# Patient Record
Sex: FEMALE | Race: WHITE
[De-identification: names, ages, dates, MRNs, and addresses within clinical notes are randomized per-mention and may not be internally consistent; named-entity substitution may affect disease eponyms.]

---

## 2017-02-13 ENCOUNTER — APPOINTMENT (OUTPATIENT)
Dept: CARDIOLOGY | Facility: CLINIC | Age: 72
End: 2017-02-13

## 2017-02-13 VITALS
BODY MASS INDEX: 42.75 KG/M2 | SYSTOLIC BLOOD PRESSURE: 140 MMHG | WEIGHT: 266 LBS | DIASTOLIC BLOOD PRESSURE: 70 MMHG | HEART RATE: 84 BPM | HEIGHT: 66 IN

## 2017-04-03 ENCOUNTER — APPOINTMENT (OUTPATIENT)
Dept: CARDIOLOGY | Facility: CLINIC | Age: 72
End: 2017-04-03

## 2017-04-03 VITALS
WEIGHT: 271 LBS | BODY MASS INDEX: 43.55 KG/M2 | HEIGHT: 66 IN | SYSTOLIC BLOOD PRESSURE: 150 MMHG | DIASTOLIC BLOOD PRESSURE: 68 MMHG | HEART RATE: 63 BPM

## 2017-04-14 ENCOUNTER — APPOINTMENT (OUTPATIENT)
Dept: CARDIOLOGY | Facility: CLINIC | Age: 72
End: 2017-04-14

## 2017-04-17 ENCOUNTER — APPOINTMENT (OUTPATIENT)
Dept: CARDIOLOGY | Facility: CLINIC | Age: 72
End: 2017-04-17

## 2017-04-17 VITALS
WEIGHT: 268 LBS | BODY MASS INDEX: 43.07 KG/M2 | DIASTOLIC BLOOD PRESSURE: 60 MMHG | SYSTOLIC BLOOD PRESSURE: 130 MMHG | HEIGHT: 66 IN

## 2017-05-22 ENCOUNTER — APPOINTMENT (OUTPATIENT)
Dept: CARDIOLOGY | Facility: CLINIC | Age: 72
End: 2017-05-22

## 2017-05-22 VITALS
BODY MASS INDEX: 43.23 KG/M2 | WEIGHT: 269 LBS | SYSTOLIC BLOOD PRESSURE: 130 MMHG | HEART RATE: 71 BPM | DIASTOLIC BLOOD PRESSURE: 60 MMHG | HEIGHT: 66 IN

## 2017-05-22 DIAGNOSIS — R07.9 CHEST PAIN, UNSPECIFIED: ICD-10-CM

## 2017-05-31 ENCOUNTER — OUTPATIENT (OUTPATIENT)
Dept: OUTPATIENT SERVICES | Facility: HOSPITAL | Age: 72
LOS: 1 days | Discharge: HOME | End: 2017-05-31

## 2017-06-12 ENCOUNTER — APPOINTMENT (OUTPATIENT)
Dept: CARDIOLOGY | Facility: CLINIC | Age: 72
End: 2017-06-12

## 2017-06-12 VITALS
DIASTOLIC BLOOD PRESSURE: 62 MMHG | BODY MASS INDEX: 43.23 KG/M2 | SYSTOLIC BLOOD PRESSURE: 112 MMHG | WEIGHT: 269 LBS | HEIGHT: 66 IN | HEART RATE: 72 BPM

## 2017-06-28 DIAGNOSIS — Z88.0 ALLERGY STATUS TO PENICILLIN: ICD-10-CM

## 2017-06-28 DIAGNOSIS — Z95.2 PRESENCE OF PROSTHETIC HEART VALVE: ICD-10-CM

## 2017-06-28 DIAGNOSIS — R06.00 DYSPNEA, UNSPECIFIED: ICD-10-CM

## 2017-06-28 DIAGNOSIS — I10 ESSENTIAL (PRIMARY) HYPERTENSION: ICD-10-CM

## 2017-06-28 DIAGNOSIS — Z98.61 CORONARY ANGIOPLASTY STATUS: ICD-10-CM

## 2017-06-28 DIAGNOSIS — E11.9 TYPE 2 DIABETES MELLITUS WITHOUT COMPLICATIONS: ICD-10-CM

## 2017-08-28 ENCOUNTER — APPOINTMENT (OUTPATIENT)
Dept: CARDIOLOGY | Facility: CLINIC | Age: 72
End: 2017-08-28

## 2017-08-28 VITALS
DIASTOLIC BLOOD PRESSURE: 78 MMHG | HEART RATE: 78 BPM | SYSTOLIC BLOOD PRESSURE: 126 MMHG | WEIGHT: 293 LBS | BODY MASS INDEX: 47.09 KG/M2 | HEIGHT: 66 IN

## 2017-09-18 ENCOUNTER — APPOINTMENT (OUTPATIENT)
Dept: CARDIOLOGY | Facility: CLINIC | Age: 72
End: 2017-09-18

## 2017-09-18 ENCOUNTER — RESULT REVIEW (OUTPATIENT)
Age: 72
End: 2017-09-18

## 2017-09-18 ENCOUNTER — OUTPATIENT (OUTPATIENT)
Dept: OUTPATIENT SERVICES | Facility: HOSPITAL | Age: 72
LOS: 1 days | Discharge: HOME | End: 2017-09-18

## 2017-09-18 VITALS
HEIGHT: 66 IN | DIASTOLIC BLOOD PRESSURE: 68 MMHG | HEART RATE: 69 BPM | WEIGHT: 293 LBS | BODY MASS INDEX: 47.09 KG/M2 | SYSTOLIC BLOOD PRESSURE: 108 MMHG

## 2017-09-18 DIAGNOSIS — L27.0 GENERALIZED SKIN ERUPTION DUE TO DRUGS AND MEDICAMENTS TAKEN INTERNALLY: ICD-10-CM

## 2017-09-18 DIAGNOSIS — R06.00 DYSPNEA, UNSPECIFIED: ICD-10-CM

## 2017-09-18 DIAGNOSIS — R06.09 OTHER FORMS OF DYSPNEA: ICD-10-CM

## 2017-09-18 DIAGNOSIS — E11.9 TYPE 2 DIABETES MELLITUS WITHOUT COMPLICATIONS: ICD-10-CM

## 2017-09-18 DIAGNOSIS — E78.5 HYPERLIPIDEMIA, UNSPECIFIED: ICD-10-CM

## 2017-09-18 DIAGNOSIS — I10 ESSENTIAL (PRIMARY) HYPERTENSION: ICD-10-CM

## 2017-09-18 DIAGNOSIS — I25.10 ATHEROSCLEROTIC HEART DISEASE OF NATIVE CORONARY ARTERY WITHOUT ANGINA PECTORIS: ICD-10-CM

## 2017-09-25 ENCOUNTER — OUTPATIENT (OUTPATIENT)
Dept: OUTPATIENT SERVICES | Facility: HOSPITAL | Age: 72
LOS: 1 days | Discharge: HOME | End: 2017-09-25

## 2017-09-25 DIAGNOSIS — E11.9 TYPE 2 DIABETES MELLITUS WITHOUT COMPLICATIONS: ICD-10-CM

## 2017-09-25 DIAGNOSIS — I25.10 ATHEROSCLEROTIC HEART DISEASE OF NATIVE CORONARY ARTERY WITHOUT ANGINA PECTORIS: ICD-10-CM

## 2017-09-25 DIAGNOSIS — E78.5 HYPERLIPIDEMIA, UNSPECIFIED: ICD-10-CM

## 2017-09-25 DIAGNOSIS — L27.0 GENERALIZED SKIN ERUPTION DUE TO DRUGS AND MEDICAMENTS TAKEN INTERNALLY: ICD-10-CM

## 2017-09-28 ENCOUNTER — MEDICATION RENEWAL (OUTPATIENT)
Age: 72
End: 2017-09-28

## 2017-10-03 LAB
ALBUMIN SERPL-MCNC: 3.9 G/DL
ALBUMIN/GLOB SERPL: 1.44
ALP SERPL-CCNC: 43 IU/L
ALT SERPL-CCNC: 15 IU/L
ANION GAP SERPL CALC-SCNC: 15 MEQ/L
AST SERPL-CCNC: 26 IU/L
BASOPHILS # BLD: 0.01 TH/MM3
BASOPHILS NFR BLD: 0.2 %
BILIRUB SERPL-MCNC: 0.9 MG/DL
BNP SERPL-MCNC: 303 PG/ML
BUN SERPL-MCNC: 33 MG/DL
BUN/CREAT SERPL: 25.2 %
CALCIUM SERPL-MCNC: 9.2 MG/DL
CHLORIDE SERPL-SCNC: 89 MEQ/L
CHOLEST SERPL-MCNC: 150 MG/DL
CO2 SERPL-SCNC: 34 MEQ/L
CREAT SERPL-MCNC: 1.31 MG/DL
DIFFERENTIAL METHOD BLD: NORMAL
EOSINOPHIL # BLD: 0.05 TH/MM3
EOSINOPHIL NFR BLD: 0.9 %
ERYTHROCYTE [DISTWIDTH] IN BLOOD BY AUTOMATED COUNT: 19 %
ESTIMATED AVERGAGE GLUCOSE (NORTH): 91 MG/DL
GFR SERPL CREATININE-BSD FRML MDRD: 40
GLUCOSE SERPL-MCNC: 56 MG/DL
GRANULOCYTES # BLD: 2.88 TH/MM3
GRANULOCYTES NFR BLD: 53.5 %
HBA1C MFR BLD: 4.8 %
HCT VFR BLD AUTO: 27.2 %
HDLC SERPL-MCNC: 73 MG/DL
HDLC SERPL: 2.05
HGB BLD-MCNC: 8 G/DL
IMM GRANULOCYTES # BLD: 0.01 TH/MM3
IMM GRANULOCYTES NFR BLD: 0.2 %
LDLC SERPL DIRECT ASSAY-MCNC: 54 MG/DL
LYMPHOCYTES # BLD: 1.78 TH/MM3
LYMPHOCYTES NFR BLD: 33 %
MCH RBC QN AUTO: 25.6 PG
MCHC RBC AUTO-ENTMCNC: 29.4 G/DL
MCV RBC AUTO: 86.9 FL
MONOCYTES # BLD: 0.66 TH/MM3
MONOCYTES NFR BLD: 12.2 %
PLATELET # BLD: 187 TH/MM3
PMV BLD AUTO: 10.4 FL
POTASSIUM SERPL-SCNC: 2.7 MMOL/L
PROT SERPL-MCNC: 6.6 G/DL
RBC # BLD AUTO: 3.13 MIL/MM3
SODIUM SERPL-SCNC: 138 MEQ/L
TRIGL SERPL-MCNC: 67 MG/DL
VLDLC SERPL-MCNC: 13 MG/DL
WBC # BLD: 5.39 TH/MM3

## 2017-10-13 ENCOUNTER — MEDICATION RENEWAL (OUTPATIENT)
Age: 72
End: 2017-10-13

## 2017-10-17 ENCOUNTER — MEDICATION RENEWAL (OUTPATIENT)
Age: 72
End: 2017-10-17

## 2017-10-20 ENCOUNTER — MEDICATION RENEWAL (OUTPATIENT)
Age: 72
End: 2017-10-20

## 2017-10-23 ENCOUNTER — APPOINTMENT (OUTPATIENT)
Dept: CARDIOLOGY | Facility: CLINIC | Age: 72
End: 2017-10-23

## 2017-11-06 ENCOUNTER — APPOINTMENT (OUTPATIENT)
Dept: CARDIOLOGY | Facility: CLINIC | Age: 72
End: 2017-11-06

## 2017-11-06 VITALS
HEIGHT: 66 IN | DIASTOLIC BLOOD PRESSURE: 60 MMHG | BODY MASS INDEX: 45.8 KG/M2 | HEART RATE: 65 BPM | SYSTOLIC BLOOD PRESSURE: 100 MMHG | WEIGHT: 285 LBS

## 2018-01-22 ENCOUNTER — APPOINTMENT (OUTPATIENT)
Dept: CARDIOLOGY | Facility: CLINIC | Age: 73
End: 2018-01-22

## 2018-02-12 ENCOUNTER — OUTPATIENT (OUTPATIENT)
Dept: OUTPATIENT SERVICES | Facility: HOSPITAL | Age: 73
LOS: 1 days | Discharge: HOME | End: 2018-02-12

## 2018-02-12 ENCOUNTER — APPOINTMENT (OUTPATIENT)
Dept: CARDIOLOGY | Facility: CLINIC | Age: 73
End: 2018-02-12

## 2018-02-12 DIAGNOSIS — D53.9 NUTRITIONAL ANEMIA, UNSPECIFIED: ICD-10-CM

## 2018-02-12 DIAGNOSIS — D51.0 VITAMIN B12 DEFICIENCY ANEMIA DUE TO INTRINSIC FACTOR DEFICIENCY: ICD-10-CM

## 2018-02-12 DIAGNOSIS — N39.0 URINARY TRACT INFECTION, SITE NOT SPECIFIED: ICD-10-CM

## 2018-02-12 DIAGNOSIS — Z00.00 ENCOUNTER FOR GENERAL ADULT MEDICAL EXAMINATION WITHOUT ABNORMAL FINDINGS: ICD-10-CM

## 2018-02-12 DIAGNOSIS — E78.5 HYPERLIPIDEMIA, UNSPECIFIED: ICD-10-CM

## 2018-02-12 DIAGNOSIS — E11.9 TYPE 2 DIABETES MELLITUS WITHOUT COMPLICATIONS: ICD-10-CM

## 2018-02-12 DIAGNOSIS — D51.8 OTHER VITAMIN B12 DEFICIENCY ANEMIAS: ICD-10-CM

## 2018-02-12 DIAGNOSIS — E03.9 HYPOTHYROIDISM, UNSPECIFIED: ICD-10-CM

## 2018-02-15 ENCOUNTER — RX RENEWAL (OUTPATIENT)
Age: 73
End: 2018-02-15

## 2018-03-01 ENCOUNTER — MEDICATION RENEWAL (OUTPATIENT)
Age: 73
End: 2018-03-01

## 2018-03-27 ENCOUNTER — MEDICATION RENEWAL (OUTPATIENT)
Age: 73
End: 2018-03-27

## 2018-04-03 ENCOUNTER — RX RENEWAL (OUTPATIENT)
Age: 73
End: 2018-04-03

## 2018-05-14 ENCOUNTER — APPOINTMENT (OUTPATIENT)
Dept: CARDIOLOGY | Facility: CLINIC | Age: 73
End: 2018-05-14

## 2018-05-14 VITALS
BODY MASS INDEX: 46.28 KG/M2 | WEIGHT: 288 LBS | DIASTOLIC BLOOD PRESSURE: 70 MMHG | HEART RATE: 63 BPM | SYSTOLIC BLOOD PRESSURE: 108 MMHG | HEIGHT: 66 IN

## 2018-05-14 DIAGNOSIS — I25.10 ATHEROSCLEROTIC HEART DISEASE OF NATIVE CORONARY ARTERY W/OUT ANGINA PECTORIS: ICD-10-CM

## 2018-07-18 ENCOUNTER — MEDICATION RENEWAL (OUTPATIENT)
Age: 73
End: 2018-07-18

## 2018-08-06 ENCOUNTER — APPOINTMENT (OUTPATIENT)
Dept: CARDIOLOGY | Facility: CLINIC | Age: 73
End: 2018-08-06

## 2018-08-06 VITALS
HEART RATE: 64 BPM | SYSTOLIC BLOOD PRESSURE: 130 MMHG | WEIGHT: 287 LBS | BODY MASS INDEX: 46.12 KG/M2 | HEIGHT: 66 IN | DIASTOLIC BLOOD PRESSURE: 80 MMHG

## 2018-10-18 ENCOUNTER — MEDICATION RENEWAL (OUTPATIENT)
Age: 73
End: 2018-10-18

## 2018-11-07 ENCOUNTER — OTHER (OUTPATIENT)
Age: 73
End: 2018-11-07

## 2018-11-08 ENCOUNTER — MEDICATION RENEWAL (OUTPATIENT)
Age: 73
End: 2018-11-08

## 2018-11-12 ENCOUNTER — APPOINTMENT (OUTPATIENT)
Dept: CARDIOLOGY | Facility: CLINIC | Age: 73
End: 2018-11-12

## 2018-11-12 VITALS
HEIGHT: 66 IN | WEIGHT: 285 LBS | HEART RATE: 76 BPM | SYSTOLIC BLOOD PRESSURE: 120 MMHG | DIASTOLIC BLOOD PRESSURE: 62 MMHG | BODY MASS INDEX: 45.8 KG/M2

## 2018-11-12 NOTE — HISTORY OF PRESENT ILLNESS
[FreeTextEntry1] : 72 y/o lady, presents for follow-up. Hx of severe AS, and had a lesion in the LAD. The patient had a AVR with CABG by Dr. Gonzales in September of 2016. Last cath revealed patent graft and no AO gradient. She had an echo, which revealed Normal LV function, normally working AVR, mild MR. She feels better with increased dose of Ranexa. Still with leg edema, better with increased Lasix dose. She also had a TIA, but no further events since then. labs noted. Feels more tired - no new labs though.

## 2018-11-12 NOTE — REASON FOR VISIT
[Follow-Up - Clinic] : a clinic follow-up of [Aortic Stenosis] : aortic stenosis [Coronary Artery Disease] : coronary artery disease

## 2018-11-12 NOTE — PHYSICAL EXAM
[General Appearance - Well Developed] : well developed [General Appearance - Well Nourished] : well nourished [General Appearance - In No Acute Distress] : no acute distress [Normal Conjunctiva] : the conjunctiva exhibited no abnormalities [Normal Oral Mucosa] : normal oral mucosa [No Oral Pallor] : no oral pallor [Normal Oropharynx] : normal oropharynx [] : no respiratory distress [Respiration, Rhythm And Depth] : normal respiratory rhythm and effort [Exaggerated Use Of Accessory Muscles For Inspiration] : no accessory muscle use [Auscultation Breath Sounds / Voice Sounds] : lungs were clear to auscultation bilaterally [Heart Rate And Rhythm] : heart rate and rhythm were normal [Heart Sounds] : normal S1 and S2 [Murmurs] : no murmurs present [Bowel Sounds] : normal bowel sounds [Abdomen Soft] : soft [Abdomen Tenderness] : non-tender [Nail Clubbing] : no clubbing of the fingernails [Cyanosis, Localized] : no localized cyanosis [Petechial Hemorrhages (___cm)] : no petechial hemorrhages [Oriented To Time, Place, And Person] : oriented to person, place, and time [Affect] : the affect was normal [FreeTextEntry1] : erythema LE b/l, venostasis lesions

## 2018-11-12 NOTE — REVIEW OF SYSTEMS
[Feeling Fatigued] : feeling fatigued [Dyspnea on exertion] : dyspnea during exertion [Cough] : cough [Abdominal Pain] : abdominal pain [Heartburn] : heartburn [Joint Pain] : joint pain [see HPI] : see HPI [Skin: A Rash] : rash: [Itching] : itching [Change In Color Of Skin] : change in skin color [Skin Lesions] : skin lesion(s): [Depression] : depression [Anxiety] : anxiety [Under Stress] : under stress [Fever] : no fever [Headache] : no headache [Chills] : no chills [Blurry Vision] : no blurred vision [Seeing Double (Diplopia)] : no diplopia [Earache] : no earache [Discharge From The Ears] : no discharge from the ears [Shortness Of Breath] : no shortness of breath [Chest  Pressure] : no chest pressure [Chest Pain] : no chest pain [Lower Ext Edema] : no extremity edema [Leg Claudication] : no intermittent leg claudication [Palpitations] : no palpitations [Wheezing] : no wheezing [Nausea] : no nausea [Vomiting] : no vomiting [Dysuria] : no dysuria [Joint Swelling] : no joint swelling [Dizziness] : no dizziness [Tremor] : no tremor was seen [Confusion] : no confusion was observed [Excessive Thirst] : no polydipsia [Easy Bleeding] : no tendency for easy bleeding [Easy Bruising] : no tendency for easy bruising

## 2018-11-12 NOTE — ASSESSMENT
[FreeTextEntry1] : S/p AVR, CABG, recovering well.\par Still with edema - improved.\par CAD, s/p CABG.\par Echo and cath results noted.\par BP is controlled.\par Anemic - following with Heme and GI. \par C/w current medical therapy. C/w Losartan. \par C/w Ranexa 1000 mg q12.\par C/w Lasix BID. Metolazone as needed.\par KCL suppl.\par Repeat labs  - f/u in 1 month\par

## 2018-12-17 ENCOUNTER — APPOINTMENT (OUTPATIENT)
Dept: CARDIOLOGY | Facility: CLINIC | Age: 73
End: 2018-12-17

## 2018-12-17 VITALS
WEIGHT: 240 LBS | BODY MASS INDEX: 38.57 KG/M2 | HEIGHT: 66 IN | HEART RATE: 70 BPM | DIASTOLIC BLOOD PRESSURE: 70 MMHG | SYSTOLIC BLOOD PRESSURE: 130 MMHG

## 2018-12-17 DIAGNOSIS — R06.00 DYSPNEA, UNSPECIFIED: ICD-10-CM

## 2018-12-17 NOTE — PHYSICAL EXAM
[General Appearance - Well Developed] : well developed [General Appearance - Well Nourished] : well nourished [General Appearance - In No Acute Distress] : no acute distress [Normal Conjunctiva] : the conjunctiva exhibited no abnormalities [Normal Oral Mucosa] : normal oral mucosa [No Oral Pallor] : no oral pallor [Normal Oropharynx] : normal oropharynx [] : no respiratory distress [Respiration, Rhythm And Depth] : normal respiratory rhythm and effort [Exaggerated Use Of Accessory Muscles For Inspiration] : no accessory muscle use [Auscultation Breath Sounds / Voice Sounds] : lungs were clear to auscultation bilaterally [Heart Rate And Rhythm] : heart rate and rhythm were normal [Heart Sounds] : normal S1 and S2 [Murmurs] : no murmurs present [Bowel Sounds] : normal bowel sounds [Abdomen Soft] : soft [Abdomen Tenderness] : non-tender [Nail Clubbing] : no clubbing of the fingernails [Cyanosis, Localized] : no localized cyanosis [Petechial Hemorrhages (___cm)] : no petechial hemorrhages [FreeTextEntry1] : erythema LE b/l, venostasis lesions [Oriented To Time, Place, And Person] : oriented to person, place, and time [Affect] : the affect was normal

## 2018-12-17 NOTE — HISTORY OF PRESENT ILLNESS
[FreeTextEntry1] : 74 y/o lady, presents for follow-up. Hx of severe AS, and had a lesion in the LAD. The patient had a AVR with CABG by Dr. Gonzales in September of 2016. Last cath revealed patent graft and no AO gradient. She had an echo, which revealed Normal LV function, normally working AVR, mild MR. She feels better with increased dose of Ranexa. Still with leg edema, better with increased Lasix dose. She also had a TIA, but no further events since then. labs noted. Feels more tired -  new labs pending.\par Still with dyspnea on exertion.

## 2018-12-17 NOTE — ASSESSMENT
[FreeTextEntry1] : S/p AVR, CABG, recovering well.\par Still with edema - improved.\par CAD, s/p CABG.\par Echo and cath results noted.\par BP is controlled.\par Anemic - following with Heme and GI. \par C/w current medical therapy. C/w Losartan. \par C/w Ranexa 1000 mg q12.\par C/w Lasix BID. Metolazone as needed.\par KCL suppl.\par Repeat labs  noted - f/u in 3 months\par Consider pulmonary evaluation\par

## 2018-12-17 NOTE — REVIEW OF SYSTEMS
[Fever] : no fever [Headache] : no headache [Chills] : no chills [Feeling Fatigued] : feeling fatigued [Blurry Vision] : no blurred vision [Seeing Double (Diplopia)] : no diplopia [Earache] : no earache [Discharge From The Ears] : no discharge from the ears [Shortness Of Breath] : no shortness of breath [Dyspnea on exertion] : dyspnea during exertion [Chest  Pressure] : no chest pressure [Chest Pain] : no chest pain [Lower Ext Edema] : no extremity edema [Leg Claudication] : no intermittent leg claudication [Palpitations] : no palpitations [Cough] : cough [Wheezing] : no wheezing [Abdominal Pain] : abdominal pain [Nausea] : no nausea [Vomiting] : no vomiting [Heartburn] : heartburn [Dysuria] : no dysuria [Joint Pain] : joint pain [Joint Swelling] : no joint swelling [see HPI] : see HPI [Skin: A Rash] : rash: [Itching] : itching [Change In Color Of Skin] : change in skin color [Skin Lesions] : skin lesion(s): [Dizziness] : no dizziness [Tremor] : no tremor was seen [Confusion] : no confusion was observed [Depression] : depression [Anxiety] : anxiety [Under Stress] : under stress [Excessive Thirst] : no polydipsia [Easy Bleeding] : no tendency for easy bleeding [Easy Bruising] : no tendency for easy bruising

## 2019-01-03 ENCOUNTER — RX RENEWAL (OUTPATIENT)
Age: 74
End: 2019-01-03

## 2019-01-17 ENCOUNTER — MEDICATION RENEWAL (OUTPATIENT)
Age: 74
End: 2019-01-17

## 2019-01-30 ENCOUNTER — MEDICATION RENEWAL (OUTPATIENT)
Age: 74
End: 2019-01-30

## 2019-01-31 ENCOUNTER — MEDICATION RENEWAL (OUTPATIENT)
Age: 74
End: 2019-01-31

## 2019-03-18 ENCOUNTER — TRANSCRIPTION ENCOUNTER (OUTPATIENT)
Age: 74
End: 2019-03-18

## 2019-03-18 ENCOUNTER — APPOINTMENT (OUTPATIENT)
Dept: CARDIOLOGY | Facility: CLINIC | Age: 74
End: 2019-03-18

## 2019-03-18 VITALS
HEIGHT: 66 IN | HEART RATE: 60 BPM | DIASTOLIC BLOOD PRESSURE: 60 MMHG | BODY MASS INDEX: 47.09 KG/M2 | SYSTOLIC BLOOD PRESSURE: 132 MMHG | WEIGHT: 293 LBS

## 2019-03-18 NOTE — HISTORY OF PRESENT ILLNESS
[FreeTextEntry1] : 74 y/o lady, presents for follow-up. Hx of severe AS, and had a lesion in the LAD. The patient had a AVR with CABG by Dr. Gonzales in September of 2016. Last cath revealed patent graft and no AO gradient. She had an echo, which revealed Normal LV function, normally working AVR, mild MR. She feels better with increased dose of Ranexa. Still with leg edema, better with increased Lasix dose. She also had a TIA, but no further events since then. labs noted. Feels more tired. Still with stable dyspnea on exertion.\par She feels she has more constipation with Ranexa.

## 2019-03-18 NOTE — ASSESSMENT
[FreeTextEntry1] : S/p AVR, CABG, recovering well.\par Still with edema - improved.\par CAD, s/p CABG.\par Echo and cath results noted.\par BP is controlled.\par Anemic - following with Heme and GI. \par C/w current medical therapy. C/w Losartan. \par C/w Ranexa 1000 mg q12.\par Doubt constipation is related to Ranexa, but we agreed she will hold Ranexa for 3-4 days and check the response.\par C/w Lasix BID. Metolazone as needed.\par KCL suppl.\par Repeat labs  noted - f/u in 3 months\par Pulmonary evaluation\par Patient is depressed - c/w Zoloft, f/u with the PMD.\par \par

## 2019-03-18 NOTE — REVIEW OF SYSTEMS
[Fever] : no fever [Headache] : no headache [Chills] : no chills [Feeling Fatigued] : feeling fatigued [Blurry Vision] : no blurred vision [Seeing Double (Diplopia)] : no diplopia [Earache] : no earache [Discharge From The Ears] : no discharge from the ears [Shortness Of Breath] : no shortness of breath [Dyspnea on exertion] : dyspnea during exertion [Chest  Pressure] : no chest pressure [Chest Pain] : no chest pain [Lower Ext Edema] : no extremity edema [Palpitations] : no palpitations [Leg Claudication] : no intermittent leg claudication [Cough] : cough [Wheezing] : no wheezing [Nausea] : no nausea [Abdominal Pain] : abdominal pain [Heartburn] : heartburn [Vomiting] : no vomiting [Dysuria] : no dysuria [Joint Pain] : joint pain [Joint Swelling] : no joint swelling [see HPI] : see HPI [Skin: A Rash] : rash: [Itching] : itching [Change In Color Of Skin] : change in skin color [Skin Lesions] : skin lesion(s): [Dizziness] : no dizziness [Tremor] : no tremor was seen [Confusion] : no confusion was observed [Depression] : depression [Anxiety] : anxiety [Under Stress] : under stress [Excessive Thirst] : no polydipsia [Easy Bleeding] : no tendency for easy bleeding [Easy Bruising] : no tendency for easy bruising

## 2019-03-18 NOTE — PHYSICAL EXAM
[General Appearance - Well Developed] : well developed [General Appearance - Well Nourished] : well nourished [General Appearance - In No Acute Distress] : no acute distress [Normal Conjunctiva] : the conjunctiva exhibited no abnormalities [Normal Oral Mucosa] : normal oral mucosa [No Oral Pallor] : no oral pallor [Normal Oropharynx] : normal oropharynx [Respiration, Rhythm And Depth] : normal respiratory rhythm and effort [Exaggerated Use Of Accessory Muscles For Inspiration] : no accessory muscle use [] : no respiratory distress [Auscultation Breath Sounds / Voice Sounds] : lungs were clear to auscultation bilaterally [Heart Rate And Rhythm] : heart rate and rhythm were normal [Heart Sounds] : normal S1 and S2 [Murmurs] : no murmurs present [Bowel Sounds] : normal bowel sounds [Abdomen Soft] : soft [Abdomen Tenderness] : non-tender [Nail Clubbing] : no clubbing of the fingernails [Cyanosis, Localized] : no localized cyanosis [Petechial Hemorrhages (___cm)] : no petechial hemorrhages [Oriented To Time, Place, And Person] : oriented to person, place, and time [FreeTextEntry1] : erythema LE b/l, venostasis lesions [Affect] : the affect was normal

## 2019-07-08 ENCOUNTER — APPOINTMENT (OUTPATIENT)
Dept: CARDIOLOGY | Facility: CLINIC | Age: 74
End: 2019-07-08
Payer: MEDICARE

## 2019-07-08 VITALS
WEIGHT: 283 LBS | DIASTOLIC BLOOD PRESSURE: 66 MMHG | SYSTOLIC BLOOD PRESSURE: 134 MMHG | HEIGHT: 66 IN | BODY MASS INDEX: 45.48 KG/M2 | HEART RATE: 88 BPM

## 2019-07-08 PROCEDURE — 99214 OFFICE O/P EST MOD 30 MIN: CPT

## 2019-07-08 PROCEDURE — 93000 ELECTROCARDIOGRAM COMPLETE: CPT

## 2019-07-08 NOTE — PHYSICAL EXAM
[General Appearance - Well Developed] : well developed [General Appearance - Well Nourished] : well nourished [General Appearance - In No Acute Distress] : no acute distress [Normal Conjunctiva] : the conjunctiva exhibited no abnormalities [No Oral Pallor] : no oral pallor [Normal Oropharynx] : normal oropharynx [Normal Oral Mucosa] : normal oral mucosa [] : no respiratory distress [Respiration, Rhythm And Depth] : normal respiratory rhythm and effort [Exaggerated Use Of Accessory Muscles For Inspiration] : no accessory muscle use [Auscultation Breath Sounds / Voice Sounds] : lungs were clear to auscultation bilaterally [Heart Rate And Rhythm] : heart rate and rhythm were normal [Heart Sounds] : normal S1 and S2 [Murmurs] : no murmurs present [Bowel Sounds] : normal bowel sounds [Abdomen Soft] : soft [Abdomen Tenderness] : non-tender [Nail Clubbing] : no clubbing of the fingernails [Petechial Hemorrhages (___cm)] : no petechial hemorrhages [Cyanosis, Localized] : no localized cyanosis [FreeTextEntry1] : healed ulcers, venostasis changes b/l L>R [Oriented To Time, Place, And Person] : oriented to person, place, and time [Affect] : the affect was normal

## 2019-07-08 NOTE — REVIEW OF SYSTEMS
[Fever] : no fever [Chills] : no chills [Headache] : no headache [Feeling Fatigued] : feeling fatigued [Blurry Vision] : no blurred vision [Seeing Double (Diplopia)] : no diplopia [Earache] : no earache [Shortness Of Breath] : no shortness of breath [Discharge From The Ears] : no discharge from the ears [Chest  Pressure] : no chest pressure [Dyspnea on exertion] : dyspnea during exertion [Lower Ext Edema] : no extremity edema [Chest Pain] : no chest pain [Leg Claudication] : no intermittent leg claudication [Cough] : cough [Palpitations] : no palpitations [Wheezing] : no wheezing [Abdominal Pain] : abdominal pain [Vomiting] : no vomiting [Heartburn] : heartburn [Nausea] : no nausea [Joint Swelling] : no joint swelling [Joint Pain] : joint pain [Dysuria] : no dysuria [see HPI] : see HPI [Skin: A Rash] : rash: [Change In Color Of Skin] : change in skin color [Itching] : itching [Dizziness] : no dizziness [Skin Lesions] : skin lesion(s): [Tremor] : no tremor was seen [Depression] : depression [Anxiety] : anxiety [Confusion] : no confusion was observed [Under Stress] : under stress [Excessive Thirst] : no polydipsia [Easy Bleeding] : no tendency for easy bleeding [Easy Bruising] : no tendency for easy bruising

## 2019-07-08 NOTE — HISTORY OF PRESENT ILLNESS
[FreeTextEntry1] : 74 y/o lady, presents for follow-up. Hx of severe AS, and had a lesion in the LAD. The patient had a AVR with CABG by Dr. Gonzales in September of 2016. Last cath revealed patent graft and no AO gradient. She had an echo, which revealed Normal LV function, normally working AVR, mild MR. She feels better with increased dose of Ranexa. Still with leg edema, better with increased Lasix dose. She also had a TIA, but no further events since then. labs noted. Feels more tired. Still with stable dyspnea on exertion.\par Also had episodes of left shoulder pains. ST depressions on ECG.

## 2019-07-08 NOTE — ASSESSMENT
[FreeTextEntry1] : S/p AVR, CABG, recovering well.\par Still with edema - improved a lot.\par CAD, s/p CABG.\par Echo and cath results noted.\par BP is controlled.\par Anemic - following with Heme and GI. \par C/w current medical therapy. C/w Losartan. \par C/w Ranexa 1000 mg q12.\par Doubt constipation is related to Ranexa, but we agreed she will hold Ranexa for 3-4 days and check the response.\par C/w Lasix BID. Metolazone as needed.\par KCL suppl.\par Repeat labs  noted - f/u in 3 months\par Pulmonary evaluation\par Patient is depressed - c/w Zoloft, f/u with the PMD.\par In terms of the ischemic w/u will obtain nuclear stress test, especially in light of the ECG changes, although her pain is very atypical and most likely related to rotator cuff, but given the history of DM and CABG, I feel will need to obtain a stress test. Repeat echo to assess AVR.\par F/u in 4 weeks.\par \par

## 2019-07-09 ENCOUNTER — OTHER (OUTPATIENT)
Age: 74
End: 2019-07-09

## 2019-07-16 ENCOUNTER — OTHER (OUTPATIENT)
Age: 74
End: 2019-07-16

## 2019-07-18 ENCOUNTER — APPOINTMENT (OUTPATIENT)
Dept: CARDIOLOGY | Facility: CLINIC | Age: 74
End: 2019-07-18
Payer: MEDICARE

## 2019-07-18 DIAGNOSIS — I77.9 DISORDER OF ARTERIES AND ARTERIOLES, UNSPECIFIED: ICD-10-CM

## 2019-07-18 PROCEDURE — 93880 EXTRACRANIAL BILAT STUDY: CPT

## 2019-07-18 PROCEDURE — 93306 TTE W/DOPPLER COMPLETE: CPT

## 2019-07-20 PROBLEM — I77.9 CAROTID ARTERY DISEASE: Status: ACTIVE | Noted: 2019-07-20

## 2019-07-22 ENCOUNTER — FORM ENCOUNTER (OUTPATIENT)
Age: 74
End: 2019-07-22

## 2019-07-23 ENCOUNTER — OUTPATIENT (OUTPATIENT)
Dept: OUTPATIENT SERVICES | Facility: HOSPITAL | Age: 74
LOS: 1 days | Discharge: HOME | End: 2019-07-23
Payer: MEDICARE

## 2019-07-23 DIAGNOSIS — R94.31 ABNORMAL ELECTROCARDIOGRAM [ECG] [EKG]: ICD-10-CM

## 2019-07-23 PROCEDURE — 78452 HT MUSCLE IMAGE SPECT MULT: CPT | Mod: 26

## 2019-07-29 ENCOUNTER — MEDICATION RENEWAL (OUTPATIENT)
Age: 74
End: 2019-07-29

## 2019-08-16 ENCOUNTER — RX RENEWAL (OUTPATIENT)
Age: 74
End: 2019-08-16

## 2019-09-23 ENCOUNTER — APPOINTMENT (OUTPATIENT)
Dept: CARDIOLOGY | Facility: CLINIC | Age: 74
End: 2019-09-23
Payer: MEDICARE

## 2019-09-23 VITALS
HEART RATE: 96 BPM | DIASTOLIC BLOOD PRESSURE: 58 MMHG | SYSTOLIC BLOOD PRESSURE: 130 MMHG | BODY MASS INDEX: 46.93 KG/M2 | WEIGHT: 292 LBS | HEIGHT: 66 IN

## 2019-09-23 DIAGNOSIS — I10 ESSENTIAL (PRIMARY) HYPERTENSION: ICD-10-CM

## 2019-09-23 PROCEDURE — 99214 OFFICE O/P EST MOD 30 MIN: CPT

## 2019-09-23 PROCEDURE — 93000 ELECTROCARDIOGRAM COMPLETE: CPT

## 2019-09-23 NOTE — HISTORY OF PRESENT ILLNESS
[FreeTextEntry1] : 73 y/o lady, presents for follow-up. Hx of severe AS, and had a lesion in the LAD. The patient had a AVR with CABG by Dr. Gonzales in September of 2016. Last cath revealed patent graft and no AO gradient. She had an echo, which revealed Normal LV function, normally working AVR, mild MR. She feels better with increased dose of Ranexa. Still with leg edema, better with increased Lasix dose - got dizzy, so went back to 40 mg a day. She also had a TIA, but no further events since then.  Feels more tired. Still with stable dyspnea on exertion.\par Also had episodes of left shoulder pains - has rotator cuff tear - going through PT.

## 2019-09-23 NOTE — PHYSICAL EXAM
[General Appearance - Well Developed] : well developed [General Appearance - In No Acute Distress] : no acute distress [General Appearance - Well Nourished] : well nourished [Normal Conjunctiva] : the conjunctiva exhibited no abnormalities [Normal Oral Mucosa] : normal oral mucosa [Normal Oropharynx] : normal oropharynx [No Oral Pallor] : no oral pallor [Respiration, Rhythm And Depth] : normal respiratory rhythm and effort [] : no respiratory distress [Exaggerated Use Of Accessory Muscles For Inspiration] : no accessory muscle use [Heart Rate And Rhythm] : heart rate and rhythm were normal [Auscultation Breath Sounds / Voice Sounds] : lungs were clear to auscultation bilaterally [Murmurs] : no murmurs present [Heart Sounds] : normal S1 and S2 [Bowel Sounds] : normal bowel sounds [Abdomen Soft] : soft [Abdomen Tenderness] : non-tender [Nail Clubbing] : no clubbing of the fingernails [Cyanosis, Localized] : no localized cyanosis [Petechial Hemorrhages (___cm)] : no petechial hemorrhages [FreeTextEntry1] : erythema LE b/l, venostasis lesions [Oriented To Time, Place, And Person] : oriented to person, place, and time [Affect] : the affect was normal

## 2019-09-23 NOTE — ASSESSMENT
[FreeTextEntry1] : S/p AVR, CABG, recovering well.\par Still with edema - improved a lot.\par CAD, s/p CABG.\par Echo and cath results noted.\par BP is controlled.\par Anemic - following with Heme and GI. \par C/w current medical therapy. C/w Losartan. \par C/w Ranexa 1000 mg q12.\par C/w Lasix BID. Metolazone as needed.\par KCL suppl.\par Repeat labs  noted - f/u in 3 months\par Pulmonary evaluation\par Patient is depressed - c/w Zoloft, f/u with the PMD.\par In terms of the ischemic w/u will obtain nuclear stress test, especially in light of the ECG changes, although her pain is very atypical and most likely related to rotator cuff, but given the history of DM and CABG, I feel will need to obtain a stress test. Repeat echo to assess AVR.\par F/u in 4 weeks.\par \par

## 2019-09-23 NOTE — REVIEW OF SYSTEMS
[Fever] : no fever [Headache] : no headache [Feeling Fatigued] : feeling fatigued [Chills] : no chills [Blurry Vision] : no blurred vision [Earache] : no earache [Seeing Double (Diplopia)] : no diplopia [Discharge From The Ears] : no discharge from the ears [Shortness Of Breath] : no shortness of breath [Dyspnea on exertion] : dyspnea during exertion [Chest  Pressure] : no chest pressure [Chest Pain] : no chest pain [Lower Ext Edema] : no extremity edema [Leg Claudication] : no intermittent leg claudication [Palpitations] : no palpitations [Wheezing] : no wheezing [Cough] : cough [Nausea] : no nausea [Abdominal Pain] : abdominal pain [Vomiting] : no vomiting [Heartburn] : heartburn [Dysuria] : no dysuria [Joint Swelling] : no joint swelling [Joint Pain] : joint pain [see HPI] : see HPI [Skin: A Rash] : rash: [Itching] : itching [Change In Color Of Skin] : change in skin color [Skin Lesions] : skin lesion(s): [Dizziness] : no dizziness [Tremor] : no tremor was seen [Depression] : depression [Confusion] : no confusion was observed [Anxiety] : anxiety [Under Stress] : under stress [Excessive Thirst] : no polydipsia [Easy Bleeding] : no tendency for easy bleeding [Easy Bruising] : no tendency for easy bruising

## 2020-01-27 ENCOUNTER — APPOINTMENT (OUTPATIENT)
Dept: CARDIOLOGY | Facility: CLINIC | Age: 75
End: 2020-01-27
Payer: MEDICARE

## 2020-01-27 VITALS
HEIGHT: 66 IN | SYSTOLIC BLOOD PRESSURE: 118 MMHG | WEIGHT: 282 LBS | DIASTOLIC BLOOD PRESSURE: 50 MMHG | HEART RATE: 90 BPM | BODY MASS INDEX: 45.32 KG/M2

## 2020-01-27 PROCEDURE — 99214 OFFICE O/P EST MOD 30 MIN: CPT

## 2020-01-27 PROCEDURE — 93000 ELECTROCARDIOGRAM COMPLETE: CPT

## 2020-01-27 NOTE — ASSESSMENT
[FreeTextEntry1] : S/p AVR, CABG, recovering well.\par Still with edema - improved a lot. May be over diuresed - decrease metolazone to twice a week.\par CAD, s/p CABG.\par Echo and cath results noted.\par BP is controlled.\par Anemic - following with Heme and GI. last Hg 9.6.\par C/w current medical therapy. C/w Losartan. \par C/w Ranexa 1000 mg q12.\par Decrease ASA to 81 mg.\par C/w Lasix BID. Metolazone as above. K is 3.0 - will increase to 60 mEq for one week, then to 40 mEq after that.\par KCL suppl.\par Repeat labs  noted - f/u with PMD\par Pulmonary evaluation. LENY evaluation discussed - patient is not willing to consider PSG, or to use the CPAP if positive.\par Patient is depressed - c/w Zoloft, f/u with the PMD.\par In terms of the ischemic w/u, her nuclear stress test was negative.\par F/u in 4 months.\par \par

## 2020-01-27 NOTE — HISTORY OF PRESENT ILLNESS
[FreeTextEntry1] : 75 y/o lady, presents for follow-up. Hx of severe AS, and had a lesion in the LAD. The patient had a AVR with CABG by Dr. Gonzales in September of 2016. Last cath revealed patent graft and no AO gradient. She had an echo, which revealed Normal LV function, normally working AVR, mild MR. She feels better with increased dose of Ranexa. Still with leg edema, better with increased Lasix dose - got dizzy, so went back to 40 mg twice a day. She also had a TIA, but no further events since then.  Feels more tired. Still with stable dyspnea on exertion. + bruising. + fatigue. Potassium was low. Hg 9.6.

## 2020-05-18 ENCOUNTER — APPOINTMENT (OUTPATIENT)
Dept: CARDIOLOGY | Facility: CLINIC | Age: 75
End: 2020-05-18
Payer: MEDICARE

## 2020-05-18 PROCEDURE — 99442: CPT

## 2020-08-08 ENCOUNTER — INPATIENT (INPATIENT)
Facility: HOSPITAL | Age: 75
LOS: 4 days | Discharge: HOME | End: 2020-08-13
Attending: INTERNAL MEDICINE | Admitting: INTERNAL MEDICINE
Payer: MEDICARE

## 2020-08-08 VITALS
OXYGEN SATURATION: 98 % | RESPIRATION RATE: 22 BRPM | HEART RATE: 67 BPM | SYSTOLIC BLOOD PRESSURE: 127 MMHG | DIASTOLIC BLOOD PRESSURE: 57 MMHG | TEMPERATURE: 99 F

## 2020-08-08 DIAGNOSIS — N18.3 CHRONIC KIDNEY DISEASE, STAGE 3 (MODERATE): ICD-10-CM

## 2020-08-08 DIAGNOSIS — Z95.1 PRESENCE OF AORTOCORONARY BYPASS GRAFT: Chronic | ICD-10-CM

## 2020-08-08 DIAGNOSIS — E66.9 OBESITY, UNSPECIFIED: ICD-10-CM

## 2020-08-08 DIAGNOSIS — Z95.828 PRESENCE OF OTHER VASCULAR IMPLANTS AND GRAFTS: Chronic | ICD-10-CM

## 2020-08-08 LAB
ALBUMIN SERPL ELPH-MCNC: 3.9 G/DL — SIGNIFICANT CHANGE UP (ref 3.5–5.2)
ALP SERPL-CCNC: 52 U/L — SIGNIFICANT CHANGE UP (ref 30–115)
ALT FLD-CCNC: 27 U/L — SIGNIFICANT CHANGE UP (ref 0–41)
ANION GAP SERPL CALC-SCNC: 11 MMOL/L — SIGNIFICANT CHANGE UP (ref 7–14)
ANION GAP SERPL CALC-SCNC: 15 MMOL/L — HIGH (ref 7–14)
APTT BLD: 31.1 SEC — SIGNIFICANT CHANGE UP (ref 27–39.2)
AST SERPL-CCNC: 74 U/L — HIGH (ref 0–41)
BASE EXCESS BLDV CALC-SCNC: 10.8 MMOL/L — HIGH (ref -2–2)
BASOPHILS # BLD AUTO: 0.01 K/UL — SIGNIFICANT CHANGE UP (ref 0–0.2)
BASOPHILS # BLD AUTO: 0.01 K/UL — SIGNIFICANT CHANGE UP (ref 0–0.2)
BASOPHILS NFR BLD AUTO: 0.1 % — SIGNIFICANT CHANGE UP (ref 0–1)
BASOPHILS NFR BLD AUTO: 0.2 % — SIGNIFICANT CHANGE UP (ref 0–1)
BILIRUB SERPL-MCNC: 0.7 MG/DL — SIGNIFICANT CHANGE UP (ref 0.2–1.2)
BLD GP AB SCN SERPL QL: SIGNIFICANT CHANGE UP
BUN SERPL-MCNC: 32 MG/DL — HIGH (ref 10–20)
BUN SERPL-MCNC: 32 MG/DL — HIGH (ref 10–20)
CA-I SERPL-SCNC: 1.05 MMOL/L — LOW (ref 1.12–1.3)
CALCIUM SERPL-MCNC: 9.2 MG/DL — SIGNIFICANT CHANGE UP (ref 8.5–10.1)
CALCIUM SERPL-MCNC: 9.3 MG/DL — SIGNIFICANT CHANGE UP (ref 8.5–10.1)
CHLORIDE SERPL-SCNC: 100 MMOL/L — SIGNIFICANT CHANGE UP (ref 98–110)
CHLORIDE SERPL-SCNC: 102 MMOL/L — SIGNIFICANT CHANGE UP (ref 98–110)
CK MB CFR SERPL CALC: 2.5 NG/ML — SIGNIFICANT CHANGE UP (ref 0.6–6.3)
CO2 SERPL-SCNC: 28 MMOL/L — SIGNIFICANT CHANGE UP (ref 17–32)
CO2 SERPL-SCNC: 30 MMOL/L — SIGNIFICANT CHANGE UP (ref 17–32)
CREAT SERPL-MCNC: 1.3 MG/DL — SIGNIFICANT CHANGE UP (ref 0.7–1.5)
CREAT SERPL-MCNC: 1.4 MG/DL — SIGNIFICANT CHANGE UP (ref 0.7–1.5)
EOSINOPHIL # BLD AUTO: 0.01 K/UL — SIGNIFICANT CHANGE UP (ref 0–0.7)
EOSINOPHIL # BLD AUTO: 0.01 K/UL — SIGNIFICANT CHANGE UP (ref 0–0.7)
EOSINOPHIL NFR BLD AUTO: 0.1 % — SIGNIFICANT CHANGE UP (ref 0–8)
EOSINOPHIL NFR BLD AUTO: 0.2 % — SIGNIFICANT CHANGE UP (ref 0–8)
GAS PNL BLDV: 130 MMOL/L — LOW (ref 136–145)
GAS PNL BLDV: SIGNIFICANT CHANGE UP
GLUCOSE BLDC GLUCOMTR-MCNC: 151 MG/DL — HIGH (ref 70–99)
GLUCOSE BLDC GLUCOMTR-MCNC: 167 MG/DL — HIGH (ref 70–99)
GLUCOSE SERPL-MCNC: 167 MG/DL — HIGH (ref 70–99)
GLUCOSE SERPL-MCNC: 238 MG/DL — HIGH (ref 70–99)
HCO3 BLDV-SCNC: 36 MMOL/L — HIGH (ref 22–29)
HCT VFR BLD CALC: 26.8 % — LOW (ref 37–47)
HCT VFR BLD CALC: 27.2 % — LOW (ref 37–47)
HCT VFR BLDA CALC: 28.3 % — LOW (ref 34–44)
HGB BLD CALC-MCNC: 9.2 G/DL — LOW (ref 14–18)
HGB BLD-MCNC: 7.6 G/DL — LOW (ref 12–16)
HGB BLD-MCNC: 7.8 G/DL — LOW (ref 12–16)
IMM GRANULOCYTES NFR BLD AUTO: 0.4 % — HIGH (ref 0.1–0.3)
IMM GRANULOCYTES NFR BLD AUTO: 0.5 % — HIGH (ref 0.1–0.3)
INR BLD: 1.3 RATIO — SIGNIFICANT CHANGE UP (ref 0.65–1.3)
LACTATE BLDV-MCNC: 1.6 MMOL/L — SIGNIFICANT CHANGE UP (ref 0.5–1.6)
LYMPHOCYTES # BLD AUTO: 1.03 K/UL — LOW (ref 1.2–3.4)
LYMPHOCYTES # BLD AUTO: 1.47 K/UL — SIGNIFICANT CHANGE UP (ref 1.2–3.4)
LYMPHOCYTES # BLD AUTO: 14.2 % — LOW (ref 20.5–51.1)
LYMPHOCYTES # BLD AUTO: 22.3 % — SIGNIFICANT CHANGE UP (ref 20.5–51.1)
MAGNESIUM SERPL-MCNC: 2.1 MG/DL — SIGNIFICANT CHANGE UP (ref 1.8–2.4)
MCHC RBC-ENTMCNC: 23.8 PG — LOW (ref 27–31)
MCHC RBC-ENTMCNC: 23.9 PG — LOW (ref 27–31)
MCHC RBC-ENTMCNC: 28.4 G/DL — LOW (ref 32–37)
MCHC RBC-ENTMCNC: 28.7 G/DL — LOW (ref 32–37)
MCV RBC AUTO: 83.2 FL — SIGNIFICANT CHANGE UP (ref 81–99)
MCV RBC AUTO: 83.8 FL — SIGNIFICANT CHANGE UP (ref 81–99)
MONOCYTES # BLD AUTO: 0.46 K/UL — SIGNIFICANT CHANGE UP (ref 0.1–0.6)
MONOCYTES # BLD AUTO: 0.79 K/UL — HIGH (ref 0.1–0.6)
MONOCYTES NFR BLD AUTO: 12 % — HIGH (ref 1.7–9.3)
MONOCYTES NFR BLD AUTO: 6.3 % — SIGNIFICANT CHANGE UP (ref 1.7–9.3)
NEUTROPHILS # BLD AUTO: 4.27 K/UL — SIGNIFICANT CHANGE UP (ref 1.4–6.5)
NEUTROPHILS # BLD AUTO: 5.71 K/UL — SIGNIFICANT CHANGE UP (ref 1.4–6.5)
NEUTROPHILS NFR BLD AUTO: 64.8 % — SIGNIFICANT CHANGE UP (ref 42.2–75.2)
NEUTROPHILS NFR BLD AUTO: 78.9 % — HIGH (ref 42.2–75.2)
NRBC # BLD: 0 /100 WBCS — SIGNIFICANT CHANGE UP (ref 0–0)
NRBC # BLD: 0 /100 WBCS — SIGNIFICANT CHANGE UP (ref 0–0)
NT-PROBNP SERPL-SCNC: 4880 PG/ML — HIGH (ref 0–300)
PCO2 BLDV: 49 MMHG — SIGNIFICANT CHANGE UP (ref 41–51)
PH BLDV: 7.47 — HIGH (ref 7.26–7.43)
PLATELET # BLD AUTO: 149 K/UL — SIGNIFICANT CHANGE UP (ref 130–400)
PLATELET # BLD AUTO: 209 K/UL — SIGNIFICANT CHANGE UP (ref 130–400)
PO2 BLDV: 33 MMHG — SIGNIFICANT CHANGE UP (ref 20–40)
POTASSIUM BLDV-SCNC: 3.6 MMOL/L — SIGNIFICANT CHANGE UP (ref 3.3–5.6)
POTASSIUM SERPL-MCNC: 3.9 MMOL/L — SIGNIFICANT CHANGE UP (ref 3.5–5)
POTASSIUM SERPL-MCNC: 5.1 MMOL/L — HIGH (ref 3.5–5)
POTASSIUM SERPL-SCNC: 3.9 MMOL/L — SIGNIFICANT CHANGE UP (ref 3.5–5)
POTASSIUM SERPL-SCNC: 5.1 MMOL/L — HIGH (ref 3.5–5)
PROT SERPL-MCNC: 6.9 G/DL — SIGNIFICANT CHANGE UP (ref 6–8)
PROTHROM AB SERPL-ACNC: 14.9 SEC — HIGH (ref 9.95–12.87)
RBC # BLD: 3.2 M/UL — LOW (ref 4.2–5.4)
RBC # BLD: 3.27 M/UL — LOW (ref 4.2–5.4)
RBC # FLD: 20.7 % — HIGH (ref 11.5–14.5)
RBC # FLD: 20.8 % — HIGH (ref 11.5–14.5)
SAO2 % BLDV: 54 % — SIGNIFICANT CHANGE UP
SARS-COV-2 RNA SPEC QL NAA+PROBE: SIGNIFICANT CHANGE UP
SODIUM SERPL-SCNC: 143 MMOL/L — SIGNIFICANT CHANGE UP (ref 135–146)
SODIUM SERPL-SCNC: 143 MMOL/L — SIGNIFICANT CHANGE UP (ref 135–146)
TROPONIN T SERPL-MCNC: <0.01 NG/ML — SIGNIFICANT CHANGE UP
WBC # BLD: 6.58 K/UL — SIGNIFICANT CHANGE UP (ref 4.8–10.8)
WBC # BLD: 7.25 K/UL — SIGNIFICANT CHANGE UP (ref 4.8–10.8)
WBC # FLD AUTO: 6.58 K/UL — SIGNIFICANT CHANGE UP (ref 4.8–10.8)
WBC # FLD AUTO: 7.25 K/UL — SIGNIFICANT CHANGE UP (ref 4.8–10.8)

## 2020-08-08 PROCEDURE — 71045 X-RAY EXAM CHEST 1 VIEW: CPT | Mod: 26

## 2020-08-08 PROCEDURE — 99222 1ST HOSP IP/OBS MODERATE 55: CPT

## 2020-08-08 PROCEDURE — 99223 1ST HOSP IP/OBS HIGH 75: CPT

## 2020-08-08 PROCEDURE — 93010 ELECTROCARDIOGRAM REPORT: CPT

## 2020-08-08 PROCEDURE — 99285 EMERGENCY DEPT VISIT HI MDM: CPT

## 2020-08-08 RX ORDER — POTASSIUM CHLORIDE 20 MEQ
20 PACKET (EA) ORAL DAILY
Refills: 0 | Status: DISCONTINUED | OUTPATIENT
Start: 2020-08-09 | End: 2020-08-10

## 2020-08-08 RX ORDER — CHLORHEXIDINE GLUCONATE 213 G/1000ML
1 SOLUTION TOPICAL
Refills: 0 | Status: DISCONTINUED | OUTPATIENT
Start: 2020-08-08 | End: 2020-08-13

## 2020-08-08 RX ORDER — ATORVASTATIN CALCIUM 80 MG/1
40 TABLET, FILM COATED ORAL AT BEDTIME
Refills: 0 | Status: DISCONTINUED | OUTPATIENT
Start: 2020-08-08 | End: 2020-08-13

## 2020-08-08 RX ORDER — SENNA PLUS 8.6 MG/1
2 TABLET ORAL AT BEDTIME
Refills: 0 | Status: DISCONTINUED | OUTPATIENT
Start: 2020-08-08 | End: 2020-08-08

## 2020-08-08 RX ORDER — POTASSIUM CHLORIDE 20 MEQ
20 PACKET (EA) ORAL DAILY
Refills: 0 | Status: DISCONTINUED | OUTPATIENT
Start: 2020-08-08 | End: 2020-08-08

## 2020-08-08 RX ORDER — METOPROLOL TARTRATE 50 MG
50 TABLET ORAL DAILY
Refills: 0 | Status: DISCONTINUED | OUTPATIENT
Start: 2020-08-08 | End: 2020-08-09

## 2020-08-08 RX ORDER — MULTIVIT-MIN/FERROUS GLUCONATE 9 MG/15 ML
1 LIQUID (ML) ORAL DAILY
Refills: 0 | Status: DISCONTINUED | OUTPATIENT
Start: 2020-08-08 | End: 2020-08-13

## 2020-08-08 RX ORDER — RANOLAZINE 500 MG/1
1000 TABLET, FILM COATED, EXTENDED RELEASE ORAL DAILY
Refills: 0 | Status: DISCONTINUED | OUTPATIENT
Start: 2020-08-08 | End: 2020-08-13

## 2020-08-08 RX ORDER — HEPARIN SODIUM 5000 [USP'U]/ML
5000 INJECTION INTRAVENOUS; SUBCUTANEOUS EVERY 8 HOURS
Refills: 0 | Status: DISCONTINUED | OUTPATIENT
Start: 2020-08-08 | End: 2020-08-10

## 2020-08-08 RX ORDER — MONTELUKAST 4 MG/1
10 TABLET, CHEWABLE ORAL DAILY
Refills: 0 | Status: DISCONTINUED | OUTPATIENT
Start: 2020-08-08 | End: 2020-08-13

## 2020-08-08 RX ORDER — ASPIRIN/CALCIUM CARB/MAGNESIUM 324 MG
81 TABLET ORAL DAILY
Refills: 0 | Status: DISCONTINUED | OUTPATIENT
Start: 2020-08-08 | End: 2020-08-13

## 2020-08-08 RX ORDER — FUROSEMIDE 40 MG
40 TABLET ORAL
Refills: 0 | Status: COMPLETED | OUTPATIENT
Start: 2020-08-08 | End: 2020-08-09

## 2020-08-08 RX ORDER — PANTOPRAZOLE SODIUM 20 MG/1
40 TABLET, DELAYED RELEASE ORAL
Refills: 0 | Status: DISCONTINUED | OUTPATIENT
Start: 2020-08-08 | End: 2020-08-13

## 2020-08-08 RX ADMIN — Medication 40 MILLIGRAM(S): at 16:53

## 2020-08-08 RX ADMIN — Medication 81 MILLIGRAM(S): at 16:53

## 2020-08-08 RX ADMIN — Medication 50 MILLIGRAM(S): at 16:53

## 2020-08-08 RX ADMIN — ATORVASTATIN CALCIUM 40 MILLIGRAM(S): 80 TABLET, FILM COATED ORAL at 21:49

## 2020-08-08 RX ADMIN — RANOLAZINE 1000 MILLIGRAM(S): 500 TABLET, FILM COATED, EXTENDED RELEASE ORAL at 16:54

## 2020-08-08 RX ADMIN — HEPARIN SODIUM 5000 UNIT(S): 5000 INJECTION INTRAVENOUS; SUBCUTANEOUS at 21:50

## 2020-08-08 NOTE — H&P ADULT - HISTORY OF PRESENT ILLNESS
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 As per the patient she has been getting worked up with Dr. Phipps over the past few months. Her hemoglobin has been around an 8-9.     The daughter at bedside admits that the patient has beemn a bit more depressed, sluggish, and just not herself. She is not eating well in terms of amount of PO intake and type of food lately. 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 As per the patient she has been getting worked up with Dr. Phipps over the past few months. Her hemoglobin has been around an 8-9 and she was scheduled to get worked up with Dr. Ivory this week. Over the past few days though she has been feeling a bit worse. States that she has been having some nausea, dizziness, and weakness. States that she has been also having shortness of breath that has been worse on ambulation. She also states that she has been having some diarrhea. Reports that it could be from her taking the senna which she used for constipation. The patient does not endorse any urinary symptoms or any bleeding. This morning she reports that she was having some increased shortness of breath along with a bit of chest/left arm discomfort. She states that it was pressure like and went to the left side. The daughter who lives next door was contacted and 911 was called. EMS did EKG noted for A-fib RVR, given Nitro and loaded with Aspirin.    In the ED, initial vitals T98.9, HR 67 (documented) with monitor saying 98, /57, sat 98% on room air. Chest X-ray noted for sternotomy wires, possible enlarged heart but it is a single view AP, and some noted congestion and slight blunting of inferior angle on right. EKG noted for A-fib RVR, BNP approximately 5,000     The daughter at bedside admits that the patient has been a bit more depressed, sluggish, and just not herself. She is not eating well in terms of amount of PO intake and type of food lately.

## 2020-08-08 NOTE — CHART NOTE - NSCHARTNOTEFT_GEN_A_CORE
Contacted by pharmacy over concern with patient's K5.1 hemolyzed with home levels in the 4 and patient taking 20meq of K daily as her home medication. Explained to pharmacist repeat level is ordered but wanted to keep order in place to ensure patient is receiving her home meds.     Discussion further had with pharmacy manager. We agreed that a one time can be ordered for the patient today and can place daily home dose order starting tomorrow. Contacted by pharmacy over concern with patient's K5.1 hemolyzed with home levels in the 3-4 range (most recent is a [facility] from 1/2020 and patient taking 20meq of K daily as her home medication. Explained to pharmacist repeat level is ordered but wanted to keep order in place to ensure patient is receiving her home meds.     Discussion further had with pharmacy manager. We agreed that a one time can be ordered for the patient today and can place daily home dose order starting tomorrow.

## 2020-08-08 NOTE — H&P ADULT - NSHPLABSRESULTS_GEN_ALL_CORE
7.6    7.25  )-----------( 209      ( 08 Aug 2020 09:10 )             26.8     08-08    143  |  100  |  32<H>  ----------------------------<  238<H>  5.1<H>   |  28  |  1.4    Ca    9.3      08 Aug 2020 09:10  Mg     2.1     08-08    TPro  6.9  /  Alb  3.9  /  TBili  0.7  /  DBili  x   /  AST  74<H>  /  ALT  27  /  AlkPhos  52  08-08        PT/INR - ( 08 Aug 2020 11:05 )   PT: 14.90 sec;   INR: 1.30 ratio       PTT - ( 08 Aug 2020 11:05 )  PTT:31.1 sec    CARDIAC MARKERS ( 08 Aug 2020 09:00 )  x     / <0.01 ng/mL / x     / x     / x        EKG: A-fib RVR; rate 120s    CXR: sternotomy wires, possible enlarged heart but it is a single view AP, and some noted congestion and slight blunting of inferior angle on right.

## 2020-08-08 NOTE — ED PROVIDER NOTE - OBJECTIVE STATEMENT
75F with pmh of AS s/p TAVR, HTN, HLD, DM, CHF, CABG, anemia requiring auto-tranfusions presents due to worsening SCHAFFER over the past 2-3 days. PT called daughter this AM for SOB who walked her 5 steps and reports that she became "white as ghost". PT admits to chest pressure similar to when she had prior CABG. Denies fever, chills, n/v, dysuria, travel, exogenous hormone use.  Cards: James, last nuc stress 1 year ago. 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 PT called daughter this AM for SOB who walked her 5 steps and reports that she became "white as ghost". PT admits to chest pressure similar to when she had prior CABG. Denies fever, chills, n/v, dysuria, travel, exogenous hormone use.  Cards: James, last nuc stress 1 year ago.

## 2020-08-08 NOTE — H&P ADULT - ATTENDING COMMENTS
I saw and evaluated the patient on 08/08/2020 (note edited thereafter for further clarification). I have reviewed and agree with the findings and plan of care as documented above in the resident’s note (unless indicated differently below). Any necessary changes were made in the body of the text.    SOB: multi-factorial    Plan:  ECHO  Tele monitoring  Anemia workup  GI eval  Cardio eval  Meds as dosed  Supportive care I saw and evaluated the patient on 08/08/2020 (note edited thereafter for further clarification). I have reviewed and agree with the findings and plan of care as documented above in the resident’s note (unless indicated differently below). Any necessary changes were made in the body of the text.    74 yo F pt w/ a relevant hx of HFpEF (LVEF of 55-65% in 07/2019), CAD (s/p CABG in 2016) and anemia. P/w progressively worsening SOB - worsening over the past week. At baseline the patient can ambulate around her house - says "I am barely able to walk 5 steps now" before needing to rest. Associated w/ 2+ pillow orthopnea, palpitations, chest tightness (midsternal going to the LUE, alleviated by NTG and diuresis), worsening LE swelling, nausea and generalized weakness/fatigue. Intensity of symptoms was severe prior to presentation and this is what prompted the patient to come in. Of note, pt was being worked up for her anemia and was being planned for an EGD --- denies hematochezia/melena. Reports FHx of anemia (mother's side).    ROS:  Constitutional: no fevers; no chills; +generalized weakness  Eyes: no conjunctivitis; no itching  ENT: no dysphagia; no odynophagia  CVS: +orthopnea; +chest pain; +b/l LE swelling  Resp: +SOB; no coughing  GI: +nausea; no vomiting; no diarrhea; no abd pain  : no dysuria; no hematuria  MSK: +intermittent back and LE discomfort; +charcot left foot (per pt)  Skin: +b/l LE skin changes  Neuro: no focal weakness; no headache  All other systems reviewed and are negative    PMHx, home medications, SurHx, FHx and Social history as above in the corresponding sections of the note - reviewed and edited where appropriate    Exam:  Vitals: BP = 117/56; P = 107; T = 98.9; RR = 16; SpO2 > 95 on 2 L/min via NC  General: appears stated age; pleasant; dyspneic especially after exertion  Eyes: anicteric sclera; moist conjunctiva; PERRL; EOMI  HENT: NC/AT; clear oropharynx; MMM  Neck: supple w/ FROM; trachea midline; no thyromegaly; no carotid bruits  Lungs: cta b/l with no tachypnea, accessory muscle usage, wheezing, rhonci or rales  CVS: RRR; S1 and S2 w/o MRGs  Abd: BS+; soft; non-tender to palpation x 4; no masses or HSM  Ext: no peripheral edema; pulses 2+ b/l  Skin: normal temp, turgor and texture; no rashes, ulcers or nodules  Neuro: CN II-XII intact; str 5/5 throughout; sensation grossly intact – light touch/temp  Psych: appropriate affect; alert and oriented to person, place, time and situation            SOB: multi-factorial    Plan:  ECHO  Tele monitoring  Anemia workup  GI eval  Cardio eval  Meds as dosed  Supportive care I saw and evaluated the patient on 08/08/2020 (note edited thereafter for further clarification). I have reviewed and agree with the findings and plan of care as documented above in the resident’s note (unless indicated differently below). Any necessary changes were made in the body of the text.    74 yo F pt w/ a relevant hx of HFpEF (LVEF of 55-65% in 07/2019), CAD (s/p CABG in 2016) and anemia. P/w progressively worsening SOB - worsening over the past week. At baseline the patient can ambulate around her house - says "I am barely able to walk 5 steps now" before needing to rest. Associated w/ 2+ pillow orthopnea, palpitations, chest tightness (midsternal going to the LUE, alleviated by NTG and diuresis), worsening LE swelling, nausea and generalized weakness/fatigue. Intensity of symptoms was severe prior to presentation and this is what prompted the patient to come in. Of note, pt was being worked up for her anemia and was being planned for an EGD --- denies hematochezia/melena. Reports FHx of anemia (mother's side).    ROS:  Constitutional: no fevers; no chills; +generalized weakness  Eyes: no conjunctivitis; no itching  ENT: no dysphagia; no odynophagia  CVS: +orthopnea; +chest pain; +b/l LE swelling  Resp: +SOB; no coughing  GI: +nausea; no vomiting; no diarrhea; no abd pain  : no dysuria; no hematuria  MSK: +intermittent back and LE discomfort; +charcot left foot (per pt)  Skin: +b/l LE skin changes  Neuro: no focal weakness; no headache  All other systems reviewed and are negative    PMHx, home medications, SurHx, FHx and Social history as above in the corresponding sections of the note - reviewed and edited where appropriate    Exam:  Vitals: BP = 117/56; P = 107; T = 98.9; RR = 16; SpO2 > 95 on 2 L/min via NC  General: appears stated age; pleasant; dyspneic especially after exertion  Eyes: anicteric sclera; moist conjunctiva; PERRL; EOMI  HENT: NC/AT; clear oropharynx; MMM  Neck: supple w/ FROM; trachea midline  Lungs: mild tachypnea worsened by exertion; no accessory muscle usage; no wheezing or rhonci; crackles anne-hilar right  CVS: regular rhythm; tachycardic; S1 and S2 w/o apparent MRGs; eJVP 6  Abd: BS+; soft; non-tender to palpation x 4; no masses or HSM  Ext: +LE edema (2+); pulses palpable distally  Skin: +LE venous stasis changes w/ lipodermatosclerosis / lichenification  Neuro: CN II-XII intact; str grossly intact  Psych: appropriate affect; alert and oriented to person, place and situation    Labs significant for H&H 7.8/27.2, BUN/Cr 32/1.3, gluc 167, AST 74, trop wnL x 3, proBNP 4,880, vBG 7.47/49, iCa 1.05  Covid-19 PCR not detected  CXR (imaging reviewed): no acute cardiopulmonary abnormality noted  EKG: A-flutter w/ RVR; 3:1 conduction; non-specific ST-T changes infero-lat leads  Telemetry: A-flutter w/ RVR    Assessment:  (1) Acute exac of HFpEF - unclear precipitating factor (possibly non-compliance)  (2) A-flutter w/ RVR (hx of paroxysmal A-fib): not on anti-coagulation (anemia)  --- [id number] - age 2, sex 1, CHF 1, HTN 1, vasculopathy 1, DM 1  (3) Acute on chronic normocytic anemia (possibly symptomatic - SOB)  (4) Chest pain: exertional [stable angina] - r/o ACS  (5) CAD (hx of CABG) - chronic  (6) DM w/ hyperglycemia  (7) Asthma: not in acute exacerbation  (8) CKD 3a - stable renal function  (9) Physical deconditioning - likely also contributing to SOB  (10) Venous stasis dermatitis - chronic issue  (11) Hx of bioprosthetic AV replacement - stable cardiac exam - pending ECHO  (12) HTN / HLD - stable on tx    Plan:  (1) Telemetry monitoring  (2) Daily EKG; ECHO  (3) Strict intake and output monitoring and daily weights  (4) Diuresis - goal net negative 1.5 L daily  (5) Metoprolol for rate control   --- Can titrate as tolerated although flutter more difficult to rate control than Fib  --- Holding off anti-coagulation for now per Cardio recs (pending anemia w/u)  (6) Anemia w/u: retics, ferritin, Fe profile w/ TIBC [send stool guaiac if pt agrees]  (7) Limit unnecessary blood draws (phlebotomy will also contribute to anemia)  (8) Was pending out-pt EGD; can consider this in-pt if evidence of GI bleeding  (9) NTG PRN; beta blocker and ranolazine as dosed   --- Can consider long acting nitrates as tolerated  (10) Repeat enzymes w/ AM labs given episode of pain overnight - requested  (11) Rest of medications as dosed  (12) Supportive care; may benefit from rehab / PT when clinical condition improves  (13) D/c planning (not d/c ready - has clinically decompensated CHF)  --- Tentative planning 72+ hrs    Code status: full code

## 2020-08-08 NOTE — ED PROVIDER NOTE - PHYSICAL EXAMINATION
CONSTITUTIONAL: Well-developed; well-nourished; in no acute distress.   SKIN: warm, dry  HEAD: Normocephalic.  EYES: PERRL, EOMI.  ENT: Airway clear.  NECK: Supple.  LYMPH: No acute cervical adenopathy.  CARD: No murmurs, rubs or gallops. Regular rate and rhythm.   RESP: No wheezing, rales or rhonchi.  ABD: soft ntnd  EXT: No clubbing, cyanosis. LE swelling 1+ pitting edema.  NEURO: Alert, oriented.  PSYCH: Cooperative, appropriate.

## 2020-08-08 NOTE — ED PROVIDER NOTE - CHIEF COMPLAINT
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)                                         General:          Thyroid:             Growth:   X BMP   TSH   IGF-1      Glucose   Free T4   IGFBP-3    BUN   Total T3   IgA    Cr   Total T4   Tissue Transglutaminase IgA    Ca (plasma)   T3 Uptake   Endomysial Ab, IgA    Ionized Ca (whole blood)   TPO Ab (thyroperoxidase)   ESR    Mg   Tg Ab (thyroglobulin Ab)       Phos   TSI (thyroid stimulating Ab)       Osmolality, serum   TBII (TSH-Receptor antibody)                Adrenal:    CBC with differential      ACTH    ALT            Gondal:   Cortisol    AST   LH   PRA (plasma renin activity)    Other:   FSH   DHEA    Other:   Estradiol   DHEA Sulfate    Other:   Testosterone   Androstenedione       Free Testosterone   17-hydroxyprogesterone           Urine:   Prolactin   Other:    Spot        24 hour          Ca             Bone:               Diabetes:    Cr   PTH   HbA1c    Osmolality   25-OH vitamin D   Insulin    Microalbumin   1,25OH vitamin D   C-Peptide    Free cortisol   Alkaline Phosphatase   Fasting Lipids (Chol, HDL,     Other:         LDL, Trig)          Other:     Please Fax Results to [phone]  For Critical Results, call [phone]        Tiffanie Babcock MD  Pediatric Endocrinologist  11/07/2019       
November 9, 2019      Crystal Mendes MD  [address] [facility]  [facility] [address]           [facility]  [address] [address] [address]  Phone: [phone]  Fax: [phone]          Patient: Virgie Blancas   MR Number: [id number]   YOB: 2018   Date of Visit: 11/7/2019       Dear Dr. Crystal Mendes:    Thank you for referring Virgie Blancas to me for evaluation. Attached you will find relevant portions of my assessment and plan of care.    If you have questions, please do not hesitate to call me. I look forward to following Virgie Blancas along with you.    Sincerely,    Tiffanie Babcock MD    Enclosure  CC:  No Recipients    If you would like to receive this communication electronically, please contact [email or web address] or [phone] to request more information on [employer] Link access.    For providers and/or their staff who would like to refer a patient to [facility], please contact us through our one-stop-shop provider referral line, [facility], at [phone].    If you feel you have received this communication in error or would no longer like to receive these types of communications, please e-mail [email or web address]         
The patient is a 75y Female complaining of shortness of breath.

## 2020-08-08 NOTE — H&P ADULT - ASSESSMENT
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     #Shortness of Breath, weakness, dizziness, nausea, diarrhea:   - Differential: CHF exacerbation given elevated BNP and LE edema is likely along with symptomatic anemia given Hg 7.5 on admission with baseline reported 8-9 vs Viral illness as symptoms are non-specific which has been seen in COVID-19 presentations  - BNP 4800, LE Edema noted, Patient has been holding her Metolazone at home   - Sat 98% on room air  - Will change lasix to IV BID for 24 hours to help with removing some fluid given the extensive LE swelling  - Cardiology evaluation; repeat TTE and diurese for now. Further recs pending Dr. Ramirez  - Diarrhea likely secondary to Senna; will hold the Senna   - Daily Weights  - Strict Is and Os  - Monitor Electrolytes; maintain K over 4 and Mg over 2  - COVID swab; would advise patient being kept in isolation pending covid results     #Paroxysmal Atrial Fibrillation   - Currently in NSR   - Continue on Metoprolol 50mg daily  - CHADsVASC score at lease 6; but will hold off on anticoagulation at this time as per Cardio    #Symptomatic normocytic acute on chronic Anemia   - Hemoglobin 7.6 with noted baseline 8-9  - No signs of active bleeding; MILDRED refused by the patient despite education  - Iron panel, B12, Folate ordered    #LE edema with chronic skin changes  - Likely underlying vascular disease with chronic changes noted on physical examination   - Will order LE duplex to assess for any clots; low likelihood.     #CAD s/p CABG  - Continue on Metoprolol, ASA, Statin    #HLD  - Continue on Statin     #Type 2 DM  - PO meds on hold  - a1c ordered  - Carb consistent diet  - Monitor finger sticks; if over 180 start on basal/bolus insulin regimen. 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     #Shortness of Breath, weakness, dizziness, nausea, diarrhea:   - Differential: CHF exacerbation given elevated BNP and LE edema is likely along with symptomatic anemia given Hg 7.5 on admission with baseline reported 8-9 vs Viral illness as symptoms are non-specific which has been seen in COVID-19 presentations  - BNP 4800, LE Edema noted, Patient has been holding her Metolazone at home   - Sat 98% on room air  - Will change lasix to IV BID for 24 hours to help with removing some fluid given the extensive LE swelling  - Cardiology evaluation; repeat TTE and diurese for now. Further recs pending Dr. Ramirez  - Diarrhea likely secondary to Senna; will hold the Senna   - Daily Weights  - Strict Is and Os  - Monitor Electrolytes; maintain K over 4 and Mg over 2  - COVID swab; would advise patient being kept in isolation pending covid results     #Paroxysmal Atrial Fibrillation   - Currently in NSR   - Continue on Metoprolol 50mg daily  - CHADsVASC score at lease 6; but will hold off on anticoagulation at this time as per Cardio    #Symptomatic normocytic acute on chronic Anemia   - Hemoglobin 7.6 with noted baseline 8-9  - No signs of active bleeding; MILDRED refused by the patient despite education  - Iron panel, B12, Folate ordered    #LE edema with chronic skin changes  - Likely underlying vascular disease with chronic changes noted on physical examination   - Will order LE duplex to assess for any clots; low likelihood.     #CKD stage 3a  - Creatinine baseline 1.1-1.2  - Creatinine on admission 1.4  - Monitor repeat     #CAD s/p CABG  - Continue on Metoprolol, ASA, Statin    #HLD  - Continue on Statin     #Type 2 DM  - PO meds on hold  - a1c ordered  - Carb consistent diet  - Monitor finger sticks; if over 180 start on basal/bolus insulin regimen. 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     #Shortness of Breath, weakness, dizziness, nausea, diarrhea:   - Differential: CHF exacerbation given elevated BNP and LE edema is likely along with symptomatic anemia given Hg 7.5 on admission with baseline reported 8-9 vs Viral illness as symptoms are non-specific which has been seen in COVID-19 presentations  - BNP 4800, LE Edema noted, Patient has been holding her Metolazone at home   - Sat 98% on room air  - Will change lasix to IV BID for 24 hours to help with removing some fluid given the extensive LE swelling  - Cardiology evaluation; repeat TTE and diurese for now. Further recs pending Dr. Ramirez  - Diarrhea likely secondary to Senna; will hold the Senna   - Daily Weights  - Strict Is and Os  - Monitor Electrolytes; maintain K over 4 and Mg over 2  - COVID swab; would advise patient being kept in isolation pending covid results; symptoms can be COVID related but noted patient has been isolating at home to avoid erika disease.     #Paroxysmal Atrial Fibrillation   - Currently in NSR   - Continue on Metoprolol 50mg daily  - CHADsVASC score at lease 6; but will hold off on anticoagulation at this time as per Cardio    #Symptomatic normocytic acute on chronic Anemia   - Hemoglobin 7.6 with noted baseline 8-9  - No signs of active bleeding; MILDRED refused by the patient despite education  - Iron panel, B12, Folate ordered    #LE edema with chronic skin changes  - Likely underlying vascular disease with chronic changes noted on physical examination   - Will order LE duplex to assess for any clots; low likelihood.     #CKD stage 3a  - Creatinine baseline 1.1-1.2  - Creatinine on admission 1.4  - Monitor repeat     #CAD s/p CABG  - Continue on Metoprolol, ASA, Statin    #HLD  - Continue on Statin     #Type 2 DM  - PO meds on hold  - a1c ordered  - Carb consistent diet  - Monitor finger sticks; if over 180 start on basal/bolus insulin regimen. 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     #Shortness of Breath, weakness, dizziness, nausea, diarrhea:   - Differential: CHF exacerbation given elevated BNP and LE edema is likely along with symptomatic anemia given Hg 7.5 on admission with baseline reported 8-9 vs Viral illness as symptoms are non-specific which has been seen in COVID-19 presentations  - BNP 4800, LE Edema noted, Patient has been holding her Metolazone at home   - Sat 98% on room air  - Will change lasix to IV BID for 24 hours to help with removing some fluid given the extensive LE swelling  - Cardiology evaluation; repeat TTE and diurese for now. Further recs pending Dr. Ramirez  - Diarrhea likely secondary to Senna; will hold the Senna   - Daily Weights  - Strict Is and Os  - Monitor Electrolytes; maintain K over 4 and Mg over 2  - COVID swab; would advise patient being kept in isolation pending covid results; symptoms can be COVID related but noted patient has been isolating at home to avoid erika disease.     #Paroxysmal Atrial Fibrillation   - Currently in NSR   - Continue on Metoprolol 50mg daily  - CHADsVASC score at lease 6; but will hold off on anticoagulation at this time as per Cardio    #Symptomatic normocytic acute on chronic Anemia   - Hemoglobin 7.6 with noted baseline 8-9  - No signs of active bleeding; MILDRED refused by the patient despite education  - Iron panel, B12, Folate ordered    #LE edema with chronic skin changes  - Likely underlying vascular disease with chronic changes noted on physical examination   - Will order LE duplex to assess for any clots; low likelihood.     #CHFpEF  - TTE from outpatient noted for normal EF with Grade ii diastolic dysfunction  - Continue on Metoprolol and Lasix  - Losartan being held due to BP being on lower side.     #CKD stage 3a  - Creatinine baseline 1.1-1.2  - Creatinine on admission 1.4  - Monitor repeat     #CAD s/p CABG  - Continue on Metoprolol, ASA, Statin    #HLD  - Continue on Statin     #Type 2 DM  - PO meds on hold  - a1c ordered  - Carb consistent diet  - Monitor finger sticks; if over 180 start on basal/bolus insulin regimen. Will need patient's weight added to the system for medication dosing    #Asthma (stable)  - No signs of wheezing and stable on room air  - Continue on Montelukast    #AS s/p Bovine AVR  - Continue on Metoprolol   - Repeat TTE ordered    Activity: As tolerated  Diet: DASH/Carb  DVT ppx: Lovenox  GI ppx: Protonix  Code Status: Full Code  DISPO: From home; admit to telemetry

## 2020-08-08 NOTE — ED PROVIDER NOTE - CLINICAL SUMMARY MEDICAL DECISION MAKING FREE TEXT BOX
Patient presented with worsening dyspnea on exertion, hx CABG in the past. Otherwise afebrile, HD stable, normal O2 saturation at rest but with dyspnea, (+) significant desaturation. Obtained EKG which showed (+) afib of which patient does not have a hx, but no evidence of STEMI. troponin negative, but (+) pro-bnp elevated and patient anemic (no evidence of bleeding on exam). Therefore likely 2/2 fluid overload from CHF + symptomatic anemia. Consulted cardiology who evaluated patient in ED and will follow during admission. For new afib, patient started on AC in ED per cardio recs. Will admit for further monitoring and management. Patient and family at bedside agreeable with plan. HD stable at time of admission.

## 2020-08-08 NOTE — PHARMACOTHERAPY INTERVENTION NOTE - COMMENTS
k=5.1 but hemolyzed.  Prescriber [id number] was contacted with recommendation to adjust Kcl 20meq po q24h till new labs are available.

## 2020-08-08 NOTE — H&P ADULT - NSICDXPASTMEDICALHX_GEN_ALL_CORE_FT
PAST MEDICAL HISTORY:  Aortic stenosis     Asthma with COPD     CAD (coronary artery disease), native coronary artery     Diabetes     Hypertension

## 2020-08-08 NOTE — ED ADULT NURSE NOTE - OBJECTIVE STATEMENT
Pt c/c SOB worsening x "several days". Pt BIBA and treated in field for chest pain: 2X nitroglycerin spray and 2X 81mg aspirin.

## 2020-08-08 NOTE — CHART NOTE - NSCHARTNOTEFT_GEN_A_CORE
Called by nursing staff that patient is complaining of substernal chest pain radiating to left arm relieved with nitroglcyerin. Recent troponin negative x2. The patient denied chest pain. Anika signs normal. EKG showed T wave inversions I and II ( lead I was present before, lead II ? as a lot of artifact on previous ekg) - however these leads are not contiguous. Will repeat troponin and ekg. If any changes, will call cardio. Called by nursing staff that patient is complaining of substernal chest pain radiating to left arm relieved with nitroglcyerin. Recent troponin negative x2. Anika signs normal. EKG showed T wave inversions I and II ( lead I was present before, lead II ? as a lot of artifact on previous ekg) - however these leads are not contiguous. CKMB and Troponin drawm during this episode negative.  If any changes, will call cardio.

## 2020-08-08 NOTE — ED PROVIDER NOTE - NS ED ROS FT
Constitutional: (-) fever  Eyes/ENT: (-) runny nose  Cardiovascular: (+) chest pain, (-) syncope  Respiratory: (-) cough, (+) shortness of breath  Gastrointestinal: (-) vomiting, (-) diarrhea, (-) abdominal pain  : (-) dysuria   Musculoskeletal: (-) back pain, (-) joint pain  Integumentary: (-) rash  Neurological: (-)loc  Allergic/Immunologic: (-) pruritus  Endocrine: (-) history of thyroid disease

## 2020-08-08 NOTE — ED PROVIDER NOTE - PROGRESS NOTE DETAILS
SC: PT with symptomatic anemia and AFib RVR on EKG, rate controlled without medication. Cardiology consulted re: anticoagulation in setting of low hgb and history of transfusion reactions. Patient to be admitted to an inpatient floor. Case discussed with and care endorsed to medical admitting resident. Admitting physician notified. SC: PT with symptomatic anemia and AFib RVR on EKG, rate controlled without medication. Cardiology consulted re: anticoagulation in setting of low hgb and history of transfusion reactions. PT refusing MILDRED. Patient to be admitted to an inpatient floor. Case discussed with and care endorsed to medical admitting resident. Admitting physician notified.

## 2020-08-08 NOTE — ED ADULT NURSE REASSESSMENT NOTE - NS ED NURSE REASSESS COMMENT FT1
Pt transferred to ED 3 and endorsed to BABITA Snider. Pt A&OX3, ambulatory with assistance, VS stable and IV intact.

## 2020-08-08 NOTE — CONSULT NOTE ADULT - ASSESSMENT
75 y.o female patient with PMH of AS s/p Bovine valve replacement in 2016, CAD s/p CABG in 2016, HTN, HLD, Type 2 DM, HFpEF (EF 55-60% in 2016), post-op anemia requiring auto-transfusions due to severe rejection reaction, who presented to the ED for a 2-day history of worsening shortness of breath      # Shortness of breath  - Multifactorial:    > CHF exacerbation: elevated pro-BNP, fluid overload    > Anemia  - IV diuresis for now: monitor renal function, electrolytes, daily weight and volume status, strict I&Os; keep I<Os and adjust diuretics accordingly  - Check 2D echo  - Continue metoprolol; Losartan was held by her PMD due to low BP; monitor BP  - Serial cardiac enzymes and EKGs  - Check COVID    # Atrial fibrillation  - New to the patient  - Now in sinus rhythm  - Continue metoprolol  - CHADSVASC 6; will need anticoagulation but would do work-up for anemia first    # Anemia  - As per patient, last Hb was 8.4 at her PMD  - No signs or symptoms of severe active bleeding  - Work-up 75 y.o female patient with PMH of AS s/p Bovine valve replacement in 2016, CAD s/p CABG in 2016, HTN, HLD, Type 2 DM, HFpEF (EF 55-60% in 2016), post-op anemia requiring auto-transfusions due to severe rejection reaction, who presented to the ED for a 2-day history of worsening shortness of breath      # Shortness of breath  - Multifactorial:    > CHF exacerbation: elevated pro-BNP, fluid overload    > Anemia  - IV diuresis for now: monitor renal function, electrolytes, daily weight and volume status, strict I&Os; keep I<Os and adjust diuretics accordingly  - Check 2D echo  - Continue metoprolol; Losartan was held by her PMD due to low BP; monitor BP  - Serial cardiac enzymes and EKGs  - Check COVID    # Atrial fibrillation  - New to the patient  - Now in sinus rhythm  - Continue metoprolol  - CHADSVASC 6; will need anticoagulation but would do work-up for anemia first - GI follow-up for possible EGD    # Anemia  - As per patient, last Hb was 8.4 at her PMD  - No signs or symptoms of severe active bleeding  - Work-up per primary team.

## 2020-08-08 NOTE — H&P ADULT - NSHPPHYSICALEXAM_GEN_ALL_CORE
T(C): 37.2 (08-08-20 @ 06:54), Max: 37.2 (08-08-20 @ 06:54)  HR: 100 (08-08-20 @ 12:24) (67 - 100)  BP: 119/58 (08-08-20 @ 12:24) (119/58 - 127/57)  RR: 18 (08-08-20 @ 12:24) (18 - 22)  SpO2: 100% (08-08-20 @ 12:24) (98% - 100%)    PHYSICAL EXAM:  GENERAL: NAD, well-developed, obese appearing female  HEAD:  Atraumatic, Normocephalic  EYES: EOMI, PERRLA, conjunctiva and sclera clear  ENT:No nasal obstruction or discharge. No tonsillar exudate, swelling or erythema.  NECK: Supple, No JVD  CHEST/LUNG: Breath sounds diminished bilaterally; especially at the bases. Slightly coarse sounding, no wheeze, rhonchi, rales   HEART: Regular rate and rhythm; Grade 2/6 systolic murmur upper sternal border. No rubs, or gallops  ABDOMEN: Soft, mild tenderness to palpation on RLQ, Nondistended; Bowel sounds present  EXTREMITIES:  2+ Peripheral Pulses, No clubbing, cyanosis, 2+ pitting edema on lower extremities   PSYCH: AAOx3  NEUROLOGY: non-focal  SKIN: Bilateral LE with darkening with some noted hard firm bubbling blisters.

## 2020-08-08 NOTE — CONSULT NOTE ADULT - SUBJECTIVE AND OBJECTIVE BOX
HPI:  75 y.o female patient with PMH of AS s/p Bovine valve replacement in 2016, CAD s/p CABG in 2016, HTN, HLD, Type 2 DM, HFpEF (EF 55-60% in 2016), post-op anemia requiring auto-transfusions due to severe rejection reaction, who presented to the ED for a 2-day history of worsening shortness of breath.  History goes back to about 2 weeks ago when the patient started complaining of shortness of breath on exertion. Symptoms got progressively worse but in the last 2 days, symptoms became very severe; patient is unable to walk for 3 steps without getting short of breath. Symptoms resolve at rest. Shortness of breath is associated with chest pressure and worsening LE edema. Today, patient became short of breath and became very pale as per the daughter.  ROS is positive for dry cough, mainly at night as well as diarrhea and nausea. She denies abdominal pain, fever, hematemesis, bright red blood per rectum or melena.  The daughter at bedside admits that the patient has been a bit more depressed, sluggish, and just not herself. She is not eating well in terms of amount of PO intake and type of food lately.  She is compliant with medications but stopped taking metolazone lately      PAST MEDICAL & SURGICAL HISTORY  CAD (coronary artery disease), native coronary artery  Asthma with COPD  Diabetes  Hypertension  Aortic stenosis  S/P CABG x 1  H/O aortic valve replacement: Bovine Replacement      FAMILY HISTORY:  FAMILY HISTORY:  No pertinent family history in first degree relatives      SOCIAL HISTORY:  []smoker: quit smoking 20 years ago  []Alcohol: occasional  []Drug: none    ALLERGIES:  Augmentin (Other)      MEDICATIONS:  MEDICATIONS  (STANDING):  aspirin enteric coated 81 milliGRAM(s) Oral daily  atorvastatin 40 milliGRAM(s) Oral at bedtime  furosemide   Injectable 40 milliGRAM(s) IV Push two times a day  metoprolol succinate ER 50 milliGRAM(s) Oral daily  montelukast 10 milliGRAM(s) Oral daily  multivitamin/minerals 1 Tablet(s) Oral daily  pantoprazole    Tablet 40 milliGRAM(s) Oral before breakfast  potassium chloride    Tablet ER 20 milliEquivalent(s) Oral daily  ranolazine 1000 milliGRAM(s) Oral daily  senna 2 Tablet(s) Oral at bedtime    MEDICATIONS  (PRN):      HOME MEDICATIONS:  Home Medications:  aspirin 81 mg oral tablet: 1 tab(s) orally once a day (08 Aug 2020 12:17)  Centrum oral tablet: 1 tab(s) orally once a day (08 Aug 2020 12:17)  furosemide 40 mg oral tablet: 1 tab(s) orally 2 times a day (08 Aug 2020 12:17)  glipiZIDE 5 mg oral tablet: 1 tab(s) orally once a day (08 Aug 2020 12:17)  losartan 25 mg oral tablet: 1 tab(s) orally once a day (08 Aug 2020 12:17)  metoprolol succinate 50 mg oral capsule, extended release: 1 cap(s) orally once a day (08 Aug 2020 12:17)  montelukast 10 mg oral tablet: 1 tab(s) orally once a day (08 Aug 2020 12:17)  pantoprazole 40 mg oral delayed release tablet: 1 tab(s) orally once a day (08 Aug 2020 12:17)  pioglitazone 45 mg oral tablet: 1 tab(s) orally once a day (08 Aug 2020 12:17)  potassium chloride 20 mEq oral granule, extended release: 1 cap(s) orally once a day (08 Aug 2020 12:17)  ranolazine 1000 mg oral tablet, extended release: 1 tab(s) orally once a day (08 Aug 2020 12:17)  rosuvastatin 10 mg oral tablet: 1 tab(s) orally once a day (08 Aug 2020 12:17)  Senna 8.6 mg oral tablet: 1 tab(s) orally once a day (at bedtime) (08 Aug 2020 12:17)      VITALS:   T(F): 98.9 (08-08 @ 06:54), Max: 98.9 (08-08 @ 06:54)  HR: 100 (08-08 @ 12:24) (67 - 100)  BP: 119/58 (08-08 @ 12:24) (119/58 - 127/57)  BP(mean): --  RR: 18 (08-08 @ 12:24) (18 - 22)  SpO2: 100% (08-08 @ 12:24) (98% - 100%)    I&O's Summary      REVIEW OF SYSTEMS:  CONSTITUTIONAL: Reduced PO intake lately  EYES: No visual changes  ENT: No vertigo or throat pain   NECK: No pain or stiffness  RESPIRATORY: Shortness of breath as described in HPI  CARDIOVASCULAR: Chest pressure as described in HPI  GASTROINTESTINAL: No abdominal or epigastric pain. + Nausea, no vomiting, or hematemesis; + diarrhea No melena or hematochezia.  GENITOURINARY: No dysuria, frequency or hematuria  NEUROLOGICAL: No numbness or weakness  SKIN: No itching, no rashes  MSK: No pain    PHYSICAL EXAM:  NEURO: patient is awake , alert and oriented  GEN: Not in acute distress  NECK: no thyroid enlargement, no JVD  LUNGS: Bibasilar crackles   CARDIOVASCULAR: S1/S2 present, RRR , mild systolic murmur heard at right 2nd ICS  ABD: Soft, non-tender, non-distended  EXT: 2+ bilateral LE edema  SKIN: Intact    LABS:                        7.6    7.25  )-----------( 209      ( 08 Aug 2020 09:10 )             26.8     08-08    143  |  100  |  32<H>  ----------------------------<  238<H>  5.1<H>   |  28  |  1.4    Ca    9.3      08 Aug 2020 09:10  Mg     2.1     08-08    TPro  6.9  /  Alb  3.9  /  TBili  0.7  /  DBili  x   /  AST  74<H>  /  ALT  27  /  AlkPhos  52  08-08    PT/INR - ( 08 Aug 2020 11:05 )   PT: 14.90 sec;   INR: 1.30 ratio         PTT - ( 08 Aug 2020 11:05 )  PTT:31.1 sec  Troponin T, Serum: <0.01 ng/mL (08-08-20 @ 09:00)    CARDIAC MARKERS ( 08 Aug 2020 09:00 )  x     / <0.01 ng/mL / x     / x     / x        Troponin trend:    Serum Pro-Brain Natriuretic Peptide: 4880 pg/mL (08-08-20 @ 09:00)          RADIOLOGY:  -CXR:  -TTE:  -CCTA:  -STRESS TEST:  < from: NM Nuclear Stress Pharmacologic Multiple (07.23.19 @ 10:00) >  Impression:  1. IV Adenosine Dual Isotope Study which was negative with respect to   symptoms and EKG changes.  2. Myocardial perfusion imaging reveals no fixed perfusion defects  3. Gated imaging reveals septal motion consistent with an   interventricular conduction defect normal thickening and ejection   fraction:    < end of copied text >    -CATHETERIZATION:    ECG: atrial fibrillation with HR of 123bpm    TELEMETRY EVENTS: HPI:  75 y.o female patient with PMH of AS s/p Bovine valve replacement in 2016, CAD s/p CABG in 2016, HTN, HLD, Type 2 DM, HFpEF (EF 55-60% in 2016), post-op anemia requiring auto-transfusions due to severe rejection reaction, who presented to the ED for a 2-day history of worsening shortness of breath.  History goes back to about 2 weeks ago when the patient started complaining of shortness of breath on exertion. Symptoms got progressively worse but in the last 2 days, symptoms became very severe; patient is unable to walk for 3 steps without getting short of breath. Symptoms resolve at rest. Shortness of breath is associated with chest pressure and worsening LE edema. Today, patient became short of breath and became very pale as per the daughter.  ROS is positive for dry cough, mainly at night as well as diarrhea and nausea. She denies abdominal pain, fever, hematemesis, bright red blood per rectum or melena.  The daughter at bedside admits that the patient has been a bit more depressed, sluggish, and just not herself. She is not eating well in terms of amount of PO intake and type of food lately.  She is compliant with medications but stopped taking metolazone lately    In the ED, patient was found to be in rapid atrial fibrillation      PAST MEDICAL & SURGICAL HISTORY  CAD (coronary artery disease), native coronary artery  Asthma with COPD  Diabetes  Hypertension  Aortic stenosis  S/P CABG x 1  H/O aortic valve replacement: Bovine Replacement      FAMILY HISTORY:  FAMILY HISTORY:  No pertinent family history in first degree relatives      SOCIAL HISTORY:  []smoker: quit smoking 20 years ago  []Alcohol: occasional  []Drug: none    ALLERGIES:  Augmentin (Other)      MEDICATIONS:  MEDICATIONS  (STANDING):  aspirin enteric coated 81 milliGRAM(s) Oral daily  atorvastatin 40 milliGRAM(s) Oral at bedtime  furosemide   Injectable 40 milliGRAM(s) IV Push two times a day  metoprolol succinate ER 50 milliGRAM(s) Oral daily  montelukast 10 milliGRAM(s) Oral daily  multivitamin/minerals 1 Tablet(s) Oral daily  pantoprazole    Tablet 40 milliGRAM(s) Oral before breakfast  potassium chloride    Tablet ER 20 milliEquivalent(s) Oral daily  ranolazine 1000 milliGRAM(s) Oral daily  senna 2 Tablet(s) Oral at bedtime    MEDICATIONS  (PRN):      HOME MEDICATIONS:  Home Medications:  aspirin 81 mg oral tablet: 1 tab(s) orally once a day (08 Aug 2020 12:17)  Centrum oral tablet: 1 tab(s) orally once a day (08 Aug 2020 12:17)  furosemide 40 mg oral tablet: 1 tab(s) orally 2 times a day (08 Aug 2020 12:17)  glipiZIDE 5 mg oral tablet: 1 tab(s) orally once a day (08 Aug 2020 12:17)  losartan 25 mg oral tablet: 1 tab(s) orally once a day (08 Aug 2020 12:17)  metoprolol succinate 50 mg oral capsule, extended release: 1 cap(s) orally once a day (08 Aug 2020 12:17)  montelukast 10 mg oral tablet: 1 tab(s) orally once a day (08 Aug 2020 12:17)  pantoprazole 40 mg oral delayed release tablet: 1 tab(s) orally once a day (08 Aug 2020 12:17)  pioglitazone 45 mg oral tablet: 1 tab(s) orally once a day (08 Aug 2020 12:17)  potassium chloride 20 mEq oral granule, extended release: 1 cap(s) orally once a day (08 Aug 2020 12:17)  ranolazine 1000 mg oral tablet, extended release: 1 tab(s) orally once a day (08 Aug 2020 12:17)  rosuvastatin 10 mg oral tablet: 1 tab(s) orally once a day (08 Aug 2020 12:17)  Senna 8.6 mg oral tablet: 1 tab(s) orally once a day (at bedtime) (08 Aug 2020 12:17)      VITALS:   T(F): 98.9 (08-08 @ 06:54), Max: 98.9 (08-08 @ 06:54)  HR: 100 (08-08 @ 12:24) (67 - 100)  BP: 119/58 (08-08 @ 12:24) (119/58 - 127/57)  BP(mean): --  RR: 18 (08-08 @ 12:24) (18 - 22)  SpO2: 100% (08-08 @ 12:24) (98% - 100%)    I&O's Summary      REVIEW OF SYSTEMS:  CONSTITUTIONAL: Reduced PO intake lately  EYES: No visual changes  ENT: No vertigo or throat pain   NECK: No pain or stiffness  RESPIRATORY: Shortness of breath as described in HPI  CARDIOVASCULAR: Chest pressure as described in HPI  GASTROINTESTINAL: No abdominal or epigastric pain. + Nausea, no vomiting, or hematemesis; + diarrhea No melena or hematochezia.  GENITOURINARY: No dysuria, frequency or hematuria  NEUROLOGICAL: No numbness or weakness  SKIN: No itching, no rashes  MSK: No pain    PHYSICAL EXAM:  NEURO: patient is awake , alert and oriented  GEN: Not in acute distress  NECK: no thyroid enlargement, no JVD  LUNGS: Bibasilar crackles   CARDIOVASCULAR: S1/S2 present, RRR , mild systolic murmur heard at right 2nd ICS  ABD: Soft, non-tender, non-distended  EXT: 2+ bilateral LE edema  SKIN: Intact    LABS:                        7.6    7.25  )-----------( 209      ( 08 Aug 2020 09:10 )             26.8     08-08    143  |  100  |  32<H>  ----------------------------<  238<H>  5.1<H>   |  28  |  1.4    Ca    9.3      08 Aug 2020 09:10  Mg     2.1     08-08    TPro  6.9  /  Alb  3.9  /  TBili  0.7  /  DBili  x   /  AST  74<H>  /  ALT  27  /  AlkPhos  52  08-08    PT/INR - ( 08 Aug 2020 11:05 )   PT: 14.90 sec;   INR: 1.30 ratio         PTT - ( 08 Aug 2020 11:05 )  PTT:31.1 sec  Troponin T, Serum: <0.01 ng/mL (08-08-20 @ 09:00)    CARDIAC MARKERS ( 08 Aug 2020 09:00 )  x     / <0.01 ng/mL / x     / x     / x        Troponin trend:    Serum Pro-Brain Natriuretic Peptide: 4880 pg/mL (08-08-20 @ 09:00)          RADIOLOGY:  -CXR:  -TTE:  -CCTA:  -STRESS TEST:  < from: NM Nuclear Stress Pharmacologic Multiple (07.23.19 @ 10:00) >  Impression:  1. IV Adenosine Dual Isotope Study which was negative with respect to   symptoms and EKG changes.  2. Myocardial perfusion imaging reveals no fixed perfusion defects  3. Gated imaging reveals septal motion consistent with an   interventricular conduction defect normal thickening and ejection   fraction:    < end of copied text >    -CATHETERIZATION:    ECG: atrial fibrillation with HR of 123bpm    TELEMETRY EVENTS:

## 2020-08-08 NOTE — ED PROVIDER NOTE - ATTENDING CONTRIBUTION TO CARE
75 year old female, pmhx as documented above, presenting with dyspnea on exertion x 2-3 days that has been worsening. Patient reports it has been getting worse to the point that she is unable to ambulate across the room without significant dyspnea. Also endorsing chest pain described as tight, substernal, intermittent, non-radiating, worse with exertion, relieved with rest, mild severity. Otherwise denies fevers, palpitations, N/V/D, abdominal pain or any other complaints.    Vital Signs: I have reviewed the initial vital signs.  Constitutional: NAD, well-nourished, appears stated age, no acute distress.  HEENT: Airway patent, moist MM, no erythema/swelling/deformity of oral structures. EOMI, PERRLA.  CV: (+) irregular rhythm, well-perfused extremities, 2+ b/l DP and radial pulses equal. (+) b/l 2+ pitting edema  Lungs: BCTA, no increased WOB.  ABD: NTND, no guarding or rebound, no pulsatile mass, no hernias.   MSK: Neck supple, nontender, nl ROM, no stepoff. Chest nontender. Back nontender in TLS spine or to b/l bony structures or flanks. Ext nontender, nl rom, no deformity.   INTEG: Skin warm, dry, no rash.  NEURO: A&Ox3, normal strength, nl sensation throughout, normal speech.   PSYCH: Calm, cooperative, normal affect and interaction.    Patient fluid overloaded on exam which is likely cause of patient's sxs. Will obtain labs, EKG, CXR, re-eval.

## 2020-08-08 NOTE — H&P ADULT - NSHPREVIEWOFSYSTEMS_GEN_ALL_CORE
General: No fevers, chills, weight changes, +Weakness and dizziness 	  Skin/Breast: Skin darkening with bubbling on bilateral legs, Scabbing on UE from dog   Ophthalmologic: No blurry vision, double vision, recent changes in vision  ENMT: No difficulty hearing, ringing in ears, nasal discharge, throat pain, difficulty swallowing  Respiratory and Thorax: No coughing, wheezing, + shortness of breath  Cardiovascular: Mild chest pressure, No palpitations   Gastrointestinal: No abdominal pain, constipation. +Diarrhea, +Nausea  Genitourinary: No dysuria, polyuria, pyuria, hematuria  Musculoskeletal: No muscle aches or joint aches  Neurological: Some numbness/tingling into left arm from prior cervical issues   Psychiatric: Regular mood  Hematology/Lymphatics: No easy bruising	  Endocrine: No hot or cold intolerance

## 2020-08-09 LAB
ANION GAP SERPL CALC-SCNC: 14 MMOL/L — SIGNIFICANT CHANGE UP (ref 7–14)
BASOPHILS # BLD AUTO: 0.01 K/UL — SIGNIFICANT CHANGE UP (ref 0–0.2)
BASOPHILS NFR BLD AUTO: 0.2 % — SIGNIFICANT CHANGE UP (ref 0–1)
BUN SERPL-MCNC: 26 MG/DL — HIGH (ref 10–20)
CALCIUM SERPL-MCNC: 9.8 MG/DL — SIGNIFICANT CHANGE UP (ref 8.5–10.1)
CHLORIDE SERPL-SCNC: 100 MMOL/L — SIGNIFICANT CHANGE UP (ref 98–110)
CO2 SERPL-SCNC: 29 MMOL/L — SIGNIFICANT CHANGE UP (ref 17–32)
CREAT SERPL-MCNC: 1.2 MG/DL — SIGNIFICANT CHANGE UP (ref 0.7–1.5)
EOSINOPHIL # BLD AUTO: 0.04 K/UL — SIGNIFICANT CHANGE UP (ref 0–0.7)
EOSINOPHIL NFR BLD AUTO: 0.7 % — SIGNIFICANT CHANGE UP (ref 0–8)
GLUCOSE BLDC GLUCOMTR-MCNC: 136 MG/DL — HIGH (ref 70–99)
GLUCOSE BLDC GLUCOMTR-MCNC: 89 MG/DL — SIGNIFICANT CHANGE UP (ref 70–99)
GLUCOSE BLDC GLUCOMTR-MCNC: 92 MG/DL — SIGNIFICANT CHANGE UP (ref 70–99)
GLUCOSE BLDC GLUCOMTR-MCNC: 99 MG/DL — SIGNIFICANT CHANGE UP (ref 70–99)
GLUCOSE SERPL-MCNC: 91 MG/DL — SIGNIFICANT CHANGE UP (ref 70–99)
HCT VFR BLD CALC: 30.8 % — LOW (ref 37–47)
HGB BLD-MCNC: 8.5 G/DL — LOW (ref 12–16)
IMM GRANULOCYTES NFR BLD AUTO: 0.3 % — SIGNIFICANT CHANGE UP (ref 0.1–0.3)
LYMPHOCYTES # BLD AUTO: 1.75 K/UL — SIGNIFICANT CHANGE UP (ref 1.2–3.4)
LYMPHOCYTES # BLD AUTO: 29.8 % — SIGNIFICANT CHANGE UP (ref 20.5–51.1)
MAGNESIUM SERPL-MCNC: 2 MG/DL — SIGNIFICANT CHANGE UP (ref 1.8–2.4)
MCHC RBC-ENTMCNC: 23.4 PG — LOW (ref 27–31)
MCHC RBC-ENTMCNC: 27.6 G/DL — LOW (ref 32–37)
MCV RBC AUTO: 84.8 FL — SIGNIFICANT CHANGE UP (ref 81–99)
MONOCYTES # BLD AUTO: 0.63 K/UL — HIGH (ref 0.1–0.6)
MONOCYTES NFR BLD AUTO: 10.7 % — HIGH (ref 1.7–9.3)
NEUTROPHILS # BLD AUTO: 3.43 K/UL — SIGNIFICANT CHANGE UP (ref 1.4–6.5)
NEUTROPHILS NFR BLD AUTO: 58.3 % — SIGNIFICANT CHANGE UP (ref 42.2–75.2)
NRBC # BLD: 0 /100 WBCS — SIGNIFICANT CHANGE UP (ref 0–0)
PLATELET # BLD AUTO: 172 K/UL — SIGNIFICANT CHANGE UP (ref 130–400)
POTASSIUM SERPL-MCNC: 3.5 MMOL/L — SIGNIFICANT CHANGE UP (ref 3.5–5)
POTASSIUM SERPL-SCNC: 3.5 MMOL/L — SIGNIFICANT CHANGE UP (ref 3.5–5)
RBC # BLD: 3.63 M/UL — LOW (ref 4.2–5.4)
RBC # BLD: 3.63 M/UL — LOW (ref 4.2–5.4)
RBC # FLD: 20.9 % — HIGH (ref 11.5–14.5)
RETICS #: 112.2 K/UL — SIGNIFICANT CHANGE UP (ref 25–125)
RETICS/RBC NFR: 3.1 % — HIGH (ref 0.5–1.5)
SODIUM SERPL-SCNC: 143 MMOL/L — SIGNIFICANT CHANGE UP (ref 135–146)
WBC # BLD: 5.88 K/UL — SIGNIFICANT CHANGE UP (ref 4.8–10.8)
WBC # FLD AUTO: 5.88 K/UL — SIGNIFICANT CHANGE UP (ref 4.8–10.8)

## 2020-08-09 PROCEDURE — 93010 ELECTROCARDIOGRAM REPORT: CPT

## 2020-08-09 PROCEDURE — 99233 SBSQ HOSP IP/OBS HIGH 50: CPT

## 2020-08-09 PROCEDURE — 99232 SBSQ HOSP IP/OBS MODERATE 35: CPT

## 2020-08-09 PROCEDURE — 93306 TTE W/DOPPLER COMPLETE: CPT | Mod: 26

## 2020-08-09 RX ORDER — GLUCAGON INJECTION, SOLUTION 0.5 MG/.1ML
1 INJECTION, SOLUTION SUBCUTANEOUS ONCE
Refills: 0 | Status: DISCONTINUED | OUTPATIENT
Start: 2020-08-09 | End: 2020-08-13

## 2020-08-09 RX ORDER — DEXTROSE 50 % IN WATER 50 %
25 SYRINGE (ML) INTRAVENOUS ONCE
Refills: 0 | Status: DISCONTINUED | OUTPATIENT
Start: 2020-08-09 | End: 2020-08-13

## 2020-08-09 RX ORDER — FUROSEMIDE 40 MG
40 TABLET ORAL
Refills: 0 | Status: DISCONTINUED | OUTPATIENT
Start: 2020-08-09 | End: 2020-08-13

## 2020-08-09 RX ORDER — METOPROLOL TARTRATE 50 MG
100 TABLET ORAL DAILY
Refills: 0 | Status: DISCONTINUED | OUTPATIENT
Start: 2020-08-09 | End: 2020-08-11

## 2020-08-09 RX ORDER — DEXTROSE 50 % IN WATER 50 %
12.5 SYRINGE (ML) INTRAVENOUS ONCE
Refills: 0 | Status: DISCONTINUED | OUTPATIENT
Start: 2020-08-09 | End: 2020-08-13

## 2020-08-09 RX ORDER — INSULIN LISPRO 100/ML
VIAL (ML) SUBCUTANEOUS
Refills: 0 | Status: DISCONTINUED | OUTPATIENT
Start: 2020-08-09 | End: 2020-08-13

## 2020-08-09 RX ORDER — ALBUTEROL 90 UG/1
2 AEROSOL, METERED ORAL EVERY 6 HOURS
Refills: 0 | Status: DISCONTINUED | OUTPATIENT
Start: 2020-08-09 | End: 2020-08-13

## 2020-08-09 RX ORDER — DEXTROSE 50 % IN WATER 50 %
15 SYRINGE (ML) INTRAVENOUS ONCE
Refills: 0 | Status: DISCONTINUED | OUTPATIENT
Start: 2020-08-09 | End: 2020-08-13

## 2020-08-09 RX ORDER — SODIUM CHLORIDE 9 MG/ML
1000 INJECTION, SOLUTION INTRAVENOUS
Refills: 0 | Status: DISCONTINUED | OUTPATIENT
Start: 2020-08-09 | End: 2020-08-13

## 2020-08-09 RX ORDER — INSULIN GLARGINE 100 [IU]/ML
9 INJECTION, SOLUTION SUBCUTANEOUS AT BEDTIME
Refills: 0 | Status: DISCONTINUED | OUTPATIENT
Start: 2020-08-09 | End: 2020-08-13

## 2020-08-09 RX ORDER — INSULIN LISPRO 100/ML
3 VIAL (ML) SUBCUTANEOUS
Refills: 0 | Status: DISCONTINUED | OUTPATIENT
Start: 2020-08-09 | End: 2020-08-13

## 2020-08-09 RX ORDER — METOPROLOL TARTRATE 50 MG
50 TABLET ORAL ONCE
Refills: 0 | Status: COMPLETED | OUTPATIENT
Start: 2020-08-09 | End: 2020-08-09

## 2020-08-09 RX ADMIN — Medication 20 MILLIEQUIVALENT(S): at 12:45

## 2020-08-09 RX ADMIN — Medication 81 MILLIGRAM(S): at 12:45

## 2020-08-09 RX ADMIN — Medication 40 MILLIGRAM(S): at 05:09

## 2020-08-09 RX ADMIN — HEPARIN SODIUM 5000 UNIT(S): 5000 INJECTION INTRAVENOUS; SUBCUTANEOUS at 15:37

## 2020-08-09 RX ADMIN — HEPARIN SODIUM 5000 UNIT(S): 5000 INJECTION INTRAVENOUS; SUBCUTANEOUS at 05:09

## 2020-08-09 RX ADMIN — HEPARIN SODIUM 5000 UNIT(S): 5000 INJECTION INTRAVENOUS; SUBCUTANEOUS at 22:39

## 2020-08-09 RX ADMIN — RANOLAZINE 1000 MILLIGRAM(S): 500 TABLET, FILM COATED, EXTENDED RELEASE ORAL at 12:45

## 2020-08-09 RX ADMIN — PANTOPRAZOLE SODIUM 40 MILLIGRAM(S): 20 TABLET, DELAYED RELEASE ORAL at 06:16

## 2020-08-09 RX ADMIN — Medication 100 MILLIGRAM(S): at 15:48

## 2020-08-09 RX ADMIN — ATORVASTATIN CALCIUM 40 MILLIGRAM(S): 80 TABLET, FILM COATED ORAL at 22:39

## 2020-08-09 RX ADMIN — Medication 50 MILLIGRAM(S): at 18:14

## 2020-08-09 RX ADMIN — MONTELUKAST 10 MILLIGRAM(S): 4 TABLET, CHEWABLE ORAL at 12:45

## 2020-08-09 RX ADMIN — Medication 50 MILLIGRAM(S): at 05:10

## 2020-08-09 RX ADMIN — Medication 1 TABLET(S): at 12:45

## 2020-08-09 NOTE — PROGRESS NOTE ADULT - ATTENDING COMMENTS
Patient seen and examined independently. Agree with resident note   # Ac Diastolic CHF-- resume home dose of lasix 40mg po q12  # A fib-- rate uncontrolled-- metoprolol was increased to 100mg daily-- not on Ac due to anemia  may need watchman  # anemia-- occult blood stool-- GI for endoscopy

## 2020-08-09 NOTE — PROGRESS NOTE ADULT - SUBJECTIVE AND OBJECTIVE BOX
EVAN QUINN 75y Female  MRN#: [id number]   CODE STATUS:__full      SUBJECTIVE  Patient is a 75y old Female who presents with a chief complaint of Shortness of Breath (09 Aug 2020 14:58)  Currently admitted to medicine with the primary diagnosis of Afib  Today is hospital day 1d, and this morning she is feeling well.   Notes no chest pain, SOB, palpitations, abd pain, GI or urinary symptoms.     OBJECTIVE  PAST MEDICAL & SURGICAL HISTORY  CAD (coronary artery disease), native coronary artery  Asthma with COPD  Diabetes  Hypertension  Aortic stenosis  S/P CABG x 1  H/O ascending aortic replacement: Bovine Replacement    ALLERGIES:  Augmentin (Other)    MEDICATIONS:  STANDING MEDICATIONS  aspirin enteric coated 81 milliGRAM(s) Oral daily  atorvastatin 40 milliGRAM(s) Oral at bedtime  chlorhexidine 4% Liquid 1 Application(s) Topical <User Schedule>  dextrose 5%. 1000 milliLiter(s) IV Continuous <Continuous>  dextrose 50% Injectable 12.5 Gram(s) IV Push once  dextrose 50% Injectable 25 Gram(s) IV Push once  dextrose 50% Injectable 25 Gram(s) IV Push once  heparin   Injectable 5000 Unit(s) SubCutaneous every 8 hours  insulin glargine Injectable (LANTUS) 9 Unit(s) SubCutaneous at bedtime  insulin lispro (HumaLOG) corrective regimen sliding scale   SubCutaneous three times a day before meals  insulin lispro Injectable (HumaLOG) 3 Unit(s) SubCutaneous three times a day before meals  metoprolol succinate  milliGRAM(s) Oral daily  metoprolol succinate ER 50 milliGRAM(s) Oral once  montelukast 10 milliGRAM(s) Oral daily  multivitamin/minerals 1 Tablet(s) Oral daily  pantoprazole    Tablet 40 milliGRAM(s) Oral before breakfast  potassium chloride    Tablet ER 20 milliEquivalent(s) Oral daily  ranolazine 1000 milliGRAM(s) Oral daily    PRN MEDICATIONS  ALBUTerol    90 MICROgram(s) HFA Inhaler 2 Puff(s) Inhalation every 6 hours PRN  dextrose 40% Gel 15 Gram(s) Oral once PRN  glucagon  Injectable 1 milliGRAM(s) IntraMuscular once PRN      VITAL SIGNS: Last 24 Hours  T(C): 36.6 (09 Aug 2020 14:17), Max: 37.2 (08 Aug 2020 20:34)  T(F): 97.9 (09 Aug 2020 14:17), Max: 98.9 (08 Aug 2020 20:34)  HR: 117 (09 Aug 2020 14:17) (107 - 117)  BP: 124/65 (09 Aug 2020 14:17) (117/56 - 124/65)  BP(mean): --  RR: 18 (09 Aug 2020 14:17) (16 - 18)  SpO2: 100% (09 Aug 2020 05:15) (100% - 100%)    LABS:                        8.5    5.88  )-----------( 172      ( 09 Aug 2020 06:28 )             30.8     08-09    143  |  100  |  26<H>  ----------------------------<  91  3.5   |  29  |  1.2    Ca    9.8      09 Aug 2020 06:28  Mg     2.0     08-09    TPro  6.9  /  Alb  3.9  /  TBili  0.7  /  DBili  x   /  AST  74<H>  /  ALT  27  /  AlkPhos  52  08-08    PT/INR - ( 08 Aug 2020 11:05 )   PT: 14.90 sec;   INR: 1.30 ratio         PTT - ( 08 Aug 2020 11:05 )  PTT:31.1 sec      Troponin T, Serum: <0.01 ng/mL (08-08-20 @ 17:32)      CARDIAC MARKERS ( 08 Aug 2020 17:32 )  x     / <0.01 ng/mL / x     / x     / 2.5 ng/mL  CARDIAC MARKERS ( 08 Aug 2020 16:14 )  x     / <0.01 ng/mL / x     / x     / x      CARDIAC MARKERS ( 08 Aug 2020 09:00 )  x     / <0.01 ng/mL / x     / x     / x          RADIOLOGY:  < from: Transthoracic Echocardiogram (08.09.20 @ 11:53) >  Summary:   1. Left ventricular ejection fraction, by visual estimation, is 60 to 65%.   2. Mild concentric left ventricular hypertrophy.   3. Mild mitral valve regurgitation.  4. Mitral annular calcification.   5. Moderate tricuspid regurgitation.   6. Bioprosthesis in the aortic position.   7. Estimated pulmonary artery systolic pressure is 41.3 mmHg assuming a right atrial pressure of 10 mmHg, which is consistent with mild pulmonary hypertension.    < end of copied text >  < from: Xray Chest 1 View-PORTABLE IMMEDIATE (08.08.20 @ 08:06) >  Comparison : Chest radiograph 9/26/2016.    Technique/Positioning: Frontal view of the chest wasobtained.    Findings:    Support devices: None.    Cardiac/mediastinum/hilum: Stable cardiomegaly. Multiple fractured and migrated sternotomy wires with fragments present over the right upper quadrant and the left cardiac silhouette. This is new since the prior exam.    Lung parenchyma/Pleura: Low lung volumes. No focal consolidation, pneumothorax or pleural effusion.    Skeleton/soft tissues: No radiographically evident acute displaced fracture within the limitations of this exam.    Impression:    No radiographic evidence of acute cardiopulmonary disease.    Multiple fractured and migrated sternotomy wires as above. Correlate with point tenderness for underlying acute abnormality or with interval surgical history.    < end of copied text >      PHYSICAL EXAM:    GENERAL: NAD, obese, AAOx3  HEENT:  Atraumatic, Normocephalic. EOMI, ,   PULMONARY: Clear to auscultation bilaterally; No wheeze or crackles. poor inspiratory effort. decreased breath sounds at bases.  CARDIOVASCULAR: Regular rate and rhythm; No murmurs, rubs, or gallops  GASTROINTESTINAL: Soft, Nontender, Nondistended; Bowel sounds present  MUSCULOSKELETAL:  2+ Peripheral Pulses, No clubbing, cyanosis. + bilateral LE edema 2+, chronic venostasis changes.   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     #SOB, weakness 2/2 Acute on chronic CHF exacerbation, HFpEF 60-65% EF.  - likely CHF exacerbation given elevated BNP and LE edema is likely along with symptomatic anemia given Hg 7.5 on admission with baseline reported 8-9   - BNP 4800, LE Edema noted, Patient has been holding her Metolazone at home   - Sat 98% on room air  - c/w IV lasix, pt still volumed overloaded on exam today  - f/u cardio recs  - Daily Weights  - Strict Is and Os  - Monitor Electrolytes; maintain K over 4 and Mg over 2  - COVID swab negative  - losartan held due to low BP, can restart if BP elevated    #Paroxysmal Atrial Fibrillation   - Increase to Metoprolol 100mg daily  - CHADsVASC score at lease 6; but will hold off on anticoagulation at this time as per Cardio  -f/u GI for anemia and recs for a/c.    #Symptomatic normocytic acute on chronic Anemia   - Hemoglobin 7.6 with noted baseline 8-9. Today 8.5   - No signs of active bleeding; MILDRED refused by the patient despite education  - Iron panel, B12, Folate ordered  - f/u GI consult for anemia prior to starting a/c for afib    #LE edema with chronic skin changes  - Likely underlying vascular disease with chronic changes noted on physical examination   - Will order LE duplex to assess for any clots; low likelihood.       #CKD stage 3a  - creatinine 1.2 today  - Creatinine baseline 1.1-1.2  - Creatinine on admission 1.4  - Monitor     #CAD s/p CABG  - Continue on Metoprolol, ASA, Statin    #HLD  - Continue on Statin     #Type 2 DM  - PO meds on hold  - a1c ordered  - Carb consistent diet  - Monitor finger sticks; if over 180 start on basal/bolus insulin regimen. Will need patient's weight added to the system for medication dosing    #Asthma (stable)  - No signs of wheezing and stable on room air  - Continue on Montelukast    #AS s/p Bovine AVR  - Continue on Metoprolol   - Repeat TTE as above    Activity: As tolerated  Diet: DASH/Carb  DVT ppx: Lovenox  GI ppx: Protonix  Code Status: Full Code  DISPO: From home; admit to telemetry    pending: f/u GI consult, increased metoprolol today, f/u duplex. EVAN QUINN 75y Female  MRN#: [id number]   CODE STATUS:__full      SUBJECTIVE  Patient is a 75y old Female who presents with a chief complaint of Shortness of Breath (09 Aug 2020 14:58)  Currently admitted to medicine with the primary diagnosis of Afib  Today is hospital day 1d, and this morning she is feeling well.   Notes no chest pain, SOB, palpitations, abd pain, GI or urinary symptoms.     OBJECTIVE  PAST MEDICAL & SURGICAL HISTORY  CAD (coronary artery disease), native coronary artery  Asthma with COPD  Diabetes  Hypertension  Aortic stenosis  S/P CABG x 1  H/O ascending aortic replacement: Bovine Replacement    ALLERGIES:  Augmentin (Other)    MEDICATIONS:  STANDING MEDICATIONS  aspirin enteric coated 81 milliGRAM(s) Oral daily  atorvastatin 40 milliGRAM(s) Oral at bedtime  chlorhexidine 4% Liquid 1 Application(s) Topical <User Schedule>  dextrose 5%. 1000 milliLiter(s) IV Continuous <Continuous>  dextrose 50% Injectable 12.5 Gram(s) IV Push once  dextrose 50% Injectable 25 Gram(s) IV Push once  dextrose 50% Injectable 25 Gram(s) IV Push once  heparin   Injectable 5000 Unit(s) SubCutaneous every 8 hours  insulin glargine Injectable (LANTUS) 9 Unit(s) SubCutaneous at bedtime  insulin lispro (HumaLOG) corrective regimen sliding scale   SubCutaneous three times a day before meals  insulin lispro Injectable (HumaLOG) 3 Unit(s) SubCutaneous three times a day before meals  metoprolol succinate  milliGRAM(s) Oral daily  metoprolol succinate ER 50 milliGRAM(s) Oral once  montelukast 10 milliGRAM(s) Oral daily  multivitamin/minerals 1 Tablet(s) Oral daily  pantoprazole    Tablet 40 milliGRAM(s) Oral before breakfast  potassium chloride    Tablet ER 20 milliEquivalent(s) Oral daily  ranolazine 1000 milliGRAM(s) Oral daily    PRN MEDICATIONS  ALBUTerol    90 MICROgram(s) HFA Inhaler 2 Puff(s) Inhalation every 6 hours PRN  dextrose 40% Gel 15 Gram(s) Oral once PRN  glucagon  Injectable 1 milliGRAM(s) IntraMuscular once PRN      VITAL SIGNS: Last 24 Hours  T(C): 36.6 (09 Aug 2020 14:17), Max: 37.2 (08 Aug 2020 20:34)  T(F): 97.9 (09 Aug 2020 14:17), Max: 98.9 (08 Aug 2020 20:34)  HR: 117 (09 Aug 2020 14:17) (107 - 117)  BP: 124/65 (09 Aug 2020 14:17) (117/56 - 124/65)  BP(mean): --  RR: 18 (09 Aug 2020 14:17) (16 - 18)  SpO2: 100% (09 Aug 2020 05:15) (100% - 100%)    LABS:                        8.5    5.88  )-----------( 172      ( 09 Aug 2020 06:28 )             30.8     08-09    143  |  100  |  26<H>  ----------------------------<  91  3.5   |  29  |  1.2    Ca    9.8      09 Aug 2020 06:28  Mg     2.0     08-09    TPro  6.9  /  Alb  3.9  /  TBili  0.7  /  DBili  x   /  AST  74<H>  /  ALT  27  /  AlkPhos  52  08-08    PT/INR - ( 08 Aug 2020 11:05 )   PT: 14.90 sec;   INR: 1.30 ratio         PTT - ( 08 Aug 2020 11:05 )  PTT:31.1 sec      Troponin T, Serum: <0.01 ng/mL (08-08-20 @ 17:32)      CARDIAC MARKERS ( 08 Aug 2020 17:32 )  x     / <0.01 ng/mL / x     / x     / 2.5 ng/mL  CARDIAC MARKERS ( 08 Aug 2020 16:14 )  x     / <0.01 ng/mL / x     / x     / x      CARDIAC MARKERS ( 08 Aug 2020 09:00 )  x     / <0.01 ng/mL / x     / x     / x          RADIOLOGY:  < from: Transthoracic Echocardiogram (08.09.20 @ 11:53) >  Summary:   1. Left ventricular ejection fraction, by visual estimation, is 60 to 65%.   2. Mild concentric left ventricular hypertrophy.   3. Mild mitral valve regurgitation.  4. Mitral annular calcification.   5. Moderate tricuspid regurgitation.   6. Bioprosthesis in the aortic position.   7. Estimated pulmonary artery systolic pressure is 41.3 mmHg assuming a right atrial pressure of 10 mmHg, which is consistent with mild pulmonary hypertension.    < end of copied text >  < from: Xray Chest 1 View-PORTABLE IMMEDIATE (08.08.20 @ 08:06) >  Comparison : Chest radiograph 9/26/2016.    Technique/Positioning: Frontal view of the chest wasobtained.    Findings:    Support devices: None.    Cardiac/mediastinum/hilum: Stable cardiomegaly. Multiple fractured and migrated sternotomy wires with fragments present over the right upper quadrant and the left cardiac silhouette. This is new since the prior exam.    Lung parenchyma/Pleura: Low lung volumes. No focal consolidation, pneumothorax or pleural effusion.    Skeleton/soft tissues: No radiographically evident acute displaced fracture within the limitations of this exam.    Impression:    No radiographic evidence of acute cardiopulmonary disease.    Multiple fractured and migrated sternotomy wires as above. Correlate with point tenderness for underlying acute abnormality or with interval surgical history.    < end of copied text >      PHYSICAL EXAM:    GENERAL: NAD, obese, AAOx3  HEENT:  Atraumatic, Normocephalic. EOMI, ,   PULMONARY: Clear to auscultation bilaterally; No wheeze or crackles. poor inspiratory effort. decreased breath sounds at bases.  CARDIOVASCULAR: Regular rate and rhythm; No murmurs, rubs, or gallops  GASTROINTESTINAL: Soft, Nontender, Nondistended; Bowel sounds present  MUSCULOSKELETAL:  2+ Peripheral Pulses, No clubbing, cyanosis. + bilateral LE edema 2+, chronic venostasis changes.   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  Prior to admission yesterday she was having increased shortness of breath along with chest/left arm discomfort. EMS did EKG noted for A-fib RVR, she was given Nitro and loaded with Aspirin.  In the ED, vitals were normal  BNP approximately 5,000. Chest X-ray remarkable for some noted congestion and slight blunting of inferior angle on right. EKG noted for A-fib RVR,    #SOB, weakness 2/2 Acute on chronic CHF exacerbation, HFpEF 60-65% EF.  - likely CHF exacerbation given elevated BNP and LE edema is likely along with symptomatic anemia given Hg 7.5 on admission with baseline reported 8-9   - BNP 4800, LE Edema noted, Patient has been holding her Metolazone at home   - Sat 98% on room air  - c/w IV lasix, pt still volumed overloaded  - f/u cardio recs  - Daily Weights  - Strict Is and Os  - Monitor Electrolytes; maintain K over 4 and Mg over 2  - COVID swab negative  - losartan held due to low BP, can restart if BP elevated    #Paroxysmal Atrial Fibrillation   - HR today 103-104  - Increase to Metoprolol 100mg daily  - CHADsVASC score at lease 6; but will hold off on anticoagulation at this time as per Cardio  - f/u GI for anemia and recs for a/c.    #Symptomatic normocytic acute on chronic Anemia   - Hemoglobin 7.6 with noted baseline 8-9. Today 8.5   - No signs of active bleeding; MILDRED refused by the patient despite education  - Iron panel, B12, Folate ordered  - f/u GI consult for anemia prior to starting a/c for afib    #LE edema with chronic skin changes  - Likely underlying vascular disease with chronic changes noted on physical examination   - Will order LE duplex to assess for any clots; low likelihood.       #CKD stage 3a  - creatinine 1.2 today  - Creatinine baseline 1.1-1.2  - Creatinine on admission 1.4  - Monitor     #CAD s/p CABG  - Continue on Metoprolol, ASA, Statin    #HLD  - Continue on Statin     #Type 2 DM  - PO meds on hold  - a1c ordered  - Carb consistent diet  - Monitor finger sticks; if over 180 start on basal/bolus insulin regimen. Will need patient's weight added to the system for medication dosing    #Asthma (stable)  - No signs of wheezing and stable on room air  - Continue on Montelukast    #AS s/p Bovine AVR  - Continue on Metoprolol   - Repeat TTE as above    Activity: As tolerated  Diet: DASH/Carb  DVT ppx: Lovenox  GI ppx: Protonix  Code Status: Full Code  DISPO: From home; admit to telemetry    pending: f/u GI consult, increased metoprolol today, f/u duplex.

## 2020-08-09 NOTE — PROGRESS NOTE ADULT - SUBJECTIVE AND OBJECTIVE BOX
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 As per the patient she has been getting worked up with Dr. Phipps over the past few months. Her hemoglobin has been around an 8-9 and she was scheduled to get worked up with Dr. Ivory this week. Over the past few days though she has been feeling a bit worse. States that she has been having some nausea, dizziness, and weakness. States that she has been also having shortness of breath that has been worse on ambulation. She also states that she has been having some diarrhea. Reports that it could be from her taking the senna which she used for constipation. The patient does not endorse any urinary symptoms or any bleeding. This morning she reports that she was having some increased shortness of breath along with a bit of chest/left arm discomfort. She states that it was pressure like and went to the left side. The daughter who lives next door was contacted and 911 was called. EMS did EKG noted for A-fib RVR, given Nitro and loaded with Aspirin.    In the ED, initial vitals T98.9, HR 67 (documented) with monitor saying 98, /57, sat 98% on room air. Chest X-ray noted for sternotomy wires, possible enlarged heart but it is a single view AP, and some noted congestion and slight blunting of inferior angle on right. EKG noted for A-fib RVR, BNP approximately 5,000     The daughter at bedside admits that the patient has been a bit more depressed, sluggish, and just not herself. She is not eating well in terms of amount of PO intake and type of food lately. (08 Aug 2020 11:57)      SUBJ:  Patient seen and examined. Clinically feels better. Dyspna improved. She diuresed with Lasix. In AF with RVR.      MEDICATIONS  (STANDING):  aspirin enteric coated 81 milliGRAM(s) Oral daily  atorvastatin 40 milliGRAM(s) Oral at bedtime  chlorhexidine 4% Liquid 1 Application(s) Topical <User Schedule>  dextrose 5%. 1000 milliLiter(s) (50 mL/Hr) IV Continuous <Continuous>  dextrose 50% Injectable 12.5 Gram(s) IV Push once  dextrose 50% Injectable 25 Gram(s) IV Push once  dextrose 50% Injectable 25 Gram(s) IV Push once  heparin   Injectable 5000 Unit(s) SubCutaneous every 8 hours  insulin glargine Injectable (LANTUS) 9 Unit(s) SubCutaneous at bedtime  insulin lispro (HumaLOG) corrective regimen sliding scale   SubCutaneous three times a day before meals  insulin lispro Injectable (HumaLOG) 3 Unit(s) SubCutaneous three times a day before meals  metoprolol succinate ER 50 milliGRAM(s) Oral daily  montelukast 10 milliGRAM(s) Oral daily  multivitamin/minerals 1 Tablet(s) Oral daily  pantoprazole    Tablet 40 milliGRAM(s) Oral before breakfast  potassium chloride    Tablet ER 20 milliEquivalent(s) Oral daily  ranolazine 1000 milliGRAM(s) Oral daily    MEDICATIONS  (PRN):  ALBUTerol    90 MICROgram(s) HFA Inhaler 2 Puff(s) Inhalation every 6 hours PRN Shortness of Breath and/or Wheezing  dextrose 40% Gel 15 Gram(s) Oral once PRN Blood Glucose LESS THAN 70 milliGRAM(s)/deciliter  glucagon  Injectable 1 milliGRAM(s) IntraMuscular once PRN Glucose LESS THAN 70 milligrams/deciliter            Vital Signs Last 24 Hrs  T(C): 36.6 (09 Aug 2020 14:17), Max: 37.2 (08 Aug 2020 20:34)  T(F): 97.9 (09 Aug 2020 14:17), Max: 98.9 (08 Aug 2020 20:34)  HR: 117 (09 Aug 2020 14:17) (107 - 117)  BP: 124/65 (09 Aug 2020 14:17) (117/56 - 140/68)  BP(mean): --  RR: 18 (09 Aug 2020 14:17) (16 - 18)  SpO2: 100% (09 Aug 2020 05:15) (100% - 100%)      PHYSICAL EXAM:    GEN: AAO x 3, NAD, obese  HEENT: NC/AT, PERRL  Neck: No JVD, no bruits  CV: irreg, S1-S2, 2/6 murmur  Lungs: CTAB  Abd: Soft, non-tender  Ext: mild edema, + venostasis      I&O's Summary    08 Aug 2020 07:01  -  09 Aug 2020 07:00  --------------------------------------------------------  IN: 240 mL / OUT: 1550 mL / NET: -1310 mL    09 Aug 2020 07:01  -  09 Aug 2020 14:58  --------------------------------------------------------  IN: 0 mL / OUT: 800 mL / NET: -800 mL    	    TTE:  < from: Transthoracic Echocardiogram (08.09.20 @ 11:53) >  Summary:   1. Left ventricular ejection fraction, by visual estimation, is 60 to 65%.   2. Mild concentric left ventricular hypertrophy.   3. Mild mitral valve regurgitation.  4. Mitral annular calcification.   5. Moderate tricuspid regurgitation.   6. Bioprosthesis in the aortic position.   7. Estimated pulmonary artery systolic pressure is 41.3 mmHg assuming a right atrial pressure of 10 mmHg, which is consistent with mild pulmonary hypertension.    < end of copied text >      LABS:                        8.5    5.88  )-----------( 172      ( 09 Aug 2020 06:28 )             30.8     08-09    143  |  100  |  26<H>  ----------------------------<  91  3.5   |  29  |  1.2    Ca    9.8      09 Aug 2020 06:28  Mg     2.0     08-09    TPro  6.9  /  Alb  3.9  /  TBili  0.7  /  DBili  x   /  AST  74<H>  /  ALT  27  /  AlkPhos  52  08-08    CARDIAC MARKERS ( 08 Aug 2020 17:32 )  x     / <0.01 ng/mL / x     / x     / 2.5 ng/mL  CARDIAC MARKERS ( 08 Aug 2020 16:14 )  x     / <0.01 ng/mL / x     / x     / x      CARDIAC MARKERS ( 08 Aug 2020 09:00 )  x     / <0.01 ng/mL / x     / x     / x          PT/INR - ( 08 Aug 2020 11:05 )   PT: 14.90 sec;   INR: 1.30 ratio         PTT - ( 08 Aug 2020 11:05 )  PTT:31.1 sec      BNP  RADIOLOGY & ADDITIONAL STUDIES:      IMPRESSION AND PLAN:

## 2020-08-09 NOTE — PROGRESS NOTE ADULT - ASSESSMENT
new onset A. fib  CHF - diastolic   AS, s/p AVR - echo noted.  CAD, s/p CABG  GIB history.  Anemic    Recommend:  C/w diuresis with IV Lasix  Increase Metoprolol to 100 mg qd for better rate control  GI evaluation for EGD  Will hold off on Eliquis until the GI evaluation.  Will discuss Watchman with EP.

## 2020-08-09 NOTE — PATIENT PROFILE ADULT - NSTOBACCONEVERSMOKERY/N_GEN_A
Telephone Encounter by Scarlett Keating RN at 05/09/17 11:53 AM     Author:  Scarlett Keating RN Service:  (none) Author Type:  Registered Nurse     Filed:  05/09/17 12:04 PM Encounter Date:  5/8/2017 Status:  Signed     :  Scarlett Keating RN (Registered Nurse)            To Dr Castillo as FYI     Recent office visit notes printed and faxed as requested.  Patient's dad notified and verbalized understanding of all information given.[JS1.1M]       Revision History        User Key Date/Time User Provider Type Action    > JS1.1 05/09/17 12:04 PM Scarlett Keating RN Registered Nurse Sign    M - Manual             Yes

## 2020-08-09 NOTE — CHART NOTE - NSCHARTNOTEFT_GEN_A_CORE
rapid a fin-- dose of metoprolol was increased to 100mg ER and extra dose given for today-- Gi consult for  anemia -- needs EGD CHADs vac high needs to be on AC.

## 2020-08-10 ENCOUNTER — APPOINTMENT (OUTPATIENT)
Dept: CARDIOLOGY | Facility: CLINIC | Age: 75
End: 2020-08-10

## 2020-08-10 LAB
A1C WITH ESTIMATED AVERAGE GLUCOSE RESULT: 4.8 % — SIGNIFICANT CHANGE UP (ref 4–5.6)
ANION GAP SERPL CALC-SCNC: 13 MMOL/L — SIGNIFICANT CHANGE UP (ref 7–14)
ANION GAP SERPL CALC-SCNC: 18 MMOL/L — HIGH (ref 7–14)
APTT BLD: 28.8 SEC — SIGNIFICANT CHANGE UP (ref 27–39.2)
BASOPHILS # BLD AUTO: 0.02 K/UL — SIGNIFICANT CHANGE UP (ref 0–0.2)
BASOPHILS NFR BLD AUTO: 0.3 % — SIGNIFICANT CHANGE UP (ref 0–1)
BUN SERPL-MCNC: 27 MG/DL — HIGH (ref 10–20)
BUN SERPL-MCNC: 30 MG/DL — HIGH (ref 10–20)
CALCIUM SERPL-MCNC: 9.4 MG/DL — SIGNIFICANT CHANGE UP (ref 8.5–10.1)
CALCIUM SERPL-MCNC: 9.7 MG/DL — SIGNIFICANT CHANGE UP (ref 8.5–10.1)
CHLORIDE SERPL-SCNC: 100 MMOL/L — SIGNIFICANT CHANGE UP (ref 98–110)
CHLORIDE SERPL-SCNC: 98 MMOL/L — SIGNIFICANT CHANGE UP (ref 98–110)
CO2 SERPL-SCNC: 23 MMOL/L — SIGNIFICANT CHANGE UP (ref 17–32)
CO2 SERPL-SCNC: 30 MMOL/L — SIGNIFICANT CHANGE UP (ref 17–32)
CREAT SERPL-MCNC: 1.3 MG/DL — SIGNIFICANT CHANGE UP (ref 0.7–1.5)
CREAT SERPL-MCNC: 1.3 MG/DL — SIGNIFICANT CHANGE UP (ref 0.7–1.5)
EOSINOPHIL # BLD AUTO: 0.04 K/UL — SIGNIFICANT CHANGE UP (ref 0–0.7)
EOSINOPHIL NFR BLD AUTO: 0.6 % — SIGNIFICANT CHANGE UP (ref 0–8)
ESTIMATED AVERAGE GLUCOSE: 91 MG/DL — SIGNIFICANT CHANGE UP (ref 68–114)
FOLATE SERPL-MCNC: >20 NG/ML — SIGNIFICANT CHANGE UP
GLUCOSE BLDC GLUCOMTR-MCNC: 109 MG/DL — HIGH (ref 70–99)
GLUCOSE BLDC GLUCOMTR-MCNC: 112 MG/DL — HIGH (ref 70–99)
GLUCOSE BLDC GLUCOMTR-MCNC: 118 MG/DL — HIGH (ref 70–99)
GLUCOSE BLDC GLUCOMTR-MCNC: 126 MG/DL — HIGH (ref 70–99)
GLUCOSE SERPL-MCNC: 117 MG/DL — HIGH (ref 70–99)
GLUCOSE SERPL-MCNC: 127 MG/DL — HIGH (ref 70–99)
HCT VFR BLD CALC: 31.7 % — LOW (ref 37–47)
HCV AB S/CO SERPL IA: 0.04 COI — SIGNIFICANT CHANGE UP
HCV AB SERPL-IMP: SIGNIFICANT CHANGE UP
HGB BLD-MCNC: 8.7 G/DL — LOW (ref 12–16)
IMM GRANULOCYTES NFR BLD AUTO: 0.2 % — SIGNIFICANT CHANGE UP (ref 0.1–0.3)
INR BLD: 1.23 RATIO — SIGNIFICANT CHANGE UP (ref 0.65–1.3)
IRON SATN MFR SERPL: 21 UG/DL — LOW (ref 35–150)
IRON SATN MFR SERPL: 5 % — LOW (ref 15–50)
LYMPHOCYTES # BLD AUTO: 1.92 K/UL — SIGNIFICANT CHANGE UP (ref 1.2–3.4)
LYMPHOCYTES # BLD AUTO: 30.1 % — SIGNIFICANT CHANGE UP (ref 20.5–51.1)
MAGNESIUM SERPL-MCNC: 2 MG/DL — SIGNIFICANT CHANGE UP (ref 1.8–2.4)
MCHC RBC-ENTMCNC: 23.3 PG — LOW (ref 27–31)
MCHC RBC-ENTMCNC: 27.4 G/DL — LOW (ref 32–37)
MCV RBC AUTO: 85 FL — SIGNIFICANT CHANGE UP (ref 81–99)
MONOCYTES # BLD AUTO: 0.6 K/UL — SIGNIFICANT CHANGE UP (ref 0.1–0.6)
MONOCYTES NFR BLD AUTO: 9.4 % — HIGH (ref 1.7–9.3)
NEUTROPHILS # BLD AUTO: 3.79 K/UL — SIGNIFICANT CHANGE UP (ref 1.4–6.5)
NEUTROPHILS NFR BLD AUTO: 59.4 % — SIGNIFICANT CHANGE UP (ref 42.2–75.2)
NRBC # BLD: 0 /100 WBCS — SIGNIFICANT CHANGE UP (ref 0–0)
PLATELET # BLD AUTO: 181 K/UL — SIGNIFICANT CHANGE UP (ref 130–400)
POTASSIUM SERPL-MCNC: 3.7 MMOL/L — SIGNIFICANT CHANGE UP (ref 3.5–5)
POTASSIUM SERPL-MCNC: 3.8 MMOL/L — SIGNIFICANT CHANGE UP (ref 3.5–5)
POTASSIUM SERPL-SCNC: 3.7 MMOL/L — SIGNIFICANT CHANGE UP (ref 3.5–5)
POTASSIUM SERPL-SCNC: 3.8 MMOL/L — SIGNIFICANT CHANGE UP (ref 3.5–5)
PROTHROM AB SERPL-ACNC: 14.2 SEC — HIGH (ref 9.95–12.87)
RBC # BLD: 3.73 M/UL — LOW (ref 4.2–5.4)
RBC # FLD: 20.8 % — HIGH (ref 11.5–14.5)
SODIUM SERPL-SCNC: 139 MMOL/L — SIGNIFICANT CHANGE UP (ref 135–146)
SODIUM SERPL-SCNC: 143 MMOL/L — SIGNIFICANT CHANGE UP (ref 135–146)
TIBC SERPL-MCNC: 386 UG/DL — SIGNIFICANT CHANGE UP (ref 220–430)
TROPONIN T SERPL-MCNC: <0.01 NG/ML — SIGNIFICANT CHANGE UP
UIBC SERPL-MCNC: 365 UG/DL — SIGNIFICANT CHANGE UP (ref 110–370)
VIT B12 SERPL-MCNC: 789 PG/ML — SIGNIFICANT CHANGE UP (ref 232–1245)
WBC # BLD: 6.38 K/UL — SIGNIFICANT CHANGE UP (ref 4.8–10.8)
WBC # FLD AUTO: 6.38 K/UL — SIGNIFICANT CHANGE UP (ref 4.8–10.8)

## 2020-08-10 PROCEDURE — 99233 SBSQ HOSP IP/OBS HIGH 50: CPT

## 2020-08-10 PROCEDURE — 99223 1ST HOSP IP/OBS HIGH 75: CPT

## 2020-08-10 PROCEDURE — 93970 EXTREMITY STUDY: CPT | Mod: 26

## 2020-08-10 RX ORDER — HEPARIN SODIUM 5000 [USP'U]/ML
1500 INJECTION INTRAVENOUS; SUBCUTANEOUS
Qty: 25000 | Refills: 0 | Status: DISCONTINUED | OUTPATIENT
Start: 2020-08-10 | End: 2020-08-11

## 2020-08-10 RX ORDER — MORPHINE SULFATE 50 MG/1
1 CAPSULE, EXTENDED RELEASE ORAL ONCE
Refills: 0 | Status: COMPLETED | OUTPATIENT
Start: 2020-08-10 | End: 2020-08-10

## 2020-08-10 RX ADMIN — Medication 81 MILLIGRAM(S): at 12:21

## 2020-08-10 RX ADMIN — Medication 1 TABLET(S): at 12:22

## 2020-08-10 RX ADMIN — ATORVASTATIN CALCIUM 40 MILLIGRAM(S): 80 TABLET, FILM COATED ORAL at 22:59

## 2020-08-10 RX ADMIN — Medication 40 MILLIGRAM(S): at 06:10

## 2020-08-10 RX ADMIN — Medication 100 MILLIGRAM(S): at 06:11

## 2020-08-10 RX ADMIN — HEPARIN SODIUM 5000 UNIT(S): 5000 INJECTION INTRAVENOUS; SUBCUTANEOUS at 06:11

## 2020-08-10 RX ADMIN — Medication 40 MILLIGRAM(S): at 17:57

## 2020-08-10 RX ADMIN — HEPARIN SODIUM 15 UNIT(S)/HR: 5000 INJECTION INTRAVENOUS; SUBCUTANEOUS at 17:57

## 2020-08-10 RX ADMIN — PANTOPRAZOLE SODIUM 40 MILLIGRAM(S): 20 TABLET, DELAYED RELEASE ORAL at 06:10

## 2020-08-10 RX ADMIN — RANOLAZINE 1000 MILLIGRAM(S): 500 TABLET, FILM COATED, EXTENDED RELEASE ORAL at 12:22

## 2020-08-10 RX ADMIN — MONTELUKAST 10 MILLIGRAM(S): 4 TABLET, CHEWABLE ORAL at 12:22

## 2020-08-10 RX ADMIN — HEPARIN SODIUM 15 UNIT(S)/HR: 5000 INJECTION INTRAVENOUS; SUBCUTANEOUS at 16:17

## 2020-08-10 NOTE — PROGRESS NOTE ADULT - ASSESSMENT
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     #SOB, weakness 2/2 Acute on chronic CHF exacerbation, HFpEF 60-65% EF.  - likely CHF exacerbation given elevated BNP and LE edema is likely along with symptomatic anemia given Hg 7.5 on admission with baseline reported 8-9   - BNP 4800, LE Edema noted, Patient has been holding her Metolazone at home   - Sat 98% on room air  - c/w IV lasix, pt still volumed overloaded on exam today  - f/u cardio recs  - Daily Weights  - Strict Is and Os  - Monitor Electrolytes; maintain K over 4 and Mg over 2  - COVID swab negative  - losartan held due to low BP, can restart if BP elevated    #Paroxysmal Atrial Fibrillation   - Increase Metoprolol to 100mg daily  - CHADsVASC score at lease 6; but will hold off on anticoagulation at this time as per Cardio  -f/u GI for anemia and recs for a/c.    #Symptomatic normocytic acute on chronic Anemia   - Hemoglobin 7.6 with noted baseline 8-9. Today 8.5   - No signs of active bleeding; MILDRED refused by the patient despite education  - Iron panel, B12, Folate ordered  - f/u GI consult for anemia prior to starting a/c for afib    #LE edema with chronic skin changes  - Likely underlying vascular disease with chronic changes noted on physical examination   - Will order LE duplex to assess for any clots; low likelihood.       #CKD stage 3a  - creatinine 1.2 today  - Creatinine baseline 1.1-1.2  - Creatinine on admission 1.4  - Monitor     #CAD s/p CABG  - Continue on Metoprolol, ASA, Statin    #HLD  - Continue on Statin     #Type 2 DM  - PO meds on hold  - a1c ordered  - Carb consistent diet  - Monitor finger sticks; if over 180 start on basal/bolus insulin regimen. Will need patient's weight added to the system for medication dosing    #Asthma (stable)  - No signs of wheezing and stable on room air  - Continue on Montelukast    #AS s/p Bovine AVR  - Continue on Metoprolol   - Repeat TTE as above    Activity: As tolerated  Diet: DASH/Carb  DVT ppx: Lovenox  GI ppx: Protonix  Code Status: Full Code  DISPO: From home; admit to telemetry    pending: f/u GI consult, increased metoprolol today, f/u duplex. 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  Prior to admission yesterday she was having increased shortness of breath along with chest/left arm discomfort. EMS did EKG noted for A-fib RVR, she was given Nitro and loaded with Aspirin.  In the ED, vitals were normal  BNP approximately 5,000. Chest X-ray remarkable for some noted congestion and slight blunting of inferior angle on right. EKG noted for A-fib RVR,      #SOB, weakness 2/2 Acute on chronic CHF exacerbation, HFpEF 60-65% EF.  - likely CHF exacerbation given elevated BNP and LE edema is likely along with symptomatic anemia given Hg 7.5 on admission with baseline reported 8-9   - BNP 4800, LE Edema noted, Patient has been holding her Metolazone at home   - Sat 98% on room air  - c/w IV lasix, pt still volumed overloaded  - f/u cardio recs  - Daily Weights  - Strict Is and Os  - Monitor Electrolytes; maintain K over 4 and Mg over 2  - COVID swab negative  - losartan held due to low BP, can restart if BP elevated    #Paroxysmal Atrial Fibrillation   - HR today 103-104  - Increase to Metoprolol 100mg daily  - CHADsVASC score at lease 6; but will hold off on anticoagulation at this time as per Cardio  - f/u GI for anemia and recs for a/c.     #Symptomatic normocytic acute on chronic Anemia   - Hemoglobin 7.6 with noted baseline 8-9. Today 8.5   - No signs of active bleeding; MILDRED refused by the patient despite education  - Iron panel, B12, Folate ordered  - f/u GI consult for anemia prior to starting a/c for afib    #LE edema with chronic skin changes  - Likely underlying vascular disease with chronic changes noted on physical examination   - Will order LE duplex to assess for any clots; low likelihood.       #CKD stage 3a  - creatinine 1.2 today  - Creatinine baseline 1.1-1.2  - Creatinine on admission 1.4  - Monitor     #CAD s/p CABG  - Continue on Metoprolol, ASA, Statin    #HLD  - Continue on Statin     #Type 2 DM  - PO meds on hold  - a1c ordered  - Carb consistent diet  - Monitor finger sticks; if over 180 start on basal/bolus insulin regimen. Will need patient's weight added to the system for medication dosing    #Asthma (stable)  - No signs of wheezing and stable on room air  - Continue on Montelukast    #AS s/p Bovine AVR  - Continue on Metoprolol   - Repeat TTE as above    Activity: As tolerated  Diet: DASH/Carb  DVT ppx: Lovenox  GI ppx: Protonix  Code Status: Full Code  DISPO: From home; admit to telemetry    pending: f/u GI consult, increased metoprolol today, f/u duplex. 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  Prior to admission yesterday she was having increased shortness of breath along with chest/left arm discomfort. EMS did EKG noted for A-fib RVR, she was given Nitro and loaded with Aspirin.  In the ED, vitals were normal  BNP approximately 5,000. Chest X-ray remarkable for some noted congestion and slight blunting of inferior angle on right. EKG noted for A-fib RVR,      #SOB, weakness 2/2 Acute on chronic CHF exacerbation, HFpEF 60-65% EF.  - likely CHF exacerbation given elevated BNP and LE edema is likely along with symptomatic anemia given Hg 7.5 on admission with baseline reported 8-9   - BNP 4800, LE Edema noted, Patient has been holding her Metolazone at home   - Sat 98% on room air  - c/w IV lasix, pt still volumed overloaded  - f/u cardio recs  - Daily Weights  - Strict Is and Os  - Monitor Electrolytes; maintain K over 4 and Mg over 2  - COVID swab negative  - losartan held due to low BP, can restart if BP elevated    #Paroxysmal Atrial Fibrillation   - HR today 103-104  - Increase to Metoprolol 100mg daily  - CHADsVASC score at lease 6; but will hold off on anticoagulation at this time as per Cardio  - F/u GI recs regarding AC: No signs of active bleeding, Hb stable at baseline, f/u iron studies, folate and b12 pending. Will discuss with attending EGD and colonoscopy, needs risk     #Symptomatic normocytic acute on chronic Anemia   - Hemoglobin 7.6 with noted baseline 8-9. Today 8.5   - No signs of active bleeding; MILDRED refused by the patient despite education  - Iron panel, B12, Folate ordered  - f/u GI consult for anemia prior to starting a/c for afib    #LE edema with chronic skin changes  - Likely underlying vascular disease with chronic changes noted on physical examination   - Will order LE duplex to assess for any clots; low likelihood.       #CKD stage 3a  - creatinine 1.2 today  - Creatinine baseline 1.1-1.2  - Creatinine on admission 1.4  - Monitor     #CAD s/p CABG  - Continue on Metoprolol, ASA, Statin    #HLD  - Continue on Statin     #Type 2 DM  - PO meds on hold  - a1c ordered  - Carb consistent diet  - Monitor finger sticks; if over 180 start on basal/bolus insulin regimen. Will need patient's weight added to the system for medication dosing    #Asthma (stable)  - No signs of wheezing and stable on room air  - Continue on Montelukast    #AS s/p Bovine AVR  - Continue on Metoprolol   - Repeat TTE as above    Activity: As tolerated  Diet: DASH/Carb  DVT ppx: Lovenox  GI ppx: Protonix  Code Status: Full Code  DISPO: From home; admit to telemetry    pending: f/u GI consult, increased metoprolol today, f/u duplex.

## 2020-08-10 NOTE — PROGRESS NOTE ADULT - ATTENDING COMMENTS
patient seen and examined independently   agree with above note     A/P  #acute diastolic chf improved   on po lasix 40 BID   s/p IV lasix   s/p AVR     AFIB: rate better controlled on toprol 100 qda. anticoagulation on hold untl GI eval for anemia     Iron def anemia: GI eval pending

## 2020-08-10 NOTE — PROGRESS NOTE ADULT - SUBJECTIVE AND OBJECTIVE BOX
EVAN QUINN 75y Female  MRN#: [id number]   Hospital Day: 2d    SUBJECTIVE  Patient is a 75y old Female who presents with a chief complaint of Shortness of Breath (09 Aug 2020 17:30)  Currently admitted to medicine with the primary diagnosis of Afib.     Currently admitted to medicine with the primary diagnosis of Afib  Today is hospital day 2d, and this morning she is feeling well.   Notes no chest pain, SOB, palpitations, abd pain, GI or urinary symptoms.     INTERVAL HPI AND OVERNIGHT EVENTS:  Patient was examined and seen at bedside. This morning she is resting comfortably in bed and reports no issues or overnight events.    REVIEW OF SYMPTOMS:  CONSTITUTIONAL: No weakness, fevers or chills; No headaches  EYES: No visual changes, eye pain, or discharge  ENT: No vertigo; No ear pain or change in hearing; No sore throat or difficulty swallowing  NECK: No pain or stiffness  RESPIRATORY: No cough, wheezing, or hemoptysis; No shortness of breath  CARDIOVASCULAR: No chest pain or palpitations  GASTROINTESTINAL: No abdominal or epigastric pain; No nausea, vomiting, or hematemesis; No diarrhea or constipation; No melena or hematochezia  GENITOURINARY: No dysuria, frequency or hematuria  MUSCULOSKELETAL: No joint pain, no muscle pain, no weakness  NEUROLOGICAL: No numbness or weakness  SKIN: No itching or rashes    OBJECTIVE  PAST MEDICAL & SURGICAL HISTORY  CAD (coronary artery disease), native coronary artery  Asthma with COPD  Diabetes  Hypertension  Aortic stenosis  S/P CABG x 1  H/O ascending aortic replacement: Bovine Replacement    ALLERGIES:  Augmentin (Other)    MEDICATIONS:  STANDING MEDICATIONS  aspirin enteric coated 81 milliGRAM(s) Oral daily  atorvastatin 40 milliGRAM(s) Oral at bedtime  chlorhexidine 4% Liquid 1 Application(s) Topical <User Schedule>  dextrose 5%. 1000 milliLiter(s) IV Continuous <Continuous>  dextrose 50% Injectable 12.5 Gram(s) IV Push once  dextrose 50% Injectable 25 Gram(s) IV Push once  dextrose 50% Injectable 25 Gram(s) IV Push once  furosemide    Tablet 40 milliGRAM(s) Oral two times a day  heparin   Injectable 5000 Unit(s) SubCutaneous every 8 hours  insulin glargine Injectable (LANTUS) 9 Unit(s) SubCutaneous at bedtime  insulin lispro (HumaLOG) corrective regimen sliding scale   SubCutaneous three times a day before meals  insulin lispro Injectable (HumaLOG) 3 Unit(s) SubCutaneous three times a day before meals  metoprolol succinate  milliGRAM(s) Oral daily  montelukast 10 milliGRAM(s) Oral daily  multivitamin/minerals 1 Tablet(s) Oral daily  pantoprazole    Tablet 40 milliGRAM(s) Oral before breakfast  potassium chloride    Tablet ER 20 milliEquivalent(s) Oral daily  ranolazine 1000 milliGRAM(s) Oral daily    PRN MEDICATIONS  ALBUTerol    90 MICROgram(s) HFA Inhaler 2 Puff(s) Inhalation every 6 hours PRN  dextrose 40% Gel 15 Gram(s) Oral once PRN  glucagon  Injectable 1 milliGRAM(s) IntraMuscular once PRN      VITAL SIGNS: Last 24 Hours  T(C): 37.2 (09 Aug 2020 20:44), Max: 37.2 (09 Aug 2020 20:44)  T(F): 98.9 (09 Aug 2020 20:44), Max: 98.9 (09 Aug 2020 20:44)  HR: 104 (10 Aug 2020 01:20) (104 - 117)  BP: 123/58 (10 Aug 2020 01:20) (106/53 - 124/65)  BP(mean): --  RR: 18 (09 Aug 2020 20:44) (18 - 18)  SpO2: 98% (10 Aug 2020 01:20) (98% - 98%)    LABS:                        8.5    5.88  )-----------( 172      ( 09 Aug 2020 06:28 )             30.8     08-09    143  |  100  |  26<H>  ----------------------------<  91  3.5   |  29  |  1.2    Ca    9.8      09 Aug 2020 06:28  Mg     2.0     08-09    TPro  6.9  /  Alb  3.9  /  TBili  0.7  /  DBili  x   /  AST  74<H>  /  ALT  27  /  AlkPhos  52  08-08    PT/INR - ( 08 Aug 2020 11:05 )   PT: 14.90 sec;   INR: 1.30 ratio         PTT - ( 08 Aug 2020 11:05 )  PTT:31.1 sec      CARDIAC MARKERS ( 08 Aug 2020 17:32 )  x     / <0.01 ng/mL / x     / x     / 2.5 ng/mL  CARDIAC MARKERS ( 08 Aug 2020 16:14 )  x     / <0.01 ng/mL / x     / x     / x      CARDIAC MARKERS ( 08 Aug 2020 09:00 )  x     / <0.01 ng/mL / x     / x     / x          RADIOLOGY:  < from: Transthoracic Echocardiogram (08.09.20 @ 11:53) >  Summary:   1. Left ventricular ejection fraction, by visual estimation, is 60 to 65%.   2. Mild concentric left ventricular hypertrophy.   3. Mild mitral valve regurgitation.  4. Mitral annular calcification.   5. Moderate tricuspid regurgitation.   6. Bioprosthesis in the aortic position.   7. Estimated pulmonary artery systolic pressure is 41.3 mmHg assuming a right atrial pressure of 10 mmHg, which is consistent with mild pulmonary hypertension.    < end of copied text >  < from: Xray Chest 1 View-PORTABLE IMMEDIATE (08.08.20 @ 08:06) >  Comparison : Chest radiograph 9/26/2016.    Technique/Positioning: Frontal view of the chest wasobtained.    Findings:    Support devices: None.    Cardiac/mediastinum/hilum: Stable cardiomegaly. Multiple fractured and migrated sternotomy wires with fragments present over the right upper quadrant and the left cardiac silhouette. This is new since the prior exam.    Lung parenchyma/Pleura: Low lung volumes. No focal consolidation, pneumothorax or pleural effusion.    Skeleton/soft tissues: No radiographically evident acute displaced fracture within the limitations of this exam.    Impression:    No radiographic evidence of acute cardiopulmonary disease.    Multiple fractured and migrated sternotomy wires as above. Correlate with point tenderness for underlying acute abnormality or with interval surgical history.    < end of copied text >      PHYSICAL EXAM:  GENERAL: NAD, obese, AAOx3  HEENT:  Atraumatic, Normocephalic. EOMI, ,   PULMONARY: Clear to auscultation bilaterally; No wheeze or crackles. poor inspiratory effort. decreased breath sounds at bases.  CARDIOVASCULAR: Regular rate and rhythm; No murmurs, rubs, or gallops  GASTROINTESTINAL: Soft, Nontender, Nondistended; Bowel sounds present  MUSCULOSKELETAL:  2+ Peripheral Pulses, No clubbing, cyanosis. + bilateral LE edema 2+, chronic venostasis changes.   NEUROLOGY: non-focal  SKIN: No rashes or lesions EVAN QUINN 75y Female  MRN#: [id number]   Hospital Day: 2d    SUBJECTIVE  Patient is a 75y old Female who presents with a chief complaint of Shortness of Breath (09 Aug 2020 17:30)  Currently admitted to medicine with the primary diagnosis of Afib.     Currently admitted to medicine with the primary diagnosis of Afib  Today is hospital day 2d, and this morning she is feeling well.   Notes no chest pain, SOB, palpitations, abd pain, GI or urinary symptoms.     INTERVAL HPI AND OVERNIGHT EVENTS:  Patient was examined and seen at bedside. This morning she is resting comfortably in bed and reports no issues or overnight events.    REVIEW OF SYMPTOMS:  CONSTITUTIONAL: No weakness, fevers or chills; No headaches  EYES: No visual changes, eye pain, or discharge  ENT: No vertigo; No ear pain or change in hearing; No sore throat or difficulty swallowing  NECK: No pain or stiffness  RESPIRATORY: No cough, wheezing, or hemoptysis; No shortness of breath  CARDIOVASCULAR: No chest pain or palpitations  GASTROINTESTINAL: No abdominal or epigastric pain; No nausea, vomiting, or hematemesis; No diarrhea or constipation; No melena or hematochezia  GENITOURINARY: No dysuria, frequency or hematuria  MUSCULOSKELETAL: No joint pain, no muscle pain, no weakness  NEUROLOGICAL: No numbness or weakness  SKIN: No itching or rashes    OBJECTIVE  PAST MEDICAL & SURGICAL HISTORY  CAD (coronary artery disease), native coronary artery  Asthma with COPD  Diabetes  Hypertension  Aortic stenosis  S/P CABG x 1  H/O ascending aortic replacement: Bovine Replacement    ALLERGIES:  Augmentin (Other)    MEDICATIONS:  STANDING MEDICATIONS  aspirin enteric coated 81 milliGRAM(s) Oral daily  atorvastatin 40 milliGRAM(s) Oral at bedtime  chlorhexidine 4% Liquid 1 Application(s) Topical <User Schedule>  dextrose 5%. 1000 milliLiter(s) IV Continuous <Continuous>  dextrose 50% Injectable 12.5 Gram(s) IV Push once  dextrose 50% Injectable 25 Gram(s) IV Push once  dextrose 50% Injectable 25 Gram(s) IV Push once  furosemide    Tablet 40 milliGRAM(s) Oral two times a day  heparin   Injectable 5000 Unit(s) SubCutaneous every 8 hours  insulin glargine Injectable (LANTUS) 9 Unit(s) SubCutaneous at bedtime  insulin lispro (HumaLOG) corrective regimen sliding scale   SubCutaneous three times a day before meals  insulin lispro Injectable (HumaLOG) 3 Unit(s) SubCutaneous three times a day before meals  metoprolol succinate  milliGRAM(s) Oral daily  montelukast 10 milliGRAM(s) Oral daily  multivitamin/minerals 1 Tablet(s) Oral daily  pantoprazole    Tablet 40 milliGRAM(s) Oral before breakfast  potassium chloride    Tablet ER 20 milliEquivalent(s) Oral daily  ranolazine 1000 milliGRAM(s) Oral daily    PRN MEDICATIONS  ALBUTerol    90 MICROgram(s) HFA Inhaler 2 Puff(s) Inhalation every 6 hours PRN  dextrose 40% Gel 15 Gram(s) Oral once PRN  glucagon  Injectable 1 milliGRAM(s) IntraMuscular once PRN      VITAL SIGNS: Last 24 Hours  T(C): 37.2 (09 Aug 2020 20:44), Max: 37.2 (09 Aug 2020 20:44)  T(F): 98.9 (09 Aug 2020 20:44), Max: 98.9 (09 Aug 2020 20:44)  HR: 104 (10 Aug 2020 01:20) (104 - 117)  BP: 123/58 (10 Aug 2020 01:20) (106/53 - 124/65)  BP(mean): --  RR: 18 (09 Aug 2020 20:44) (18 - 18)  SpO2: 98% (10 Aug 2020 01:20) (98% - 98%)    LABS:                        8.5    5.88  )-----------( 172      ( 09 Aug 2020 06:28 )             30.8     08-09    143  |  100  |  26<H>  ----------------------------<  91  3.5   |  29  |  1.2    Ca    9.8      09 Aug 2020 06:28  Mg     2.0     08-09    TPro  6.9  /  Alb  3.9  /  TBili  0.7  /  DBili  x   /  AST  74<H>  /  ALT  27  /  AlkPhos  52  08-08    PT/INR - ( 08 Aug 2020 11:05 )   PT: 14.90 sec;   INR: 1.30 ratio         PTT - ( 08 Aug 2020 11:05 )  PTT:31.1 sec      CARDIAC MARKERS ( 08 Aug 2020 17:32 )  x     / <0.01 ng/mL / x     / x     / 2.5 ng/mL  CARDIAC MARKERS ( 08 Aug 2020 16:14 )  x     / <0.01 ng/mL / x     / x     / x      CARDIAC MARKERS ( 08 Aug 2020 09:00 )  x     / <0.01 ng/mL / x     / x     / x          RADIOLOGY:  < from: Transthoracic Echocardiogram (08.09.20 @ 11:53) >  Summary:   1. Left ventricular ejection fraction, by visual estimation, is 60 to 65%.   2. Mild concentric left ventricular hypertrophy.   3. Mild mitral valve regurgitation.  4. Mitral annular calcification.   5. Moderate tricuspid regurgitation.   6. Bioprosthesis in the aortic position.   7. Estimated pulmonary artery systolic pressure is 41.3 mmHg assuming a right atrial pressure of 10 mmHg, which is consistent with mild pulmonary hypertension.    < end of copied text >  < from: Xray Chest 1 View-PORTABLE IMMEDIATE (08.08.20 @ 08:06) >  Comparison : Chest radiograph 9/26/2016.    Technique/Positioning: Frontal view of the chest wasobtained.    Findings:    Support devices: None.    Cardiac/mediastinum/hilum: Stable cardiomegaly. Multiple fractured and migrated sternotomy wires with fragments present over the right upper quadrant and the left cardiac silhouette. This is new since the prior exam.    Lung parenchyma/Pleura: Low lung volumes. No focal consolidation, pneumothorax or pleural effusion.    Skeleton/soft tissues: No radiographically evident acute displaced fracture within the limitations of this exam.    Impression:    No radiographic evidence of acute cardiopulmonary disease.    Multiple fractured and migrated sternotomy wires as above. Correlate with point tenderness for underlying acute abnormality or with interval surgical history.    < end of copied text >      PHYSICAL EXAM:  GENERAL: NAD, obese, AAOx3  HEENT:  Atraumatic, Normocephalic. EOMI, ,   PULMONARY: Clear to auscultation bilaterally; No wheeze or crackles. poor inspiratory effort. decreased breath sounds at bases.  CARDIOVASCULAR: IRRegular rate and rhythm;   GASTROINTESTINAL: Soft, Nontender, Nondistended; Bowel sounds present  MUSCULOSKELETAL:  2+ Peripheral Pulses, No clubbing, cyanosis. + bilateral LE edema 2+, chronic venostasis changes.   NEUROLOGY: non-focal  SKIN: No rashes or lesions

## 2020-08-10 NOTE — CONSULT NOTE ADULT - ASSESSMENT
This is a 75 year old female with PMHx of AS s/p Bovine valve replacement in 2016, CAD s/p CABG in 2016 on ASA 81mg, HTN, HLD, Type 2 DM, CHFpEF (EF=60-65%) and anemia presented for dyspnea on exertion. currently admitted to telemetry for new onset afib.     patient's CHADSVASC 6. Cardiology requested GI evaluation before starting anticoagulation    No signs of active bleeding  Hb stable at baseline     please check iron studies (added on)  folate and b12 pending  will discuss with attending EGD and colonoscopy   needs risk stratification This is a 75 year old female with PMHx of AS s/p Bovine valve replacement in 2016, CAD s/p CABG in 2016 on ASA 81mg, HTN, HLD, Type 2 DM, CHFpEF (EF=60-65%) and anemia presented for dyspnea on exertion. currently admitted to telemetry for new onset afib.     patient's CHADSVASC 6. Cardiology requested GI evaluation before starting anticoagulation    No signs of active bleeding  Hb stable at baseline     please check iron studies (added on)  folate and b12 pending  can start anticoagulation / no signs of active bleeding This is a 75 year old female with PMHx of AS s/p Bovine valve replacement in 2016, CAD s/p CABG in 2016 on ASA 81mg, HTN, HLD, Type 2 DM, CHFpEF (EF=60-65%) and anemia presented for dyspnea on exertion. currently admitted to telemetry for new onset afib.     patient's CHADSVASC 6. Cardiology requested GI evaluation before starting anticoagulation    No signs of active bleeding  Hb stable at baseline     please check iron studies (added on)  folate and b12 pending  can start anticoagulation   plan for EGD/colonoscopy on wednesday for NATALIO workup This is a 75 year old female with PMHx of AS s/p Bovine valve replacement in 2016, CAD s/p CABG in 2016 on ASA 81mg, HTN, HLD, Type 2 DM, CHFpEF (EF=60-65%) and anemia presented for dyspnea on exertion. currently admitted to telemetry for new onset afib.     patient's CHADSVASC 6. Cardiology requested GI evaluation before starting anticoagulation    No signs of active bleeding  Hb stable at baseline     please check iron studies (added on)  folate and b12 pending  Please proceed with anticoagulation as indicated  Pt will need EGD/colonoscopy for NATALIO, potentially on wednesday, hence a short acting anticoagulants are preferred till after work up.  Would appreciate Cardiopulmonary stabilization and clearance for procedures by [facility] if possible  will follow up

## 2020-08-10 NOTE — CONSULT NOTE ADULT - SUBJECTIVE AND OBJECTIVE BOX
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 As per the patient she has been getting worked up with Dr. Phipps over the past few months. Her hemoglobin has been around an 8-9 and she was scheduled to get worked up with Dr. Ivory this week. Over the past few days though she has been feeling a bit worse. States that she has been having some nausea, dizziness, and weakness. States that she has been also having shortness of breath that has been worse on ambulation.      In the ED, initial vitals T98.9, HR 67, /57, sat 98% on room air. EKG noted for A-fib RVR and currently admitted to telemetry for rate control.     GI hx  patient denies melena, hematochezia, nausea, vomiting  she used to have constipation but currently on sennacot, she has daily BM  she complains of intermittent dysphagia to both solids and liquids and intermittent sensation of globus. no odynophagia, no weight loss.    Prior records Reviewed (Y/N): Y   History obtained from person other than patient (Y/N): N     Prior EGD: waiting on dr Poole to fax it, normal per the patient   Prior Colonoscopy:  waiting on dr Poole to fax it, 4 years ago, normal per the patient     PAST MEDICAL & SURGICAL HISTORY:  CAD (coronary artery disease), native coronary artery  Asthma with COPD  Diabetes  Hypertension  Aortic stenosis  S/P CABG x 1  H/O ascending aortic replacement: Bovine Replacement      FAMILY HISTORY:  No pertinent family history in first degree relatives      Social History:  Tobacco: stopped 25 years ago   Alcohol: no   Drugs: no     Home Medications:  aspirin 81 mg oral tablet: 1 tab(s) orally once a day (08 Aug 2020 12:17)  Centrum oral tablet: 1 tab(s) orally once a day (08 Aug 2020 12:17)  furosemide 40 mg oral tablet: 1 tab(s) orally 2 times a day (08 Aug 2020 12:17)  glipiZIDE 5 mg oral tablet: 1 tab(s) orally once a day (08 Aug 2020 12:17)  losartan 25 mg oral tablet: 1 tab(s) orally once a day (08 Aug 2020 12:17)  metoprolol succinate 50 mg oral capsule, extended release: 1 cap(s) orally once a day (08 Aug 2020 12:17)  montelukast 10 mg oral tablet: 1 tab(s) orally once a day (08 Aug 2020 12:17)  pantoprazole 40 mg oral delayed release tablet: 1 tab(s) orally once a day (08 Aug 2020 12:17)  pioglitazone 45 mg oral tablet: 1 tab(s) orally once a day (08 Aug 2020 12:17)  potassium chloride 20 mEq oral granule, extended release: 1 cap(s) orally once a day (08 Aug 2020 12:17)  ranolazine 1000 mg oral tablet, extended release: 1 tab(s) orally once a day (08 Aug 2020 12:17)  rosuvastatin 10 mg oral tablet: 1 tab(s) orally once a day (08 Aug 2020 12:17)  Senna 8.6 mg oral tablet: 1 tab(s) orally once a day (at bedtime) (08 Aug 2020 12:17)    MEDICATIONS  (STANDING):  aspirin enteric coated 81 milliGRAM(s) Oral daily  atorvastatin 40 milliGRAM(s) Oral at bedtime  chlorhexidine 4% Liquid 1 Application(s) Topical <User Schedule>  dextrose 5%. 1000 milliLiter(s) (50 mL/Hr) IV Continuous <Continuous>  dextrose 50% Injectable 12.5 Gram(s) IV Push once  dextrose 50% Injectable 25 Gram(s) IV Push once  dextrose 50% Injectable 25 Gram(s) IV Push once  furosemide    Tablet 40 milliGRAM(s) Oral two times a day  heparin   Injectable 5000 Unit(s) SubCutaneous every 8 hours  insulin glargine Injectable (LANTUS) 9 Unit(s) SubCutaneous at bedtime  insulin lispro (HumaLOG) corrective regimen sliding scale   SubCutaneous three times a day before meals  insulin lispro Injectable (HumaLOG) 3 Unit(s) SubCutaneous three times a day before meals  metoprolol succinate  milliGRAM(s) Oral daily  montelukast 10 milliGRAM(s) Oral daily  multivitamin/minerals 1 Tablet(s) Oral daily  pantoprazole    Tablet 40 milliGRAM(s) Oral before breakfast  potassium chloride    Tablet ER 20 milliEquivalent(s) Oral daily  ranolazine 1000 milliGRAM(s) Oral daily    MEDICATIONS  (PRN):  ALBUTerol    90 MICROgram(s) HFA Inhaler 2 Puff(s) Inhalation every 6 hours PRN Shortness of Breath and/or Wheezing  dextrose 40% Gel 15 Gram(s) Oral once PRN Blood Glucose LESS THAN 70 milliGRAM(s)/deciliter  glucagon  Injectable 1 milliGRAM(s) IntraMuscular once PRN Glucose LESS THAN 70 milligrams/deciliter      Allergies  Augmentin (Other)      Review of Systems:   Constitutional:  No Fever, No Chills  ENT/Mouth:  No Hearing Changes,  +Difficulty Swallowing  Eyes:  No Eye Pain, No Vision Changes  Cardiovascular:  +Chest Pain, +Palpitations  Respiratory:  No Cough, +Dyspnea  Gastrointestinal:  As described in HPI  Musculoskeletal:  No Joint Swelling, No Back Pain  Skin:  No Skin Lesions, No Jaundice  Neuro:  No Syncope, No Dizziness  Heme/Lymph:  No Bruising, No Bleeding.    Physical Examination:  T(C): 36.3 (08-10-20 @ 05:54), Max: 37.2 (08-09-20 @ 20:44)  HR: 103 (08-10-20 @ 05:54) (103 - 117)  BP: 127/66 (08-10-20 @ 05:54) (106/53 - 127/66)  RR: 18 (08-10-20 @ 05:54) (18 - 18)  SpO2: 98% (08-10-20 @ 01:20) (98% - 98%)  Height (cm): 165.1 (08-09-20 @ 14:58)  Weight (kg): 126 (08-09-20 @ 14:58)    08-08-20 @ 07:01  -  08-09-20 @ 07:00  --------------------------------------------------------  IN: 240 mL / OUT: 1550 mL / NET: -1310 mL    08-09-20 @ 07:01  -  08-10-20 @ 07:00  --------------------------------------------------------  IN: 240 mL / OUT: 1340 mL / NET: -1100 mL    Constitutional: No acute distress.  Eyes:. Conjunctivae are clear, Sclera is non-icteric.  Ears Nose and Throat: The external ears are normal appearing,  Oral mucosa is pink and moist.  Respiratory:  No signs of respiratory distress. Lung sounds are clear bilaterally.  Cardiovascular:  irregular rhythm, tachycardic   GI: Abdomen is soft, symmetric, and non-tender without distention. Bowel sounds are present and normoactive in all four quadrants.    Lower ext chronic changes   Neuro: No Tremor, No involuntary movements  Skin: No rashes, No Jaundice.          Data: (reviewed by attending)                        8.7    6.38  )-----------( 181      ( 10 Aug 2020 06:27 )             31.7     Hgb Trend:  8.7  08-10-20 @ 06:27  8.5  08-09-20 @ 06:28  7.8  08-08-20 @ 16:14  7.6  08-08-20 @ 09:10        08-10    143  |  100  |  27<H>  ----------------------------<  117<H>  3.7   |  30  |  1.3    Ca    9.7      10 Aug 2020 06:27  Mg     2.0     08-10      Liver panel trend:  TBili 0.7   /   AST 74   /   ALT 27   /   AlkP 52   /   Tptn 6.9   /   Alb 3.9    /   DBili --      08-08      PT/INR - ( 08 Aug 2020 11:05 )   PT: 14.90 sec;   INR: 1.30 ratio         PTT - ( 08 Aug 2020 11:05 )  PTT:31.1 sec

## 2020-08-11 LAB
ALBUMIN SERPL ELPH-MCNC: 3.4 G/DL — LOW (ref 3.5–5.2)
ALBUMIN SERPL ELPH-MCNC: 3.9 G/DL — SIGNIFICANT CHANGE UP (ref 3.5–5.2)
ALP SERPL-CCNC: 41 U/L — SIGNIFICANT CHANGE UP (ref 30–115)
ALP SERPL-CCNC: 43 U/L — SIGNIFICANT CHANGE UP (ref 30–115)
ALT FLD-CCNC: 14 U/L — SIGNIFICANT CHANGE UP (ref 0–41)
ALT FLD-CCNC: 14 U/L — SIGNIFICANT CHANGE UP (ref 0–41)
ANION GAP SERPL CALC-SCNC: 13 MMOL/L — SIGNIFICANT CHANGE UP (ref 7–14)
ANION GAP SERPL CALC-SCNC: 15 MMOL/L — HIGH (ref 7–14)
APTT BLD: 137 SEC — CRITICAL HIGH (ref 27–39.2)
APTT BLD: 32.5 SEC — SIGNIFICANT CHANGE UP (ref 27–39.2)
APTT BLD: 92.5 SEC — CRITICAL HIGH (ref 27–39.2)
APTT BLD: >200 SEC — CRITICAL HIGH (ref 27–39.2)
AST SERPL-CCNC: 24 U/L — SIGNIFICANT CHANGE UP (ref 0–41)
AST SERPL-CCNC: 25 U/L — SIGNIFICANT CHANGE UP (ref 0–41)
BASOPHILS # BLD AUTO: 0.01 K/UL — SIGNIFICANT CHANGE UP (ref 0–0.2)
BASOPHILS NFR BLD AUTO: 0.2 % — SIGNIFICANT CHANGE UP (ref 0–1)
BILIRUB DIRECT SERPL-MCNC: 0.3 MG/DL — HIGH (ref 0–0.2)
BILIRUB INDIRECT FLD-MCNC: 0.4 MG/DL — SIGNIFICANT CHANGE UP (ref 0.2–1.2)
BILIRUB SERPL-MCNC: 0.7 MG/DL — SIGNIFICANT CHANGE UP (ref 0.2–1.2)
BILIRUB SERPL-MCNC: 0.8 MG/DL — SIGNIFICANT CHANGE UP (ref 0.2–1.2)
BUN SERPL-MCNC: 27 MG/DL — HIGH (ref 10–20)
BUN SERPL-MCNC: 32 MG/DL — HIGH (ref 10–20)
CALCIUM SERPL-MCNC: 9 MG/DL — SIGNIFICANT CHANGE UP (ref 8.5–10.1)
CALCIUM SERPL-MCNC: 9 MG/DL — SIGNIFICANT CHANGE UP (ref 8.5–10.1)
CHLORIDE SERPL-SCNC: 98 MMOL/L — SIGNIFICANT CHANGE UP (ref 98–110)
CHLORIDE SERPL-SCNC: 99 MMOL/L — SIGNIFICANT CHANGE UP (ref 98–110)
CO2 SERPL-SCNC: 27 MMOL/L — SIGNIFICANT CHANGE UP (ref 17–32)
CO2 SERPL-SCNC: 29 MMOL/L — SIGNIFICANT CHANGE UP (ref 17–32)
CREAT SERPL-MCNC: 1.2 MG/DL — SIGNIFICANT CHANGE UP (ref 0.7–1.5)
CREAT SERPL-MCNC: 1.3 MG/DL — SIGNIFICANT CHANGE UP (ref 0.7–1.5)
EOSINOPHIL # BLD AUTO: 0.02 K/UL — SIGNIFICANT CHANGE UP (ref 0–0.7)
EOSINOPHIL NFR BLD AUTO: 0.4 % — SIGNIFICANT CHANGE UP (ref 0–8)
FERRITIN SERPL-MCNC: 25 NG/ML — SIGNIFICANT CHANGE UP (ref 15–150)
GLUCOSE BLDC GLUCOMTR-MCNC: 108 MG/DL — HIGH (ref 70–99)
GLUCOSE BLDC GLUCOMTR-MCNC: 112 MG/DL — HIGH (ref 70–99)
GLUCOSE BLDC GLUCOMTR-MCNC: 121 MG/DL — HIGH (ref 70–99)
GLUCOSE BLDC GLUCOMTR-MCNC: 94 MG/DL — SIGNIFICANT CHANGE UP (ref 70–99)
GLUCOSE SERPL-MCNC: 101 MG/DL — HIGH (ref 70–99)
GLUCOSE SERPL-MCNC: 94 MG/DL — SIGNIFICANT CHANGE UP (ref 70–99)
HCT VFR BLD CALC: 27.4 % — LOW (ref 37–47)
HCT VFR BLD CALC: 28.5 % — LOW (ref 37–47)
HGB BLD-MCNC: 7.8 G/DL — LOW (ref 12–16)
HGB BLD-MCNC: 8.2 G/DL — LOW (ref 12–16)
IMM GRANULOCYTES NFR BLD AUTO: 0.4 % — HIGH (ref 0.1–0.3)
INR BLD: 1.23 RATIO — SIGNIFICANT CHANGE UP (ref 0.65–1.3)
INR BLD: 1.3 RATIO — SIGNIFICANT CHANGE UP (ref 0.65–1.3)
INR BLD: 1.37 RATIO — HIGH (ref 0.65–1.3)
LYMPHOCYTES # BLD AUTO: 1.43 K/UL — SIGNIFICANT CHANGE UP (ref 1.2–3.4)
LYMPHOCYTES # BLD AUTO: 27.5 % — SIGNIFICANT CHANGE UP (ref 20.5–51.1)
MAGNESIUM SERPL-MCNC: 1.7 MG/DL — LOW (ref 1.8–2.4)
MAGNESIUM SERPL-MCNC: 2 MG/DL — SIGNIFICANT CHANGE UP (ref 1.8–2.4)
MCHC RBC-ENTMCNC: 23.7 PG — LOW (ref 27–31)
MCHC RBC-ENTMCNC: 23.9 PG — LOW (ref 27–31)
MCHC RBC-ENTMCNC: 28.5 G/DL — LOW (ref 32–37)
MCHC RBC-ENTMCNC: 28.8 G/DL — LOW (ref 32–37)
MCV RBC AUTO: 83.1 FL — SIGNIFICANT CHANGE UP (ref 81–99)
MCV RBC AUTO: 83.3 FL — SIGNIFICANT CHANGE UP (ref 81–99)
MONOCYTES # BLD AUTO: 0.56 K/UL — SIGNIFICANT CHANGE UP (ref 0.1–0.6)
MONOCYTES NFR BLD AUTO: 10.8 % — HIGH (ref 1.7–9.3)
NEUTROPHILS # BLD AUTO: 3.16 K/UL — SIGNIFICANT CHANGE UP (ref 1.4–6.5)
NEUTROPHILS NFR BLD AUTO: 60.7 % — SIGNIFICANT CHANGE UP (ref 42.2–75.2)
NRBC # BLD: 0 /100 WBCS — SIGNIFICANT CHANGE UP (ref 0–0)
NRBC # BLD: 0 /100 WBCS — SIGNIFICANT CHANGE UP (ref 0–0)
PHOSPHATE SERPL-MCNC: 4.5 MG/DL — SIGNIFICANT CHANGE UP (ref 2.1–4.9)
PLATELET # BLD AUTO: 164 K/UL — SIGNIFICANT CHANGE UP (ref 130–400)
PLATELET # BLD AUTO: 173 K/UL — SIGNIFICANT CHANGE UP (ref 130–400)
POTASSIUM SERPL-MCNC: 3.4 MMOL/L — LOW (ref 3.5–5)
POTASSIUM SERPL-MCNC: 3.4 MMOL/L — LOW (ref 3.5–5)
POTASSIUM SERPL-SCNC: 3.4 MMOL/L — LOW (ref 3.5–5)
POTASSIUM SERPL-SCNC: 3.4 MMOL/L — LOW (ref 3.5–5)
PROT SERPL-MCNC: 6.4 G/DL — SIGNIFICANT CHANGE UP (ref 6–8)
PROT SERPL-MCNC: 6.6 G/DL — SIGNIFICANT CHANGE UP (ref 6–8)
PROTHROM AB SERPL-ACNC: 14.2 SEC — HIGH (ref 9.95–12.87)
PROTHROM AB SERPL-ACNC: 15 SEC — HIGH (ref 9.95–12.87)
PROTHROM AB SERPL-ACNC: 15.8 SEC — HIGH (ref 9.95–12.87)
RBC # BLD: 3.29 M/UL — LOW (ref 4.2–5.4)
RBC # BLD: 3.43 M/UL — LOW (ref 4.2–5.4)
RBC # FLD: 20.4 % — HIGH (ref 11.5–14.5)
RBC # FLD: 20.5 % — HIGH (ref 11.5–14.5)
SODIUM SERPL-SCNC: 140 MMOL/L — SIGNIFICANT CHANGE UP (ref 135–146)
SODIUM SERPL-SCNC: 141 MMOL/L — SIGNIFICANT CHANGE UP (ref 135–146)
WBC # BLD: 5.2 K/UL — SIGNIFICANT CHANGE UP (ref 4.8–10.8)
WBC # BLD: 5.21 K/UL — SIGNIFICANT CHANGE UP (ref 4.8–10.8)
WBC # FLD AUTO: 5.2 K/UL — SIGNIFICANT CHANGE UP (ref 4.8–10.8)
WBC # FLD AUTO: 5.21 K/UL — SIGNIFICANT CHANGE UP (ref 4.8–10.8)

## 2020-08-11 PROCEDURE — 99232 SBSQ HOSP IP/OBS MODERATE 35: CPT

## 2020-08-11 PROCEDURE — 99233 SBSQ HOSP IP/OBS HIGH 50: CPT

## 2020-08-11 RX ORDER — MAGNESIUM SULFATE 500 MG/ML
2 VIAL (ML) INJECTION ONCE
Refills: 0 | Status: COMPLETED | OUTPATIENT
Start: 2020-08-11 | End: 2020-08-11

## 2020-08-11 RX ORDER — HEPARIN SODIUM 5000 [USP'U]/ML
1600 INJECTION INTRAVENOUS; SUBCUTANEOUS
Qty: 25000 | Refills: 0 | Status: DISCONTINUED | OUTPATIENT
Start: 2020-08-11 | End: 2020-08-11

## 2020-08-11 RX ORDER — POTASSIUM CHLORIDE 20 MEQ
40 PACKET (EA) ORAL ONCE
Refills: 0 | Status: COMPLETED | OUTPATIENT
Start: 2020-08-11 | End: 2020-08-11

## 2020-08-11 RX ORDER — SOD SULF/SODIUM/NAHCO3/KCL/PEG
4000 SOLUTION, RECONSTITUTED, ORAL ORAL ONCE
Refills: 0 | Status: COMPLETED | OUTPATIENT
Start: 2020-08-11 | End: 2020-08-11

## 2020-08-11 RX ORDER — IRON SUCROSE 20 MG/ML
200 INJECTION, SOLUTION INTRAVENOUS EVERY 24 HOURS
Refills: 0 | Status: DISCONTINUED | OUTPATIENT
Start: 2020-08-11 | End: 2020-08-13

## 2020-08-11 RX ORDER — METOPROLOL TARTRATE 50 MG
50 TABLET ORAL ONCE
Refills: 0 | Status: COMPLETED | OUTPATIENT
Start: 2020-08-11 | End: 2020-08-11

## 2020-08-11 RX ORDER — METOPROLOL TARTRATE 50 MG
150 TABLET ORAL DAILY
Refills: 0 | Status: DISCONTINUED | OUTPATIENT
Start: 2020-08-12 | End: 2020-08-12

## 2020-08-11 RX ADMIN — Medication 100 MILLIGRAM(S): at 06:29

## 2020-08-11 RX ADMIN — HEPARIN SODIUM 14 UNIT(S)/HR: 5000 INJECTION INTRAVENOUS; SUBCUTANEOUS at 10:19

## 2020-08-11 RX ADMIN — Medication 1 TABLET(S): at 12:31

## 2020-08-11 RX ADMIN — Medication 4000 MILLILITER(S): at 18:59

## 2020-08-11 RX ADMIN — Medication 20 MILLIGRAM(S): at 21:55

## 2020-08-11 RX ADMIN — Medication 50 MILLIGRAM(S): at 12:32

## 2020-08-11 RX ADMIN — Medication 40 MILLIGRAM(S): at 06:29

## 2020-08-11 RX ADMIN — RANOLAZINE 1000 MILLIGRAM(S): 500 TABLET, FILM COATED, EXTENDED RELEASE ORAL at 12:32

## 2020-08-11 RX ADMIN — Medication 40 MILLIEQUIVALENT(S): at 14:52

## 2020-08-11 RX ADMIN — MONTELUKAST 10 MILLIGRAM(S): 4 TABLET, CHEWABLE ORAL at 12:32

## 2020-08-11 RX ADMIN — Medication 50 GRAM(S): at 12:33

## 2020-08-11 RX ADMIN — IRON SUCROSE 110 MILLIGRAM(S): 20 INJECTION, SOLUTION INTRAVENOUS at 14:52

## 2020-08-11 RX ADMIN — PANTOPRAZOLE SODIUM 40 MILLIGRAM(S): 20 TABLET, DELAYED RELEASE ORAL at 06:30

## 2020-08-11 RX ADMIN — Medication 81 MILLIGRAM(S): at 12:33

## 2020-08-11 RX ADMIN — ATORVASTATIN CALCIUM 40 MILLIGRAM(S): 80 TABLET, FILM COATED ORAL at 21:55

## 2020-08-11 RX ADMIN — Medication 40 MILLIGRAM(S): at 17:56

## 2020-08-11 NOTE — PROGRESS NOTE ADULT - ASSESSMENT
This is a 75 year old female with PMHx of AS s/p Bovine valve replacement in 2016, CAD s/p CABG in 2016 on ASA 81mg, HTN, HLD, Type 2 DM, CHFpEF (EF=60-65%) and anemia presented for dyspnea on exertion. currently admitted to telemetry for new onset afib with  CHADSVASC 6.     We are following the patient for NATALIO  -No signs of active bleeding  -Hb stable 7-8  -ferritin=25, consider IV iron  -normal folate and b12 pending  -patient started on IV heparin drip no signs of active bleeding   -plan for EGD/colonoscopy for NATALIO, tomorrow   -Cardiology note appreciated: intermediate risk  -clear liquid diet today and NPO after midnight   -Golytely 4L and dulcolax 20mg at bedtime  -water enema at bedtime and in am    will follow This is a 75 year old female with PMHx of AS s/p Bovine valve replacement in 2016, CAD s/p CABG in 2016 on ASA 81mg, HTN, HLD, Type 2 DM, CHFpEF (EF=60-65%) and anemia presented for dyspnea on exertion. currently admitted to telemetry for new onset afib with  CHADSVASC 6.     We are following the patient for NATALIO  -No signs of active bleeding  -Hb stable 7-8  -ferritin=25, consider IV iron  -normal folate and b12 pending  -patient started on IV heparin drip no signs of active bleeding, hold heparin 6 hours before procedure  -plan for EGD/colonoscopy for NATALIO, tomorrow   -Cardiology note appreciated: intermediate risk  -clear liquid diet today and NPO after midnight   -Golytely 4L and dulcolax 20mg at bedtime  -water enema at bedtime and in am    will follow

## 2020-08-11 NOTE — PROGRESS NOTE ADULT - SUBJECTIVE AND OBJECTIVE BOX
Patient is a 75y old  Female who presents with a chief complaint of Shortness of Breath (10 Aug 2020 09:18)      SUBJ:  Patient seen and examined. Dyspnea much improved. No chest pain or plapitations. HR ~ 100. In atypical A. flutter with variable block (2:1, 3:1). No active bleeding. Tolerating Heparin.      MEDICATIONS  (STANDING):  aspirin enteric coated 81 milliGRAM(s) Oral daily  atorvastatin 40 milliGRAM(s) Oral at bedtime  chlorhexidine 4% Liquid 1 Application(s) Topical <User Schedule>  dextrose 5%. 1000 milliLiter(s) (50 mL/Hr) IV Continuous <Continuous>  dextrose 50% Injectable 12.5 Gram(s) IV Push once  dextrose 50% Injectable 25 Gram(s) IV Push once  dextrose 50% Injectable 25 Gram(s) IV Push once  furosemide    Tablet 40 milliGRAM(s) Oral two times a day  insulin glargine Injectable (LANTUS) 9 Unit(s) SubCutaneous at bedtime  insulin lispro (HumaLOG) corrective regimen sliding scale   SubCutaneous three times a day before meals  insulin lispro Injectable (HumaLOG) 3 Unit(s) SubCutaneous three times a day before meals  metoprolol succinate  milliGRAM(s) Oral daily  montelukast 10 milliGRAM(s) Oral daily  multivitamin/minerals 1 Tablet(s) Oral daily  pantoprazole    Tablet 40 milliGRAM(s) Oral before breakfast  ranolazine 1000 milliGRAM(s) Oral daily    MEDICATIONS  (PRN):  ALBUTerol    90 MICROgram(s) HFA Inhaler 2 Puff(s) Inhalation every 6 hours PRN Shortness of Breath and/or Wheezing  dextrose 40% Gel 15 Gram(s) Oral once PRN Blood Glucose LESS THAN 70 milliGRAM(s)/deciliter  glucagon  Injectable 1 milliGRAM(s) IntraMuscular once PRN Glucose LESS THAN 70 milligrams/deciliter            Vital Signs Last 24 Hrs  T(C): 36.1 (11 Aug 2020 06:24), Max: 36.4 (10 Aug 2020 20:30)  T(F): 97 (11 Aug 2020 06:24), Max: 97.5 (10 Aug 2020 20:30)  HR: 104 (11 Aug 2020 06:24) (104 - 111)  BP: 113/56 (11 Aug 2020 06:24) (101/64 - 113/59)  BP(mean): --  RR: 18 (11 Aug 2020 06:24) (18 - 18)  SpO2: --      PHYSICAL EXAM:    GEN: AAO x 3, NAD  HEENT: NC/AT, PERRL  Neck: No JVD, no bruits  CV: Reg, S1-S2, 2/6 murmur  Lungs: CTAB  Abd: Soft, non-tender  Ext: mild edema      I&O's Summary    10 Aug 2020 07:01  -  11 Aug 2020 07:00  --------------------------------------------------------  IN: 1088 mL / OUT: 1375 mL / NET: -287 mL    	    TELEMETRY:  Flutter 2:1    ECG:  < from: 12 Lead ECG (08.09.20 @ 00:25) >  Atrial flutter with 2 to 1 block  ST & T wave abnormality, consider inferolateral ischemia  Abnormal ECG    < end of copied text >    TTE:  < from: Transthoracic Echocardiogram (08.09.20 @ 11:53) >    Summary:   1. Left ventricular ejection fraction, by visual estimation, is 60 to 65%.   2. Mild concentric left ventricular hypertrophy.   3. Mild mitral valve regurgitation.  4. Mitral annular calcification.   5. Moderate tricuspid regurgitation.   6. Bioprosthesis in the aortic position.   7. Estimated pulmonary artery systolic pressure is 41.3 mmHg assuming a right atrial pressure of 10 mmHg, which is consistent with mild pulmonary hypertension.    < end of copied text >    CATH:    Patient FRY to LAD      Nuclear:    < from: NM Nuclear Stress Pharmacologic Multiple (07.23.19 @ 10:00) >  Gated SPECT imaging demonstrates septal motion consistent with an   interventricular conduction defect normal thickening and ejection   fraction.  The left ventricular ejection fraction was calculated to be   greater than 55  %.  Impression:  1. IV Adenosine Dual Isotope Study which was negative with respect to   symptoms and EKG changes.  2. Myocardial perfusion imaging reveals no fixed perfusion defects  3. Gated imaging reveals septal motion consistent with an   interventricular conduction defect normal thickening and ejection   fraction:    < end of copied text >        LABS:                        7.8    5.21  )-----------( 164      ( 11 Aug 2020 07:12 )             27.4     08-10    139  |  98  |  30<H>  ----------------------------<  127<H>  3.8   |  23  |  1.3    Ca    9.4      10 Aug 2020 18:16  Mg     2.0     08-10      CARDIAC MARKERS ( 10 Aug 2020 06:27 )  x     / <0.01 ng/mL / x     / x     / x          PT/INR - ( 11 Aug 2020 07:12 )   PT: 15.00 sec;   INR: 1.30 ratio         PTT - ( 11 Aug 2020 07:12 )  PTT:>200.0 sec      BNP  RADIOLOGY & ADDITIONAL STUDIES:      IMPRESSION AND PLAN:

## 2020-08-11 NOTE — PROGRESS NOTE ADULT - ASSESSMENT
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 She was admitted for A-fib with RVR,      #SOB, weakness 2/2 Acute on chronic CHF exacerbation, HFpEF 60-65% EF.  - likely CHF exacerbation given elevated BNP and LE edema is likely along with symptomatic anemia given Hg 7.5 on admission with baseline reported 8-9   - BNP 4800, LE Edema noted, Patient has been holding her Metolazone at home   - Sat 98% on room air  - c/w IV lasix, pt still volumed overloaded  - f/u cardio recs  - Daily Weights  - Strict Is and Os  - Monitor Electrolytes; maintain K over 4 and Mg over 2  - COVID swab negative  - losartan held due to low BP, can restart if BP elevated    #Paroxysmal Atrial Fibrillation   - HR today 103-104  - Increase to Metoprolol 100mg daily  - CHADsVASC score at lease 6; but will hold off on anticoagulation at this time as per Cardio  - Patient on heparin drip   - GI will do EGD and colonoscopy on Wednesday     #Symptomatic normocytic acute on chronic Anemia   - Hemoglobin 7.6 with noted baseline 8-9. Today 8.5   - No signs of active bleeding; MILDRED refused by the patient despite education  - Iron panel, B12, Folate ordered  - f/u GI consult for anemia prior to starting a/c for afib    #LE edema with chronic skin changes  - Likely underlying vascular disease with chronic changes noted on physical examination   - Will order LE duplex to assess for any clots; low likelihood.       #CKD stage 3a  - creatinine 1.2 today  - Creatinine baseline 1.1-1.2  - Creatinine on admission 1.4  - Monitor     #CAD s/p CABG  - Continue on Metoprolol, ASA, Statin    #HLD  - Continue on Statin     #Type 2 DM  - PO meds on hold  - a1c ordered  - Carb consistent diet  - Monitor finger sticks; if over 180 start on basal/bolus insulin regimen. Will need patient's weight added to the system for medication dosing    #Asthma (stable)  - No signs of wheezing and stable on room air  - Continue on Montelukast    #AS s/p Bovine AVR  - Continue on Metoprolol   - Repeat TTE as above    Activity: As tolerated  Diet: DASH/Carb  DVT ppx: Lovenox  GI ppx: Protonix  Code Status: Full Code  DISPO: From home; admit to telemetry

## 2020-08-11 NOTE — CONSULT NOTE ADULT - SUBJECTIVE AND OBJECTIVE BOX
Patient is a 75y old  Female who presents with a chief complaint of Shortness of Breath (11 Aug 2020 09:28)    HPI:  Pt is a 74 yo male with  PMH of AS s/p Bovine valve replacement in 2016, CAD s/p CABG in 2016, HTN, HLD, Type 2 DM, HFpEF (EF 55-60% in 2016), post-op anemia requiring auto-transfusions due to severe rejection reaction, who presented to the ED for a 2-day history of worsening shortness of breath.  History goes back to about 2 weeks ago when the patient started complaining of shortness of breath on exertion. Symptoms got progressively worse but in the last 2 days, symptoms became very severe; patient is unable to walk for 3 steps without getting short of breath. Symptoms resolve at rest. Shortness of breath is associated with chest pressure and worsening LE edema.   Dtr called 911 due to pts symptoms.  EKG done by EMS showed Afib with RVR.    Pt was admitted to telemetry.  She was found to be volume overloaded and diureses with IV lasix.  She also has acute on chronic anemia and is being followed by GI  for workup.  She was started on Heparin gtt due to high [id number] score and has not had any bleeding with Heparin.  She reports she feels palpitations at times.  On tele she is in Afib/Flutter    PAST MEDICAL & SURGICAL HISTORY:  CAD (coronary artery disease), native coronary artery  Asthma with COPD  Diabetes  Hypertension  Aortic stenosis  S/P CABG x 1  H/O ascending aortic replacement: Bovine Replacement      PREVIOUS DIAGNOSTIC TESTING:      ECHO  FINDINGS:  Transthoracic Echocardiogram (08.09.20 @ 11:53) >  Summary:   1. Left ventricular ejection fraction, by visual estimation, is 60 to 65%.   2. Mild concentric left ventricular hypertrophy.   3. Mild mitral valve regurgitation.  4. Mitral annular calcification.   5. Moderate tricuspid regurgitation.   6. Bioprosthesis in the aortic position.   7. Estimated pulmonary artery systolic pressure is 41.3 mmHg assuming a right atrial pressure of 10 mmHg, which is consistent with mild pulmonary hypertension.    MEDICATIONS  (STANDING):  aspirin enteric coated 81 milliGRAM(s) Oral daily  atorvastatin 40 milliGRAM(s) Oral at bedtime  chlorhexidine 4% Liquid 1 Application(s) Topical <User Schedule>  dextrose 5%. 1000 milliLiter(s) (50 mL/Hr) IV Continuous <Continuous>  dextrose 50% Injectable 12.5 Gram(s) IV Push once  dextrose 50% Injectable 25 Gram(s) IV Push once  dextrose 50% Injectable 25 Gram(s) IV Push once  furosemide    Tablet 40 milliGRAM(s) Oral two times a day  heparin  Infusion 1600 Unit(s)/Hr (14 mL/Hr) IV Continuous <Continuous>  insulin glargine Injectable (LANTUS) 9 Unit(s) SubCutaneous at bedtime  insulin lispro (HumaLOG) corrective regimen sliding scale   SubCutaneous three times a day before meals  insulin lispro Injectable (HumaLOG) 3 Unit(s) SubCutaneous three times a day before meals  metoprolol succinate ER 50 milliGRAM(s) Oral once  montelukast 10 milliGRAM(s) Oral daily  multivitamin/minerals 1 Tablet(s) Oral daily  pantoprazole    Tablet 40 milliGRAM(s) Oral before breakfast  ranolazine 1000 milliGRAM(s) Oral daily    MEDICATIONS  (PRN):  ALBUTerol    90 MICROgram(s) HFA Inhaler 2 Puff(s) Inhalation every 6 hours PRN Shortness of Breath and/or Wheezing  dextrose 40% Gel 15 Gram(s) Oral once PRN Blood Glucose LESS THAN 70 milliGRAM(s)/deciliter  glucagon  Injectable 1 milliGRAM(s) IntraMuscular once PRN Glucose LESS THAN 70 milligrams/deciliter      FAMILY HISTORY:  No pertinent family history in first degree relatives      SOCIAL HISTORY:    CIGARETTES: denies    ALCOHOL: denies    Past Surgical History:    Allergies:    Augmentin (Other)      REVIEW OF SYSTEMS:  Pt with fatigue, loss of appetite and cough.  Occasionaly palpitations.  Remainder 10 point ROS is negative      Vital Signs Last 24 Hrs  T(C): 36.1 (11 Aug 2020 06:24), Max: 36.4 (10 Aug 2020 20:30)  T(F): 97 (11 Aug 2020 06:24), Max: 97.5 (10 Aug 2020 20:30)  HR: 104 (11 Aug 2020 06:24) (104 - 111)  BP: 113/56 (11 Aug 2020 06:24) (101/64 - 113/59)  BP(mean): --  RR: 18 (11 Aug 2020 06:24) (18 - 18)  SpO2: --    PHYSICAL EXAM:    GENERAL: In no apparent distress, well nourished, and hydrated.  HEART: irreg irreg at 84 BPM  PULMONARY: Clear to auscultation and perfusion.  No rales, wheezing, or rhonchi bilaterally.  ABDOMEN: Soft, Nontender, Nondistended; Bowel sounds present  EXTREMITIES:  2+ Peripheral Pulses, No clubbing, cyanosis, or edema  NEUROLOGICAL: Grossly nonfocal      INTERPRETATION OF TELEMETRY:  Afib/Aflutter    ECG:  < from: 12 Lead ECG (08.09.20 @ 00:25) >  Ventricular Rate 109 BPM    Atrial Rate 109 BPM    P-R Interval 240 ms    QRS Duration 86 ms    Q-T Interval 348 ms    QTC Calculation(Bezet) 468 ms    P Axis 26 degrees    R Axis -9 degrees    T Axis 193 degrees    Diagnosis Line Atrial flutter with 2 to 1 block  ST & T wave abnormality, consider inferolateral ischemia  Abnormal ECG    I&O's Detail    10 Aug 2020 07:01  -  11 Aug 2020 07:00  --------------------------------------------------------  IN:    heparin Infusion: 208 mL    Oral Fluid: 880 mL  Total IN: 1088 mL    OUT:    Voided: 1375 mL  Total OUT: 1375 mL    Total NET: -287 mL      11 Aug 2020 07:01  -  11 Aug 2020 11:04  --------------------------------------------------------  IN:  Total IN: 0 mL    OUT:    Voided: 400 mL  Total OUT: 400 mL    Total NET: -400 mL      LABS:                        7.8    5.21  )-----------( 164      ( 11 Aug 2020 07:12 )             27.4     08-11    141  |  99  |  32<H>  ----------------------------<  101<H>  3.4<L>   |  27  |  1.3    Ca    9.0      11 Aug 2020 07:12  Phos  4.5     08-11  Mg     1.7     08-11    TPro  6.4  /  Alb  3.4<L>  /  TBili  0.7  /  DBili  0.3<H>  /  AST  25  /  ALT  14  /  AlkPhos  41  08-11    CARDIAC MARKERS ( 10 Aug 2020 06:27 )  x     / <0.01 ng/mL / x     / x     / x          PT/INR - ( 11 Aug 2020 07:12 )   PT: 15.00 sec;   INR: 1.30 ratio         PTT - ( 11 Aug 2020 07:12 )  PTT:>200.0 sec    RADIOLOGY & ADDITIONAL STUDIES:  Xray Chest 1 View-PORTABLE IMMEDIATE (08.08.20 @ 08:06) >  Impression:    No radiographic evidence of acute cardiopulmonary disease.    Multiple fractured and migrated sternotomy wires as above. Correlate with point tenderness for underlying acute abnormality or with interval surgical history.

## 2020-08-11 NOTE — PROGRESS NOTE ADULT - SUBJECTIVE AND OBJECTIVE BOX
Gastroenterology progress note:     Patient is a 75y old  Female who presents with a chief complaint of Shortness of Breath (11 Aug 2020 08:50)       Admitted on: 08-08-20    We are following the patient for NATALIO     Interval History: no melena, no vomiting, no abdominal pain     Patient's medical problems are stable    Prior records reviewed (Y/N):Y  History obtained from someone other than patient (Y/N):N      PAST MEDICAL & SURGICAL HISTORY:  CAD (coronary artery disease), native coronary artery  Asthma with COPD  Diabetes  Hypertension  Aortic stenosis  S/P CABG x 1  H/O ascending aortic replacement: Bovine Replacement      MEDICATIONS  (STANDING):  aspirin enteric coated 81 milliGRAM(s) Oral daily  atorvastatin 40 milliGRAM(s) Oral at bedtime  chlorhexidine 4% Liquid 1 Application(s) Topical <User Schedule>  dextrose 5%. 1000 milliLiter(s) (50 mL/Hr) IV Continuous <Continuous>  dextrose 50% Injectable 12.5 Gram(s) IV Push once  dextrose 50% Injectable 25 Gram(s) IV Push once  dextrose 50% Injectable 25 Gram(s) IV Push once  furosemide    Tablet 40 milliGRAM(s) Oral two times a day  heparin  Infusion 1600 Unit(s)/Hr (14 mL/Hr) IV Continuous <Continuous>  insulin glargine Injectable (LANTUS) 9 Unit(s) SubCutaneous at bedtime  insulin lispro (HumaLOG) corrective regimen sliding scale   SubCutaneous three times a day before meals  insulin lispro Injectable (HumaLOG) 3 Unit(s) SubCutaneous three times a day before meals  metoprolol succinate  milliGRAM(s) Oral daily  montelukast 10 milliGRAM(s) Oral daily  multivitamin/minerals 1 Tablet(s) Oral daily  pantoprazole    Tablet 40 milliGRAM(s) Oral before breakfast  ranolazine 1000 milliGRAM(s) Oral daily    MEDICATIONS  (PRN):  ALBUTerol    90 MICROgram(s) HFA Inhaler 2 Puff(s) Inhalation every 6 hours PRN Shortness of Breath and/or Wheezing  dextrose 40% Gel 15 Gram(s) Oral once PRN Blood Glucose LESS THAN 70 milliGRAM(s)/deciliter  glucagon  Injectable 1 milliGRAM(s) IntraMuscular once PRN Glucose LESS THAN 70 milligrams/deciliter      Allergies  Augmentin (Other)      Review of Systems:   General: no fever  HEENT: no hemoptysis  Cardiovascular:  No Chest Pain, +Palpitations  Respiratory:  No Cough, No Dyspnea  Gastrointestinal:  As described in HPI  Hematology: no bruising or hematoma   Neurology: no new motor deficit  Skin: no new rash    Physical Examination:  T(C): 36.1 (08-11-20 @ 06:24), Max: 36.4 (08-10-20 @ 20:30)  HR: 104 (08-11-20 @ 06:24) (104 - 111)  BP: 113/56 (08-11-20 @ 06:24) (101/64 - 113/59)  RR: 18 (08-11-20 @ 06:24) (18 - 18)  SpO2: --  Weight (kg): 126 (08-11-20 @ 08:39)    08-10-20 @ 07:01  -  08-11-20 @ 07:00  --------------------------------------------------------  IN: 1088 mL / OUT: 1375 mL / NET: -287 mL    08-11-20 @ 07:01  -  08-11-20 @ 09:28  --------------------------------------------------------  IN: 0 mL / OUT: 400 mL / NET: -400 mL        Constitutional: No acute distress.  Head: normocephalic  Neck: no palpable thyroid  Eyes: EOMI  Respiratory:  No signs of respiratory distress. Lung sounds are clear bilaterally.  Cardiovascular: irregular rhythm   Abdominal: Abdomen is soft, symmetric, and non-tender without distention.    Skin: No rashes, No Jaundice.        Data: (reviewed by attending)                        7.8    5.21  )-----------( 164      ( 11 Aug 2020 07:12 )             27.4     Hgb trend:  7.8  08-11-20 @ 07:12  8.7  08-10-20 @ 06:27  8.5  08-09-20 @ 06:28  7.8  08-08-20 @ 16:14        08-10    139  |  98  |  30<H>  ----------------------------<  127<H>  3.8   |  23  |  1.3    Ca    9.4      10 Aug 2020 18:16  Mg     2.0     08-10      Liver panel trend:  TBili 0.7   /   AST 74   /   ALT 27   /   AlkP 52   /   Tptn 6.9   /   Alb 3.9    /   DBili --      08-08      PT/INR - ( 11 Aug 2020 07:12 )   PT: 15.00 sec;   INR: 1.30 ratio         PTT - ( 11 Aug 2020 07:12 )  PTT:>200.0 sec

## 2020-08-11 NOTE — PROGRESS NOTE ADULT - ATTENDING COMMENTS
patient seen and examined independently   agree with above note with the following additions     A/P  #acute diastolic chf improved   on po lasix 40 BID   s/p IV lasix   s/p AVR     AFIB: increase toprol to 150 qday  . d/c hep gtt PTT elevated will hold off and after EGD and colonoscopy tomorrow if no active bleeding then will start eliquis. EP eval for afib/flutter as per cardiology     Iron def anemia: EGD and colonoscopy tomorrow. will start IV IRON 200 mg qday not to exceed 1000 mg

## 2020-08-11 NOTE — PROGRESS NOTE ADULT - SUBJECTIVE AND OBJECTIVE BOX
EVAN QUINN 75y Female  MRN#: [id number]   Hospital Day: 3d    SUBJECTIVE  Patient is a 75y old Female who presents with a chief complaint of Shortness of Breath (10 Aug 2020 09:18)  Currently admitted to medicine with the primary diagnosis of Afib    INTERVAL HPI AND OVERNIGHT EVENTS:  Patient was examined and seen at bedside. This morning she is resting comfortably in bed and reports no issues or overnight events.    REVIEW OF SYMPTOMS:  CONSTITUTIONAL: No weakness, fevers or chills; No headaches  EYES: No visual changes, eye pain, or discharge  ENT: No vertigo; No ear pain or change in hearing; No sore throat or difficulty swallowing  NECK: No pain or stiffness  RESPIRATORY: No cough, wheezing, or hemoptysis; No shortness of breath  CARDIOVASCULAR: No chest pain or palpitations  GASTROINTESTINAL: No abdominal or epigastric pain; No nausea, vomiting, or hematemesis; No diarrhea or constipation; No melena or hematochezia  GENITOURINARY: No dysuria, frequency or hematuria  MUSCULOSKELETAL: No joint pain, no muscle pain, no weakness  NEUROLOGICAL: No numbness or weakness  SKIN: No itching or rashes    OBJECTIVE  PAST MEDICAL & SURGICAL HISTORY  CAD (coronary artery disease), native coronary artery  Asthma with COPD  Diabetes  Hypertension  Aortic stenosis  S/P CABG x 1  H/O ascending aortic replacement: Bovine Replacement    ALLERGIES:  Augmentin (Other)    MEDICATIONS:  STANDING MEDICATIONS  aspirin enteric coated 81 milliGRAM(s) Oral daily  atorvastatin 40 milliGRAM(s) Oral at bedtime  chlorhexidine 4% Liquid 1 Application(s) Topical <User Schedule>  dextrose 5%. 1000 milliLiter(s) IV Continuous <Continuous>  dextrose 50% Injectable 12.5 Gram(s) IV Push once  dextrose 50% Injectable 25 Gram(s) IV Push once  dextrose 50% Injectable 25 Gram(s) IV Push once  furosemide    Tablet 40 milliGRAM(s) Oral two times a day  insulin glargine Injectable (LANTUS) 9 Unit(s) SubCutaneous at bedtime  insulin lispro (HumaLOG) corrective regimen sliding scale   SubCutaneous three times a day before meals  insulin lispro Injectable (HumaLOG) 3 Unit(s) SubCutaneous three times a day before meals  metoprolol succinate  milliGRAM(s) Oral daily  montelukast 10 milliGRAM(s) Oral daily  multivitamin/minerals 1 Tablet(s) Oral daily  pantoprazole    Tablet 40 milliGRAM(s) Oral before breakfast  ranolazine 1000 milliGRAM(s) Oral daily    PRN MEDICATIONS  ALBUTerol    90 MICROgram(s) HFA Inhaler 2 Puff(s) Inhalation every 6 hours PRN  dextrose 40% Gel 15 Gram(s) Oral once PRN  glucagon  Injectable 1 milliGRAM(s) IntraMuscular once PRN      VITAL SIGNS: Last 24 Hours  T(C): 36.1 (11 Aug 2020 06:24), Max: 36.4 (10 Aug 2020 20:30)  T(F): 97 (11 Aug 2020 06:24), Max: 97.5 (10 Aug 2020 20:30)  HR: 104 (11 Aug 2020 06:24) (104 - 111)  BP: 113/56 (11 Aug 2020 06:24) (101/64 - 113/59)  BP(mean): --  RR: 18 (11 Aug 2020 06:24) (18 - 18)  SpO2: --    LABS:                        7.8    5.21  )-----------( 164      ( 11 Aug 2020 07:12 )             27.4     08-10    139  |  98  |  30<H>  ----------------------------<  127<H>  3.8   |  23  |  1.3    Ca    9.4      10 Aug 2020 18:16  Mg     2.0     08-10      PT/INR - ( 11 Aug 2020 07:12 )   PT: 15.00 sec;   INR: 1.30 ratio         PTT - ( 11 Aug 2020 07:12 )  PTT:>200.0 sec          CARDIAC MARKERS ( 10 Aug 2020 06:27 )  x     / <0.01 ng/mL / x     / x     / x          RADIOLOGY:      PHYSICAL EXAM:  CONSTITUTIONAL: No acute distress, well-developed, well-groomed, AAOx3  HEAD: Atraumatic, normocephalic  EYES: EOM intact, PERRLA, conjunctiva and sclera clear  ENT: Supple, no masses, no thyromegaly, no bruits, no JVD; moist mucous membranes  PULMONARY: Clear to auscultation bilaterally; no wheezes, rales, or rhonchi  CARDIOVASCULAR: Regular rate and rhythm; no murmurs, rubs, or gallops  GASTROINTESTINAL: Soft, non-tender, non-distended; bowel sounds present  MUSCULOSKELETAL: 2+ peripheral pulses; no clubbing, no cyanosis, no edema  NEUROLOGY: non-focal  SKIN: No rashes or lesions; warm and dry EVAN QUINN 75y Female  MRN#: [id number]   Hospital Day: 3d    SUBJECTIVE  Patient is a 75y old Female who presents with a chief complaint of Shortness of Breath (10 Aug 2020 09:18)  Currently admitted to medicine with the primary diagnosis of Afib    INTERVAL HPI AND OVERNIGHT EVENTS:  Patient was examined and seen at bedside. This morning she is resting comfortably in bed and reports no issues or overnight events.    REVIEW OF SYMPTOMS:  CONSTITUTIONAL: No weakness, fevers or chills; No headaches  EYES: No visual changes, eye pain, or discharge  ENT: No vertigo; No ear pain or change in hearing; No sore throat or difficulty swallowing  NECK: No pain or stiffness  RESPIRATORY: No cough, wheezing, or hemoptysis; No shortness of breath  CARDIOVASCULAR: No chest pain or palpitations  GASTROINTESTINAL: No abdominal or epigastric pain; No nausea, vomiting, or hematemesis; No diarrhea or constipation; No melena or hematochezia  GENITOURINARY: No dysuria, frequency or hematuria  MUSCULOSKELETAL: No joint pain, no muscle pain, no weakness  NEUROLOGICAL: No numbness or weakness  SKIN: No itching or rashes    OBJECTIVE  PAST MEDICAL & SURGICAL HISTORY  CAD (coronary artery disease), native coronary artery  Asthma with COPD  Diabetes  Hypertension  Aortic stenosis  S/P CABG x 1  H/O ascending aortic replacement: Bovine Replacement    ALLERGIES:  Augmentin (Other)    MEDICATIONS:  STANDING MEDICATIONS  aspirin enteric coated 81 milliGRAM(s) Oral daily  atorvastatin 40 milliGRAM(s) Oral at bedtime  chlorhexidine 4% Liquid 1 Application(s) Topical <User Schedule>  dextrose 5%. 1000 milliLiter(s) IV Continuous <Continuous>  dextrose 50% Injectable 12.5 Gram(s) IV Push once  dextrose 50% Injectable 25 Gram(s) IV Push once  dextrose 50% Injectable 25 Gram(s) IV Push once  furosemide    Tablet 40 milliGRAM(s) Oral two times a day  insulin glargine Injectable (LANTUS) 9 Unit(s) SubCutaneous at bedtime  insulin lispro (HumaLOG) corrective regimen sliding scale   SubCutaneous three times a day before meals  insulin lispro Injectable (HumaLOG) 3 Unit(s) SubCutaneous three times a day before meals  metoprolol succinate  milliGRAM(s) Oral daily  montelukast 10 milliGRAM(s) Oral daily  multivitamin/minerals 1 Tablet(s) Oral daily  pantoprazole    Tablet 40 milliGRAM(s) Oral before breakfast  ranolazine 1000 milliGRAM(s) Oral daily    PRN MEDICATIONS  ALBUTerol    90 MICROgram(s) HFA Inhaler 2 Puff(s) Inhalation every 6 hours PRN  dextrose 40% Gel 15 Gram(s) Oral once PRN  glucagon  Injectable 1 milliGRAM(s) IntraMuscular once PRN      VITAL SIGNS: Last 24 Hours  T(C): 36.1 (11 Aug 2020 06:24), Max: 36.4 (10 Aug 2020 20:30)  T(F): 97 (11 Aug 2020 06:24), Max: 97.5 (10 Aug 2020 20:30)  HR: 104 (11 Aug 2020 06:24) (104 - 111)  BP: 113/56 (11 Aug 2020 06:24) (101/64 - 113/59)  BP(mean): --  RR: 18 (11 Aug 2020 06:24) (18 - 18)  SpO2: --    LABS:                        7.8    5.21  )-----------( 164      ( 11 Aug 2020 07:12 )             27.4     08-10    139  |  98  |  30<H>  ----------------------------<  127<H>  3.8   |  23  |  1.3    Ca    9.4      10 Aug 2020 18:16  Mg     2.0     08-10      PT/INR - ( 11 Aug 2020 07:12 )   PT: 15.00 sec;   INR: 1.30 ratio         PTT - ( 11 Aug 2020 07:12 )  PTT:>200.0 sec          CARDIAC MARKERS ( 10 Aug 2020 06:27 )  x     / <0.01 ng/mL / x     / x     / x          RADIOLOGY:      PHYSICAL EXAM:  CONSTITUTIONAL: No acute distress, well-developed, well-groomed, AAOx3  HEAD: Atraumatic, normocephalic  EYES: EOM intact, PERRLA, conjunctiva and sclera clear  ENT: Supple, no masses, no thyromegaly, no bruits, no JVD; moist mucous membranes  PULMONARY: Clear to auscultation bilaterally; no wheezes, rales, or rhonchi  CARDIOVASCULAR: IRRegular rate and rhythm;   GASTROINTESTINAL: Soft, non-tender, non-distended; bowel sounds present  MUSCULOSKELETAL: + edema in LE   NEUROLOGY: non-focal  SKIN: No rashes

## 2020-08-11 NOTE — PROGRESS NOTE ADULT - ASSESSMENT
New onset AF/Flutter,   Diastolic dysfunction - CHF improved with diuresis.  AS s/p AVR. CAD, s/p CABG (LIMA to LAD)  Fe-def anemia    Plan:    C/w diuresis - use Lasix PO  C/w metoprolol for rate control  EP evaluation - Dr. Jaquez  GI evaluation appreciated. Cleared form cardiac perspective for EGD. Intermediate cardiac risk based on history, but clinically optimized for the procedure.  C/w COPD management. Minimize albuterol to prevent excessive tachycardia.  C/w statin.  Usual prophylaxis measures.  If no GI contraindications, will initiate anticoagulation with Eliquis after the EGD.   Will discuss long term strategy (i.e. ablation vs. medical therapy and anticoagulation vs. Watchman) with Dr. Jaquez, after the risks of anticoagulation are more clear.

## 2020-08-11 NOTE — CONSULT NOTE ADULT - ASSESSMENT
76 yo male with  PMH of AS s/p Bovine valve replacement in 2016, CAD s/p CABG in 2016, HTN, HLD, Type 2 DM, HFpEF (EF 55-60% in 2016), post-op anemia requiring auto-transfusions due to severe rejection reaction, who presented to the ED for a 2-day history of worsening shortness of breath and fatigue.  She was found to be volume overloaded, anemic and in new onset afib/flutter    PAFib/flutter  Anemia  CHF due to diastolic dysfunction  HTN  DM    Plan  - [id number] is 6.  Continue heparin gtt  - pt is to undergo EGD/Colonoscopy tomorrow to assess for GI source of anemia.  Long term anticoagulation will be determined based on results of GI workup  - Pt may be a candidate for ablation vs watchman device.   - Cont Toprol XL  - maintain K>4 and Mg >2  - cont to monitor on tele

## 2020-08-12 ENCOUNTER — TRANSCRIPTION ENCOUNTER (OUTPATIENT)
Age: 75
End: 2020-08-12

## 2020-08-12 ENCOUNTER — RESULT REVIEW (OUTPATIENT)
Age: 75
End: 2020-08-12

## 2020-08-12 LAB
BLD GP AB SCN SERPL QL: SIGNIFICANT CHANGE UP
GLUCOSE BLDC GLUCOMTR-MCNC: 103 MG/DL — HIGH (ref 70–99)
GLUCOSE BLDC GLUCOMTR-MCNC: 105 MG/DL — HIGH (ref 70–99)
GLUCOSE BLDC GLUCOMTR-MCNC: 114 MG/DL — HIGH (ref 70–99)
GLUCOSE BLDC GLUCOMTR-MCNC: 74 MG/DL — SIGNIFICANT CHANGE UP (ref 70–99)

## 2020-08-12 PROCEDURE — 99232 SBSQ HOSP IP/OBS MODERATE 35: CPT

## 2020-08-12 PROCEDURE — 88305 TISSUE EXAM BY PATHOLOGIST: CPT | Mod: 26

## 2020-08-12 PROCEDURE — 43239 EGD BIOPSY SINGLE/MULTIPLE: CPT | Mod: XS

## 2020-08-12 PROCEDURE — 45380 COLONOSCOPY AND BIOPSY: CPT

## 2020-08-12 PROCEDURE — 88312 SPECIAL STAINS GROUP 1: CPT | Mod: 26

## 2020-08-12 PROCEDURE — 99233 SBSQ HOSP IP/OBS HIGH 50: CPT

## 2020-08-12 RX ORDER — POTASSIUM CHLORIDE 20 MEQ
20 PACKET (EA) ORAL ONCE
Refills: 0 | Status: COMPLETED | OUTPATIENT
Start: 2020-08-12 | End: 2020-08-12

## 2020-08-12 RX ORDER — ENOXAPARIN SODIUM 100 MG/ML
120 INJECTION SUBCUTANEOUS EVERY 12 HOURS
Refills: 0 | Status: DISCONTINUED | OUTPATIENT
Start: 2020-08-12 | End: 2020-08-13

## 2020-08-12 RX ORDER — MULTIVIT WITH MIN/MFOLATE/K2 340-15/3 G
1 POWDER (GRAM) ORAL ONCE
Refills: 0 | Status: COMPLETED | OUTPATIENT
Start: 2020-08-12 | End: 2020-08-12

## 2020-08-12 RX ORDER — METOPROLOL TARTRATE 50 MG
150 TABLET ORAL DAILY
Refills: 0 | Status: DISCONTINUED | OUTPATIENT
Start: 2020-08-12 | End: 2020-08-13

## 2020-08-12 RX ADMIN — RANOLAZINE 1000 MILLIGRAM(S): 500 TABLET, FILM COATED, EXTENDED RELEASE ORAL at 16:40

## 2020-08-12 RX ADMIN — MONTELUKAST 10 MILLIGRAM(S): 4 TABLET, CHEWABLE ORAL at 16:40

## 2020-08-12 RX ADMIN — ATORVASTATIN CALCIUM 40 MILLIGRAM(S): 80 TABLET, FILM COATED ORAL at 22:26

## 2020-08-12 RX ADMIN — IRON SUCROSE 110 MILLIGRAM(S): 20 INJECTION, SOLUTION INTRAVENOUS at 16:38

## 2020-08-12 RX ADMIN — Medication 81 MILLIGRAM(S): at 16:39

## 2020-08-12 RX ADMIN — PANTOPRAZOLE SODIUM 40 MILLIGRAM(S): 20 TABLET, DELAYED RELEASE ORAL at 08:30

## 2020-08-12 RX ADMIN — Medication 40 MILLIGRAM(S): at 17:20

## 2020-08-12 RX ADMIN — Medication 1 BOTTLE: at 08:30

## 2020-08-12 RX ADMIN — Medication 1 TABLET(S): at 16:40

## 2020-08-12 RX ADMIN — Medication 150 MILLIGRAM(S): at 18:24

## 2020-08-12 RX ADMIN — Medication 50 MILLIEQUIVALENT(S): at 06:23

## 2020-08-12 NOTE — PROGRESS NOTE ADULT - SUBJECTIVE AND OBJECTIVE BOX
no chest pain   no sob   pending EGD and c-scope today     Vital Signs Last 24 Hrs  T(C): 36.7 (12 Aug 2020 07:26), Max: 36.7 (12 Aug 2020 05:44)  T(F): 98 (12 Aug 2020 07:26), Max: 98 (12 Aug 2020 05:44)  HR: 105 (12 Aug 2020 07:26) (103 - 105)  BP: 94/48 (12 Aug 2020 07:26) (94/48 - 143/64)  BP(mean): --  RR: 17 (12 Aug 2020 07:26) (17 - 18)  SpO2: --    PHYSICAL EXAM:  GENERAL: NAD, well-developed  HEAD:  Atraumatic, Normocephalic  EYES: EOMI, PERRLA, conjunctiva and sclera clear  NECK: Supple, No JVD  Pulm: Clear to auscultation bilaterally; No wheeze  CV: IRRegular rate and rhythm;   GI: Soft, Nontender, Nondistended; Bowel sounds present  EXTREMITIES:  +edema  PSYCH: AAOx3  NEUROLOGY: non-focal  SKIN: No rashes                           8.2    5.20  )-----------( 173      ( 11 Aug 2020 21:48 )             28.5     08-11    140  |  98  |  27<H>  ----------------------------<  94  3.4<L>   |  29  |  1.2    Ca    9.0      11 Aug 2020 21:48  Phos  4.5     08-11  Mg     2.0     08-11    TPro  6.6  /  Alb  3.9  /  TBili  0.8  /  DBili  x   /  AST  24  /  ALT  14  /  AlkPhos  43  08-11    LIVER FUNCTIONS - ( 11 Aug 2020 21:48 )  Alb: 3.9 g/dL / Pro: 6.6 g/dL / ALK PHOS: 43 U/L / ALT: 14 U/L / AST: 24 U/L / GGT: x           PT/INR - ( 11 Aug 2020 17:14 )   PT: 14.20 sec;   INR: 1.23 ratio         PTT - ( 11 Aug 2020 17:14 )  PTT:32.5 sec      MEDICATIONS  (STANDING):  aspirin enteric coated 81 milliGRAM(s) Oral daily  atorvastatin 40 milliGRAM(s) Oral at bedtime  bisacodyl 20 milliGRAM(s) Oral at bedtime  chlorhexidine 4% Liquid 1 Application(s) Topical <User Schedule>  dextrose 5%. 1000 milliLiter(s) (50 mL/Hr) IV Continuous <Continuous>  dextrose 50% Injectable 12.5 Gram(s) IV Push once  dextrose 50% Injectable 25 Gram(s) IV Push once  dextrose 50% Injectable 25 Gram(s) IV Push once  furosemide    Tablet 40 milliGRAM(s) Oral two times a day  insulin glargine Injectable (LANTUS) 9 Unit(s) SubCutaneous at bedtime  insulin lispro (HumaLOG) corrective regimen sliding scale   SubCutaneous three times a day before meals  insulin lispro Injectable (HumaLOG) 3 Unit(s) SubCutaneous three times a day before meals  iron sucrose IVPB 200 milliGRAM(s) IV Intermittent every 24 hours  metoprolol succinate  milliGRAM(s) Oral daily  montelukast 10 milliGRAM(s) Oral daily  multivitamin/minerals 1 Tablet(s) Oral daily  pantoprazole    Tablet 40 milliGRAM(s) Oral before breakfast  ranolazine 1000 milliGRAM(s) Oral daily    MEDICATIONS  (PRN):  ALBUTerol    90 MICROgram(s) HFA Inhaler 2 Puff(s) Inhalation every 6 hours PRN Shortness of Breath and/or Wheezing  dextrose 40% Gel 15 Gram(s) Oral once PRN Blood Glucose LESS THAN 70 milliGRAM(s)/deciliter  glucagon  Injectable 1 milliGRAM(s) IntraMuscular once PRN Glucose LESS THAN 70 milligrams/deciliter

## 2020-08-12 NOTE — PROGRESS NOTE ADULT - SUBJECTIVE AND OBJECTIVE BOX
Patient is a 75y old  Female who presents with a chief complaint of Shortness of Breath (11 Aug 2020 11:04)          SUBJ:  Patient seen and examined. Events noted. HR ~ 100. For EGD/colonoscopy today.      MEDICATIONS  (STANDING):  aspirin enteric coated 81 milliGRAM(s) Oral daily  atorvastatin 40 milliGRAM(s) Oral at bedtime  bisacodyl 20 milliGRAM(s) Oral at bedtime  chlorhexidine 4% Liquid 1 Application(s) Topical <User Schedule>  dextrose 5%. 1000 milliLiter(s) (50 mL/Hr) IV Continuous <Continuous>  dextrose 50% Injectable 12.5 Gram(s) IV Push once  dextrose 50% Injectable 25 Gram(s) IV Push once  dextrose 50% Injectable 25 Gram(s) IV Push once  furosemide    Tablet 40 milliGRAM(s) Oral two times a day  insulin glargine Injectable (LANTUS) 9 Unit(s) SubCutaneous at bedtime  insulin lispro (HumaLOG) corrective regimen sliding scale   SubCutaneous three times a day before meals  insulin lispro Injectable (HumaLOG) 3 Unit(s) SubCutaneous three times a day before meals  iron sucrose IVPB 200 milliGRAM(s) IV Intermittent every 24 hours  magnesium citrate Oral Solution 1 Bottle Oral once  metoprolol succinate  milliGRAM(s) Oral daily  montelukast 10 milliGRAM(s) Oral daily  multivitamin/minerals 1 Tablet(s) Oral daily  pantoprazole    Tablet 40 milliGRAM(s) Oral before breakfast  ranolazine 1000 milliGRAM(s) Oral daily    MEDICATIONS  (PRN):  ALBUTerol    90 MICROgram(s) HFA Inhaler 2 Puff(s) Inhalation every 6 hours PRN Shortness of Breath and/or Wheezing  dextrose 40% Gel 15 Gram(s) Oral once PRN Blood Glucose LESS THAN 70 milliGRAM(s)/deciliter  glucagon  Injectable 1 milliGRAM(s) IntraMuscular once PRN Glucose LESS THAN 70 milligrams/deciliter            Vital Signs Last 24 Hrs  T(C): 36.7 (12 Aug 2020 07:26), Max: 36.7 (12 Aug 2020 05:44)  T(F): 98 (12 Aug 2020 07:26), Max: 98 (12 Aug 2020 05:44)  HR: 105 (12 Aug 2020 07:26) (103 - 105)  BP: 94/48 (12 Aug 2020 07:26) (94/48 - 143/64)  BP(mean): --  RR: 17 (12 Aug 2020 07:26) (17 - 18)  SpO2: --      PHYSICAL EXAM:    GEN: AAO x 3, NAD  HEENT: NC/AT, PERRL  Neck: No JVD  CV: Reg, S1-S2, 2/6 murmur  Lungs: CTAB  Abd: Soft, non-tender  Ext: + edema      I&O's Summary    11 Aug 2020 07:01  -  12 Aug 2020 07:00  --------------------------------------------------------  IN: 120 mL / OUT: 1200 mL / NET: -1080 mL    	    TELEMETRY:  Flutter 2:1          LABS:                        8.2    5.20  )-----------( 173      ( 11 Aug 2020 21:48 )             28.5     08-11    140  |  98  |  27<H>  ----------------------------<  94  3.4<L>   |  29  |  1.2    Ca    9.0      11 Aug 2020 21:48  Phos  4.5     08-11  Mg     2.0     08-11    TPro  6.6  /  Alb  3.9  /  TBili  0.8  /  DBili  x   /  AST  24  /  ALT  14  /  AlkPhos  43  08-11        PT/INR - ( 11 Aug 2020 17:14 )   PT: 14.20 sec;   INR: 1.23 ratio         PTT - ( 11 Aug 2020 17:14 )  PTT:32.5 sec      BNP  RADIOLOGY & ADDITIONAL STUDIES:      IMPRESSION AND PLAN:

## 2020-08-12 NOTE — PROVIDER CONTACT NOTE (OTHER) - SITUATION
Pt had 2 IV's placed and stop working. Pt will only allow you to use left arm, Unable to retrieve access at this time. Pt pending Iron infusion.

## 2020-08-12 NOTE — PROGRESS NOTE ADULT - ASSESSMENT
#acute diastolic chf improved   on po lasix 40 BID   s/p IV lasix   s/p AVR     AFIB: toprol to 150 qday , EP follow up after EGD and colonoscopy     Iron def anemia: EGD and colonoscopy today.  IV IRON 200 mg qday not to exceed 1000 mg today is day2     #Progress Note Handoff  Pending (specify):  EGD and c-scope then EP follow up   Family discussion: daughter   Disposition: Home

## 2020-08-12 NOTE — PROGRESS NOTE ADULT - ASSESSMENT
C/w diuretics.  Supplement K  Plan for EGD/colonoscopy today  Cleared from cardiac perspective.  EP evaluation appreciated, recommendations reviewed.  High CHADS-VASC score. If no GI contraindications, will initiate anticoagulation after the procedure.  DM control  COPD regimen  C/w PPI  DVT prophylaxis

## 2020-08-13 ENCOUNTER — TRANSCRIPTION ENCOUNTER (OUTPATIENT)
Age: 75
End: 2020-08-13

## 2020-08-13 VITALS
RESPIRATION RATE: 18 BRPM | HEART RATE: 99 BPM | DIASTOLIC BLOOD PRESSURE: 60 MMHG | SYSTOLIC BLOOD PRESSURE: 123 MMHG | TEMPERATURE: 97 F

## 2020-08-13 LAB
ANION GAP SERPL CALC-SCNC: 14 MMOL/L — SIGNIFICANT CHANGE UP (ref 7–14)
APTT BLD: 32.6 SEC — SIGNIFICANT CHANGE UP (ref 27–39.2)
BASOPHILS # BLD AUTO: 0.02 K/UL — SIGNIFICANT CHANGE UP (ref 0–0.2)
BASOPHILS NFR BLD AUTO: 0.3 % — SIGNIFICANT CHANGE UP (ref 0–1)
BUN SERPL-MCNC: 22 MG/DL — HIGH (ref 10–20)
CALCIUM SERPL-MCNC: 8.9 MG/DL — SIGNIFICANT CHANGE UP (ref 8.5–10.1)
CHLORIDE SERPL-SCNC: 101 MMOL/L — SIGNIFICANT CHANGE UP (ref 98–110)
CO2 SERPL-SCNC: 23 MMOL/L — SIGNIFICANT CHANGE UP (ref 17–32)
CREAT SERPL-MCNC: 1.3 MG/DL — SIGNIFICANT CHANGE UP (ref 0.7–1.5)
EOSINOPHIL # BLD AUTO: 0.03 K/UL — SIGNIFICANT CHANGE UP (ref 0–0.7)
EOSINOPHIL NFR BLD AUTO: 0.4 % — SIGNIFICANT CHANGE UP (ref 0–8)
GLUCOSE BLDC GLUCOMTR-MCNC: 122 MG/DL — HIGH (ref 70–99)
GLUCOSE BLDC GLUCOMTR-MCNC: 136 MG/DL — HIGH (ref 70–99)
GLUCOSE SERPL-MCNC: 139 MG/DL — HIGH (ref 70–99)
HCT VFR BLD CALC: 29.5 % — LOW (ref 37–47)
HGB BLD-MCNC: 8.3 G/DL — LOW (ref 12–16)
IMM GRANULOCYTES NFR BLD AUTO: 1.3 % — HIGH (ref 0.1–0.3)
INR BLD: 1.32 RATIO — HIGH (ref 0.65–1.3)
LYMPHOCYTES # BLD AUTO: 1.45 K/UL — SIGNIFICANT CHANGE UP (ref 1.2–3.4)
LYMPHOCYTES # BLD AUTO: 19.3 % — LOW (ref 20.5–51.1)
MCHC RBC-ENTMCNC: 23.7 PG — LOW (ref 27–31)
MCHC RBC-ENTMCNC: 28.1 G/DL — LOW (ref 32–37)
MCV RBC AUTO: 84.3 FL — SIGNIFICANT CHANGE UP (ref 81–99)
MONOCYTES # BLD AUTO: 0.9 K/UL — HIGH (ref 0.1–0.6)
MONOCYTES NFR BLD AUTO: 12 % — HIGH (ref 1.7–9.3)
NEUTROPHILS # BLD AUTO: 5.02 K/UL — SIGNIFICANT CHANGE UP (ref 1.4–6.5)
NEUTROPHILS NFR BLD AUTO: 66.7 % — SIGNIFICANT CHANGE UP (ref 42.2–75.2)
NRBC # BLD: 1 /100 WBCS — HIGH (ref 0–0)
PLATELET # BLD AUTO: 155 K/UL — SIGNIFICANT CHANGE UP (ref 130–400)
POTASSIUM SERPL-MCNC: 3.7 MMOL/L — SIGNIFICANT CHANGE UP (ref 3.5–5)
POTASSIUM SERPL-SCNC: 3.7 MMOL/L — SIGNIFICANT CHANGE UP (ref 3.5–5)
PROTHROM AB SERPL-ACNC: 15.2 SEC — HIGH (ref 9.95–12.87)
RBC # BLD: 3.5 M/UL — LOW (ref 4.2–5.4)
RBC # FLD: 20.9 % — HIGH (ref 11.5–14.5)
SODIUM SERPL-SCNC: 138 MMOL/L — SIGNIFICANT CHANGE UP (ref 135–146)
WBC # BLD: 7.52 K/UL — SIGNIFICANT CHANGE UP (ref 4.8–10.8)
WBC # FLD AUTO: 7.52 K/UL — SIGNIFICANT CHANGE UP (ref 4.8–10.8)

## 2020-08-13 PROCEDURE — 99239 HOSP IP/OBS DSCHRG MGMT >30: CPT

## 2020-08-13 RX ORDER — METOPROLOL TARTRATE 50 MG
1 TABLET ORAL
Qty: 0 | Refills: 0 | DISCHARGE

## 2020-08-13 RX ORDER — PANTOPRAZOLE SODIUM 20 MG/1
1 TABLET, DELAYED RELEASE ORAL
Qty: 30 | Refills: 0
Start: 2020-08-13 | End: 2020-09-11

## 2020-08-13 RX ORDER — POTASSIUM CHLORIDE 20 MEQ
1 PACKET (EA) ORAL
Qty: 5 | Refills: 0
Start: 2020-08-13 | End: 2020-08-17

## 2020-08-13 RX ORDER — LOSARTAN POTASSIUM 100 MG/1
1 TABLET, FILM COATED ORAL
Qty: 0 | Refills: 0 | DISCHARGE

## 2020-08-13 RX ORDER — PANTOPRAZOLE SODIUM 20 MG/1
1 TABLET, DELAYED RELEASE ORAL
Qty: 0 | Refills: 0 | DISCHARGE

## 2020-08-13 RX ORDER — METOPROLOL TARTRATE 50 MG
1 TABLET ORAL
Qty: 30 | Refills: 0
Start: 2020-08-13 | End: 2020-09-11

## 2020-08-13 RX ORDER — METOPROLOL TARTRATE 50 MG
1 TABLET ORAL
Qty: 60 | Refills: 0
Start: 2020-08-13 | End: 2020-09-11

## 2020-08-13 RX ORDER — APIXABAN 2.5 MG/1
1 TABLET, FILM COATED ORAL
Qty: 60 | Refills: 0
Start: 2020-08-13 | End: 2020-09-11

## 2020-08-13 RX ORDER — METOPROLOL TARTRATE 50 MG
150 TABLET ORAL ONCE
Refills: 0 | Status: COMPLETED | OUTPATIENT
Start: 2020-08-13 | End: 2020-08-13

## 2020-08-13 RX ORDER — FUROSEMIDE 40 MG
40 TABLET ORAL DAILY
Refills: 0 | Status: DISCONTINUED | OUTPATIENT
Start: 2020-08-14 | End: 2020-08-13

## 2020-08-13 RX ORDER — SENNA PLUS 8.6 MG/1
1 TABLET ORAL
Qty: 0 | Refills: 0 | DISCHARGE

## 2020-08-13 RX ORDER — APIXABAN 2.5 MG/1
5 TABLET, FILM COATED ORAL EVERY 12 HOURS
Refills: 0 | Status: DISCONTINUED | OUTPATIENT
Start: 2020-08-13 | End: 2020-08-13

## 2020-08-13 RX ORDER — POTASSIUM CHLORIDE 20 MEQ
1 PACKET (EA) ORAL
Qty: 0 | Refills: 0 | DISCHARGE

## 2020-08-13 RX ORDER — ASPIRIN/CALCIUM CARB/MAGNESIUM 324 MG
1 TABLET ORAL
Qty: 0 | Refills: 0 | DISCHARGE

## 2020-08-13 RX ORDER — POTASSIUM CHLORIDE 20 MEQ
1 PACKET (EA) ORAL
Qty: 0 | Refills: 0 | DISCHARGE
Start: 2020-08-13 | End: 2020-08-17

## 2020-08-13 RX ORDER — PIOGLITAZONE HYDROCHLORIDE 15 MG/1
1 TABLET ORAL
Qty: 0 | Refills: 0 | DISCHARGE

## 2020-08-13 RX ADMIN — Medication 40 MILLIGRAM(S): at 05:57

## 2020-08-13 RX ADMIN — Medication 150 MILLIGRAM(S): at 14:01

## 2020-08-13 RX ADMIN — Medication 1 TABLET(S): at 13:22

## 2020-08-13 RX ADMIN — RANOLAZINE 1000 MILLIGRAM(S): 500 TABLET, FILM COATED, EXTENDED RELEASE ORAL at 13:23

## 2020-08-13 RX ADMIN — MONTELUKAST 10 MILLIGRAM(S): 4 TABLET, CHEWABLE ORAL at 13:22

## 2020-08-13 RX ADMIN — PANTOPRAZOLE SODIUM 40 MILLIGRAM(S): 20 TABLET, DELAYED RELEASE ORAL at 07:55

## 2020-08-13 NOTE — DISCHARGE NOTE PROVIDER - NSDCMRMEDTOKEN_GEN_ALL_CORE_FT
apixaban 5 mg oral tablet: 1 tab(s) orally every 12 hours  Centrum oral tablet: 1 tab(s) orally once a day  furosemide 40 mg oral tablet: 1 tab(s) orally 2 times a day  glipiZIDE 5 mg oral tablet: 1 tab(s) orally once a day  K-Tab 20 mEq oral tablet, extended release: 1 tab(s) orally once a day   metoprolol succinate 100 mg oral tablet, extended release: 1 tab(s) orally once a day   montelukast 10 mg oral tablet: 1 tab(s) orally once a day  pantoprazole 40 mg oral delayed release tablet: 1 tab(s) orally once a day   ranolazine 1000 mg oral tablet, extended release: 1 tab(s) orally once a day  rosuvastatin 10 mg oral tablet: 1 tab(s) orally once a day

## 2020-08-13 NOTE — DISCHARGE NOTE PROVIDER - NSDCCPCAREPLAN_GEN_ALL_CORE_FT
PRINCIPAL DISCHARGE DIAGNOSIS  Diagnosis: Afib  Assessment and Plan of Treatment:       SECONDARY DISCHARGE DIAGNOSES  Diagnosis: Dyspnea on exertion  Assessment and Plan of Treatment:     Diagnosis: Anemia  Assessment and Plan of Treatment: PRINCIPAL DISCHARGE DIAGNOSIS  Diagnosis: Afib  Assessment and Plan of Treatment: You came to hospital after experiencing shortness of breath. On admission you were noted to have irregular heart rate called atrial fibrillation. Your heart was slightly elevated throughout your stay, around 101-107 beats per minute. You were given medication to control your heart rate. Please follow up with you primary care doctor and cardiologists after discharge from the hospital and take your medications as prescribed.      SECONDARY DISCHARGE DIAGNOSES  Diagnosis: Heart failure  Assessment and Plan of Treatment: Your lab work was signficant for heart failure which is a condition where your heart has difficulty pumping blood and moving blood forward. You underwent TTE which is an imaging modality that allows the provider to take a closer look at your heart and visualize any possible abnormalities. you lab work was significant for elevated BNP which is increased when the is excess strain on the heart due to fluid overload. you were given diuretics to help you eliminate the excess fluid. Please follow up with you primary care doctor and cardiologists after discharge from the hospital and take your medications as prescribed.    Diagnosis: Anemia  Assessment and Plan of Treatment: On admission you were noted to have low hemoglobin of 7.5 normally you have a hemoglobin of 8-9 as per prior medical records. You seen by a gastroenterologist here at the hospital and underwent endoscopy and colonoscopy. Please follow up with your primary care doctor and gastroenetrologist after discharge from the hospital.

## 2020-08-13 NOTE — DISCHARGE NOTE PROVIDER - NSDCFUADDINST_GEN_ALL_CORE_FT
Please follow up with your primary care doctor, your gastroenterologist, and cardiologists. please take your medications as prescribed.

## 2020-08-13 NOTE — DISCHARGE NOTE PROVIDER - HOSPITAL COURSE
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  Prior to admission yesterday she was having increased shortness of breath along with chest/left arm discomfort. EMS did EKG noted for A-fib RVR, she was given Nitro and loaded with Aspirin. In the ED, vitals were normal  BNP approximately 5,000. Chest X-ray remarkable for some noted congestion and slight blunting of inferior angle on right. EKG noted for A-fib RVR. She was noted to have LE swelling. Patient received lasix throughout her stay. Cardio was consulted, she undewent TTE, which showed grade II diastolic dysfunction an EF of 60-65. For Afib with RVR she received metoprolol for rate control, her HR through her stay remained between 101-107.  She was also noted to have a hemoglobin of 7.5 (baseline 8-9), GI was consulted for anemia and pt underwent EGD and colonoscopy. Her AC were held as per cardio recs for risk of bleeding. 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 patient was stable prior to discharge, sating well on RA.

## 2020-08-13 NOTE — PROGRESS NOTE ADULT - REASON FOR ADMISSION
Shortness of Breath

## 2020-08-13 NOTE — PROGRESS NOTE ADULT - SUBJECTIVE AND OBJECTIVE BOX
EVAN QUINN 75y Female  MRN#: [id number]   Hospital Day: 5d    SUBJECTIVE  Patient is a 75y old Female who presents with a chief complaint of Shortness of Breath (12 Aug 2020 11:18)  Currently admitted to medicine with the primary diagnosis of Afib    INTERVAL HPI AND OVERNIGHT EVENTS:  Patient was examined and seen at bedside. This morning she is resting comfortably in bed and reports no issues or overnight events.    REVIEW OF SYMPTOMS:  CONSTITUTIONAL: No weakness, fevers or chills; No headaches  EYES: No visual changes, eye pain, or discharge  ENT: No vertigo; No ear pain or change in hearing; No sore throat or difficulty swallowing  NECK: No pain or stiffness  RESPIRATORY: No cough, wheezing, or hemoptysis; No shortness of breath  CARDIOVASCULAR: No chest pain or palpitations  GASTROINTESTINAL: No abdominal or epigastric pain; No nausea, vomiting, or hematemesis; No diarrhea or constipation; No melena or hematochezia  GENITOURINARY: No dysuria, frequency or hematuria  MUSCULOSKELETAL: No joint pain, no muscle pain, no weakness  NEUROLOGICAL: No numbness or weakness  SKIN: No itching or rashes    OBJECTIVE  PAST MEDICAL & SURGICAL HISTORY  CAD (coronary artery disease), native coronary artery  Asthma with COPD  Diabetes  Hypertension  Aortic stenosis  S/P CABG x 1  H/O ascending aortic replacement: Bovine Replacement    ALLERGIES:  Augmentin (Other)    MEDICATIONS:  STANDING MEDICATIONS  aspirin enteric coated 81 milliGRAM(s) Oral daily  atorvastatin 40 milliGRAM(s) Oral at bedtime  bisacodyl 20 milliGRAM(s) Oral at bedtime  chlorhexidine 4% Liquid 1 Application(s) Topical <User Schedule>  dextrose 5%. 1000 milliLiter(s) IV Continuous <Continuous>  dextrose 50% Injectable 12.5 Gram(s) IV Push once  dextrose 50% Injectable 25 Gram(s) IV Push once  dextrose 50% Injectable 25 Gram(s) IV Push once  enoxaparin Injectable 120 milliGRAM(s) SubCutaneous every 12 hours  furosemide    Tablet 40 milliGRAM(s) Oral two times a day  insulin glargine Injectable (LANTUS) 9 Unit(s) SubCutaneous at bedtime  insulin lispro (HumaLOG) corrective regimen sliding scale   SubCutaneous three times a day before meals  insulin lispro Injectable (HumaLOG) 3 Unit(s) SubCutaneous three times a day before meals  iron sucrose IVPB 200 milliGRAM(s) IV Intermittent every 24 hours  metoprolol succinate  milliGRAM(s) Oral daily  montelukast 10 milliGRAM(s) Oral daily  multivitamin/minerals 1 Tablet(s) Oral daily  pantoprazole    Tablet 40 milliGRAM(s) Oral before breakfast  ranolazine 1000 milliGRAM(s) Oral daily    PRN MEDICATIONS  ALBUTerol    90 MICROgram(s) HFA Inhaler 2 Puff(s) Inhalation every 6 hours PRN  dextrose 40% Gel 15 Gram(s) Oral once PRN  glucagon  Injectable 1 milliGRAM(s) IntraMuscular once PRN      VITAL SIGNS: Last 24 Hours  T(C): 36.4 (13 Aug 2020 05:55), Max: 36.7 (12 Aug 2020 07:26)  T(F): 97.5 (13 Aug 2020 05:55), Max: 98 (12 Aug 2020 07:26)  HR: 101 (13 Aug 2020 05:55) (101 - 107)  BP: 123/57 (13 Aug 2020 05:55) (94/48 - 140/88)  BP(mean): --  RR: 18 (13 Aug 2020 05:55) (16 - 18)  SpO2: 98% (12 Aug 2020 16:00) (95% - 98%)    LABS:                        8.2    5.20  )-----------( 173      ( 11 Aug 2020 21:48 )             28.5     08-11    140  |  98  |  27<H>  ----------------------------<  94  3.4<L>   |  29  |  1.2    Ca    9.0      11 Aug 2020 21:48  Phos  4.5     08-11  Mg     2.0     08-11    TPro  6.6  /  Alb  3.9  /  TBili  0.8  /  DBili  x   /  AST  24  /  ALT  14  /  AlkPhos  43  08-11    PT/INR - ( 13 Aug 2020 05:56 )   PT: 15.20 sec;   INR: 1.32 ratio         PTT - ( 13 Aug 2020 05:56 )  PTT:32.6 sec              RADIOLOGY:      PHYSICAL EXAM:  CONSTITUTIONAL: No acute distress, well-developed, well-groomed, AAOx3  HEAD: Atraumatic, normocephalic  EYES: EOM intact, PERRLA, conjunctiva and sclera clear  ENT: Supple, no masses, no thyromegaly, no bruits, no JVD; moist mucous membranes  PULMONARY: Clear to auscultation bilaterally; no wheezes, rales, or rhonchi  CARDIOVASCULAR: Regular rate and rhythm; no murmurs, rubs, or gallops  GASTROINTESTINAL: Soft, non-tender, non-distended; bowel sounds present  MUSCULOSKELETAL: 2+ peripheral pulses; no clubbing, no cyanosis, no edema  NEUROLOGY: non-focal  SKIN: No rashes or lesions; warm and dry

## 2020-08-13 NOTE — PROGRESS NOTE ADULT - PROVIDER SPECIALTY LIST ADULT
Cardiology
Cardiology
Gastroenterology
Hospitalist
Internal Medicine
Cardiology

## 2020-08-13 NOTE — DISCHARGE NOTE PROVIDER - PROVIDER TOKENS
PROVIDER:[TOKEN:[[id number]]],PROVIDER:[TOKEN:[[id number]]],PROVIDER:[TOKEN:[[id number]]],PROVIDER:[TOKEN:[[id number]]]

## 2020-08-13 NOTE — DISCHARGE NOTE NURSING/CASE MANAGEMENT/SOCIAL WORK - NSDCPEEMAIL_GEN_ALL_CORE
[facility] for Tobacco Control email [email or web address]

## 2020-08-13 NOTE — DISCHARGE NOTE PROVIDER - CARE PROVIDER_API CALL
Mumtaz Jaquez; DEBBIE)  Cardiac Electrophysiology  [address] [place] [address]  Phone: [phone]  Fax: [phone]  Follow Up Time:     Howard Ramirez)  Cardiovascular Disease; Internal Medicine; Interventional Cardiology  [address] [place] [address]  Phone: [phone]  Fax: [phone]  Follow Up Time:     Swati Yañez)  Internal Medicine  [address] [place] [address]  Phone: [phone]  Fax: [phone]  Follow Up Time:     Ayad Phipps  Infectious Disease  [address] [place] [address]  Phone: [phone]  Fax: [phone]  Follow Up Time:

## 2020-08-13 NOTE — DISCHARGE NOTE NURSING/CASE MANAGEMENT/SOCIAL WORK - NSDCPEWEB_GEN_ALL_CORE
[facility] for Tobacco Control website --- [email or web address] website --- [email or web address]

## 2020-08-13 NOTE — DISCHARGE NOTE PROVIDER - CARE PROVIDERS DIRECT ADDRESSES
[email or web address],DirectAddress_Unknown

## 2020-08-13 NOTE — PROGRESS NOTE ADULT - ATTENDING COMMENTS
#acute diastolic chf improved   on po lasix 40 BID   s/p IV lasix   s/p AVR     AFIB/flutter: toprol to 150 qday , EP follow up. per GI can start anticoagulation will start eliquis 5 BID. Discussed benefits of starting anticoagulation to prevent strokes from Afib/Aflutter and risks involved in starting anticoagulantion including risk of bleeding, hemorrhagic stroke, GI bleed and even death. Pt agreed to start anticoagulation given benifits outweighs risk.      Iron def anemia: EGD and colonoscopy on 8/12/20 did not show any active bleeding see report for details. need OPT follow up with GI and PPI.  IV IRON 200 mg qday not to exceed 1000 mg today is day 3    #Progress Note Handoff  Pending (specify):   EP follow up   Family discussion: daughter   Disposition: Home #acute diastolic chf improved   will decrease lasix to 40 mg po qday from BID   s/p IV lasix   s/p AVR     AFIB/flutter: EP follow up. per GI can start anticoagulation will start eliquis 5 BID. Discussed benefits of starting anticoagulation to prevent strokes from Afib/Aflutter and risks involved in starting anticoagulantion including risk of bleeding, hemorrhagic stroke, GI bleed and even death. Pt agreed to start anticoagulation given benifits outweighs risk.  spoke to Dr. Jaquez from EP and recommended to anticoagulate for at least 2 weeks and to do cardioversion as OPT   spoke to Dr. Ramirez from cardiology and agrees with plan   will increase toprol to 100 BID cardiology also agrees with this         Iron def anemia: EGD and colonoscopy on 8/12/20 did not show any active bleeding see report for details. need OPT follow up with GI and PPI.  IV IRON 200 mg qday not to exceed 1000 mg today is day 3      history of CAD: given that we started eliquis and patient has NATALIO cardiology recommended to hold aspirin for now     discharge within 24 hours either today or tomorrow will discuss with daughter   spent 35 min coordinating discharge plan

## 2020-08-13 NOTE — DISCHARGE NOTE NURSING/CASE MANAGEMENT/SOCIAL WORK - PATIENT PORTAL LINK FT
You can access the FollowMyHealth Patient Portal offered by [facility] by registering at the following website: [email or web address]. By joining [employer]’s FollowMyHealth portal, you will also be able to view your health information using other applications (apps) compatible with our system.

## 2020-08-13 NOTE — PROGRESS NOTE ADULT - ASSESSMENT
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 She was admitted for A-fib with RVR,      #SOB, weakness 2/2 Acute on chronic CHF exacerbation, HFpEF 60-65% EF.  - likely CHF exacerbation given elevated BNP and LE edema is likely along with symptomatic anemia given Hg 7.5 on admission with baseline reported 8-9   - BNP 4800, LE Edema noted, Patient has been holding her Metolazone at home   - Sat 98% on room air  - c/w IV lasix, pt still volumed overloaded  - f/u cardio recs  - Daily Weights  - Strict Is and Os  - Monitor Electrolytes; maintain K over 4 and Mg over 2  - COVID swab negative  - losartan held due to low BP, can restart if BP elevated    #Paroxysmal Atrial Fibrillation   - HR today 101-107  - Increase to Metoprolol 100mg daily  - CHADsVASC score at lease 6; but will hold off on anticoagulation at this time as per Cardio   - EGD and colonoscopy yesterday     #Symptomatic normocytic acute on chronic Anemia   - Hemoglobin 7.6 with noted baseline 8-9. Today 8.5   - No signs of active bleeding; MILDRED refused by the patient despite education  - Iron panel, B12, Folate ordered  - f/u GI consult for anemia prior to starting a/c for afib    #LE edema with chronic skin changes  - Likely underlying vascular disease with chronic changes noted on physical examination   - Will order LE duplex to assess for any clots; low likelihood.       #CKD stage 3a  - creatinine 1.2 today  - Creatinine baseline 1.1-1.2  - Creatinine on admission 1.4  - Monitor     #CAD s/p CABG  - Continue on Metoprolol, ASA, Statin    #HLD  - Continue on Statin     #Type 2 DM  - PO meds on hold  - a1c ordered  - Carb consistent diet  - Monitor finger sticks; if over 180 start on basal/bolus insulin regimen. 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 She was admitted for A-fib with RVR,      #SOB, weakness 2/2 Acute on chronic CHF exacerbation, HFpEF 60-65% EF.  - likely CHF exacerbation given elevated BNP and LE edema is likely along with symptomatic anemia given Hg 7.5 on admission with baseline reported 8-9   - BNP 4800, LE Edema noted, Patient has been holding her Metolazone at home   - Sat 98% on room air  - c/w IV lasix, pt still volumed overloaded  - f/u cardio recs  - Daily Weights  - Strict Is and Os  - Monitor Electrolytes; maintain K over 4 and Mg over 2  - COVID swab negative  - losartan held due to low BP, can restart if BP elevated    #Paroxysmal Atrial Fibrillation   - HR today 101-107  - Increase to Metoprolol 100mg daily  - CHADsVASC score at lease 6; but will hold off on anticoagulation at this time as per Cardio   - EGD and colonoscopy yesterday     #Symptomatic normocytic acute on chronic Anemia   - Hemoglobin 7.6 with noted baseline 8-9. Today 8.5   - No signs of active bleeding; MILDRED refused by the patient despite education  - Iron panel, B12, Folate ordered  - f/u GI consult for anemia prior to starting a/c for afib    #LE edema with chronic skin changes  - Likely underlying vascular disease with chronic changes noted on physical examination   - Will order LE duplex to assess for any clots; low likelihood.       #CKD stage 3a  - creatinine 1.2 today  - Creatinine baseline 1.1-1.2  - Creatinine on admission 1.4  - Monitor     #CAD s/p CABG  - Continue on Metoprolol, Statin    #HLD  - Continue on Statin     #Type 2 DM  - PO meds on hold  - a1c ordered  - Carb consistent diet  - Monitor finger sticks; if over 180 start on basal/bolus insulin regimen. Will need patient's weight added to the system for medication dosing    #Asthma (stable)  - No signs of wheezing and stable on room air  - Continue on Montelukast    #AS s/p Bovine AVR  - Continue on Metoprolol   - Repeat TTE as above    Activity: As tolerated  Diet: DASH/Carb  DVT ppx: Lovenox  GI ppx: Protonix  Code Status: Full Code  DISPO: From home; admit to telemetry

## 2020-08-14 PROBLEM — E11.9 TYPE 2 DIABETES MELLITUS WITHOUT COMPLICATIONS: Chronic | Status: ACTIVE | Noted: 2020-08-08

## 2020-08-14 PROBLEM — I25.10 ATHEROSCLEROTIC HEART DISEASE OF NATIVE CORONARY ARTERY WITHOUT ANGINA PECTORIS: Chronic | Status: ACTIVE | Noted: 2020-08-08

## 2020-08-14 PROBLEM — I35.0 NONRHEUMATIC AORTIC (VALVE) STENOSIS: Chronic | Status: ACTIVE | Noted: 2020-08-08

## 2020-08-14 LAB
SURGICAL PATHOLOGY STUDY: SIGNIFICANT CHANGE UP
SURGICAL PATHOLOGY STUDY: SIGNIFICANT CHANGE UP

## 2020-08-20 DIAGNOSIS — K63.89 OTHER SPECIFIED DISEASES OF INTESTINE: ICD-10-CM

## 2020-08-20 DIAGNOSIS — E11.22 TYPE 2 DIABETES MELLITUS WITH DIABETIC CHRONIC KIDNEY DISEASE: ICD-10-CM

## 2020-08-20 DIAGNOSIS — K57.30 DIVERTICULOSIS OF LARGE INTESTINE WITHOUT PERFORATION OR ABSCESS WITHOUT BLEEDING: ICD-10-CM

## 2020-08-20 DIAGNOSIS — I13.0 HYPERTENSIVE HEART AND CHRONIC KIDNEY DISEASE WITH HEART FAILURE AND STAGE 1 THROUGH STAGE 4 CHRONIC KIDNEY DISEASE, OR UNSPECIFIED CHRONIC KIDNEY DISEASE: ICD-10-CM

## 2020-08-20 DIAGNOSIS — I48.0 PAROXYSMAL ATRIAL FIBRILLATION: ICD-10-CM

## 2020-08-20 DIAGNOSIS — K64.4 RESIDUAL HEMORRHOIDAL SKIN TAGS: ICD-10-CM

## 2020-08-20 DIAGNOSIS — K31.7 POLYP OF STOMACH AND DUODENUM: ICD-10-CM

## 2020-08-20 DIAGNOSIS — K29.60 OTHER GASTRITIS WITHOUT BLEEDING: ICD-10-CM

## 2020-08-20 DIAGNOSIS — Z95.1 PRESENCE OF AORTOCORONARY BYPASS GRAFT: ICD-10-CM

## 2020-08-20 DIAGNOSIS — I87.2 VENOUS INSUFFICIENCY (CHRONIC) (PERIPHERAL): ICD-10-CM

## 2020-08-20 DIAGNOSIS — Z88.0 ALLERGY STATUS TO PENICILLIN: ICD-10-CM

## 2020-08-20 DIAGNOSIS — R06.02 SHORTNESS OF BREATH: ICD-10-CM

## 2020-08-20 DIAGNOSIS — J44.9 CHRONIC OBSTRUCTIVE PULMONARY DISEASE, UNSPECIFIED: ICD-10-CM

## 2020-08-20 DIAGNOSIS — N18.3 CHRONIC KIDNEY DISEASE, STAGE 3 (MODERATE): ICD-10-CM

## 2020-08-20 DIAGNOSIS — E11.65 TYPE 2 DIABETES MELLITUS WITH HYPERGLYCEMIA: ICD-10-CM

## 2020-08-20 DIAGNOSIS — I50.33 ACUTE ON CHRONIC DIASTOLIC (CONGESTIVE) HEART FAILURE: ICD-10-CM

## 2020-08-20 DIAGNOSIS — K64.8 OTHER HEMORRHOIDS: ICD-10-CM

## 2020-08-20 DIAGNOSIS — L28.0 LICHEN SIMPLEX CHRONICUS: ICD-10-CM

## 2020-08-20 DIAGNOSIS — Z95.3 PRESENCE OF XENOGENIC HEART VALVE: ICD-10-CM

## 2020-08-20 DIAGNOSIS — D50.9 IRON DEFICIENCY ANEMIA, UNSPECIFIED: ICD-10-CM

## 2020-08-20 DIAGNOSIS — R19.7 DIARRHEA, UNSPECIFIED: ICD-10-CM

## 2020-08-20 DIAGNOSIS — Z79.82 LONG TERM (CURRENT) USE OF ASPIRIN: ICD-10-CM

## 2020-08-20 DIAGNOSIS — Z20.828 CONTACT WITH AND (SUSPECTED) EXPOSURE TO OTHER VIRAL COMMUNICABLE DISEASES: ICD-10-CM

## 2020-08-20 DIAGNOSIS — I25.118 ATHEROSCLEROTIC HEART DISEASE OF NATIVE CORONARY ARTERY WITH OTHER FORMS OF ANGINA PECTORIS: ICD-10-CM

## 2020-08-20 DIAGNOSIS — Z79.84 LONG TERM (CURRENT) USE OF ORAL HYPOGLYCEMIC DRUGS: ICD-10-CM

## 2020-08-20 DIAGNOSIS — I48.92 UNSPECIFIED ATRIAL FLUTTER: ICD-10-CM

## 2020-08-25 ENCOUNTER — APPOINTMENT (OUTPATIENT)
Dept: CARDIOLOGY | Facility: CLINIC | Age: 75
End: 2020-08-25
Payer: MEDICARE

## 2020-08-25 VITALS
DIASTOLIC BLOOD PRESSURE: 70 MMHG | SYSTOLIC BLOOD PRESSURE: 146 MMHG | BODY MASS INDEX: 47.09 KG/M2 | WEIGHT: 293 LBS | TEMPERATURE: 98 F | HEART RATE: 105 BPM | HEIGHT: 66 IN

## 2020-08-25 PROCEDURE — 99214 OFFICE O/P EST MOD 30 MIN: CPT

## 2020-08-25 PROCEDURE — 93000 ELECTROCARDIOGRAM COMPLETE: CPT

## 2020-08-25 NOTE — HISTORY OF PRESENT ILLNESS
[FreeTextEntry1] : 76 y/o lady, presents for follow-up. She was recently in the hospital with new onset AF/flutter, staretd on Eliquis and metoprolol. She had worsening CHF with progressive SCHAFFER and increasing LE, despiote increase in diuretics. Labs were done last week. S/p AVR with CABG by Dr. Gonzales in September of 2016. Last cath revealed patent graft and no AO gradient. She had an echo, which revealed Normal LV function, normally working AVR, mild MR. She also has a history of TIA, but no further events since then.  Feels more tired. Still with stable dyspnea on exertion. + bruising. + fatigue. Potassium was low. Hg 8.3.

## 2020-08-25 NOTE — ASSESSMENT
[FreeTextEntry1] : new onset A. fib.\par Symptomatic - schedule for NERY/CV within one week.\par C/w Eliquis\par \par Worsening CHF - c/w Lasix, re-start metolazone. Monitor renal function.\par S/p AVR, CABG,- stable\par \par \par Echo and cath results noted.\par BP is controlled.\par Anemic - following with Heme and GI. last Hg 8.3. No active bleeding.\par C/w current medical therapy. C/w Losartan. \par C/w Ranexa 1000 mg q12.\par \par KCL suppl.\par Repeat labs  noted - f/u with PMD\par Pulmonary evaluation. \par Patient is depressed - c/w Zoloft, f/u with the PMD.\par In terms of the ischemic w/u, her nuclear stress test was negative.\par Options of ablations and Watchman were also discussed. She will f/u with Dr. Jaquez after NERY.\par F/u  after the procedure.\par \par

## 2020-08-25 NOTE — REASON FOR VISIT
[Follow-Up - From Hospitalization] : follow-up of a recent hospitalization for [Aortic Stenosis] : aortic stenosis [Coronary Artery Disease] : coronary artery disease

## 2020-08-25 NOTE — PHYSICAL EXAM
[General Appearance - Well Developed] : well developed [General Appearance - Well Nourished] : well nourished [General Appearance - In No Acute Distress] : no acute distress [Normal Conjunctiva] : the conjunctiva exhibited no abnormalities [No Oral Pallor] : no oral pallor [Normal Oral Mucosa] : normal oral mucosa [] : no respiratory distress [Normal Oropharynx] : normal oropharynx [Respiration, Rhythm And Depth] : normal respiratory rhythm and effort [Auscultation Breath Sounds / Voice Sounds] : lungs were clear to auscultation bilaterally [Exaggerated Use Of Accessory Muscles For Inspiration] : no accessory muscle use [Heart Rate And Rhythm] : heart rate and rhythm were normal [Heart Sounds] : normal S1 and S2 [Murmurs] : no murmurs present [Bowel Sounds] : normal bowel sounds [Abdomen Tenderness] : non-tender [Abdomen Soft] : soft [Nail Clubbing] : no clubbing of the fingernails [Cyanosis, Localized] : no localized cyanosis [Petechial Hemorrhages (___cm)] : no petechial hemorrhages [FreeTextEntry1] : erythema LE b/l, venostasis lesions [Oriented To Time, Place, And Person] : oriented to person, place, and time [Affect] : the affect was normal

## 2020-08-25 NOTE — REVIEW OF SYSTEMS
[Fever] : no fever [Chills] : no chills [Headache] : no headache [Feeling Fatigued] : feeling fatigued [Blurry Vision] : no blurred vision [Seeing Double (Diplopia)] : no diplopia [Earache] : no earache [Discharge From The Ears] : no discharge from the ears [Shortness Of Breath] : no shortness of breath [Dyspnea on exertion] : dyspnea during exertion [Chest  Pressure] : no chest pressure [Chest Pain] : no chest pain [Lower Ext Edema] : no extremity edema [Leg Claudication] : no intermittent leg claudication [Palpitations] : no palpitations [Cough] : cough [Nausea] : no nausea [Abdominal Pain] : abdominal pain [Wheezing] : no wheezing [Dysuria] : no dysuria [Heartburn] : heartburn [Vomiting] : no vomiting [Joint Swelling] : no joint swelling [Joint Pain] : joint pain [see HPI] : see HPI [Skin: A Rash] : rash: [Change In Color Of Skin] : change in skin color [Itching] : itching [Dizziness] : no dizziness [Skin Lesions] : skin lesion(s): [Confusion] : no confusion was observed [Tremor] : no tremor was seen [Depression] : depression [Anxiety] : anxiety [Under Stress] : under stress [Excessive Thirst] : no polydipsia [Easy Bleeding] : no tendency for easy bleeding [Easy Bruising] : no tendency for easy bruising

## 2020-09-01 ENCOUNTER — OUTPATIENT (OUTPATIENT)
Dept: OUTPATIENT SERVICES | Facility: HOSPITAL | Age: 75
LOS: 1 days | Discharge: HOME | End: 2020-09-01

## 2020-09-01 ENCOUNTER — LABORATORY RESULT (OUTPATIENT)
Age: 75
End: 2020-09-01

## 2020-09-01 DIAGNOSIS — Z95.828 PRESENCE OF OTHER VASCULAR IMPLANTS AND GRAFTS: Chronic | ICD-10-CM

## 2020-09-01 DIAGNOSIS — Z11.59 ENCOUNTER FOR SCREENING FOR OTHER VIRAL DISEASES: ICD-10-CM

## 2020-09-01 DIAGNOSIS — Z95.1 PRESENCE OF AORTOCORONARY BYPASS GRAFT: Chronic | ICD-10-CM

## 2020-09-02 ENCOUNTER — FORM ENCOUNTER (OUTPATIENT)
Age: 75
End: 2020-09-02

## 2020-09-03 ENCOUNTER — OUTPATIENT (OUTPATIENT)
Dept: OUTPATIENT SERVICES | Facility: HOSPITAL | Age: 75
LOS: 1 days | Discharge: HOME | End: 2020-09-03
Payer: MEDICARE

## 2020-09-03 VITALS
HEIGHT: 64.96 IN | OXYGEN SATURATION: 98 % | DIASTOLIC BLOOD PRESSURE: 55 MMHG | SYSTOLIC BLOOD PRESSURE: 114 MMHG | HEART RATE: 96 BPM | RESPIRATION RATE: 20 BRPM | WEIGHT: 270.73 LBS

## 2020-09-03 DIAGNOSIS — Z95.828 PRESENCE OF OTHER VASCULAR IMPLANTS AND GRAFTS: Chronic | ICD-10-CM

## 2020-09-03 DIAGNOSIS — Z95.1 PRESENCE OF AORTOCORONARY BYPASS GRAFT: Chronic | ICD-10-CM

## 2020-09-03 LAB — GLUCOSE BLDC GLUCOMTR-MCNC: 113 MG/DL — HIGH (ref 70–99)

## 2020-09-03 PROCEDURE — 93010 ELECTROCARDIOGRAM REPORT: CPT | Mod: 76

## 2020-09-03 PROCEDURE — 92960 CARDIOVERSION ELECTRIC EXT: CPT

## 2020-09-03 PROCEDURE — 93312 ECHO TRANSESOPHAGEAL: CPT | Mod: 26,XU

## 2020-09-03 PROCEDURE — 93010 ELECTROCARDIOGRAM REPORT: CPT | Mod: 77

## 2020-09-03 NOTE — H&P CARDIOLOGY - HISTORY OF PRESENT ILLNESS
76 y/o F with PMH of AS s/p bovine AVR, HFpEF, CAD s/p CABG 2016, mild LVH, pulm HTN, COPD, HTN, DLD, DM, anemia last EGD/Colonoscopy found melanosis coli, recently found to have new onset afib/aflutter during past hospital admission presented today for NERY and cardioversion

## 2020-09-03 NOTE — PRE-ANESTHESIA EVALUATION ADULT - NSANTHADDINFOFT_GEN_ALL_CORE
risks, benefits, alternatives, general anesthesia as a backup discussed with the patient and she is agreeable to proceed as planned

## 2020-09-03 NOTE — CHART NOTE - NSCHARTNOTEFT_GEN_A_CORE
POST OPERATIVE PROCEDURAL DOCUMENTATION  PRE-OP DIAGNOSIS: Atrial flutter    POST-OP DIAGNOSIS: Atrial flutter with successful cardioversion to normal sinus rhythm    PROCEDURE: Transesophageal echocardiogram    Primary Physician: Dr. Ramirez  Assistant: Kiko    ANESTHESIA TYPE  [  ] General Anesthesia  [ x ] Conscious Sedation  [  ] Local/Regional    CONDITION  [  ] Critical  [  ] Serious  [x] Fair  [  ] Good    SPECIMENS REMOVED (IF APPLICABLE): N/A    IMPLANTS (IF APPLICABLE): None    ESTIMATED BLOOD LOSS: None    COMPLICATIONS: None      FINDINGS:    After risks and benefits of procedures were explained, informed consent was obtained and placed in chart. Refer to Anesthesia note for sedation details.  The NERY probe was passed into the esophagus without difficulty.  Transesophageal and transgastric images were obtained.  The NERY probe was removed without difficulty and examined.  There was no evidence for bleeding.  The patient tolerated the procedure well without any immediate NERY-related complications.      Preliminary Findings:  LA and RA: Enlarged.  GENA: Left atrial appendage was clear of clot and spontaneous echo contrast. Good velocities across GENA.  LV: LVEF was estimated at 55-65%  RV: Normal size and systolic function.  MV: Mild MR, No evidence for MS.   AV: No evidence for AI, no evidence for AS. Bioprosthetic valve functioning normally.  TV: Moderate to severe TR.   IAS: no PFO. NO R-> L shunt on color doppler.  There was mild, non-mobile atheroma seen in the thoracic aorta.     Patient successfully converted to sinus rhythm with synchronized  200 J of direct current cardioversion via AP pads.    DIAGNOSIS/IMPRESSION:    PLAN OF CARE:  [x] Continue with anticoagulation eliquis 5 mg po BID and toprol xl 100 mg POST OPERATIVE PROCEDURAL DOCUMENTATION  PRE-OP DIAGNOSIS: Atrial flutter    POST-OP DIAGNOSIS: Atrial flutter with successful cardioversion to normal sinus rhythm    PROCEDURE: Transesophageal echocardiogram    Primary Physician: Dr. Ramirez  Assistant: Kiko    ANESTHESIA TYPE  [  ] General Anesthesia  [ x ] Conscious Sedation  [  ] Local/Regional    CONDITION  [  ] Critical  [  ] Serious  [x] Fair  [  ] Good    SPECIMENS REMOVED (IF APPLICABLE): N/A    IMPLANTS (IF APPLICABLE): None    ESTIMATED BLOOD LOSS: None    COMPLICATIONS: None      FINDINGS:    After risks and benefits of procedures were explained, informed consent was obtained and placed in chart. Refer to Anesthesia note for sedation details.  The NERY probe was passed into the esophagus without difficulty.  Transesophageal and transgastric images were obtained.  The NERY probe was removed without difficulty and examined.  There was no evidence for bleeding.  The patient tolerated the procedure well without any immediate NERY-related complications.      Preliminary Findings:  LA and RA: Enlarged.  GENA: Left atrial appendage was clear of clot and spontaneous echo contrast. Good velocities across GENA.  LV: LVEF was estimated at 55-65%  RV: Normal size and systolic function.  MV: Mild MR, No evidence for MS. Thickened and calcified leaflets.  AV: No evidence for AI, no evidence for AS. Bioprosthetic valve functioning normally.  TV: Moderate to severe TR.   IAS: no PFO. NO R-> L shunt on color doppler.  There was mild, non-mobile atheroma seen in the thoracic aorta.     Patient successfully converted to sinus rhythm with synchronized  200 J of direct current cardioversion via AP pads.    DIAGNOSIS/IMPRESSION: Atrial flutter with successful cardioversion to normal sinus rhythm    PLAN OF CARE:  [x] Continue with anticoagulation eliquis 5 mg po BID and toprol xl 100 mg POST OPERATIVE PROCEDURAL DOCUMENTATION  PRE-OP DIAGNOSIS: Atrial flutter    POST-OP DIAGNOSIS: Atrial flutter with successful cardioversion to normal sinus rhythm    PROCEDURE: Transesophageal echocardiogram    Primary Physician: Dr. Ramirez  Assistant: Kiko    ANESTHESIA TYPE  [  ] General Anesthesia  [ x ] Conscious Sedation  [  ] Local/Regional    CONDITION  [  ] Critical  [  ] Serious  [x] Fair  [  ] Good    SPECIMENS REMOVED (IF APPLICABLE): N/A    IMPLANTS (IF APPLICABLE): None    ESTIMATED BLOOD LOSS: None    COMPLICATIONS: None      FINDINGS:    After risks and benefits of procedures were explained, informed consent was obtained and placed in chart. Refer to Anesthesia note for sedation details.  The NERY probe was passed into the esophagus without difficulty.  Transesophageal and transgastric images were obtained.  The NERY probe was removed without difficulty and examined.  There was no evidence for bleeding.  The patient tolerated the procedure well without any immediate NERY-related complications.      Preliminary Findings:  LA and RA: Enlarged.  GENA: Left atrial appendage was clear of clot and spontaneous echo contrast. Good velocities across GENA.  LV: LVEF was estimated at 55-65%  RV: Normal size and systolic function.  MV: Mild MR, No evidence for MS. Thickened and calcified leaflets.  AV: No evidence for AI, no evidence for AS. Bioprosthetic valve functioning normally.  TV: Moderate to severe TR.   IAS: no PFO. NO R-> L shunt on color doppler.  There was mild, non-mobile atheroma seen in the thoracic aorta.     Patient successfully converted to sinus rhythm with synchronized  200 J of direct current cardioversion via AP pads.    DIAGNOSIS/IMPRESSION: Atrial flutter with successful cardioversion to normal sinus rhythm    PLAN OF CARE:  [x] Continue with anticoagulation eliquis 5 mg po BID and toprol xl

## 2020-09-03 NOTE — ASU PATIENT PROFILE, ADULT - PMH
Aortic stenosis    Asthma with COPD    CAD (coronary artery disease), native coronary artery    Diabetes    Hypertension

## 2020-09-03 NOTE — CHART NOTE - NSCHARTNOTEFT_GEN_A_CORE
PACU ANESTHESIA ADMISSION NOTE      Procedure: NERY and cardioversion  Post op diagnosis:  A.fib/ A.flutter    _x___  Patent Airway    _x___  Full return of protective reflexes    __x__  Full recovery from anesthesia / back to baseline status    Vitals:  T(C): 98. 5 F  HR: 68  BP: 117/70  RR: 18  SpO2: 100%    Mental Status:  __x__ Awake   __x___ Alert   _____ Drowsy   _____ Sedated    Nausea/Vomiting:  __x__ NO  ______Yes,   See Post - Op Orders          Pain Scale (0-10):  __0___    Treatment: __x__ None    ____ See Post - Op/PCA Orders    Post - Operative Fluids:   ____ Oral   __x__ See Post - Op Orders    Plan: Discharge:   __x__Home       _____Floor     _____Critical Care    _____  Other:_________________    Comments: uneventful anesthesia course no complications. Vitals stable. Pt transferred to PACU. Discharge home when criteria is met

## 2020-09-03 NOTE — PRE-ANESTHESIA EVALUATION ADULT - NSANTHOSAYNRD_GEN_A_CORE
denies/No. LENY screening performed.  STOP BANG Legend: 0-2 = LOW Risk; 3-4 = INTERMEDIATE Risk; 5-8 = HIGH Risk

## 2020-09-04 DIAGNOSIS — I48.91 UNSPECIFIED ATRIAL FIBRILLATION: ICD-10-CM

## 2020-09-10 ENCOUNTER — APPOINTMENT (OUTPATIENT)
Dept: CARDIOLOGY | Facility: CLINIC | Age: 75
End: 2020-09-10
Payer: MEDICARE

## 2020-09-10 VITALS
TEMPERATURE: 95.1 F | WEIGHT: 267 LBS | HEART RATE: 60 BPM | DIASTOLIC BLOOD PRESSURE: 69 MMHG | SYSTOLIC BLOOD PRESSURE: 119 MMHG | BODY MASS INDEX: 42.91 KG/M2 | HEIGHT: 66 IN

## 2020-09-10 DIAGNOSIS — I48.19 OTHER PERSISTENT ATRIAL FIBRILLATION: ICD-10-CM

## 2020-09-10 DIAGNOSIS — I10 ESSENTIAL (PRIMARY) HYPERTENSION: ICD-10-CM

## 2020-09-10 PROCEDURE — 93000 ELECTROCARDIOGRAM COMPLETE: CPT

## 2020-09-10 PROCEDURE — 99214 OFFICE O/P EST MOD 30 MIN: CPT

## 2020-09-10 NOTE — ASSESSMENT
[FreeTextEntry1] : SOB - could be multi factorial including TR, BB and weight\par \par - decrease metoprolol to 100 mg; pt may need decrease even more\par - will continue to monitor

## 2020-09-14 ENCOUNTER — APPOINTMENT (OUTPATIENT)
Dept: CARDIOLOGY | Facility: CLINIC | Age: 75
End: 2020-09-14
Payer: MEDICARE

## 2020-09-14 VITALS
HEIGHT: 66 IN | SYSTOLIC BLOOD PRESSURE: 124 MMHG | DIASTOLIC BLOOD PRESSURE: 72 MMHG | HEART RATE: 60 BPM | WEIGHT: 261 LBS | TEMPERATURE: 96.3 F | BODY MASS INDEX: 41.95 KG/M2

## 2020-09-14 PROCEDURE — 99214 OFFICE O/P EST MOD 30 MIN: CPT

## 2020-09-14 PROCEDURE — 93000 ELECTROCARDIOGRAM COMPLETE: CPT

## 2020-09-14 RX ORDER — METOLAZONE 5 MG/1
5 TABLET ORAL DAILY
Qty: 30 | Refills: 0 | Status: COMPLETED | COMMUNITY
Start: 2020-08-25 | End: 2020-09-14

## 2020-09-14 NOTE — HISTORY OF PRESENT ILLNESS
[FreeTextEntry1] : 74 y/o lady, presents for follow-up. S/p NERY/cardioversion for new onset AF/flutter, on Eliquis and metoprolol. Remains in NSR. Her B-blocker was decreased due to bradycardia - now on 100 mg QD. She lost 30 lbs with diuresis. She is off metolazone - on Lasix 40 mg q12. Labs noted. S/p AVR with CABG by Dr. Gonzales in September of 2016. Last cath revealed patent graft and no AO gradient. She had an echo, which revealed Normal LV function, normally working AVR, mild MR. She also has a history of TIA, but no further events since then.  Feels more tired. Still with stable dyspnea on exertion. + bruising. + fatigue. Potassium was low on supplement. Hg is stable.

## 2020-09-14 NOTE — REVIEW OF SYSTEMS
[Fever] : no fever [Headache] : no headache [Chills] : no chills [Feeling Fatigued] : feeling fatigued [Earache] : no earache [Seeing Double (Diplopia)] : no diplopia [Blurry Vision] : no blurred vision [Discharge From The Ears] : no discharge from the ears [Dyspnea on exertion] : dyspnea during exertion [Shortness Of Breath] : no shortness of breath [Chest  Pressure] : no chest pressure [Chest Pain] : no chest pain [Lower Ext Edema] : no extremity edema [Leg Claudication] : no intermittent leg claudication [Palpitations] : no palpitations [Cough] : cough [Wheezing] : no wheezing [Abdominal Pain] : abdominal pain [Nausea] : no nausea [Heartburn] : heartburn [Vomiting] : no vomiting [Dysuria] : no dysuria [Joint Pain] : joint pain [Joint Swelling] : no joint swelling [see HPI] : see HPI [Skin: A Rash] : rash: [Itching] : itching [Change In Color Of Skin] : change in skin color [Skin Lesions] : skin lesion(s): [Dizziness] : no dizziness [Confusion] : no confusion was observed [Tremor] : no tremor was seen [Depression] : depression [Under Stress] : under stress [Anxiety] : anxiety [Excessive Thirst] : no polydipsia [Easy Bleeding] : no tendency for easy bleeding [Easy Bruising] : no tendency for easy bruising

## 2020-09-14 NOTE — ASSESSMENT
[FreeTextEntry1] : new onset A. fib s/p NERY/CV - remains in NSR.\par NERY reviewed.\par C/w Eliquis, metoprolol.\par \par Diastolic CHF - c/w Lasix, PRN metolazone. Monitor renal function.\par S/p AVR, CABG,- stable\par \par \par Echo and cath results noted.\par BP is controlled.\par Anemic - following with Heme and GI. last Hg 8.3. No active bleeding.\par C/w current medical therapy. C/w Losartan. \par C/w Ranexa 1000 mg q12.\par \par KCL suppl.\par Repeat labs  noted - f/u with PMD\par Pulmonary evaluation. \par Patient is depressed - c/w Zoloft, f/u with the PMD.\par In terms of the ischemic w/u, her nuclear stress test was negative.\par Options of ablations and Watchman were also discussed. She will f/u with Dr. Jaquez , if recurrent AF.\par F/u  in 1-2 months for close f/u.\par \par

## 2020-09-14 NOTE — PHYSICAL EXAM
[General Appearance - Well Developed] : well developed [General Appearance - In No Acute Distress] : no acute distress [General Appearance - Well Nourished] : well nourished [Normal Conjunctiva] : the conjunctiva exhibited no abnormalities [Normal Oral Mucosa] : normal oral mucosa [No Oral Pallor] : no oral pallor [Normal Oropharynx] : normal oropharynx [Respiration, Rhythm And Depth] : normal respiratory rhythm and effort [] : no respiratory distress [Auscultation Breath Sounds / Voice Sounds] : lungs were clear to auscultation bilaterally [Exaggerated Use Of Accessory Muscles For Inspiration] : no accessory muscle use [Heart Rate And Rhythm] : heart rate and rhythm were normal [Heart Sounds] : normal S1 and S2 [Bowel Sounds] : normal bowel sounds [Murmurs] : no murmurs present [Abdomen Tenderness] : non-tender [Abdomen Soft] : soft [Cyanosis, Localized] : no localized cyanosis [Nail Clubbing] : no clubbing of the fingernails [Petechial Hemorrhages (___cm)] : no petechial hemorrhages [FreeTextEntry1] : erythema LE b/l, venostasis lesions [Oriented To Time, Place, And Person] : oriented to person, place, and time [Affect] : the affect was normal

## 2020-11-19 NOTE — PHYSICAL EXAM
[Normal Oral Mucosa] : normal oral mucosa [No Oral Cyanosis] : no oral cyanosis [No Oral Pallor] : no oral pallor [Normal Jugular Venous V Waves Present] : normal jugular venous V waves present [Normal Jugular Venous A Waves Present] : normal jugular venous A waves present [No Jugular Venous Galvez A Waves] : no jugular venous galvez A waves [Heart Sounds] : normal S1 and S2 [Heart Rate And Rhythm] : heart rate and rhythm were normal [Respiration, Rhythm And Depth] : normal respiratory rhythm and effort [Murmurs] : no murmurs present [Exaggerated Use Of Accessory Muscles For Inspiration] : no accessory muscle use [Abdomen Soft] : soft [Auscultation Breath Sounds / Voice Sounds] : lungs were clear to auscultation bilaterally [Abdomen Mass (___ Cm)] : no abdominal mass palpated [Abdomen Tenderness] : non-tender [Nail Clubbing] : no clubbing of the fingernails [Cyanosis, Localized] : no localized cyanosis Calm [Petechial Hemorrhages (___cm)] : no petechial hemorrhages [] : no ischemic changes

## 2020-12-08 NOTE — PATIENT PROFILE ADULT - NSPROPTRIGHTCAREGIVER_GEN_A_NUR
declines
I have personally seen and examined this patient.  I have fully participated in the care of this patient. I have reviewed all pertinent clinical information, including history, physical exam, plan and the Resident’s note and agree except as noted.

## 2020-12-10 ENCOUNTER — APPOINTMENT (OUTPATIENT)
Dept: CARDIOLOGY | Facility: CLINIC | Age: 75
End: 2020-12-10

## 2021-01-04 ENCOUNTER — APPOINTMENT (OUTPATIENT)
Dept: CARDIOLOGY | Facility: CLINIC | Age: 76
End: 2021-01-04

## 2021-01-13 DIAGNOSIS — E11.9 TYPE 2 DIABETES MELLITUS W/OUT COMPLICATIONS: ICD-10-CM

## 2021-01-13 DIAGNOSIS — E78.5 HYPERLIPIDEMIA, UNSPECIFIED: ICD-10-CM

## 2021-02-22 ENCOUNTER — APPOINTMENT (OUTPATIENT)
Dept: CARDIOLOGY | Facility: CLINIC | Age: 76
End: 2021-02-22
Payer: MEDICARE

## 2021-02-22 PROCEDURE — 99442: CPT

## 2021-03-18 ENCOUNTER — APPOINTMENT (OUTPATIENT)
Dept: CARDIOLOGY | Facility: CLINIC | Age: 76
End: 2021-03-18
Payer: MEDICARE

## 2021-03-18 VITALS — TEMPERATURE: 97.8 F | HEIGHT: 66 IN | HEART RATE: 83 BPM | BODY MASS INDEX: 41.95 KG/M2 | WEIGHT: 261 LBS

## 2021-03-18 PROCEDURE — 99214 OFFICE O/P EST MOD 30 MIN: CPT

## 2021-03-18 PROCEDURE — 93000 ELECTROCARDIOGRAM COMPLETE: CPT

## 2021-03-18 PROCEDURE — 99072 ADDL SUPL MATRL&STAF TM PHE: CPT

## 2021-03-18 NOTE — PHYSICAL EXAM
[Normal Oral Mucosa] : normal oral mucosa [No Oral Pallor] : no oral pallor [No Oral Cyanosis] : no oral cyanosis [Normal Jugular Venous A Waves Present] : normal jugular venous A waves present [Normal Jugular Venous V Waves Present] : normal jugular venous V waves present [No Jugular Venous Galvez A Waves] : no jugular venous galvez A waves [Respiration, Rhythm And Depth] : normal respiratory rhythm and effort [Exaggerated Use Of Accessory Muscles For Inspiration] : no accessory muscle use [Auscultation Breath Sounds / Voice Sounds] : lungs were clear to auscultation bilaterally [Heart Rate And Rhythm] : heart rate and rhythm were normal [Heart Sounds] : normal S1 and S2 [Murmurs] : no murmurs present [Abdomen Soft] : soft [Abdomen Tenderness] : non-tender [Abdomen Mass (___ Cm)] : no abdominal mass palpated [Nail Clubbing] : no clubbing of the fingernails [Cyanosis, Localized] : no localized cyanosis [Petechial Hemorrhages (___cm)] : no petechial hemorrhages [] : no ischemic changes

## 2021-03-18 NOTE — ASSESSMENT
[FreeTextEntry1] : AF\par Left Atrial Occlusion Device Implant\par I have discussed different treatment options with the patient including other anticoagulation medication. I have explained the risks and benefits of the procedure to the patient. I have explained to the patient the patient will require to be on warfarin for 45 days after implant and NERY will be repeated. If there is no leak patient will remain on aspirin and Plavix for next month, and then ASA only. There is approximately 1-2% chance of any major cardiovascular complication to occur. Complications include, but are not limited to infection, bleeding, damage to the vessels, hole in the heart, stroke, death and heart attack. The patient understands the risk and would like to proceed with the procedure.  Materials were provided to the patient. Patient indicated that all of his questions were answered to his satisfaction and verbalized understanding.\par Patients CHADVASC Score is     6\par Patients HASBLED score is 3\par (Hypertension, Abnormal Renal/Liver Function, Stroke, Bleeding History or Predisposition, Labile INR, >65, Antiplatelet agents, NSAID, Drugs/Alcohol)\par \par Dr. Ramirez        recommends and agrees with implant of watchman\par \par

## 2021-03-18 NOTE — HISTORY OF PRESENT ILLNESS
[FreeTextEntry1] : Patient with history of DM, CABG, CAD, AF/AFL, TIA CHADVSC 6. Patient came in for followup after hospital admission. Pt s/p DCCV for AF. Patient fell very short of breath especially when walking. After cardioversion shortness of breath improved however patient continued to have be short of breath and tired. \par \par Patient continued to be anemic and complained of shortness of breath without any source of bleeding.without a source of bleeding.\par \par \par EKG SR 60 bpm

## 2021-03-24 ENCOUNTER — RX RENEWAL (OUTPATIENT)
Age: 76
End: 2021-03-24

## 2021-03-24 ENCOUNTER — NON-APPOINTMENT (OUTPATIENT)
Age: 76
End: 2021-03-24

## 2021-04-07 ENCOUNTER — LABORATORY RESULT (OUTPATIENT)
Age: 76
End: 2021-04-07

## 2021-04-07 ENCOUNTER — APPOINTMENT (OUTPATIENT)
Dept: HEMATOLOGY ONCOLOGY | Facility: CLINIC | Age: 76
End: 2021-04-07
Payer: MEDICARE

## 2021-04-07 VITALS
DIASTOLIC BLOOD PRESSURE: 47 MMHG | SYSTOLIC BLOOD PRESSURE: 138 MMHG | TEMPERATURE: 97.8 F | HEIGHT: 63 IN | BODY MASS INDEX: 46.78 KG/M2 | HEART RATE: 83 BPM | WEIGHT: 264 LBS

## 2021-04-07 PROCEDURE — 99205 OFFICE O/P NEW HI 60 MIN: CPT

## 2021-04-07 NOTE — CONSULT LETTER
[Dear  ___] : Dear  [unfilled], [Consult Letter:] : I had the pleasure of evaluating your patient, [unfilled]. [( Thank you for referring [unfilled] for consultation for _____ )] : Thank you for referring [unfilled] for consultation for [unfilled] [Please see my note below.] : Please see my note below. [Consult Closing:] : Thank you very much for allowing me to participate in the care of this patient.  If you have any questions, please do not hesitate to contact me. [Sincerely,] : Sincerely, [DrPhil  ___] : Dr. LAWSON [DrPhil ___] : Dr. LAWSON [FreeTextEntry3] : Rosalinda Lockwood MD

## 2021-04-07 NOTE — REVIEW OF SYSTEMS
[Fatigue] : fatigue [Recent Change In Weight] : ~T recent weight change [Shortness Of Breath] : shortness of breath [SOB on Exertion] : shortness of breath during exertion [Negative] : Allergic/Immunologic [Wheezing] : no wheezing [Cough] : no cough [FreeTextEntry2] : weight loss related to diuresis

## 2021-04-07 NOTE — REASON FOR VISIT
[Initial Consultation] : an initial consultation for [Family Member] : family member [FreeTextEntry2] : Anemia, referred by Dr. Ramirez

## 2021-04-07 NOTE — PHYSICAL EXAM
[Ambulatory and capable of all self care but unable to carry out any work activities] : Status 2- Ambulatory and capable of all self care but unable to carry out any work activities. Up and about more than 50% of waking hours [Obese] : obese [Normal] : grossly intact [de-identified] : LE edema, venous stasis changes

## 2021-04-07 NOTE — HISTORY OF PRESENT ILLNESS
[de-identified] : Debi is a keyla 74 yo lady with long standing h/o normocytic anemia, her Hb has been fluctuating between 7.5 and 11 since at least 2016, she also reports h/o at the time of her hysterectomy 48 years ago when her daughter was born. \par She has extensive cardiac history, including h/o CAD s/p CABG in [address]216 by DENITA Nolan, EMA. fib on Eliquis, s/p NERY/CV, now in sinus, TIA, diastolic CHF with preserved EF, DM, pulm nodules. \par She reports she is experiencing on and off rectal bleeding (2/2 hemorrhoids) that improves with holding Eliquis. \par She had EGD and colonoscopy in 8/2021, path did not reveal malignancy.  \par She reports h/o severe transfusion reaction at the time she received CABG, I have reviewed limited records available from 9/2016, no documentation on transfusion reactions, however patient required multiple units of PRBC, platelets, cryo, FFP, Novo7, suggestive of bleeding. Patient reports she was coded twice at the time. \par She is now scheduled for Watchman procedure with Dr. Jaquez for 5/11/2021 with plan to wean her off Eliquis.  [de-identified] : 4/7/2021: Today she reports increasing SOB, she was seen by Dr. Ramirez, cardiac work up was stable. She also has h/o pulm nodules, she has dropped off the follow up with Dr. Yoni Thomas and was encouraged to go back, she promises to go back ASAP. \par CT chest from 5/3/2017 that revealed Stable lung nodules, largest measuring 1.5 cm right lower lobe. Recommend\par follow-up CT in 12 months was reviewed with patient. I reiterated importance of follow up for another scan. She declined me ordering one today. \par She is also NOT up to date with mammos and has never had a DEXA scan.

## 2021-04-07 NOTE — ASSESSMENT
[FreeTextEntry1] : Anemia, normocytic, chronic present since at least 2016, reports occasional hemorrhoidal bleeding and gum bleeding \par --On Elqiuis for A. fib, plan for Watchman on 5/11/2021 \par --Not on ASA \par --Labwork today \par --Reports h/o transfusion reaction in 2016, in fact looks like hemorrhagic shock on 9/14-15/2021 requiring multiple blood products, Novo7\par --H/o hysterectomy postpartum and anemia following, ? bleeding complication during delivery \par \par Suspect possibility of bleeding d/o \par --Will need work up and additional history focused on bleeding personal (postpartum) and family \par --vW, PT/PTT for Watchman clearance\par \par Follow up in 2-3 weeks to discuss labs, will need further labwork

## 2021-04-08 LAB
ALBUMIN SERPL ELPH-MCNC: 4.1 G/DL
ALP BLD-CCNC: 60 U/L
ALT SERPL-CCNC: 13 U/L
ANION GAP SERPL CALC-SCNC: 14 MMOL/L
AST SERPL-CCNC: 21 U/L
BILIRUB SERPL-MCNC: 0.6 MG/DL
BUN SERPL-MCNC: 35 MG/DL
CALCIUM SERPL-MCNC: 10 MG/DL
CHLORIDE SERPL-SCNC: 93 MMOL/L
CO2 SERPL-SCNC: 31 MMOL/L
CREAT SERPL-MCNC: 1.2 MG/DL
FERRITIN SERPL-MCNC: 34 NG/ML
FOLATE SERPL-MCNC: 12.3 NG/ML
GLUCOSE SERPL-MCNC: 173 MG/DL
HAPTOGLOB SERPL-MCNC: 224 MG/DL
HCT VFR BLD CALC: 31.2 %
HGB BLD-MCNC: 9.1 G/DL
IRON SATN MFR SERPL: 8 %
IRON SERPL-MCNC: 31 UG/DL
LDH SERPL-CCNC: 266
MCHC RBC-ENTMCNC: 22.9 PG
MCHC RBC-ENTMCNC: 29.2 G/DL
MCV RBC AUTO: 78.6 FL
PLATELET # BLD AUTO: 196 K/UL
PMV BLD: 8.7 FL
POTASSIUM SERPL-SCNC: 3.3 MMOL/L
PROT SERPL-MCNC: 7.3 G/DL
RBC # BLD: 3.97 M/UL
RBC # FLD: 17.7 %
RETICS # AUTO: 2.2 %
RETICS AGGREG/RBC NFR: 85.7 K/UL
SODIUM SERPL-SCNC: 138 MMOL/L
TIBC SERPL-MCNC: 375 UG/DL
UIBC SERPL-MCNC: 344 UG/DL
VIT B12 SERPL-MCNC: 666 PG/ML
WBC # FLD AUTO: 7.62 K/UL

## 2021-04-14 LAB — EPO SERPL-MCNC: 119.8 MIU/ML

## 2021-04-28 ENCOUNTER — LABORATORY RESULT (OUTPATIENT)
Age: 76
End: 2021-04-28

## 2021-04-28 ENCOUNTER — APPOINTMENT (OUTPATIENT)
Dept: HEMATOLOGY ONCOLOGY | Facility: CLINIC | Age: 76
End: 2021-04-28
Payer: MEDICARE

## 2021-04-28 ENCOUNTER — APPOINTMENT (OUTPATIENT)
Dept: INFUSION THERAPY | Facility: CLINIC | Age: 76
End: 2021-04-28
Payer: MEDICARE

## 2021-04-28 VITALS
HEIGHT: 63 IN | DIASTOLIC BLOOD PRESSURE: 44 MMHG | TEMPERATURE: 97.8 F | HEART RATE: 85 BPM | BODY MASS INDEX: 46.42 KG/M2 | SYSTOLIC BLOOD PRESSURE: 129 MMHG | RESPIRATION RATE: 16 BRPM | WEIGHT: 262 LBS

## 2021-04-28 PROCEDURE — 99214 OFFICE O/P EST MOD 30 MIN: CPT

## 2021-04-28 RX ORDER — IRON SUCROSE 20 MG/ML
200 INJECTION, SOLUTION INTRAVENOUS ONCE
Refills: 0 | Status: COMPLETED | OUTPATIENT
Start: 2021-04-28 | End: 2021-04-28

## 2021-04-28 RX ADMIN — IRON SUCROSE 200 MILLIGRAM(S): 20 INJECTION, SOLUTION INTRAVENOUS at 12:15

## 2021-04-28 RX ADMIN — IRON SUCROSE 220 MILLIGRAM(S): 20 INJECTION, SOLUTION INTRAVENOUS at 11:45

## 2021-04-29 LAB
APTT BLD: 34.9 SEC
HCT VFR BLD CALC: 31.2 %
HGB BLD-MCNC: 9.1 G/DL
INR PPP: 1.55 RATIO
MCHC RBC-ENTMCNC: 23.3 PG
MCHC RBC-ENTMCNC: 29.2 G/DL
MCV RBC AUTO: 79.8 FL
PLATELET # BLD AUTO: 155 K/UL
PMV BLD: 8.5 FL
PT BLD: 17.8 SEC
RBC # BLD: 3.91 M/UL
RBC # FLD: 18.9 %
WBC # FLD AUTO: 7.99 K/UL

## 2021-04-30 ENCOUNTER — APPOINTMENT (OUTPATIENT)
Dept: INFUSION THERAPY | Facility: CLINIC | Age: 76
End: 2021-04-30

## 2021-04-30 RX ORDER — IRON SUCROSE 20 MG/ML
200 INJECTION, SOLUTION INTRAVENOUS ONCE
Refills: 0 | Status: COMPLETED | OUTPATIENT
Start: 2021-04-30 | End: 2021-04-30

## 2021-04-30 RX ADMIN — IRON SUCROSE 220 MILLIGRAM(S): 20 INJECTION, SOLUTION INTRAVENOUS at 10:42

## 2021-05-03 ENCOUNTER — APPOINTMENT (OUTPATIENT)
Dept: INFUSION THERAPY | Facility: CLINIC | Age: 76
End: 2021-05-03

## 2021-05-03 RX ORDER — IRON SUCROSE 20 MG/ML
200 INJECTION, SOLUTION INTRAVENOUS ONCE
Refills: 0 | Status: COMPLETED | OUTPATIENT
Start: 2021-05-03 | End: 2021-05-03

## 2021-05-03 RX ADMIN — IRON SUCROSE 220 MILLIGRAM(S): 20 INJECTION, SOLUTION INTRAVENOUS at 09:32

## 2021-05-05 ENCOUNTER — APPOINTMENT (OUTPATIENT)
Dept: INFUSION THERAPY | Facility: CLINIC | Age: 76
End: 2021-05-05

## 2021-05-05 ENCOUNTER — OUTPATIENT (OUTPATIENT)
Dept: OUTPATIENT SERVICES | Facility: HOSPITAL | Age: 76
LOS: 1 days | Discharge: HOME | End: 2021-05-05
Payer: MEDICARE

## 2021-05-05 VITALS
OXYGEN SATURATION: 98 % | TEMPERATURE: 97 F | RESPIRATION RATE: 16 BRPM | HEIGHT: 63 IN | DIASTOLIC BLOOD PRESSURE: 54 MMHG | WEIGHT: 274.92 LBS | SYSTOLIC BLOOD PRESSURE: 117 MMHG | HEART RATE: 63 BPM

## 2021-05-05 DIAGNOSIS — Z01.818 ENCOUNTER FOR OTHER PREPROCEDURAL EXAMINATION: ICD-10-CM

## 2021-05-05 DIAGNOSIS — Z95.828 PRESENCE OF OTHER VASCULAR IMPLANTS AND GRAFTS: Chronic | ICD-10-CM

## 2021-05-05 DIAGNOSIS — I48.0 PAROXYSMAL ATRIAL FIBRILLATION: ICD-10-CM

## 2021-05-05 DIAGNOSIS — Z95.1 PRESENCE OF AORTOCORONARY BYPASS GRAFT: Chronic | ICD-10-CM

## 2021-05-05 DIAGNOSIS — Z96.651 PRESENCE OF RIGHT ARTIFICIAL KNEE JOINT: Chronic | ICD-10-CM

## 2021-05-05 DIAGNOSIS — Z90.710 ACQUIRED ABSENCE OF BOTH CERVIX AND UTERUS: Chronic | ICD-10-CM

## 2021-05-05 LAB
ALBUMIN SERPL ELPH-MCNC: 3.7 G/DL — SIGNIFICANT CHANGE UP (ref 3.5–5.2)
ALP SERPL-CCNC: 57 U/L — SIGNIFICANT CHANGE UP (ref 30–115)
ALT FLD-CCNC: 13 U/L — SIGNIFICANT CHANGE UP (ref 0–41)
ANION GAP SERPL CALC-SCNC: 12 MMOL/L — SIGNIFICANT CHANGE UP (ref 7–14)
ANISOCYTOSIS BLD QL: SIGNIFICANT CHANGE UP
APPEARANCE UR: CLEAR — SIGNIFICANT CHANGE UP
APTT BLD: 37 SEC — SIGNIFICANT CHANGE UP (ref 27–39.2)
AST SERPL-CCNC: 21 U/L — SIGNIFICANT CHANGE UP (ref 0–41)
BASOPHILS # BLD AUTO: 0 K/UL — SIGNIFICANT CHANGE UP (ref 0–0.2)
BASOPHILS NFR BLD AUTO: 0 % — SIGNIFICANT CHANGE UP (ref 0–1)
BILIRUB SERPL-MCNC: 0.6 MG/DL — SIGNIFICANT CHANGE UP (ref 0.2–1.2)
BILIRUB UR-MCNC: NEGATIVE — SIGNIFICANT CHANGE UP
BUN SERPL-MCNC: 21 MG/DL — HIGH (ref 10–20)
CALCIUM SERPL-MCNC: 8.9 MG/DL — SIGNIFICANT CHANGE UP (ref 8.5–10.1)
CHLORIDE SERPL-SCNC: 106 MMOL/L — SIGNIFICANT CHANGE UP (ref 98–110)
CO2 SERPL-SCNC: 25 MMOL/L — SIGNIFICANT CHANGE UP (ref 17–32)
COLOR SPEC: YELLOW — SIGNIFICANT CHANGE UP
CREAT SERPL-MCNC: 1 MG/DL — SIGNIFICANT CHANGE UP (ref 0.7–1.5)
DIFF PNL FLD: NEGATIVE — SIGNIFICANT CHANGE UP
EOSINOPHIL # BLD AUTO: 0 K/UL — SIGNIFICANT CHANGE UP (ref 0–0.7)
EOSINOPHIL NFR BLD AUTO: 0 % — SIGNIFICANT CHANGE UP (ref 0–8)
GLUCOSE SERPL-MCNC: 121 MG/DL — HIGH (ref 70–99)
GLUCOSE UR QL: NEGATIVE — SIGNIFICANT CHANGE UP
HCT VFR BLD CALC: 31.1 % — LOW (ref 37–47)
HGB BLD-MCNC: 9 G/DL — LOW (ref 12–16)
HYPOCHROMIA BLD QL: SLIGHT — SIGNIFICANT CHANGE UP
INR BLD: 1.67 RATIO — HIGH (ref 0.65–1.3)
KETONES UR-MCNC: NEGATIVE — SIGNIFICANT CHANGE UP
LEUKOCYTE ESTERASE UR-ACNC: NEGATIVE — SIGNIFICANT CHANGE UP
LYMPHOCYTES # BLD AUTO: 1.71 K/UL — SIGNIFICANT CHANGE UP (ref 1.2–3.4)
LYMPHOCYTES # BLD AUTO: 27.8 % — SIGNIFICANT CHANGE UP (ref 20.5–51.1)
MANUAL SMEAR VERIFICATION: SIGNIFICANT CHANGE UP
MCHC RBC-ENTMCNC: 24.3 PG — LOW (ref 27–31)
MCHC RBC-ENTMCNC: 28.9 G/DL — LOW (ref 32–37)
MCV RBC AUTO: 83.8 FL — SIGNIFICANT CHANGE UP (ref 81–99)
MICROCYTES BLD QL: SIGNIFICANT CHANGE UP
MONOCYTES # BLD AUTO: 0.32 K/UL — SIGNIFICANT CHANGE UP (ref 0.1–0.6)
MONOCYTES NFR BLD AUTO: 5.2 % — SIGNIFICANT CHANGE UP (ref 1.7–9.3)
MRSA PCR RESULT.: NEGATIVE — SIGNIFICANT CHANGE UP
NEUTROPHILS # BLD AUTO: 4.12 K/UL — SIGNIFICANT CHANGE UP (ref 1.4–6.5)
NEUTROPHILS NFR BLD AUTO: 67 % — SIGNIFICANT CHANGE UP (ref 42.2–75.2)
NITRITE UR-MCNC: NEGATIVE — SIGNIFICANT CHANGE UP
PH UR: 6.5 — SIGNIFICANT CHANGE UP (ref 5–8)
PLAT MORPH BLD: NORMAL — SIGNIFICANT CHANGE UP
PLATELET # BLD AUTO: 163 K/UL — SIGNIFICANT CHANGE UP (ref 130–400)
POIKILOCYTOSIS BLD QL AUTO: SLIGHT — SIGNIFICANT CHANGE UP
POLYCHROMASIA BLD QL SMEAR: SIGNIFICANT CHANGE UP
POTASSIUM SERPL-MCNC: 4.2 MMOL/L — SIGNIFICANT CHANGE UP (ref 3.5–5)
POTASSIUM SERPL-SCNC: 4.2 MMOL/L — SIGNIFICANT CHANGE UP (ref 3.5–5)
PROT SERPL-MCNC: 6.3 G/DL — SIGNIFICANT CHANGE UP (ref 6–8)
PROT UR-MCNC: SIGNIFICANT CHANGE UP
PROTHROM AB SERPL-ACNC: 19.2 SEC — HIGH (ref 9.95–12.87)
RBC # BLD: 3.71 M/UL — LOW (ref 4.2–5.4)
RBC # FLD: 22.9 % — HIGH (ref 11.5–14.5)
RBC BLD AUTO: ABNORMAL
SODIUM SERPL-SCNC: 143 MMOL/L — SIGNIFICANT CHANGE UP (ref 135–146)
SP GR SPEC: 1.02 — SIGNIFICANT CHANGE UP (ref 1.01–1.03)
UROBILINOGEN FLD QL: SIGNIFICANT CHANGE UP
WBC # BLD: 6.15 K/UL — SIGNIFICANT CHANGE UP (ref 4.8–10.8)
WBC # FLD AUTO: 6.15 K/UL — SIGNIFICANT CHANGE UP (ref 4.8–10.8)

## 2021-05-05 PROCEDURE — 93010 ELECTROCARDIOGRAM REPORT: CPT

## 2021-05-05 RX ORDER — IRON SUCROSE 20 MG/ML
200 INJECTION, SOLUTION INTRAVENOUS ONCE
Refills: 0 | Status: COMPLETED | OUTPATIENT
Start: 2021-05-05 | End: 2021-05-05

## 2021-05-05 RX ORDER — LINACLOTIDE 145 UG/1
1 CAPSULE, GELATIN COATED ORAL
Qty: 0 | Refills: 0 | DISCHARGE

## 2021-05-05 RX ORDER — LOSARTAN POTASSIUM 100 MG/1
1 TABLET, FILM COATED ORAL
Qty: 0 | Refills: 0 | DISCHARGE

## 2021-05-05 RX ADMIN — IRON SUCROSE 220 MILLIGRAM(S): 20 INJECTION, SOLUTION INTRAVENOUS at 10:12

## 2021-05-05 RX ADMIN — IRON SUCROSE 200 MILLIGRAM(S): 20 INJECTION, SOLUTION INTRAVENOUS at 10:42

## 2021-05-05 NOTE — H&P PST ADULT - REASON FOR ADMISSION
76 y/o female presents at [facility] in preparation for LEFT Atrial appendage closure watchman, Transesophageal echocardiogram by Dr Jaquez in EPS by DR Jaquez with sedation on5/11/21.

## 2021-05-05 NOTE — H&P PST ADULT - NSICDXPASTSURGICALHX_GEN_ALL_CORE_FT
PAST SURGICAL HISTORY:  H/O ascending aortic replacement Bovine Replacement    H/O total knee replacement, right complicated with fx femur bars screws in femur    S/P CABG x 1 complication of bleeding 2016    S/P hysterectomy

## 2021-05-05 NOTE — H&P PST ADULT - NSICDXPASTMEDICALHX_GEN_ALL_CORE_FT
PAST MEDICAL HISTORY:  Anemia     Aortic stenosis     Asthma with COPD     CAD (coronary artery disease), native coronary artery     Diabetes     History of bleeding disorder having work up 5/2/21    History of transfusion reaction     Hypertension

## 2021-05-05 NOTE — H&P PST ADULT - HISTORY OF PRESENT ILLNESS
74 y/o female presents at [facility] in preparation for LEFT Atrial appendage closure watchman, Transesophageal echocardiogram by Dr Jaquez in EPS by DR Jaquez with sedation on5/11/21.Pt has had bleeding complications with prior surgeries and has had transfusion reactions in the past reportedly due to incompatibilities. Pt is being evaluated by Dr Lockwood who has been in contact with Dr Jaquez. She has h/o AF on eliquis and they would like to take her off of her anticoagulation. She has anemia and weakness occasional lightheadedness.  Pt denies chest pain, palpitations, shortness of breath, dyspnea, or dysuria. Pt exercise tolerance: 2 blocks/ flights of stairs without SOB.  The patient  denies any covid signs or symptoms, denies  recent exposure and denies  testing  positive in the past.  The patient was advised to self quarantine from now and following pre op covid test until  day of procedure.    Anesthesia Alert  NO--Difficult Airway  NO--History of neck surgery or radiation  NO--Limited ROM of neck  NO--History of Malignant hyperthermia  YES--No personal or family history of Pseudocholinesterase deficiency. Daughter  NO--Prior Anesthesia Complication   NO--Latex Allergy  YES--Loose teeth lower bottom   NO--History of Rheumatoid Arthritis  NO--LENY  YES--Bleeding risk__pt having eval for bleeding dyscrasia has had blood loss with prior surgeries, Has had transfusion reactions in past___

## 2021-05-05 NOTE — H&P PST ADULT - NSANTHOSAYNRD_GEN_A_CORE
No. LENY screening performed.  STOP BANG Legend: 0-2 = LOW Risk; 3-4 = INTERMEDIATE Risk; 5-8 = HIGH Risk

## 2021-05-05 NOTE — H&P PST ADULT - NSICDXFAMILYHX_GEN_ALL_CORE_FT
FAMILY HISTORY:  Child  Still living? Unknown  Family history of pseudocholinesterase deficiency, Age at diagnosis: Age Unknown

## 2021-05-07 ENCOUNTER — APPOINTMENT (OUTPATIENT)
Dept: INFUSION THERAPY | Facility: CLINIC | Age: 76
End: 2021-05-07

## 2021-05-07 PROBLEM — D64.9 ANEMIA, UNSPECIFIED: Chronic | Status: ACTIVE | Noted: 2021-05-05

## 2021-05-07 PROBLEM — Z87.898 PERSONAL HISTORY OF OTHER SPECIFIED CONDITIONS: Chronic | Status: ACTIVE | Noted: 2021-05-05

## 2021-05-07 LAB
CULTURE RESULTS: SIGNIFICANT CHANGE UP
SPECIMEN SOURCE: SIGNIFICANT CHANGE UP

## 2021-05-07 RX ORDER — IRON SUCROSE 20 MG/ML
200 INJECTION, SOLUTION INTRAVENOUS ONCE
Refills: 0 | Status: COMPLETED | OUTPATIENT
Start: 2021-05-07 | End: 2021-05-07

## 2021-05-07 RX ADMIN — IRON SUCROSE 220 MILLIGRAM(S): 20 INJECTION, SOLUTION INTRAVENOUS at 10:20

## 2021-05-07 RX ADMIN — IRON SUCROSE 200 MILLIGRAM(S): 20 INJECTION, SOLUTION INTRAVENOUS at 10:50

## 2021-05-08 LAB
FACT XI ACT/NOR PPP: 93 %
FACT XIIIA PPP-ACNC: 112 %
VWF AG PPP IA-ACNC: 329 %
VWF:RCO ACT/NOR PPP PL AGG: 304 %

## 2021-05-10 NOTE — REASON FOR VISIT
[Family Member] : family member [Follow-Up Visit] : a follow-up visit for [FreeTextEntry2] : Anemia, referred by Dr. Ramirez

## 2021-05-10 NOTE — HISTORY OF PRESENT ILLNESS
[de-identified] : Debi is a keyla 76 yo lady with long standing h/o normocytic anemia, her Hb has been fluctuating between 7.5 and 11 since at least 2016, she also reports h/o at the time of her hysterectomy 48 years ago when her daughter was born. \par She has extensive cardiac history, including h/o CAD s/p CABG in [address]216 by DENITA Nolan, EMA. fib on Eliquis, s/p NERY/CV, now in sinus, TIA, diastolic CHF with preserved EF, DM, pulm nodules. \par She reports she is experiencing on and off rectal bleeding (2/2 hemorrhoids) that improves with holding Eliquis. \par She had EGD and colonoscopy in 8/2021, path did not reveal malignancy.  \par She reports h/o severe transfusion reaction at the time she received CABG, I have reviewed limited records available from 9/2016, no documentation on transfusion reactions, however patient required multiple units of PRBC, platelets, cryo, FFP, Novo7, suggestive of bleeding. Patient reports she was coded twice at the time. \par She is now scheduled for Watchman procedure with Dr. Jaquez for 5/11/2021 with plan to wean her off Eliquis.  [de-identified] : 4/7/2021: Today she reports increasing SOB, she was seen by Dr. Ramirez, cardiac work up was stable. She also has h/o pulm nodules, she has dropped off the follow up with Dr. Yoni Thomas and was encouraged to go back, she promises to go back ASAP. \par CT chest from 5/3/2017 that revealed Stable lung nodules, largest measuring 1.5 cm right lower lobe. Recommend\par follow-up CT in 12 months was reviewed with patient. I reiterated importance of follow up for another scan. She declined me ordering one today. \par She is also NOT up to date with mammos and has never had a DEXA scan. \par \par 4/28/21: Mrs. Pickering is here for a follow up for Anemia. She reports feeling tired , S/P fall last week secondary her dizziness. Diastolic Blood pressure is Low. Recommend to contact cardiology to adjust BP Medication.  She is feeling well now but still tired. \par We reviewed blood work with Hgb/ HCT is 9.1/ 31.2 with IRON sat is 8%. Patient is scheduled to start Venofer Course today every other day for 5 doses.\par She is schedule to have WATCHMAN Procedure on 5/11/21. Case was discussed with cardiology, the procedure is now risk of bleeding complications. \par Patient has history of post Procedure  uncontrolled bleeding post heart surgery we will proceed with Blood work up for bleeding diathesis. \par Case was also discussed with patient's daughter over the phone.

## 2021-05-10 NOTE — ASSESSMENT
[FreeTextEntry1] : Anemia, normocytic, chronic present since at least 2016, reports occasional hemorrhoidal bleeding and gum bleeding \par --On Elqiuis for A. fib, plan for Watchman on 5/11/2021 \par --Not on ASA \par --Labwork today \par --Reports h/o transfusion reaction in 2016, in fact looks like hemorrhagic shock on 9/14-15/2021 requiring multiple blood products, Novo7\par --H/o hysterectomy postpartum and anemia following, ? bleeding complication during delivery \par --Patient will start Venofer Infusion on 4/28/21 fro 5 doses.\par \par Suspect possibility of bleeding d/o \par --Will need work up and additional history focused on bleeding personal (postpartum) and family \par --Patient will proceed with Blood work for vW, PT/PTT for Watchman clearance.\par \par Follow up in 4 weeks .\par  Patient seen and examined with Dr. Lockwood who agreed for the above plan of care. \par

## 2021-05-10 NOTE — PHYSICAL EXAM
[Ambulatory and capable of all self care but unable to carry out any work activities] : Status 2- Ambulatory and capable of all self care but unable to carry out any work activities. Up and about more than 50% of waking hours [Obese] : obese [Normal] : affect appropriate [de-identified] : LE edema, venous stasis changes

## 2021-05-10 NOTE — END OF VISIT
[FreeTextEntry3] : Patient was seen seen examined with NP Leighann, agree with above plan of care.\par

## 2021-05-21 ENCOUNTER — OUTPATIENT (OUTPATIENT)
Dept: OUTPATIENT SERVICES | Facility: HOSPITAL | Age: 76
LOS: 1 days | Discharge: HOME | End: 2021-05-21

## 2021-05-21 DIAGNOSIS — Z95.828 PRESENCE OF OTHER VASCULAR IMPLANTS AND GRAFTS: Chronic | ICD-10-CM

## 2021-05-21 DIAGNOSIS — Z96.651 PRESENCE OF RIGHT ARTIFICIAL KNEE JOINT: Chronic | ICD-10-CM

## 2021-05-21 DIAGNOSIS — Z90.710 ACQUIRED ABSENCE OF BOTH CERVIX AND UTERUS: Chronic | ICD-10-CM

## 2021-05-21 DIAGNOSIS — Z11.59 ENCOUNTER FOR SCREENING FOR OTHER VIRAL DISEASES: ICD-10-CM

## 2021-05-21 DIAGNOSIS — Z95.1 PRESENCE OF AORTOCORONARY BYPASS GRAFT: Chronic | ICD-10-CM

## 2021-05-24 ENCOUNTER — INPATIENT (INPATIENT)
Facility: HOSPITAL | Age: 76
LOS: 0 days | Discharge: HOME | End: 2021-05-25
Attending: INTERNAL MEDICINE | Admitting: INTERNAL MEDICINE
Payer: MEDICARE

## 2021-05-24 VITALS — WEIGHT: 273.37 LBS

## 2021-05-24 DIAGNOSIS — I48.0 PAROXYSMAL ATRIAL FIBRILLATION: ICD-10-CM

## 2021-05-24 DIAGNOSIS — Z96.651 PRESENCE OF RIGHT ARTIFICIAL KNEE JOINT: Chronic | ICD-10-CM

## 2021-05-24 DIAGNOSIS — Z95.1 PRESENCE OF AORTOCORONARY BYPASS GRAFT: Chronic | ICD-10-CM

## 2021-05-24 DIAGNOSIS — Z90.710 ACQUIRED ABSENCE OF BOTH CERVIX AND UTERUS: Chronic | ICD-10-CM

## 2021-05-24 DIAGNOSIS — Z95.828 PRESENCE OF OTHER VASCULAR IMPLANTS AND GRAFTS: Chronic | ICD-10-CM

## 2021-05-24 LAB
BLD GP AB SCN SERPL QL: SIGNIFICANT CHANGE UP
GLUCOSE BLDC GLUCOMTR-MCNC: 120 MG/DL — HIGH (ref 70–99)
GLUCOSE BLDC GLUCOMTR-MCNC: 193 MG/DL — HIGH (ref 70–99)
GLUCOSE BLDC GLUCOMTR-MCNC: 268 MG/DL — HIGH (ref 70–99)
VWF MULTIMERS PPP IA-ACNC: NORMAL

## 2021-05-24 PROCEDURE — 33340 PERQ CLSR TCAT L ATR APNDGE: CPT | Mod: Q0

## 2021-05-24 PROCEDURE — ZZZZZ: CPT

## 2021-05-24 RX ORDER — RANOLAZINE 500 MG/1
1000 TABLET, FILM COATED, EXTENDED RELEASE ORAL DAILY
Refills: 0 | Status: DISCONTINUED | OUTPATIENT
Start: 2021-05-24 | End: 2021-05-25

## 2021-05-24 RX ORDER — APIXABAN 2.5 MG/1
5 TABLET, FILM COATED ORAL EVERY 12 HOURS
Refills: 0 | Status: DISCONTINUED | OUTPATIENT
Start: 2021-05-24 | End: 2021-05-25

## 2021-05-24 RX ORDER — CHLORHEXIDINE GLUCONATE 213 G/1000ML
1 SOLUTION TOPICAL
Refills: 0 | Status: DISCONTINUED | OUTPATIENT
Start: 2021-05-24 | End: 2021-05-25

## 2021-05-24 RX ORDER — ACETAMINOPHEN 500 MG
1000 TABLET ORAL ONCE
Refills: 0 | Status: DISCONTINUED | OUTPATIENT
Start: 2021-05-24 | End: 2021-05-25

## 2021-05-24 RX ORDER — MULTIVIT-MIN/FERROUS GLUCONATE 9 MG/15 ML
1 LIQUID (ML) ORAL DAILY
Refills: 0 | Status: DISCONTINUED | OUTPATIENT
Start: 2021-05-24 | End: 2021-05-25

## 2021-05-24 RX ORDER — ACETAMINOPHEN 500 MG
650 TABLET ORAL ONCE
Refills: 0 | Status: DISCONTINUED | OUTPATIENT
Start: 2021-05-24 | End: 2021-05-25

## 2021-05-24 RX ORDER — OXYCODONE AND ACETAMINOPHEN 5; 325 MG/1; MG/1
1 TABLET ORAL EVERY 6 HOURS
Refills: 0 | Status: DISCONTINUED | OUTPATIENT
Start: 2021-05-24 | End: 2021-05-25

## 2021-05-24 RX ORDER — METOPROLOL TARTRATE 50 MG
100 TABLET ORAL DAILY
Refills: 0 | Status: DISCONTINUED | OUTPATIENT
Start: 2021-05-24 | End: 2021-05-25

## 2021-05-24 RX ORDER — PANTOPRAZOLE SODIUM 20 MG/1
40 TABLET, DELAYED RELEASE ORAL
Refills: 0 | Status: DISCONTINUED | OUTPATIENT
Start: 2021-05-24 | End: 2021-05-25

## 2021-05-24 RX ORDER — VANCOMYCIN HCL 1 G
1000 VIAL (EA) INTRAVENOUS ONCE
Refills: 0 | Status: COMPLETED | OUTPATIENT
Start: 2021-05-24 | End: 2021-05-24

## 2021-05-24 RX ORDER — VANCOMYCIN HCL 1 G
1000 VIAL (EA) INTRAVENOUS EVERY 12 HOURS
Refills: 0 | Status: COMPLETED | OUTPATIENT
Start: 2021-05-24 | End: 2021-05-25

## 2021-05-24 RX ORDER — MONTELUKAST 4 MG/1
10 TABLET, CHEWABLE ORAL DAILY
Refills: 0 | Status: DISCONTINUED | OUTPATIENT
Start: 2021-05-24 | End: 2021-05-25

## 2021-05-24 RX ORDER — VENLAFAXINE HCL 75 MG
150 CAPSULE, EXT RELEASE 24 HR ORAL DAILY
Refills: 0 | Status: DISCONTINUED | OUTPATIENT
Start: 2021-05-24 | End: 2021-05-25

## 2021-05-24 RX ORDER — SODIUM CHLORIDE 9 MG/ML
1000 INJECTION, SOLUTION INTRAVENOUS
Refills: 0 | Status: DISCONTINUED | OUTPATIENT
Start: 2021-05-24 | End: 2021-05-25

## 2021-05-24 RX ORDER — HYDROMORPHONE HYDROCHLORIDE 2 MG/ML
1 INJECTION INTRAMUSCULAR; INTRAVENOUS; SUBCUTANEOUS
Refills: 0 | Status: DISCONTINUED | OUTPATIENT
Start: 2021-05-24 | End: 2021-05-25

## 2021-05-24 RX ORDER — ATORVASTATIN CALCIUM 80 MG/1
40 TABLET, FILM COATED ORAL AT BEDTIME
Refills: 0 | Status: DISCONTINUED | OUTPATIENT
Start: 2021-05-24 | End: 2021-05-25

## 2021-05-24 RX ORDER — FUROSEMIDE 40 MG
40 TABLET ORAL
Refills: 0 | Status: DISCONTINUED | OUTPATIENT
Start: 2021-05-24 | End: 2021-05-25

## 2021-05-24 RX ORDER — SODIUM CHLORIDE 9 MG/ML
500 INJECTION INTRAMUSCULAR; INTRAVENOUS; SUBCUTANEOUS ONCE
Refills: 0 | Status: COMPLETED | OUTPATIENT
Start: 2021-05-24 | End: 2021-05-24

## 2021-05-24 RX ADMIN — Medication 5 MILLIGRAM(S): at 15:17

## 2021-05-24 RX ADMIN — Medication 250 MILLIGRAM(S): at 21:18

## 2021-05-24 RX ADMIN — SODIUM CHLORIDE 75 MILLILITER(S): 9 INJECTION, SOLUTION INTRAVENOUS at 12:18

## 2021-05-24 RX ADMIN — APIXABAN 5 MILLIGRAM(S): 2.5 TABLET, FILM COATED ORAL at 22:23

## 2021-05-24 RX ADMIN — Medication 40 MILLIGRAM(S): at 18:58

## 2021-05-24 RX ADMIN — OXYCODONE AND ACETAMINOPHEN 1 TABLET(S): 5; 325 TABLET ORAL at 14:24

## 2021-05-24 RX ADMIN — ATORVASTATIN CALCIUM 40 MILLIGRAM(S): 80 TABLET, FILM COATED ORAL at 21:18

## 2021-05-24 RX ADMIN — SODIUM CHLORIDE 1000 MILLILITER(S): 9 INJECTION INTRAMUSCULAR; INTRAVENOUS; SUBCUTANEOUS at 11:55

## 2021-05-24 RX ADMIN — Medication 250 MILLIGRAM(S): at 07:59

## 2021-05-24 NOTE — PRE-ANESTHESIA EVALUATION ADULT - NSRADCARDRESULTSFT_GEN_ALL_CORE
TTE 8/9/2020  Summary:   1. Left ventricular ejection fraction, by visual estimation, is 60 to 65%.   2. Mild concentric left ventricular hypertrophy.   3. Mild mitral valve regurgitation.  4. Mitral annular calcification.   5. Moderate tricuspid regurgitation.   6. Bioprosthesis in the aortic position.   7. Estimated pulmonary artery systolic pressure is 41.3 mmHg assuming a right atrial pressure of 10 mmHg, which is consistent with mild pulmonary hypertension.

## 2021-05-24 NOTE — PROGRESS NOTE ADULT - SUBJECTIVE AND OBJECTIVE BOX
Electrophysiology Brief Post-Op Note    I have personally seen and examined the patient.  I agree with the history and physical which I have reviewed and noted any changes below.  05-24-21 @ 07:04    PRE-OP DIAGNOSIS: AF    POST-OP DIAGNOSIS: AF    PROCEDURE: watchman implant    Vendor Representative was present for clinical support.    Physician: Gisele  Assistant: None    ESTIMATED BLOOD LOSS:   80    mL    ANESTHESIA TYPE:  [X  ]General Anesthesia  [  ] Sedation  [ X ] Local/Regional    CONDITION  [  ] Critical  [  ] Serious  [  ]Fair  [X  ]Good      SPECIMENS REMOVED (IF APPLICABLE):  none    IMPLANTS (IF APPLICABLE)  gladis    FINDINGS: none    COMPLICATIONS: none     PLAN OF CARE  - CCU admission  - start min today at 6 pm  - bed rest

## 2021-05-24 NOTE — CHART NOTE - NSCHARTNOTEFT_GEN_A_CORE
Cardiac Electrophysiology Procedure Note  	  PROCEDURE:  Watchman Implant  Transesophageal echocardiogram    MATERIALS  Watchman FLEX  Size: 27 mm  SN: [id number]  Access System  SN:[id number]  Maximum GENA Ostium Size: 19 mm   Contrast:     OPERATORS:  Attending	 Mumtaz Jaquez MD    Indication: Atrial Fibrillation, Elevated CHADS-VASC score, Elevated HAS-BLED, and sever anemia  Respecting values and preferences, through a shared decision making process involving  myself, the patient  and referring physician Dr. Ramirez , a review of patients history of  persistent Atrial Fibrillation and YOX8VQ6-BPNm/CHADS2 score  = 4  the patient has been determined to be at increased risk for thromboembolic event.   All risks, benefits and alternatives therapies in the management of thromboembolic risk reduction have been discussed such as long term anticoagulation therapy and LAAO. The patient has been determined to be a candidate and is agreement of  short term anticoagulation, but deemed inappropriate for long-term anticoagulation.  Weighing the risk/benefits, agrees to percutaneous LAAO closure Implant.    [facility], ClinicalTrials.gov number: [id number]” for Watchman  Procedure:  The patient was brought to the Procedure Room in a nonsedated and fasting state. Informed, written consent was obtained prior to the procedure. Anesthesia maintain comfort and analgesia throughout the procedure. Blood pressure, oxygenation and level of comfort were stable throughout. The right and left groin were cleaned and prepped with the applications of Chloraprep. Patient was then covered from head to toe with sterile drapes in the usual manner.     The left right inguinal areas and arterial pulse were defined; 30 cc of lidocaine solution were infiltrated into the skin overlying the area.     Next, using needle and guidewire, the right femoral vein was accessed once using modified Seldinger technique with ultrasound guidance. The guidewires were followed up the IVC, right of the spine, to confirm venous access. One introducer sheath was placed into the femoral vein. The dilators and guidewires were removed. Then, using needle and guidewire, the left femoral vein was accessed once using modified Seldinger technique with ultrasound guidance. The guidewires were followed up the IVC, right of the spine, to confirm venous access. One introducer sheaths were placed into the femoral vein. The dilators and guidewires were removed.   One SL-1 sheaths were exchanged for the 11F introducer sheath previously inserted in the right femoral vein. Single transeptal puncture was performed with BRK1 needle and one [place] 8 F degree SL-1 sheaths via the right femoral vein under direct visualization with echocardiography and  fluoroscopy guidance. In both instances, a J wire was followed to the left subclavian vein and the sheath and dilator combo inserted to that level. The wire was exchanged for the BRK1 needle and pulled back under fluoroscopic visualization until the interatrial septum was reached. On all occasions, the sheath needle combo was placed over the septum and into the left atrium with needle advancement. Left atrial location was confirmed for each transeptal puncture with pressure monitoring, micro-bubble confirmation on ICE, after withdrawal of bright red blood from the catheter and with advancement of a guide wire into the left pulmonary vein. Left atrial pressure was measured at 12 mmHg. The SL-1 sheaths were exchanged over a J wire located in the left pulmonary vein for the [employer] Access Ststem  catheter. Intravenous heparin was given prior to performing transeptal puncture and ACTs followed during the rest of the procedure to ensure an ACT greater than 300 secs.   A 6 [place] pigtail was advanced to the GENA and utilized to select the appropriate lobe as well as perform angiography of the GENA. Watchman was deployed at the ostium of the GENA a to the appropriate ostial location covering all of the appendage lobes. Final compression measurements under NERY at 0, 45,90 and 120  degrees with no anne-Watchman leak/jet on echo. All PASS criteria were met and the device was released. A figure of 8 stitch was placed in the right groin after the 14 [place] Watchman access sheath was removed. No effusion noted on the NERY.   COMPLICATIONS:    The patient tolerated the procedure well. There were no immediate complications.     CONCLUSIONS:  Successful deployment of the Watchman Device in the left atrial appendage.          _______________________________  Mumtaz Jaquez MD  Cardiac Electrophysiology

## 2021-05-24 NOTE — CHART NOTE - NSCHARTNOTEFT_GEN_A_CORE
PRE-OP DIAGNOSIS: Watchman device implantation    POST-OP DIAGNOSIS: Successful watchman device implantation    PROCEDURE: Transesophageal echocardiogram    Primary Physician: Dr. Jaquez / Dr. Cowart  Fellow: Dr. Esqueda    ANESTHESIA TYPE  [X] General Anesthesia  [  ] Conscious Sedation  [  ] Local/Regional    CONDITION  [  ] Critical  [  ] Serious  [  ] Fair  [X] Good    SPECIMENS REMOVED (IF APPLICABLE): N/A    IMPLANTS (IF APPLICABLE): None    ESTIMATED BLOOD LOSS: None    COMPLICATIONS: None      FINDINGS:    After risks and benefits of procedures were explained, informed consent was obtained and placed in chart. Refer to Anesthesia note for sedation details.  The NERY probe was passed into the esophagus without difficulty.  Transesophageal and transgastric images were obtained.  The NERY probe was removed without difficulty and examined.  There was no evidence for bleeding.  The patient tolerated the procedure well without any immediate NERY-related complications.      Findings:  GENA: Left atrial appendage was clear of clot  LV: LVEF was estimated to be normal  No pericardial effusion pre and post Watchman device implantation     Baseline measurements  45 degrees: width 15mm, length 21mm  90 degrees: width 14mm, length 22mm  135 degrees: width 18mm, length 34mm  60 degrees: width 16mm, length 22mm    Size post implantation  45 degrees: 17mm  90 degrees: 18mm  135 degrees: 19mm, compression 30  Position ostium; partial recapture: 1     DEVICE: [employer] WATCHMAN FLX, 27mm PRE-OP DIAGNOSIS: Watchman device implantation    POST-OP DIAGNOSIS: Successful watchman device implantation    PROCEDURE: Transesophageal echocardiogram    Primary Physician: Dr. Jaquez / Dr. Cowart  Fellow: Dr. Esqueda    ANESTHESIA TYPE  [X] General Anesthesia  [  ] Conscious Sedation  [  ] Local/Regional    CONDITION  [  ] Critical  [  ] Serious  [  ] Fair  [X] Good    SPECIMENS REMOVED (IF APPLICABLE): N/A    IMPLANTS (IF APPLICABLE): [employer] WATCHMAN FLX, 27mm    ESTIMATED BLOOD LOSS: None    COMPLICATIONS: None      FINDINGS:    After risks and benefits of procedures were explained, informed consent was obtained and placed in chart. Refer to Anesthesia note for sedation details.  The NERY probe was passed into the esophagus without difficulty.  Transesophageal and transgastric images were obtained.  The NERY probe was removed without difficulty and examined.  There was no evidence for bleeding.  The patient tolerated the procedure well without any immediate NERY-related complications.      Findings:  GENA: Left atrial appendage was clear of clot  LV: LVEF was estimated to be normal  No pericardial effusion pre and post Watchman device implantation     Baseline measurements  45 degrees: width 15mm, length 21mm  90 degrees: width 14mm, length 22mm  135 degrees: width 18mm, length 34mm  60 degrees: width 16mm, length 22mm    Size post implantation  45 degrees: 17mm  90 degrees: 18mm  135 degrees: 19mm, compression 30  Position ostium; partial recapture: 1     DEVICE: [employer] WATCHMAN FLX, 27mm

## 2021-05-25 ENCOUNTER — TRANSCRIPTION ENCOUNTER (OUTPATIENT)
Age: 76
End: 2021-05-25

## 2021-05-25 VITALS — SYSTOLIC BLOOD PRESSURE: 120 MMHG | DIASTOLIC BLOOD PRESSURE: 51 MMHG | HEART RATE: 80 BPM

## 2021-05-25 LAB
ANION GAP SERPL CALC-SCNC: 9 MMOL/L — SIGNIFICANT CHANGE UP (ref 7–14)
BUN SERPL-MCNC: 24 MG/DL — HIGH (ref 10–20)
CALCIUM SERPL-MCNC: 8.6 MG/DL — SIGNIFICANT CHANGE UP (ref 8.5–10.1)
CHLORIDE SERPL-SCNC: 100 MMOL/L — SIGNIFICANT CHANGE UP (ref 98–110)
CO2 SERPL-SCNC: 26 MMOL/L — SIGNIFICANT CHANGE UP (ref 17–32)
COVID-19 SPIKE DOMAIN AB INTERP: POSITIVE
COVID-19 SPIKE DOMAIN ANTIBODY RESULT: >250 U/ML — HIGH
CREAT SERPL-MCNC: 1.2 MG/DL — SIGNIFICANT CHANGE UP (ref 0.7–1.5)
GLUCOSE BLDC GLUCOMTR-MCNC: 259 MG/DL — HIGH (ref 70–99)
GLUCOSE BLDC GLUCOMTR-MCNC: 284 MG/DL — HIGH (ref 70–99)
GLUCOSE SERPL-MCNC: 289 MG/DL — HIGH (ref 70–99)
HCT VFR BLD CALC: 27.1 % — LOW (ref 37–47)
HGB BLD-MCNC: 8.3 G/DL — LOW (ref 12–16)
MCHC RBC-ENTMCNC: 26.6 PG — LOW (ref 27–31)
MCHC RBC-ENTMCNC: 30.6 G/DL — LOW (ref 32–37)
MCV RBC AUTO: 86.9 FL — SIGNIFICANT CHANGE UP (ref 81–99)
NRBC # BLD: 0 /100 WBCS — SIGNIFICANT CHANGE UP (ref 0–0)
PLATELET # BLD AUTO: 128 K/UL — LOW (ref 130–400)
POTASSIUM SERPL-MCNC: 5 MMOL/L — SIGNIFICANT CHANGE UP (ref 3.5–5)
POTASSIUM SERPL-SCNC: 5 MMOL/L — SIGNIFICANT CHANGE UP (ref 3.5–5)
RBC # BLD: 3.12 M/UL — LOW (ref 4.2–5.4)
RBC # FLD: 23 % — HIGH (ref 11.5–14.5)
SARS-COV-2 IGG+IGM SERPL QL IA: >250 U/ML — HIGH
SARS-COV-2 IGG+IGM SERPL QL IA: POSITIVE
SODIUM SERPL-SCNC: 135 MMOL/L — SIGNIFICANT CHANGE UP (ref 135–146)
WBC # BLD: 8.74 K/UL — SIGNIFICANT CHANGE UP (ref 4.8–10.8)
WBC # FLD AUTO: 8.74 K/UL — SIGNIFICANT CHANGE UP (ref 4.8–10.8)

## 2021-05-25 PROCEDURE — 93010 ELECTROCARDIOGRAM REPORT: CPT

## 2021-05-25 PROCEDURE — 99232 SBSQ HOSP IP/OBS MODERATE 35: CPT | Mod: GC

## 2021-05-25 RX ADMIN — Medication 100 MILLIGRAM(S): at 05:07

## 2021-05-25 RX ADMIN — MONTELUKAST 10 MILLIGRAM(S): 4 TABLET, CHEWABLE ORAL at 11:59

## 2021-05-25 RX ADMIN — APIXABAN 5 MILLIGRAM(S): 2.5 TABLET, FILM COATED ORAL at 11:29

## 2021-05-25 RX ADMIN — Medication 250 MILLIGRAM(S): at 07:21

## 2021-05-25 RX ADMIN — Medication 40 MILLIGRAM(S): at 05:07

## 2021-05-25 RX ADMIN — RANOLAZINE 1000 MILLIGRAM(S): 500 TABLET, FILM COATED, EXTENDED RELEASE ORAL at 11:55

## 2021-05-25 RX ADMIN — Medication 1 TABLET(S): at 11:30

## 2021-05-25 RX ADMIN — Medication 5 MILLIGRAM(S): at 11:56

## 2021-05-25 RX ADMIN — Medication 150 MILLIGRAM(S): at 11:58

## 2021-05-25 RX ADMIN — PANTOPRAZOLE SODIUM 40 MILLIGRAM(S): 20 TABLET, DELAYED RELEASE ORAL at 06:00

## 2021-05-25 RX ADMIN — CHLORHEXIDINE GLUCONATE 1 APPLICATION(S): 213 SOLUTION TOPICAL at 05:08

## 2021-05-25 NOTE — PROGRESS NOTE ADULT - ASSESSMENT
This is a 76 y/o female with PMH bleeding disorder, CAD, asthma, COPD, DM, HTN, AF who presents for Wathcman device, now POD#1. No immediate complications noted.    Plan:  - Cont Eliquis 5 mg Q 12 uninterrupted for 45 days  - Cont Lasix PO  - Cont Toprol    Dispo: home today after ambulation    Discharge Instructions:  - Continue Eliquis  - Do NOT stop blood thinners unless discussed with doctor  - No heavy lifting > 10 lbs, squatting, or exertional activities for 5-7days  - Can take a shower starting tomorrow  - No submerging in water for 1 week  - No driving for 5 days  - FU in office on      This is a 76 y/o female with PMH bleeding disorder, CAD, asthma, COPD, DM, HTN, AF who presents for Wathcman device, now POD#1. No immediate complications noted.    Plan:  - Cont Eliquis 5 mg Q 12 uninterrupted for 45 days  - Cont Lasix PO  - Cont Toprol    Dispo: home today after ambulation    Discharge Instructions:  - Continue Eliquis  - Do NOT stop blood thinners unless discussed with doctor  - No heavy lifting > 10 lbs, squatting, or exertional activities for 5-7days  - Can take a shower starting tomorrow  - No submerging in water for 1 week  - No driving for 5 days  - FU in office on 6/25 10:45 AM, [facility]

## 2021-05-25 NOTE — DISCHARGE NOTE NURSING/CASE MANAGEMENT/SOCIAL WORK - PATIENT PORTAL LINK FT
You can access the FollowMyHealth Patient Portal offered by [facility] by registering at the following website: [email or web address]. By joining [employer]’s FollowMyHealth portal, you will also be able to view your health information using other applications (apps) compatible with our system.

## 2021-05-25 NOTE — DISCHARGE NOTE PROVIDER - NSDCFUSCHEDAPPT_GEN_ALL_CORE_FT
EVAN QUINN ; 06/01/2021 ; NPP [address] EVAN Wade ; 06/15/2021 ; NPP HemOnc [address] EVAN Wade ; 06/25/2021 ; NPP Cardio [address]

## 2021-05-25 NOTE — DISCHARGE NOTE PROVIDER - HOSPITAL COURSE
This is a 74 y/o female with PMH bleeding disorder, CAD, asthma, COPD, DM, HTN, AF who presents for Wathcman device, now POD#1. No immediate complications noted.

## 2021-05-25 NOTE — DISCHARGE NOTE PROVIDER - CARE PROVIDER_API CALL
Mumtaz Jaquez; DEBBIE)  Electrophysiology Center  [address] [place] [address]  Phone: [phone]  Fax: [phone]  Established Patient  Scheduled Appointment: 06/25/2021 10:45 AM

## 2021-05-25 NOTE — PROGRESS NOTE ADULT - ASSESSMENT
IMPRESSION:    PAF on Eliquis CHADSVASc 6  Chronic anemia not tolerating AC   POD1 WATCHMAN  Hx AS s/p AV bioprosthesis  CAD s/p CABG (LIMA to LAD)    PLAN:    CNS: avoid sedatives     HEENT: Oral care    PULMONARY:  HOB @ 45 degrees    CARDIOVASCULAR:  - c/w Eliquis   - EP follow-up  - AAT   - c/w metoprolol, Ranexa, lipitor    GI: GI prophylaxis.  Feeding     RENAL:  Follow up lytes.  Correct as needed    INFECTIOUS DISEASE: Monitor VS    HEMATOLOGICAL:  DVT prophylaxis.    ENDOCRINE:  Follow up FS.  Insulin protocol if needed.    MUSCULOSKELETAL: AAT

## 2021-05-25 NOTE — PROGRESS NOTE ADULT - SUBJECTIVE AND OBJECTIVE BOX
INTERVAL HPI/OVERNIGHT EVENTS:  Pt is POD #1 Watchman device. No acute events overnight. In NSR, no tele events noted. B/L groin sutures removed.   Patient denies fever, chills, dizziness, syncope, chest pain, palpitations, SOB, cough, abd pain, n/v/d/c, dysuria, hematuria or unusual rash.     MEDICATIONS  (STANDING):  acetaminophen   Tablet .. 650 milliGRAM(s) Oral once  acetaminophen  IVPB .. 1000 milliGRAM(s) IV Intermittent once  apixaban 5 milliGRAM(s) Oral every 12 hours  atorvastatin 40 milliGRAM(s) Oral at bedtime  chlorhexidine 4% Liquid 1 Application(s) Topical <User Schedule>  furosemide    Tablet 40 milliGRAM(s) Oral two times a day  glipiZIDE. 5 milliGRAM(s) Oral daily  lactated ringers. 1000 milliLiter(s) (75 mL/Hr) IV Continuous <Continuous>  metoprolol succinate  milliGRAM(s) Oral daily  montelukast 10 milliGRAM(s) Oral daily  multivitamin/minerals 1 Tablet(s) Oral daily  pantoprazole    Tablet 40 milliGRAM(s) Oral before breakfast  ranolazine 1000 milliGRAM(s) Oral daily  venlafaxine XR. 150 milliGRAM(s) Oral daily    MEDICATIONS  (PRN):  HYDROmorphone  Injectable 1 milliGRAM(s) IV Push every 10 minutes PRN Severe Pain (7 - 10)  oxycodone    5 mG/acetaminophen 325 mG 1 Tablet(s) Oral every 6 hours PRN Moderate Pain (4 - 6)      Allergies  Augmentin (Other)  penicillins (Hives; Rash)      REVIEW OF SYSTEMS    [x] A ten-point review of systems was otherwise negative except as noted.  [ ] Due to altered mental status/intubation, subjective information were not able to be obtained from the patient. History was obtained, to the extent possible, from review of the chart and collateral sources of information.      Vital Signs Last 24 Hrs  T(C): 36.9 (25 May 2021 10:00), Max: 36.9 (25 May 2021 10:00)  T(F): 98.4 (25 May 2021 10:00), Max: 98.4 (25 May 2021 10:00)  HR: 80 (25 May 2021 10:04) (72 - 80)  BP: 109/40 (25 May 2021 10:04) (99/51 - 163/52)  BP(mean): 61 (25 May 2021 10:04) (50 - 105)  RR: 20 (25 May 2021 10:04) (16 - 28)  SpO2: 99% (25 May 2021 08:00) (97% - 100%)    05-24-21 @ 07:01  -  05-25-21 @ 07:00  --------------------------------------------------------  IN: 1345 mL / OUT: 1500 mL / NET: -155 mL    Physical Exam  GENERAL: In no apparent distress, well nourished, and hydrated.  HEART: Regular rate and rhythm; No murmurs, rubs, or gallops.  PULMONARY: Clear to auscultation and perfusion.  No rales, wheezing, or rhonchi bilaterally.  ABDOMEN: Soft, Nontender, Nondistended; Bowel sounds present  EXTREMITIES: B/L groins without hematoma/drainage. Soft to touch. 2+ Peripheral Pulses, No clubbing, cyanosis, or edema  NEUROLOGICAL: AO x4, DÍAZ speech clear.     LABS:                        8.3    8.74  )-----------( 128      ( 25 May 2021 05:28 )             27.1     05-25    135  |  100  |  24<H>  ----------------------------<  289<H>  5.0   |  26  |  1.2    Ca    8.6      25 May 2021 05:28    05-24-21 @ 07:01  -  05-25-21 @ 07:00  --------------------------------------------------------  IN: 1345 mL / OUT: 1500 mL / NET: -155 mL    05-24-21 @ 07:01  -  05-25-21 @ 07:00  --------------------------------------------------------  IN: 1345 mL / OUT: 1500 mL / NET: -155 mL        RADIOLOGY & ADDITIONAL TESTS:  < from: NERY w/Probe Placement (09.03.20 @ 09:42) >  Summary:   1. Left ventricular ejection fraction, by visual estimation, is 55 to 60%.   2. Normal global left ventricular systolic function.   3. Left atrial enlargement.   4. Mild concentric left ventricular hypertrophy.   5. Right atrial enlargement.   6. Mild mitral valve regurgitation.   7. Moderate thickening and calcification of the anterior and posteriormitral valve leaflets.   8. Moderate-severe tricuspid regurgitation.   9. Aortic valve is normally functioning bioprosthetic valve.  10. Atrial flutter with successful cardioversion to normal sinus rhythm with synchronized 200 J DC via AP pads.  11. No left atrial appendage thrombus and normal left atrial appendage velocities.      PROCEDURE: After discussion of the risks and benefits of the NERY, an informed consent was obtained by the cardiologist. Intravenous sedation was performed by anesthesia. The NERY probe was passed by the cardiologist without difficulty. Images were obtained with the patient in a supine position. The patient tolerated the procedure well and without complications.    PHYSICIAN INTERPRETATION:  Left Ventricle: There is mild concentric left ventricular hypertrophy. Global LV systolic function was normal. Left ventricular ejection fraction, by visual estimation, is 55 to 60%.  Right Ventricle: Normal right ventricular size and function.  Left Atrium: Left atrial enlargement. No left atrial appendage thrombus is seen and normal left atrial appendage velocities.  Right Atrium: Right atrial enlargement.  Pericardium: There is no evidence of pericardial effusion.  Mitral Valve: Moderate thickening and calcification ofthe anterior and posterior mitral valve leaflets. Mild mitral valve regurgitation is seen.  Tricuspid Valve: Moderate-severe tricuspid regurgitation is visualized.  Aortic Valve: The aortic valve is a normally functioning bioprosthetic valve. No evidence of aortic stenosis. No evidence of aortic valve regurgitation is seen.  Pulmonic Valve: The pulmonic valve was not well visualized.  Aorta: The aortic root, ascending aorta, aortic arch and descending aorta are all structurally normal, with no evidence of dilitation or obstruction. Simple atheroma seen in the aortic arch.  SPECTRAL DOPPLER ANALYSIS:  Tricuspid Valve and PA/RV Systolic Pressure: TR Max Velocity: 2.50 m/s RA Pressure:  RVSP/PASP:    < end of copied text >    < from: 12 Lead ECG (05.25.21 @ 05:22) >  Ventricular Rate 74 BPM    Atrial Rate 74 BPM    P-R Interval 180 ms    QRS Duration 90 ms    Q-T Interval 438 ms    QTC Calculation(Bazett) 486 ms    P Axis 76 degrees    R Axis 4 degrees    T Axis 167 degrees    Diagnosis Line Normal sinus rhythm  ST & T wave abnormality, consider inferior ischemia  ST & T wave abnormality, consider anterolateral ischemia  Prolonged QT  Abnormal ECG    < end of copied text >

## 2021-05-25 NOTE — DISCHARGE NOTE PROVIDER - NSDCMRMEDTOKEN_GEN_ALL_CORE_FT
apixaban 5 mg oral tablet: 1 tab(s) orally every 12 hours  Centrum oral tablet: 1 tab(s) orally once a day  furosemide 40 mg oral tablet: 1 tab(s) orally 2 times a day  glipiZIDE 5 mg oral tablet: 1 tab(s) orally once a day  K-Tab 20 mEq oral tablet, extended release: 1 tab(s) orally 3 times a day  Linzess 145 mcg oral capsule: 1 cap(s) orally once a day  metOLazone 5 mg oral tablet: 1 tab(s) orally 3 times a week, As Needed  metoprolol succinate 100 mg oral tablet, extended release: 1 tab(s) orally once a day   montelukast 10 mg oral tablet: 1 tab(s) orally once a day  pantoprazole 40 mg oral delayed release tablet: 1 tab(s) orally once a day   ranolazine 1000 mg oral tablet, extended release: 1 tab(s) orally once a day  rosuvastatin 10 mg oral tablet: 1 tab(s) orally once a day  venlafaxine 150 mg oral capsule, extended release: 1 cap(s) orally once a day

## 2021-05-25 NOTE — DISCHARGE NOTE PROVIDER - PROVIDER TOKENS
PROVIDER:[TOKEN:[[id number]],SCHEDULEDAPPT:[06/25/2021],SCHEDULEDAPPTTIME:[10:45 AM],ESTABLISHEDPATIENT:[T]]

## 2021-05-25 NOTE — PROGRESS NOTE ADULT - SUBJECTIVE AND OBJECTIVE BOX
HPI: 75 y female with PMH of Paroxysmal afib on Eliquis CAD s/p CABG by dr. Ramirez procedure complicated by transfusion reaction requiring initiation of massive transfusion protocol,  iron def anemia bleeding disorder currently being worked up by hematologist Heriberto,  asthma, COPD, DM, HTN, aortic stenosis presents s/p Watchman procedure by Dr. Jaquez       INTERVAL HISTORY:    PAST MEDICAL & SURGICAL HISTORY  Aortic stenosis    Hypertension    Diabetes    Asthma with COPD    CAD (coronary artery disease), native coronary artery    Anemia    History of bleeding disorder  having work up 21    History of transfusion reaction    H/O ascending aortic replacement  Bovine Replacement    S/P CABG x 1  complication of bleeding     H/O total knee replacement, right  complicated with fx femur bars screws in femur    S/P hysterectomy        ALLERGIES:  Augmentin (Other)  penicillins (Hives; Rash)      MEDICATIONS:  MEDICATIONS  (STANDING):  acetaminophen   Tablet .. 650 milliGRAM(s) Oral once  acetaminophen  IVPB .. 1000 milliGRAM(s) IV Intermittent once  apixaban 5 milliGRAM(s) Oral every 12 hours  atorvastatin 40 milliGRAM(s) Oral at bedtime  chlorhexidine 4% Liquid 1 Application(s) Topical <User Schedule>  furosemide    Tablet 40 milliGRAM(s) Oral two times a day  glipiZIDE. 5 milliGRAM(s) Oral daily  lactated ringers. 1000 milliLiter(s) (75 mL/Hr) IV Continuous <Continuous>  metoprolol succinate  milliGRAM(s) Oral daily  montelukast 10 milliGRAM(s) Oral daily  multivitamin/minerals 1 Tablet(s) Oral daily  pantoprazole    Tablet 40 milliGRAM(s) Oral before breakfast  ranolazine 1000 milliGRAM(s) Oral daily  venlafaxine XR. 150 milliGRAM(s) Oral daily    MEDICATIONS  (PRN):  HYDROmorphone  Injectable 1 milliGRAM(s) IV Push every 10 minutes PRN Severe Pain (7 - 10)  oxycodone    5 mG/acetaminophen 325 mG 1 Tablet(s) Oral every 6 hours PRN Moderate Pain (4 - 6)      HOME MEDICATIONS:  Home Medications:  Centrum oral tablet: 1 tab(s) orally once a day (05 May 2021 12:35)  furosemide 40 mg oral tablet: 1 tab(s) orally 2 times a day (05 May 2021 12:35)  glipiZIDE 5 mg oral tablet: 1 tab(s) orally once a day (05 May 2021 12:35)  K-Tab 20 mEq oral tablet, extended release: 1 tab(s) orally 3 times a day (05 May 2021 12:35)  Linzess 145 mcg oral capsule: 1 cap(s) orally once a day (05 May 2021 12:35)  metOLazone 5 mg oral tablet: 1 tab(s) orally 3 times a week, As Needed (05 May 2021 12:35)  montelukast 10 mg oral tablet: 1 tab(s) orally once a day (05 May 2021 12:35)  ranolazine 1000 mg oral tablet, extended release: 1 tab(s) orally once a day (05 May 2021 12:35)  rosuvastatin 10 mg oral tablet: 1 tab(s) orally once a day (05 May 2021 12:35)  venlafaxine 150 mg oral capsule, extended release: 1 cap(s) orally once a day (05 May 2021 12:35)        OBJECTIVE:  ICU Vital Signs Last 24 Hrs  T(C): 36.2 (25 May 2021 04:00), Max: 36.2 (25 May 2021 04:00)  T(F): 97.1 (25 May 2021 04:00), Max: 97.1 (25 May 2021 04:00)  HR: 78 (25 May 2021 06:00) (72 - 78)  BP: 126/69 (25 May 2021 06:00) (99/51 - 131/54)  BP(mean): 105 (25 May 2021 06:00) (59 - 105)  ABP: --  ABP(mean): --  RR: 22 (25 May 2021 06:00) (17 - 28)  SpO2: 100% (25 May 2021 06:00) (97% - 100%)      Adult Advanced Hemodynamics Last 24 Hrs  CVP(mm Hg): --  CVP(cm H2O): --  CO: --  CI: --  PA: --  PA(mean): --  PCWP: --  SVR: --  SVRI: --  PVR: --  PVRI: --  I&O's Summary    24 May 2021 07:01  -  25 May 2021 07:00  --------------------------------------------------------  IN: 1345 mL / OUT: 1500 mL / NET: -155 mL      Daily Height in cm: 167.64 (25 May 2021 06:00)    Daily Weight in k.5 (25 May 2021 06:00)    PHYSICAL EXAM:  NEURO: patient is awake , alert and oriented  GEN: Not in acute distress  NECK: no thyroid enlargement, no JVD  LUNGS: Clear to auscultation bilaterally   CARDIOVASCULAR: S1/S2 present, RRR , no murmurs or rubs, no carotid bruits,  + PP bilaterally  ABD: Soft, non-tender, non-distended, +BS  EXT: No YAYA  SKIN: Intact  ACCESS Site:    LABS:                        8.3    8.74  )-----------( 128      ( 25 May 2021 05:28 )             27.1     05-25    135  |  100  |  24<H>  ----------------------------<  289<H>  5.0   |  26  |  1.2    Ca    8.6      25 May 2021 05:28                Troponin trend:            RADIOLOGY:  -CXR:  -TTE:  -STRESS TEST:  -CATHETERIZATION:    ECG:    TELEMETRY EVENTS:       HPI: 75 y female with PMH of Paroxysmal afib on Eliquis CAD s/p CABG by dr. Ramirez procedure complicated by transfusion reaction requiring initiation of massive transfusion protocol,  iron def anemia bleeding disorder currently being worked up by hematologist Hreiberto,  asthma, COPD, DM, HTN, aortic stenosis presents s/p Watchman procedure by Dr. Jaquez       INTERVAL HISTORY:    No events    PAST MEDICAL & SURGICAL HISTORY  Aortic stenosis    Hypertension    Diabetes    Asthma with COPD    CAD (coronary artery disease), native coronary artery    Anemia    History of bleeding disorder  having work up 21    History of transfusion reaction    H/O ascending aortic replacement  Bovine Replacement    S/P CABG x 1  complication of bleeding     H/O total knee replacement, right  complicated with fx femur bars screws in femur    S/P hysterectomy        ALLERGIES:  Augmentin (Other)  penicillins (Hives; Rash)      MEDICATIONS:  MEDICATIONS  (STANDING):  acetaminophen   Tablet .. 650 milliGRAM(s) Oral once  acetaminophen  IVPB .. 1000 milliGRAM(s) IV Intermittent once  apixaban 5 milliGRAM(s) Oral every 12 hours  atorvastatin 40 milliGRAM(s) Oral at bedtime  chlorhexidine 4% Liquid 1 Application(s) Topical <User Schedule>  furosemide    Tablet 40 milliGRAM(s) Oral two times a day  glipiZIDE. 5 milliGRAM(s) Oral daily  lactated ringers. 1000 milliLiter(s) (75 mL/Hr) IV Continuous <Continuous>  metoprolol succinate  milliGRAM(s) Oral daily  montelukast 10 milliGRAM(s) Oral daily  multivitamin/minerals 1 Tablet(s) Oral daily  pantoprazole    Tablet 40 milliGRAM(s) Oral before breakfast  ranolazine 1000 milliGRAM(s) Oral daily  venlafaxine XR. 150 milliGRAM(s) Oral daily    MEDICATIONS  (PRN):  HYDROmorphone  Injectable 1 milliGRAM(s) IV Push every 10 minutes PRN Severe Pain (7 - 10)  oxycodone    5 mG/acetaminophen 325 mG 1 Tablet(s) Oral every 6 hours PRN Moderate Pain (4 - 6)      HOME MEDICATIONS:  Home Medications:  Centrum oral tablet: 1 tab(s) orally once a day (05 May 2021 12:35)  furosemide 40 mg oral tablet: 1 tab(s) orally 2 times a day (05 May 2021 12:35)  glipiZIDE 5 mg oral tablet: 1 tab(s) orally once a day (05 May 2021 12:35)  K-Tab 20 mEq oral tablet, extended release: 1 tab(s) orally 3 times a day (05 May 2021 12:35)  Linzess 145 mcg oral capsule: 1 cap(s) orally once a day (05 May 2021 12:35)  metOLazone 5 mg oral tablet: 1 tab(s) orally 3 times a week, As Needed (05 May 2021 12:35)  montelukast 10 mg oral tablet: 1 tab(s) orally once a day (05 May 2021 12:35)  ranolazine 1000 mg oral tablet, extended release: 1 tab(s) orally once a day (05 May 2021 12:35)  rosuvastatin 10 mg oral tablet: 1 tab(s) orally once a day (05 May 2021 12:35)  venlafaxine 150 mg oral capsule, extended release: 1 cap(s) orally once a day (05 May 2021 12:35)        OBJECTIVE:  ICU Vital Signs Last 24 Hrs  T(C): 36.2 (25 May 2021 04:00), Max: 36.2 (25 May 2021 04:00)  T(F): 97.1 (25 May 2021 04:00), Max: 97.1 (25 May 2021 04:00)  HR: 78 (25 May 2021 06:00) (72 - 78)  BP: 126/69 (25 May 2021 06:00) (99/51 - 131/54)  BP(mean): 105 (25 May 2021 06:00) (59 - 105)  ABP: --  ABP(mean): --  RR: 22 (25 May 2021 06:00) (17 - 28)  SpO2: 100% (25 May 2021 06:00) (97% - 100%)      Adult Advanced Hemodynamics Last 24 Hrs  CVP(mm Hg): --  CVP(cm H2O): --  CO: --  CI: --  PA: --  PA(mean): --  PCWP: --  SVR: --  SVRI: --  PVR: --  PVRI: --  I&O's Summary    24 May 2021 07:01  -  25 May 2021 07:00  --------------------------------------------------------  IN: 1345 mL / OUT: 1500 mL / NET: -155 mL      Daily Height in cm: 167.64 (25 May 2021 06:00)    Daily Weight in k.5 (25 May 2021 06:00)    PHYSICAL EXAM:  NEURO: patient is awake , alert and oriented  GEN: Not in acute distress  NECK: no thyroid enlargement, no JVD  LUNGS: Clear to auscultation bilaterally   CARDIOVASCULAR: S1/S2 present, RRR , systolic murmur at the apex 3/6, no rubs, no carotid bruits,  + PP bilaterally  ABD: Soft, non-tender, non-distended, +BS  EXT: No YAYA  SKIN: Intact  ACCESS Site: mild swelling R groin, no hematoma     LABS:                        8.3    8.74  )-----------( 128      ( 25 May 2021 05:28 )             27.1         135  |  100  |  24<H>  ----------------------------<  289<H>  5.0   |  26  |  1.2    Ca    8.6      25 May 2021 05:28        RADIOLOGY:  -TTE:  < from: Transthoracic Echocardiogram (20 @ 11:53) >  Summary:   1. Left ventricular ejection fraction, by visual estimation, is 60 to 65%.   2. Mild concentric left ventricular hypertrophy.   3. Mild mitral valve regurgitation.  4. Mitral annular calcification.   5. Moderate tricuspid regurgitation.   6. Bioprosthesis in the aortic position.   7. Estimated pulmonary artery systolic pressure is 41.3 mmHg assuming a right atrial pressure of 10 mmHg, which is consistent with mild pulmonary hypertension.    < end of copied text >    -CATHETERIZATION:     ECG:  SR old lateral TWI    TELEMETRY EVENTS:    No events   HPI:75 y female with PMH of Paroxysmal afib on Eliquis CAD s/p CABG by dr. Ramirez procedure complicated by transfusion reaction requiring initiation of massive transfusion protocol,  iron def anemia bleeding disorder currently being worked up by hematologist Heriberto,  asthma, COPD, DM, HTN, aortic stenosis s/p ascending aorta replacement presents s/p Watchman procedure by Dr. Jaquez on  due to increased HAS-Bled score in the setting of chronic anemia and for alleviation of chronic bleeding rectal hemorrhoids.  patient had no complications from procedure.  Patient currently being worked up fro bleeding disorder by dr. escalona with no formal diagnosis.  due to extensive history patient needs CCU monitoring.      INTERVAL HISTORY:    No events    PAST MEDICAL & SURGICAL HISTORY  Aortic stenosis    Hypertension    Diabetes    Asthma with COPD    CAD (coronary artery disease), native coronary artery    Anemia    History of bleeding disorder  having work up 21    History of transfusion reaction    H/O ascending aortic replacement  Bovine Replacement    S/P CABG x 1  complication of bleeding     H/O total knee replacement, right  complicated with fx femur bars screws in femur    S/P hysterectomy        ALLERGIES:  Augmentin (Other)  penicillins (Hives; Rash)      MEDICATIONS:  MEDICATIONS  (STANDING):  acetaminophen   Tablet .. 650 milliGRAM(s) Oral once  acetaminophen  IVPB .. 1000 milliGRAM(s) IV Intermittent once  apixaban 5 milliGRAM(s) Oral every 12 hours  atorvastatin 40 milliGRAM(s) Oral at bedtime  chlorhexidine 4% Liquid 1 Application(s) Topical <User Schedule>  furosemide    Tablet 40 milliGRAM(s) Oral two times a day  glipiZIDE. 5 milliGRAM(s) Oral daily  lactated ringers. 1000 milliLiter(s) (75 mL/Hr) IV Continuous <Continuous>  metoprolol succinate  milliGRAM(s) Oral daily  montelukast 10 milliGRAM(s) Oral daily  multivitamin/minerals 1 Tablet(s) Oral daily  pantoprazole    Tablet 40 milliGRAM(s) Oral before breakfast  ranolazine 1000 milliGRAM(s) Oral daily  venlafaxine XR. 150 milliGRAM(s) Oral daily    MEDICATIONS  (PRN):  HYDROmorphone  Injectable 1 milliGRAM(s) IV Push every 10 minutes PRN Severe Pain (7 - 10)  oxycodone    5 mG/acetaminophen 325 mG 1 Tablet(s) Oral every 6 hours PRN Moderate Pain (4 - 6)      HOME MEDICATIONS:  Home Medications:  Centrum oral tablet: 1 tab(s) orally once a day (05 May 2021 12:35)  furosemide 40 mg oral tablet: 1 tab(s) orally 2 times a day (05 May 2021 12:35)  glipiZIDE 5 mg oral tablet: 1 tab(s) orally once a day (05 May 2021 12:35)  K-Tab 20 mEq oral tablet, extended release: 1 tab(s) orally 3 times a day (05 May 2021 12:35)  Linzess 145 mcg oral capsule: 1 cap(s) orally once a day (05 May 2021 12:35)  metOLazone 5 mg oral tablet: 1 tab(s) orally 3 times a week, As Needed (05 May 2021 12:35)  montelukast 10 mg oral tablet: 1 tab(s) orally once a day (05 May 2021 12:35)  ranolazine 1000 mg oral tablet, extended release: 1 tab(s) orally once a day (05 May 2021 12:35)  rosuvastatin 10 mg oral tablet: 1 tab(s) orally once a day (05 May 2021 12:35)  venlafaxine 150 mg oral capsule, extended release: 1 cap(s) orally once a day (05 May 2021 12:35)        OBJECTIVE:  ICU Vital Signs Last 24 Hrs  T(C): 36.2 (25 May 2021 04:00), Max: 36.2 (25 May 2021 04:00)  T(F): 97.1 (25 May 2021 04:00), Max: 97.1 (25 May 2021 04:00)  HR: 78 (25 May 2021 06:00) (72 - 78)  BP: 126/69 (25 May 2021 06:00) (99/51 - 131/54)  BP(mean): 105 (25 May 2021 06:00) (59 - 105)  ABP: --  ABP(mean): --  RR: 22 (25 May 2021 06:00) (17 - 28)  SpO2: 100% (25 May 2021 06:00) (97% - 100%)      Adult Advanced Hemodynamics Last 24 Hrs  CVP(mm Hg): --  CVP(cm H2O): --  CO: --  CI: --  PA: --  PA(mean): --  PCWP: --  SVR: --  SVRI: --  PVR: --  PVRI: --  I&O's Summary    24 May 2021 07:01  -  25 May 2021 07:00  --------------------------------------------------------  IN: 1345 mL / OUT: 1500 mL / NET: -155 mL      Daily Height in cm: 167.64 (25 May 2021 06:00)    Daily Weight in k.5 (25 May 2021 06:00)    PHYSICAL EXAM:  NEURO: patient is awake , alert and oriented  GEN: Not in acute distress  NECK: no thyroid enlargement, no JVD  LUNGS: Clear to auscultation bilaterally   CARDIOVASCULAR: S1/S2 present, RRR , systolic murmur at the apex 3/6, no rubs, no carotid bruits,  + PP bilaterally  ABD: Soft, non-tender, non-distended, +BS  EXT: No YAYA  SKIN: Intact  ACCESS Site: mild swelling R groin, no hematoma     LABS:                        8.3    8.74  )-----------( 128      ( 25 May 2021 05:28 )             27.1         135  |  100  |  24<H>  ----------------------------<  289<H>  5.0   |  26  |  1.2    Ca    8.6      25 May 2021 05:28        RADIOLOGY:  -TTE:  < from: Transthoracic Echocardiogram (20 @ 11:53) >  Summary:   1. Left ventricular ejection fraction, by visual estimation, is 60 to 65%.   2. Mild concentric left ventricular hypertrophy.   3. Mild mitral valve regurgitation.  4. Mitral annular calcification.   5. Moderate tricuspid regurgitation.   6. Bioprosthesis in the aortic position.   7. Estimated pulmonary artery systolic pressure is 41.3 mmHg assuming a right atrial pressure of 10 mmHg, which is consistent with mild pulmonary hypertension.    < end of copied text >    -CATHETERIZATION:     ECG:  SR old lateral TWI    TELEMETRY EVENTS:    No events

## 2021-06-01 ENCOUNTER — APPOINTMENT (OUTPATIENT)
Dept: CARDIOTHORACIC SURGERY | Facility: CLINIC | Age: 76
End: 2021-06-01
Payer: MEDICARE

## 2021-06-01 ENCOUNTER — NON-APPOINTMENT (OUTPATIENT)
Age: 76
End: 2021-06-01

## 2021-06-01 PROCEDURE — 99072 ADDL SUPL MATRL&STAF TM PHE: CPT

## 2021-06-01 RX ORDER — APIXABAN 5 MG/1
5 TABLET, FILM COATED ORAL
Qty: 180 | Refills: 3 | Status: DISCONTINUED | COMMUNITY
End: 2021-06-01

## 2021-06-02 ENCOUNTER — NON-APPOINTMENT (OUTPATIENT)
Age: 76
End: 2021-06-02

## 2021-06-02 DIAGNOSIS — I10 ESSENTIAL (PRIMARY) HYPERTENSION: ICD-10-CM

## 2021-06-02 DIAGNOSIS — E11.9 TYPE 2 DIABETES MELLITUS WITHOUT COMPLICATIONS: ICD-10-CM

## 2021-06-02 DIAGNOSIS — Z79.01 LONG TERM (CURRENT) USE OF ANTICOAGULANTS: ICD-10-CM

## 2021-06-02 DIAGNOSIS — J44.9 CHRONIC OBSTRUCTIVE PULMONARY DISEASE, UNSPECIFIED: ICD-10-CM

## 2021-06-02 DIAGNOSIS — Z00.6 ENCOUNTER FOR EXAMINATION FOR NORMAL COMPARISON AND CONTROL IN CLINICAL RESEARCH PROGRAM: ICD-10-CM

## 2021-06-02 DIAGNOSIS — I48.91 UNSPECIFIED ATRIAL FIBRILLATION: ICD-10-CM

## 2021-06-02 DIAGNOSIS — Z96.651 PRESENCE OF RIGHT ARTIFICIAL KNEE JOINT: ICD-10-CM

## 2021-06-02 DIAGNOSIS — Z90.710 ACQUIRED ABSENCE OF BOTH CERVIX AND UTERUS: ICD-10-CM

## 2021-06-02 DIAGNOSIS — I35.0 NONRHEUMATIC AORTIC (VALVE) STENOSIS: ICD-10-CM

## 2021-06-02 DIAGNOSIS — Z88.0 ALLERGY STATUS TO PENICILLIN: ICD-10-CM

## 2021-06-02 DIAGNOSIS — D64.9 ANEMIA, UNSPECIFIED: ICD-10-CM

## 2021-06-02 DIAGNOSIS — I25.10 ATHEROSCLEROTIC HEART DISEASE OF NATIVE CORONARY ARTERY WITHOUT ANGINA PECTORIS: ICD-10-CM

## 2021-06-02 DIAGNOSIS — Z95.3 PRESENCE OF XENOGENIC HEART VALVE: ICD-10-CM

## 2021-06-02 DIAGNOSIS — Z95.1 PRESENCE OF AORTOCORONARY BYPASS GRAFT: ICD-10-CM

## 2021-06-02 NOTE — PHYSICAL EXAM
[General Appearance - Alert] : alert [General Appearance - In No Acute Distress] : in no acute distress [General Appearance - Well Nourished] : well nourished [Sclera] : the sclera and conjunctiva were normal [PERRL With Normal Accommodation] : pupils were equal in size, round, and reactive to light [Extraocular Movements] : extraocular movements were intact [Outer Ear] : the ears and nose were normal in appearance [Neck Appearance] : the appearance of the neck was normal [] : no respiratory distress [Respiration, Rhythm And Depth] : normal respiratory rhythm and effort [Exaggerated Use Of Accessory Muscles For Inspiration] : no accessory muscle use [Apical Impulse] : the apical impulse was normal [Heart Rate And Rhythm] : heart rate was normal and rhythm regular [Heart Sounds] : normal S1 and S2 [Bowel Sounds] : normal bowel sounds [Abdomen Soft] : soft [Abdomen Tenderness] : non-tender [Cervical Lymph Nodes Enlarged Posterior Bilaterally] : posterior cervical [Cranial Nerves] : cranial nerves 2-12 were intact [Deep Tendon Reflexes (DTR)] : deep tendon reflexes were 2+ and symmetric [Sensation] : the sensory exam was normal to light touch and pinprick [Oriented To Time, Place, And Person] : oriented to person, place, and time [Impaired Insight] : insight and judgment were intact [Affect] : the affect was normal [FreeTextEntry1] : Unsteady, Ambulatory with Cane

## 2021-06-02 NOTE — HISTORY OF PRESENT ILLNESS
[FreeTextEntry1] : Ms. Debi Pickering is a 74 y/o female, former smoker, with PMH bleeding disorder, CAD S/P CABG AVR 9/2016 (Janet), asthma, COPD, DM, HTN, AF s/p Watchman device 5/2021, here today to discuss her hiatal hernia.  Symptoms include daily nausea, dry heaving, excessive flatulence.  Also complains of food getting stuck in throat and early satiety.  She is currently tolerating all consistency diet.  She has not had any recent imaging and EGD August 2020 did not show a hiatal hernia.  Off note with cough has visible ?sternal non-union, for which she believed to be her hernia.  Here today for discussion of her potential hiatal hernia.\par \par Arrives today with her daughter\par Pt is a poor historian\par ECOG 2\par \par Her healthcare teams is as follows:\par PMD: [facility] \par Cardio: [facility] \par Hem//Onc: [facility] \par GI: [facility]

## 2021-06-02 NOTE — DATA REVIEWED
[FreeTextEntry1] : 8/12/2020: \par EGD Findings: \par   Esophagus Mucosa Normal mucosa was noted in the whole esophagus.   Stomach Mucosa Diffuse erythema of the mucosa was noted in the stomach. These  findings are compatible with non-erosive gastritis. Multiple cold forceps  biopsies were performed for histology.   Localized erosions of the mucosa with no bleeding was noted in the pre-pyloric  region. These findings are compatible with erosive gastritis.   Protruding lesions Several sessile non-bleeding polyps of benign appearance  ranging in size from 0.5 cm to 1 cm were found in the fundus. Findings were  suggestive of fundic gland-type polyp(s). Multiple cold forceps biopsies were  performed for histology.   Duodenum Mucosa Normal mucosa was noted in the whole examined duodenum. Multiple  cold forceps biopsies were performed for histology.     EGD Impressions:    Normal mucosa in the whole esophagus.    Erythema in the stomach compatible with non-erosive gastritis. (Biopsy).    Polyps (0.5 cm to 1 cm) in the fundus. (Biopsy).    Erosions in the pre-pyloric region compatible with erosive gastritis.    Normal mucosa in the whole examined duodenum. (Biopsy).

## 2021-06-02 NOTE — ASSESSMENT
[FreeTextEntry1] : Ms. Debi Pickering is a 76 y/o female, former smoker, with PMH bleeding disorder, CAD S/P CABG AVR 9/2016 (Janet), asthma, COPD, DM, HTN, AF s/p Watchman device 5/2021, here today for discussion of her potential hiatal hernia.\par \par Plan:\par CT Chest Abdomen and Pelvis with PO contrast only\par Esophagram now\par F/U after Imaging for discussion of results\par F/U with Dr. Jaquez\par F/U with PMD for routine medical care\par \par I, Sweta Farah [facility], am acting as scribe for Dr. Khan\par I, Ravin Pedraza saw, examined and reviewed the diagnostic images on patient:  DEBI PICKERING on 06/01/2021 and agreed with my Nurse Practitioner's clinical note, physical exam findings and treatment plan.\par

## 2021-06-15 ENCOUNTER — APPOINTMENT (OUTPATIENT)
Dept: HEMATOLOGY ONCOLOGY | Facility: CLINIC | Age: 76
End: 2021-06-15
Payer: MEDICARE

## 2021-06-15 ENCOUNTER — OUTPATIENT (OUTPATIENT)
Dept: OUTPATIENT SERVICES | Facility: HOSPITAL | Age: 76
LOS: 1 days | Discharge: HOME | End: 2021-06-15

## 2021-06-15 ENCOUNTER — LABORATORY RESULT (OUTPATIENT)
Age: 76
End: 2021-06-15

## 2021-06-15 DIAGNOSIS — I10 ESSENTIAL (PRIMARY) HYPERTENSION: ICD-10-CM

## 2021-06-15 DIAGNOSIS — Z90.710 ACQUIRED ABSENCE OF BOTH CERVIX AND UTERUS: Chronic | ICD-10-CM

## 2021-06-15 DIAGNOSIS — Z86.39 PERSONAL HISTORY OF OTHER ENDOCRINE, NUTRITIONAL AND METABOLIC DISEASE: ICD-10-CM

## 2021-06-15 DIAGNOSIS — Z95.828 PRESENCE OF OTHER VASCULAR IMPLANTS AND GRAFTS: Chronic | ICD-10-CM

## 2021-06-15 DIAGNOSIS — I25.10 ATHEROSCLEROTIC HEART DISEASE OF NATIVE CORONARY ARTERY WITHOUT ANGINA PECTORIS: ICD-10-CM

## 2021-06-15 DIAGNOSIS — D64.9 ANEMIA, UNSPECIFIED: ICD-10-CM

## 2021-06-15 DIAGNOSIS — Z96.651 PRESENCE OF RIGHT ARTIFICIAL KNEE JOINT: Chronic | ICD-10-CM

## 2021-06-15 DIAGNOSIS — Z95.1 PRESENCE OF AORTOCORONARY BYPASS GRAFT: Chronic | ICD-10-CM

## 2021-06-15 DIAGNOSIS — R06.00 DYSPNEA, UNSPECIFIED: ICD-10-CM

## 2021-06-15 PROCEDURE — 99213 OFFICE O/P EST LOW 20 MIN: CPT

## 2021-06-16 LAB
FERRITIN SERPL-MCNC: 121 NG/ML
HCT VFR BLD CALC: 38.1 %
HGB BLD-MCNC: 11.5 G/DL
IRON SATN MFR SERPL: 17 %
IRON SERPL-MCNC: 51 UG/DL
MCHC RBC-ENTMCNC: 29.4 PG
MCHC RBC-ENTMCNC: 30.2 G/DL
MCV RBC AUTO: 97.4 FL
PLATELET # BLD AUTO: 168 K/UL
PMV BLD: 8.9 FL
RBC # BLD: 3.91 M/UL
RBC # FLD: 20.4 %
TIBC SERPL-MCNC: 299 UG/DL
UIBC SERPL-MCNC: 248 UG/DL
WBC # FLD AUTO: 8.75 K/UL

## 2021-06-21 ENCOUNTER — RESULT REVIEW (OUTPATIENT)
Age: 76
End: 2021-06-21

## 2021-06-21 ENCOUNTER — OUTPATIENT (OUTPATIENT)
Dept: OUTPATIENT SERVICES | Facility: HOSPITAL | Age: 76
LOS: 1 days | Discharge: HOME | End: 2021-06-21
Payer: MEDICARE

## 2021-06-21 DIAGNOSIS — Z90.710 ACQUIRED ABSENCE OF BOTH CERVIX AND UTERUS: Chronic | ICD-10-CM

## 2021-06-21 DIAGNOSIS — K21.9 GASTRO-ESOPHAGEAL REFLUX DISEASE WITHOUT ESOPHAGITIS: ICD-10-CM

## 2021-06-21 DIAGNOSIS — Z95.828 PRESENCE OF OTHER VASCULAR IMPLANTS AND GRAFTS: Chronic | ICD-10-CM

## 2021-06-21 DIAGNOSIS — Z95.1 PRESENCE OF AORTOCORONARY BYPASS GRAFT: Chronic | ICD-10-CM

## 2021-06-21 DIAGNOSIS — Z96.651 PRESENCE OF RIGHT ARTIFICIAL KNEE JOINT: Chronic | ICD-10-CM

## 2021-06-21 PROCEDURE — 74220 X-RAY XM ESOPHAGUS 1CNTRST: CPT | Mod: 26

## 2021-06-21 NOTE — HISTORY OF PRESENT ILLNESS
[de-identified] : Debi is a keyla 74 yo lady with long standing h/o normocytic anemia, her Hb has been fluctuating between 7.5 and 11 since at least 2016, she also reports h/o at the time of her hysterectomy 48 years ago when her daughter was born. \par She has extensive cardiac history, including h/o CAD s/p CABG in [address]216 by DENITA Nolan, EMA. fib on Eliquis, s/p NERY/CV, now in sinus, TIA, diastolic CHF with preserved EF, DM, pulm nodules. \par She reports she is experiencing on and off rectal bleeding (2/2 hemorrhoids) that improves with holding Eliquis. \par She had EGD and colonoscopy in 8/2021, path did not reveal malignancy.  \par She reports h/o severe transfusion reaction at the time she received CABG, I have reviewed limited records available from 9/2016, no documentation on transfusion reactions, however patient required multiple units of PRBC, platelets, cryo, FFP, Novo7, suggestive of bleeding. Patient reports she was coded twice at the time. \par She is now scheduled for Watchman procedure with Dr. Jaquez for 5/11/2021 with plan to wean her off Eliquis.  [de-identified] : 4/7/2021: Today she reports increasing SOB, she was seen by Dr. Ramirez, cardiac work up was stable. She also has h/o pulm nodules, she has dropped off the follow up with Dr. Yoni Thomas and was encouraged to go back, she promises to go back ASAP. \par CT chest from 5/3/2017 that revealed Stable lung nodules, largest measuring 1.5 cm right lower lobe. Recommend\par follow-up CT in 12 months was reviewed with patient. I reiterated importance of follow up for another scan. She declined me ordering one today. \par She is also NOT up to date with mammos and has never had a DEXA scan. \par \par 4/28/21: Mrs. Pickering is here for a follow up for Anemia. She reports feeling tired , S/P fall last week secondary her dizziness. Diastolic Blood pressure is Low. Recommend to contact cardiology to adjust BP Medication.  She is feeling well now but still tired. \par We reviewed blood work with Hgb/ HCT is 9.1/ 31.2 with IRON sat is 8%. Patient is scheduled to start Venofer Course today every other day for 5 doses.\par She is schedule to have WATCHMAN Procedure on 5/11/21. Case was discussed with cardiology, the procedure is now risk of bleeding complications. \par Patient has history of post Procedure  uncontrolled bleeding post heart surgery we will proceed with Blood work up for bleeding diathesis. \par Case was also discussed with patient's daughter over the phone. \par \par 6/15/21: Mrs. Pickering is here for a follow up for Anemia. She reports feeling up set from on going issues with her care plan, she is scheduled to repeat CT Scan of C/ A/P and also CT neck. We reviewed CBC results.\par She is s/p WATCHMAN procedure, tolerated it well. \par She is considering Hiatal Hernia repair if needed next month. \par S/P Venofer infusions, she tolerated well. \par

## 2021-06-21 NOTE — REASON FOR VISIT
[Follow-Up Visit] : a follow-up visit for [Family Member] : family member [FreeTextEntry2] : Anemia, referred by Dr. Ramirez

## 2021-06-21 NOTE — PHYSICAL EXAM
[Ambulatory and capable of all self care but unable to carry out any work activities] : Status 2- Ambulatory and capable of all self care but unable to carry out any work activities. Up and about more than 50% of waking hours [Obese] : obese [Normal] : affect appropriate [de-identified] : LE edema, venous stasis changes

## 2021-06-21 NOTE — ASSESSMENT
[FreeTextEntry1] : Anemia, normocytic, chronic present since at least 2016, reports occasional hemorrhoidal bleeding and gum bleeding \par --On Elqiuis for A. fib, plan for Watchman on 5/11/2021 \par --Not on ASA \par --Labwork today \par --Reports h/o transfusion reaction in 2016, in fact looks like hemorrhagic shock on 9/14-15/2021 requiring multiple blood products, Novo7\par --H/o hysterectomy postpartum and anemia following, ? bleeding complication during delivery \par --She completed  Venofer Infusion on 5/28/21 fro 5 doses.\par --Patient will remain on observation with monitor CBC , and Ferritin level.\par \par Suspect possibility of bleeding d/o \par --Will need work up and additional history focused on bleeding personal (postpartum) and family \par --S/P WATCHMAN  Procedure\par \par Follow up in 4 months.\par  Patient seen and examined with Dr. Lockwood who agreed for the above plan of care. \par

## 2021-06-22 DIAGNOSIS — Z86.39 PERSONAL HISTORY OF OTHER ENDOCRINE, NUTRITIONAL AND METABOLIC DISEASE: ICD-10-CM

## 2021-06-22 DIAGNOSIS — R06.00 DYSPNEA, UNSPECIFIED: ICD-10-CM

## 2021-06-23 ENCOUNTER — RESULT REVIEW (OUTPATIENT)
Age: 76
End: 2021-06-23

## 2021-06-23 ENCOUNTER — OUTPATIENT (OUTPATIENT)
Dept: OUTPATIENT SERVICES | Facility: HOSPITAL | Age: 76
LOS: 1 days | Discharge: HOME | End: 2021-06-23
Payer: MEDICARE

## 2021-06-23 DIAGNOSIS — Z95.1 PRESENCE OF AORTOCORONARY BYPASS GRAFT: Chronic | ICD-10-CM

## 2021-06-23 DIAGNOSIS — Z96.651 PRESENCE OF RIGHT ARTIFICIAL KNEE JOINT: Chronic | ICD-10-CM

## 2021-06-23 DIAGNOSIS — Z95.828 PRESENCE OF OTHER VASCULAR IMPLANTS AND GRAFTS: Chronic | ICD-10-CM

## 2021-06-23 DIAGNOSIS — K21.9 GASTRO-ESOPHAGEAL REFLUX DISEASE WITHOUT ESOPHAGITIS: ICD-10-CM

## 2021-06-23 DIAGNOSIS — Z90.710 ACQUIRED ABSENCE OF BOTH CERVIX AND UTERUS: Chronic | ICD-10-CM

## 2021-06-23 PROCEDURE — 71250 CT THORAX DX C-: CPT | Mod: 26,MH

## 2021-06-23 PROCEDURE — 74176 CT ABD & PELVIS W/O CONTRAST: CPT | Mod: 26,MH

## 2021-06-25 ENCOUNTER — APPOINTMENT (OUTPATIENT)
Dept: CARDIOLOGY | Facility: CLINIC | Age: 76
End: 2021-06-25
Payer: MEDICARE

## 2021-06-25 VITALS
WEIGHT: 260 LBS | OXYGEN SATURATION: 98 % | TEMPERATURE: 97.8 F | HEART RATE: 88 BPM | DIASTOLIC BLOOD PRESSURE: 81 MMHG | HEIGHT: 63 IN | SYSTOLIC BLOOD PRESSURE: 137 MMHG | BODY MASS INDEX: 46.07 KG/M2 | RESPIRATION RATE: 18 BRPM

## 2021-06-25 DIAGNOSIS — D64.9 ANEMIA, UNSPECIFIED: ICD-10-CM

## 2021-06-25 PROCEDURE — 99072 ADDL SUPL MATRL&STAF TM PHE: CPT

## 2021-06-25 PROCEDURE — 93000 ELECTROCARDIOGRAM COMPLETE: CPT

## 2021-06-25 PROCEDURE — 99214 OFFICE O/P EST MOD 30 MIN: CPT

## 2021-06-25 RX ORDER — LOSARTAN POTASSIUM 25 MG/1
25 TABLET, FILM COATED ORAL
Refills: 0 | Status: DISCONTINUED | COMMUNITY
End: 2021-06-25

## 2021-06-25 RX ORDER — PIOGLITAZONE HYDROCHLORIDE 15 MG/1
15 TABLET ORAL
Refills: 0 | Status: DISCONTINUED | COMMUNITY
End: 2021-06-25

## 2021-06-25 RX ORDER — VENLAFAXINE 75 MG/1
75 TABLET ORAL DAILY
Refills: 0 | Status: DISCONTINUED | COMMUNITY
End: 2021-06-25

## 2021-06-25 NOTE — HISTORY OF PRESENT ILLNESS
[FreeTextEntry1] : Patient with history of DM, CABG, CAD, AF/AFL, TIA CHADVSC 6. Patient came in for followup after hospital admission. Pt s/p DCCV for AF. Patient fell very short of breath especially when walking. After cardioversion shortness of breath improved however patient continued to have be short of breath and tired. \par \par Patient s/p wachman implant. Pt feels better - less SOB\par \par EKG SR 60 bpm

## 2021-06-25 NOTE — ASSESSMENT
[FreeTextEntry1] : S/P watchman\par - cont AC\par - NERY\par - if no leak will change to ASA and plavix\par

## 2021-06-29 ENCOUNTER — APPOINTMENT (OUTPATIENT)
Dept: CARDIOTHORACIC SURGERY | Facility: CLINIC | Age: 76
End: 2021-06-29
Payer: MEDICARE

## 2021-06-29 VITALS
SYSTOLIC BLOOD PRESSURE: 129 MMHG | HEIGHT: 63 IN | WEIGHT: 260 LBS | TEMPERATURE: 98.8 F | HEART RATE: 69 BPM | OXYGEN SATURATION: 96 % | RESPIRATION RATE: 18 BRPM | BODY MASS INDEX: 46.07 KG/M2 | DIASTOLIC BLOOD PRESSURE: 79 MMHG

## 2021-06-29 PROCEDURE — 99212 OFFICE O/P EST SF 10 MIN: CPT

## 2021-06-29 PROCEDURE — 99072 ADDL SUPL MATRL&STAF TM PHE: CPT

## 2021-06-29 NOTE — REVIEW OF SYSTEMS
[Negative] : Heme/Lymph [Fever] : no fever [Chills] : no chills [Feeling Poorly] : not feeling poorly [Feeling Tired] : not feeling tired [Eye Pain] : no eye pain [Red Eyes] : eyes not red [Earache] : no earache [Loss Of Hearing] : no hearing loss

## 2021-06-29 NOTE — REASON FOR VISIT
[Follow-Up: _____] : a [unfilled] follow-up visit [FreeTextEntry1] : Hiatal Hernia, Had CT Chest, Abdomen and Pelvis

## 2021-06-29 NOTE — ASSESSMENT
[FreeTextEntry1] : Ms. Debi Pickering is a 76 y/o female, former smoker, with PMH bleeding disorder, CAD S/P CABG AVR 9/2016 (Janet), asthma, COPD, DM, HTN, AF s/p Watchman device 5/2021, here today after esophagram and CT Chest/Abdomen for discussion of results.\par \par Plan:\par CT reviewed with patient and daughter, Small HH, sternal non-union as well\par Dr. Gonzales consulted via phone call at time of visit, no surgical intervention required at this time, pt was actually a re-op at the time of the initial surgery \par Advised to follow anti-reflux diet and continue anti-reflux medications and thoroughly chew food, small frequent meals throughout the day\par F/U with CT Surgery as needed\par F/U with GI Dr. Yañez for management of GI Symptoms \par F/U with Dr. Jaquez\par F/U with PMD for routine medical care\par \par I, Sweta Farah [facility], am acting as scribe for Dr. Khan\par I, Ravin Pedraza saw, examined and reviewed the diagnostic images on patient:  DEBI PICKERING on 06/29/2021 and agreed with my Nurse Practitioner's clinical note, physical exam findings and treatment plan.\par Patient referred to me with diagnosis of symptomatic hiatal hernia, upon questioning patient refers mild reflux symptoms, occasional dysphagia to solids and more frequent globus sensation.  Esophagram: small hiatal hernia.  Chest CT: sternal non-union.  After discussing with cardiac surgery and patient no surgical intervention was considered indicated for sternal non-union, regarding hiatal hernia given size and mild symptoms I recommended against surgical intervention: dietary recommendations given and referral to GI for esophageal dilation consideration.

## 2021-06-29 NOTE — DATA REVIEWED
[FreeTextEntry1] : EXAM:  CT CHEST\par PROCEDURE DATE:  06/23/2021\par INTERPRETATION:  Reason for Exam:  Gastroesophageal disease.\par \par Technique: CT of the chest was performed from the thoracic inlet to the level of the adrenal glands without contrast injection. Coronal and sagittal images have been submitted.\par \par Comparison: Esophagram 6/21/2021. CT chest 5/3/2017.\par \par Findings:\par \par Tubes/Lines: None.\par \par Lungs, Pleura, and Airways:Patent central airway. No pleural effusion, pneumothorax or focal consolidation. Bilateral subsegmental atelectasis.\par \par Pulmonary nodules:\par Stable 15 mm right lower lobe solid pulmonary nodule (4/123).\par Stable 6 mm right upper lobe solid pulmonary nodule and 4/97).\par \par Mediastinum/Lymph Nodes:No mediastinal, hilar or axial lymphadenopathy. Small hiatal hernia.\par \par Heart/Great Vessels: Normal heart size. No pericardial effusion. Status post CABG, aortic valve repair and atrial appendage device placement (watchman device). Dilated main pulmonary artery measuring 3.1 cm (4/97), which may be seen with pulmonary arterial hypertension. Extensive mitral annular calcifications.\par \par Bones and soft tissues: Multilevel degenerative changes of the spine. Status post median sternotomy.\par \par IMPRESSION:\par Small hiatal hernia.\par \par Dilated main pulmonary artery measuring 3.1 cm, which may be seen with pulmonary arterial hypertension.\par \par \par EXAM:  CT ABDOMEN AND PELVIS OC\par PROCEDURE DATE:  06/23/2021\par INTERPRETATION:  CLINICAL HISTORY: Gastroesophageal reflux\par \par TECHNIQUE: Contiguous axial CT images were obtained from the lower chest to the pubic symphysis following administration of Optiray intravenous contrast. Oral contrast was administered. 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 Additional subcentimeter left lower pole renal angiomyolipoma. No hydronephrosis.\par \par ABDOMINOPELVIC NODES: Unremarkable\par \par PELVIC ORGANS: Obscured by extensive streak artifact from retained contrast from prior sonogram.\par \par PERITONEUM/MESENTERY/BOWEL: Retained oral contrast from prior esophagram resulting in extensive streak artifact. No evidence of bowel obstruction ascites or free air. Small hiatal hernia.\par \par BONES/SOFT TISSUES: Degenerative changes of the spine. Bilateral L5 pars defects.\par \par OTHER: Atherosclerotic calcifications\par \par IMPRESSION:\par 1.  No CT evidence of acute abdominopelvic pathology.\par 2.  2.9 cm right renal angiomyolipoma. Additional subcentimeter left renal angiomyolipoma\par 3.  See separately dictated CT chest report for thoracic findings.\par \par   EXAM:  XR ESOPH SNGL CON STUDY\par PROCEDURE DATE:  06/21/2021\par INTERPRETATION:  CLINICAL HISTORY / REASON FOR EXAM: Dysphagia.\par \par PROCEDURE/TECHNIQUE: Double contrast barium esophagram. EZ gas is given orally. Following the oral ingestion of barium, rapid sequence filming of the esophagus was performed under fluoroscopic control.\par \par FINDINGS:\par \par The swallowing mechanism is intact. There is no pharyngeal esophageal dysfunction. No laryngeal penetration or tracheal aspiration is identified. On a single swallow there is primary peristaltic wave.\par \par Barium traverses the esophagus with no obstruction. There is no evidence of mass or ulceration.\par \par There is no evidence of hiatal hernia.\par \par Mild tertiary contractions in the distal esophagus.\par \par There is no evidence of gastroesophageal reflux.\par \par There is occasionally approximately 5 second hold up of contrast within the distal esophagus, however contrast then quickly empties into the stomach. Contrast is seen filling the stomach and proximal small bowel.\par \par A 13 mm barium tablet traverses the esophagus with no obstruction. There is approximately 180 second hold up of tablet at the gastroesophageal junction. Tablet eventually empties into the stomach.\par \par Mid sternotomy wires.\par \par IMPRESSION:\par Mild disordered motility of the distal esophagus.\par There is occasionally approximately 5 second hold up of contrast within the distal esophagus, however contrast then quickly empties into the stomach.\par No esophageal stricture or mass.

## 2021-06-29 NOTE — PHYSICAL EXAM
[Sclera] : the sclera and conjunctiva were normal [PERRL With Normal Accommodation] : pupils were equal in size, round, and reactive to light [Extraocular Movements] : extraocular movements were intact [Neck Appearance] : the appearance of the neck was normal [] : no respiratory distress [Respiration, Rhythm And Depth] : normal respiratory rhythm and effort [Exaggerated Use Of Accessory Muscles For Inspiration] : no accessory muscle use [Apical Impulse] : the apical impulse was normal [Heart Rate And Rhythm] : heart rate was normal and rhythm regular [Heart Sounds] : normal S1 and S2 [Bowel Sounds] : normal bowel sounds [Abdomen Soft] : soft [Abdomen Tenderness] : non-tender [Cervical Lymph Nodes Enlarged Posterior Bilaterally] : posterior cervical [Cranial Nerves] : cranial nerves 2-12 were intact [Deep Tendon Reflexes (DTR)] : deep tendon reflexes were 2+ and symmetric [Sensation] : the sensory exam was normal to light touch and pinprick [Impaired Insight] : insight and judgment were intact [Oriented To Time, Place, And Person] : oriented to person, place, and time [Affect] : the affect was normal [General Appearance - Alert] : alert [General Appearance - In No Acute Distress] : in no acute distress [General Appearance - Well Nourished] : well nourished [Outer Ear] : the ears and nose were normal in appearance [FreeTextEntry1] : Unsteady, Ambulatory with Cane

## 2021-06-29 NOTE — HISTORY OF PRESENT ILLNESS
[FreeTextEntry1] : Ms. Debi Pickering is a 74 y/o female, former smoker, with PMH bleeding disorder, CAD S/P CABG AVR 9/2016 (Janet), asthma, COPD, DM, HTN, AF s/p Watchman device 5/2021, here today to discuss her hiatal hernia. Symptoms include daily nausea, dry heaving, excessive flatulence. Also complains of food getting stuck in throat and early satiety. She is currently tolerating all consistency diet. She has not had any recent imaging and EGD August 2020 did not show a hiatal hernia. Off note with cough has visible ?sternal non-union, for which she believed to be her hernia. Here today for follow up discussion of  her potential hiatal hernia and recent testing. \par \par Her symptoms are unchanged, her biggest complaints are early satiety and feels the food not going doiwn in the mid, also with complaints of intermittent left sided chest pain \par Arrives today with her daughter\par Pt is a poor historian\par ECOG 2\par \par Her healthcare teams is as follows:\par PMD: [facility] \par Cardio: [facility] \par Hem//Onc: [facility] \par GI: [facility]

## 2021-07-15 ENCOUNTER — OUTPATIENT (OUTPATIENT)
Dept: OUTPATIENT SERVICES | Facility: HOSPITAL | Age: 76
LOS: 1 days | Discharge: HOME | End: 2021-07-15
Payer: MEDICARE

## 2021-07-15 VITALS
WEIGHT: 258.6 LBS | OXYGEN SATURATION: 98 % | DIASTOLIC BLOOD PRESSURE: 60 MMHG | HEART RATE: 74 BPM | TEMPERATURE: 97 F | HEIGHT: 64 IN | SYSTOLIC BLOOD PRESSURE: 139 MMHG | RESPIRATION RATE: 18 BRPM

## 2021-07-15 DIAGNOSIS — I48.0 PAROXYSMAL ATRIAL FIBRILLATION: ICD-10-CM

## 2021-07-15 DIAGNOSIS — Z01.818 ENCOUNTER FOR OTHER PREPROCEDURAL EXAMINATION: ICD-10-CM

## 2021-07-15 DIAGNOSIS — Z95.818 PRESENCE OF OTHER CARDIAC IMPLANTS AND GRAFTS: Chronic | ICD-10-CM

## 2021-07-15 DIAGNOSIS — Z95.1 PRESENCE OF AORTOCORONARY BYPASS GRAFT: Chronic | ICD-10-CM

## 2021-07-15 DIAGNOSIS — Z96.651 PRESENCE OF RIGHT ARTIFICIAL KNEE JOINT: Chronic | ICD-10-CM

## 2021-07-15 DIAGNOSIS — Z95.828 PRESENCE OF OTHER VASCULAR IMPLANTS AND GRAFTS: Chronic | ICD-10-CM

## 2021-07-15 DIAGNOSIS — Z90.710 ACQUIRED ABSENCE OF BOTH CERVIX AND UTERUS: Chronic | ICD-10-CM

## 2021-07-15 LAB
A1C WITH ESTIMATED AVERAGE GLUCOSE RESULT: 6.3 % — HIGH (ref 4–5.6)
ALBUMIN SERPL ELPH-MCNC: 4.2 G/DL — SIGNIFICANT CHANGE UP (ref 3.5–5.2)
ALP SERPL-CCNC: 56 U/L — SIGNIFICANT CHANGE UP (ref 30–115)
ALT FLD-CCNC: 15 U/L — SIGNIFICANT CHANGE UP (ref 0–41)
ANION GAP SERPL CALC-SCNC: 13 MMOL/L — SIGNIFICANT CHANGE UP (ref 7–14)
APTT BLD: 35.8 SEC — SIGNIFICANT CHANGE UP (ref 27–39.2)
AST SERPL-CCNC: 24 U/L — SIGNIFICANT CHANGE UP (ref 0–41)
BASOPHILS # BLD AUTO: 0.01 K/UL — SIGNIFICANT CHANGE UP (ref 0–0.2)
BASOPHILS NFR BLD AUTO: 0.2 % — SIGNIFICANT CHANGE UP (ref 0–1)
BILIRUB SERPL-MCNC: 1 MG/DL — SIGNIFICANT CHANGE UP (ref 0.2–1.2)
BUN SERPL-MCNC: 36 MG/DL — HIGH (ref 10–20)
CALCIUM SERPL-MCNC: 9.8 MG/DL — SIGNIFICANT CHANGE UP (ref 8.5–10.1)
CHLORIDE SERPL-SCNC: 95 MMOL/L — LOW (ref 98–110)
CO2 SERPL-SCNC: 34 MMOL/L — HIGH (ref 17–32)
CREAT SERPL-MCNC: 1.2 MG/DL — SIGNIFICANT CHANGE UP (ref 0.7–1.5)
EOSINOPHIL # BLD AUTO: 0.03 K/UL — SIGNIFICANT CHANGE UP (ref 0–0.7)
EOSINOPHIL NFR BLD AUTO: 0.5 % — SIGNIFICANT CHANGE UP (ref 0–8)
ESTIMATED AVERAGE GLUCOSE: 134 MG/DL — HIGH (ref 68–114)
GLUCOSE SERPL-MCNC: 251 MG/DL — HIGH (ref 70–99)
HCT VFR BLD CALC: 35 % — LOW (ref 37–47)
HGB BLD-MCNC: 11.2 G/DL — LOW (ref 12–16)
IMM GRANULOCYTES NFR BLD AUTO: 0.4 % — HIGH (ref 0.1–0.3)
INR BLD: 2.03 RATIO — HIGH (ref 0.65–1.3)
LYMPHOCYTES # BLD AUTO: 1.47 K/UL — SIGNIFICANT CHANGE UP (ref 1.2–3.4)
LYMPHOCYTES # BLD AUTO: 26.5 % — SIGNIFICANT CHANGE UP (ref 20.5–51.1)
MCHC RBC-ENTMCNC: 29.6 PG — SIGNIFICANT CHANGE UP (ref 27–31)
MCHC RBC-ENTMCNC: 32 G/DL — SIGNIFICANT CHANGE UP (ref 32–37)
MCV RBC AUTO: 92.6 FL — SIGNIFICANT CHANGE UP (ref 81–99)
MONOCYTES # BLD AUTO: 0.44 K/UL — SIGNIFICANT CHANGE UP (ref 0.1–0.6)
MONOCYTES NFR BLD AUTO: 7.9 % — SIGNIFICANT CHANGE UP (ref 1.7–9.3)
NEUTROPHILS # BLD AUTO: 3.57 K/UL — SIGNIFICANT CHANGE UP (ref 1.4–6.5)
NEUTROPHILS NFR BLD AUTO: 64.5 % — SIGNIFICANT CHANGE UP (ref 42.2–75.2)
NRBC # BLD: 0 /100 WBCS — SIGNIFICANT CHANGE UP (ref 0–0)
PLATELET # BLD AUTO: 150 K/UL — SIGNIFICANT CHANGE UP (ref 130–400)
POTASSIUM SERPL-MCNC: 2.6 MMOL/L — CRITICAL LOW (ref 3.5–5)
POTASSIUM SERPL-SCNC: 2.6 MMOL/L — CRITICAL LOW (ref 3.5–5)
PROT SERPL-MCNC: 6.7 G/DL — SIGNIFICANT CHANGE UP (ref 6–8)
PROTHROM AB SERPL-ACNC: 23.4 SEC — HIGH (ref 9.95–12.87)
RBC # BLD: 3.78 M/UL — LOW (ref 4.2–5.4)
RBC # FLD: 15.4 % — HIGH (ref 11.5–14.5)
SODIUM SERPL-SCNC: 142 MMOL/L — SIGNIFICANT CHANGE UP (ref 135–146)
WBC # BLD: 5.54 K/UL — SIGNIFICANT CHANGE UP (ref 4.8–10.8)
WBC # FLD AUTO: 5.54 K/UL — SIGNIFICANT CHANGE UP (ref 4.8–10.8)

## 2021-07-15 PROCEDURE — 93010 ELECTROCARDIOGRAM REPORT: CPT

## 2021-07-15 RX ORDER — VENLAFAXINE HCL 75 MG
1 CAPSULE, EXT RELEASE 24 HR ORAL
Qty: 0 | Refills: 0 | DISCHARGE

## 2021-07-15 RX ORDER — LINACLOTIDE 145 UG/1
1 CAPSULE, GELATIN COATED ORAL
Qty: 0 | Refills: 0 | DISCHARGE

## 2021-07-15 NOTE — H&P PST ADULT - NSICDXPASTMEDICALHX_GEN_ALL_CORE_FT
PAST MEDICAL HISTORY:  Anemia     Aortic stenosis     Asthma with COPD many years since last attack    CAD (coronary artery disease), native coronary artery     Diabetes     Hiatal hernia     History of bleeding disorder having work up 5/2/21- HAD WORKUP BUT NO DIAGNOSIS "NOTHING WAS FOUND"    History of transfusion reaction

## 2021-07-15 NOTE — H&P PST ADULT - REASON FOR ADMISSION
76 Y/O F SCHEDULED FOR PAST FOR NERY WITH DR MERINO ON 7/29/21. PT IS S/P SANGITA'S PROCEDURE 5/2021

## 2021-07-15 NOTE — H&P PST ADULT - NSICDXPASTSURGICALHX_GEN_ALL_CORE_FT
PAST SURGICAL HISTORY:  H/O ascending aortic replacement Bovine Replacement    H/O total knee replacement, right complicated with fx femur bars screws in femur- b/l knee replacement    Presence of Watchman left atrial appendage closure device     S/P CABG x 1 complication of bleeding 2016    S/P hysterectomy

## 2021-07-15 NOTE — H&P PST ADULT - HISTORY OF PRESENT ILLNESS
CURRENTLY  DENIES ANY CP, SOB,PALPITATIONS,COUGH OR DYSURIA  EXERCISE TOLERANCE 1/2 FOS WITHOUT shortness of breath. HAS A CANE FOR STABILITY.    AS PER PATIENT  this is his/her complete medical history including medications - PRESCRIPTIONS  OVER THE COUNTER MEDS    pt denies any covid s/s, or tested positive in the past.  Received covid vaccine- MODERNA  pt advised self quarantine till day of procedure    Anesthesia Alert  NO--Difficult Airway  NO--History of neck surgery or radiation  NO--Limited ROM of neck  NO--History of Malignant hyperthermia  YES--personal or family history of Pseudocholinesterase deficiency + DAUGHTER  NO--Prior Anesthesia Complication  NO--Latex Allergy  NO--Loose teeth  NO--History of Rheumatoid Arthritis  NO--LENY  YES--Bleeding risk- has had many complications after surgery requiring blood transfusions but has also had severe transfusion reactions- Had workup with Dr Louis but states "they didn't find any cause"  NO--Other_____

## 2021-07-20 DIAGNOSIS — I25.10 ATHEROSCLEROTIC HEART DISEASE OF NATIVE CORONARY ARTERY W/OUT ANGINA PECTORIS: ICD-10-CM

## 2021-07-21 LAB
ANION GAP SERPL CALC-SCNC: 15 MMOL/L
BUN SERPL-MCNC: 29 MG/DL
CALCIUM SERPL-MCNC: 9 MG/DL
CHLORIDE SERPL-SCNC: 101 MMOL/L
CO2 SERPL-SCNC: 24 MMOL/L
CREAT SERPL-MCNC: 1.12 MG/DL
GLUCOSE SERPL-MCNC: 153 MG/DL
POTASSIUM SERPL-SCNC: 4.2 MMOL/L
SODIUM SERPL-SCNC: 141 MMOL/L

## 2021-07-26 PROBLEM — Z86.2 PERSONAL HISTORY OF DISEASES OF THE BLOOD AND BLOOD-FORMING ORGANS AND CERTAIN DISORDERS INVOLVING THE IMMUNE MECHANISM: Chronic | Status: ACTIVE | Noted: 2021-05-05

## 2021-07-26 PROBLEM — J44.9 CHRONIC OBSTRUCTIVE PULMONARY DISEASE, UNSPECIFIED: Chronic | Status: ACTIVE | Noted: 2020-08-08

## 2021-07-26 PROBLEM — K44.9 DIAPHRAGMATIC HERNIA WITHOUT OBSTRUCTION OR GANGRENE: Chronic | Status: ACTIVE | Noted: 2021-07-15

## 2021-07-28 ENCOUNTER — FORM ENCOUNTER (OUTPATIENT)
Age: 76
End: 2021-07-28

## 2021-07-29 ENCOUNTER — OUTPATIENT (OUTPATIENT)
Dept: OUTPATIENT SERVICES | Facility: HOSPITAL | Age: 76
LOS: 1 days | Discharge: HOME | End: 2021-07-29
Payer: MEDICARE

## 2021-07-29 VITALS — HEIGHT: 66 IN | WEIGHT: 259.93 LBS

## 2021-07-29 DIAGNOSIS — Z95.828 PRESENCE OF OTHER VASCULAR IMPLANTS AND GRAFTS: Chronic | ICD-10-CM

## 2021-07-29 DIAGNOSIS — Z95.818 PRESENCE OF OTHER CARDIAC IMPLANTS AND GRAFTS: Chronic | ICD-10-CM

## 2021-07-29 DIAGNOSIS — I48.0 PAROXYSMAL ATRIAL FIBRILLATION: ICD-10-CM

## 2021-07-29 DIAGNOSIS — Z95.1 PRESENCE OF AORTOCORONARY BYPASS GRAFT: Chronic | ICD-10-CM

## 2021-07-29 DIAGNOSIS — Z96.651 PRESENCE OF RIGHT ARTIFICIAL KNEE JOINT: Chronic | ICD-10-CM

## 2021-07-29 DIAGNOSIS — Z90.710 ACQUIRED ABSENCE OF BOTH CERVIX AND UTERUS: Chronic | ICD-10-CM

## 2021-07-29 LAB — GLUCOSE BLDC GLUCOMTR-MCNC: 152 MG/DL — HIGH (ref 70–99)

## 2021-07-29 PROCEDURE — 93312 ECHO TRANSESOPHAGEAL: CPT | Mod: 26,XU

## 2021-07-29 PROCEDURE — 93325 DOPPLER ECHO COLOR FLOW MAPG: CPT | Mod: 26

## 2021-07-29 PROCEDURE — 93320 DOPPLER ECHO COMPLETE: CPT | Mod: 26

## 2021-07-29 NOTE — CHART NOTE - NSCHARTNOTEFT_GEN_A_CORE
POST OPERATIVE PROCEDURAL DOCUMENTATION  PRE-OP DIAGNOSIS:  Post watchman     POST-OP DIAGNOSIS:  No anne-watchman leaks, device appears in position  Severe TR,  dilated PA, mild MR, PFO with bidirectional flow    PROCEDURE: Transesophageal echocardiogram    Primary Physician:  Dr. Ramirez  Assistant: Dr. Cody Posey    ANESTHESIA TYPE  [  ] General Anesthesia  [ x ] Conscious Sedation  [  ] Local/Regional    CONDITION  [  ] Critical  [  ] Serious  [  ] Fair  [ x ] Good    SPECIMENS REMOVED (IF APPLICABLE): N/A    IMPLANTS (IF APPLICABLE): None    ESTIMATED BLOOD LOSS: None    COMPLICATIONS: None      FINDINGS:    After risks and benefits of procedures were explained, informed consent was obtained and placed in chart. Refer to Anesthesia note for sedation details.  The NERY probe was passed into the esophagus without difficulty.  Transesophageal and transgastric images were obtained.  The NERY probe was removed without difficulty and examined.  There was no evidence for bleeding.  The patient tolerated the procedure well without any immediate NERY-related complications.      Preliminary Findings:  LA:   severely dilated  GENA: watchman device in place.   No anne-device leaks observed.    LV: LVEF nL  MV: mild MR, severe MAC with calcific MV.   AV:  bioprosthetic AV in position, opening and functioning normally.   RA: severely dilated  TV: severe TR. with mod-sev pulmonary HTN and dilated PA.    IAS: PFO with bidirectional flow on doppler.   Aorta:  simple atheroma of aortic arch / desc aorta        DIAGNOSIS/IMPRESSION:  No anne-watchman leaks, device appears in position  Severe TR,  dilated PA, mild MR, PFO with bidirectional flow      PLAN OF CARE:  dc home later today  f/u with cardiologists Dr. Ramirez and Dr. Jaquez

## 2021-07-29 NOTE — ASU PATIENT PROFILE, ADULT - PSH
H/O ascending aortic replacement  Bovine Replacement  H/O total knee replacement, right  complicated with fx femur bars screws in femur- b/l knee replacement  Presence of Watchman left atrial appendage closure device    S/P CABG x 1  complication of bleeding 2016  S/P hysterectomy

## 2021-07-29 NOTE — ASU PATIENT PROFILE, ADULT - PMH
Anemia    Aortic stenosis    Asthma with COPD  many years since last attack  CAD (coronary artery disease), native coronary artery    Diabetes    Hiatal hernia    History of bleeding disorder  having work up 5/2/21- HAD WORKUP BUT NO DIAGNOSIS "NOTHING WAS FOUND"  History of transfusion reaction

## 2021-07-29 NOTE — CHART NOTE - NSCHARTNOTEFT_GEN_A_CORE
PACU ANESTHESIA ADMISSION NOTE      Procedure:   Post op diagnosis:      ____  Intubated  TV:______       Rate: ______      FiO2: ______    __x__  Patent Airway    _x___  Full return of protective reflexes    __x__  Full recovery from anesthesia / back to baseline     Vitals:   T:    36.5      R:    16              BP:  100 / 42                Sat:    99%               P:  64      Mental Status:  _x___ Awake   _____ Alert   _____ Drowsy   _____ Sedated    Nausea/Vomiting:  __x__ NO  ______Yes,   See Post - Op Orders          Pain Scale (0-10):  ___0__    Treatment: ____ None    ____ See Post - Op/PCA Orders    Post - Operative Fluids:   ___x_ Oral   ____ See Post - Op Orders    Plan: Discharge:   ___x_Home       _____Floor     _____Critical Care    _____  Other:_________________    Comments:    May be discharged from PACU when criteria met.

## 2021-08-09 ENCOUNTER — APPOINTMENT (OUTPATIENT)
Dept: CARDIOLOGY | Facility: CLINIC | Age: 76
End: 2021-08-09
Payer: MEDICARE

## 2021-08-09 VITALS
DIASTOLIC BLOOD PRESSURE: 70 MMHG | TEMPERATURE: 97.5 F | BODY MASS INDEX: 48.2 KG/M2 | HEART RATE: 68 BPM | HEIGHT: 63 IN | SYSTOLIC BLOOD PRESSURE: 130 MMHG | WEIGHT: 272 LBS

## 2021-08-09 DIAGNOSIS — I25.10 ATHEROSCLEROTIC HEART DISEASE OF NATIVE CORONARY ARTERY W/OUT ANGINA PECTORIS: ICD-10-CM

## 2021-08-09 PROCEDURE — 93000 ELECTROCARDIOGRAM COMPLETE: CPT

## 2021-08-09 PROCEDURE — 99214 OFFICE O/P EST MOD 30 MIN: CPT

## 2021-08-09 NOTE — ASSESSMENT
[FreeTextEntry1] : A. fib s/p NERY/CV - remains in NSR.\par S/p Watchman\par NERY reviewed.\par C/w metoprolol. D/c Eliquis. Start on ASA/Plavix\par \par Diastolic CHF - c/w Lasix, PRN metolazone. Monitor renal function. Supplement K.\par S/p AVR, CABG,- stable\par \par \par Echo and cath results noted.\par BP is controlled.\par Anemic - following with Heme and GI. last Hg 8.3. No active bleeding.\par C/w current medical therapy. C/w Losartan. \par C/w Ranexa 1000 mg q12.\par \par KCL suppl.\par Repeat labs  noted - f/u with PMD\par Pulmonary evaluation. \par Patient is depressed - c/w Zoloft, f/u with the PMD.\par In terms of the ischemic w/u, her nuclear stress test was negative.\par Options of ablations and Watchman were also discussed. She will f/u with Dr. Jaquez\par F/u  in 2-3 months for close f/u.\par \par

## 2021-08-09 NOTE — PHYSICAL EXAM
[General Appearance - Well Developed] : well developed [General Appearance - Well Nourished] : well nourished [General Appearance - In No Acute Distress] : no acute distress [Normal Conjunctiva] : the conjunctiva exhibited no abnormalities [Normal Oral Mucosa] : normal oral mucosa [No Oral Pallor] : no oral pallor [Normal Oropharynx] : normal oropharynx [] : no respiratory distress [Respiration, Rhythm And Depth] : normal respiratory rhythm and effort [Exaggerated Use Of Accessory Muscles For Inspiration] : no accessory muscle use [Auscultation Breath Sounds / Voice Sounds] : lungs were clear to auscultation bilaterally [Heart Rate And Rhythm] : heart rate and rhythm were normal [Heart Sounds] : normal S1 and S2 [Murmurs] : no murmurs present [Edema] : no peripheral edema present [Bowel Sounds] : normal bowel sounds [Abdomen Soft] : soft [Abdomen Tenderness] : non-tender [Nail Clubbing] : no clubbing of the fingernails [Cyanosis, Localized] : no localized cyanosis [Petechial Hemorrhages (___cm)] : no petechial hemorrhages [FreeTextEntry1] : erythema LE b/l, venostasis lesions [Oriented To Time, Place, And Person] : oriented to person, place, and time [Affect] : the affect was normal

## 2021-08-09 NOTE — REVIEW OF SYSTEMS
[Feeling Fatigued] : feeling fatigued [Joint Pain] : joint pain [Easy Bruising] : a tendency for easy bruising [Negative] : Psychiatric [FreeTextEntry5] : see HPI [de-identified] : bruising

## 2021-08-09 NOTE — HISTORY OF PRESENT ILLNESS
[FreeTextEntry1] : 77 y/o lady, presents for follow-up. S/p NERY/cardioversion for new onset AF/flutter, on Eliquis and metoprolol. Underwent Watchman implant with Dr. Jaquez. Repeat NERY revealed no leak. Remains in NSR. Her B-blocker was decreased due to bradycardia - now on 100 mg QD. She lost 30 lbs with diuresis. She is off metolazone - on Lasix 40 mg q12. Labs noted. S/p AVR with CABG by Dr. Gonzales in September of 2016. Last cath revealed patent graft and no AO gradient. She had an echo, which revealed Normal LV function, normally working AVR, mild MR. She also has a history of TIA, but no further events since then.  Feels more tired. Still with stable dyspnea on exertion. + bruising. + fatigue. Potassium was better on supplement. Hg is stable.

## 2021-08-09 NOTE — REASON FOR VISIT
[Arrhythmia/ECG Abnorrmalities] : arrhythmia/ECG abnormalities [Structural Heart and Valve Disease] : structural heart and valve disease [Follow-Up - Clinic] : a clinic follow-up of [Aortic Stenosis] : aortic stenosis [Coronary Artery Disease] : coronary artery disease

## 2021-08-13 NOTE — CHART NOTE - NSCHARTNOTESELECT_GEN_ALL_CORE
The medication is typically used for restless leg syndrome (night time symptoms most common).     If he is willing to wait, I would have him consult with the Neurologist in the near future, as a referral to [facility] has already been placed.    Shoaib Toro MD  8/13/2021  1:24 PM       Post NERY/Cardioversion Note Post NERY/Cardioversion Note/Event Note

## 2021-08-25 NOTE — H&P CARDIOLOGY - NEUROLOGICAL
Addended by: AMAN FLOOD on: 8/25/2021 03:46 PM     Modules accepted: Orders     negative not examined

## 2021-09-14 ENCOUNTER — APPOINTMENT (OUTPATIENT)
Dept: HEMATOLOGY ONCOLOGY | Facility: CLINIC | Age: 76
End: 2021-09-14

## 2021-10-08 ENCOUNTER — TRANSCRIPTION ENCOUNTER (OUTPATIENT)
Age: 76
End: 2021-10-08

## 2021-10-29 ENCOUNTER — APPOINTMENT (OUTPATIENT)
Dept: CARDIOLOGY | Facility: CLINIC | Age: 76
End: 2021-10-29
Payer: MEDICARE

## 2021-10-29 VITALS
SYSTOLIC BLOOD PRESSURE: 136 MMHG | HEIGHT: 63 IN | BODY MASS INDEX: 46.25 KG/M2 | TEMPERATURE: 97.6 F | DIASTOLIC BLOOD PRESSURE: 77 MMHG | WEIGHT: 261 LBS | HEART RATE: 92 BPM | RESPIRATION RATE: 16 BRPM

## 2021-10-29 PROCEDURE — 93000 ELECTROCARDIOGRAM COMPLETE: CPT

## 2021-10-29 PROCEDURE — 99214 OFFICE O/P EST MOD 30 MIN: CPT

## 2021-10-29 RX ORDER — APIXABAN 5 MG/1
5 TABLET, FILM COATED ORAL
Qty: 3 | Refills: 3 | Status: DISCONTINUED | COMMUNITY
Start: 2021-06-25 | End: 2021-10-29

## 2021-12-13 ENCOUNTER — RX RENEWAL (OUTPATIENT)
Age: 76
End: 2021-12-13

## 2022-01-14 NOTE — DISCHARGE NOTE PROVIDER - NSDCFUADDINST_GEN_ALL_CORE_FT
- Continue Eliquis  - Do NOT stop blood thinners unless discussed with doctor  - No heavy lifting > 10 lbs, squatting, or exertional activities for 5-7days  - Can take a shower starting tomorrow  - No submerging in water for 1 week  - No driving for 5 days  - FU in office on 6/25 10:45 AM, [facility] intox, found in car in driveway by girlfriend who called 911 after patient drove home intox states drnak bottle of [place]

## 2022-02-19 NOTE — ED ADULT NURSE NOTE - NSIMPLEMENTINTERV_GEN_ALL_ED
No
Implemented All Fall Risk Interventions:  [place] to call system. Call bell, personal items and telephone within reach. Instruct patient to call for assistance. Room bathroom lighting operational. Non-slip footwear when patient is off stretcher. Physically safe environment: no spills, clutter or unnecessary equipment. Stretcher in lowest position, wheels locked, appropriate side rails in place. Provide visual cue, wrist band, yellow gown, etc. Monitor gait and stability. Monitor for mental status changes and reorient to person, place, and time. Review medications for side effects contributing to fall risk. Reinforce activity limits and safety measures with patient and family.

## 2022-02-25 ENCOUNTER — APPOINTMENT (OUTPATIENT)
Dept: CARDIOLOGY | Facility: CLINIC | Age: 77
End: 2022-02-25
Payer: MEDICARE

## 2022-02-25 VITALS
WEIGHT: 264.31 LBS | DIASTOLIC BLOOD PRESSURE: 73 MMHG | HEART RATE: 74 BPM | HEIGHT: 63 IN | SYSTOLIC BLOOD PRESSURE: 114 MMHG | BODY MASS INDEX: 46.83 KG/M2 | TEMPERATURE: 97.2 F

## 2022-02-25 PROCEDURE — 93000 ELECTROCARDIOGRAM COMPLETE: CPT

## 2022-02-25 PROCEDURE — 99214 OFFICE O/P EST MOD 30 MIN: CPT

## 2022-02-25 RX ORDER — CLOPIDOGREL BISULFATE 75 MG/1
75 TABLET, FILM COATED ORAL DAILY
Qty: 1 | Refills: 2 | Status: DISCONTINUED | COMMUNITY
Start: 2021-08-09 | End: 2022-02-25

## 2022-02-25 NOTE — ASSESSMENT
[FreeTextEntry1] : S/P watchman\par - DC plavix \par - cont ASA\par \par \par AF\par - ASA\par - cont BB

## 2022-03-21 ENCOUNTER — RX RENEWAL (OUTPATIENT)
Age: 77
End: 2022-03-21

## 2022-04-07 NOTE — PATIENT PROFILE ADULT - DATE OF LAST VACCINATION
small bowel obstruction small bowel obstruction small bowel obstruction small bowel obstruction small bowel obstruction small bowel obstruction small bowel obstruction small bowel obstruction small bowel obstruction small bowel obstruction small bowel obstruction small bowel obstruction small bowel obstruction small bowel obstruction small bowel obstruction small bowel obstruction small bowel obstruction small bowel obstruction small bowel obstruction small bowel obstruction small bowel obstruction small bowel obstruction small bowel obstruction small bowel obstruction small bowel obstruction small bowel obstruction small bowel obstruction small bowel obstruction small bowel obstruction small bowel obstruction small bowel obstruction small bowel obstruction small bowel obstruction small bowel obstruction small bowel obstruction small bowel obstruction small bowel obstruction small bowel obstruction small bowel obstruction small bowel obstruction small bowel obstruction small bowel obstruction small bowel obstruction small bowel obstruction small bowel obstruction small bowel obstruction small bowel obstruction small bowel obstruction small bowel obstruction small bowel obstruction small bowel obstruction small bowel obstruction small bowel obstruction small bowel obstruction small bowel obstruction small bowel obstruction small bowel obstruction small bowel obstruction small bowel obstruction small bowel obstruction small bowel obstruction small bowel obstruction small bowel obstruction small bowel obstruction small bowel obstruction small bowel obstruction small bowel obstruction small bowel obstruction small bowel obstruction small bowel obstruction small bowel obstruction 28-Feb-2021

## 2022-04-11 ENCOUNTER — TRANSCRIPTION ENCOUNTER (OUTPATIENT)
Age: 77
End: 2022-04-11

## 2022-08-02 ENCOUNTER — EMERGENCY (EMERGENCY)
Facility: HOSPITAL | Age: 77
LOS: 0 days | Discharge: HOME | End: 2022-08-02
Attending: EMERGENCY MEDICINE | Admitting: EMERGENCY MEDICINE

## 2022-08-02 VITALS
HEART RATE: 67 BPM | TEMPERATURE: 97 F | DIASTOLIC BLOOD PRESSURE: 67 MMHG | RESPIRATION RATE: 20 BRPM | OXYGEN SATURATION: 98 % | HEIGHT: 66 IN | SYSTOLIC BLOOD PRESSURE: 149 MMHG | WEIGHT: 276.02 LBS
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
## 2022-08-02 DIAGNOSIS — Z95.1 PRESENCE OF AORTOCORONARY BYPASS GRAFT: ICD-10-CM

## 2022-08-02 DIAGNOSIS — Z98.42 CATARACT EXTRACTION STATUS, LEFT EYE: ICD-10-CM

## 2022-08-02 DIAGNOSIS — I25.10 ATHEROSCLEROTIC HEART DISEASE OF NATIVE CORONARY ARTERY WITHOUT ANGINA PECTORIS: ICD-10-CM

## 2022-08-02 DIAGNOSIS — Z86.2 PERSONAL HISTORY OF DISEASES OF THE BLOOD AND BLOOD-FORMING ORGANS AND CERTAIN DISORDERS INVOLVING THE IMMUNE MECHANISM: ICD-10-CM

## 2022-08-02 DIAGNOSIS — Z95.2 PRESENCE OF PROSTHETIC HEART VALVE: ICD-10-CM
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
## 2022-08-02 DIAGNOSIS — Z96.651 PRESENCE OF RIGHT ARTIFICIAL KNEE JOINT: Chronic | ICD-10-CM

## 2022-08-02 DIAGNOSIS — S01.01XA LACERATION WITHOUT FOREIGN BODY OF SCALP, INITIAL ENCOUNTER: ICD-10-CM

## 2022-08-02 DIAGNOSIS — Z95.828 PRESENCE OF OTHER VASCULAR IMPLANTS AND GRAFTS: Chronic | ICD-10-CM

## 2022-08-02 DIAGNOSIS — E11.9 TYPE 2 DIABETES MELLITUS WITHOUT COMPLICATIONS: ICD-10-CM

## 2022-08-02 DIAGNOSIS — Z88.0 ALLERGY STATUS TO PENICILLIN: ICD-10-CM

## 2022-08-02 DIAGNOSIS — Z96.653 PRESENCE OF ARTIFICIAL KNEE JOINT, BILATERAL: ICD-10-CM
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
## 2022-08-02 DIAGNOSIS — S09.90XA UNSPECIFIED INJURY OF HEAD, INITIAL ENCOUNTER: ICD-10-CM

## 2022-08-02 DIAGNOSIS — Z79.82 LONG TERM (CURRENT) USE OF ASPIRIN: ICD-10-CM

## 2022-08-02 DIAGNOSIS — J44.9 CHRONIC OBSTRUCTIVE PULMONARY DISEASE, UNSPECIFIED: ICD-10-CM

## 2022-08-02 DIAGNOSIS — Z90.710 ACQUIRED ABSENCE OF BOTH CERVIX AND UTERUS: Chronic | ICD-10-CM

## 2022-08-02 DIAGNOSIS — Z90.710 ACQUIRED ABSENCE OF BOTH CERVIX AND UTERUS: ICD-10-CM
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
## 2022-08-02 DIAGNOSIS — Z95.1 PRESENCE OF AORTOCORONARY BYPASS GRAFT: Chronic | ICD-10-CM

## 2022-08-02 DIAGNOSIS — Z95.818 PRESENCE OF OTHER CARDIAC IMPLANTS AND GRAFTS: Chronic | ICD-10-CM

## 2022-08-02 PROCEDURE — 12002 RPR S/N/AX/GEN/TRNK2.6-7.5CM: CPT

## 2022-08-02 PROCEDURE — 70450 CT HEAD/BRAIN W/O DYE: CPT | Mod: 26,MA

## 2022-08-02 PROCEDURE — 72125 CT NECK SPINE W/O DYE: CPT | Mod: 26,MA

## 2022-08-02 PROCEDURE — 99285 EMERGENCY DEPT VISIT HI MDM: CPT | Mod: 25

## 2022-08-02 NOTE — ED PROVIDER NOTE - OBJECTIVE STATEMENT
77 year old female on daily ASA not taken in 3 days as patient had left eye cateract sx today comes to emergency room for fall and head injury. patient states hat she was walking and tripped and fell backwards hitting head. no other injury. no loss of consciousness. patient family called 911 as she was bleeding from scalp/

## 2022-08-02 NOTE — ED PROVIDER NOTE - CLINICAL SUMMARY MEDICAL DECISION MAKING FREE TEXT BOX
Imaging obtained.  results d/w pt and son in law. Head injury instructions provided.  Wound care and repair

## 2022-08-02 NOTE — ED PROVIDER NOTE - PHYSICAL EXAMINATION
Physical Exam    Vital Signs: I have reviewed the initial vital signs.  Constitutional: elderly no acute distress  Eyes: Conjunctiva pink, Sclera clear, PERRLA, EOMI.  Cardiovascular: S1 and S2, regular rate, regular rhythm, well-perfused extremities, radial pulses equal and 2+  Respiratory: unlabored respiratory effort, clear to auscultation bilaterally no wheezing, rales and rhonchi  Gastrointestinal: soft, non-tender abdomen, no pulsatile mass, normal bowl sounds  Musculoskeletal: supple neck, no lower extremity edema, no midline tenderness  Integumentary: warm, dry, 3.0cm lac to posterior scalp and mild swelling and tenderness noted.   Neurologic: awake, alert, cranial nerves II-XII grossly intact, extremities’ motor and sensory functions grossly intact  Psychiatric: appropriate mood, appropriate affect

## 2022-08-02 NOTE — ED PROVIDER NOTE - ATTENDING APP SHARED VISIT CONTRIBUTION OF CARE
78 yo F PMHx noted with cataract surgery done today of left eye, presents for evaluation of head injury.  Pt fell backwards and hit head, no LOC. Pt was able to ambulate afterwards. Tetanus UTD. Pt has been holding her aspirin last 3 days in anticipation of cataract surgery. On exam pt in NAD AAO x 3, GCS 15, + 3 cm laceration to posterior scalp, no raccoon no hicks, left eye patched, rt eye reactive, no midline vertebral tenderness, ext atraumatic,

## 2022-08-02 NOTE — ED PROVIDER NOTE - PATIENT PORTAL LINK FT
You can access the FollowMyHealth Patient Portal offered by [facility] by registering at the following website: [email or web address]. By joining [facility]’s FollowMyHealth portal, you will also be able to view your health information using other applications (apps) compatible with our system.

## 2022-08-02 NOTE — ED ADULT NURSE NOTE - CHIEF COMPLAINT QUOTE
Pt BIBA for fall after LEFT cataract surgery today; hit head into brick by front door walking out of house; denies LOC; laceration to back of head.

## 2022-08-02 NOTE — ED ADULT TRIAGE NOTE - CHIEF COMPLAINT QUOTE
Pt BIBA for fall after cataract surgery today; hit head into brick by front door walking into house; denies LOC; laceration to back of head. Pt BIBA for fall after LEFT cataract surgery today; hit head into brick by front door walking out of house; denies LOC; laceration to back of head.

## 2022-08-02 NOTE — ED PROVIDER NOTE - NSFOLLOWUPINSTRUCTIONS_ED_ALL_ED_FT
Follow up with your primary care doctor in 1-2 days     Staple removal in 1 week    Head Injury    WHAT YOU NEED TO KNOW:    A head injury is most often caused by a blow to the head. This may occur from a fall, bicycle injury, sports injury, being struck in the head, or a motor vehicle accident.     DISCHARGE INSTRUCTIONS:    Call 911 or have someone else call for any of the following:     You cannot be woken.      You have a seizure.      You stop responding to others or you faint.      You have blurry or double vision.      Your speech becomes slurred or confused.      You have arm or leg weakness, loss of feeling, or new problems with coordination.      Your pupils are larger than usual or one pupil is a different size than the other.       You have blood or clear fluid coming out of your ears or nose.    Return to the emergency department if:     You have repeated or forceful vomiting.      You feel confused.      Your headache gets worse or becomes severe.      You or someone caring for you notices that you are harder to wake than usual.    Contact your healthcare provider if:     Your symptoms last longer than 6 weeks after the injury.      You have questions or concerns about your condition or care.    Medicines:     Acetaminophen decreases pain. Acetaminophen is available without a doctor's order. Ask how much to take and how often to take it. Follow directions. Acetaminophen can cause liver damage if not taken correctly.      Take your medicine as directed. Contact your healthcare provider if you think your medicine is not helping or if you have side effects. Tell him or her if you are allergic to any medicine. Keep a list of the medicines, vitamins, and herbs you take. Include the amounts, and when and why you take them. Bring the list or the pill bottles to follow-up visits. Carry your medicine list with you in case of an emergency.    Self-care:     Rest or do quiet activities for 24 to 48 hours. Limit your time watching TV, using the computer, or doing tasks that require a lot of thinking. Slowly return to your normal activities as directed. Do not play sports or do activities that may cause you to get hit in the head. Ask your healthcare provider when you can return to sports.       Apply ice on your head for 15 to 20 minutes every hour or as directed. Use an ice pack, or put crushed ice in a plastic bag. Cover it with a towel before you apply it to your skin. Ice helps prevent tissue damage and decreases swelling and pain.       Have someone stay with you for 24 hours or as directed. This person can monitor you for complications and call 911. When you are awake the person should ask you a few questions to see if you are thinking clearly. An example would be to ask your name or your address.     Prevent another head injury:     Wear a helmet that fits properly. Do this when you play sports, or ride a bike, scooter, or skateboard. Helmets help decrease your risk of a serious head injury. Talk to your healthcare provider about other ways you can protect yourself if you play sports.      Wear your seat belt every time you are in a car. This helps to decrease your risk for a head injury if you are in a car accident.     Follow up with your healthcare provider as directed: Write down your questions so you remember to ask them during your visits.        © Copyright [employer] 2019 All illustrations and images included in CareNotes are the copyrighted property of [employer] or [employer].        Laceration    WHAT YOU NEED TO KNOW:    A laceration is an injury to the skin and the soft tissue underneath it. Lacerations happen when you are cut or hit by something. They can happen anywhere on the body.     DISCHARGE INSTRUCTIONS:    Return to the emergency department if:     You have heavy bleeding or bleeding that does not stop after 10 minutes of holding firm, direct pressure over the wound.       Your wound opens up.     Contact your healthcare provider if:     You have a fever or chills.       Your laceration is red, warm, or swollen.      You have red streaks on your skin coming from your wound.      You have white or yellow drainage from the wound that smells bad.      You have pain that gets worse, even after treatment.       You have questions or concerns about your condition or care.     Medicines:     Prescription pain medicine may be given. Ask how to take this medicine safely.       Antibiotics help treat or prevent a bacterial infection.       Take your medicine as directed. Contact your healthcare provider if you think your medicine is not helping or if you have side effects. Tell him or her if you are allergic to any medicine. Keep a list of the medicines, vitamins, and herbs you take. Include the amounts, and when and why you take them. Bring the list or the pill bottles to follow-up visits. Carry your medicine list with you in case of an emergency.    Care for your wound as directed:     Do not get your wound wet until your healthcare provider says it is okay. Do not soak your wound in water. Do not go swimming until your healthcare provider says it is okay. Carefully wash the wound with soap and water. Gently pat the area dry or allow it to air dry.       Change your bandages when they get wet, dirty, or after washing. Apply new, clean bandages as directed. Do not apply elastic bandages or tape too tight. Do not put powders or lotions over your incision.       Apply antibiotic ointment as directed. Your healthcare provider may give you antibiotic ointment to put over your wound if you have stitches. If you have strips of tape over your incision, let them dry up and fall off on their own. If they do not fall off within 14 days, gently remove them. If you have glue over your wound, do not remove or pick at it. If your glue comes off, do not replace it with glue that you have at home.       Check your wound every day for signs of infection such as swelling, redness, or pus.     Self-care:     Apply ice on your wound for 15 to 20 minutes every hour or as directed. Use an ice pack, or put crushed ice in a plastic bag. Cover it with a towel. Ice helps prevent tissue damage and decreases swelling and pain.      Use a splint as directed. A splint will decrease movement and stress on your wound. It may help it heal faster. A splint may be used for lacerations over joints or areas of your body that bend. Ask your healthcare provider how to apply and remove a splint.       Decrease scarring of your wound by applying ointments as directed. Do not apply ointments until your healthcare provider says it is okay. You may need to wait until your wound is healed. Ask which ointment to buy and how often to use it. After your wound is healed, use sunscreen over the area when you are out in the sun. You should do this for at least 6 months to 1 year after your injury.     Follow up with your healthcare provider as directed: You may need to follow up in 24 to 48 hours to have your wound checked for infection. You will need to return in 3 to 14 days if you have stitches or staples so they can be removed. Care for your wound as directed to prevent infection and help it heal. Write down your questions so you remember to ask them during your visits.       © Copyright [employer] 2019 All illustrations and images included in CareNotes are the copyrighted property of [email or web address]. or [employer].       Staple Care    WHAT YOU NEED TO KNOW:    Staples are often used to close a wound. Your staples may be placed for 3 to 14 days, depending on the location of your wound.    DISCHARGE INSTRUCTIONS:    Care for your wound:     Clean:   You may be able to shower in 24 hours. Do not soak your wound under water.      Gently wash your wound with soap and warm water daily. Lightly pat it dry. Do not cover your wound unless your healthcare provider tells you to.       You may also need to clean your wound with a mixture of hydrogen peroxide and water. Ask how to do this.      Do not apply ointment or cream to the wound unless your healthcare provider tells you to.      Elevate:   Rest any arm or leg that has a wound on pillows above the level of your heart. Do this as often as possible for 2 days. This will help decrease swelling and pain, and help you heal faster.          Minimize scarring:   Avoid sunshine on your wound to reduce scarring.         Follow up with your healthcare provider as directed: You may need to return for a wound checkup 3 days after your staples are placed. Ask when you should return to get your staples removed.    Staple removal:     A medical staple remover will be used to take out your staples. Your healthcare provider will slide the tool under each staple, squeeze the handle, and gently pull the staple out.       Medical tape will be placed on your wound once your staples are removed. This will help keep your wound closed. The medical tape will fall off on its own after several days.     Contact your healthcare provider if:     You have redness, pain, swelling, and pus draining from your wound.      Your pain medicine does not relieve your pain.      You have a fever of 101°F (38.5°C) or higher.      You have an odor coming from your wound.      You have questions or concerns about your condition or care.    Return to the emergency department if:     Your wound reopens.      You have red streaks in your skin that spread out from your wound.      You have severe pain or vomiting.

## 2022-08-02 NOTE — ED ADULT NURSE NOTE - OBJECTIVE STATEMENT
"I had cataract surgery today. I was walking afterwards and fell backwards, hitting my head on the door. I think my depth perception was off"  pt denies LOC  pt has laceration to back of the head

## 2022-08-02 NOTE — ED PROVIDER NOTE - NS ED ROS FT
Constitutional: (-) fever  Eyes/ENT: (-) blurry vision, (-) epistaxis  Cardiovascular: (-) chest pain, (-) syncope  Respiratory: (-) cough, (-) shortness of breath  Gastrointestinal: (-) vomiting, (-) diarrhea  Musculoskeletal: (-) neck pain, (-) back pain, (-) joint pain  Integumentary: + lac  Neurological: (-) headache, (-) altered mental status  Psychiatric: (-) hallucinations  Allergic/Immunologic: (-) pruritus

## 2022-08-03 NOTE — ED PROCEDURE NOTE - CPROC ED ANATOMIC LOCATION1
Kenalog Preparation: Kenalog Include Z78.9 (Other Specified Conditions Influencing Health Status) As An Associated Diagnosis?: No Total Volume Injected (Ccs- Only Use Numbers And Decimals): 3.0 Consent: The risks of atrophy were reviewed with the patient. Concentration Of Solution Injected (Mg/Ml): 2.5 Lot # (Optional): [id number] Validate Note Data When Using Inventory: Yes Ndc# For Kenalog Only: 4160-2093-39 Administered By (Optional): Patel Griffin PA-C Medical Necessity Clause: This procedure was medically necessary because the lesions that were treated were: scalp Detail Level: Detailed Expiration Date (Optional): MAR 2023 X Size Of Lesion In Cm (Optional): 0

## 2022-08-31 ENCOUNTER — RX RENEWAL (OUTPATIENT)
Age: 77
End: 2022-08-31

## 2022-09-06 ENCOUNTER — APPOINTMENT (OUTPATIENT)
Dept: CARDIOLOGY | Facility: CLINIC | Age: 77
End: 2022-09-06

## 2022-09-06 VITALS
TEMPERATURE: 96 F | BODY MASS INDEX: 45 KG/M2 | DIASTOLIC BLOOD PRESSURE: 77 MMHG | HEIGHT: 66 IN | SYSTOLIC BLOOD PRESSURE: 121 MMHG | HEART RATE: 91 BPM | WEIGHT: 280 LBS

## 2022-09-06 PROCEDURE — 93000 ELECTROCARDIOGRAM COMPLETE: CPT

## 2022-09-06 PROCEDURE — 99214 OFFICE O/P EST MOD 30 MIN: CPT | Mod: 25

## 2022-09-06 RX ORDER — METOPROLOL SUCCINATE 100 MG/1
100 TABLET, EXTENDED RELEASE ORAL
Qty: 120 | Refills: 3 | Status: COMPLETED | COMMUNITY
Start: 2020-08-25 | End: 2022-09-06

## 2022-09-06 RX ORDER — GLIPIZIDE 5 MG/1
5 TABLET ORAL DAILY
Refills: 0 | Status: COMPLETED | COMMUNITY
End: 2022-09-06

## 2022-09-06 NOTE — REVIEW OF SYSTEMS
[Feeling Fatigued] : feeling fatigued [Joint Pain] : joint pain [Easy Bruising] : a tendency for easy bruising [Negative] : Psychiatric [FreeTextEntry5] : see HPI [de-identified] : bruising

## 2022-09-06 NOTE — ASSESSMENT
[FreeTextEntry1] : A. fib s/p NERY/CV - remains in NSR.\par S/p Watchman\par NERY reviewed.\par C/w metoprolol. D/c Eliquis. Start on ASA/Plavix\par \par Diastolic CHF - c/w Lasix, PRN metolazone. Monitor renal function. Supplement K.\par S/p AVR, CABG,- stable\par \par Syncope - likely fawn related - metoprolol decreased. Check MCOT.  F/u with EP, Dr. Torres to consider monitoring.Also decreased glipizide - possible hypoglycemia.\par \par \par Echo and cath results noted.\par BP is controlled.\par Anemic - following with Heme and GI. last Hg 8.3. No active bleeding.\par C/w current medical therapy. C/w Losartan. \par C/w Ranexa 1000 mg q12.\par \par KCL suppl.\par Repeat labs  noted - f/u with PMD\par Pulmonary evaluation. \par Patient is depressed - c/w Zoloft, f/u with the PMD.\par In terms of the ischemic w/u, her nuclear stress test was negative.\par S/p Watchman-  f/u with Dr. Jaquez\par F/u  in 2-3 months for close f/u.\par \par

## 2022-09-06 NOTE — HISTORY OF PRESENT ILLNESS
[FreeTextEntry1] : 78 y/o lady, presents for follow-up. S/p NERY/cardioversion for new onset AF/flutter, on Eliquis and metoprolol. Underwent Watchman implant with Dr. Jaquez. Repeat NERY revealed no leak. Remains in NSR. Her B-blocker was decreased due to bradycardia - now on 50 mg QD. She lost weight with diuresis. She is off metolazone - on Lasix 40 mg q12. Labs noted. S/p AVR with CABG by Dr. Gonzales in September of 2016. Last cath revealed patent graft and no AO gradient. She had an echo, which revealed Normal LV function, normally working AVR, mild MR. She also has a history of TIA, but no further events since then.  Feels more tired. Still with stable dyspnea on exertion. + bruising. + fatigue. Potassium was better on supplement. Hg is stable. She had cataract surgery, later that day she had a syncope, hit her head, CT head was negative, but needed staples for superficial occipital wound,  - her heart rate was low and her metoprolol was decreased to 50 - no further events. her glipizide was decreased as well.

## 2022-10-18 ENCOUNTER — APPOINTMENT (OUTPATIENT)
Dept: CARDIOLOGY | Facility: CLINIC | Age: 77
End: 2022-10-18

## 2022-12-06 ENCOUNTER — APPOINTMENT (OUTPATIENT)
Dept: CARDIOLOGY | Facility: CLINIC | Age: 77
End: 2022-12-06

## 2022-12-06 VITALS
HEIGHT: 66 IN | DIASTOLIC BLOOD PRESSURE: 80 MMHG | RESPIRATION RATE: 16 BRPM | TEMPERATURE: 98 F | HEART RATE: 103 BPM | BODY MASS INDEX: 43.55 KG/M2 | SYSTOLIC BLOOD PRESSURE: 144 MMHG | WEIGHT: 271 LBS

## 2022-12-06 DIAGNOSIS — I10 ESSENTIAL (PRIMARY) HYPERTENSION: ICD-10-CM

## 2022-12-06 DIAGNOSIS — I35.0 NONRHEUMATIC AORTIC (VALVE) STENOSIS: ICD-10-CM

## 2022-12-06 DIAGNOSIS — I25.10 ATHEROSCLEROTIC HEART DISEASE OF NATIVE CORONARY ARTERY W/OUT ANGINA PECTORIS: ICD-10-CM

## 2022-12-06 DIAGNOSIS — I48.91 UNSPECIFIED ATRIAL FIBRILLATION: ICD-10-CM

## 2022-12-06 DIAGNOSIS — I50.32 CHRONIC DIASTOLIC (CONGESTIVE) HEART FAILURE: ICD-10-CM

## 2022-12-06 DIAGNOSIS — R94.31 ABNORMAL ELECTROCARDIOGRAM [ECG] [EKG]: ICD-10-CM

## 2022-12-06 PROCEDURE — 93000 ELECTROCARDIOGRAM COMPLETE: CPT

## 2022-12-06 PROCEDURE — 99214 OFFICE O/P EST MOD 30 MIN: CPT | Mod: 25

## 2022-12-06 RX ORDER — MAGNESIUM OXIDE/MAG AA CHELATE 300 MG
CAPSULE ORAL DAILY
Refills: 0 | Status: ACTIVE | COMMUNITY

## 2022-12-06 RX ORDER — RANOLAZINE 1000 MG/1
1000 TABLET, FILM COATED, EXTENDED RELEASE ORAL DAILY
Refills: 0 | Status: COMPLETED | COMMUNITY
End: 2022-12-06

## 2022-12-06 RX ORDER — MULTIVIT-MIN/FA/LYCOPEN/LUTEIN .4-300-25
TABLET ORAL DAILY
Refills: 0 | Status: ACTIVE | COMMUNITY

## 2022-12-06 RX ORDER — CLOPIDOGREL BISULFATE 75 MG/1
75 TABLET, FILM COATED ORAL DAILY
Refills: 0 | Status: COMPLETED | COMMUNITY
End: 2022-12-06

## 2022-12-06 RX ORDER — FUROSEMIDE 40 MG/1
40 TABLET ORAL DAILY
Refills: 0 | Status: ACTIVE | COMMUNITY

## 2022-12-06 RX ORDER — ASPIRIN 81 MG
81 TABLET, DELAYED RELEASE (ENTERIC COATED) ORAL DAILY
Refills: 0 | Status: ACTIVE | COMMUNITY

## 2022-12-06 RX ORDER — ASPIRIN ENTERIC COATED TABLETS 81 MG 81 MG/1
81 TABLET, DELAYED RELEASE ORAL DAILY
Refills: 0 | Status: COMPLETED | COMMUNITY
End: 2022-12-06

## 2022-12-06 RX ORDER — METOLAZONE 5 MG/1
5 TABLET ORAL
Refills: 0 | Status: COMPLETED | COMMUNITY
End: 2022-12-06

## 2022-12-06 RX ORDER — ROSUVASTATIN CALCIUM 10 MG/1
10 TABLET, FILM COATED ORAL DAILY
Qty: 30 | Refills: 3 | Status: COMPLETED | COMMUNITY
End: 2022-12-06

## 2022-12-06 RX ORDER — MAGNESIUM OXIDE 500 MG
500 TABLET ORAL DAILY
Refills: 0 | Status: COMPLETED | COMMUNITY
End: 2022-12-06

## 2022-12-06 RX ORDER — MONTELUKAST 10 MG/1
10 TABLET, FILM COATED ORAL DAILY
Refills: 0 | Status: ACTIVE | COMMUNITY

## 2022-12-06 RX ORDER — DESVENLAFAXINE 25 MG/1
25 TABLET, EXTENDED RELEASE ORAL DAILY
Refills: 0 | Status: ACTIVE | COMMUNITY

## 2022-12-06 RX ORDER — PNV NO.95/FERROUS FUM/FOLIC AC 28MG-0.8MG
TABLET ORAL DAILY
Refills: 0 | Status: ACTIVE | COMMUNITY

## 2022-12-06 RX ORDER — FUROSEMIDE 40 MG/1
40 TABLET ORAL
Qty: 180 | Refills: 3 | Status: COMPLETED | COMMUNITY
Start: 2018-10-18 | End: 2022-12-06

## 2022-12-06 RX ORDER — POTASSIUM CHLORIDE 20 MEQ
20 TABLET, EXT RELEASE, PARTICLES/CRYSTALS ORAL TWICE DAILY
Refills: 0 | Status: ACTIVE | COMMUNITY

## 2022-12-06 RX ORDER — GLIPIZIDE 5 MG/1
5 TABLET ORAL DAILY
Refills: 0 | Status: ACTIVE | COMMUNITY

## 2022-12-06 RX ORDER — RANOLAZINE 1000 MG/1
1000 TABLET, EXTENDED RELEASE ORAL DAILY
Refills: 0 | Status: ACTIVE | COMMUNITY

## 2022-12-06 RX ORDER — METOLAZONE 5 MG/1
5 TABLET ORAL WEEKLY
Refills: 0 | Status: ACTIVE | COMMUNITY

## 2022-12-06 RX ORDER — PNV NO.95/FERROUS FUM/FOLIC AC 28MG-0.8MG
TABLET ORAL DAILY
Refills: 0 | Status: COMPLETED | COMMUNITY
End: 2022-12-06

## 2022-12-06 RX ORDER — PANTOPRAZOLE 40 MG/1
40 TABLET, DELAYED RELEASE ORAL DAILY
Refills: 0 | Status: ACTIVE | COMMUNITY

## 2022-12-06 NOTE — ASSESSMENT
[FreeTextEntry1] : A. fib s/p NERY/CV - remains in NSR.\par S/p Watchman\par NERY reviewed.\par C/w metoprolol. D/c Eliquis. C/w ASA, she has been off Plavix as per EP.\par \par Diastolic CHF - c/w Lasix, PRN metolazone. Monitor renal function. Supplement K.\par S/p AVR, CABG,- stable\par Sternal disunion, hernia - f/u with CT surgery\par \par Syncope - likely fawn related - metoprolol decreased. Check MCOT.  F/u with EP, Dr. Torres to consider monitoring.Also decreased glipizide - possible hypoglycemia.\par \par \par Echo and cath results noted.\par BP is controlled.\par Anemic - following with Heme and GI. last Hg 8.3. No active bleeding.\par C/w current medical therapy. C/w Losartan. \par C/w Ranexa 1000 mg q12.\par \par KCL suppl.\par Repeat labs  noted - f/u with PMD\par Pulmonary evaluation. \par Patient is depressed - c/w Zoloft, f/u with the PMD.\par In terms of the ischemic w/u, her nuclear stress test was negative.\par S/p Watchman-  f/u with Dr. Jaquez\par F/u  in 3 months for close f/u.\par \par

## 2022-12-06 NOTE — REVIEW OF SYSTEMS
[Feeling Fatigued] : feeling fatigued [Joint Pain] : joint pain [Easy Bruising] : a tendency for easy bruising [Negative] : Psychiatric [FreeTextEntry5] : see HPI [de-identified] : bruising

## 2022-12-06 NOTE — PHYSICAL EXAM
[General Appearance - Well Developed] : well developed [General Appearance - Well Nourished] : well nourished [General Appearance - In No Acute Distress] : no acute distress [Normal Conjunctiva] : the conjunctiva exhibited no abnormalities [Normal Oral Mucosa] : normal oral mucosa [No Oral Pallor] : no oral pallor [Normal Oropharynx] : normal oropharynx [] : no respiratory distress [Respiration, Rhythm And Depth] : normal respiratory rhythm and effort [Exaggerated Use Of Accessory Muscles For Inspiration] : no accessory muscle use [Auscultation Breath Sounds / Voice Sounds] : lungs were clear to auscultation bilaterally [Heart Rate And Rhythm] : heart rate and rhythm were normal [Heart Sounds] : normal S1 and S2 [Murmurs] : no murmurs present [Edema] : no peripheral edema present [Bowel Sounds] : normal bowel sounds [Abdomen Soft] : soft [Abdomen Tenderness] : non-tender [Nail Clubbing] : no clubbing of the fingernails [Cyanosis, Localized] : no localized cyanosis [Petechial Hemorrhages (___cm)] : no petechial hemorrhages [Oriented To Time, Place, And Person] : oriented to person, place, and time [Affect] : the affect was normal [FreeTextEntry1] : erythema LE b/l, venostasis lesions

## 2022-12-06 NOTE — HISTORY OF PRESENT ILLNESS
[FreeTextEntry1] : 76 y/o lady, presents for follow-up. S/p NERY/cardioversion for new onset AF/flutter, on Eliquis and metoprolol. Underwent Watchman implant with Dr. Jaquez. Repeat NERY revealed no leak. Remains in NSR. Her B-blocker was decreased due to bradycardia - now on 50 mg QD. She lost weight with diuresis. She is off metolazone - on Lasix 40 mg q12. Labs noted. S/p AVR with CABG by Dr. Gonzales in September of 2016. Last cath revealed patent graft and no AO gradient. She had an echo, which revealed Normal LV function, normally working AVR, mild MR. She also has a history of TIA, but no further events since then.  Feels more tired. Still with stable dyspnea on exertion. + bruising. + fatigue.  Hg is stable. She had cataract surgery, later that day she had a syncope, hit her head, CT head was negative, but needed staples for superficial occipital wound,  - her heart rate was low and her metoprolol was decreased to 50 - no further events. HR is  today. Will increase metoprolol back to 100mg. Sternal dysunion, + hernia - f/u with Dr. Gonzales and Dr. Sami Pedraza.

## 2022-12-22 NOTE — ED PROVIDER NOTE - SECONDARY DIAGNOSIS.
30 day refill only  Must follow up with me or another provider for refills Anemia Dyspnea on exertion

## 2023-01-01 ENCOUNTER — APPOINTMENT (OUTPATIENT)
Dept: PAIN MANAGEMENT | Facility: CLINIC | Age: 78
End: 2023-01-01

## 2023-01-01 ENCOUNTER — APPOINTMENT (OUTPATIENT)
Dept: PAIN MANAGEMENT | Facility: CLINIC | Age: 78
End: 2023-01-01
Payer: MEDICARE

## 2023-01-01 ENCOUNTER — RX RENEWAL (OUTPATIENT)
Age: 78
End: 2023-01-01

## 2023-01-01 VITALS
HEIGHT: 66 IN | HEART RATE: 77 BPM | SYSTOLIC BLOOD PRESSURE: 110 MMHG | WEIGHT: 271 LBS | BODY MASS INDEX: 43.55 KG/M2 | DIASTOLIC BLOOD PRESSURE: 51 MMHG

## 2023-01-01 DIAGNOSIS — M75.22 BICIPITAL TENDINITIS, LEFT SHOULDER: ICD-10-CM

## 2023-01-01 DIAGNOSIS — M46.1 SACROILIITIS, NOT ELSEWHERE CLASSIFIED: ICD-10-CM

## 2023-01-01 DIAGNOSIS — M16.11 UNILATERAL PRIMARY OSTEOARTHRITIS, RIGHT HIP: ICD-10-CM

## 2023-01-01 DIAGNOSIS — M51.26 OTHER INTERVERTEBRAL DISC DISPLACEMENT, LUMBAR REGION: ICD-10-CM

## 2023-01-01 DIAGNOSIS — M48.061 SPINAL STENOSIS, LUMBAR REGION WITHOUT NEUROGENIC CLAUDICATION: ICD-10-CM

## 2023-01-01 DIAGNOSIS — M54.16 RADICULOPATHY, LUMBAR REGION: ICD-10-CM

## 2023-01-01 PROCEDURE — 27096 INJECT SACROILIAC JOINT: CPT | Mod: RT

## 2023-01-01 PROCEDURE — 99213 OFFICE O/P EST LOW 20 MIN: CPT

## 2023-01-01 PROCEDURE — 93770 DETERMINATION VENOUS PRESS: CPT

## 2023-01-01 PROCEDURE — 99442: CPT

## 2023-01-01 PROCEDURE — 94761 N-INVAS EAR/PLS OXIMETRY MLT: CPT | Mod: 59

## 2023-01-01 PROCEDURE — 99214 OFFICE O/P EST MOD 30 MIN: CPT

## 2023-01-01 PROCEDURE — 93770 DETERMINATION VENOUS PRESS: CPT | Mod: 59

## 2023-01-01 PROCEDURE — 27096 INJECT SACROILIAC JOINT: CPT | Mod: LT

## 2023-01-01 PROCEDURE — 62323 NJX INTERLAMINAR LMBR/SAC: CPT

## 2023-01-01 RX ORDER — DICLOFENAC SODIUM 1% 10 MG/G
1 GEL TOPICAL DAILY
Qty: 1 | Refills: 3 | Status: ACTIVE | COMMUNITY
Start: 2023-01-01 | End: 1900-01-01

## 2023-01-01 RX ORDER — PREGABALIN 75 MG/1
75 CAPSULE ORAL
Qty: 60 | Refills: 0 | Status: ACTIVE | COMMUNITY
Start: 2023-06-12 | End: 1900-01-01

## 2023-01-01 RX ORDER — ROSUVASTATIN CALCIUM 10 MG/1
10 TABLET, FILM COATED ORAL DAILY
Qty: 90 | Refills: 3 | Status: ACTIVE | COMMUNITY
Start: 1900-01-01 | End: 1900-01-01

## 2023-01-01 RX ORDER — METOPROLOL SUCCINATE 100 MG/1
100 TABLET, EXTENDED RELEASE ORAL DAILY
Qty: 90 | Refills: 3 | Status: ACTIVE | COMMUNITY
Start: 1900-01-01 | End: 1900-01-01

## 2023-01-01 RX ORDER — ACETAMINOPHEN AND CODEINE PHOSPHATE 300; 30 MG/1; MG/1
300-30 TABLET ORAL
Qty: 28 | Refills: 0 | Status: ACTIVE | COMMUNITY
Start: 2023-01-01 | End: 1900-01-01

## 2023-01-01 RX ORDER — RANOLAZINE 1000 MG/1
1000 TABLET, EXTENDED RELEASE ORAL
Qty: 180 | Refills: 3 | Status: ACTIVE | COMMUNITY
Start: 2021-12-13 | End: 1900-01-01

## 2023-01-01 RX ORDER — MELOXICAM 15 MG/1
15 TABLET ORAL DAILY
Qty: 14 | Refills: 0 | Status: ACTIVE | COMMUNITY
Start: 2023-01-01 | End: 1900-01-01

## 2023-01-17 ENCOUNTER — APPOINTMENT (OUTPATIENT)
Dept: CARDIOTHORACIC SURGERY | Facility: CLINIC | Age: 78
End: 2023-01-17
Payer: MEDICARE

## 2023-01-17 VITALS
RESPIRATION RATE: 12 BRPM | DIASTOLIC BLOOD PRESSURE: 84 MMHG | BODY MASS INDEX: 43.55 KG/M2 | WEIGHT: 271 LBS | HEIGHT: 66 IN | TEMPERATURE: 98 F | SYSTOLIC BLOOD PRESSURE: 134 MMHG | OXYGEN SATURATION: 96 % | HEART RATE: 77 BPM

## 2023-01-17 DIAGNOSIS — Z95.1 PRESENCE OF AORTOCORONARY BYPASS GRAFT: ICD-10-CM

## 2023-01-17 PROCEDURE — 99213 OFFICE O/P EST LOW 20 MIN: CPT

## 2023-01-18 LAB
ANION GAP SERPL CALC-SCNC: 12 MMOL/L
BUN SERPL-MCNC: 24 MG/DL
CALCIUM SERPL-MCNC: 9.6 MG/DL
CHLORIDE SERPL-SCNC: 100 MMOL/L
CO2 SERPL-SCNC: 29 MMOL/L
CREAT SERPL-MCNC: 1 MG/DL
EGFR: 58 ML/MIN/1.73M2
GLUCOSE SERPL-MCNC: 123 MG/DL
POTASSIUM SERPL-SCNC: 4.3 MMOL/L
SODIUM SERPL-SCNC: 141 MMOL/L

## 2023-01-19 NOTE — PHYSICAL EXAM
[Sclera] : the sclera and conjunctiva were normal [PERRL With Normal Accommodation] : pupils were equal in size, round, and reactive to light [Extraocular Movements] : extraocular movements were intact [Neck Appearance] : the appearance of the neck was normal [] : no respiratory distress [Respiration, Rhythm And Depth] : normal respiratory rhythm and effort [Exaggerated Use Of Accessory Muscles For Inspiration] : no accessory muscle use [Apical Impulse] : the apical impulse was normal [Heart Rate And Rhythm] : heart rate was normal and rhythm regular [Heart Sounds] : normal S1 and S2 [Bowel Sounds] : normal bowel sounds [Abdomen Soft] : soft [Abdomen Tenderness] : non-tender [Cervical Lymph Nodes Enlarged Posterior Bilaterally] : posterior cervical [Skin Color & Pigmentation] : normal skin color and pigmentation [Cranial Nerves] : cranial nerves 2-12 were intact [Deep Tendon Reflexes (DTR)] : deep tendon reflexes were 2+ and symmetric [Sensation] : the sensory exam was normal to light touch and pinprick [Oriented To Time, Place, And Person] : oriented to person, place, and time [Impaired Insight] : insight and judgment were intact [Affect] : the affect was normal [General Appearance - Alert] : alert [General Appearance - In No Acute Distress] : in no acute distress [General Appearance - Well Nourished] : well nourished [Outer Ear] : the ears and nose were normal in appearance [FreeTextEntry1] : Unsteady, Ambulatory with Cane

## 2023-01-19 NOTE — ASSESSMENT
[FreeTextEntry1] : Ms. Debi Quinn is a 76 y/o female, former smoker, with PMH bleeding disorder, CAD S/P CABG AVR 9/2016 (Janet), asthma, COPD, DM, HTN, AF s/p Watchman device 5/2021, here today for discussion of her potential hiatal hernia.\par \par Plan:\par Symptoms reviewed with patient, she has a worsening sternal non-union and hernia\par Will get CT Chest with IV Contrast now\par F/U after with televisit for review\par F/U with Cardiologist \par F/U with PMD for routine medical care\par \par I, Sweta [facility], am acting as scribe for Dr.Villa Pedraza \par I, Ravin Pedraza saw, examined and reviewed the diagnostic images on patient:  DEBI QUINN on 01/17/2023 and agreed with my Nurse Practitioner's clinical note, physical exam findings and treatment plan.\par Patient presented to the office for follow-up, I had seen her 2021 for hiatal hernia with mild symptoms well controlled with PPIs, she also 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  At that time no surgical intervention was recommended.  Patient comes back to the office complaining of enlarging bulging of the lower anterior chest.  No vomiting no pain but mild discomfort.  At physical exam there is large epigastric fascial defect, reducible and nontender.  It seems to me that he has been enlarging.  I reviewed the old CT scan images with the patient and recommended a repeat CT scan.  I discussed that large hernia defects are very unlikely to present incarceration but there is always a possibility.  Patient is a very high surgical risk with multiple medical conditions and very poor functional condition condition.  CT scan was ordered.\par

## 2023-01-19 NOTE — HISTORY OF PRESENT ILLNESS
[FreeTextEntry1] : Ms. Debi Pickering is a 76 y/o female, former smoker, with PMH bleeding disorder, CAD S/P CABG AVR 9/2016 (Janet), asthma, COPD, DM, HTN, AF s/p Watchman device 5/2021, here today to discuss her potential hiatal hernia.   She was seen last 6/2021 and she  Symptoms include daily nausea, dry heaving, excessive flatulence.  Also complains of food getting stuck in throat and early satiety.  Her esophagram her last visit showed a small hiatal hernia, at the time after discussing with cardiac surgery and patient no surgical intervention was considered indicated for sternal non-union, regarding hiatal hernia given size and she was referred back by Cardiologist for worsening sternal non-union.\par \par Arrives today with her daughter\par Pt is a poor historian\par ECOG 2\par \par Her healthcare teams is as follows:\par PMD: [facility] \par Cardio: Tamarayzman \par Hem//Onc: [facility]ie \par GI: [facility]

## 2023-01-27 ENCOUNTER — NON-APPOINTMENT (OUTPATIENT)
Age: 78
End: 2023-01-27

## 2023-01-27 DIAGNOSIS — Z00.00 ENCOUNTER FOR GENERAL ADULT MEDICAL EXAMINATION W/OUT ABNORMAL FINDINGS: ICD-10-CM

## 2023-02-02 ENCOUNTER — OUTPATIENT (OUTPATIENT)
Dept: OUTPATIENT SERVICES | Facility: HOSPITAL | Age: 78
LOS: 1 days | Discharge: HOME | End: 2023-02-02
Payer: MEDICARE

## 2023-02-02 DIAGNOSIS — Z90.710 ACQUIRED ABSENCE OF BOTH CERVIX AND UTERUS: Chronic | ICD-10-CM

## 2023-02-02 DIAGNOSIS — Z95.1 PRESENCE OF AORTOCORONARY BYPASS GRAFT: Chronic | ICD-10-CM

## 2023-02-02 DIAGNOSIS — Z95.828 PRESENCE OF OTHER VASCULAR IMPLANTS AND GRAFTS: Chronic | ICD-10-CM

## 2023-02-02 DIAGNOSIS — Z95.1 PRESENCE OF AORTOCORONARY BYPASS GRAFT: ICD-10-CM

## 2023-02-02 DIAGNOSIS — Z95.818 PRESENCE OF OTHER CARDIAC IMPLANTS AND GRAFTS: Chronic | ICD-10-CM

## 2023-02-02 DIAGNOSIS — Z96.651 PRESENCE OF RIGHT ARTIFICIAL KNEE JOINT: Chronic | ICD-10-CM

## 2023-02-02 PROCEDURE — 71260 CT THORAX DX C+: CPT | Mod: 26

## 2023-02-07 ENCOUNTER — APPOINTMENT (OUTPATIENT)
Dept: CARDIOTHORACIC SURGERY | Facility: CLINIC | Age: 78
End: 2023-02-07
Payer: MEDICARE

## 2023-02-07 VITALS
OXYGEN SATURATION: 98 % | TEMPERATURE: 98 F | HEART RATE: 70 BPM | DIASTOLIC BLOOD PRESSURE: 95 MMHG | BODY MASS INDEX: 43.55 KG/M2 | SYSTOLIC BLOOD PRESSURE: 144 MMHG | RESPIRATION RATE: 12 BRPM | WEIGHT: 271 LBS | HEIGHT: 66 IN

## 2023-02-07 DIAGNOSIS — M95.4 ACQUIRED DEFORMITY OF CHEST AND RIB: ICD-10-CM

## 2023-02-07 DIAGNOSIS — K43.2 INCISIONAL HERNIA W/OUT OBSTRUCTION OR GANGRENE: ICD-10-CM

## 2023-02-07 PROCEDURE — 99212 OFFICE O/P EST SF 10 MIN: CPT

## 2023-02-09 NOTE — PHYSICAL EXAM
[Sclera] : the sclera and conjunctiva were normal [PERRL With Normal Accommodation] : pupils were equal in size, round, and reactive to light [Extraocular Movements] : extraocular movements were intact [Neck Appearance] : the appearance of the neck was normal [] : no respiratory distress [Respiration, Rhythm And Depth] : normal respiratory rhythm and effort [Exaggerated Use Of Accessory Muscles For Inspiration] : no accessory muscle use [Apical Impulse] : the apical impulse was normal [Heart Rate And Rhythm] : heart rate was normal and rhythm regular [Heart Sounds] : normal S1 and S2 [Bowel Sounds] : normal bowel sounds [Abdomen Tenderness] : non-tender [Abdomen Soft] : soft [Cervical Lymph Nodes Enlarged Posterior Bilaterally] : posterior cervical [Skin Color & Pigmentation] : normal skin color and pigmentation [Cranial Nerves] : cranial nerves 2-12 were intact [Deep Tendon Reflexes (DTR)] : deep tendon reflexes were 2+ and symmetric [Sensation] : the sensory exam was normal to light touch and pinprick [Oriented To Time, Place, And Person] : oriented to person, place, and time [Impaired Insight] : insight and judgment were intact [Affect] : the affect was normal [General Appearance - Alert] : alert [General Appearance - In No Acute Distress] : in no acute distress [General Appearance - Well Nourished] : well nourished [Outer Ear] : the ears and nose were normal in appearance [FreeTextEntry1] : Unsteady, Ambulatory with Cane

## 2023-02-09 NOTE — PACU DISCHARGE NOTE - HYDRATION STATUS:
My chart message has been sent to the patient for PATIENT R[place]ING with [facility].  
Satisfactory

## 2023-02-09 NOTE — DATA REVIEWED
[FreeTextEntry1] : ACC: [id number] EXAM: CT CHEST IC ORDERED BY: Ravin Pedraza\par \par PROCEDURE DATE: 02/02/2023\par \par \par \par INTERPRETATION: Reason for study: Preop hiatal hernia\par \par Technique: Postcontrast CT scan of the thorax was performed from lung apices to adrenal glands.\par Sagittal and coronal reformatted images were generated.\par \par Comparison: CT chest-6/23/2021\par \par \par Findings:\par \par Tubes/Lines: none\par Mediastinum/hilum: No adenopathy or mass.\par Chest Wall/ Breasts: Poststernotomy\par \par  Heart/Great Vessels:No pericardial effusions. Stable dilatation main pulmonary artery segment measuring approximately 3.2 cm (303/85). Normal size thoracic aorta.. Post CABG, aortic valve repair and placement of left atrial appendage watchman device. Extensive mitral calcifications\par \par Abdomen: Stable approximate 2.9 cm hiatal hernia. (303/173). Cholelithiasis. Hypodensity left kidney, too small to characterize. 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 There is no evidence of parenchymal mass or pleural effusions. No bronchiectasis or honeycombing. Stable reticular opacities, left upper lobe and peripheral right middle lobe, suggesting discoid atelectasis and/or scarring.\par Pulmonary nodules: No new or enlarging nodules.\par Stable 15 mm solid nodule right lower lobe (303/103).\par Stable 6 mm solid nodule right upper lobe (303/74).\par \par \par IMPRESSION:\par \par Since 6/23/2021\par \par Small hiatal hernia measuring approximately 2.9 cm.\par \par Stable dilatation main pulmonary artery segment, measuring approximately 3.2 cm (303/85).\par \par Stable pulmonary nodules.

## 2023-02-09 NOTE — HISTORY OF PRESENT ILLNESS
[FreeTextEntry1] : Ms. Debi Pickering is a 76 y/o female, former smoker, with PMH bleeding disorder, CAD S/P CABG AVR 9/2016 (Janet), asthma, COPD, DM, HTN, AF s/p Watchman device 5/2021, here today to discuss her potential hiatal hernia. She was seen last 6/2021 and she Symptoms include daily nausea, dry heaving, excessive flatulence. Also complains of food getting stuck in throat and early satiety. Her esophagram her last visit showed a small hiatal hernia, at the time after discussing with cardiac surgery and patient no surgical intervention was considered indicated for sternal non-union, regarding hiatal hernia given size and she was referred back by Cardiologist for worsening sternal non-union. Here today for review of CT. \par \par Arrives today with her daughter\par Pt is a poor historian\par ECOG 2\par \par Her healthcare teams is as follows:\par PMD: Scafuri \par Cardio: Tamarayzman \par Hem//Onc: Mandie \par GI: SALLY \par

## 2023-02-09 NOTE — ASSESSMENT
[FreeTextEntry1] : Ms. Debi Pickering is a 76 y/o female, former smoker, with PMH bleeding disorder, CAD S/P CABG AVR 9/2016 (Janet), asthma, COPD, DM, HTN, AF s/p Watchman device 5/2021, here today to discuss her potential hiatal hernia. Here today for review of CT. \par \par Plan:\par CT Imaging reviewed with patient and daughter\par Not much of a change since last CT and no bowel noted in hernia, fat\par Patient wearing supportive bra with some relief\par No indication for surgical intervention \par F/U with CT Surgery PRN\par F/U with Dr. Ramirez for cardiac management\par F/U with PMD Dr. 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 At that time no surgical intervention was recommended. Patient comes back to the office complaining of enlarging bulging of the lower anterior chest. No vomiting no pain but mild discomfort. At physical exam there is large epigastric fascial defect, reducible and nontender. It seems to me that he has been enlarging. I reviewed the old CT scan images with the patient and recommended a repeat CT scan. I discussed that large hernia defects are very unlikely to present incarceration but there is always a possibility. Patient is a very high surgical risk with multiple medical conditions and very poor functional condition condition. CT scan was ordered.\par Today I reviewed the CT scan with the patient, epigastric bulge represents hernia with fat within the sac, no evidence of bowel or any viscus within the hernia. Patient is wearing support bra with improved symptoms. She is to continue care by PCP. No surgical intervention recommended given high surgical risk and minimal symptoms.

## 2023-02-22 ENCOUNTER — RX RENEWAL (OUTPATIENT)
Age: 78
End: 2023-02-22

## 2023-04-27 ENCOUNTER — INPATIENT (INPATIENT)
Facility: HOSPITAL | Age: 78
LOS: 12 days | Discharge: HOME HEALTH | DRG: 393 | End: 2023-05-10
Attending: HOSPITALIST | Admitting: INTERNAL MEDICINE
Payer: MEDICARE

## 2023-04-27 VITALS
RESPIRATION RATE: 18 BRPM | HEART RATE: 86 BPM | OXYGEN SATURATION: 99 % | SYSTOLIC BLOOD PRESSURE: 121 MMHG | HEIGHT: 66 IN | TEMPERATURE: 99 F | DIASTOLIC BLOOD PRESSURE: 54 MMHG | WEIGHT: 270.07 LBS

## 2023-04-27 DIAGNOSIS — Z96.651 PRESENCE OF RIGHT ARTIFICIAL KNEE JOINT: Chronic | ICD-10-CM

## 2023-04-27 DIAGNOSIS — M54.59 OTHER LOW BACK PAIN: ICD-10-CM

## 2023-04-27 DIAGNOSIS — Z95.1 PRESENCE OF AORTOCORONARY BYPASS GRAFT: Chronic | ICD-10-CM

## 2023-04-27 DIAGNOSIS — Z90.710 ACQUIRED ABSENCE OF BOTH CERVIX AND UTERUS: Chronic | ICD-10-CM

## 2023-04-27 DIAGNOSIS — Z95.818 PRESENCE OF OTHER CARDIAC IMPLANTS AND GRAFTS: Chronic | ICD-10-CM

## 2023-04-27 DIAGNOSIS — Z95.828 PRESENCE OF OTHER VASCULAR IMPLANTS AND GRAFTS: Chronic | ICD-10-CM

## 2023-04-27 LAB
ALBUMIN SERPL ELPH-MCNC: 3.9 G/DL — SIGNIFICANT CHANGE UP (ref 3.5–5.2)
ALP SERPL-CCNC: 70 U/L — SIGNIFICANT CHANGE UP (ref 30–115)
ALT FLD-CCNC: 21 U/L — SIGNIFICANT CHANGE UP (ref 0–41)
ANION GAP SERPL CALC-SCNC: 13 MMOL/L — SIGNIFICANT CHANGE UP (ref 7–14)
APPEARANCE UR: CLEAR — SIGNIFICANT CHANGE UP
APTT BLD: 28.6 SEC — SIGNIFICANT CHANGE UP (ref 27–39.2)
AST SERPL-CCNC: 29 U/L — SIGNIFICANT CHANGE UP (ref 0–41)
BACTERIA # UR AUTO: NEGATIVE — SIGNIFICANT CHANGE UP
BASOPHILS # BLD AUTO: 0.01 K/UL — SIGNIFICANT CHANGE UP (ref 0–0.2)
BASOPHILS NFR BLD AUTO: 0.1 % — SIGNIFICANT CHANGE UP (ref 0–1)
BILIRUB SERPL-MCNC: 0.8 MG/DL — SIGNIFICANT CHANGE UP (ref 0.2–1.2)
BILIRUB UR-MCNC: NEGATIVE — SIGNIFICANT CHANGE UP
BLD GP AB SCN SERPL QL: SIGNIFICANT CHANGE UP
BUN SERPL-MCNC: 33 MG/DL — HIGH (ref 10–20)
CALCIUM SERPL-MCNC: 9.4 MG/DL — SIGNIFICANT CHANGE UP (ref 8.4–10.5)
CHLORIDE SERPL-SCNC: 104 MMOL/L — SIGNIFICANT CHANGE UP (ref 98–110)
CO2 SERPL-SCNC: 22 MMOL/L — SIGNIFICANT CHANGE UP (ref 17–32)
COLOR SPEC: SIGNIFICANT CHANGE UP
CREAT SERPL-MCNC: 1.2 MG/DL — SIGNIFICANT CHANGE UP (ref 0.7–1.5)
DIFF PNL FLD: ABNORMAL
EGFR: 47 ML/MIN/1.73M2 — LOW
EOSINOPHIL # BLD AUTO: 0.01 K/UL — SIGNIFICANT CHANGE UP (ref 0–0.7)
EOSINOPHIL NFR BLD AUTO: 0.1 % — SIGNIFICANT CHANGE UP (ref 0–8)
EPI CELLS # UR: 1 /HPF — SIGNIFICANT CHANGE UP (ref 0–5)
GLUCOSE SERPL-MCNC: 179 MG/DL — HIGH (ref 70–99)
GLUCOSE UR QL: NEGATIVE — SIGNIFICANT CHANGE UP
HCT VFR BLD CALC: 35.7 % — LOW (ref 37–47)
HGB BLD-MCNC: 11.1 G/DL — LOW (ref 12–16)
HYALINE CASTS # UR AUTO: 1 /LPF — SIGNIFICANT CHANGE UP (ref 0–7)
IMM GRANULOCYTES NFR BLD AUTO: 0.5 % — HIGH (ref 0.1–0.3)
INR BLD: 1.13 RATIO — SIGNIFICANT CHANGE UP (ref 0.65–1.3)
KETONES UR-MCNC: SIGNIFICANT CHANGE UP
LEUKOCYTE ESTERASE UR-ACNC: NEGATIVE — SIGNIFICANT CHANGE UP
LYMPHOCYTES # BLD AUTO: 1.46 K/UL — SIGNIFICANT CHANGE UP (ref 1.2–3.4)
LYMPHOCYTES # BLD AUTO: 18.3 % — LOW (ref 20.5–51.1)
MCHC RBC-ENTMCNC: 29.2 PG — SIGNIFICANT CHANGE UP (ref 27–31)
MCHC RBC-ENTMCNC: 31.1 G/DL — LOW (ref 32–37)
MCV RBC AUTO: 93.9 FL — SIGNIFICANT CHANGE UP (ref 81–99)
MONOCYTES # BLD AUTO: 0.52 K/UL — SIGNIFICANT CHANGE UP (ref 0.1–0.6)
MONOCYTES NFR BLD AUTO: 6.5 % — SIGNIFICANT CHANGE UP (ref 1.7–9.3)
NEUTROPHILS # BLD AUTO: 5.96 K/UL — SIGNIFICANT CHANGE UP (ref 1.4–6.5)
NEUTROPHILS NFR BLD AUTO: 74.5 % — SIGNIFICANT CHANGE UP (ref 42.2–75.2)
NITRITE UR-MCNC: NEGATIVE — SIGNIFICANT CHANGE UP
NRBC # BLD: 0 /100 WBCS — SIGNIFICANT CHANGE UP (ref 0–0)
PH UR: 6 — SIGNIFICANT CHANGE UP (ref 5–8)
PLATELET # BLD AUTO: 138 K/UL — SIGNIFICANT CHANGE UP (ref 130–400)
PMV BLD: 10 FL — SIGNIFICANT CHANGE UP (ref 7.4–10.4)
POTASSIUM SERPL-MCNC: 5.3 MMOL/L — HIGH (ref 3.5–5)
POTASSIUM SERPL-SCNC: 5.3 MMOL/L — HIGH (ref 3.5–5)
PROT SERPL-MCNC: 6.5 G/DL — SIGNIFICANT CHANGE UP (ref 6–8)
PROT UR-MCNC: NEGATIVE — SIGNIFICANT CHANGE UP
PROTHROM AB SERPL-ACNC: 12.9 SEC — HIGH (ref 9.95–12.87)
RBC # BLD: 3.8 M/UL — LOW (ref 4.2–5.4)
RBC # FLD: 14.1 % — SIGNIFICANT CHANGE UP (ref 11.5–14.5)
RBC CASTS # UR COMP ASSIST: 2 /HPF — SIGNIFICANT CHANGE UP (ref 0–4)
SODIUM SERPL-SCNC: 139 MMOL/L — SIGNIFICANT CHANGE UP (ref 135–146)
SP GR SPEC: 1.01 — SIGNIFICANT CHANGE UP (ref 1.01–1.03)
UROBILINOGEN FLD QL: SIGNIFICANT CHANGE UP
WBC # BLD: 8 K/UL — SIGNIFICANT CHANGE UP (ref 4.8–10.8)
WBC # FLD AUTO: 8 K/UL — SIGNIFICANT CHANGE UP (ref 4.8–10.8)
WBC UR QL: 0 /HPF — SIGNIFICANT CHANGE UP (ref 0–5)

## 2023-04-27 PROCEDURE — 86850 RBC ANTIBODY SCREEN: CPT

## 2023-04-27 PROCEDURE — 82962 GLUCOSE BLOOD TEST: CPT

## 2023-04-27 PROCEDURE — 73620 X-RAY EXAM OF FOOT: CPT | Mod: RT

## 2023-04-27 PROCEDURE — 94640 AIRWAY INHALATION TREATMENT: CPT

## 2023-04-27 PROCEDURE — 84132 ASSAY OF SERUM POTASSIUM: CPT

## 2023-04-27 PROCEDURE — 93970 EXTREMITY STUDY: CPT

## 2023-04-27 PROCEDURE — U0003: CPT

## 2023-04-27 PROCEDURE — 88305 TISSUE EXAM BY PATHOLOGIST: CPT

## 2023-04-27 PROCEDURE — 86901 BLOOD TYPING SEROLOGIC RH(D): CPT

## 2023-04-27 PROCEDURE — 93306 TTE W/DOPPLER COMPLETE: CPT

## 2023-04-27 PROCEDURE — U0005: CPT

## 2023-04-27 PROCEDURE — 83036 HEMOGLOBIN GLYCOSYLATED A1C: CPT

## 2023-04-27 PROCEDURE — 80053 COMPREHEN METABOLIC PANEL: CPT

## 2023-04-27 PROCEDURE — 99223 1ST HOSP IP/OBS HIGH 75: CPT

## 2023-04-27 PROCEDURE — 93005 ELECTROCARDIOGRAM TRACING: CPT

## 2023-04-27 PROCEDURE — 85027 COMPLETE CBC AUTOMATED: CPT

## 2023-04-27 PROCEDURE — 83735 ASSAY OF MAGNESIUM: CPT

## 2023-04-27 PROCEDURE — C9113: CPT

## 2023-04-27 PROCEDURE — 99285 EMERGENCY DEPT VISIT HI MDM: CPT

## 2023-04-27 PROCEDURE — 97530 THERAPEUTIC ACTIVITIES: CPT | Mod: GP

## 2023-04-27 PROCEDURE — 97535 SELF CARE MNGMENT TRAINING: CPT | Mod: GO

## 2023-04-27 PROCEDURE — 86900 BLOOD TYPING SEROLOGIC ABO: CPT

## 2023-04-27 PROCEDURE — 97166 OT EVAL MOD COMPLEX 45 MIN: CPT | Mod: GO

## 2023-04-27 PROCEDURE — 97116 GAIT TRAINING THERAPY: CPT | Mod: GP

## 2023-04-27 PROCEDURE — 97110 THERAPEUTIC EXERCISES: CPT | Mod: GP

## 2023-04-27 PROCEDURE — 97162 PT EVAL MOD COMPLEX 30 MIN: CPT | Mod: GP

## 2023-04-27 PROCEDURE — 85025 COMPLETE CBC W/AUTO DIFF WBC: CPT

## 2023-04-27 PROCEDURE — 74177 CT ABD & PELVIS W/CONTRAST: CPT | Mod: 26,MA

## 2023-04-27 PROCEDURE — 88312 SPECIAL STAINS GROUP 1: CPT

## 2023-04-27 PROCEDURE — 36415 COLL VENOUS BLD VENIPUNCTURE: CPT

## 2023-04-27 RX ORDER — DEXTROSE 50 % IN WATER 50 %
50 SYRINGE (ML) INTRAVENOUS ONCE
Refills: 0 | Status: COMPLETED | OUTPATIENT
Start: 2023-04-27 | End: 2023-04-27

## 2023-04-27 RX ORDER — MORPHINE SULFATE 50 MG/1
4 CAPSULE, EXTENDED RELEASE ORAL ONCE
Refills: 0 | Status: DISCONTINUED | OUTPATIENT
Start: 2023-04-27 | End: 2023-04-27

## 2023-04-27 RX ORDER — TRAMADOL HYDROCHLORIDE 50 MG/1
25 TABLET ORAL ONCE
Refills: 0 | Status: DISCONTINUED | OUTPATIENT
Start: 2023-04-27 | End: 2023-04-27

## 2023-04-27 RX ORDER — KETOROLAC TROMETHAMINE 30 MG/ML
15 SYRINGE (ML) INJECTION ONCE
Refills: 0 | Status: DISCONTINUED | OUTPATIENT
Start: 2023-04-27 | End: 2023-04-27

## 2023-04-27 RX ORDER — METHOCARBAMOL 500 MG/1
750 TABLET, FILM COATED ORAL ONCE
Refills: 0 | Status: COMPLETED | OUTPATIENT
Start: 2023-04-27 | End: 2023-04-27

## 2023-04-27 RX ORDER — PANTOPRAZOLE SODIUM 20 MG/1
80 TABLET, DELAYED RELEASE ORAL ONCE
Refills: 0 | Status: COMPLETED | OUTPATIENT
Start: 2023-04-27 | End: 2023-04-27

## 2023-04-27 RX ORDER — INSULIN HUMAN 100 [IU]/ML
10 INJECTION, SOLUTION SUBCUTANEOUS ONCE
Refills: 0 | Status: COMPLETED | OUTPATIENT
Start: 2023-04-27 | End: 2023-04-27

## 2023-04-27 RX ORDER — OXYCODONE HYDROCHLORIDE 5 MG/1
5 TABLET ORAL EVERY 6 HOURS
Refills: 0 | Status: DISCONTINUED | OUTPATIENT
Start: 2023-04-27 | End: 2023-04-28

## 2023-04-27 RX ORDER — PANTOPRAZOLE SODIUM 20 MG/1
8 TABLET, DELAYED RELEASE ORAL
Qty: 80 | Refills: 0 | Status: DISCONTINUED | OUTPATIENT
Start: 2023-04-27 | End: 2023-04-28

## 2023-04-27 RX ADMIN — PANTOPRAZOLE SODIUM 80 MILLIGRAM(S): 20 TABLET, DELAYED RELEASE ORAL at 15:17

## 2023-04-27 RX ADMIN — METHOCARBAMOL 750 MILLIGRAM(S): 500 TABLET, FILM COATED ORAL at 18:50

## 2023-04-27 RX ADMIN — MORPHINE SULFATE 4 MILLIGRAM(S): 50 CAPSULE, EXTENDED RELEASE ORAL at 15:18

## 2023-04-27 RX ADMIN — PANTOPRAZOLE SODIUM 10 MG/HR: 20 TABLET, DELAYED RELEASE ORAL at 17:35

## 2023-04-27 RX ADMIN — MORPHINE SULFATE 4 MILLIGRAM(S): 50 CAPSULE, EXTENDED RELEASE ORAL at 16:37

## 2023-04-27 RX ADMIN — TRAMADOL HYDROCHLORIDE 25 MILLIGRAM(S): 50 TABLET ORAL at 20:49

## 2023-04-27 NOTE — ED PROVIDER NOTE - CARE PLAN
1 Principal Discharge DX:	Back pain  Secondary Diagnosis:	Abdominal pain  Secondary Diagnosis:	Rectal bleeding

## 2023-04-27 NOTE — H&P ADULT - HISTORY OF PRESENT ILLNESS
76yo F hx of HFpEF, CAD s/p CABG, AS s/p SAVR (2016), atrial fibrillation s/p watchman (2021), bleeding disorder, asthma, COPD, DM2, HTN, CKD 2-3 presenting to the hospital for lower back pain, and GI bleed. Last week, patient had COVID-19. This week, she was having lower back pain with radiation to LLE with no improvement from OTC meds and having difficulty ambulation. Patient was also having BRBPR, noticed by daughter. Denies fevers, chills, dizziness, lightheadedness, shortness of breah, nausea/vomiting, dysuria.     Brought in via EMS. At EMS, was given fentanyl 200 mcg x1. At the ED, T: 98.7F, BP: 121/54, HR: 86bpm, RR: 18bpm, SpO2: 99% on RA. MILDRED (+) for blood and internal hemorrhoids on physical exam per ED. Labs significant for mild anemia (Hb- 11.1), hyperkalemia (K-5.3), elevated BUN (33), UA (+) for blood, only 2 RBC. CT A+P was not significant for acute pathology.     Patient admitted to the hospital for intractable pain, GIB. Given ketorolac x1, metocabamol x1, morphine 8mg IV, tramadol 25mg x1, and started on PPI infusion after PPI IVP. GI consulted. Possible EGD/colonoscopy tomorrow.      76yo F hx of diverticulitis, HFpEF, CAD s/p CABG, AS s/p SAVR (2016), atrial fibrillation s/p watchman (2021), bleeding disorder, asthma, COPD, DM2, HTN, CKD 2-3 presenting to the hospital for lower back pain, and GI bleed. On Monday 4/24/23, she was having lower back pain with radiation to LLE with no improvement from OTC meds and bloody BMs with associated abdominal pain. Today, back pain so severe that she called her daughter for help with assisting with cleaning her skin after having bloody BMs because she was having difficulty with ambulation. Patient was also having BRBPR, noticed by daughter. Patient does note she has internal hemorrhoids but felt this intensity of bleeding felt different. Has had diverticulitis in the past. Denies fevers, chills, dizziness, lightheadedness, shortness of breath, nausea/vomiting, dysuria.     Brought in via EMS. At EMS, was given fentanyl 200 mcg x1. At the ED, T: 98.7F, BP: 121/54, HR: 86bpm, RR: 18bpm, SpO2: 99% on RA. MILDRED (+) for blood and internal hemorrhoids on physical exam per ED. Labs significant for mild anemia (Hb- 11.1), hyperkalemia (K-5.3), elevated BUN (33), UA (+) for blood, only 2 RBC. CT A+P was not significant for acute pathology.     Patient admitted to the hospital for intractable pain, GIB. Given ketorolac x1, metjocabamol x1, morphine 8mg IV, tramadol 25mg x1, and started on PPI infusion after PPI IVP. Minimal improvement in her back pain with the pain regimen. GI consulted. Possible EGD/colonoscopy tomorrow.

## 2023-04-27 NOTE — ED PROVIDER NOTE - ATTENDING CONTRIBUTION TO CARE
77-year-old female past medical history of diabetes, hypertension, hyperlipidemia, CHF, asthma presents with lower back pain.  Patient states for the last few days she has been having right lower back pain radiating to her right leg.  Denies any fall or trauma.  No heavy lifting.  No numbness tingling or weakness.  No changes in urination or bowel habits.  Separately patient reports a few days of bright red blood per rectum.  No pain.  States that she had similar episodes in the past that resolved on their own.  Had a normal colonoscopy this year, does not member who did the procedure.  No chest pain shortness of breath or palpitations.  No nausea vomiting or diarrhea.  Recent illness.    CONSTITUTIONAL: Well-developed; well-nourished; in no acute distress.   SKIN: warm, dry  HEAD: Normocephalic; atraumatic.  EYES: PERRL, EOMI, no conjunctival erythema  ENT: No nasal discharge; airway clear.  NECK: Supple; non tender.  CARD: S1, S2 normal;  Regular rate and rhythm.   RESP: No wheezes, rales or rhonchi.  ABD: soft + tenderness LLQ, non distended, no rebound or guarding. + red blood on rectal exam.   EXT: Normal ROM.  5/5 strength in all 4 extremities. + tenderness to midline and right lumbar side.    LYMPH: No acute cervical adenopathy.  NEURO: Alert, oriented, grossly unremarkable. neurovascularly intact  PSYCH: Cooperative, appropriate.

## 2023-04-27 NOTE — PATIENT PROFILE ADULT - NSPROPTRIGHTCAREGIVER_GEN_A_NUR
Last Visit: 9/30/19 with KEILY Velez  Next Appointment: none  Previous Refill Encounter(s): 2/17/20 #30 with 1 refill    Requested Prescriptions     Pending Prescriptions Disp Refills    clopidogreL (Plavix) 75 mg tab 90 Tab 0     Sig: Take 1 Tab by mouth daily. no

## 2023-04-27 NOTE — H&P ADULT - ATTENDING COMMENTS
78 YO F w/ a PMH of HFpEF, CAD s/p CABG, AS s/p TAVR, chronic Afib s/p watchman, bleeding disorder, asthma, COPD (not on home O2), DM2, HTN, CKD3, and recent Hx of COVID19 who was BIBEMS for eval of bloody stools for the past x 1 day. Associated with - ABD pain. - hematemesis. Denies any hematuria, dysuria, rashes, bruising, N/V/D, or fevers/chills. ROS is positive for lumbar back pain radiating into LLE (denies any bowel/bladder incontinence or saddle paresthesia), otherwise negative, except as stated above.     In the ED, MILDRED was bloody. CT-AP w/ IV contrast showed no acute process. Hgb was 11.1. GI consulted and will consider scope in the AM.     FMHx:   -No family Hx of early cardiac death, CAD, asthma, or genetic disorders identified    Physical exam shows pt in NAD. VSS, afebrile, not hypoxic on RA. No Pallor present. A&Ox3. Neuro exam intact. CTA B/L with no W/C/R. RRR, no M/G/R. ABD with no TTP, normoactive BSs. LEs without swelling. No rashes or ecchymosis noted. Labs and radiology as above.     Normocytic anemia + bloody stools, due to lower GIB, suspect hemorrhoidal vs diverticular. HD stable. Trend CBC. Anemia studies. PPI. Active T&S. Transfuse PRN for Hgb < 7. Two large bore IVs. NPO. GI consult.    Difficulty ambulating due to acute back pain, non-traumatic; doubt cord compression. Non-benzo muscle relaxers. Lidocaine patch. PRN pain meds. PT consult. Fall precautions.     Hyperkalemia. Treat now. Repeat BMP in the AM.     Diabetes mellitus with hyperglycemia. A1c. FSs. Insulin standing/correctional dosing    Normocytic anemia, at baseline. Replace as necessary.     HX of HFpEF, CAD s/p CABG, AS s/p TAVR, chronic Afib s/p watchman, bleeding disorder, asthma, COPD (not on home O2), HTN, CKD3, and recent Hx of COVID19. Restart home meds, except as stated above. DVT PPX. Inform PCP of pt's admission to hospital. My note supersedes the residents note.     Date seen by Attendin23

## 2023-04-27 NOTE — ED ADULT TRIAGE NOTE - CHIEF COMPLAINT QUOTE
Pt BIBA from home for bloody stools x 1 day. Pt also c/o lower back pain radiating down right leg since monday. Given 200 mcg of fentanyl via EMS

## 2023-04-27 NOTE — ED PROVIDER NOTE - OBJECTIVE STATEMENT
76 y/o F with PMH A fib s/p Watchman, CAD, HTN, HLD, COPD, ?unknown bleeding disorder presenting for right lower back pain and BRBPR. Pt brought in by daughter who states pt has been having progressive pain at right lower back and buttock for past week, radiates down right leg and not responding to OTC pain meds. Pt states she has been having more and more difficulty walking and for past few days has not been walking. Today pt's daughter was helping her use the bathroom because of pain and daughter noted that there was a lot of blood in stool and underwear. Pt reports this has happened before in the past and that she has been holding her bowel movements because of pain when she moves around. Denies chest pain, SOB, lightheadedness, dizziness.

## 2023-04-27 NOTE — ED PROVIDER NOTE - CLINICAL SUMMARY MEDICAL DECISION MAKING FREE TEXT BOX
Patient presents with lower back pain and rectal bleeding. labs, imaging done. Hgb stable, GI consulted. Recommending keeping patient NPO. Patient admitted for further management.

## 2023-04-27 NOTE — H&P ADULT - NSHPLABSRESULTS_GEN_ALL_CORE
LABS:  cret                        11.1   8.00  )-----------( 138      ( 27 Apr 2023 14:55 )             35.7     04-27    139  |  104  |  33<H>  ----------------------------<  179<H>  5.3<H>   |  22  |  1.2    Ca    9.4      27 Apr 2023 14:55    TPro  6.5  /  Alb  3.9  /  TBili  0.8  /  DBili  x   /  AST  29  /  ALT  21  /  AlkPhos  70  04-27    PT/INR - ( 27 Apr 2023 17:54 )   PT: 12.90 sec;   INR: 1.13 ratio       < from: CT Abdomen and Pelvis w/ IV Cont (04.27.23 @ 17:09) >    IMPRESSION:  No CT evidence of an acute abdominopelvic pathology.    < end of copied text >      PTT - ( 27 Apr 2023 17:54 )  PTT:28.6 sec

## 2023-04-27 NOTE — H&P ADULT - ASSESSMENT
78yo F hx of diverticulitis, HFpEF, CAD s/p CABG, AS s/p SAVR (2016), atrial fibrillation s/p watchman (2021), bleeding disorder, asthma, COPD, DM2, HTN presenting to the hospital for lower back pain, and GI bleed.    #Acute Hematochezia: DDx includes diverticulitis, AVM, colitis, colon polyp, internal hemorrhoids. Low suspicion for colon CA  #Internal hemmorhoids  #Normocytic anemia  - Hb 11  - s/p PPI IV bolus, cont PPI drip  - clear liquids + NPO midnight  - GI following  - keep active T+S  - avoid NSAIDs received toradol @ ED for back pain)  - cont sucralfate    #Hyperkalemia: likely 2/2 home K PO supplementation  - K- 5.3  - insulin + dextrose  - repeat CMP in AM    #Difficulty with ambulation 2/2 pain  - PRN pain meds  - PT consult   muscle relaxers  - lidocaine patch for back    #CAD s/p CABG  #HFpEF  #HTN  - cont metoprolol  - hold diuretics, aspirin  - cont statin, ranolazine    #Paroxysmal atrial fibrillation s/p Watchmann  - no longer on therapeutic AC due to concern of bleeding    #COPD- not on home O2  #Hx of Asthma  - not in COPD exacerbation currently  - cont montelukast    #DM2  - check A1c  - check FS BG  - start insulin if FG>180    #DVT ppx: none. SCDs  #GI ppx: protonix drip  #Diet: Clear liquid. NPO midnight  #Activity: AAT  #Dispo: acute  #Code: full

## 2023-04-27 NOTE — H&P ADULT - NSHPPHYSICALEXAM_GEN_ALL_CORE
PHYSICAL EXAM:  GENERAL: NAD, lying in bed comfortably  HEAD:  Atraumatic, Normocephalic  EYES: EOMI, PERRLA, conjunctiva and sclera clear  ENT: Moist mucous membranes  NECK: Supple, No JVD  CHEST/LUNG: Clear to auscultation bilaterally; No rales, rhonchi, wheezing, or rubs. Unlabored respirations  HEART: Regular rate and rhythm; No murmurs, rubs, or gallops  ABDOMEN: Bowel sounds present; Soft, RLQ tenderness to palpation, Nondistended. No hepatomegaly  EXTREMITIES:  2+ Peripheral Pulses, brisk capillary refill. No clubbing, cyanosis, or edema  NERVOUS SYSTEM:  Alert & Oriented X3, speech clear. No deficits   MSK: FROM all 4 extremities, full and equal strength  SKIN: multiple small petechiae on upper and lower extremities b/l

## 2023-04-27 NOTE — ED PROVIDER NOTE - PROGRESS NOTE DETAILS
Livia: GI consulted. Advised to keep pt NPO, will see pt in morning. Advised CTA if pt becomes hemodynamically unstable.

## 2023-04-27 NOTE — PATIENT PROFILE ADULT - FUNCTIONAL ASSESSMENT - BASIC MOBILITY 6.
2-calculated by average/Not able to assess (calculate score using [facility] averaging method)

## 2023-04-27 NOTE — ED PROVIDER NOTE - PHYSICAL EXAMINATION
CONSTITUTIONAL: Well-developed; well-nourished; in no acute distress.   SKIN: Warm, dry  HEAD: Normocephalic; atraumatic  EYES: PERRL, EOMI, normal sclera and conjunctiva. No conjunctival pallor.   ENT: No nasal discharge; airway clear. MMM  NECK: Supple; non tender.  CARD:  Regular rate and rhythm. Normal S1, S2  RESP: No increased WOB. CTA b/l without wheezes, crackles, rhonchi  ABD: Normoactive BS. ++ttp RLQ, LLQ. +guarding. Soft, nondistended. Rectal exam with profuse bright red blood externally and in vault; internal hemorrhoid present at 12 o clock position.  BACK: Right buttock tender to palpation.   EXT: Normal ROM.   NEURO: Alert, oriented, grossly unremarkable. 5/5 lower extremity strength. LE sensation intact and equal.  PSYCH: Cooperative, appropriate.

## 2023-04-28 ENCOUNTER — TRANSCRIPTION ENCOUNTER (OUTPATIENT)
Age: 78
End: 2023-04-28

## 2023-04-28 DIAGNOSIS — M54.41 LUMBAGO WITH SCIATICA, RIGHT SIDE: ICD-10-CM

## 2023-04-28 LAB
A1C WITH ESTIMATED AVERAGE GLUCOSE RESULT: 6.5 % — HIGH (ref 4–5.6)
ALBUMIN SERPL ELPH-MCNC: 3.7 G/DL — SIGNIFICANT CHANGE UP (ref 3.5–5.2)
ALP SERPL-CCNC: 62 U/L — SIGNIFICANT CHANGE UP (ref 30–115)
ALT FLD-CCNC: 19 U/L — SIGNIFICANT CHANGE UP (ref 0–41)
ANION GAP SERPL CALC-SCNC: 9 MMOL/L — SIGNIFICANT CHANGE UP (ref 7–14)
AST SERPL-CCNC: 27 U/L — SIGNIFICANT CHANGE UP (ref 0–41)
BASOPHILS # BLD AUTO: 0.02 K/UL — SIGNIFICANT CHANGE UP (ref 0–0.2)
BASOPHILS # BLD AUTO: 0.02 K/UL — SIGNIFICANT CHANGE UP (ref 0–0.2)
BASOPHILS NFR BLD AUTO: 0.3 % — SIGNIFICANT CHANGE UP (ref 0–1)
BASOPHILS NFR BLD AUTO: 0.3 % — SIGNIFICANT CHANGE UP (ref 0–1)
BILIRUB SERPL-MCNC: 1 MG/DL — SIGNIFICANT CHANGE UP (ref 0.2–1.2)
BUN SERPL-MCNC: 24 MG/DL — HIGH (ref 10–20)
CALCIUM SERPL-MCNC: 9.2 MG/DL — SIGNIFICANT CHANGE UP (ref 8.4–10.5)
CHLORIDE SERPL-SCNC: 105 MMOL/L — SIGNIFICANT CHANGE UP (ref 98–110)
CO2 SERPL-SCNC: 30 MMOL/L — SIGNIFICANT CHANGE UP (ref 17–32)
CREAT SERPL-MCNC: 1.1 MG/DL — SIGNIFICANT CHANGE UP (ref 0.7–1.5)
CULTURE RESULTS: SIGNIFICANT CHANGE UP
EGFR: 52 ML/MIN/1.73M2 — LOW
EOSINOPHIL # BLD AUTO: 0.13 K/UL — SIGNIFICANT CHANGE UP (ref 0–0.7)
EOSINOPHIL # BLD AUTO: 0.13 K/UL — SIGNIFICANT CHANGE UP (ref 0–0.7)
EOSINOPHIL NFR BLD AUTO: 1.8 % — SIGNIFICANT CHANGE UP (ref 0–8)
EOSINOPHIL NFR BLD AUTO: 1.9 % — SIGNIFICANT CHANGE UP (ref 0–8)
ESTIMATED AVERAGE GLUCOSE: 140 MG/DL — HIGH (ref 68–114)
GLUCOSE SERPL-MCNC: 153 MG/DL — HIGH (ref 70–99)
HCT VFR BLD CALC: 33.2 % — LOW (ref 37–47)
HCT VFR BLD CALC: 34.8 % — LOW (ref 37–47)
HGB BLD-MCNC: 10.5 G/DL — LOW (ref 12–16)
HGB BLD-MCNC: 10.8 G/DL — LOW (ref 12–16)
IMM GRANULOCYTES NFR BLD AUTO: 0.3 % — SIGNIFICANT CHANGE UP (ref 0.1–0.3)
IMM GRANULOCYTES NFR BLD AUTO: 0.4 % — HIGH (ref 0.1–0.3)
LYMPHOCYTES # BLD AUTO: 1.71 K/UL — SIGNIFICANT CHANGE UP (ref 1.2–3.4)
LYMPHOCYTES # BLD AUTO: 1.72 K/UL — SIGNIFICANT CHANGE UP (ref 1.2–3.4)
LYMPHOCYTES # BLD AUTO: 23.1 % — SIGNIFICANT CHANGE UP (ref 20.5–51.1)
LYMPHOCYTES # BLD AUTO: 25.4 % — SIGNIFICANT CHANGE UP (ref 20.5–51.1)
MAGNESIUM SERPL-MCNC: 2.2 MG/DL — SIGNIFICANT CHANGE UP (ref 1.8–2.4)
MCHC RBC-ENTMCNC: 29.3 PG — SIGNIFICANT CHANGE UP (ref 27–31)
MCHC RBC-ENTMCNC: 29.9 PG — SIGNIFICANT CHANGE UP (ref 27–31)
MCHC RBC-ENTMCNC: 31 G/DL — LOW (ref 32–37)
MCHC RBC-ENTMCNC: 31.6 G/DL — LOW (ref 32–37)
MCV RBC AUTO: 94.6 FL — SIGNIFICANT CHANGE UP (ref 81–99)
MCV RBC AUTO: 94.6 FL — SIGNIFICANT CHANGE UP (ref 81–99)
MONOCYTES # BLD AUTO: 0.66 K/UL — HIGH (ref 0.1–0.6)
MONOCYTES # BLD AUTO: 0.81 K/UL — HIGH (ref 0.1–0.6)
MONOCYTES NFR BLD AUTO: 10.9 % — HIGH (ref 1.7–9.3)
MONOCYTES NFR BLD AUTO: 9.7 % — HIGH (ref 1.7–9.3)
NEUTROPHILS # BLD AUTO: 4.22 K/UL — SIGNIFICANT CHANGE UP (ref 1.4–6.5)
NEUTROPHILS # BLD AUTO: 4.71 K/UL — SIGNIFICANT CHANGE UP (ref 1.4–6.5)
NEUTROPHILS NFR BLD AUTO: 62.4 % — SIGNIFICANT CHANGE UP (ref 42.2–75.2)
NEUTROPHILS NFR BLD AUTO: 63.5 % — SIGNIFICANT CHANGE UP (ref 42.2–75.2)
NRBC # BLD: 0 /100 WBCS — SIGNIFICANT CHANGE UP (ref 0–0)
NRBC # BLD: 0 /100 WBCS — SIGNIFICANT CHANGE UP (ref 0–0)
PLATELET # BLD AUTO: 128 K/UL — LOW (ref 130–400)
PLATELET # BLD AUTO: 129 K/UL — LOW (ref 130–400)
PMV BLD: 10 FL — SIGNIFICANT CHANGE UP (ref 7.4–10.4)
PMV BLD: 9.9 FL — SIGNIFICANT CHANGE UP (ref 7.4–10.4)
POTASSIUM SERPL-MCNC: 4.7 MMOL/L — SIGNIFICANT CHANGE UP (ref 3.5–5)
POTASSIUM SERPL-SCNC: 4.7 MMOL/L — SIGNIFICANT CHANGE UP (ref 3.5–5)
PROT SERPL-MCNC: 6 G/DL — SIGNIFICANT CHANGE UP (ref 6–8)
RBC # BLD: 3.51 M/UL — LOW (ref 4.2–5.4)
RBC # BLD: 3.68 M/UL — LOW (ref 4.2–5.4)
RBC # FLD: 14.2 % — SIGNIFICANT CHANGE UP (ref 11.5–14.5)
RBC # FLD: 14.5 % — SIGNIFICANT CHANGE UP (ref 11.5–14.5)
SODIUM SERPL-SCNC: 144 MMOL/L — SIGNIFICANT CHANGE UP (ref 135–146)
SPECIMEN SOURCE: SIGNIFICANT CHANGE UP
WBC # BLD: 6.77 K/UL — SIGNIFICANT CHANGE UP (ref 4.8–10.8)
WBC # BLD: 7.41 K/UL — SIGNIFICANT CHANGE UP (ref 4.8–10.8)
WBC # FLD AUTO: 6.77 K/UL — SIGNIFICANT CHANGE UP (ref 4.8–10.8)
WBC # FLD AUTO: 7.41 K/UL — SIGNIFICANT CHANGE UP (ref 4.8–10.8)

## 2023-04-28 PROCEDURE — 99233 SBSQ HOSP IP/OBS HIGH 50: CPT

## 2023-04-28 PROCEDURE — 99222 1ST HOSP IP/OBS MODERATE 55: CPT

## 2023-04-28 PROCEDURE — 93306 TTE W/DOPPLER COMPLETE: CPT | Mod: 26

## 2023-04-28 PROCEDURE — 93010 ELECTROCARDIOGRAM REPORT: CPT

## 2023-04-28 PROCEDURE — 99223 1ST HOSP IP/OBS HIGH 75: CPT

## 2023-04-28 RX ORDER — METOPROLOL TARTRATE 50 MG
100 TABLET ORAL DAILY
Refills: 0 | Status: DISCONTINUED | OUTPATIENT
Start: 2023-04-28 | End: 2023-05-10

## 2023-04-28 RX ORDER — MORPHINE SULFATE 50 MG/1
4 CAPSULE, EXTENDED RELEASE ORAL ONCE
Refills: 0 | Status: DISCONTINUED | OUTPATIENT
Start: 2023-04-28 | End: 2023-04-28

## 2023-04-28 RX ORDER — LIDOCAINE 4 G/100G
1 CREAM TOPICAL EVERY 24 HOURS
Refills: 0 | Status: DISCONTINUED | OUTPATIENT
Start: 2023-04-28 | End: 2023-05-10

## 2023-04-28 RX ORDER — GABAPENTIN 400 MG/1
200 CAPSULE ORAL THREE TIMES A DAY
Refills: 0 | Status: DISCONTINUED | OUTPATIENT
Start: 2023-04-28 | End: 2023-04-29

## 2023-04-28 RX ORDER — PANTOPRAZOLE SODIUM 20 MG/1
40 TABLET, DELAYED RELEASE ORAL
Refills: 0 | Status: DISCONTINUED | OUTPATIENT
Start: 2023-04-28 | End: 2023-05-04

## 2023-04-28 RX ORDER — POLYETHYLENE GLYCOL 3350 17 G/17G
17 POWDER, FOR SOLUTION ORAL DAILY
Refills: 0 | Status: DISCONTINUED | OUTPATIENT
Start: 2023-04-28 | End: 2023-05-10

## 2023-04-28 RX ORDER — ACETAMINOPHEN 500 MG
1000 TABLET ORAL ONCE
Refills: 0 | Status: COMPLETED | OUTPATIENT
Start: 2023-04-28 | End: 2023-04-28

## 2023-04-28 RX ORDER — SENNA PLUS 8.6 MG/1
2 TABLET ORAL AT BEDTIME
Refills: 0 | Status: DISCONTINUED | OUTPATIENT
Start: 2023-04-28 | End: 2023-05-10

## 2023-04-28 RX ORDER — OXYCODONE HYDROCHLORIDE 5 MG/1
10 TABLET ORAL EVERY 6 HOURS
Refills: 0 | Status: DISCONTINUED | OUTPATIENT
Start: 2023-04-28 | End: 2023-04-29

## 2023-04-28 RX ORDER — ACETAMINOPHEN 500 MG
650 TABLET ORAL EVERY 6 HOURS
Refills: 0 | Status: DISCONTINUED | OUTPATIENT
Start: 2023-04-28 | End: 2023-05-10

## 2023-04-28 RX ORDER — METHOCARBAMOL 500 MG/1
750 TABLET, FILM COATED ORAL EVERY 6 HOURS
Refills: 0 | Status: DISCONTINUED | OUTPATIENT
Start: 2023-04-28 | End: 2023-05-05

## 2023-04-28 RX ORDER — SUCRALFATE 1 G
1 TABLET ORAL
Refills: 0 | Status: DISCONTINUED | OUTPATIENT
Start: 2023-04-28 | End: 2023-05-06

## 2023-04-28 RX ORDER — RANOLAZINE 500 MG/1
1000 TABLET, FILM COATED, EXTENDED RELEASE ORAL
Refills: 0 | Status: DISCONTINUED | OUTPATIENT
Start: 2023-04-28 | End: 2023-05-10

## 2023-04-28 RX ORDER — MONTELUKAST 4 MG/1
10 TABLET, CHEWABLE ORAL DAILY
Refills: 0 | Status: DISCONTINUED | OUTPATIENT
Start: 2023-04-28 | End: 2023-05-10

## 2023-04-28 RX ORDER — ACETAMINOPHEN 500 MG
650 TABLET ORAL EVERY 6 HOURS
Refills: 0 | Status: DISCONTINUED | OUTPATIENT
Start: 2023-04-28 | End: 2023-04-28

## 2023-04-28 RX ORDER — LANOLIN ALCOHOL/MO/W.PET/CERES
3 CREAM (GRAM) TOPICAL ONCE
Refills: 0 | Status: COMPLETED | OUTPATIENT
Start: 2023-04-28 | End: 2023-04-28

## 2023-04-28 RX ORDER — ATORVASTATIN CALCIUM 80 MG/1
40 TABLET, FILM COATED ORAL AT BEDTIME
Refills: 0 | Status: DISCONTINUED | OUTPATIENT
Start: 2023-04-28 | End: 2023-05-10

## 2023-04-28 RX ADMIN — Medication 1 GRAM(S): at 05:41

## 2023-04-28 RX ADMIN — SENNA PLUS 2 TABLET(S): 8.6 TABLET ORAL at 22:32

## 2023-04-28 RX ADMIN — GABAPENTIN 200 MILLIGRAM(S): 400 CAPSULE ORAL at 16:17

## 2023-04-28 RX ADMIN — LIDOCAINE 1 PATCH: 4 CREAM TOPICAL at 00:45

## 2023-04-28 RX ADMIN — METHOCARBAMOL 750 MILLIGRAM(S): 500 TABLET, FILM COATED ORAL at 05:42

## 2023-04-28 RX ADMIN — INSULIN HUMAN 10 UNIT(S): 100 INJECTION, SOLUTION SUBCUTANEOUS at 00:15

## 2023-04-28 RX ADMIN — Medication 650 MILLIGRAM(S): at 17:51

## 2023-04-28 RX ADMIN — OXYCODONE HYDROCHLORIDE 10 MILLIGRAM(S): 5 TABLET ORAL at 12:42

## 2023-04-28 RX ADMIN — Medication 50 MILLILITER(S): at 00:15

## 2023-04-28 RX ADMIN — Medication 1000 MILLIGRAM(S): at 11:45

## 2023-04-28 RX ADMIN — GABAPENTIN 200 MILLIGRAM(S): 400 CAPSULE ORAL at 11:16

## 2023-04-28 RX ADMIN — Medication 650 MILLIGRAM(S): at 11:45

## 2023-04-28 RX ADMIN — Medication 3 MILLIGRAM(S): at 00:45

## 2023-04-28 RX ADMIN — Medication 1 GRAM(S): at 11:16

## 2023-04-28 RX ADMIN — RANOLAZINE 1000 MILLIGRAM(S): 500 TABLET, FILM COATED, EXTENDED RELEASE ORAL at 05:42

## 2023-04-28 RX ADMIN — Medication 400 MILLIGRAM(S): at 10:48

## 2023-04-28 RX ADMIN — LIDOCAINE 1 PATCH: 4 CREAM TOPICAL at 04:07

## 2023-04-28 RX ADMIN — RANOLAZINE 1000 MILLIGRAM(S): 500 TABLET, FILM COATED, EXTENDED RELEASE ORAL at 17:51

## 2023-04-28 RX ADMIN — PANTOPRAZOLE SODIUM 40 MILLIGRAM(S): 20 TABLET, DELAYED RELEASE ORAL at 17:52

## 2023-04-28 RX ADMIN — MORPHINE SULFATE 4 MILLIGRAM(S): 50 CAPSULE, EXTENDED RELEASE ORAL at 04:17

## 2023-04-28 RX ADMIN — METHOCARBAMOL 750 MILLIGRAM(S): 500 TABLET, FILM COATED ORAL at 11:16

## 2023-04-28 RX ADMIN — Medication 650 MILLIGRAM(S): at 11:16

## 2023-04-28 RX ADMIN — MONTELUKAST 10 MILLIGRAM(S): 4 TABLET, CHEWABLE ORAL at 11:17

## 2023-04-28 RX ADMIN — METHOCARBAMOL 750 MILLIGRAM(S): 500 TABLET, FILM COATED ORAL at 17:51

## 2023-04-28 RX ADMIN — OXYCODONE HYDROCHLORIDE 10 MILLIGRAM(S): 5 TABLET ORAL at 13:42

## 2023-04-28 RX ADMIN — OXYCODONE HYDROCHLORIDE 5 MILLIGRAM(S): 5 TABLET ORAL at 00:01

## 2023-04-28 RX ADMIN — PANTOPRAZOLE SODIUM 10 MG/HR: 20 TABLET, DELAYED RELEASE ORAL at 00:56

## 2023-04-28 RX ADMIN — Medication 1 GRAM(S): at 17:51

## 2023-04-28 RX ADMIN — GABAPENTIN 200 MILLIGRAM(S): 400 CAPSULE ORAL at 22:32

## 2023-04-28 RX ADMIN — Medication 100 MILLIGRAM(S): at 05:41

## 2023-04-28 RX ADMIN — ATORVASTATIN CALCIUM 40 MILLIGRAM(S): 80 TABLET, FILM COATED ORAL at 22:32

## 2023-04-28 NOTE — CONSULT NOTE ADULT - ASSESSMENT
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 On Monday 4/24/23, she was having lower back pain with radiation to LLE with no improvement from OTC meds. Pt took a combination of tylenol, liquid advil and aleeve for the past week. She also noticed that she was constipated for 3 days and when attempting to have a bm, pt had large red blood per rectum with clots in her stool. This prompted the daughter to bring her to the hospital. She has not had any bloody bms since shes been in the hospital. She also endorses epigastric pain without bloody vomitus, weight loss, N/V, fever or chills. GI was consulted for management    #Hematochezia likely Diverticular vs Hemorrhoidal Bleed  #Iron Deficiency anemia  #Recent Hx of NSAID abuse w/ epigastric pain  - HD stable  - Hb at baseline 11  - MILDRED - Old blood red + Hemorrhoids  - EGD (2020); small hiatal hernia. Non erosive gastritis.. No H. Pylori  - CF (2020) - Good prep. Mild diverticulosis in left colon. Melanosis COLI. I/E Hemorrhoids.     Plan  - Clear liquid diet  - Monitor CBC BID  - Target Hb >8  - Protonix 40mg BID  - Pt will benefit from EGD/Colonoscopy. However, will need cardiac risk stratification prior to procedure. Will plan for EGD/Colonoscopy early next week  - Avoid NSAIDs  - If large hematochezia with red blood per rectum with HD instability please obtain stat CTAngio

## 2023-04-28 NOTE — CONSULT NOTE ADULT - PROBLEM SELECTOR RECOMMENDATION 9
No history of chronic opioid therapy. Low risk of opioid abuse per ORT.  1) Continue oxycodone IR 10mg Q6h prn; if no improvement, may rotate to hydromorphone PO 4mg Q4h prn  2) Start hydromorphone IV 0.5mg Q8h prn  3) Continue acetaminophen 650mg Q6h standing  4) Increase gabapentin to 300mg TID  5) Continue methocarbamol 750mg Q6h standing  6) Avoid NSAIDs in the setting of bleeding  7) Continue miralax, senna

## 2023-04-28 NOTE — CONSULT NOTE ADULT - ASSESSMENT
Patient is a 76 y/o woman with history of CHFpEF, CAD s/p CABG, AS s/p AVR, Afib w/ watchman, undiagnosed bleeding disorder, COPD, asthma, DM, HTN, and CKD who was admitted on 4/27/2023 with low back pain and recent GI bleed.

## 2023-04-28 NOTE — CONSULT NOTE ADULT - ASSESSMENT
* SUMMARY:    * Patient-based characteristics (Functional capacity)  Patient is able to achieve more than 4 MET (walk 4 blocks, climb 2 flights of stairs, etc...)          Y [] / N [x]    High-risk patient features:  - Recent (<30 days) or active MI          Y [] / N [X]  - Unstable or severe angina          Y [] / N [X]  - Decompensated heart failure, or worsening or new-onset heart failure          Y [] / N [X]  - Severe valvular disease          Y [] / N [X]  - Significant arrhythmia (Tachy- or Bradyarrhythmia)          Y [] / N [X]    * Surgery/Procedure-based characteristics (Type of surgery)  - Low-risk procedure (outpatient procedure, elective, endoscopy, etc...)          Y [X] / N []  - Elevated or Moderate-risk procedure (Inpatient)          Y [] / N []  - High-risk procedure (urgent/emergent procedure, Intrathoracic, vascular, etc...)          Y [] / N []    * Revised Cardiac Risk Index (RCRI)  1- History of ischemic heart disease          Y [X] / N []  2- History of congestive heart failure          Y [X] / N []  3- History of stroke/TIA          Y [] / N [X]  4- History of insulin-dependent diabetes          Y [X] / N []  5- Chronic kidney disease (Cr >2mg/dL)          Y [] / N [X]  6- Undergoing suprainguinal vascular, intraperitoneal, or intrathoracic surgery          Y [] / N [X]    Class IV risk (Three factors or more) --> >15% risk (30-day risk of death, MI, or cardiac arrest)    * IMPRESSION & RECOMMENDATIONS:  Pt has new EKG changes T wave inversion anterolateral leads  Obtain Echo  Will need Nuclear stress test  Will discuss with cardiologist * SUMMARY:    * Patient-based characteristics (Functional capacity)  Patient is able to achieve more than 4 MET (walk 4 blocks, climb 2 flights of stairs, etc...)          Y [] / N [x]    High-risk patient features:  - Recent (<30 days) or active MI          Y [] / N [X]  - Unstable or severe angina          Y [] / N [X]  - Decompensated heart failure, or worsening or new-onset heart failure          Y [] / N [X]  - Severe valvular disease          Y [] / N [X]  - Significant arrhythmia (Tachy- or Bradyarrhythmia)          Y [] / N [X]    * Surgery/Procedure-based characteristics (Type of surgery)  - Low-risk procedure (outpatient procedure, elective, endoscopy, etc...)          Y [X] / N []  - Elevated or Moderate-risk procedure (Inpatient)          Y [] / N []  - High-risk procedure (urgent/emergent procedure, Intrathoracic, vascular, etc...)          Y [] / N []    * Revised Cardiac Risk Index (RCRI)  1- History of ischemic heart disease          Y [X] / N []  2- History of congestive heart failure          Y [X] / N []  3- History of stroke/TIA          Y [] / N [X]  4- History of insulin-dependent diabetes          Y [X] / N []  5- Chronic kidney disease (Cr >2mg/dL)          Y [] / N [X]  6- Undergoing suprainguinal vascular, intraperitoneal, or intrathoracic surgery          Y [] / N [X]    Class IV risk (Three factors or more) --> >15% risk (30-day risk of death, MI, or cardiac arrest)    * IMPRESSION & RECOMMENDATIONS:  Obtain Echo  Will discuss with cardiologist

## 2023-04-28 NOTE — PHYSICAL THERAPY INITIAL EVALUATION ADULT - SPECIFY REASON(S)
1125 am PT attempted to see pt for eval however pt. c/o tremendous lower back pain, RN aware and will provide pain meds. PT to f/u later if appropriate.

## 2023-04-28 NOTE — DISCHARGE NOTE NURSING/CASE MANAGEMENT/SOCIAL WORK - PATIENT PORTAL LINK FT
You can access the FollowMyHealth Patient Portal offered by [facility] by registering at the following website: [email or web address]. By joining [employer]’s FollowMyHealth portal, you will also be able to view your health information using other applications (apps) compatible with our system.

## 2023-04-28 NOTE — DISCHARGE NOTE NURSING/CASE MANAGEMENT/SOCIAL WORK - NSDCPEFALRISK_GEN_ALL_CORE
For information on Fall & Injury Prevention, visit: [email or web address] OR  [email or web address] OR  https://www.cdc.gov/steadi/patient.html

## 2023-04-28 NOTE — PROGRESS NOTE ADULT - ASSESSMENT
77F w/ PMHx of unknown bleeding disorder (s/p workup 5/2021 by Dr. Louis; w/o diagnosis), severe transfusion reactions, diverticulitis, HFpEF, CAD (s/p CABG; Dr. Ramirez), AS (s/p surgical AVR, 2016), atrial fibrillation (s/p watchman, 2021), asthma, COPD, HTN, and DM p/w lower back pain + GI bleed.    # Hematochezia  # Normocytic anemia  # HO Internal hemorrhoids  Patient endorses bloody BMs + BRBPR. Patient has HO internal hemorrhoids but says intensity of bleed felt different.   - GI eval:       - Likely diverticular vs hemorrhoidal bleed       - Clear liquid diet       - CBC BID, transfuse if Hgb < 8       - Protonix 40mg BID       - Plan for EGD/colonoscopy early next week after cardiac risk stratification       - If large hematochezia w/ BRBPR + HD instability, get STAT CT angio  - C/w sucralfate 1g PO QID  - Bowel reg  - CBC BID, transfuse if Hgb < 8  - Keep active T&S  - F/u anemia w/u    # Severe lower back pain  # ?Sciatica  R straight leg raise (+).  - C/w lidocaine patch Q24H  - C/w methocarbamol 750mg PO Q6H  - C/w acetaminophen 650mg PO Q6H standing  - C/w gabapentin 200mg PO TID  - C/w oxycodone 10mg PO Q6H PRN  - C/s pain management, appreciate recs  - PT once pain is better controlled    # CAD s/p CABG  # HFpEF  # Severe pHTN  # HTN  # pAfib s/p watchman  Not on AC 2/2 concern for bleed.  - Holding diuretics/aspirin for GI bleed  - C/w atorvastatin 40mg PO QHS  - C/w metoprolol succinate ER 100mg PO QD  - C/w ranolazine 1000mg PO BID  - Fluid restriction to 1200mL  - I/Os, daily weight    # COPD (not on home O2)  # ?LENY  # Obesity hypoventilation syndrome  # HO asthma  - C/w montelukast 10mg PO QD    # DM2  A1c 6.5.  - Monitor FS  - Start insulin if >180    # To follow up  - Cardio eval for clearance  - Plan for EGD/colonoscopy early next week  - CBC BID, transfuse if Hgb < 8  - Keep active T&S  - F/u anemia w/u    # Misc  - DVT Prophylaxis: Compression Device Sequential  - GI Prophylaxis: pantoprazole Infusion 8 mG/Hr IV Continuous <Continuous>  - Diet: Diet, Clear Liquid  - Activity: Activity - Ambulate as Tolerated  - Code Status: Full    77F w/ PMHx of unknown bleeding disorder (s/p workup 5/2021 by Dr. Louis; w/o diagnosis), severe transfusion reactions, diverticulitis, HFpEF, CAD (s/p CABG; Dr. Ramirez), AS (s/p surgical AVR, 2016), atrial fibrillation (s/p watchman, 2021), asthma, COPD, HTN, and DM p/w lower back pain + GI bleed.    # Hematochezia  # Normocytic anemia  # HO Internal hemorrhoids  Patient endorses bloody BMs + BRBPR. Patient has HO internal hemorrhoids but says intensity of bleed felt different.   - GI eval:       - Likely diverticular vs hemorrhoidal bleed       - Clear liquid diet       - CBC BID, transfuse if Hgb < 8       - Protonix 40mg BID       - Plan for EGD/colonoscopy early next week after cardiac risk stratification       - If large hematochezia w/ BRBPR + HD instability, get STAT CT angio  - C/w sucralfate 1g PO QID  - Bowel reg  - CBC BID, transfuse if Hgb < 8  - Keep active T&S  - F/u anemia w/u    # Severe lower back pain  # ?Sciatica  R straight leg raise (+).  - C/w lidocaine patch Q24H  - C/w methocarbamol 750mg PO Q6H  - C/w acetaminophen 650mg PO Q6H standing  - C/w gabapentin 200mg PO TID  - C/w oxycodone 10mg PO Q6H PRN  - C/s pain management, appreciate recs  - PT once pain is better controlled    # CAD s/p CABG  # HFpEF  # Severe pHTN  # HTN  # pAfib s/p watchman  Not on AC 2/2 concern for bleed.  - Holding diuretics/aspirin for GI bleed  - C/w atorvastatin 40mg PO QHS  - C/w metoprolol succinate ER 100mg PO QD  - C/w ranolazine 1000mg PO BID  - Fluid restriction to 1200mL  - I/Os, daily weight    # COPD (not on home O2)  # ?LENY  # Obesity hypoventilation syndrome  # HO asthma  - C/w montelukast 10mg PO QD    # DM2  A1c 6.5.  - Monitor FS  - Start insulin if >180    # To follow up  - Cardio eval for clearance  - Plan for EGD/colonoscopy early next week  - CBC BID, transfuse if Hgb < 8  - Keep active T&S  - F/u anemia w/u    # Misc  - DVT Prophylaxis: Compression Device Sequential  - GI Prophylaxis: pantoprazole 40mg IV BID  - Diet: Diet, Clear Liquid  - Activity: Activity - Ambulate as Tolerated  - Code Status: Full

## 2023-04-28 NOTE — CONSULT NOTE ADULT - ATTENDING COMMENTS
Patient with rectal bleeding.  Pt will benefit from EGD/Colonoscopy. However, will need cardiac risk stratification prior to procedure. Will plan for Colonoscopy early next week

## 2023-04-28 NOTE — PROGRESS NOTE ADULT - ASSESSMENT
76yo F hx of diverticulitis, HFpEF, CAD s/p CABG, AS s/p SAVR (2016), atrial fibrillation s/p watchman (2021), bleeding disorder, asthma, COPD, DM2, HTN presenting to the hospital for lower back pain, and GI bleed.      A/P  # Sciatica/ severe lower back pain  - give IV Tylenol 1 gm x one now and start Tylenol po standing  - start Neurontin 200 mg TID, c/w muscle relaxants  - may consult pain management for better pain control   - pt denies urinary or stool incontinence at this time, no clinica indications for imaging   - will call PT eval when pain is better controlled      #Hematochezia/ Internal hemorrhoids /Normocytic anemia  - likely due to hemorrhoids  - send anemia work up   - monitor H/H, keep Hb above7.5, if bleeding persists check CBC Q 12 hours    - pt was consulted by GI, will give IV PPIs   - pt kept NPO, no test planned for today, may resume diet   - consult cardiology for risk stratification, GI might schedule EGD/ colonoscopy early next week   - prevent constipation   - cont sucralfate    #Hyperkalemia  - resolved     #CAD s/p CABG/ chronic diastolic CHF/ severe PHTN/ HTN  - fluid restriction 1200 ml in 24 hours   - intake and output monitoring, daily weight   - monitor for fluid overload    - cont metoprolol  - diuretics, aspirin held on admission   - cont statin, ranolazine    #Paroxysmal atrial fibrillation s/p Watchmann  - no longer on therapeutic AC due to concern of bleeding  - c/w BB     #COPD- not on home O2/ Hx of Asthma/ suspected LENY/ obesity hypoventilation syndrome   - stable for now   - cont montelukast    #DM2  - carb consistent diet   - monitor finger stick   - check A1c  - start insulin if FG>180    #DVT ppx: none. SCDs  #GI ppx: protonix drip    #Progress Note Handoff  Pending (specify): change PPI to Q 12 hours IV, resume diet, consult cardiology for risk stratification, GI is planning EGD/colonoscopy early next week, send anemia work up, monitor H/H, keep Hb above 7.5 put pt on CHF protocol, monitor for fluid overload, start Tylenol around the clock, Neurontin, consult pain management for sciatica, supportive care    Family discussion: I spoke with pt, she agreed with a plan of care   Disposition: Home___/SNF___/Other________/Unknown at this time____x ____

## 2023-04-29 LAB
BASOPHILS # BLD AUTO: 0.02 K/UL — SIGNIFICANT CHANGE UP (ref 0–0.2)
BASOPHILS NFR BLD AUTO: 0.3 % — SIGNIFICANT CHANGE UP (ref 0–1)
EOSINOPHIL # BLD AUTO: 0.15 K/UL — SIGNIFICANT CHANGE UP (ref 0–0.7)
EOSINOPHIL NFR BLD AUTO: 2 % — SIGNIFICANT CHANGE UP (ref 0–8)
GLUCOSE BLDC GLUCOMTR-MCNC: 104 MG/DL — HIGH (ref 70–99)
GLUCOSE BLDC GLUCOMTR-MCNC: 136 MG/DL — HIGH (ref 70–99)
GLUCOSE BLDC GLUCOMTR-MCNC: 174 MG/DL — HIGH (ref 70–99)
GLUCOSE BLDC GLUCOMTR-MCNC: 87 MG/DL — SIGNIFICANT CHANGE UP (ref 70–99)
HCT VFR BLD CALC: 34.4 % — LOW (ref 37–47)
HGB BLD-MCNC: 10.7 G/DL — LOW (ref 12–16)
IMM GRANULOCYTES NFR BLD AUTO: 0.1 % — SIGNIFICANT CHANGE UP (ref 0.1–0.3)
LYMPHOCYTES # BLD AUTO: 1.97 K/UL — SIGNIFICANT CHANGE UP (ref 1.2–3.4)
LYMPHOCYTES # BLD AUTO: 26.2 % — SIGNIFICANT CHANGE UP (ref 20.5–51.1)
MCHC RBC-ENTMCNC: 29.4 PG — SIGNIFICANT CHANGE UP (ref 27–31)
MCHC RBC-ENTMCNC: 31.1 G/DL — LOW (ref 32–37)
MCV RBC AUTO: 94.5 FL — SIGNIFICANT CHANGE UP (ref 81–99)
MONOCYTES # BLD AUTO: 0.82 K/UL — HIGH (ref 0.1–0.6)
MONOCYTES NFR BLD AUTO: 10.9 % — HIGH (ref 1.7–9.3)
NEUTROPHILS # BLD AUTO: 4.54 K/UL — SIGNIFICANT CHANGE UP (ref 1.4–6.5)
NEUTROPHILS NFR BLD AUTO: 60.5 % — SIGNIFICANT CHANGE UP (ref 42.2–75.2)
NRBC # BLD: 0 /100 WBCS — SIGNIFICANT CHANGE UP (ref 0–0)
PLATELET # BLD AUTO: 123 K/UL — LOW (ref 130–400)
PMV BLD: 9.3 FL — SIGNIFICANT CHANGE UP (ref 7.4–10.4)
RBC # BLD: 3.64 M/UL — LOW (ref 4.2–5.4)
RBC # FLD: 14.1 % — SIGNIFICANT CHANGE UP (ref 11.5–14.5)
WBC # BLD: 7.51 K/UL — SIGNIFICANT CHANGE UP (ref 4.8–10.8)
WBC # FLD AUTO: 7.51 K/UL — SIGNIFICANT CHANGE UP (ref 4.8–10.8)

## 2023-04-29 PROCEDURE — 99233 SBSQ HOSP IP/OBS HIGH 50: CPT

## 2023-04-29 PROCEDURE — 93970 EXTREMITY STUDY: CPT | Mod: 26

## 2023-04-29 RX ORDER — GABAPENTIN 400 MG/1
300 CAPSULE ORAL THREE TIMES A DAY
Refills: 0 | Status: DISCONTINUED | OUTPATIENT
Start: 2023-04-29 | End: 2023-05-05

## 2023-04-29 RX ORDER — OXYCODONE HYDROCHLORIDE 5 MG/1
10 TABLET ORAL EVERY 6 HOURS
Refills: 0 | Status: DISCONTINUED | OUTPATIENT
Start: 2023-04-29 | End: 2023-05-05

## 2023-04-29 RX ORDER — HYDROMORPHONE HYDROCHLORIDE 2 MG/ML
0.5 INJECTION INTRAMUSCULAR; INTRAVENOUS; SUBCUTANEOUS EVERY 8 HOURS
Refills: 0 | Status: DISCONTINUED | OUTPATIENT
Start: 2023-04-29 | End: 2023-05-05

## 2023-04-29 RX ADMIN — Medication 1 GRAM(S): at 05:20

## 2023-04-29 RX ADMIN — GABAPENTIN 300 MILLIGRAM(S): 400 CAPSULE ORAL at 21:19

## 2023-04-29 RX ADMIN — SENNA PLUS 2 TABLET(S): 8.6 TABLET ORAL at 21:20

## 2023-04-29 RX ADMIN — Medication 650 MILLIGRAM(S): at 17:52

## 2023-04-29 RX ADMIN — PANTOPRAZOLE SODIUM 40 MILLIGRAM(S): 20 TABLET, DELAYED RELEASE ORAL at 17:52

## 2023-04-29 RX ADMIN — POLYETHYLENE GLYCOL 3350 17 GRAM(S): 17 POWDER, FOR SOLUTION ORAL at 11:38

## 2023-04-29 RX ADMIN — GABAPENTIN 300 MILLIGRAM(S): 400 CAPSULE ORAL at 14:43

## 2023-04-29 RX ADMIN — PANTOPRAZOLE SODIUM 40 MILLIGRAM(S): 20 TABLET, DELAYED RELEASE ORAL at 05:19

## 2023-04-29 RX ADMIN — Medication 650 MILLIGRAM(S): at 00:50

## 2023-04-29 RX ADMIN — GABAPENTIN 200 MILLIGRAM(S): 400 CAPSULE ORAL at 05:19

## 2023-04-29 RX ADMIN — HYDROMORPHONE HYDROCHLORIDE 0.5 MILLIGRAM(S): 2 INJECTION INTRAMUSCULAR; INTRAVENOUS; SUBCUTANEOUS at 17:56

## 2023-04-29 RX ADMIN — METHOCARBAMOL 750 MILLIGRAM(S): 500 TABLET, FILM COATED ORAL at 05:18

## 2023-04-29 RX ADMIN — RANOLAZINE 1000 MILLIGRAM(S): 500 TABLET, FILM COATED, EXTENDED RELEASE ORAL at 17:52

## 2023-04-29 RX ADMIN — METHOCARBAMOL 750 MILLIGRAM(S): 500 TABLET, FILM COATED ORAL at 17:52

## 2023-04-29 RX ADMIN — Medication 1 GRAM(S): at 17:52

## 2023-04-29 RX ADMIN — MONTELUKAST 10 MILLIGRAM(S): 4 TABLET, CHEWABLE ORAL at 11:38

## 2023-04-29 RX ADMIN — Medication 1 GRAM(S): at 23:03

## 2023-04-29 RX ADMIN — METHOCARBAMOL 750 MILLIGRAM(S): 500 TABLET, FILM COATED ORAL at 00:50

## 2023-04-29 RX ADMIN — OXYCODONE HYDROCHLORIDE 10 MILLIGRAM(S): 5 TABLET ORAL at 11:42

## 2023-04-29 RX ADMIN — Medication 1 GRAM(S): at 00:50

## 2023-04-29 RX ADMIN — Medication 650 MILLIGRAM(S): at 11:38

## 2023-04-29 RX ADMIN — Medication 650 MILLIGRAM(S): at 05:19

## 2023-04-29 RX ADMIN — ATORVASTATIN CALCIUM 40 MILLIGRAM(S): 80 TABLET, FILM COATED ORAL at 21:20

## 2023-04-29 RX ADMIN — Medication 1 GRAM(S): at 11:38

## 2023-04-29 RX ADMIN — METHOCARBAMOL 750 MILLIGRAM(S): 500 TABLET, FILM COATED ORAL at 11:38

## 2023-04-29 RX ADMIN — Medication 100 MILLIGRAM(S): at 05:19

## 2023-04-29 RX ADMIN — RANOLAZINE 1000 MILLIGRAM(S): 500 TABLET, FILM COATED, EXTENDED RELEASE ORAL at 05:19

## 2023-04-29 NOTE — PHYSICAL THERAPY INITIAL EVALUATION ADULT - PERTINENT HX OF CURRENT PROBLEM, REHAB EVAL
78yo F hx of diverticulitis, HFpEF, CAD s/p CABG, AS s/p SAVR (2016), atrial fibrillation s/p watchman (2021), bleeding disorder, asthma, COPD, DM2, HTN, CKD 2-3 presenting to the hospital for lower back pain, and GI bleed. On Monday 4/24/23, she was having lower back pain with radiation to RLE with no improvement from OTC meds and bloody BMs with associated abdominal pain.

## 2023-04-29 NOTE — PHYSICAL THERAPY INITIAL EVALUATION ADULT - GENERAL OBSERVATIONS, REHAB EVAL
PT IE [id number]. Chart reviewed. pt encountered semi-mackenzie in bed. In NAD. + IV lock, + primafit. pt c/o 6-7/10 pain of LB and radiating to R LE. Hwever willing to participate in PT session.

## 2023-04-29 NOTE — PROGRESS NOTE ADULT - ASSESSMENT
76yo F hx of diverticulitis, HFpEF, CAD s/p CABG, AS s/p SAVR (2016), atrial fibrillation s/p watchman (2021), bleeding disorder, asthma, COPD, DM2, HTN presenting to the hospital for lower back pain, and GI bleed.      A/P  # Sciatica/ severe lower back pain  -clinically improving   - consulted by pain management recommendations noted:   1) Continue oxycodone IR 10mg Q6h prn; if no improvement, may rotate to hydromorphone PO 4mg Q4h prn  2) Start hydromorphone IV 0.5mg Q8h prn  3) Continue acetaminophen 650mg Q6h standing  4) Increase gabapentin to 300mg TID  5) Continue methocarbamol 750mg Q6h standing  6) Avoid NSAIDs in the setting of bleeding  7) Continue miralax, senna.  - pt denies urinary or stool incontinence at this time, no clinica indications for imaging   - will call PT eval when pain is better controlled      #Hematochezia/ Internal hemorrhoids /Normocytic anemia  - likely due to hemorrhoids, no BMs since admission reported   - send anemia work up   - monitor H/H, keep Hb above7.5, if bleeding persists check CBC Q 12 hours    - pt was consulted by GI, will give IV PPIs   - pt kept NPO, no test planned for today, may resume diet   - consult cardiology for risk stratification, GI might schedule EGD/ colonoscopy early next week   - prevent constipation   - cont sucralfate        #CAD s/p CABG/ chronic diastolic CHF/ severe PHTN/ HTN  - fluid restriction 1200 ml in 24 hours   - intake and output monitoring, daily weight   - monitor for fluid overload    - cont metoprolol  - diuretics, aspirin held on admission   - cont statin, ranolazine    #Paroxysmal atrial fibrillation s/p Watchmann  - no longer on therapeutic AC due to concern of bleeding  - c/w BB     #COPD- not on home O2/ Hx of Asthma/ suspected LENY/ obesity hypoventilation syndrome   - stable for now   - cont montelukast    #DM2  - carb consistent diet   - monitor finger stick   - check A1c  - start insulin if FG>180    #DVT ppx: none. SCDs  #GI ppx: protonix drip    #Progress Note Handoff  Pending (specify): may resume regular diet for today, GI follow up to confirm EGD/ Colonoscopy on Monday, if confirmed will keep pt on clear liquid diet and start bowel prep on Sunday, f/u  send anemia work up, monitor H/H, keep Hb above 7.5 put  CHF protocol, monitor for fluid overload, OOB to chair, PT as tolerated    Family discussion: I spoke with pt, she agreed with a plan of care   Disposition: Home___/SNF___/Other________/Unknown at this time____x ____

## 2023-04-29 NOTE — PROGRESS NOTE ADULT - ASSESSMENT
77F w/ PMHx of unknown bleeding disorder (s/p workup 5/2021 by Dr. Louis; w/o diagnosis), severe transfusion reactions, diverticulitis, HFpEF, CAD (s/p CABG; Dr. Ramirez), AS (s/p surgical AVR, 2016), atrial fibrillation (s/p watchman, 2021), asthma, COPD, HTN, and DM p/w lower back pain + GI bleed.    # Hematochezia  # Normocytic anemia  # HO Internal hemorrhoids  Patient endorses bloody BMs + BRBPR. Patient has HO internal hemorrhoids but says intensity of bleed felt different.   - GI eval:       - Likely diverticular vs hemorrhoidal bleed       - Clear liquid diet       - CBC BID, transfuse if Hgb < 8       - Protonix 40mg BID       - Plan for EGD/colonoscopy early next week after cardiac risk stratification [moderate risk]       - If large hematochezia w/ BRBPR + HD instability, get STAT CT angio  - C/w sucralfate 1g PO QID  - Bowel reg  - CBC BID, transfuse if Hgb < 8  - Keep active T&S  - F/u anemia w/u    # Severe lower back pain  # ?Sciatica  R straight leg raise (+).  - C/w lidocaine patch Q24H  - C/w methocarbamol 750mg PO Q6H  - C/w acetaminophen 650mg PO Q6H standing  - C/w gabapentin 200mg PO TID  - C/w oxycodone 10mg PO Q6H PRN  - C/s pain management, appreciate recs  - PT once pain is better controlled    # CAD s/p CABG  # HFpEF  # Severe pHTN  # HTN  # pAfib s/p watchman  Not on AC 2/2 concern for bleed. TTE w/ EF 72%, severely enlarged LA, G2DD, mod reduced RV systolic function, severe mitral annular calcification, mod TR, PASP 66.7 mmHg.  - Holding diuretics/aspirin for GI bleed  - C/w atorvastatin 40mg PO QHS  - C/w metoprolol succinate ER 100mg PO QD  - C/w ranolazine 1000mg PO BID  - Fluid restriction to 1200mL  - I/Os, daily weight    # COPD (not on home O2)  # ?LENY  # Obesity hypoventilation syndrome  # HO asthma  - C/w montelukast 10mg PO QD    # DM2  A1c 6.5.  - Monitor FS  - Start insulin if >180    # To follow up  - Plan for EGD/colonoscopy early next week  - CBC BID, transfuse if Hgb < 8  - Keep active T&S  - F/u anemia w/u    # Misc  - DVT Prophylaxis: Compression Device Sequential  - GI Prophylaxis: pantoprazole 40mg IV BID  - Diet: Diet, Clear Liquid  - Activity: Activity - Ambulate as Tolerated  - Code Status: Full

## 2023-04-30 LAB
ALBUMIN SERPL ELPH-MCNC: 3.6 G/DL — SIGNIFICANT CHANGE UP (ref 3.5–5.2)
ALP SERPL-CCNC: 57 U/L — SIGNIFICANT CHANGE UP (ref 30–115)
ALT FLD-CCNC: 15 U/L — SIGNIFICANT CHANGE UP (ref 0–41)
ANION GAP SERPL CALC-SCNC: 13 MMOL/L — SIGNIFICANT CHANGE UP (ref 7–14)
AST SERPL-CCNC: 26 U/L — SIGNIFICANT CHANGE UP (ref 0–41)
BASOPHILS # BLD AUTO: 0.01 K/UL — SIGNIFICANT CHANGE UP (ref 0–0.2)
BASOPHILS # BLD AUTO: 0.02 K/UL — SIGNIFICANT CHANGE UP (ref 0–0.2)
BASOPHILS NFR BLD AUTO: 0.1 % — SIGNIFICANT CHANGE UP (ref 0–1)
BASOPHILS NFR BLD AUTO: 0.3 % — SIGNIFICANT CHANGE UP (ref 0–1)
BILIRUB SERPL-MCNC: 0.9 MG/DL — SIGNIFICANT CHANGE UP (ref 0.2–1.2)
BLD GP AB SCN SERPL QL: SIGNIFICANT CHANGE UP
BUN SERPL-MCNC: 25 MG/DL — HIGH (ref 10–20)
CALCIUM SERPL-MCNC: 9 MG/DL — SIGNIFICANT CHANGE UP (ref 8.4–10.5)
CHLORIDE SERPL-SCNC: 104 MMOL/L — SIGNIFICANT CHANGE UP (ref 98–110)
CO2 SERPL-SCNC: 24 MMOL/L — SIGNIFICANT CHANGE UP (ref 17–32)
CREAT SERPL-MCNC: 1.1 MG/DL — SIGNIFICANT CHANGE UP (ref 0.7–1.5)
EGFR: 52 ML/MIN/1.73M2 — LOW
EOSINOPHIL # BLD AUTO: 0.08 K/UL — SIGNIFICANT CHANGE UP (ref 0–0.7)
EOSINOPHIL # BLD AUTO: 0.09 K/UL — SIGNIFICANT CHANGE UP (ref 0–0.7)
EOSINOPHIL NFR BLD AUTO: 1.2 % — SIGNIFICANT CHANGE UP (ref 0–8)
EOSINOPHIL NFR BLD AUTO: 1.2 % — SIGNIFICANT CHANGE UP (ref 0–8)
GLUCOSE BLDC GLUCOMTR-MCNC: 115 MG/DL — HIGH (ref 70–99)
GLUCOSE BLDC GLUCOMTR-MCNC: 122 MG/DL — HIGH (ref 70–99)
GLUCOSE BLDC GLUCOMTR-MCNC: 91 MG/DL — SIGNIFICANT CHANGE UP (ref 70–99)
GLUCOSE SERPL-MCNC: 77 MG/DL — SIGNIFICANT CHANGE UP (ref 70–99)
HCT VFR BLD CALC: 33.7 % — LOW (ref 37–47)
HCT VFR BLD CALC: 35.1 % — LOW (ref 37–47)
HGB BLD-MCNC: 10.6 G/DL — LOW (ref 12–16)
HGB BLD-MCNC: 11.2 G/DL — LOW (ref 12–16)
IMM GRANULOCYTES NFR BLD AUTO: 0.1 % — SIGNIFICANT CHANGE UP (ref 0.1–0.3)
IMM GRANULOCYTES NFR BLD AUTO: 0.4 % — HIGH (ref 0.1–0.3)
LYMPHOCYTES # BLD AUTO: 1.17 K/UL — LOW (ref 1.2–3.4)
LYMPHOCYTES # BLD AUTO: 1.4 K/UL — SIGNIFICANT CHANGE UP (ref 1.2–3.4)
LYMPHOCYTES # BLD AUTO: 15.4 % — LOW (ref 20.5–51.1)
LYMPHOCYTES # BLD AUTO: 20.5 % — SIGNIFICANT CHANGE UP (ref 20.5–51.1)
MCHC RBC-ENTMCNC: 29.2 PG — SIGNIFICANT CHANGE UP (ref 27–31)
MCHC RBC-ENTMCNC: 29.9 PG — SIGNIFICANT CHANGE UP (ref 27–31)
MCHC RBC-ENTMCNC: 31.5 G/DL — LOW (ref 32–37)
MCHC RBC-ENTMCNC: 31.9 G/DL — LOW (ref 32–37)
MCV RBC AUTO: 92.8 FL — SIGNIFICANT CHANGE UP (ref 81–99)
MCV RBC AUTO: 93.9 FL — SIGNIFICANT CHANGE UP (ref 81–99)
MONOCYTES # BLD AUTO: 0.75 K/UL — HIGH (ref 0.1–0.6)
MONOCYTES # BLD AUTO: 0.8 K/UL — HIGH (ref 0.1–0.6)
MONOCYTES NFR BLD AUTO: 10.5 % — HIGH (ref 1.7–9.3)
MONOCYTES NFR BLD AUTO: 11 % — HIGH (ref 1.7–9.3)
NEUTROPHILS # BLD AUTO: 4.57 K/UL — SIGNIFICANT CHANGE UP (ref 1.4–6.5)
NEUTROPHILS # BLD AUTO: 5.49 K/UL — SIGNIFICANT CHANGE UP (ref 1.4–6.5)
NEUTROPHILS NFR BLD AUTO: 67.1 % — SIGNIFICANT CHANGE UP (ref 42.2–75.2)
NEUTROPHILS NFR BLD AUTO: 72.2 % — SIGNIFICANT CHANGE UP (ref 42.2–75.2)
NRBC # BLD: 0 /100 WBCS — SIGNIFICANT CHANGE UP (ref 0–0)
NRBC # BLD: 0 /100 WBCS — SIGNIFICANT CHANGE UP (ref 0–0)
PLATELET # BLD AUTO: 128 K/UL — LOW (ref 130–400)
PLATELET # BLD AUTO: 139 K/UL — SIGNIFICANT CHANGE UP (ref 130–400)
PMV BLD: 9.8 FL — SIGNIFICANT CHANGE UP (ref 7.4–10.4)
PMV BLD: 9.9 FL — SIGNIFICANT CHANGE UP (ref 7.4–10.4)
POTASSIUM SERPL-MCNC: 4.2 MMOL/L — SIGNIFICANT CHANGE UP (ref 3.5–5)
POTASSIUM SERPL-SCNC: 4.2 MMOL/L — SIGNIFICANT CHANGE UP (ref 3.5–5)
PROT SERPL-MCNC: 6 G/DL — SIGNIFICANT CHANGE UP (ref 6–8)
RBC # BLD: 3.63 M/UL — LOW (ref 4.2–5.4)
RBC # BLD: 3.74 M/UL — LOW (ref 4.2–5.4)
RBC # FLD: 14.1 % — SIGNIFICANT CHANGE UP (ref 11.5–14.5)
RBC # FLD: 14.2 % — SIGNIFICANT CHANGE UP (ref 11.5–14.5)
SODIUM SERPL-SCNC: 141 MMOL/L — SIGNIFICANT CHANGE UP (ref 135–146)
WBC # BLD: 6.82 K/UL — SIGNIFICANT CHANGE UP (ref 4.8–10.8)
WBC # BLD: 7.6 K/UL — SIGNIFICANT CHANGE UP (ref 4.8–10.8)
WBC # FLD AUTO: 6.82 K/UL — SIGNIFICANT CHANGE UP (ref 4.8–10.8)
WBC # FLD AUTO: 7.6 K/UL — SIGNIFICANT CHANGE UP (ref 4.8–10.8)

## 2023-04-30 PROCEDURE — 99233 SBSQ HOSP IP/OBS HIGH 50: CPT

## 2023-04-30 RX ORDER — ONDANSETRON 8 MG/1
4 TABLET, FILM COATED ORAL ONCE
Refills: 0 | Status: COMPLETED | OUTPATIENT
Start: 2023-04-30 | End: 2023-04-30

## 2023-04-30 RX ORDER — SOD SULF/SODIUM/NAHCO3/KCL/PEG
4000 SOLUTION, RECONSTITUTED, ORAL ORAL ONCE
Refills: 0 | Status: COMPLETED | OUTPATIENT
Start: 2023-04-30 | End: 2023-04-30

## 2023-04-30 RX ADMIN — Medication 650 MILLIGRAM(S): at 17:36

## 2023-04-30 RX ADMIN — SENNA PLUS 2 TABLET(S): 8.6 TABLET ORAL at 22:18

## 2023-04-30 RX ADMIN — METHOCARBAMOL 750 MILLIGRAM(S): 500 TABLET, FILM COATED ORAL at 17:35

## 2023-04-30 RX ADMIN — OXYCODONE HYDROCHLORIDE 10 MILLIGRAM(S): 5 TABLET ORAL at 23:59

## 2023-04-30 RX ADMIN — METHOCARBAMOL 750 MILLIGRAM(S): 500 TABLET, FILM COATED ORAL at 05:43

## 2023-04-30 RX ADMIN — METHOCARBAMOL 750 MILLIGRAM(S): 500 TABLET, FILM COATED ORAL at 23:58

## 2023-04-30 RX ADMIN — Medication 650 MILLIGRAM(S): at 23:57

## 2023-04-30 RX ADMIN — PANTOPRAZOLE SODIUM 40 MILLIGRAM(S): 20 TABLET, DELAYED RELEASE ORAL at 17:35

## 2023-04-30 RX ADMIN — Medication 100 MILLIGRAM(S): at 05:43

## 2023-04-30 RX ADMIN — Medication 1 GRAM(S): at 23:57

## 2023-04-30 RX ADMIN — Medication 1 GRAM(S): at 11:54

## 2023-04-30 RX ADMIN — Medication 650 MILLIGRAM(S): at 00:40

## 2023-04-30 RX ADMIN — Medication 650 MILLIGRAM(S): at 05:43

## 2023-04-30 RX ADMIN — PANTOPRAZOLE SODIUM 40 MILLIGRAM(S): 20 TABLET, DELAYED RELEASE ORAL at 05:42

## 2023-04-30 RX ADMIN — GABAPENTIN 300 MILLIGRAM(S): 400 CAPSULE ORAL at 05:43

## 2023-04-30 RX ADMIN — ATORVASTATIN CALCIUM 40 MILLIGRAM(S): 80 TABLET, FILM COATED ORAL at 22:18

## 2023-04-30 RX ADMIN — METHOCARBAMOL 750 MILLIGRAM(S): 500 TABLET, FILM COATED ORAL at 11:54

## 2023-04-30 RX ADMIN — GABAPENTIN 300 MILLIGRAM(S): 400 CAPSULE ORAL at 13:35

## 2023-04-30 RX ADMIN — Medication 4000 MILLILITER(S): at 11:53

## 2023-04-30 RX ADMIN — OXYCODONE HYDROCHLORIDE 10 MILLIGRAM(S): 5 TABLET ORAL at 17:36

## 2023-04-30 RX ADMIN — METHOCARBAMOL 750 MILLIGRAM(S): 500 TABLET, FILM COATED ORAL at 01:40

## 2023-04-30 RX ADMIN — GABAPENTIN 300 MILLIGRAM(S): 400 CAPSULE ORAL at 22:18

## 2023-04-30 RX ADMIN — Medication 650 MILLIGRAM(S): at 11:53

## 2023-04-30 RX ADMIN — ONDANSETRON 4 MILLIGRAM(S): 8 TABLET, FILM COATED ORAL at 16:03

## 2023-04-30 RX ADMIN — RANOLAZINE 1000 MILLIGRAM(S): 500 TABLET, FILM COATED, EXTENDED RELEASE ORAL at 17:34

## 2023-04-30 RX ADMIN — RANOLAZINE 1000 MILLIGRAM(S): 500 TABLET, FILM COATED, EXTENDED RELEASE ORAL at 05:43

## 2023-04-30 RX ADMIN — MONTELUKAST 10 MILLIGRAM(S): 4 TABLET, CHEWABLE ORAL at 11:55

## 2023-04-30 RX ADMIN — Medication 1 GRAM(S): at 05:43

## 2023-04-30 RX ADMIN — Medication 1 GRAM(S): at 17:34

## 2023-04-30 NOTE — DIETITIAN INITIAL EVALUATION ADULT - COLLABORATION WITH OTHER PROVIDERS
I tried to contact the patient's physician name   I tried to contact the patient's physician name Dr. Kimble but was unsuccessful.

## 2023-04-30 NOTE — DIETITIAN INITIAL EVALUATION ADULT - PERTINENT MEDS FT
MEDICATIONS  (STANDING):  acetaminophen     Tablet .. 650 milliGRAM(s) Oral every 6 hours  atorvastatin 40 milliGRAM(s) Oral at bedtime  gabapentin 300 milliGRAM(s) Oral three times a day  lidocaine   4% Patch 1 Patch Transdermal every 24 hours  methocarbamol 750 milliGRAM(s) Oral every 6 hours  metoprolol succinate  milliGRAM(s) Oral daily  montelukast 10 milliGRAM(s) Oral daily  pantoprazole  Injectable 40 milliGRAM(s) IV Push two times a day  polyethylene glycol 3350 17 Gram(s) Oral daily  ranolazine 1000 milliGRAM(s) Oral two times a day  senna 2 Tablet(s) Oral at bedtime  sucralfate 1 Gram(s) Oral four times a day    MEDICATIONS  (PRN):  HYDROmorphone  Injectable 0.5 milliGRAM(s) IV Push every 8 hours PRN Severe Pain (7 - 10)  oxyCODONE    IR 10 milliGRAM(s) Oral every 6 hours PRN Moderate Pain (4 - 6)

## 2023-04-30 NOTE — DIETITIAN INITIAL EVALUATION ADULT - NAME AND PHONE
Intervention: 1.Meals and Snacks 2.Medical Food Supplement  Monitor/Evaluate: Diet order, energy intake, nutrition focused physical findings, renal and anemia profile

## 2023-04-30 NOTE — DIETITIAN INITIAL EVALUATION ADULT - NSFNSGIIOFT_GEN_A_CORE
Dx: 78y/o female with h/o diverticulitis, HFpEF, CAD s/p CABG, AS s/p SAVR (2016), atrial fibrillation s/p watchman (2021), bleeding disorder, asthma, COPD, Type II DM, HTN presenting to the hospital for lower back pain and GI bleed. Hospital course is complicated by hematochezia, internal hemorrhoids, normocytic anemia, sciatica and lower back pain (improving). An EGD and colonoscopy are pending.

## 2023-04-30 NOTE — DIETITIAN INITIAL EVALUATION ADULT - OTHER CALCULATIONS
Estimated Calorie Needs: [id number] x AF-8=4314uulc/day -Due to morbid obesity  Estimated Protein Needs: 77-89grams/day (1.3-1.5grams/kg of IBW-59kg) -Due to morbid obesity and age  Estimated Fluid Needs: 1837mL/day (1mL/kcal)

## 2023-04-30 NOTE — DIETITIAN INITIAL EVALUATION ADULT - PERTINENT LABORATORY DATA
04-30    141  |  104  |  25<H>  ----------------------------<  77  4.2   |  24  |  1.1    Ca    9.0      30 Apr 2023 07:42    TPro  6.0  /  Alb  3.6  /  TBili  0.9  /  DBili  x   /  AST  26  /  ALT  15  /  AlkPhos  57  04-30  POCT Blood Glucose.: 122 mg/dL (04-30-23 @ 17:43)  A1C with Estimated Average Glucose Result: 6.5 % (04-28-23 @ 07:26)

## 2023-04-30 NOTE — DIETITIAN INITIAL EVALUATION ADULT - ORAL INTAKE PTA/DIET HISTORY
The patient reports following a regular diet at home; consumed two meals at >75%; consumed glucerna shake daily.

## 2023-04-30 NOTE — PROGRESS NOTE ADULT - ASSESSMENT
76yo F hx of diverticulitis, HFpEF, CAD s/p CABG, AS s/p SAVR (2016), atrial fibrillation s/p watchman (2021), bleeding disorder, asthma, COPD, DM2, HTN presenting to the hospital for lower back pain, and GI bleed.      A/P  #Hematochezia/ Internal hemorrhoids /Normocytic anemia  - likely due to hemorrhoids, no BMs since admission reported   - send anemia work up   - monitor H/H, keep Hb above7.5, if bleeding persists check CBC Q 12 hours    - pt was consulted by GI, scheduled for EGD/colonoscopy   - clear liquid diet today, will start bowel prep in the afternoon   - NPO after midnight     # Sciatica/ severe lower back pain  -clinically improving   - consulted by pain management recommendations noted:   1) Continue oxycodone IR 10mg Q6h prn; if no improvement, may rotate to hydromorphone PO 4mg Q4h prn  2) Start hydromorphone IV 0.5mg Q8h prn  3) Continue acetaminophen 650mg Q6h standing  4) Increase gabapentin to 300mg TID  5) Continue methocarbamol 750mg Q6h standing  6) Avoid NSAIDs in the setting of bleeding  7) Continue miralax, senna.  - pt denies urinary or stool incontinence at this time, no clinica indications for imaging   - will call PT eval when pain is better controlled      #CAD s/p CABG/ chronic diastolic CHF/ severe PHTN/ HTN  - fluid restriction 1200 ml in 24 hours   - intake and output monitoring, daily weight   - monitor for fluid overload    - cont metoprolol  - diuretics, aspirin held on admission   - cont statin, ranolazine    #Paroxysmal atrial fibrillation s/p Watchmann  - no longer on therapeutic AC due to concern of bleeding  - c/w BB     #COPD- not on home O2/ Hx of Asthma/ suspected LENY/ obesity hypoventilation syndrome   - stable for now   - cont montelukast    #DM2  - carb consistent diet   - monitor finger stick   - check A1c  - start insulin if FG>180    #DVT ppx: none. SCDs  #GI ppx: protonix drip    #Progress Note Handoff  Pending (specify): clear liquid diet for today, bowel prep this afternoon, NPO after midnight for EGD/ colonoscopy  on Monday, c/w pain meds for lower back pain, use bedside commode   Family discussion: I spoke with pt, she agreed with a plan of care   Disposition: Home___/SNF___/Other________/Unknown at this time____x ____

## 2023-05-01 ENCOUNTER — TRANSCRIPTION ENCOUNTER (OUTPATIENT)
Age: 78
End: 2023-05-01

## 2023-05-01 ENCOUNTER — RESULT REVIEW (OUTPATIENT)
Age: 78
End: 2023-05-01

## 2023-05-01 LAB
ALBUMIN SERPL ELPH-MCNC: 3.5 G/DL — SIGNIFICANT CHANGE UP (ref 3.5–5.2)
ALBUMIN SERPL ELPH-MCNC: 3.7 G/DL — SIGNIFICANT CHANGE UP (ref 3.5–5.2)
ALP SERPL-CCNC: 58 U/L — SIGNIFICANT CHANGE UP (ref 30–115)
ALP SERPL-CCNC: 60 U/L — SIGNIFICANT CHANGE UP (ref 30–115)
ALT FLD-CCNC: 15 U/L — SIGNIFICANT CHANGE UP (ref 0–41)
ALT FLD-CCNC: 20 U/L — SIGNIFICANT CHANGE UP (ref 0–41)
ANION GAP SERPL CALC-SCNC: 12 MMOL/L — SIGNIFICANT CHANGE UP (ref 7–14)
ANION GAP SERPL CALC-SCNC: 12 MMOL/L — SIGNIFICANT CHANGE UP (ref 7–14)
AST SERPL-CCNC: 26 U/L — SIGNIFICANT CHANGE UP (ref 0–41)
AST SERPL-CCNC: 47 U/L — HIGH (ref 0–41)
BASOPHILS # BLD AUTO: 0.01 K/UL — SIGNIFICANT CHANGE UP (ref 0–0.2)
BASOPHILS # BLD AUTO: 0.02 K/UL — SIGNIFICANT CHANGE UP (ref 0–0.2)
BASOPHILS NFR BLD AUTO: 0.2 % — SIGNIFICANT CHANGE UP (ref 0–1)
BASOPHILS NFR BLD AUTO: 0.3 % — SIGNIFICANT CHANGE UP (ref 0–1)
BILIRUB SERPL-MCNC: 0.8 MG/DL — SIGNIFICANT CHANGE UP (ref 0.2–1.2)
BILIRUB SERPL-MCNC: 0.9 MG/DL — SIGNIFICANT CHANGE UP (ref 0.2–1.2)
BUN SERPL-MCNC: 16 MG/DL — SIGNIFICANT CHANGE UP (ref 10–20)
BUN SERPL-MCNC: 18 MG/DL — SIGNIFICANT CHANGE UP (ref 10–20)
CALCIUM SERPL-MCNC: 8.4 MG/DL — SIGNIFICANT CHANGE UP (ref 8.4–10.5)
CALCIUM SERPL-MCNC: 8.9 MG/DL — SIGNIFICANT CHANGE UP (ref 8.4–10.5)
CHLORIDE SERPL-SCNC: 100 MMOL/L — SIGNIFICANT CHANGE UP (ref 98–110)
CHLORIDE SERPL-SCNC: 100 MMOL/L — SIGNIFICANT CHANGE UP (ref 98–110)
CO2 SERPL-SCNC: 26 MMOL/L — SIGNIFICANT CHANGE UP (ref 17–32)
CO2 SERPL-SCNC: 27 MMOL/L — SIGNIFICANT CHANGE UP (ref 17–32)
CREAT SERPL-MCNC: 0.9 MG/DL — SIGNIFICANT CHANGE UP (ref 0.7–1.5)
CREAT SERPL-MCNC: 1.1 MG/DL — SIGNIFICANT CHANGE UP (ref 0.7–1.5)
EGFR: 52 ML/MIN/1.73M2 — LOW
EGFR: 66 ML/MIN/1.73M2 — SIGNIFICANT CHANGE UP
EOSINOPHIL # BLD AUTO: 0.07 K/UL — SIGNIFICANT CHANGE UP (ref 0–0.7)
EOSINOPHIL # BLD AUTO: 0.07 K/UL — SIGNIFICANT CHANGE UP (ref 0–0.7)
EOSINOPHIL NFR BLD AUTO: 1 % — SIGNIFICANT CHANGE UP (ref 0–8)
EOSINOPHIL NFR BLD AUTO: 1.1 % — SIGNIFICANT CHANGE UP (ref 0–8)
GLUCOSE BLDC GLUCOMTR-MCNC: 110 MG/DL — HIGH (ref 70–99)
GLUCOSE BLDC GLUCOMTR-MCNC: 114 MG/DL — HIGH (ref 70–99)
GLUCOSE BLDC GLUCOMTR-MCNC: 127 MG/DL — HIGH (ref 70–99)
GLUCOSE BLDC GLUCOMTR-MCNC: 91 MG/DL — SIGNIFICANT CHANGE UP (ref 70–99)
GLUCOSE SERPL-MCNC: 103 MG/DL — HIGH (ref 70–99)
GLUCOSE SERPL-MCNC: 110 MG/DL — HIGH (ref 70–99)
HCT VFR BLD CALC: 33.6 % — LOW (ref 37–47)
HCT VFR BLD CALC: 35.9 % — LOW (ref 37–47)
HGB BLD-MCNC: 10.7 G/DL — LOW (ref 12–16)
HGB BLD-MCNC: 11.4 G/DL — LOW (ref 12–16)
IMM GRANULOCYTES NFR BLD AUTO: 0.3 % — SIGNIFICANT CHANGE UP (ref 0.1–0.3)
IMM GRANULOCYTES NFR BLD AUTO: 0.3 % — SIGNIFICANT CHANGE UP (ref 0.1–0.3)
LYMPHOCYTES # BLD AUTO: 1.12 K/UL — LOW (ref 1.2–3.4)
LYMPHOCYTES # BLD AUTO: 1.46 K/UL — SIGNIFICANT CHANGE UP (ref 1.2–3.4)
LYMPHOCYTES # BLD AUTO: 17.8 % — LOW (ref 20.5–51.1)
LYMPHOCYTES # BLD AUTO: 21.5 % — SIGNIFICANT CHANGE UP (ref 20.5–51.1)
MCHC RBC-ENTMCNC: 29.6 PG — SIGNIFICANT CHANGE UP (ref 27–31)
MCHC RBC-ENTMCNC: 29.6 PG — SIGNIFICANT CHANGE UP (ref 27–31)
MCHC RBC-ENTMCNC: 31.8 G/DL — LOW (ref 32–37)
MCHC RBC-ENTMCNC: 31.8 G/DL — LOW (ref 32–37)
MCV RBC AUTO: 93.1 FL — SIGNIFICANT CHANGE UP (ref 81–99)
MCV RBC AUTO: 93.2 FL — SIGNIFICANT CHANGE UP (ref 81–99)
MONOCYTES # BLD AUTO: 0.62 K/UL — HIGH (ref 0.1–0.6)
MONOCYTES # BLD AUTO: 0.67 K/UL — HIGH (ref 0.1–0.6)
MONOCYTES NFR BLD AUTO: 9.9 % — HIGH (ref 1.7–9.3)
MONOCYTES NFR BLD AUTO: 9.9 % — HIGH (ref 1.7–9.3)
NEUTROPHILS # BLD AUTO: 4.44 K/UL — SIGNIFICANT CHANGE UP (ref 1.4–6.5)
NEUTROPHILS # BLD AUTO: 4.54 K/UL — SIGNIFICANT CHANGE UP (ref 1.4–6.5)
NEUTROPHILS NFR BLD AUTO: 67 % — SIGNIFICANT CHANGE UP (ref 42.2–75.2)
NEUTROPHILS NFR BLD AUTO: 70.7 % — SIGNIFICANT CHANGE UP (ref 42.2–75.2)
NRBC # BLD: 0 /100 WBCS — SIGNIFICANT CHANGE UP (ref 0–0)
NRBC # BLD: 0 /100 WBCS — SIGNIFICANT CHANGE UP (ref 0–0)
PLATELET # BLD AUTO: 126 K/UL — LOW (ref 130–400)
PLATELET # BLD AUTO: 144 K/UL — SIGNIFICANT CHANGE UP (ref 130–400)
PMV BLD: 9.4 FL — SIGNIFICANT CHANGE UP (ref 7.4–10.4)
PMV BLD: 9.6 FL — SIGNIFICANT CHANGE UP (ref 7.4–10.4)
POTASSIUM SERPL-MCNC: 3.8 MMOL/L — SIGNIFICANT CHANGE UP (ref 3.5–5)
POTASSIUM SERPL-MCNC: 4 MMOL/L — SIGNIFICANT CHANGE UP (ref 3.5–5)
POTASSIUM SERPL-MCNC: 5.5 MMOL/L — HIGH (ref 3.5–5)
POTASSIUM SERPL-SCNC: 3.8 MMOL/L — SIGNIFICANT CHANGE UP (ref 3.5–5)
POTASSIUM SERPL-SCNC: 4 MMOL/L — SIGNIFICANT CHANGE UP (ref 3.5–5)
POTASSIUM SERPL-SCNC: 5.5 MMOL/L — HIGH (ref 3.5–5)
PROT SERPL-MCNC: 5.9 G/DL — LOW (ref 6–8)
PROT SERPL-MCNC: 6.5 G/DL — SIGNIFICANT CHANGE UP (ref 6–8)
RBC # BLD: 3.61 M/UL — LOW (ref 4.2–5.4)
RBC # BLD: 3.85 M/UL — LOW (ref 4.2–5.4)
RBC # FLD: 14.1 % — SIGNIFICANT CHANGE UP (ref 11.5–14.5)
RBC # FLD: 14.2 % — SIGNIFICANT CHANGE UP (ref 11.5–14.5)
SODIUM SERPL-SCNC: 138 MMOL/L — SIGNIFICANT CHANGE UP (ref 135–146)
SODIUM SERPL-SCNC: 139 MMOL/L — SIGNIFICANT CHANGE UP (ref 135–146)
WBC # BLD: 6.28 K/UL — SIGNIFICANT CHANGE UP (ref 4.8–10.8)
WBC # BLD: 6.78 K/UL — SIGNIFICANT CHANGE UP (ref 4.8–10.8)
WBC # FLD AUTO: 6.28 K/UL — SIGNIFICANT CHANGE UP (ref 4.8–10.8)
WBC # FLD AUTO: 6.78 K/UL — SIGNIFICANT CHANGE UP (ref 4.8–10.8)

## 2023-05-01 PROCEDURE — 99233 SBSQ HOSP IP/OBS HIGH 50: CPT

## 2023-05-01 PROCEDURE — 43239 EGD BIOPSY SINGLE/MULTIPLE: CPT | Mod: XS

## 2023-05-01 PROCEDURE — 45378 DIAGNOSTIC COLONOSCOPY: CPT

## 2023-05-01 PROCEDURE — 93010 ELECTROCARDIOGRAM REPORT: CPT

## 2023-05-01 PROCEDURE — 88305 TISSUE EXAM BY PATHOLOGIST: CPT | Mod: 26

## 2023-05-01 PROCEDURE — 88312 SPECIAL STAINS GROUP 1: CPT | Mod: 26

## 2023-05-01 RX ORDER — SODIUM ZIRCONIUM CYCLOSILICATE 10 G/10G
10 POWDER, FOR SUSPENSION ORAL ONCE
Refills: 0 | Status: COMPLETED | OUTPATIENT
Start: 2023-05-01 | End: 2023-05-01

## 2023-05-01 RX ADMIN — SODIUM ZIRCONIUM CYCLOSILICATE 10 GRAM(S): 10 POWDER, FOR SUSPENSION ORAL at 11:25

## 2023-05-01 RX ADMIN — Medication 650 MILLIGRAM(S): at 23:16

## 2023-05-01 RX ADMIN — Medication 650 MILLIGRAM(S): at 11:29

## 2023-05-01 RX ADMIN — METHOCARBAMOL 750 MILLIGRAM(S): 500 TABLET, FILM COATED ORAL at 23:16

## 2023-05-01 RX ADMIN — Medication 650 MILLIGRAM(S): at 05:37

## 2023-05-01 RX ADMIN — Medication 1 GRAM(S): at 17:19

## 2023-05-01 RX ADMIN — PANTOPRAZOLE SODIUM 40 MILLIGRAM(S): 20 TABLET, DELAYED RELEASE ORAL at 05:37

## 2023-05-01 RX ADMIN — ATORVASTATIN CALCIUM 40 MILLIGRAM(S): 80 TABLET, FILM COATED ORAL at 21:30

## 2023-05-01 RX ADMIN — Medication 650 MILLIGRAM(S): at 17:19

## 2023-05-01 RX ADMIN — Medication 1 GRAM(S): at 23:16

## 2023-05-01 RX ADMIN — Medication 650 MILLIGRAM(S): at 18:15

## 2023-05-01 RX ADMIN — MONTELUKAST 10 MILLIGRAM(S): 4 TABLET, CHEWABLE ORAL at 11:29

## 2023-05-01 RX ADMIN — METHOCARBAMOL 750 MILLIGRAM(S): 500 TABLET, FILM COATED ORAL at 05:37

## 2023-05-01 RX ADMIN — Medication 100 MILLIGRAM(S): at 05:40

## 2023-05-01 RX ADMIN — GABAPENTIN 300 MILLIGRAM(S): 400 CAPSULE ORAL at 05:37

## 2023-05-01 RX ADMIN — METHOCARBAMOL 750 MILLIGRAM(S): 500 TABLET, FILM COATED ORAL at 11:29

## 2023-05-01 RX ADMIN — PANTOPRAZOLE SODIUM 40 MILLIGRAM(S): 20 TABLET, DELAYED RELEASE ORAL at 17:21

## 2023-05-01 RX ADMIN — GABAPENTIN 300 MILLIGRAM(S): 400 CAPSULE ORAL at 21:30

## 2023-05-01 RX ADMIN — RANOLAZINE 1000 MILLIGRAM(S): 500 TABLET, FILM COATED, EXTENDED RELEASE ORAL at 05:36

## 2023-05-01 RX ADMIN — Medication 1 GRAM(S): at 11:29

## 2023-05-01 RX ADMIN — Medication 1 GRAM(S): at 05:37

## 2023-05-01 RX ADMIN — METHOCARBAMOL 750 MILLIGRAM(S): 500 TABLET, FILM COATED ORAL at 17:19

## 2023-05-01 RX ADMIN — RANOLAZINE 1000 MILLIGRAM(S): 500 TABLET, FILM COATED, EXTENDED RELEASE ORAL at 17:19

## 2023-05-01 RX ADMIN — Medication 650 MILLIGRAM(S): at 12:00

## 2023-05-01 NOTE — PROGRESS NOTE ADULT - ASSESSMENT
76yo F hx of diverticulitis, HFpEF, CAD s/p CABG, AS s/p SAVR (2016), atrial fibrillation s/p watchman (2021), bleeding disorder, asthma, COPD, DM2, HTN presenting to the hospital for lower back pain, and GI bleed.    A/P  #Hematochezia/ Internal hemorrhoids /Normocytic anemia  - likely due to hemorrhoids, no BMs since admission reported   - send anemia work up   - monitor H/H, keep Hb above7.5, if bleeding persists check CBC Q 12 hours    - pt was consulted by GI --> EGD/colonoscopy on 5/1    # Sciatica/ severe lower back pain  -clinically improving   - consulted by pain management recommendations noted:   1) Continue oxycodone IR 10mg Q6h prn; if no improvement, may rotate to hydromorphone PO 4mg Q4h prn  2) Start hydromorphone IV 0.5mg Q8h prn  3) Continue acetaminophen 650mg Q6h standing  4) Increase gabapentin to 300mg TID  5) Continue methocarbamol 750mg Q6h standing  6) Avoid NSAIDs in the setting of bleeding  7) Continue miralax, senna.  - pt denies urinary or stool incontinence at this time, no clinica indications for imaging   - will call PT eval when pain is better controlled      #CAD s/p CABG/ chronic diastolic CHF/ severe PHTN/ HTN  - fluid restriction 1200 ml in 24 hours   - intake and output monitoring, daily weight   - monitor for fluid overload    - cont metoprolol  - diuretics, aspirin held on admission   - cont statin, ranolazine    #Paroxysmal atrial fibrillation s/p Watchmann  - no longer on therapeutic AC due to concern of bleeding  - c/w BB     #COPD- not on home O2/ Hx of Asthma/ suspected LENY/ obesity hypoventilation syndrome   - stable for now   - cont montelukast    #DM2  - carb consistent diet   - monitor finger stick   - check A1c  - start insulin if FG>180    #DVT ppx: none. SCDs  #GI ppx: protonix drip

## 2023-05-01 NOTE — PROGRESS NOTE ADULT - ASSESSMENT
76yo F hx of diverticulitis, HFpEF, CAD s/p CABG, AS s/p SAVR (2016), atrial fibrillation s/p watchman (2021), bleeding disorder, asthma, COPD, DM2, HTN presenting to the hospital for lower back pain, and GI bleed.      A/P  #Hematochezia/ Internal hemorrhoids /Normocytic anemia  - likely due to hemorrhoids, no BMs since admission reported   - monitor H/H, keep Hb above7.5, if bleeding persists check CBC Q 12 hours    - pt was consulted by GI,  EGD/colonoscopy today       # Sciatica/ severe lower back pain  -clinically improving   - consulted by pain management recommendations noted:   1) Continue oxycodone IR 10mg Q6h prn; if no improvement, may rotate to hydromorphone PO 4mg Q4h prn  2) Start hydromorphone IV 0.5mg Q8h prn  3) Continue acetaminophen 650mg Q6h standing  4) Increase gabapentin to 300mg TID  5) Continue methocarbamol 750mg Q6h standing  6) Avoid NSAIDs in the setting of bleeding  7) Continue miralax, senna.  - pt denies urinary or stool incontinence at this time, no clinica indications for imaging   - will call PT eval when pain is better controlled      #CAD s/p CABG/ chronic diastolic CHF/ severe PHTN/ HTN  - fluid restriction 1200 ml in 24 hours   - intake and output monitoring, daily weight   - monitor for fluid overload    - cont metoprolol  - diuretics, aspirin held on admission   - cont statin, ranolazine    #Paroxysmal atrial fibrillation s/p Watchmann  - no longer on therapeutic AC due to concern of bleeding  - c/w BB     #COPD- not on home O2/ Hx of Asthma/ suspected LENY/ obesity hypoventilation syndrome   - stable for now   - cont montelukast    #DM2  - carb consistent diet   - monitor finger stick   - check A1c  - start insulin if FG>180    #DVT ppx: none. SCDs  #GI ppx: protonix drip    #Progress Note Handoff  Pending (specify):  EGD/ colonoscopy today, c/w pain meds for lower back pain, PT/rehab   Family discussion: I spoke with pt, she agreed with a plan of care   Disposition: Home___/SNF___/Other________/Unknown at this time____x ____

## 2023-05-02 ENCOUNTER — APPOINTMENT (OUTPATIENT)
Dept: CARDIOLOGY | Facility: CLINIC | Age: 78
End: 2023-05-02

## 2023-05-02 LAB
ALBUMIN SERPL ELPH-MCNC: 3.4 G/DL — LOW (ref 3.5–5.2)
ALP SERPL-CCNC: 58 U/L — SIGNIFICANT CHANGE UP (ref 30–115)
ALT FLD-CCNC: 15 U/L — SIGNIFICANT CHANGE UP (ref 0–41)
ANION GAP SERPL CALC-SCNC: 14 MMOL/L — SIGNIFICANT CHANGE UP (ref 7–14)
AST SERPL-CCNC: 25 U/L — SIGNIFICANT CHANGE UP (ref 0–41)
BILIRUB SERPL-MCNC: 0.7 MG/DL — SIGNIFICANT CHANGE UP (ref 0.2–1.2)
BLD GP AB SCN SERPL QL: SIGNIFICANT CHANGE UP
BUN SERPL-MCNC: 12 MG/DL — SIGNIFICANT CHANGE UP (ref 10–20)
CALCIUM SERPL-MCNC: 8.9 MG/DL — SIGNIFICANT CHANGE UP (ref 8.4–10.5)
CHLORIDE SERPL-SCNC: 103 MMOL/L — SIGNIFICANT CHANGE UP (ref 98–110)
CO2 SERPL-SCNC: 23 MMOL/L — SIGNIFICANT CHANGE UP (ref 17–32)
CREAT SERPL-MCNC: 0.9 MG/DL — SIGNIFICANT CHANGE UP (ref 0.7–1.5)
EGFR: 66 ML/MIN/1.73M2 — SIGNIFICANT CHANGE UP
GLUCOSE BLDC GLUCOMTR-MCNC: 123 MG/DL — HIGH (ref 70–99)
GLUCOSE BLDC GLUCOMTR-MCNC: 151 MG/DL — HIGH (ref 70–99)
GLUCOSE BLDC GLUCOMTR-MCNC: 156 MG/DL — HIGH (ref 70–99)
GLUCOSE BLDC GLUCOMTR-MCNC: 180 MG/DL — HIGH (ref 70–99)
GLUCOSE SERPL-MCNC: 105 MG/DL — HIGH (ref 70–99)
HCT VFR BLD CALC: 34 % — LOW (ref 37–47)
HGB BLD-MCNC: 10.9 G/DL — LOW (ref 12–16)
MAGNESIUM SERPL-MCNC: 1.9 MG/DL — SIGNIFICANT CHANGE UP (ref 1.8–2.4)
MCHC RBC-ENTMCNC: 29.6 PG — SIGNIFICANT CHANGE UP (ref 27–31)
MCHC RBC-ENTMCNC: 32.1 G/DL — SIGNIFICANT CHANGE UP (ref 32–37)
MCV RBC AUTO: 92.4 FL — SIGNIFICANT CHANGE UP (ref 81–99)
NRBC # BLD: 0 /100 WBCS — SIGNIFICANT CHANGE UP (ref 0–0)
PLATELET # BLD AUTO: 132 K/UL — SIGNIFICANT CHANGE UP (ref 130–400)
PMV BLD: 9.6 FL — SIGNIFICANT CHANGE UP (ref 7.4–10.4)
POTASSIUM SERPL-MCNC: 4.1 MMOL/L — SIGNIFICANT CHANGE UP (ref 3.5–5)
POTASSIUM SERPL-SCNC: 4.1 MMOL/L — SIGNIFICANT CHANGE UP (ref 3.5–5)
PROT SERPL-MCNC: 5.7 G/DL — LOW (ref 6–8)
RBC # BLD: 3.68 M/UL — LOW (ref 4.2–5.4)
RBC # FLD: 14.4 % — SIGNIFICANT CHANGE UP (ref 11.5–14.5)
SODIUM SERPL-SCNC: 140 MMOL/L — SIGNIFICANT CHANGE UP (ref 135–146)
WBC # BLD: 6.45 K/UL — SIGNIFICANT CHANGE UP (ref 4.8–10.8)
WBC # FLD AUTO: 6.45 K/UL — SIGNIFICANT CHANGE UP (ref 4.8–10.8)

## 2023-05-02 PROCEDURE — 99233 SBSQ HOSP IP/OBS HIGH 50: CPT

## 2023-05-02 PROCEDURE — 99232 SBSQ HOSP IP/OBS MODERATE 35: CPT

## 2023-05-02 RX ADMIN — Medication 650 MILLIGRAM(S): at 11:36

## 2023-05-02 RX ADMIN — POLYETHYLENE GLYCOL 3350 17 GRAM(S): 17 POWDER, FOR SOLUTION ORAL at 11:36

## 2023-05-02 RX ADMIN — Medication 650 MILLIGRAM(S): at 12:05

## 2023-05-02 RX ADMIN — Medication 650 MILLIGRAM(S): at 00:16

## 2023-05-02 RX ADMIN — METHOCARBAMOL 750 MILLIGRAM(S): 500 TABLET, FILM COATED ORAL at 23:00

## 2023-05-02 RX ADMIN — Medication 650 MILLIGRAM(S): at 05:42

## 2023-05-02 RX ADMIN — SENNA PLUS 2 TABLET(S): 8.6 TABLET ORAL at 21:44

## 2023-05-02 RX ADMIN — PANTOPRAZOLE SODIUM 40 MILLIGRAM(S): 20 TABLET, DELAYED RELEASE ORAL at 05:42

## 2023-05-02 RX ADMIN — RANOLAZINE 1000 MILLIGRAM(S): 500 TABLET, FILM COATED, EXTENDED RELEASE ORAL at 05:43

## 2023-05-02 RX ADMIN — GABAPENTIN 300 MILLIGRAM(S): 400 CAPSULE ORAL at 21:44

## 2023-05-02 RX ADMIN — MONTELUKAST 10 MILLIGRAM(S): 4 TABLET, CHEWABLE ORAL at 11:36

## 2023-05-02 RX ADMIN — METHOCARBAMOL 750 MILLIGRAM(S): 500 TABLET, FILM COATED ORAL at 17:23

## 2023-05-02 RX ADMIN — HYDROMORPHONE HYDROCHLORIDE 0.5 MILLIGRAM(S): 2 INJECTION INTRAMUSCULAR; INTRAVENOUS; SUBCUTANEOUS at 00:33

## 2023-05-02 RX ADMIN — Medication 1 GRAM(S): at 17:22

## 2023-05-02 RX ADMIN — Medication 1 GRAM(S): at 11:36

## 2023-05-02 RX ADMIN — Medication 100 MILLIGRAM(S): at 05:43

## 2023-05-02 RX ADMIN — PANTOPRAZOLE SODIUM 40 MILLIGRAM(S): 20 TABLET, DELAYED RELEASE ORAL at 17:23

## 2023-05-02 RX ADMIN — ATORVASTATIN CALCIUM 40 MILLIGRAM(S): 80 TABLET, FILM COATED ORAL at 21:44

## 2023-05-02 RX ADMIN — GABAPENTIN 300 MILLIGRAM(S): 400 CAPSULE ORAL at 05:42

## 2023-05-02 RX ADMIN — METHOCARBAMOL 750 MILLIGRAM(S): 500 TABLET, FILM COATED ORAL at 05:43

## 2023-05-02 RX ADMIN — Medication 1 GRAM(S): at 23:00

## 2023-05-02 RX ADMIN — Medication 1 GRAM(S): at 05:43

## 2023-05-02 RX ADMIN — OXYCODONE HYDROCHLORIDE 10 MILLIGRAM(S): 5 TABLET ORAL at 16:52

## 2023-05-02 RX ADMIN — OXYCODONE HYDROCHLORIDE 10 MILLIGRAM(S): 5 TABLET ORAL at 18:00

## 2023-05-02 RX ADMIN — Medication 650 MILLIGRAM(S): at 18:00

## 2023-05-02 RX ADMIN — Medication 650 MILLIGRAM(S): at 06:42

## 2023-05-02 RX ADMIN — Medication 650 MILLIGRAM(S): at 17:22

## 2023-05-02 RX ADMIN — HYDROMORPHONE HYDROCHLORIDE 0.5 MILLIGRAM(S): 2 INJECTION INTRAMUSCULAR; INTRAVENOUS; SUBCUTANEOUS at 01:33

## 2023-05-02 RX ADMIN — RANOLAZINE 1000 MILLIGRAM(S): 500 TABLET, FILM COATED, EXTENDED RELEASE ORAL at 17:23

## 2023-05-02 RX ADMIN — OXYCODONE HYDROCHLORIDE 10 MILLIGRAM(S): 5 TABLET ORAL at 22:52

## 2023-05-02 RX ADMIN — Medication 650 MILLIGRAM(S): at 23:00

## 2023-05-02 RX ADMIN — GABAPENTIN 300 MILLIGRAM(S): 400 CAPSULE ORAL at 14:20

## 2023-05-02 RX ADMIN — METHOCARBAMOL 750 MILLIGRAM(S): 500 TABLET, FILM COATED ORAL at 11:36

## 2023-05-02 NOTE — PROGRESS NOTE ADULT - ASSESSMENT
78yo F hx of diverticulitis, HFpEF, CAD s/p CABG, AS s/p SAVR (2016), atrial fibrillation s/p watchman (2021), bleeding disorder, asthma, COPD, DM2, HTN presenting to the hospital for lower back pain, and GI bleed.      A/P  #Hematochezia/ Internal hemorrhoids /Normocytic anemia  - monitor H/H, keep Hb above7.5, if bleeding persists check CBC Q 12 hours    - pt was consulted by GI,  EGD/colonoscopy showed gastritis, gastric polyps/ diverticulosis  - no active bleeding noted  - c/w PPIs, prevent constipation   - pt needs repeat colonoscopy as an OP      # Sciatica/ severe lower back pain  -clinically improving   - pain management follow up for recommendations for  discharge   - pt denies urinary or stool incontinence at this time, no clinical indications for imaging   - PT as tolerated, physiatry eval, pt wants to go to [facility]      #CAD s/p CABG/ chronic diastolic CHF/ severe PHTN/ HTN  - fluid restriction 1200 ml in 24 hours   - intake and output monitoring, daily weight   - monitor for fluid overload    - cont metoprolol  - diuretics, aspirin held on admission   - cont statin, ranolazine    #Paroxysmal atrial fibrillation s/p Watchmann  - no longer on therapeutic AC due to concern of bleeding  - c/w BB     #COPD- not on home O2/ Hx of Asthma/ suspected LENY/ obesity hypoventilation syndrome   - stable for now   - cont montelukast    #DM2  - carb consistent diet   - monitor finger stick   - check A1c  - start insulin if FG>180    #DVT ppx: none. SCDs  #GI ppx: protonix drip    #Progress Note Handoff  Pending (specify):  pain management follow up before discharge, physiatry eval for [facility], anticipate discharge in 24- 48 hours, supportive care   Family discussion: I spoke with pt, she agreed with a plan of care   Disposition: Home___/SNF___/Other________/Unknown at this time____x ____

## 2023-05-02 NOTE — CONSULT NOTE ADULT - SUBJECTIVE AND OBJECTIVE BOX
HPI:  78yo F hx of diverticulitis, HFpEF, CAD s/p CABG, AS s/p SAVR (2016), atrial fibrillation s/p watchman (2021), bleeding disorder, asthma, COPD, DM2, HTN, CKD 2-3 presenting to the hospital for lower back pain, and GI bleed. On Monday 4/24/23, she was having lower back pain with radiation to LLE with no improvement from OTC meds and bloody BMs with associated abdominal pain. Today, back pain so severe that she called her daughter for help with assisting with cleaning her skin after having bloody BMs because she was having difficulty with ambulation. Patient was also having BRBPR, noticed by daughter. Patient does note she has internal hemorrhoids but felt this intensity of bleeding felt different. Has had diverticulitis in the past. Denies fevers, chills, dizziness, lightheadedness, shortness of breath, nausea/vomiting, dysuria.     Brought in via EMS. At EMS, was given fentanyl 200 mcg x1. At the ED, T: 98.7F, BP: 121/54, HR: 86bpm, RR: 18bpm, SpO2: 99% on RA. MILDRED (+) for blood and internal hemorrhoids on physical exam per ED. Labs significant for mild anemia (Hb- 11.1), hyperkalemia (K-5.3), elevated BUN (33), UA (+) for blood, only 2 RBC. CT A+P was not significant for acute pathology.     Patient admitted to the hospital for intractable pain, GIB. Given ketorolac x1, metjocabamol x1, morphine 8mg IV, tramadol 25mg x1, and started on PPI infusion after PPI IVP. Minimal improvement in her back pain with the pain regimen. GI consulted. Possible EGD/colonoscopy tomorrow.     < from: CT Abdomen and Pelvis w/ IV Cont (04.27.23 @ 17:09) >    IMPRESSION:  No CT evidence of an acute abdominopelvic pathology.    < end of copied text >    < from: CT Chest w/ IV Cont (02.02.23 @ 17:03) >    IMPRESSION:    Since  6/23/2021    Small hiatal hernia measuring approximately 2.9 cm.    Stable dilatation main pulmonary artery segment, measuring approximately   3.2 cm (303/85).    Stable pulmonary nodules.    < end of copied text >    #Hematochezia/ Internal hemorrhoids /Normocytic anemia  - monitor H/H, keep Hb above7.5, if bleeding persists check CBC Q 12 hours    - pt was consulted by GI,  EGD/colonoscopy showed gastritis, gastric polyps/ diverticulosis  - no active bleeding noted  - c/w PPIs, prevent constipation   - pt needs repeat colonoscopy as an OP      # Sciatica/ severe lower back pain  - pain management follow up for recommendations for  discharge   - pt denies urinary or stool incontinence at this time, no clinical indications for imaging     Medical charts / labs / imaging studies / PT notes reviewed       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      CAD (coronary artery disease), native coronary artery      Anemia      History of bleeding disorder  having work up 5/2/21- HAD WORKUP BUT NO DIAGNOSIS "NOTHING WAS FOUND"      History of transfusion reaction      Hiatal hernia      H/O ascending aortic replacement  Bovine Replacement      S/P CABG x 1  complication of bleeding 2016      H/O total knee replacement, right  complicated with fx femur bars screws in femur- b/l knee replacement      S/P hysterectomy      Presence of Watchman left atrial appendage closure device          Hospital Course:    TODAY'S SUBJECTIVE & REVIEW OF SYMPTOMS:     Constitutional WNL   Cardio WNL   Resp WNL   GI WNL  Heme WNL  Endo WNL  Skin WNL  MSK back pain   Neuro WNL  Cognitive WNL  Psych WNL      MEDICATIONS  (STANDING):  acetaminophen     Tablet .. 650 milliGRAM(s) Oral every 6 hours  atorvastatin 40 milliGRAM(s) Oral at bedtime  gabapentin 300 milliGRAM(s) Oral three times a day  lidocaine   4% Patch 1 Patch Transdermal every 24 hours  methocarbamol 750 milliGRAM(s) Oral every 6 hours  metoprolol succinate  milliGRAM(s) Oral daily  montelukast 10 milliGRAM(s) Oral daily  pantoprazole  Injectable 40 milliGRAM(s) IV Push two times a day  polyethylene glycol 3350 17 Gram(s) Oral daily  ranolazine 1000 milliGRAM(s) Oral two times a day  senna 2 Tablet(s) Oral at bedtime  sucralfate 1 Gram(s) Oral four times a day    MEDICATIONS  (PRN):  HYDROmorphone  Injectable 0.5 milliGRAM(s) IV Push every 8 hours PRN Severe Pain (7 - 10)  oxyCODONE    IR 10 milliGRAM(s) Oral every 6 hours PRN Moderate Pain (4 - 6)      FAMILY HISTORY:  Family history of pseudocholinesterase deficiency (Child)        Allergies    penicillins (Hives; Rash)  Augmentin (Other)    Intolerances        SOCIAL HISTORY:    [  ] Etoh  [  ] Smoking  [  ] Substance abuse     Home Environment:  [  x ] Home Alone  [   ] Lives with Family  [   ] Home Health Aid    Dwelling:  [   ] Apartment  [ x  ] Private House  [   ] Adult Home  [   ] Skilled Nursing Facility      [   ] Short Term  [   ] Long Term  [ x  ] Stairs       Elevator [   ]    FUNCTIONAL STATUS PTA: (Check all that apply)  Ambulation: [  x  ]Independent    [   ] Dependent     [   ] Non-Ambulatory  Assistive Device: [x   ] SA Cane  [   ]  Q Cane  [   ] Walker  [   ]  Wheelchair  ADL : [ x  ] Independent  [    ]  Dependent       Vital Signs Last 24 Hrs  T(C): 35.9 (02 May 2023 12:25), Max: 37 (02 May 2023 05:34)  T(F): 96.6 (02 May 2023 12:25), Max: 98.6 (02 May 2023 05:34)  HR: 57 (02 May 2023 12:25) (57 - 87)  BP: 116/55 (02 May 2023 12:25) (113/63 - 137/81)  BP(mean): --  RR: 18 (02 May 2023 12:25) (18 - 18)  SpO2: 98% (01 May 2023 20:25) (98% - 98%)          PHYSICAL EXAM: Awake & Alert  GENERAL: NAD  HEAD:  Normocephalic  CHEST/LUNG: Clear   HEART: S1S2+  ABDOMEN: Soft, Nontender  EXTREMITIES:  no calf tenderness    NERVOUS SYSTEM:  Cranial Nerves 2-12 intact [   ] Abnormal  [   ]  ROM: WFL all extremities [   ]  Abnormal [x   ]limited RLE due to pain   Motor Strength: WFL all extremities  [   ]  Abnormal [ x  ]  Sensation: intact to light touch [  x ] Abnormal [   ]    FUNCTIONAL STATUS:  Bed Mobility: Independent [   ]  Supervision [   ]  Needs Assistance [x   ]  N/A [   ]  Transfers: Independent [   ]  Supervision [   ]  Needs Assistance [x  ]  N/A [   ]   Ambulation: Independent [   ]  Supervision [   ]  Needs Assistance [x   ]  N/A [   ]  ADL: Independent [   ] Requires Assistance [   ] N/A [   ]      LABS:                        10.9   6.45  )-----------( 132      ( 02 May 2023 07:15 )             34.0     05-02    140  |  103  |  12  ----------------------------<  105<H>  4.1   |  23  |  0.9    Ca    8.9      02 May 2023 07:15  Mg     1.9     05-02    TPro  5.7<L>  /  Alb  3.4<L>  /  TBili  0.7  /  DBili  x   /  AST  25  /  ALT  15  /  AlkPhos  58  05-02          RADIOLOGY & ADDITIONAL STUDIES:  
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 On Monday 4/24/23, she was having lower back pain with radiation to LLE with no improvement from OTC meds. Pt took a combination of tylenol, liquid advil and aleeve for the past week. She also noticed that she was constipated for 3 days and when attempting to have a bm, pt had large red blood per rectum with clots in her stool. This prompted the daughter to bring her to the hospital. She has not had any bloody bms since shes been in the hospital. She also endorses epigastric pain without bloody vomitus, weight loss, N/V, fever or chills. GI was consulted for management            Prior EGD:    < from: EGD-Colonoscopy (08.12.20 @ 13:00) >   Normal mucosa in the whole esophagus.    Erythema in the stomach compatible with non-erosive gastritis. (Biopsy).    Polyps (0.5 cm to 1 cm) in the fundus. (Biopsy).    Erosions in the pre-pyloric region compatible with erosive gastritis.    Normal mucosa in the whole examined duodenum. (Biopsy).     < end of copied text >    Prior Colonoscopy: < from: EGD-Colonoscopy (08.12.20 @ 13:00) >   Mild diverticulosis of the the left side of the colon.    Melanosis coli was noted, mainly in the right colon. (Biopsy).    Internal and external hemorrhoids.     < end of copied text >        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      CAD (coronary artery disease), native coronary artery      Anemia      History of bleeding disorder  having work up 5/2/21- HAD WORKUP BUT NO DIAGNOSIS "NOTHING WAS FOUND"      History of transfusion reaction      Hiatal hernia      H/O ascending aortic replacement  Bovine Replacement      S/P CABG x 1  complication of bleeding 2016      H/O total knee replacement, right  complicated with fx femur bars screws in femur- b/l knee replacement      S/P hysterectomy      Presence of Watchman left atrial appendage closure device            FAMILY HISTORY:  Family history of pseudocholinesterase deficiency (Child)        Social History:  Tobacco: denies   Alcohol: denies   Drugs: denies    Home Medications:  aspirin 81 mg oral delayed release tablet: 1 tab(s) orally once a day (27 Apr 2023 23:52)  Centrum oral tablet: 1 tab(s) orally once a day (27 Apr 2023 23:52)  desvenlafaxine (as succinate) 25 mg oral tablet, extended release: 1 tab(s) orally once a day (27 Apr 2023 23:52)  furosemide 40 mg oral tablet: 1 tab(s) orally 2 times a day (27 Apr 2023 23:52)  glipiZIDE 5 mg oral tablet: 1 tab(s) orally once a day (27 Apr 2023 23:52)  K-Tab 20 mEq oral tablet, extended release: 1 tab(s) orally 3 times a day (27 Apr 2023 23:52)  magnesium oxide 500 mg oral tablet: 1 tab(s) orally once a day (27 Apr 2023 23:52)  metOLazone 5 mg oral tablet: 1 tab(s) orally 3 times a week, As Needed (27 Apr 2023 23:52)  montelukast 10 mg oral tablet: 1 tab(s) orally once a day (27 Apr 2023 23:52)  ranolazine 1000 mg oral tablet, extended release: 1 tab(s) orally once a day (27 Apr 2023 23:52)  rosuvastatin 10 mg oral tablet: 1 tab(s) orally once a day (27 Apr 2023 23:52)  sucralfate 1 g oral tablet: 1 orally 4 times a day (27 Apr 2023 23:58)  Vitamin B-12: 5000  orally 2 times a day (27 Apr 2023 23:52)        MEDICATIONS  (STANDING):  atorvastatin 40 milliGRAM(s) Oral at bedtime  lidocaine   4% Patch 1 Patch Transdermal every 24 hours  methocarbamol 750 milliGRAM(s) Oral every 6 hours  metoprolol succinate  milliGRAM(s) Oral daily  montelukast 10 milliGRAM(s) Oral daily  pantoprazole Infusion 8 mG/Hr (10 mL/Hr) IV Continuous <Continuous>  ranolazine 1000 milliGRAM(s) Oral two times a day  sucralfate 1 Gram(s) Oral four times a day    MEDICATIONS  (PRN):  acetaminophen     Tablet .. 650 milliGRAM(s) Oral every 6 hours PRN Mild Pain (1 - 3), Moderate Pain (4 - 6)  oxyCODONE    IR 5 milliGRAM(s) Oral every 6 hours PRN Severe Pain (7 - 10)      Allergies  penicillins (Hives; Rash)  Augmentin (Other)      Review of Systems:   Constitutional:  No Fever, No Chills  ENT/Mouth:  No Hearing Changes,  No Difficulty Swallowing  Eyes:  No Eye Pain, No Vision Changes  Cardiovascular:  No Chest Pain, No Palpitations  Respiratory:  No Cough, No Dyspnea  Gastrointestinal:  As described in HPI          Physical Examination:  T(C): 36.1 (04-28-23 @ 05:30), Max: 37.2 (04-27-23 @ 16:18)  HR: 90 (04-28-23 @ 05:30) (83 - 90)  BP: 120/65 (04-28-23 @ 05:30) (120/65 - 140/66)  RR: 20 (04-28-23 @ 05:30) (17 - 20)  SpO2: 98% (04-27-23 @ 22:06) (98% - 99%)  Height (cm): 167.6 (04-27-23 @ 22:06)  Weight (kg): 133 (04-27-23 @ 22:06)        GENERAL: AAOx3, no acute distress.  HEAD:  Atraumatic, Normocephalic  EYES: conjunctiva and sclera clear  CHEST/LUNG: Clear to auscultation bilaterally; No wheeze, rhonchi, or rales. Definitive defect noted in sternal area with central hernia  HEART: Regular rate and rhythm; normal S1, S2, No murmurs.  ABDOMEN: Soft, nontender, nondistended; Bowel sounds present  SKIN: Intact, no jaundice        Data:                        10.5   6.77  )-----------( 128      ( 28 Apr 2023 07:26 )             33.2     Hgb Trend:  10.5  04-28-23 @ 07:26  11.1  04-27-23 @ 14:55      04-27    139  |  104  |  33<H>  ----------------------------<  179<H>  5.3<H>   |  22  |  1.2    Ca    9.4      27 Apr 2023 14:55    TPro  6.5  /  Alb  3.9  /  TBili  0.8  /  DBili  x   /  AST  29  /  ALT  21  /  AlkPhos  70  04-27    Liver panel trend:  TBili 0.8   /   AST 29   /   ALT 21   /   AlkP 70   /   Tptn 6.5   /   Alb 3.9    /   DBili --      04-27      PT/INR - ( 27 Apr 2023 17:54 )   PT: 12.90 sec;   INR: 1.13 ratio         PTT - ( 27 Apr 2023 17:54 )  PTT:28.6 sec        Radiology:  CT Abdomen and Pelvis w/ IV Cont:   ACC: [id number] EXAM:  CT ABDOMEN AND PELVIS IC   ORDERED BY: FLORIAN BUENO     PROCEDURE DATE:  04/27/2023          INTERPRETATION:  CLINICAL STATEMENT: Back pain    TECHNIQUE: Contiguous axial CT images were obtained from the lower chest   tothe pubic symphysis following administration of 100cc Optiray 350   intravenous contrast.  Oral contrast was not administered.  Reformatted   images in the coronal and sagittal planes were acquired.    COMPARISON CT: June 23, 2021    OTHER STUDIES USED FOR CORRELATION: None.      FINDINGS:    LOWER CHEST: Unchanged 1.5 cm right lower lobe nodule. Post median   sternotomy and aortic valve replacement left atrial appendage exclusion   device with associated thrombosis. Coronary calcifications noted.    HEPATOBILIARY: Cholelithiasis. Calcified hepatic granuloma.    SPLEEN: Unremarkable.    PANCREAS: Unremarkable.    ADRENAL GLANDS: Unremarkable.    KIDNEYS: Bilateral renal angiomyolipomas, right measuring 2.8 cm and the   left measuring subcentimeter. Symmetric renal enhancement bilaterally. No   hydronephrosis    ABDOMINOPELVIC NODES: No lymphadenopathy.    PELVIC ORGANS: Post hysterectomy.    PERITONEUM/MESENTERY/BOWEL: No bowel obstruction, ascites or   pneumoperitoneum. Scattered colonicdiverticulosis without evidence for   acute diverticulitis. Appendix not visualized; no focal pericecal   inflammation. Small hiatal hernia.    BONES/SOFT TISSUES: Fat-containing subxiphoid/epigastric hernias. Post   median sternotomy. Multilevel degenerative changes of the spine noted.   Bilateral L5 pars defects without associated spondylolisthesis.    OTHER: Scattered atherosclerotic vascular calcifications.      IMPRESSION:  No CT evidence of an acute abdominopelvic pathology.    --- End of Report ---            CARMINA GOMEZ MD; Attending Radiologist  This document has been electronically signed. Apr 27 2023  5:21PM (04-27-23 @ 17:09)      
HPI:  78yo F hx of diverticulitis, HFpEF, CAD s/p CABG, AS s/p SAVR (2016), atrial fibrillation s/p watchman (2021), bleeding disorder, asthma, COPD, DM2, HTN, CKD 2-3 presenting to the hospital for lower back pain, and GI bleed. On Monday 4/24/23, she was having lower back pain with radiation to LLE with no improvement from OTC meds and bloody BMs with associated abdominal pain. Today, back pain so severe that she called her daughter for help with assisting with cleaning her skin after having bloody BMs because she was having difficulty with ambulation. Patient was also having BRBPR, noticed by daughter. Patient does note she has internal hemorrhoids but felt this intensity of bleeding felt different. Has had diverticulitis in the past. Denies fevers, chills, dizziness, lightheadedness, shortness of breath, nausea/vomiting, dysuria.     Brought in via EMS. At EMS, was given fentanyl 200 mcg x1. At the ED, T: 98.7F, BP: 121/54, HR: 86bpm, RR: 18bpm, SpO2: 99% on RA. MILDRED (+) for blood and internal hemorrhoids on physical exam per ED. Labs significant for mild anemia (Hb- 11.1), hyperkalemia (K-5.3), elevated BUN (33), UA (+) for blood, only 2 RBC. CT A+P was not significant for acute pathology.     Patient admitted to the hospital for intractable pain, GIB. Given ketorolac x1, metjocabamol x1, morphine 8mg IV, tramadol 25mg x1, and started on PPI infusion after PPI IVP. Minimal improvement in her back pain with the pain regimen. GI consulted. Possible EGD/colonoscopy tomorrow.     Pt seen and examined at bedside. Pt has been having R sided back pain, has been taking NSAID. Noticed BRBPR. Hgb stable on presentation. Cardiology consulted for pre-op risk stratification for EGD/Colonscopy. Pt reports sob with ambulation. Able to walk less than 2 blocks, limited back back pain and SOB. denies chest pain, palpitations or dizziness    PAST MEDICAL & SURGICAL HISTORY  Aortic stenosis    Diabetes    Asthma with COPD  many years since last attack    CAD (coronary artery disease), native coronary artery    Anemia    History of bleeding disorder  having work up 5/2/21- HAD WORKUP BUT NO DIAGNOSIS "NOTHING WAS FOUND"    History of transfusion reaction    Hiatal hernia    H/O ascending aortic replacement  Bovine Replacement    S/P CABG x 1  complication of bleeding 2016    H/O total knee replacement, right  complicated with fx femur bars screws in femur- b/l knee replacement    S/P hysterectomy    Presence of Watchman left atrial appendage closure device        FAMILY HISTORY:  FAMILY HISTORY:  Family history of pseudocholinesterase deficiency (Child)        SOCIAL HISTORY:  []smoker  []Alcohol  []Drug    ALLERGIES:  penicillins (Hives; Rash)  Augmentin (Other)      MEDICATIONS:  MEDICATIONS  (STANDING):  acetaminophen     Tablet .. 650 milliGRAM(s) Oral every 6 hours  atorvastatin 40 milliGRAM(s) Oral at bedtime  gabapentin 200 milliGRAM(s) Oral three times a day  lidocaine   4% Patch 1 Patch Transdermal every 24 hours  methocarbamol 750 milliGRAM(s) Oral every 6 hours  metoprolol succinate  milliGRAM(s) Oral daily  montelukast 10 milliGRAM(s) Oral daily  pantoprazole  Injectable 40 milliGRAM(s) IV Push two times a day  polyethylene glycol 3350 17 Gram(s) Oral daily  ranolazine 1000 milliGRAM(s) Oral two times a day  senna 2 Tablet(s) Oral at bedtime  sucralfate 1 Gram(s) Oral four times a day    MEDICATIONS  (PRN):  oxyCODONE    IR 10 milliGRAM(s) Oral every 6 hours PRN Severe Pain (7 - 10)      HOME MEDICATIONS:  Home Medications:  aspirin 81 mg oral delayed release tablet: 1 tab(s) orally once a day (27 Apr 2023 23:52)  Centrum oral tablet: 1 tab(s) orally once a day (27 Apr 2023 23:52)  desvenlafaxine (as succinate) 25 mg oral tablet, extended release: 1 tab(s) orally once a day (27 Apr 2023 23:52)  furosemide 40 mg oral tablet: 1 tab(s) orally 2 times a day (27 Apr 2023 23:52)  glipiZIDE 5 mg oral tablet: 1 tab(s) orally once a day (27 Apr 2023 23:52)  K-Tab 20 mEq oral tablet, extended release: 1 tab(s) orally 3 times a day (27 Apr 2023 23:52)  magnesium oxide 500 mg oral tablet: 1 tab(s) orally once a day (27 Apr 2023 23:52)  metOLazone 5 mg oral tablet: 1 tab(s) orally 3 times a week, As Needed (27 Apr 2023 23:52)  montelukast 10 mg oral tablet: 1 tab(s) orally once a day (27 Apr 2023 23:52)  ranolazine 1000 mg oral tablet, extended release: 1 tab(s) orally once a day (27 Apr 2023 23:52)  rosuvastatin 10 mg oral tablet: 1 tab(s) orally once a day (27 Apr 2023 23:52)  sucralfate 1 g oral tablet: 1 orally 4 times a day (27 Apr 2023 23:58)  Vitamin B-12: 5000  orally 2 times a day (27 Apr 2023 23:52)      VITALS:   T(F): 96.8 (04-28 @ 14:00), Max: 98.9 (04-27 @ 16:18)  HR: 77 (04-28 @ 14:00) (77 - 90)  BP: 138/66 (04-28 @ 14:00) (120/65 - 140/66)  BP(mean): --  RR: 18 (04-28 @ 14:00) (17 - 20)  SpO2: 98% (04-27 @ 22:06) (98% - 99%)    I&O's Summary      REVIEW OF SYSTEMS:  CONSTITUTIONAL: No weakness, fevers or chills  EYES: No visual changes  ENT: No vertigo or throat pain   NECK: No pain or stiffness  RESPIRATORY: No cough, wheezing, hemoptysis; No shortness of breath  CARDIOVASCULAR: No chest pain or palpitations  GASTROINTESTINAL: No abdominal or epigastric pain. No nausea, vomiting, or hematemesis; No diarrhea or constipation. No melena or hematochezia.  GENITOURINARY: No dysuria, frequency or hematuria  NEUROLOGICAL: No numbness or weakness  SKIN: No itching, no rashes  MSK: back pain    PHYSICAL EXAM:  NEURO: patient is awake , alert and oriented  GEN: Not in acute distress  NECK: no thyroid enlargement, no JVD  LUNGS: Clear to auscultation bilaterally   CARDIOVASCULAR: S1/S2 present, RRR , no murmurs or rubs, no carotid bruits,  + PP bilaterally  ABD: Soft, non-tender, non-distended, +BS  EXT: No YAYA  SKIN: Intact    LABS:                        10.5   6.77  )-----------( 128      ( 28 Apr 2023 07:26 )             33.2     04-28    144  |  105  |  24<H>  ----------------------------<  153<H>  4.7   |  30  |  1.1    Ca    9.2      28 Apr 2023 07:26  Mg     2.2     04-28    TPro  6.0  /  Alb  3.7  /  TBili  1.0  /  DBili  x   /  AST  27  /  ALT  19  /  AlkPhos  62  04-28    PT/INR - ( 27 Apr 2023 17:54 )   PT: 12.90 sec;   INR: 1.13 ratio         PTT - ( 27 Apr 2023 17:54 )  PTT:28.6 sec          Troponin trend:            RADIOLOGY:  -CXR:  -TTE:  < from: Transesophageal Echocardiogram (07.29.21 @ 09:38) >   1. Left ventricular ejection fraction, by visual estimation, is 55 to 60%.   2. Severely enlarged left atrium.   3. Severely enlarged right atrium.   4. Watchman device in place, no anne-device leaks identified in multiple views.   5. Severe tricuspid regurgitation.   6. Aortic valve bioprosthetic valve in place, functioning normally without significant perivalvular leaks seen.   7. Estimated pulmonary artery systolic pressure is 51.6 mmHg assuming a right atrial pressure of 8 mmHg, which is consistent with moderate pulmonary hypertension.   8. Moderately dilated pulmonary artery.   9. Pulmonary hypertension is present.  10. Color Doppler demonstrates the presence of a shunt at the Atrial level.    < end of copied text >    -CCTA:  -STRESS TEST:  < from: NM Nuclear Stress Pharmacologic Multiple (07.23.19 @ 10:00) >  1. IV Adenosine Dual Isotope Study which was negative with respect to   symptoms and EKG changes.  2. Myocardial perfusion imaging reveals no fixed perfusion defects  3. Gated imaging reveals septal motion consistent with an   interventricular conduction defect normal thickening and ejection   fraction:    < end of copied text >    -CATHETERIZATION:    ECG:  NSR with T wave inversion in anterolateral leads  TELEMETRY EVENTS:  
Pain Medicine Consult Note    History of Present Illness  Patient is a 76 y/o woman with history of CHFpEF, CAD s/p CABG, AS s/p AVR, Afib w/ watchman, undiagnosed bleeding disorder, COPD, asthma, DM, HTN, and CKD who was admitted on 4/27/2023 with low back pain and recent GI bleed. The patient states that she had sudden onset of pain in the right low back with radiation to the right gluteal region radiating to the right anterolateral leg and dorsal foot. She notes some numbness and tingling over the dorsal foot with no focal weakness. No bowel or bladder incontinence or saddle anesthesia. She has never had back pain in the past. The patient states that she was taking acetaminophen, ibuprofen, and naproxen OTC for her pain. After a few days of this, she noticed bright red blood in the stool and some abdominal pain. No previous history of chronic pain or chronic opioid therapy.     Current Inpatient Medication Regimen:  acetaminophen     Tablet .. 650 milliGRAM(s) Oral every 6 hours  atorvastatin 40 milliGRAM(s) Oral at bedtime  gabapentin 200 milliGRAM(s) Oral three times a day  lidocaine   4% Patch 1 Patch Transdermal every 24 hours  methocarbamol 750 milliGRAM(s) Oral every 6 hours  metoprolol succinate  milliGRAM(s) Oral daily  montelukast 10 milliGRAM(s) Oral daily  oxyCODONE    IR 10 milliGRAM(s) Oral every 6 hours PRN  pantoprazole  Injectable 40 milliGRAM(s) IV Push two times a day  polyethylene glycol 3350 17 Gram(s) Oral daily  ranolazine 1000 milliGRAM(s) Oral two times a day  senna 2 Tablet(s) Oral at bedtime  sucralfate 1 Gram(s) Oral four times a day      Home Analgesic Regimen:  none    Allergies:  penicillins (Hives; Rash)  Augmentin (Other)      Past Medical History:  Aortic stenosis s/p AVR  Hypertension  Diabetes  Asthma with COPD  CAD (coronary artery disease) s/p CABG  Anemia  History of bleeding disorder  Hiatal hernia  Afib  CHFpEF  CKD    Past Surgical History:  AVR  Bilateral TKA  CABG  Hysterectomy  Watchman    Family History:  RA (parents)  DM (mother)    Social History:  Tobacco - quit smoking ~1992  EtOH - occasional  Drugs - denies      Review of Systems:  General: no fevers or chills  Eyes: no diplopia or blurred vision  ENT: no rhinorrhea  CV: no chest pain  Resp: no cough or dyspnea  GI: +bright red blood in the stool, abdominal pain  : no urinary incontinence or dysuria  Neuro: +right foot numbness    Physical Exam:  T(C): 36 (04-28-23 @ 14:00), Max: 37.2 (04-27-23 @ 16:18)  HR: 77 (04-28-23 @ 14:00) (77 - 90)  BP: 138/66 (04-28-23 @ 14:00) (120/65 - 140/66)  RR: 18 (04-28-23 @ 14:00) (17 - 20)  SpO2: 98% (04-27-23 @ 22:06) (98% - 99%)  Gen: NAD  Eyes: no glasses or scleral icterus  Head: Normocephalic / Atraumatic  CV: no JVD  Lungs: nonlabored breathing  Abdomen: nondistended, soft  : no burden catheter in place  Back: tenderness to palpation  Neuro: AOx3, Cranial nerves intact, +5/5 strength in bilateral lower extremities  Extremities: +bilateral trace edema to the mid leg  Psych: normal affect      Labs:  CBC  6.77 K/uL [4.80 - 10.80] > 10.5 g/dL<L> [12.0 - 16.0] / 33.2 %<L> [37.0 - 47.0] < 128 K/uL<L> [130 - 400]      BMP  144 mmol/L [135 - 146] | 105 mmol/L [98 - 110] | 24 mg/dL<H> [10 - 20]  4.7 mmol/L [3.5 - 5.0] | 30 mmol/L [17 - 32] | 1.1 mg/dL [0.7 - 1.5]    153 mg/dL<H> [70 - 99]        Imaging Studies:  CT Abdomen/Pelvis (4/27/2023)  FINDINGS:    LOWER CHEST: Unchanged 1.5 cm right lower lobe nodule. Post median   sternotomy and aortic valve replacement left atrial appendage exclusion   device with associated thrombosis. Coronary calcifications noted.    HEPATOBILIARY: Cholelithiasis. Calcified hepatic granuloma.    SPLEEN: Unremarkable.    PANCREAS: Unremarkable.    ADRENAL GLANDS: Unremarkable.    KIDNEYS: Bilateral renal angiomyolipomas, right measuring 2.8 cm and the   left measuring subcentimeter. Symmetric renal enhancement bilaterally. No   hydronephrosis    ABDOMINOPELVIC NODES: No lymphadenopathy.    PELVIC ORGANS: Post hysterectomy.    PERITONEUM/MESENTERY/BOWEL: No bowel obstruction, ascites or   pneumoperitoneum. Scattered colonic diverticulosis without evidence for   acute diverticulitis. Appendix not visualized; no focal pericecal   inflammation. Small hiatal hernia.    BONES/SOFT TISSUES: Fat-containing subxiphoid/epigastric hernias. Post   median sternotomy. Multilevel degenerative changes of the spine noted.   Bilateral L5 pars defects without associated spondylolisthesis.    OTHER: Scattered atherosclerotic vascular calcifications.      IMPRESSION:  No CT evidence of an acute abdominopelvic pathology.      Opioid Risk Assessment Tool                                                                         Female       Male  Family History  Alcohol                                                              1                3  Illegal drugs                                                       2                3  Rx drugs                                                            4                4    Personal History   Alcohol                                                              3                3  Illegal drugs                                                       4                4  Rx drugs                                                            5                5    Age between 16—45 years                                1                1  History of preadolescent sexual abuse               3                0    Psychological disease  ADD, OCD, bipolar, schizophrenia                      2                2  Depression                                                       1                1    Total Score                                                      0              __    0 - 3 = low risk for future opioid abuse  4 - 7 = moderate risk for future opioid abuse  8+ = high risk for future opioid abuse

## 2023-05-02 NOTE — PROGRESS NOTE ADULT - ASSESSMENT
77F w/ PMHx of unknown bleeding disorder (s/p workup 5/2021 by Dr. Louis; w/o diagnosis), severe transfusion reactions, diverticulitis, HFpEF, CAD (s/p CABG; Dr. Ramirez), AS (s/p surgical AVR, 2016), atrial fibrillation (s/p watchman, 2021), asthma, COPD, HTN, and DM p/w lower back pain + GI bleed.    # Hematochezia  # Normocytic anemia  # HO Internal hemorrhoids  Patient endorses bloody BMs + BRBPR. Patient has HO internal hemorrhoids but says intensity of bleed felt different.   - GI eval:       - EGD w/ multiple gastric polyps; non-erosive gastritis; biopsies sent       - Colonoscopy (poor prep) w/ moderate diverticulosis, medium internal hemorrhoids       - Likely diverticular vs hemorrhoidal bleed       - Clear liquid diet       - Protonix 40mg BID       - Repeat colonoscopy outpatient       - Repeat EGD in 2 months       - If large hematochezia w/ BRBPR + HD instability, get STAT CT angio  - C/w sucralfate 1g PO QID  - Bowel reg    # Severe lower back pain  # ?Sciatica  R straight leg raise (+).  - Pain management eval:       - C/w lidocaine patch Q24H       - C/w methocarbamol 750mg PO Q6H       - C/w acetaminophen 650mg PO Q6H standing       - C/w gabapentin 300mg PO TID       - C/w oxycodone 10mg PO Q6H PRN       - C/w Dilaudid 0.5mg IV Q8H PRN  - PT/physiatry    # CAD s/p CABG  # HFpEF  # Severe pHTN  # HTN  # pAfib s/p watchman  Not on AC 2/2 concern for bleed. TTE w/ EF 72%, severely enlarged LA, G2DD, mod reduced RV systolic function, severe mitral annular calcification, mod TR, PASP 66.7 mmHg.  - Holding diuretics/aspirin for GI bleed  - C/w atorvastatin 40mg PO QHS  - C/w metoprolol succinate ER 100mg PO QD  - C/w ranolazine 1000mg PO BID  - Fluid restriction to 1200mL  - I/Os, daily weight    # COPD (not on home O2)  # ?LENY  # Obesity hypoventilation syndrome  # HO asthma  - C/w montelukast 10mg PO QD    # DM2  A1c 6.5.  - Monitor FS  - Start insulin if >180    # To follow up  - PT/physiatry  - Dispo    # Misc  - DVT Prophylaxis: Compression Device Sequential  - GI Prophylaxis: pantoprazole 40mg IV BID  - Diet: Diet, Clear Liquid  - Activity: Activity - Ambulate as Tolerated  - Code Status: Full

## 2023-05-02 NOTE — CONSULT NOTE ADULT - ASSESSMENT
IMPRESSION: Rehab of right sciatica / anemia /  diverticulitis, HFpEF, CAD s/p CABG, AS, atrial fibrillation s/p watchman (2021), bleeding disorder, asthma, COPD, DM2, HTN, CKD    PRECAUTIONS: [   ] Cardiac  [   ] Respiratory  [   ] Seizures [   ] Contact Isolation  [   ] Droplet Isolation  [   ] Other    Weight Bearing Status:     RECOMMENDATION: aggressive pain management                                   f/u pain management   Out of Bed to Chair     DVT/Decubiti Prophylaxis    REHAB PLAN:     [  x  ] Bedside P/T 3-5 times a week   [    ]   Bedside O/T  2-3 times a week             [    ] Speech Therapy               [    ]  No Rehab Therapy Indicated   Conditioning/ROM                                    ADL  Bed Mobility                                               Conditioning/ROM  Transfers                                                     Bed Mobility  Sitting /Standing Balance                         Transfers                                        Gait Training                                               Sitting/Standing Balance  Stair Training [   ]Applicable                    Home equipment Eval                                                                        Splinting  [   ] Only      GOALS:   ADL   [    ]   Independent                    Transfers  [  x  ] Independent                          Ambulation  [  x  ] Independent     [x     ] With device                            [    ]  CG                                                         [    ]  CG                                                                  [    ] CG                            [    ] Min A                                                   [    ] Min A                                                              [    ] Min  A          DISCHARGE PLAN:   [    ]  Good candidate for Intensive Rehabilitation/Hospital based                                             Will tolerate 3hrs Intensive Rehab Daily                                       [   x  ]  Short Term Rehab in Skilled Nursing Facility                            vs           [  x   ]  Home with Outpatient or  services                                         [     ]  Possible Candidate for Intensive Hospital based Rehab

## 2023-05-02 NOTE — PROGRESS NOTE ADULT - ASSESSMENT
Patient is a 78 y/o woman with history of CHFpEF, CAD s/p CABG, AS s/p AVR, Afib w/ watchman, undiagnosed bleeding disorder, COPD, asthma, DM, HTN, and CKD who was admitted on 4/27/2023 with low back pain and recent GI bleed.

## 2023-05-03 LAB
ALBUMIN SERPL ELPH-MCNC: 3.4 G/DL — LOW (ref 3.5–5.2)
ALP SERPL-CCNC: 58 U/L — SIGNIFICANT CHANGE UP (ref 30–115)
ALT FLD-CCNC: 15 U/L — SIGNIFICANT CHANGE UP (ref 0–41)
ANION GAP SERPL CALC-SCNC: 11 MMOL/L — SIGNIFICANT CHANGE UP (ref 7–14)
AST SERPL-CCNC: 27 U/L — SIGNIFICANT CHANGE UP (ref 0–41)
BILIRUB SERPL-MCNC: 0.7 MG/DL — SIGNIFICANT CHANGE UP (ref 0.2–1.2)
BUN SERPL-MCNC: 15 MG/DL — SIGNIFICANT CHANGE UP (ref 10–20)
CALCIUM SERPL-MCNC: 9 MG/DL — SIGNIFICANT CHANGE UP (ref 8.4–10.5)
CHLORIDE SERPL-SCNC: 102 MMOL/L — SIGNIFICANT CHANGE UP (ref 98–110)
CO2 SERPL-SCNC: 27 MMOL/L — SIGNIFICANT CHANGE UP (ref 17–32)
CREAT SERPL-MCNC: 1 MG/DL — SIGNIFICANT CHANGE UP (ref 0.7–1.5)
EGFR: 58 ML/MIN/1.73M2 — LOW
GLUCOSE BLDC GLUCOMTR-MCNC: 124 MG/DL — HIGH (ref 70–99)
GLUCOSE BLDC GLUCOMTR-MCNC: 178 MG/DL — HIGH (ref 70–99)
GLUCOSE BLDC GLUCOMTR-MCNC: 227 MG/DL — HIGH (ref 70–99)
GLUCOSE SERPL-MCNC: 143 MG/DL — HIGH (ref 70–99)
HCT VFR BLD CALC: 33.9 % — LOW (ref 37–47)
HGB BLD-MCNC: 10.8 G/DL — LOW (ref 12–16)
MAGNESIUM SERPL-MCNC: 1.8 MG/DL — SIGNIFICANT CHANGE UP (ref 1.8–2.4)
MCHC RBC-ENTMCNC: 29.3 PG — SIGNIFICANT CHANGE UP (ref 27–31)
MCHC RBC-ENTMCNC: 31.9 G/DL — LOW (ref 32–37)
MCV RBC AUTO: 92.1 FL — SIGNIFICANT CHANGE UP (ref 81–99)
NRBC # BLD: 0 /100 WBCS — SIGNIFICANT CHANGE UP (ref 0–0)
PLATELET # BLD AUTO: 140 K/UL — SIGNIFICANT CHANGE UP (ref 130–400)
PMV BLD: 9.7 FL — SIGNIFICANT CHANGE UP (ref 7.4–10.4)
POTASSIUM SERPL-MCNC: 3.6 MMOL/L — SIGNIFICANT CHANGE UP (ref 3.5–5)
POTASSIUM SERPL-SCNC: 3.6 MMOL/L — SIGNIFICANT CHANGE UP (ref 3.5–5)
PROT SERPL-MCNC: 5.8 G/DL — LOW (ref 6–8)
RBC # BLD: 3.68 M/UL — LOW (ref 4.2–5.4)
RBC # FLD: 14.5 % — SIGNIFICANT CHANGE UP (ref 11.5–14.5)
SARS-COV-2 RNA SPEC QL NAA+PROBE: SIGNIFICANT CHANGE UP
SODIUM SERPL-SCNC: 140 MMOL/L — SIGNIFICANT CHANGE UP (ref 135–146)
SURGICAL PATHOLOGY STUDY: SIGNIFICANT CHANGE UP
WBC # BLD: 7.49 K/UL — SIGNIFICANT CHANGE UP (ref 4.8–10.8)
WBC # FLD AUTO: 7.49 K/UL — SIGNIFICANT CHANGE UP (ref 4.8–10.8)

## 2023-05-03 PROCEDURE — 99233 SBSQ HOSP IP/OBS HIGH 50: CPT

## 2023-05-03 RX ORDER — ASPIRIN/CALCIUM CARB/MAGNESIUM 324 MG
81 TABLET ORAL DAILY
Refills: 0 | Status: DISCONTINUED | OUTPATIENT
Start: 2023-05-03 | End: 2023-05-10

## 2023-05-03 RX ADMIN — Medication 1 GRAM(S): at 17:42

## 2023-05-03 RX ADMIN — GABAPENTIN 300 MILLIGRAM(S): 400 CAPSULE ORAL at 13:31

## 2023-05-03 RX ADMIN — METHOCARBAMOL 750 MILLIGRAM(S): 500 TABLET, FILM COATED ORAL at 17:42

## 2023-05-03 RX ADMIN — METHOCARBAMOL 750 MILLIGRAM(S): 500 TABLET, FILM COATED ORAL at 11:06

## 2023-05-03 RX ADMIN — Medication 81 MILLIGRAM(S): at 15:44

## 2023-05-03 RX ADMIN — Medication 650 MILLIGRAM(S): at 06:35

## 2023-05-03 RX ADMIN — SENNA PLUS 2 TABLET(S): 8.6 TABLET ORAL at 21:07

## 2023-05-03 RX ADMIN — RANOLAZINE 1000 MILLIGRAM(S): 500 TABLET, FILM COATED, EXTENDED RELEASE ORAL at 17:42

## 2023-05-03 RX ADMIN — RANOLAZINE 1000 MILLIGRAM(S): 500 TABLET, FILM COATED, EXTENDED RELEASE ORAL at 06:36

## 2023-05-03 RX ADMIN — OXYCODONE HYDROCHLORIDE 10 MILLIGRAM(S): 5 TABLET ORAL at 17:46

## 2023-05-03 RX ADMIN — Medication 650 MILLIGRAM(S): at 13:31

## 2023-05-03 RX ADMIN — Medication 100 MILLIGRAM(S): at 06:36

## 2023-05-03 RX ADMIN — Medication 650 MILLIGRAM(S): at 11:06

## 2023-05-03 RX ADMIN — Medication 650 MILLIGRAM(S): at 17:42

## 2023-05-03 RX ADMIN — METHOCARBAMOL 750 MILLIGRAM(S): 500 TABLET, FILM COATED ORAL at 06:36

## 2023-05-03 RX ADMIN — Medication 1 GRAM(S): at 06:36

## 2023-05-03 RX ADMIN — ATORVASTATIN CALCIUM 40 MILLIGRAM(S): 80 TABLET, FILM COATED ORAL at 21:07

## 2023-05-03 RX ADMIN — OXYCODONE HYDROCHLORIDE 10 MILLIGRAM(S): 5 TABLET ORAL at 15:41

## 2023-05-03 RX ADMIN — Medication 1 GRAM(S): at 11:06

## 2023-05-03 RX ADMIN — GABAPENTIN 300 MILLIGRAM(S): 400 CAPSULE ORAL at 06:37

## 2023-05-03 RX ADMIN — PANTOPRAZOLE SODIUM 40 MILLIGRAM(S): 20 TABLET, DELAYED RELEASE ORAL at 17:42

## 2023-05-03 RX ADMIN — Medication 650 MILLIGRAM(S): at 23:00

## 2023-05-03 RX ADMIN — GABAPENTIN 300 MILLIGRAM(S): 400 CAPSULE ORAL at 21:07

## 2023-05-03 RX ADMIN — OXYCODONE HYDROCHLORIDE 10 MILLIGRAM(S): 5 TABLET ORAL at 08:44

## 2023-05-03 RX ADMIN — PANTOPRAZOLE SODIUM 40 MILLIGRAM(S): 20 TABLET, DELAYED RELEASE ORAL at 06:35

## 2023-05-03 RX ADMIN — Medication 1 GRAM(S): at 23:00

## 2023-05-03 RX ADMIN — OXYCODONE HYDROCHLORIDE 10 MILLIGRAM(S): 5 TABLET ORAL at 11:10

## 2023-05-03 RX ADMIN — METHOCARBAMOL 750 MILLIGRAM(S): 500 TABLET, FILM COATED ORAL at 23:00

## 2023-05-03 RX ADMIN — MONTELUKAST 10 MILLIGRAM(S): 4 TABLET, CHEWABLE ORAL at 11:06

## 2023-05-03 NOTE — PROGRESS NOTE ADULT - ASSESSMENT
Imp: Rehab of right sciatica / anemia, s/p EGD / colonoscopy /  diverticulitis, HFpEF, CAD s/p CABG, AS, atrial fibrillation s/p watchman (2021), bleeding disorder, asthma, COPD, DM2, HTN, CKD           Prior to hospitalization she was ambulating independent with a cane and lives alone at home. Currently she needs assist with all ADLs and ambulate short distance        with walker with assist. Pt can tolerate 3hr/day PT / OT and medically necessary. She is motivated. She needs acute inpatient rehab to restore functions for safe return home.        She also needs physiatrist to monitor her medical status and functional progress during her rehab stay. She warrants acute inpatient rehab  Plan: continue bedside therapy as tolerated          Good 4a acute inpatient rehab candidate

## 2023-05-03 NOTE — PROGRESS NOTE ADULT - ASSESSMENT
77F w/ PMHx of unknown bleeding disorder (s/p workup 5/2021 by Dr. Louis; w/o diagnosis), severe transfusion reactions, diverticulitis, HFpEF, CAD (s/p CABG; Dr. Ramirez), AS (s/p surgical AVR, 2016), atrial fibrillation (s/p watchman, 2021), asthma, COPD, HTN, and DM p/w lower back pain + GI bleed.    # Hematochezia  # Normocytic anemia  # HO Internal hemorrhoids  Patient endorses bloody BMs + BRBPR. Patient has HO internal hemorrhoids but says intensity of bleed felt different.   - GI eval:       - EGD w/ multiple gastric polyps; non-erosive gastritis; biopsies sent       - Colonoscopy (poor prep) w/ moderate diverticulosis, medium internal hemorrhoids       - Likely diverticular vs hemorrhoidal bleed       - Clear liquid diet       - Protonix 40mg BID       - Repeat colonoscopy outpatient       - Repeat EGD in 2 months       - If large hematochezia w/ BRBPR + HD instability, get STAT CT angio  - C/w sucralfate 1g PO QID  - Bowel reg    # Severe lower back pain  # ?Sciatica  R straight leg raise (+).  - Pain management eval:  1) Continue oxycodone IR 10mg Q6h prn; may discharge with a 3-5 day supply  2) Continue hydromorphone IV 0.5mg Q8h prn  3) Continue acetaminophen 650mg Q6h standing  4) Continue gabapentin to 300mg TID  5) Continue methocarbamol 750mg Q6h standing  6) Avoid NSAIDs in the setting of bleeding  7) Continue miralax, senna.  - PT/physiatry    # CAD s/p CABG  # HFpEF  # Severe pHTN  # HTN  # pAfib s/p watchman  Not on AC 2/2 concern for bleed. TTE w/ EF 72%, severely enlarged LA, G2DD, mod reduced RV systolic function, severe mitral annular calcification, mod TR, PASP 66.7 mmHg.  - Holding diuretics/aspirin for GI bleed  - C/w atorvastatin 40mg PO QHS  - C/w metoprolol succinate ER 100mg PO QD  - C/w ranolazine 1000mg PO BID  - Fluid restriction to 1200mL  - I/Os, daily weight    # COPD (not on home O2)  # ?LENY  # Obesity hypoventilation syndrome  # HO asthma  - C/w montelukast 10mg PO QD    # DM2  A1c 6.5.  - Monitor FS  - Start insulin if >180      # Misc  - DVT Prophylaxis: Compression Device Sequential  - GI Prophylaxis: pantoprazole 40mg IV BID  - Diet: Diet, Clear Liquid  - Activity: Activity - Ambulate as Tolerated  - Code Status: Full

## 2023-05-03 NOTE — PROGRESS NOTE ADULT - ASSESSMENT
76yo F hx of diverticulitis, HFpEF, CAD s/p CABG, AS s/p SAVR (2016), atrial fibrillation s/p watchman (2021), bleeding disorder, asthma, COPD, DM2, HTN presenting to the hospital for lower back pain, and GI bleed.      #Hematochezia/Internal hemorrhoids/Normocytic anemia  - monitor H/H, keep Hb above 7.5   - pt was consulted by GI,  EGD/colonoscopy showed gastritis, gastric polyps/ diverticulosis  - no active bleeding noted  - c/w PPIs, prevent constipation   - pt needs repeat colonoscopy as an OP      #Sciatica/Severe lower back pain  - clinically improving   - pain management follow up for recommendations for  discharge   - pt denies urinary or stool incontinence at this time, no clinical indications for imaging   - PT as tolerated, physiatry eval, pt wants to go to 4A      #CAD s/p CABG/Chronic diastolic CHF/Severe PHTN/ HTN  - fluid restriction 1200 ml in 24 hours   - intake and output monitoring, daily weight   - monitor for fluid overload    - cont metoprolol  - diuretics, aspirin held on admission   - cont statin, ranolazine    #Paroxysmal atrial fibrillation s/p Watchmann  - no longer on therapeutic AC due to concern of bleeding  - c/w BB     #COPD- not on home O2/ Hx of Asthma/ suspected LENY/ obesity hypoventilation syndrome   - stable for now   - cont montelukast    #DM2  - carb consistent diet   - monitor finger stick   - check A1c  - start insulin if FG>180    DVT ppx: none. SCDs  GI ppx: protonix     #Progress Note Handoff  Pending (specify):  pain management, physiatry eval for 4A   Family discussion: I spoke with pt, she agreed with a plan of care   Disposition: Home___/SNF___/Other________/Unknown at this time____x __

## 2023-05-04 LAB
ALBUMIN SERPL ELPH-MCNC: 3.4 G/DL — LOW (ref 3.5–5.2)
ALP SERPL-CCNC: 58 U/L — SIGNIFICANT CHANGE UP (ref 30–115)
ALT FLD-CCNC: 19 U/L — SIGNIFICANT CHANGE UP (ref 0–41)
ANION GAP SERPL CALC-SCNC: 13 MMOL/L — SIGNIFICANT CHANGE UP (ref 7–14)
AST SERPL-CCNC: 32 U/L — SIGNIFICANT CHANGE UP (ref 0–41)
BASOPHILS # BLD AUTO: 0.03 K/UL — SIGNIFICANT CHANGE UP (ref 0–0.2)
BASOPHILS NFR BLD AUTO: 0.5 % — SIGNIFICANT CHANGE UP (ref 0–1)
BILIRUB SERPL-MCNC: 0.6 MG/DL — SIGNIFICANT CHANGE UP (ref 0.2–1.2)
BLD GP AB SCN SERPL QL: SIGNIFICANT CHANGE UP
BUN SERPL-MCNC: 18 MG/DL — SIGNIFICANT CHANGE UP (ref 10–20)
CALCIUM SERPL-MCNC: 9.2 MG/DL — SIGNIFICANT CHANGE UP (ref 8.4–10.4)
CHLORIDE SERPL-SCNC: 104 MMOL/L — SIGNIFICANT CHANGE UP (ref 98–110)
CO2 SERPL-SCNC: 25 MMOL/L — SIGNIFICANT CHANGE UP (ref 17–32)
CREAT SERPL-MCNC: 1 MG/DL — SIGNIFICANT CHANGE UP (ref 0.7–1.5)
EGFR: 58 ML/MIN/1.73M2 — LOW
EOSINOPHIL # BLD AUTO: 0.15 K/UL — SIGNIFICANT CHANGE UP (ref 0–0.7)
EOSINOPHIL NFR BLD AUTO: 2.3 % — SIGNIFICANT CHANGE UP (ref 0–8)
GLUCOSE BLDC GLUCOMTR-MCNC: 156 MG/DL — HIGH (ref 70–99)
GLUCOSE BLDC GLUCOMTR-MCNC: 173 MG/DL — HIGH (ref 70–99)
GLUCOSE BLDC GLUCOMTR-MCNC: 177 MG/DL — HIGH (ref 70–99)
GLUCOSE BLDC GLUCOMTR-MCNC: 216 MG/DL — HIGH (ref 70–99)
GLUCOSE SERPL-MCNC: 171 MG/DL — HIGH (ref 70–99)
HCT VFR BLD CALC: 34 % — LOW (ref 37–47)
HGB BLD-MCNC: 10.9 G/DL — LOW (ref 12–16)
IMM GRANULOCYTES NFR BLD AUTO: 0.3 % — SIGNIFICANT CHANGE UP (ref 0.1–0.3)
LYMPHOCYTES # BLD AUTO: 1.6 K/UL — SIGNIFICANT CHANGE UP (ref 1.2–3.4)
LYMPHOCYTES # BLD AUTO: 24.4 % — SIGNIFICANT CHANGE UP (ref 20.5–51.1)
MAGNESIUM SERPL-MCNC: 1.8 MG/DL — SIGNIFICANT CHANGE UP (ref 1.8–2.4)
MCHC RBC-ENTMCNC: 30.1 PG — SIGNIFICANT CHANGE UP (ref 27–31)
MCHC RBC-ENTMCNC: 32.1 G/DL — SIGNIFICANT CHANGE UP (ref 32–37)
MCV RBC AUTO: 93.9 FL — SIGNIFICANT CHANGE UP (ref 81–99)
MONOCYTES # BLD AUTO: 0.71 K/UL — HIGH (ref 0.1–0.6)
MONOCYTES NFR BLD AUTO: 10.8 % — HIGH (ref 1.7–9.3)
NEUTROPHILS # BLD AUTO: 4.06 K/UL — SIGNIFICANT CHANGE UP (ref 1.4–6.5)
NEUTROPHILS NFR BLD AUTO: 61.7 % — SIGNIFICANT CHANGE UP (ref 42.2–75.2)
NRBC # BLD: 0 /100 WBCS — SIGNIFICANT CHANGE UP (ref 0–0)
PLATELET # BLD AUTO: 136 K/UL — SIGNIFICANT CHANGE UP (ref 130–400)
PMV BLD: 9.2 FL — SIGNIFICANT CHANGE UP (ref 7.4–10.4)
POTASSIUM SERPL-MCNC: 3.9 MMOL/L — SIGNIFICANT CHANGE UP (ref 3.5–5)
POTASSIUM SERPL-SCNC: 3.9 MMOL/L — SIGNIFICANT CHANGE UP (ref 3.5–5)
PROT SERPL-MCNC: 5.9 G/DL — LOW (ref 6–8)
RBC # BLD: 3.62 M/UL — LOW (ref 4.2–5.4)
RBC # FLD: 14.6 % — HIGH (ref 11.5–14.5)
SODIUM SERPL-SCNC: 142 MMOL/L — SIGNIFICANT CHANGE UP (ref 135–146)
WBC # BLD: 6.57 K/UL — SIGNIFICANT CHANGE UP (ref 4.8–10.8)
WBC # FLD AUTO: 6.57 K/UL — SIGNIFICANT CHANGE UP (ref 4.8–10.8)

## 2023-05-04 PROCEDURE — 99232 SBSQ HOSP IP/OBS MODERATE 35: CPT

## 2023-05-04 RX ORDER — SODIUM CHLORIDE 9 MG/ML
1000 INJECTION, SOLUTION INTRAVENOUS
Refills: 0 | Status: DISCONTINUED | OUTPATIENT
Start: 2023-05-04 | End: 2023-05-10

## 2023-05-04 RX ORDER — DEXTROSE 50 % IN WATER 50 %
25 SYRINGE (ML) INTRAVENOUS ONCE
Refills: 0 | Status: DISCONTINUED | OUTPATIENT
Start: 2023-05-04 | End: 2023-05-10

## 2023-05-04 RX ORDER — INSULIN LISPRO 100/ML
VIAL (ML) SUBCUTANEOUS
Refills: 0 | Status: DISCONTINUED | OUTPATIENT
Start: 2023-05-04 | End: 2023-05-07

## 2023-05-04 RX ORDER — DEXTROSE 50 % IN WATER 50 %
12.5 SYRINGE (ML) INTRAVENOUS ONCE
Refills: 0 | Status: DISCONTINUED | OUTPATIENT
Start: 2023-05-04 | End: 2023-05-10

## 2023-05-04 RX ORDER — GLUCAGON INJECTION, SOLUTION 0.5 MG/.1ML
1 INJECTION, SOLUTION SUBCUTANEOUS ONCE
Refills: 0 | Status: DISCONTINUED | OUTPATIENT
Start: 2023-05-04 | End: 2023-05-10

## 2023-05-04 RX ORDER — PANTOPRAZOLE SODIUM 20 MG/1
40 TABLET, DELAYED RELEASE ORAL
Refills: 0 | Status: DISCONTINUED | OUTPATIENT
Start: 2023-05-04 | End: 2023-05-09

## 2023-05-04 RX ORDER — DEXTROSE 50 % IN WATER 50 %
15 SYRINGE (ML) INTRAVENOUS ONCE
Refills: 0 | Status: DISCONTINUED | OUTPATIENT
Start: 2023-05-04 | End: 2023-05-10

## 2023-05-04 RX ADMIN — Medication 650 MILLIGRAM(S): at 11:16

## 2023-05-04 RX ADMIN — Medication 650 MILLIGRAM(S): at 17:41

## 2023-05-04 RX ADMIN — MONTELUKAST 10 MILLIGRAM(S): 4 TABLET, CHEWABLE ORAL at 11:16

## 2023-05-04 RX ADMIN — Medication 1: at 17:42

## 2023-05-04 RX ADMIN — Medication 100 MILLIGRAM(S): at 06:17

## 2023-05-04 RX ADMIN — Medication 1 GRAM(S): at 23:12

## 2023-05-04 RX ADMIN — SENNA PLUS 2 TABLET(S): 8.6 TABLET ORAL at 21:16

## 2023-05-04 RX ADMIN — RANOLAZINE 1000 MILLIGRAM(S): 500 TABLET, FILM COATED, EXTENDED RELEASE ORAL at 17:41

## 2023-05-04 RX ADMIN — GABAPENTIN 300 MILLIGRAM(S): 400 CAPSULE ORAL at 06:16

## 2023-05-04 RX ADMIN — METHOCARBAMOL 750 MILLIGRAM(S): 500 TABLET, FILM COATED ORAL at 23:12

## 2023-05-04 RX ADMIN — PANTOPRAZOLE SODIUM 40 MILLIGRAM(S): 20 TABLET, DELAYED RELEASE ORAL at 17:42

## 2023-05-04 RX ADMIN — POLYETHYLENE GLYCOL 3350 17 GRAM(S): 17 POWDER, FOR SOLUTION ORAL at 11:16

## 2023-05-04 RX ADMIN — Medication 650 MILLIGRAM(S): at 18:40

## 2023-05-04 RX ADMIN — GABAPENTIN 300 MILLIGRAM(S): 400 CAPSULE ORAL at 13:39

## 2023-05-04 RX ADMIN — Medication 81 MILLIGRAM(S): at 11:16

## 2023-05-04 RX ADMIN — HYDROMORPHONE HYDROCHLORIDE 0.5 MILLIGRAM(S): 2 INJECTION INTRAMUSCULAR; INTRAVENOUS; SUBCUTANEOUS at 11:15

## 2023-05-04 RX ADMIN — HYDROMORPHONE HYDROCHLORIDE 0.5 MILLIGRAM(S): 2 INJECTION INTRAMUSCULAR; INTRAVENOUS; SUBCUTANEOUS at 21:15

## 2023-05-04 RX ADMIN — METHOCARBAMOL 750 MILLIGRAM(S): 500 TABLET, FILM COATED ORAL at 06:15

## 2023-05-04 RX ADMIN — GABAPENTIN 300 MILLIGRAM(S): 400 CAPSULE ORAL at 21:16

## 2023-05-04 RX ADMIN — Medication 1 GRAM(S): at 17:41

## 2023-05-04 RX ADMIN — PANTOPRAZOLE SODIUM 40 MILLIGRAM(S): 20 TABLET, DELAYED RELEASE ORAL at 06:17

## 2023-05-04 RX ADMIN — HYDROMORPHONE HYDROCHLORIDE 0.5 MILLIGRAM(S): 2 INJECTION INTRAMUSCULAR; INTRAVENOUS; SUBCUTANEOUS at 11:45

## 2023-05-04 RX ADMIN — Medication 650 MILLIGRAM(S): at 06:16

## 2023-05-04 RX ADMIN — Medication 650 MILLIGRAM(S): at 12:15

## 2023-05-04 RX ADMIN — Medication 1 GRAM(S): at 11:16

## 2023-05-04 RX ADMIN — Medication 1 GRAM(S): at 06:17

## 2023-05-04 RX ADMIN — METHOCARBAMOL 750 MILLIGRAM(S): 500 TABLET, FILM COATED ORAL at 17:41

## 2023-05-04 RX ADMIN — Medication 650 MILLIGRAM(S): at 23:12

## 2023-05-04 RX ADMIN — METHOCARBAMOL 750 MILLIGRAM(S): 500 TABLET, FILM COATED ORAL at 11:16

## 2023-05-04 RX ADMIN — ATORVASTATIN CALCIUM 40 MILLIGRAM(S): 80 TABLET, FILM COATED ORAL at 21:16

## 2023-05-04 RX ADMIN — RANOLAZINE 1000 MILLIGRAM(S): 500 TABLET, FILM COATED, EXTENDED RELEASE ORAL at 06:17

## 2023-05-04 NOTE — OCCUPATIONAL THERAPY INITIAL EVALUATION ADULT - LEVEL OF CONSCIOUSNESS, OT EVAL
ASSESSMENT/ PLAN:   1. Type 2 diabetes mellitus with microalbuminuria, with long-term current use of insulin (CMS/[facility])    2. Type 2 diabetes mellitus with morbid obesity (CMS/[facility])      Patient will work on improving diet.  Continue physical activity as tolerated.  Will reassess hemoglobin A1c in 3.  Patient from diabetic and cardiac standpoint cleared for cataract surgery.  Reviewed preoperative insulin dosing.    Orders Placed This Encounter   • POCT Glycohemoglobin Analyzer     See Patient Instruction section  No follow-ups on file.    ___________________________________________________  SUBJECTIVE:  Jose Cruz is a 77 year old male who is seen for evaluation of his insulin-dependent type 2 diabetes.  Obesity with BMI 35 today.  Hemoglobin A1 c 8.4.  Patient had issue with pain swelling induration erythema at injection site of his Levemir.  This occurred in mid October.  He got a new vial of Levemir which he started end of October and in the past 2 week noted significant improvement in erythema induration and discomfort.  Injecting in abdomen and thighs.  Patient on 80 units Levemir and sliding scale NovoLog.  Occasional low spells typically associated with increase physical activity will preceded by decreased oral intake.  Does have low sugar awareness.    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    • History of grid laser and multiple intra-vitreal injections of the right eye for  diabetic macular edema and panretinal photocoagulation the left eye for proliferative diabetic retinopathy   • Combined forms of age-related cataract of left eye   • Myopia of both eyes with astigmatism   • Pseudophakia of right eye   • Epiretinal membrane, left eye   • Mild nonproliferative diabetic retinopathy of right eye with macular edema associated with type 2 diabetes mellitus (CMS/HCC)   • Mild nonproliferative diabetic retinopathy of left eye without macular edema associated with type 2 diabetes mellitus (CMS/HCC)   • Posterior vitreous detachment of both eyes       REVIEW OF SYSTEMS:  In addition to that noted above, review of systems is remarkable for:  Hypertension/coronary artery disease.  No shortness of breath or chest pain.  Patient reports he has upcoming left cataract surgery.  Patient remains physically active outside without dyspnea or chest pain.    metoPROLOL tartrate (LOPRESSOR) 25 MG tablet  insulin aspart (NovoLOG FlexPen) 100 UNIT/ML pen-injector  atorvastatin (LIPITOR) 10 MG tablet  levothyroxine 150 MCG tablet  ramipril (ALTACE) 5 MG capsule  insulin detemir (Levemir) 100 UNIT/ML injectable solution  Cholecalciferol (Vitamin D3) 125 mcg (5,000 units) disintegrating tablet  Blood Glucose Calibration (Accu-Chek Juli) Solution  blood glucose (Accu-Chek Juli Plus) test strip  SOFTCLIX LANCETS Misc  Insulin Pen Needle (Pen Needles 5/16\") 31G X 8 MM Misc  Insulin Syringe-Needle U-100 (BD Insulin Syringe Ultrafine) 31G X 5/16\" 1 ML Misc  Blood Glucose Monitoring Suppl (Accu-Chek Juli Plus) w/Device Kit  aspirin (ECOTRIN) 81 MG EC tablet  ferrous sulfate 325 (65 FE) MG tablet  Fish Oil Oil    No current facility-administered medications on file prior to visit.    Allergies as of 12/22/2021 - Reviewed 12/22/2021   Allergen Reaction Noted   • Loteprednol-tobramycin Other (See Comments) 10/10/2014       OBJECTIVE:  Visit Vitals  /50 (BP Location: LUE - Left upper extremity, Patient  Position: Sitting, Cuff Size: Regular)   Pulse 60   Temp 97.7 °F (36.5 °C) (Temporal)   Resp 12   Ht 5' 10\" (1.778 m)   Wt 112.2 kg (247 lb 5 oz)   BMI 35.49 kg/m²     General:   In no acute distress. Awake, appropriate.  Neck is supple.  No adenopathy bruit or thyromegaly.  Lungs clear to auscultation, unlabored.  Heart regular rate rhythm.  No ectopy.  Abdomen soft nontender.  Abdomen skin with induration and erythema.  This appears similar to what I have seen over the past couple years.  Patient states significantly better and back to near normal.  Extremities without pitting.               alert

## 2023-05-04 NOTE — PROGRESS NOTE ADULT - ASSESSMENT
76yo F hx of diverticulitis, HFpEF, CAD s/p CABG, AS s/p SAVR (2016), atrial fibrillation s/p watchman (2021), bleeding disorder, asthma, COPD, DM2, HTN presenting to the hospital for lower back pain, and GI bleed.      #Hematochezia/Internal hemorrhoids/Normocytic anemia  - monitor H/H, keep Hb above 7.5   - pt was consulted by GI,  EGD/colonoscopy showed gastritis, gastric polyps/ diverticulosis  - no active bleeding noted  - c/w PPIs, prevent constipation   - pt needs repeat colonoscopy as an OP      #Sciatica/Severe lower back pain  - clinically improving   - pain management follow up for recommendations for  discharge   - pt denies urinary or stool incontinence at this time, no clinical indications for imaging   - PT as tolerated, physiatry eval, pt wants to go to 4A      #CAD s/p CABG/Chronic diastolic CHF/Severe PHTN/ HTN  - fluid restriction 1200 ml in 24 hours   - intake and output monitoring, daily weight   - monitor for fluid overload    - cont metoprolol  - diuretics, aspirin held on admission   - cont statin, ranolazine    #Paroxysmal atrial fibrillation s/p Watchmann  - no longer on therapeutic AC due to concern of bleeding  - c/w BB     #COPD- not on home O2/ Hx of Asthma/ suspected LENY/ obesity hypoventilation syndrome   - stable for now   - cont montelukast    #DM2  - carb consistent diet   - monitor finger stick   - start insulin if FG>180    DVT ppx: none. SCDs  GI ppx: protonix     #Progress Note Handoff  Pending (specify): 4A authorization   Family discussion: I spoke with pt, she agreed with a plan of care   Disposition: Acute rehab

## 2023-05-04 NOTE — OCCUPATIONAL THERAPY INITIAL EVALUATION ADULT - PERTINENT HX OF CURRENT PROBLEM, REHAB EVAL
78yo F hx of diverticulitis, HFpEF, CAD s/p CABG, AS s/p SAVR (2016), atrial fibrillation s/p watchman (2021), bleeding disorder, asthma, COPD, DM2, HTN, CKD 2-3 presenting to the hospital for lower back pain, and GI bleed. On Monday 4/24/23, she was having lower back pain with radiation to LLE with no improvement from OTC meds and bloody BMs with associated abdominal pain. Today, back pain so severe that she called her daughter for help with assisting with cleaning her skin after having bloody BMs because she was having difficulty with ambulation. Patient was also having BRBPR, noticed by daughter. Patient does note she has internal hemorrhoids but felt this intensity of bleeding felt different. Has had diverticulitis in the past. Denies fevers, chills, dizziness, lightheadedness, shortness of breath, nausea/vomiting, dysuria. Patient admitted to the hospital for intractable pain, GIB. Given ketorolac x1, metjocabamol x1, morphine 8mg IV, tramadol 25mg x1, and started on PPI infusion after PPI IVP. Minimal improvement in her back pain with the pain regimen. GI consulted. Possible EGD/colonoscopy tomorrow.

## 2023-05-04 NOTE — PROGRESS NOTE ADULT - ASSESSMENT
77F w/ PMHx of unknown bleeding disorder (s/p workup 5/2021 by Dr. Louis; w/o diagnosis), severe transfusion reactions, diverticulitis, HFpEF, CAD (s/p CABG; Dr. Ramirez), AS (s/p surgical AVR, 2016), atrial fibrillation (s/p watchman, 2021), asthma, COPD, HTN, and DM p/w lower back pain + GI bleed.    # Hematochezia  # Normocytic anemia  # HO Internal hemorrhoids  Patient endorses bloody BMs + BRBPR. Patient has HO internal hemorrhoids but says intensity of bleed felt different.   - GI eval:       - EGD w/ multiple gastric polyps; non-erosive gastritis; biopsies sent       - Colonoscopy (poor prep) w/ moderate diverticulosis, medium internal hemorrhoids       - Likely diverticular vs hemorrhoidal bleed       - Clear liquid diet       - Protonix 40mg BID       - Repeat colonoscopy outpatient       - Repeat EGD in 2 months       - If large hematochezia w/ BRBPR + HD instability, get STAT CT angio  - C/w sucralfate 1g PO QID  - Bowel reg    # Severe lower back pain  # ?Sciatica  R straight leg raise (+).  - Pain management eval:  1) Continue oxycodone IR 10mg Q6h prn; may discharge with a 3-5 day supply  2) Continue hydromorphone IV 0.5mg Q8h prn  3) Continue acetaminophen 650mg Q6h standing  4) Continue gabapentin to 300mg TID  5) Continue methocarbamol 750mg Q6h standing  6) Avoid NSAIDs in the setting of bleeding  7) Continue miralax, senna.  - PT/physiatry    # CAD s/p CABG  # HFpEF  # Severe pHTN  # HTN  # pAfib s/p watchman  Not on AC 2/2 concern for bleed. TTE w/ EF 72%, severely enlarged LA, G2DD, mod reduced RV systolic function, severe mitral annular calcification, mod TR, PASP 66.7 mmHg.  - Holding diuretics/aspirin for GI bleed  - C/w atorvastatin 40mg PO QHS  - C/w metoprolol succinate ER 100mg PO QD  - C/w ranolazine 1000mg PO BID  - Fluid restriction to 1200mL  - I/Os, daily weight    # COPD (not on home O2)  # ?LENY  # Obesity hypoventilation syndrome  # HO asthma  - C/w montelukast 10mg PO QD    # DM2  A1c 6.5.  - ISS    # To follow up  - Dispo: cleared for 4A by physiatry    # Misc  - DVT Prophylaxis: Compression Device Sequential  - GI Prophylaxis: pantoprazole 40mg IV BID  - Diet: Diet, Clear Liquid  - Activity: Activity - Ambulate as Tolerated  - Code Status: Full

## 2023-05-05 LAB
ALBUMIN SERPL ELPH-MCNC: 3.5 G/DL — SIGNIFICANT CHANGE UP (ref 3.5–5.2)
ALP SERPL-CCNC: 55 U/L — SIGNIFICANT CHANGE UP (ref 30–115)
ALT FLD-CCNC: 19 U/L — SIGNIFICANT CHANGE UP (ref 0–41)
ANION GAP SERPL CALC-SCNC: 11 MMOL/L — SIGNIFICANT CHANGE UP (ref 7–14)
AST SERPL-CCNC: 30 U/L — SIGNIFICANT CHANGE UP (ref 0–41)
BILIRUB SERPL-MCNC: 0.7 MG/DL — SIGNIFICANT CHANGE UP (ref 0.2–1.2)
BUN SERPL-MCNC: 19 MG/DL — SIGNIFICANT CHANGE UP (ref 10–20)
CALCIUM SERPL-MCNC: 9.2 MG/DL — SIGNIFICANT CHANGE UP (ref 8.4–10.5)
CHLORIDE SERPL-SCNC: 103 MMOL/L — SIGNIFICANT CHANGE UP (ref 98–110)
CO2 SERPL-SCNC: 28 MMOL/L — SIGNIFICANT CHANGE UP (ref 17–32)
CREAT SERPL-MCNC: 1 MG/DL — SIGNIFICANT CHANGE UP (ref 0.7–1.5)
EGFR: 58 ML/MIN/1.73M2 — LOW
GLUCOSE BLDC GLUCOMTR-MCNC: 171 MG/DL — HIGH (ref 70–99)
GLUCOSE BLDC GLUCOMTR-MCNC: 185 MG/DL — HIGH (ref 70–99)
GLUCOSE BLDC GLUCOMTR-MCNC: 194 MG/DL — HIGH (ref 70–99)
GLUCOSE BLDC GLUCOMTR-MCNC: 197 MG/DL — HIGH (ref 70–99)
GLUCOSE SERPL-MCNC: 166 MG/DL — HIGH (ref 70–99)
MAGNESIUM SERPL-MCNC: 1.7 MG/DL — LOW (ref 1.8–2.4)
POTASSIUM SERPL-MCNC: 4 MMOL/L — SIGNIFICANT CHANGE UP (ref 3.5–5)
POTASSIUM SERPL-SCNC: 4 MMOL/L — SIGNIFICANT CHANGE UP (ref 3.5–5)
PROT SERPL-MCNC: 5.8 G/DL — LOW (ref 6–8)
SODIUM SERPL-SCNC: 142 MMOL/L — SIGNIFICANT CHANGE UP (ref 135–146)

## 2023-05-05 PROCEDURE — 99232 SBSQ HOSP IP/OBS MODERATE 35: CPT

## 2023-05-05 PROCEDURE — 99231 SBSQ HOSP IP/OBS SF/LOW 25: CPT

## 2023-05-05 PROCEDURE — 99233 SBSQ HOSP IP/OBS HIGH 50: CPT

## 2023-05-05 RX ORDER — METHOCARBAMOL 500 MG/1
1000 TABLET, FILM COATED ORAL EVERY 6 HOURS
Refills: 0 | Status: DISCONTINUED | OUTPATIENT
Start: 2023-05-05 | End: 2023-05-10

## 2023-05-05 RX ORDER — OXYCODONE HYDROCHLORIDE 5 MG/1
10 TABLET ORAL ONCE
Refills: 0 | Status: DISCONTINUED | OUTPATIENT
Start: 2023-05-05 | End: 2023-05-05

## 2023-05-05 RX ORDER — GABAPENTIN 400 MG/1
600 CAPSULE ORAL THREE TIMES A DAY
Refills: 0 | Status: DISCONTINUED | OUTPATIENT
Start: 2023-05-05 | End: 2023-05-07

## 2023-05-05 RX ORDER — HYDROMORPHONE HYDROCHLORIDE 2 MG/ML
4 INJECTION INTRAMUSCULAR; INTRAVENOUS; SUBCUTANEOUS EVERY 6 HOURS
Refills: 0 | Status: DISCONTINUED | OUTPATIENT
Start: 2023-05-05 | End: 2023-05-09

## 2023-05-05 RX ORDER — LACTULOSE 10 G/15ML
30 SOLUTION ORAL
Refills: 0 | Status: COMPLETED | OUTPATIENT
Start: 2023-05-05 | End: 2023-05-06

## 2023-05-05 RX ORDER — HYDROMORPHONE HYDROCHLORIDE 2 MG/ML
1 INJECTION INTRAMUSCULAR; INTRAVENOUS; SUBCUTANEOUS ONCE
Refills: 0 | Status: DISCONTINUED | OUTPATIENT
Start: 2023-05-05 | End: 2023-05-05

## 2023-05-05 RX ORDER — MAGNESIUM OXIDE 400 MG ORAL TABLET 241.3 MG
400 TABLET ORAL
Refills: 0 | Status: DISCONTINUED | OUTPATIENT
Start: 2023-05-05 | End: 2023-05-10

## 2023-05-05 RX ORDER — NALOXEGOL OXALATE 12.5 MG/1
25 TABLET, FILM COATED ORAL DAILY
Refills: 0 | Status: DISCONTINUED | OUTPATIENT
Start: 2023-05-05 | End: 2023-05-10

## 2023-05-05 RX ORDER — HEPARIN SODIUM 5000 [USP'U]/ML
5000 INJECTION INTRAVENOUS; SUBCUTANEOUS EVERY 12 HOURS
Refills: 0 | Status: DISCONTINUED | OUTPATIENT
Start: 2023-05-05 | End: 2023-05-08

## 2023-05-05 RX ADMIN — Medication 650 MILLIGRAM(S): at 23:06

## 2023-05-05 RX ADMIN — OXYCODONE HYDROCHLORIDE 10 MILLIGRAM(S): 5 TABLET ORAL at 01:16

## 2023-05-05 RX ADMIN — Medication 650 MILLIGRAM(S): at 17:44

## 2023-05-05 RX ADMIN — OXYCODONE HYDROCHLORIDE 10 MILLIGRAM(S): 5 TABLET ORAL at 09:25

## 2023-05-05 RX ADMIN — Medication 650 MILLIGRAM(S): at 18:02

## 2023-05-05 RX ADMIN — Medication 650 MILLIGRAM(S): at 05:55

## 2023-05-05 RX ADMIN — METHOCARBAMOL 1000 MILLIGRAM(S): 500 TABLET, FILM COATED ORAL at 13:04

## 2023-05-05 RX ADMIN — METHOCARBAMOL 1000 MILLIGRAM(S): 500 TABLET, FILM COATED ORAL at 17:45

## 2023-05-05 RX ADMIN — OXYCODONE HYDROCHLORIDE 10 MILLIGRAM(S): 5 TABLET ORAL at 08:13

## 2023-05-05 RX ADMIN — GABAPENTIN 600 MILLIGRAM(S): 400 CAPSULE ORAL at 13:04

## 2023-05-05 RX ADMIN — HYDROMORPHONE HYDROCHLORIDE 4 MILLIGRAM(S): 2 INJECTION INTRAMUSCULAR; INTRAVENOUS; SUBCUTANEOUS at 17:49

## 2023-05-05 RX ADMIN — Medication 1: at 17:47

## 2023-05-05 RX ADMIN — Medication 1 GRAM(S): at 05:56

## 2023-05-05 RX ADMIN — RANOLAZINE 1000 MILLIGRAM(S): 500 TABLET, FILM COATED, EXTENDED RELEASE ORAL at 17:45

## 2023-05-05 RX ADMIN — Medication 1: at 08:16

## 2023-05-05 RX ADMIN — OXYCODONE HYDROCHLORIDE 10 MILLIGRAM(S): 5 TABLET ORAL at 09:43

## 2023-05-05 RX ADMIN — Medication 1 GRAM(S): at 23:06

## 2023-05-05 RX ADMIN — GABAPENTIN 300 MILLIGRAM(S): 400 CAPSULE ORAL at 05:55

## 2023-05-05 RX ADMIN — Medication 650 MILLIGRAM(S): at 12:39

## 2023-05-05 RX ADMIN — GABAPENTIN 600 MILLIGRAM(S): 400 CAPSULE ORAL at 21:37

## 2023-05-05 RX ADMIN — METHOCARBAMOL 1000 MILLIGRAM(S): 500 TABLET, FILM COATED ORAL at 23:06

## 2023-05-05 RX ADMIN — Medication 1 GRAM(S): at 17:45

## 2023-05-05 RX ADMIN — LACTULOSE 30 GRAM(S): 10 SOLUTION ORAL at 23:07

## 2023-05-05 RX ADMIN — SENNA PLUS 2 TABLET(S): 8.6 TABLET ORAL at 21:38

## 2023-05-05 RX ADMIN — LACTULOSE 30 GRAM(S): 10 SOLUTION ORAL at 17:46

## 2023-05-05 RX ADMIN — LACTULOSE 30 GRAM(S): 10 SOLUTION ORAL at 12:39

## 2023-05-05 RX ADMIN — Medication 100 MILLIGRAM(S): at 05:55

## 2023-05-05 RX ADMIN — Medication 1 GRAM(S): at 12:40

## 2023-05-05 RX ADMIN — HYDROMORPHONE HYDROCHLORIDE 4 MILLIGRAM(S): 2 INJECTION INTRAMUSCULAR; INTRAVENOUS; SUBCUTANEOUS at 23:07

## 2023-05-05 RX ADMIN — PANTOPRAZOLE SODIUM 40 MILLIGRAM(S): 20 TABLET, DELAYED RELEASE ORAL at 05:57

## 2023-05-05 RX ADMIN — POLYETHYLENE GLYCOL 3350 17 GRAM(S): 17 POWDER, FOR SOLUTION ORAL at 12:40

## 2023-05-05 RX ADMIN — METHOCARBAMOL 750 MILLIGRAM(S): 500 TABLET, FILM COATED ORAL at 05:55

## 2023-05-05 RX ADMIN — Medication 1: at 12:19

## 2023-05-05 RX ADMIN — Medication 81 MILLIGRAM(S): at 12:40

## 2023-05-05 RX ADMIN — MONTELUKAST 10 MILLIGRAM(S): 4 TABLET, CHEWABLE ORAL at 12:40

## 2023-05-05 RX ADMIN — OXYCODONE HYDROCHLORIDE 10 MILLIGRAM(S): 5 TABLET ORAL at 10:00

## 2023-05-05 RX ADMIN — Medication 650 MILLIGRAM(S): at 13:02

## 2023-05-05 RX ADMIN — MAGNESIUM OXIDE 400 MG ORAL TABLET 400 MILLIGRAM(S): 241.3 TABLET ORAL at 17:49

## 2023-05-05 RX ADMIN — RANOLAZINE 1000 MILLIGRAM(S): 500 TABLET, FILM COATED, EXTENDED RELEASE ORAL at 05:55

## 2023-05-05 RX ADMIN — OXYCODONE HYDROCHLORIDE 10 MILLIGRAM(S): 5 TABLET ORAL at 02:29

## 2023-05-05 RX ADMIN — HYDROMORPHONE HYDROCHLORIDE 4 MILLIGRAM(S): 2 INJECTION INTRAMUSCULAR; INTRAVENOUS; SUBCUTANEOUS at 18:02

## 2023-05-05 RX ADMIN — HYDROMORPHONE HYDROCHLORIDE 1 MILLIGRAM(S): 2 INJECTION INTRAMUSCULAR; INTRAVENOUS; SUBCUTANEOUS at 02:40

## 2023-05-05 RX ADMIN — ATORVASTATIN CALCIUM 40 MILLIGRAM(S): 80 TABLET, FILM COATED ORAL at 21:37

## 2023-05-05 NOTE — PROGRESS NOTE ADULT - PROBLEM SELECTOR PROBLEM 1
Acute right-sided low back pain with right-sided sciatica
Acute right-sided low back pain with right-sided sciatica

## 2023-05-05 NOTE — PROGRESS NOTE ADULT - PROBLEM SELECTOR PLAN 1
No history of chronic opioid therapy. Low risk of opioid abuse per ORT.  1) Start hydromorphone PO 4mg Q6h standing  2) Stop oxycodone  3) Continue hydromorphone IV 0.5mg Q8h prn  4) Continue acetaminophen 650mg Q6h standing  5) Continue gabapentin to 600mg TID  6) Continue methocarbamol 1000mg Q6h standing  7) Avoid NSAIDs in the setting of bleeding  8) Continue miralax, senna, lactulose  9) Start naloxegol 25mg daily
No history of chronic opioid therapy. Low risk of opioid abuse per ORT.  1) Continue oxycodone IR 10mg Q6h prn; may discharge with a 3-5 day supply  2) Continue hydromorphone IV 0.5mg Q8h prn  3) Continue acetaminophen 650mg Q6h standing  4) Continue gabapentin to 300mg TID  5) Continue methocarbamol 750mg Q6h standing  6) Avoid NSAIDs in the setting of bleeding  7) Continue miralax, senna

## 2023-05-05 NOTE — PROGRESS NOTE ADULT - ASSESSMENT
77F w/ PMHx of unknown bleeding disorder (s/p workup 5/2021 by Dr. Louis; w/o diagnosis), severe transfusion reactions, diverticulitis, HFpEF, CAD (s/p CABG; Dr. Ramirez), AS (s/p surgical AVR, 2016), atrial fibrillation (s/p watchman, 2021), asthma, COPD, HTN, and DM p/w lower back pain + GI bleed.    # Hematochezia  # Normocytic anemia  # HO Internal hemorrhoids  Patient endorses bloody BMs + BRBPR. Patient has HO internal hemorrhoids but says intensity of bleed felt different.   - GI eval:       - EGD w/ multiple gastric polyps; non-erosive gastritis; biopsies sent       - Colonoscopy (poor prep) w/ moderate diverticulosis, medium internal hemorrhoids       - Likely diverticular vs hemorrhoidal bleed       - Clear liquid diet       - Protonix 40mg BID       - Repeat colonoscopy outpatient       - Repeat EGD in 2 months       - If large hematochezia w/ BRBPR + HD instability, get STAT CT angio  - C/w sucralfate 1g PO QID  - Bowel reg    # Severe lower back pain  # ?Sciatica  R straight leg raise (+).  - Pain management eval:  1) Start hydromorphone PO 4mg Q6h standing  2) Stop oxycodone  3) Continue hydromorphone IV 0.5mg Q8h prn  4) Continue acetaminophen 650mg Q6h standing  5) Continue gabapentin to 600mg TID  6) Continue methocarbamol 1000mg Q6h standing  7) Avoid NSAIDs in the setting of bleeding  8) Continue miralax, senna, lactulose  9) Start naloxegol 25mg daily.  - PT/physiatry    # CAD s/p CABG  # HFpEF  # Severe pHTN  # HTN  # pAfib s/p watchman  Not on AC 2/2 concern for bleed. TTE w/ EF 72%, severely enlarged LA, G2DD, mod reduced RV systolic function, severe mitral annular calcification, mod TR, PASP 66.7 mmHg.  - Holding diuretics/aspirin for GI bleed  - C/w atorvastatin 40mg PO QHS  - C/w metoprolol succinate ER 100mg PO QD  - C/w ranolazine 1000mg PO BID  - Fluid restriction to 1200mL  - I/Os, daily weight    # COPD (not on home O2)  # ?LENY  # Obesity hypoventilation syndrome  # HO asthma  - C/w montelukast 10mg PO QD    # DM2  A1c 6.5.  - ISS    # To follow up  - Dispo: cleared for 4A by physiatry    # Misc  - DVT Prophylaxis: Compression Device Sequential  - GI Prophylaxis: pantoprazole 40mg IV BID  - Diet: Diet, DASH/CC  - Activity: Activity - Ambulate as Tolerated  - Code Status: Full

## 2023-05-05 NOTE — PROGRESS NOTE ADULT - ATTENDING COMMENTS
78yo F hx of diverticulitis, HFpEF, CAD s/p CABG, AS s/p SAVR (2016), atrial fibrillation s/p watchman (2021), bleeding disorder, asthma, COPD, DM2, HTN presenting to the hospital for lower back pain, and GI bleed.      #Hematochezia/Internal hemorrhoids/Normocytic anemia  - monitor H/H, keep Hb above 7.5   - pt was consulted by GI,  EGD/colonoscopy showed gastritis, gastric polyps/ diverticulosis  - no active bleeding noted  - c/w PPIs, prevent constipation   - pt needs repeat colonoscopy as an OP      #Sciatica/Severe lower back pain  - pain management follow up appreciated   - c/w gabapentin  - c/w Dilaudid  - c/w Robaxin   - PT as tolerated, physiatry eval, pt wants to go to 4[facility]      #CAD s/p CABG/Chronic diastolic CHF/Severe PHTN/ HTN  - fluid restriction 1200 ml in 24 hours   - intake and output monitoring, daily weight   - monitor for fluid overload    - cont metoprolol  - c/w, aspirin    - cont statin, ranolazine    #Paroxysmal atrial fibrillation s/p Watchmann  - no longer on therapeutic AC due to concern of bleeding  - c/w BB     #COPD- not on home O2/ Hx of Asthma/ suspected LENY/ obesity hypoventilation syndrome   - stable for now   - cont montelukast    #DM2  - carb consistent diet   - monitor finger stick   - start insulin if FG>180    DVT ppx: none. SCDs, heparin 5k BID   GI ppx: protonix     #Progress Note Handoff  Pending (specify): 4A authorization   Family discussion: I spoke with pt, she agreed with a plan of care   Disposition: Acute rehab

## 2023-05-06 LAB
ALBUMIN SERPL ELPH-MCNC: 3.6 G/DL — SIGNIFICANT CHANGE UP (ref 3.5–5.2)
ALP SERPL-CCNC: 60 U/L — SIGNIFICANT CHANGE UP (ref 30–115)
ALT FLD-CCNC: 18 U/L — SIGNIFICANT CHANGE UP (ref 0–41)
ANION GAP SERPL CALC-SCNC: 12 MMOL/L — SIGNIFICANT CHANGE UP (ref 7–14)
AST SERPL-CCNC: 28 U/L — SIGNIFICANT CHANGE UP (ref 0–41)
BASOPHILS # BLD AUTO: 0.01 K/UL — SIGNIFICANT CHANGE UP (ref 0–0.2)
BASOPHILS NFR BLD AUTO: 0.2 % — SIGNIFICANT CHANGE UP (ref 0–1)
BILIRUB SERPL-MCNC: 0.8 MG/DL — SIGNIFICANT CHANGE UP (ref 0.2–1.2)
BLD GP AB SCN SERPL QL: SIGNIFICANT CHANGE UP
BUN SERPL-MCNC: 20 MG/DL — SIGNIFICANT CHANGE UP (ref 10–20)
CALCIUM SERPL-MCNC: 9.6 MG/DL — SIGNIFICANT CHANGE UP (ref 8.4–10.4)
CHLORIDE SERPL-SCNC: 101 MMOL/L — SIGNIFICANT CHANGE UP (ref 98–110)
CO2 SERPL-SCNC: 25 MMOL/L — SIGNIFICANT CHANGE UP (ref 17–32)
CREAT SERPL-MCNC: 0.9 MG/DL — SIGNIFICANT CHANGE UP (ref 0.7–1.5)
EGFR: 66 ML/MIN/1.73M2 — SIGNIFICANT CHANGE UP
EOSINOPHIL # BLD AUTO: 0.14 K/UL — SIGNIFICANT CHANGE UP (ref 0–0.7)
EOSINOPHIL NFR BLD AUTO: 2.5 % — SIGNIFICANT CHANGE UP (ref 0–8)
GLUCOSE BLDC GLUCOMTR-MCNC: 151 MG/DL — HIGH (ref 70–99)
GLUCOSE BLDC GLUCOMTR-MCNC: 185 MG/DL — HIGH (ref 70–99)
GLUCOSE BLDC GLUCOMTR-MCNC: 186 MG/DL — HIGH (ref 70–99)
GLUCOSE BLDC GLUCOMTR-MCNC: 260 MG/DL — HIGH (ref 70–99)
GLUCOSE SERPL-MCNC: 187 MG/DL — HIGH (ref 70–99)
HCT VFR BLD CALC: 34 % — LOW (ref 37–47)
HGB BLD-MCNC: 10.9 G/DL — LOW (ref 12–16)
IMM GRANULOCYTES NFR BLD AUTO: 0.4 % — HIGH (ref 0.1–0.3)
LYMPHOCYTES # BLD AUTO: 1.39 K/UL — SIGNIFICANT CHANGE UP (ref 1.2–3.4)
LYMPHOCYTES # BLD AUTO: 24.4 % — SIGNIFICANT CHANGE UP (ref 20.5–51.1)
MAGNESIUM SERPL-MCNC: 1.7 MG/DL — LOW (ref 1.8–2.4)
MCHC RBC-ENTMCNC: 29.9 PG — SIGNIFICANT CHANGE UP (ref 27–31)
MCHC RBC-ENTMCNC: 32.1 G/DL — SIGNIFICANT CHANGE UP (ref 32–37)
MCV RBC AUTO: 93.2 FL — SIGNIFICANT CHANGE UP (ref 81–99)
MONOCYTES # BLD AUTO: 0.51 K/UL — SIGNIFICANT CHANGE UP (ref 0.1–0.6)
MONOCYTES NFR BLD AUTO: 8.9 % — SIGNIFICANT CHANGE UP (ref 1.7–9.3)
NEUTROPHILS # BLD AUTO: 3.63 K/UL — SIGNIFICANT CHANGE UP (ref 1.4–6.5)
NEUTROPHILS NFR BLD AUTO: 63.6 % — SIGNIFICANT CHANGE UP (ref 42.2–75.2)
NRBC # BLD: 0 /100 WBCS — SIGNIFICANT CHANGE UP (ref 0–0)
PLATELET # BLD AUTO: 140 K/UL — SIGNIFICANT CHANGE UP (ref 130–400)
PMV BLD: 9.1 FL — SIGNIFICANT CHANGE UP (ref 7.4–10.4)
POTASSIUM SERPL-MCNC: 4 MMOL/L — SIGNIFICANT CHANGE UP (ref 3.5–5)
POTASSIUM SERPL-SCNC: 4 MMOL/L — SIGNIFICANT CHANGE UP (ref 3.5–5)
PROT SERPL-MCNC: 6.4 G/DL — SIGNIFICANT CHANGE UP (ref 6–8)
RBC # BLD: 3.65 M/UL — LOW (ref 4.2–5.4)
RBC # FLD: 14.6 % — HIGH (ref 11.5–14.5)
SODIUM SERPL-SCNC: 138 MMOL/L — SIGNIFICANT CHANGE UP (ref 135–146)
WBC # BLD: 5.7 K/UL — SIGNIFICANT CHANGE UP (ref 4.8–10.8)
WBC # FLD AUTO: 5.7 K/UL — SIGNIFICANT CHANGE UP (ref 4.8–10.8)

## 2023-05-06 PROCEDURE — 99232 SBSQ HOSP IP/OBS MODERATE 35: CPT

## 2023-05-06 PROCEDURE — 73620 X-RAY EXAM OF FOOT: CPT | Mod: 26,RT

## 2023-05-06 RX ORDER — ONDANSETRON 8 MG/1
4 TABLET, FILM COATED ORAL EVERY 6 HOURS
Refills: 0 | Status: DISCONTINUED | OUTPATIENT
Start: 2023-05-06 | End: 2023-05-06

## 2023-05-06 RX ORDER — ONDANSETRON 8 MG/1
4 TABLET, FILM COATED ORAL EVERY 6 HOURS
Refills: 0 | Status: DISCONTINUED | OUTPATIENT
Start: 2023-05-06 | End: 2023-05-10

## 2023-05-06 RX ORDER — HYDROMORPHONE HYDROCHLORIDE 2 MG/ML
0.5 INJECTION INTRAMUSCULAR; INTRAVENOUS; SUBCUTANEOUS EVERY 8 HOURS
Refills: 0 | Status: DISCONTINUED | OUTPATIENT
Start: 2023-05-06 | End: 2023-05-10

## 2023-05-06 RX ADMIN — ONDANSETRON 4 MILLIGRAM(S): 8 TABLET, FILM COATED ORAL at 19:01

## 2023-05-06 RX ADMIN — Medication 100 MILLIGRAM(S): at 06:08

## 2023-05-06 RX ADMIN — HYDROMORPHONE HYDROCHLORIDE 4 MILLIGRAM(S): 2 INJECTION INTRAMUSCULAR; INTRAVENOUS; SUBCUTANEOUS at 06:07

## 2023-05-06 RX ADMIN — LACTULOSE 30 GRAM(S): 10 SOLUTION ORAL at 06:10

## 2023-05-06 RX ADMIN — Medication 650 MILLIGRAM(S): at 11:10

## 2023-05-06 RX ADMIN — POLYETHYLENE GLYCOL 3350 17 GRAM(S): 17 POWDER, FOR SOLUTION ORAL at 11:10

## 2023-05-06 RX ADMIN — HYDROMORPHONE HYDROCHLORIDE 0.5 MILLIGRAM(S): 2 INJECTION INTRAMUSCULAR; INTRAVENOUS; SUBCUTANEOUS at 03:06

## 2023-05-06 RX ADMIN — HYDROMORPHONE HYDROCHLORIDE 4 MILLIGRAM(S): 2 INJECTION INTRAMUSCULAR; INTRAVENOUS; SUBCUTANEOUS at 11:09

## 2023-05-06 RX ADMIN — Medication 1: at 08:14

## 2023-05-06 RX ADMIN — RANOLAZINE 1000 MILLIGRAM(S): 500 TABLET, FILM COATED, EXTENDED RELEASE ORAL at 17:16

## 2023-05-06 RX ADMIN — MAGNESIUM OXIDE 400 MG ORAL TABLET 400 MILLIGRAM(S): 241.3 TABLET ORAL at 17:15

## 2023-05-06 RX ADMIN — HYDROMORPHONE HYDROCHLORIDE 4 MILLIGRAM(S): 2 INJECTION INTRAMUSCULAR; INTRAVENOUS; SUBCUTANEOUS at 11:45

## 2023-05-06 RX ADMIN — RANOLAZINE 1000 MILLIGRAM(S): 500 TABLET, FILM COATED, EXTENDED RELEASE ORAL at 06:07

## 2023-05-06 RX ADMIN — METHOCARBAMOL 1000 MILLIGRAM(S): 500 TABLET, FILM COATED ORAL at 17:16

## 2023-05-06 RX ADMIN — Medication 650 MILLIGRAM(S): at 06:08

## 2023-05-06 RX ADMIN — HYDROMORPHONE HYDROCHLORIDE 4 MILLIGRAM(S): 2 INJECTION INTRAMUSCULAR; INTRAVENOUS; SUBCUTANEOUS at 17:13

## 2023-05-06 RX ADMIN — Medication 650 MILLIGRAM(S): at 23:05

## 2023-05-06 RX ADMIN — Medication 1 GRAM(S): at 06:08

## 2023-05-06 RX ADMIN — ONDANSETRON 4 MILLIGRAM(S): 8 TABLET, FILM COATED ORAL at 08:58

## 2023-05-06 RX ADMIN — MAGNESIUM OXIDE 400 MG ORAL TABLET 400 MILLIGRAM(S): 241.3 TABLET ORAL at 07:42

## 2023-05-06 RX ADMIN — GABAPENTIN 600 MILLIGRAM(S): 400 CAPSULE ORAL at 23:04

## 2023-05-06 RX ADMIN — HYDROMORPHONE HYDROCHLORIDE 4 MILLIGRAM(S): 2 INJECTION INTRAMUSCULAR; INTRAVENOUS; SUBCUTANEOUS at 18:00

## 2023-05-06 RX ADMIN — HEPARIN SODIUM 5000 UNIT(S): 5000 INJECTION INTRAVENOUS; SUBCUTANEOUS at 17:17

## 2023-05-06 RX ADMIN — Medication 650 MILLIGRAM(S): at 12:30

## 2023-05-06 RX ADMIN — HYDROMORPHONE HYDROCHLORIDE 4 MILLIGRAM(S): 2 INJECTION INTRAMUSCULAR; INTRAVENOUS; SUBCUTANEOUS at 02:28

## 2023-05-06 RX ADMIN — MONTELUKAST 10 MILLIGRAM(S): 4 TABLET, CHEWABLE ORAL at 11:10

## 2023-05-06 RX ADMIN — LIDOCAINE 1 PATCH: 4 CREAM TOPICAL at 11:09

## 2023-05-06 RX ADMIN — METHOCARBAMOL 1000 MILLIGRAM(S): 500 TABLET, FILM COATED ORAL at 11:10

## 2023-05-06 RX ADMIN — Medication 1: at 17:26

## 2023-05-06 RX ADMIN — LIDOCAINE 1 PATCH: 4 CREAM TOPICAL at 00:25

## 2023-05-06 RX ADMIN — HYDROMORPHONE HYDROCHLORIDE 4 MILLIGRAM(S): 2 INJECTION INTRAMUSCULAR; INTRAVENOUS; SUBCUTANEOUS at 23:07

## 2023-05-06 RX ADMIN — LIDOCAINE 1 PATCH: 4 CREAM TOPICAL at 07:30

## 2023-05-06 RX ADMIN — METHOCARBAMOL 1000 MILLIGRAM(S): 500 TABLET, FILM COATED ORAL at 06:08

## 2023-05-06 RX ADMIN — Medication 81 MILLIGRAM(S): at 11:10

## 2023-05-06 RX ADMIN — Medication 3: at 11:56

## 2023-05-06 RX ADMIN — Medication 650 MILLIGRAM(S): at 02:28

## 2023-05-06 RX ADMIN — ATORVASTATIN CALCIUM 40 MILLIGRAM(S): 80 TABLET, FILM COATED ORAL at 23:03

## 2023-05-06 RX ADMIN — PANTOPRAZOLE SODIUM 40 MILLIGRAM(S): 20 TABLET, DELAYED RELEASE ORAL at 17:27

## 2023-05-06 RX ADMIN — Medication 1 GRAM(S): at 11:10

## 2023-05-06 RX ADMIN — MAGNESIUM OXIDE 400 MG ORAL TABLET 400 MILLIGRAM(S): 241.3 TABLET ORAL at 11:56

## 2023-05-06 RX ADMIN — LIDOCAINE 1 PATCH: 4 CREAM TOPICAL at 23:14

## 2023-05-06 RX ADMIN — SENNA PLUS 2 TABLET(S): 8.6 TABLET ORAL at 23:06

## 2023-05-06 RX ADMIN — GABAPENTIN 600 MILLIGRAM(S): 400 CAPSULE ORAL at 06:07

## 2023-05-06 RX ADMIN — Medication 650 MILLIGRAM(S): at 17:15

## 2023-05-06 RX ADMIN — HEPARIN SODIUM 5000 UNIT(S): 5000 INJECTION INTRAVENOUS; SUBCUTANEOUS at 06:09

## 2023-05-06 RX ADMIN — METHOCARBAMOL 1000 MILLIGRAM(S): 500 TABLET, FILM COATED ORAL at 23:05

## 2023-05-06 RX ADMIN — NALOXEGOL OXALATE 25 MILLIGRAM(S): 12.5 TABLET, FILM COATED ORAL at 11:09

## 2023-05-06 RX ADMIN — PANTOPRAZOLE SODIUM 40 MILLIGRAM(S): 20 TABLET, DELAYED RELEASE ORAL at 06:07

## 2023-05-06 RX ADMIN — ONDANSETRON 4 MILLIGRAM(S): 8 TABLET, FILM COATED ORAL at 07:42

## 2023-05-06 RX ADMIN — Medication 650 MILLIGRAM(S): at 18:00

## 2023-05-06 RX ADMIN — GABAPENTIN 600 MILLIGRAM(S): 400 CAPSULE ORAL at 13:24

## 2023-05-06 NOTE — CHART NOTE - NSCHARTNOTEFT_GEN_A_CORE
Registered Dietitian Follow-Up     Patient Profile Reviewed                           Yes [x]   No []     Nutrition History Previously Obtained        Yes [x]  No []       Pertinent Subjective Information: Pt reports fair appetite. Pt did not consume breakfast yet at time of visit d/t pain. Per pt, RN gave her pain medication this morning. RD discussed medical nutrition supplements to optimize kcal/protein intake; pt refused. Pt states she does not need supplements.      Pertinent Medical Information: 78 y/o female PMHx of diverticulitis, HFpEF, CAD s/p CABG, AS s/p SAVR (2016), atrial fibrillation s/p watchman (2021), bleeding disorder, asthma, COPD, DM2, HTN, CKD 2-3 presenting to the hospital for lower back pain, and GI bleed.      Diet order: Diet, DASH/TLC:   Sodium & Cholesterol Restricted  Consistent Carbohydrate {Evening Snack} (05-05-23 @ 09:00) [Active]     Anthropometrics:  - Ht: 167.6 cm  - Wt: 133 KG -- no new dosing weights   - BMI: 47.4  - IBW: 56.8 KG     Pertinent Lab Data: 5/4 @ 08:42 - RBC 3.62; H/H 10.9/34; ; eGFR 58    4/28 @ 07:26 - A1C 6.5    CAPILLARY BLOOD GLUCOSE: finger sticks  POCT Blood Glucose.: 197 mg/dL (05 May 2023 07:37)  POCT Blood Glucose.: 156 mg/dL (04 May 2023 21:19)  POCT Blood Glucose.: 173 mg/dL (04 May 2023 16:47)  POCT Blood Glucose.: 216 mg/dL (04 May 2023 11:42)     Pertinent Meds:  MEDICATIONS  (STANDING):  acetaminophen     Tablet .. 650 milliGRAM(s) Oral every 6 hours  aspirin  chewable 81 milliGRAM(s) Oral daily  atorvastatin 40 milliGRAM(s) Oral at bedtime  dextrose 5%. 1000 milliLiter(s) (50 mL/Hr) IV Continuous <Continuous>  dextrose 5%. 1000 milliLiter(s) (100 mL/Hr) IV Continuous <Continuous>  dextrose 50% Injectable 12.5 Gram(s) IV Push once  dextrose 50% Injectable 25 Gram(s) IV Push once  dextrose 50% Injectable 25 Gram(s) IV Push once  gabapentin 300 milliGRAM(s) Oral three times a day  glucagon  Injectable 1 milliGRAM(s) IntraMuscular once  insulin lispro (ADMELOG) corrective regimen sliding scale   SubCutaneous three times a day before meals  lidocaine   4% Patch 1 Patch Transdermal every 24 hours  methocarbamol 750 milliGRAM(s) Oral every 6 hours  metoprolol succinate  milliGRAM(s) Oral daily  montelukast 10 milliGRAM(s) Oral daily  pantoprazole   Suspension 40 milliGRAM(s) Oral two times a day  polyethylene glycol 3350 17 Gram(s) Oral daily  ranolazine 1000 milliGRAM(s) Oral two times a day  senna 2 Tablet(s) Oral at bedtime  sucralfate 1 Gram(s) Oral four times a day    MEDICATIONS  (PRN):  dextrose Oral Gel 15 Gram(s) Oral once PRN Blood Glucose LESS THAN 70 milliGRAM(s)/deciliter  HYDROmorphone  Injectable 0.5 milliGRAM(s) IV Push every 8 hours PRN Severe Pain (7 - 10)  oxyCODONE    IR 10 milliGRAM(s) Oral every 6 hours PRN Moderate Pain (4 - 6)     Physical Findings:  - Appearance: alert  - GI function: last BM last week per pt; no BMs documented per flowsheet  - Tubes: n/a  - Oral/Mouth cavity: tolerating diet texture/consistency well  - Skin: edema 2+ to left foot, right foot; intact, no pressure injuries noted     Nutrition Requirements  Weight Used:  KG -- with consideration for age, BMI    Estimated Energy Needs    Continue [x]  Adjust []  Estimated Calorie Needs: [id number] x AF-9=4725kjks/day     Estimated Protein Needs    Continue [x]  Adjust []  Estimated Protein Needs: 77-89grams/day (1.3-1.5grams/kg of IBW-59kg)      Estimated Fluid Needs        Continue [x]  Adjust []  Estimated Fluid Needs: 1837mL/day (1mL/kcal)     [x] Previous Nutrition Diagnosis: Predicted Suboptimal Calorie, Protein intake            [x] Ongoing          [] Resolved    [] No active nutrition diagnosis identified at this time    Nutrition Intervention: Meals, snacks, coordination of care     Goal/Expected Outcome: Pt to meet >75% of estimated needs within 5-7 days     Indicator/Monitoring: Diet order, PO intake, weights, labs, NFPF, body composition, BM     Recommendations:  1. Continue with diet order; RD to offer supplements again at follow up if appetite declines  2. Encourage PO intake and assist during meals prn  3. Adjust insulin regimen prn; BG trending up - will mon BG, POCT, A1C    Pt is a moderate nutrition risk; will f/u in 5-7 days or prn    RD to remain available: Rosalinda Huitron [phone] or TEAMS
Will plan for EGD/Colonoscopy in AM  clear liquid diet today  NPO after mid night  Golytely and dulcolax for prep  optimize blood work
declined heparin  states she has watchman device
EGD:    Esophagus: normal    Stomach: 	A single superficial non-bleeding 6 mm ulcer was found in the antrum. Multiple cold forceps biopsies were performed for histology. 	A sliding large size hiatal hernia was seen, displacing the gastroesophageal junction (GEJ) to 36cm from the incisors, with hiatal narrowing at 50cm from the incisors. Additional findings include Mauro erosions. Retroflexion view in the stomach confirmed the size and morphology of the hernia. Hill Grade 4. The hiatal hernia could be easily palpated at the site of non union sternal fracture. Pressure with the palm of both hands of the assistant was required to further advance the scope in to the duodenum. 	Diffuse erythema of the mucosa was noted in the body and antrum. These findings are compatible with non-erosive gastritis. Multiple cold forceps biopsies were performed for histology. 	Many semi-pedunculated polyps ranging in size from 8 mm to 1 cm were found in the fundus and body. There was residual oral medication with a green tinge coating in the body of the stomach that could not be completely irrigated. As such, larger polyps could have been easily missed. Scant bleeding occurred at the polypectomy site. Multiple cold forceps biopsies were performed for histology. 	There was evidence of an inflamed nodule in the gastric antrum in the pyloric channel.. Multiple cold forceps biopsies were performed for histology.    Duodenum: Normal mucosa was noted in the duodenal bulb, first part of the duodenum and second part of the duodenum. Multiple cold forceps biopsies were performed for histology.    Colonoscopy    Prep: poor    Multiple diverticula with medium openings were seen in the the left side of the colon. Diverticulosis appeared to be of moderate severity. Medium internal hemorrhoids were noted.	No fresh or old bleed was seen in the colon. Due to significant looping and inadequate prep the PCF scope could not be traversed beyond mid transverse colon. Additionally, polyps <2cm could have been easily missed..    PLAN    Resume Diet    Await pathology results    Follow up in [facility]   Protonix 40mg BID for 2 months     Avoid NSAIDs    Avoid Constipation - Miralax and Senna x2 tabs  Recommend small frequent meals   Repeat EGD in 2 months to document healing of gastric ulcer     Colonoscopy to be re-scheduled as outpatient due to poor prep

## 2023-05-06 NOTE — PROGRESS NOTE ADULT - ASSESSMENT
77F w/ PMHx of unknown bleeding disorder (s/p workup 5/2021 by Dr. Louis; w/o diagnosis), severe transfusion reactions, diverticulitis, HFpEF, CAD (s/p CABG; Dr. Ramirez), AS (s/p surgical AVR, 2016), atrial fibrillation (s/p watchman, 2021), asthma, COPD, HTN, and DM p/w lower back pain + GI bleed.    # Hematochezia  # Normocytic anemia  # HO Internal hemorrhoids  Patient endorses bloody BMs + BRBPR. Patient has HO internal hemorrhoids but says intensity of bleed felt different.   - GI eval:       - EGD w/ multiple gastric polyps; non-erosive gastritis; biopsies sent       - Colonoscopy (poor prep) w/ moderate diverticulosis, medium internal hemorrhoids       - Likely diverticular vs hemorrhoidal bleed       - Protonix 40mg BID       - Repeat colonoscopy outpatient       - Repeat EGD in 2 months       - If large hematochezia w/ BRBPR + HD instability, get STAT CT angio  - C/w sucralfate 1g PO QID  - Bowel reg    # Severe lower back pain  # ?Sciatica  R straight leg raise (+).  - Pain management eval:  1) Start hydromorphone PO 4mg Q6h standing  2) Stop oxycodone  3) Continue hydromorphone IV 0.5mg Q8h prn  4) Continue acetaminophen 650mg Q6h standing  5) Continue gabapentin to 600mg TID  6) Continue methocarbamol 1000mg Q6h standing  7) Avoid NSAIDs in the setting of bleeding  8) Continue miralax, senna, lactulose  9) Start naloxegol 25mg daily.  - PT/physiatry    # CAD s/p CABG  # HFpEF  # Severe pHTN  # HTN  # pAfib s/p watchman  Not on AC 2/2 concern for bleed. TTE w/ EF 72%, severely enlarged LA, G2DD, mod reduced RV systolic function, severe mitral annular calcification, mod TR, PASP 66.7 mmHg.  - Holding diuretics/aspirin for GI bleed  - C/w atorvastatin 40mg PO QHS  - C/w metoprolol succinate ER 100mg PO QD  - C/w ranolazine 1000mg PO BID  - Fluid restriction to 1200mL  - I/Os, daily weight    # COPD (not on home O2)  # ?LENY  # Obesity hypoventilation syndrome  # HO asthma  - C/w montelukast 10mg PO QD    # DM2  A1c 6.5.  - ISS    # To follow up  - Dispo: cleared for 4A by physiatry    # Misc  - DVT Prophylaxis: Compression Device Sequential  - GI Prophylaxis: pantoprazole 40mg IV BID  - Diet: Diet, DASH/CC  - Activity: Activity - Ambulate as Tolerated  - Code Status: Full

## 2023-05-06 NOTE — PROGRESS NOTE ADULT - ATTENDING COMMENTS
78yo F hx of diverticulitis, HFpEF, CAD s/p CABG, AS s/p SAVR (2016), atrial fibrillation s/p watchman (2021), bleeding disorder, asthma, COPD, DM2, HTN presenting to the hospital for lower back pain, and GI bleed.      #Hematochezia/Internal hemorrhoids/Normocytic anemia  - monitor H/H, keep Hb above 7.5   - pt was consulted by GI,  EGD/colonoscopy showed gastritis, gastric polyps/ diverticulosis  - no active bleeding noted  - c/w PPIs, prevent constipation   - pt needs repeat colonoscopy as an OP      #Sciatica/Severe lower back pain  - pain management follow up appreciated   - c/w gabapentin  - c/w Dilaudid  - c/w Robaxin   - PT as tolerated, physiatry eval, pt wants to go to 4[facility]     #Opiate induced Constipation   - on bowel regimen including Movantik   - dulcolax suppository     #Right Foot Pain  - dorsum tender on palpation  - check r foot xray- rule out fracture      #CAD s/p CABG/Chronic diastolic CHF/Severe PHTN/ HTN  - fluid restriction 1200 ml in 24 hours   - intake and output monitoring, daily weight   - monitor for fluid overload    - cont metoprolol  - c/w, aspirin    - cont statin, ranolazine    #Paroxysmal atrial fibrillation s/p Watchmann  - no longer on therapeutic AC due to concern of bleeding  - c/w BB     #COPD- not on home O2/ Hx of Asthma/ suspected LENY/ obesity hypoventilation syndrome   - stable for now   - cont montelukast    #DM2  - carb consistent diet   - monitor finger stick   - start insulin if FG>180    DVT ppx: none. SCDs, heparin 5k BID   GI ppx: protonix     #Progress Note Handoff  Pending (specify): 4A authorization   Family discussion: I spoke with pt, she agreed with a plan of care   Disposition: Acute rehab

## 2023-05-07 LAB
ALBUMIN SERPL ELPH-MCNC: 3.6 G/DL — SIGNIFICANT CHANGE UP (ref 3.5–5.2)
ALP SERPL-CCNC: 54 U/L — SIGNIFICANT CHANGE UP (ref 30–115)
ALT FLD-CCNC: 15 U/L — SIGNIFICANT CHANGE UP (ref 0–41)
ANION GAP SERPL CALC-SCNC: 11 MMOL/L — SIGNIFICANT CHANGE UP (ref 7–14)
AST SERPL-CCNC: 25 U/L — SIGNIFICANT CHANGE UP (ref 0–41)
BASOPHILS # BLD AUTO: 0.01 K/UL — SIGNIFICANT CHANGE UP (ref 0–0.2)
BASOPHILS NFR BLD AUTO: 0.2 % — SIGNIFICANT CHANGE UP (ref 0–1)
BILIRUB SERPL-MCNC: 0.6 MG/DL — SIGNIFICANT CHANGE UP (ref 0.2–1.2)
BUN SERPL-MCNC: 20 MG/DL — SIGNIFICANT CHANGE UP (ref 10–20)
CALCIUM SERPL-MCNC: 9.3 MG/DL — SIGNIFICANT CHANGE UP (ref 8.4–10.5)
CHLORIDE SERPL-SCNC: 103 MMOL/L — SIGNIFICANT CHANGE UP (ref 98–110)
CO2 SERPL-SCNC: 26 MMOL/L — SIGNIFICANT CHANGE UP (ref 17–32)
CREAT SERPL-MCNC: 1 MG/DL — SIGNIFICANT CHANGE UP (ref 0.7–1.5)
EGFR: 58 ML/MIN/1.73M2 — LOW
EOSINOPHIL # BLD AUTO: 0.14 K/UL — SIGNIFICANT CHANGE UP (ref 0–0.7)
EOSINOPHIL NFR BLD AUTO: 2.5 % — SIGNIFICANT CHANGE UP (ref 0–8)
GLUCOSE BLDC GLUCOMTR-MCNC: 150 MG/DL — HIGH (ref 70–99)
GLUCOSE BLDC GLUCOMTR-MCNC: 198 MG/DL — HIGH (ref 70–99)
GLUCOSE BLDC GLUCOMTR-MCNC: 228 MG/DL — HIGH (ref 70–99)
GLUCOSE BLDC GLUCOMTR-MCNC: 238 MG/DL — HIGH (ref 70–99)
GLUCOSE SERPL-MCNC: 161 MG/DL — HIGH (ref 70–99)
HCT VFR BLD CALC: 32.3 % — LOW (ref 37–47)
HGB BLD-MCNC: 10.2 G/DL — LOW (ref 12–16)
IMM GRANULOCYTES NFR BLD AUTO: 0.4 % — HIGH (ref 0.1–0.3)
LYMPHOCYTES # BLD AUTO: 1.85 K/UL — SIGNIFICANT CHANGE UP (ref 1.2–3.4)
LYMPHOCYTES # BLD AUTO: 32.5 % — SIGNIFICANT CHANGE UP (ref 20.5–51.1)
MAGNESIUM SERPL-MCNC: 1.7 MG/DL — LOW (ref 1.8–2.4)
MCHC RBC-ENTMCNC: 29.7 PG — SIGNIFICANT CHANGE UP (ref 27–31)
MCHC RBC-ENTMCNC: 31.6 G/DL — LOW (ref 32–37)
MCV RBC AUTO: 93.9 FL — SIGNIFICANT CHANGE UP (ref 81–99)
MONOCYTES # BLD AUTO: 0.66 K/UL — HIGH (ref 0.1–0.6)
MONOCYTES NFR BLD AUTO: 11.6 % — HIGH (ref 1.7–9.3)
NEUTROPHILS # BLD AUTO: 3.01 K/UL — SIGNIFICANT CHANGE UP (ref 1.4–6.5)
NEUTROPHILS NFR BLD AUTO: 52.8 % — SIGNIFICANT CHANGE UP (ref 42.2–75.2)
NRBC # BLD: 0 /100 WBCS — SIGNIFICANT CHANGE UP (ref 0–0)
PLATELET # BLD AUTO: 124 K/UL — LOW (ref 130–400)
PMV BLD: 9.4 FL — SIGNIFICANT CHANGE UP (ref 7.4–10.4)
POTASSIUM SERPL-MCNC: 4.2 MMOL/L — SIGNIFICANT CHANGE UP (ref 3.5–5)
POTASSIUM SERPL-SCNC: 4.2 MMOL/L — SIGNIFICANT CHANGE UP (ref 3.5–5)
PROT SERPL-MCNC: 5.8 G/DL — LOW (ref 6–8)
RBC # BLD: 3.44 M/UL — LOW (ref 4.2–5.4)
RBC # FLD: 14.7 % — HIGH (ref 11.5–14.5)
SODIUM SERPL-SCNC: 140 MMOL/L — SIGNIFICANT CHANGE UP (ref 135–146)
WBC # BLD: 5.69 K/UL — SIGNIFICANT CHANGE UP (ref 4.8–10.8)
WBC # FLD AUTO: 5.69 K/UL — SIGNIFICANT CHANGE UP (ref 4.8–10.8)

## 2023-05-07 PROCEDURE — 99232 SBSQ HOSP IP/OBS MODERATE 35: CPT

## 2023-05-07 RX ORDER — GABAPENTIN 400 MG/1
300 CAPSULE ORAL THREE TIMES A DAY
Refills: 0 | Status: DISCONTINUED | OUTPATIENT
Start: 2023-05-07 | End: 2023-05-10

## 2023-05-07 RX ORDER — IPRATROPIUM/ALBUTEROL SULFATE 18-103MCG
3 AEROSOL WITH ADAPTER (GRAM) INHALATION EVERY 6 HOURS
Refills: 0 | Status: DISCONTINUED | OUTPATIENT
Start: 2023-05-07 | End: 2023-05-09

## 2023-05-07 RX ORDER — INSULIN GLARGINE 100 [IU]/ML
10 INJECTION, SOLUTION SUBCUTANEOUS AT BEDTIME
Refills: 0 | Status: DISCONTINUED | OUTPATIENT
Start: 2023-05-07 | End: 2023-05-08

## 2023-05-07 RX ORDER — INSULIN LISPRO 100/ML
VIAL (ML) SUBCUTANEOUS
Refills: 0 | Status: DISCONTINUED | OUTPATIENT
Start: 2023-05-07 | End: 2023-05-10

## 2023-05-07 RX ORDER — INSULIN LISPRO 100/ML
3 VIAL (ML) SUBCUTANEOUS
Refills: 0 | Status: DISCONTINUED | OUTPATIENT
Start: 2023-05-07 | End: 2023-05-08

## 2023-05-07 RX ADMIN — METHOCARBAMOL 1000 MILLIGRAM(S): 500 TABLET, FILM COATED ORAL at 17:53

## 2023-05-07 RX ADMIN — SENNA PLUS 2 TABLET(S): 8.6 TABLET ORAL at 22:24

## 2023-05-07 RX ADMIN — METHOCARBAMOL 1000 MILLIGRAM(S): 500 TABLET, FILM COATED ORAL at 11:48

## 2023-05-07 RX ADMIN — MAGNESIUM OXIDE 400 MG ORAL TABLET 400 MILLIGRAM(S): 241.3 TABLET ORAL at 08:52

## 2023-05-07 RX ADMIN — MONTELUKAST 10 MILLIGRAM(S): 4 TABLET, CHEWABLE ORAL at 11:46

## 2023-05-07 RX ADMIN — HYDROMORPHONE HYDROCHLORIDE 4 MILLIGRAM(S): 2 INJECTION INTRAMUSCULAR; INTRAVENOUS; SUBCUTANEOUS at 17:57

## 2023-05-07 RX ADMIN — GABAPENTIN 300 MILLIGRAM(S): 400 CAPSULE ORAL at 22:25

## 2023-05-07 RX ADMIN — PANTOPRAZOLE SODIUM 40 MILLIGRAM(S): 20 TABLET, DELAYED RELEASE ORAL at 05:34

## 2023-05-07 RX ADMIN — ATORVASTATIN CALCIUM 40 MILLIGRAM(S): 80 TABLET, FILM COATED ORAL at 22:25

## 2023-05-07 RX ADMIN — Medication 650 MILLIGRAM(S): at 17:53

## 2023-05-07 RX ADMIN — Medication 100 MILLIGRAM(S): at 05:34

## 2023-05-07 RX ADMIN — HYDROMORPHONE HYDROCHLORIDE 4 MILLIGRAM(S): 2 INJECTION INTRAMUSCULAR; INTRAVENOUS; SUBCUTANEOUS at 05:35

## 2023-05-07 RX ADMIN — Medication 650 MILLIGRAM(S): at 00:55

## 2023-05-07 RX ADMIN — HYDROMORPHONE HYDROCHLORIDE 4 MILLIGRAM(S): 2 INJECTION INTRAMUSCULAR; INTRAVENOUS; SUBCUTANEOUS at 11:47

## 2023-05-07 RX ADMIN — Medication 3 MILLILITER(S): at 20:26

## 2023-05-07 RX ADMIN — NALOXEGOL OXALATE 25 MILLIGRAM(S): 12.5 TABLET, FILM COATED ORAL at 11:53

## 2023-05-07 RX ADMIN — HEPARIN SODIUM 5000 UNIT(S): 5000 INJECTION INTRAVENOUS; SUBCUTANEOUS at 17:54

## 2023-05-07 RX ADMIN — Medication 650 MILLIGRAM(S): at 05:35

## 2023-05-07 RX ADMIN — POLYETHYLENE GLYCOL 3350 17 GRAM(S): 17 POWDER, FOR SOLUTION ORAL at 11:52

## 2023-05-07 RX ADMIN — HYDROMORPHONE HYDROCHLORIDE 0.5 MILLIGRAM(S): 2 INJECTION INTRAMUSCULAR; INTRAVENOUS; SUBCUTANEOUS at 00:58

## 2023-05-07 RX ADMIN — Medication 81 MILLIGRAM(S): at 11:46

## 2023-05-07 RX ADMIN — Medication 650 MILLIGRAM(S): at 13:00

## 2023-05-07 RX ADMIN — Medication 2: at 11:46

## 2023-05-07 RX ADMIN — MAGNESIUM OXIDE 400 MG ORAL TABLET 400 MILLIGRAM(S): 241.3 TABLET ORAL at 11:47

## 2023-05-07 RX ADMIN — HYDROMORPHONE HYDROCHLORIDE 0.5 MILLIGRAM(S): 2 INJECTION INTRAMUSCULAR; INTRAVENOUS; SUBCUTANEOUS at 08:51

## 2023-05-07 RX ADMIN — HYDROMORPHONE HYDROCHLORIDE 4 MILLIGRAM(S): 2 INJECTION INTRAMUSCULAR; INTRAVENOUS; SUBCUTANEOUS at 00:55

## 2023-05-07 RX ADMIN — GABAPENTIN 600 MILLIGRAM(S): 400 CAPSULE ORAL at 05:34

## 2023-05-07 RX ADMIN — HEPARIN SODIUM 5000 UNIT(S): 5000 INJECTION INTRAVENOUS; SUBCUTANEOUS at 05:34

## 2023-05-07 RX ADMIN — HYDROMORPHONE HYDROCHLORIDE 0.5 MILLIGRAM(S): 2 INJECTION INTRAMUSCULAR; INTRAVENOUS; SUBCUTANEOUS at 09:51

## 2023-05-07 RX ADMIN — Medication 650 MILLIGRAM(S): at 11:46

## 2023-05-07 RX ADMIN — Medication 650 MILLIGRAM(S): at 06:24

## 2023-05-07 RX ADMIN — Medication 3 MILLILITER(S): at 15:26

## 2023-05-07 RX ADMIN — HYDROMORPHONE HYDROCHLORIDE 4 MILLIGRAM(S): 2 INJECTION INTRAMUSCULAR; INTRAVENOUS; SUBCUTANEOUS at 06:24

## 2023-05-07 RX ADMIN — HYDROMORPHONE HYDROCHLORIDE 4 MILLIGRAM(S): 2 INJECTION INTRAMUSCULAR; INTRAVENOUS; SUBCUTANEOUS at 18:29

## 2023-05-07 RX ADMIN — HYDROMORPHONE HYDROCHLORIDE 0.5 MILLIGRAM(S): 2 INJECTION INTRAMUSCULAR; INTRAVENOUS; SUBCUTANEOUS at 01:51

## 2023-05-07 RX ADMIN — Medication 650 MILLIGRAM(S): at 18:29

## 2023-05-07 RX ADMIN — METHOCARBAMOL 1000 MILLIGRAM(S): 500 TABLET, FILM COATED ORAL at 05:33

## 2023-05-07 RX ADMIN — PANTOPRAZOLE SODIUM 40 MILLIGRAM(S): 20 TABLET, DELAYED RELEASE ORAL at 17:53

## 2023-05-07 RX ADMIN — Medication 2: at 17:52

## 2023-05-07 RX ADMIN — Medication 40 MILLIGRAM(S): at 13:05

## 2023-05-07 RX ADMIN — Medication 3 UNIT(S): at 17:52

## 2023-05-07 RX ADMIN — RANOLAZINE 1000 MILLIGRAM(S): 500 TABLET, FILM COATED, EXTENDED RELEASE ORAL at 17:53

## 2023-05-07 RX ADMIN — INSULIN GLARGINE 10 UNIT(S): 100 INJECTION, SOLUTION SUBCUTANEOUS at 22:23

## 2023-05-07 RX ADMIN — RANOLAZINE 1000 MILLIGRAM(S): 500 TABLET, FILM COATED, EXTENDED RELEASE ORAL at 05:34

## 2023-05-07 RX ADMIN — MAGNESIUM OXIDE 400 MG ORAL TABLET 400 MILLIGRAM(S): 241.3 TABLET ORAL at 17:53

## 2023-05-07 NOTE — PROGRESS NOTE ADULT - ASSESSMENT
78yo F hx of diverticulitis, HFpEF, CAD s/p CABG, AS s/p SAVR (2016), atrial fibrillation s/p watchman (2021), bleeding disorder, asthma, COPD, DM2, HTN presenting to the hospital for lower back pain, and GI bleed.      #Hematochezia/Internal hemorrhoids/Normocytic anemia  - monitor H/H, keep Hb above 7.5   - pt was consulted by GI, EGD/colonoscopy showed gastritis, gastric polyps/ diverticulosis  - no active bleeding noted  - c/w PPIs, prevent constipation   - pt needs repeat colonoscopy as an OP      #Sciatica/Severe lower back pain  - pain management follow up appreciated   - c/w gabapentin  - c/w Dilaudid  - c/w Robaxin   - start prednisone   - PT as tolerated, physiatry eval, pt wants to go to 4[facility]     #Opiate induced Constipation   - on bowel regimen including Movantik   - dulcolax suppository     #Right Foot Pain  - dorsum tender on palpation  - f/u r foot x-ray results - rule out fracture   - prednisone for 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      #CAD s/p CABG/Chronic diastolic CHF/Severe PHTN/ HTN  - cont metoprolol  - c/w, aspirin    - cont statin, ranolazine    #Paroxysmal atrial fibrillation s/p Watchmann  - no longer on therapeutic AC due to concern of bleeding  - c/w BB     #COPD- not on home O2/ Hx of Asthma/ suspected LENY/ obesity hypoventilation syndrome   - stable for now   - cont montelukast    #DM2  - carb consistent diet   - monitor finger stick   - start insulin if FG>180    DVT ppx: none. SCDs, heparin 5k BID   GI ppx: protonix     #Progress Note Handoff  Pending (specify): 4A authorization   Family discussion: I spoke with pt, she agreed with a plan of care   Disposition: Acute rehab .

## 2023-05-08 LAB
ALBUMIN SERPL ELPH-MCNC: 3.7 G/DL — SIGNIFICANT CHANGE UP (ref 3.5–5.2)
ALP SERPL-CCNC: 55 U/L — SIGNIFICANT CHANGE UP (ref 30–115)
ALT FLD-CCNC: 17 U/L — SIGNIFICANT CHANGE UP (ref 0–41)
ANION GAP SERPL CALC-SCNC: 8 MMOL/L — SIGNIFICANT CHANGE UP (ref 7–14)
AST SERPL-CCNC: 24 U/L — SIGNIFICANT CHANGE UP (ref 0–41)
BASOPHILS # BLD AUTO: 0.01 K/UL — SIGNIFICANT CHANGE UP (ref 0–0.2)
BASOPHILS NFR BLD AUTO: 0.2 % — SIGNIFICANT CHANGE UP (ref 0–1)
BILIRUB SERPL-MCNC: 0.6 MG/DL — SIGNIFICANT CHANGE UP (ref 0.2–1.2)
BLD GP AB SCN SERPL QL: SIGNIFICANT CHANGE UP
BUN SERPL-MCNC: 25 MG/DL — HIGH (ref 10–20)
CALCIUM SERPL-MCNC: 9.2 MG/DL — SIGNIFICANT CHANGE UP (ref 8.4–10.5)
CHLORIDE SERPL-SCNC: 99 MMOL/L — SIGNIFICANT CHANGE UP (ref 98–110)
CO2 SERPL-SCNC: 28 MMOL/L — SIGNIFICANT CHANGE UP (ref 17–32)
CREAT SERPL-MCNC: 1.1 MG/DL — SIGNIFICANT CHANGE UP (ref 0.7–1.5)
EGFR: 52 ML/MIN/1.73M2 — LOW
EOSINOPHIL # BLD AUTO: 0 K/UL — SIGNIFICANT CHANGE UP (ref 0–0.7)
EOSINOPHIL NFR BLD AUTO: 0 % — SIGNIFICANT CHANGE UP (ref 0–8)
GLUCOSE BLDC GLUCOMTR-MCNC: 224 MG/DL — HIGH (ref 70–99)
GLUCOSE BLDC GLUCOMTR-MCNC: 246 MG/DL — HIGH (ref 70–99)
GLUCOSE BLDC GLUCOMTR-MCNC: 281 MG/DL — HIGH (ref 70–99)
GLUCOSE BLDC GLUCOMTR-MCNC: 305 MG/DL — HIGH (ref 70–99)
GLUCOSE SERPL-MCNC: 258 MG/DL — HIGH (ref 70–99)
HCT VFR BLD CALC: 31.1 % — LOW (ref 37–47)
HGB BLD-MCNC: 9.9 G/DL — LOW (ref 12–16)
IMM GRANULOCYTES NFR BLD AUTO: 0.8 % — HIGH (ref 0.1–0.3)
LYMPHOCYTES # BLD AUTO: 0.89 K/UL — LOW (ref 1.2–3.4)
LYMPHOCYTES # BLD AUTO: 13.9 % — LOW (ref 20.5–51.1)
MAGNESIUM SERPL-MCNC: 2 MG/DL — SIGNIFICANT CHANGE UP (ref 1.8–2.4)
MCHC RBC-ENTMCNC: 29.7 PG — SIGNIFICANT CHANGE UP (ref 27–31)
MCHC RBC-ENTMCNC: 31.8 G/DL — LOW (ref 32–37)
MCV RBC AUTO: 93.4 FL — SIGNIFICANT CHANGE UP (ref 81–99)
MONOCYTES # BLD AUTO: 0.27 K/UL — SIGNIFICANT CHANGE UP (ref 0.1–0.6)
MONOCYTES NFR BLD AUTO: 4.2 % — SIGNIFICANT CHANGE UP (ref 1.7–9.3)
NEUTROPHILS # BLD AUTO: 5.17 K/UL — SIGNIFICANT CHANGE UP (ref 1.4–6.5)
NEUTROPHILS NFR BLD AUTO: 80.9 % — HIGH (ref 42.2–75.2)
NRBC # BLD: 0 /100 WBCS — SIGNIFICANT CHANGE UP (ref 0–0)
PLATELET # BLD AUTO: 131 K/UL — SIGNIFICANT CHANGE UP (ref 130–400)
PMV BLD: 9.5 FL — SIGNIFICANT CHANGE UP (ref 7.4–10.4)
POTASSIUM SERPL-MCNC: 4.6 MMOL/L — SIGNIFICANT CHANGE UP (ref 3.5–5)
POTASSIUM SERPL-SCNC: 4.6 MMOL/L — SIGNIFICANT CHANGE UP (ref 3.5–5)
PROT SERPL-MCNC: 6.2 G/DL — SIGNIFICANT CHANGE UP (ref 6–8)
RBC # BLD: 3.33 M/UL — LOW (ref 4.2–5.4)
RBC # FLD: 14.6 % — HIGH (ref 11.5–14.5)
SODIUM SERPL-SCNC: 135 MMOL/L — SIGNIFICANT CHANGE UP (ref 135–146)
WBC # BLD: 6.39 K/UL — SIGNIFICANT CHANGE UP (ref 4.8–10.8)
WBC # FLD AUTO: 6.39 K/UL — SIGNIFICANT CHANGE UP (ref 4.8–10.8)

## 2023-05-08 PROCEDURE — 99232 SBSQ HOSP IP/OBS MODERATE 35: CPT

## 2023-05-08 RX ORDER — LACTULOSE 10 G/15ML
30 SOLUTION ORAL
Refills: 0 | Status: DISCONTINUED | OUTPATIENT
Start: 2023-05-08 | End: 2023-05-10

## 2023-05-08 RX ORDER — INSULIN GLARGINE 100 [IU]/ML
20 INJECTION, SOLUTION SUBCUTANEOUS AT BEDTIME
Refills: 0 | Status: DISCONTINUED | OUTPATIENT
Start: 2023-05-08 | End: 2023-05-10

## 2023-05-08 RX ORDER — INSULIN LISPRO 100/ML
6 VIAL (ML) SUBCUTANEOUS
Refills: 0 | Status: DISCONTINUED | OUTPATIENT
Start: 2023-05-08 | End: 2023-05-10

## 2023-05-08 RX ORDER — FUROSEMIDE 40 MG
40 TABLET ORAL
Refills: 0 | Status: DISCONTINUED | OUTPATIENT
Start: 2023-05-08 | End: 2023-05-09

## 2023-05-08 RX ADMIN — ATORVASTATIN CALCIUM 40 MILLIGRAM(S): 80 TABLET, FILM COATED ORAL at 22:18

## 2023-05-08 RX ADMIN — Medication 650 MILLIGRAM(S): at 00:13

## 2023-05-08 RX ADMIN — Medication 40 MILLIGRAM(S): at 09:18

## 2023-05-08 RX ADMIN — Medication 6 UNIT(S): at 11:53

## 2023-05-08 RX ADMIN — METHOCARBAMOL 1000 MILLIGRAM(S): 500 TABLET, FILM COATED ORAL at 11:52

## 2023-05-08 RX ADMIN — Medication 650 MILLIGRAM(S): at 12:21

## 2023-05-08 RX ADMIN — HYDROMORPHONE HYDROCHLORIDE 4 MILLIGRAM(S): 2 INJECTION INTRAMUSCULAR; INTRAVENOUS; SUBCUTANEOUS at 00:46

## 2023-05-08 RX ADMIN — METHOCARBAMOL 1000 MILLIGRAM(S): 500 TABLET, FILM COATED ORAL at 00:13

## 2023-05-08 RX ADMIN — Medication 650 MILLIGRAM(S): at 06:00

## 2023-05-08 RX ADMIN — Medication 3 MILLILITER(S): at 08:44

## 2023-05-08 RX ADMIN — POLYETHYLENE GLYCOL 3350 17 GRAM(S): 17 POWDER, FOR SOLUTION ORAL at 11:52

## 2023-05-08 RX ADMIN — METHOCARBAMOL 1000 MILLIGRAM(S): 500 TABLET, FILM COATED ORAL at 05:28

## 2023-05-08 RX ADMIN — Medication 6 UNIT(S): at 17:22

## 2023-05-08 RX ADMIN — MAGNESIUM OXIDE 400 MG ORAL TABLET 400 MILLIGRAM(S): 241.3 TABLET ORAL at 11:51

## 2023-05-08 RX ADMIN — Medication 81 MILLIGRAM(S): at 11:52

## 2023-05-08 RX ADMIN — GABAPENTIN 300 MILLIGRAM(S): 400 CAPSULE ORAL at 14:29

## 2023-05-08 RX ADMIN — Medication 650 MILLIGRAM(S): at 23:26

## 2023-05-08 RX ADMIN — HYDROMORPHONE HYDROCHLORIDE 4 MILLIGRAM(S): 2 INJECTION INTRAMUSCULAR; INTRAVENOUS; SUBCUTANEOUS at 00:16

## 2023-05-08 RX ADMIN — Medication 40 MILLIGRAM(S): at 05:28

## 2023-05-08 RX ADMIN — Medication 650 MILLIGRAM(S): at 00:43

## 2023-05-08 RX ADMIN — Medication 6: at 08:36

## 2023-05-08 RX ADMIN — RANOLAZINE 1000 MILLIGRAM(S): 500 TABLET, FILM COATED, EXTENDED RELEASE ORAL at 05:28

## 2023-05-08 RX ADMIN — SENNA PLUS 2 TABLET(S): 8.6 TABLET ORAL at 22:15

## 2023-05-08 RX ADMIN — Medication 650 MILLIGRAM(S): at 05:29

## 2023-05-08 RX ADMIN — GABAPENTIN 300 MILLIGRAM(S): 400 CAPSULE ORAL at 22:15

## 2023-05-08 RX ADMIN — Medication 8: at 11:53

## 2023-05-08 RX ADMIN — MONTELUKAST 10 MILLIGRAM(S): 4 TABLET, CHEWABLE ORAL at 11:52

## 2023-05-08 RX ADMIN — Medication 3 MILLILITER(S): at 14:06

## 2023-05-08 RX ADMIN — Medication 3 MILLILITER(S): at 21:34

## 2023-05-08 RX ADMIN — METHOCARBAMOL 1000 MILLIGRAM(S): 500 TABLET, FILM COATED ORAL at 23:26

## 2023-05-08 RX ADMIN — METHOCARBAMOL 1000 MILLIGRAM(S): 500 TABLET, FILM COATED ORAL at 17:24

## 2023-05-08 RX ADMIN — RANOLAZINE 1000 MILLIGRAM(S): 500 TABLET, FILM COATED, EXTENDED RELEASE ORAL at 17:23

## 2023-05-08 RX ADMIN — Medication 650 MILLIGRAM(S): at 17:23

## 2023-05-08 RX ADMIN — Medication 40 MILLIGRAM(S): at 22:18

## 2023-05-08 RX ADMIN — INSULIN GLARGINE 20 UNIT(S): 100 INJECTION, SOLUTION SUBCUTANEOUS at 22:15

## 2023-05-08 RX ADMIN — PANTOPRAZOLE SODIUM 40 MILLIGRAM(S): 20 TABLET, DELAYED RELEASE ORAL at 17:24

## 2023-05-08 RX ADMIN — MAGNESIUM OXIDE 400 MG ORAL TABLET 400 MILLIGRAM(S): 241.3 TABLET ORAL at 17:23

## 2023-05-08 RX ADMIN — MAGNESIUM OXIDE 400 MG ORAL TABLET 400 MILLIGRAM(S): 241.3 TABLET ORAL at 08:38

## 2023-05-08 RX ADMIN — NALOXEGOL OXALATE 25 MILLIGRAM(S): 12.5 TABLET, FILM COATED ORAL at 11:54

## 2023-05-08 RX ADMIN — Medication 650 MILLIGRAM(S): at 11:51

## 2023-05-08 RX ADMIN — Medication 100 MILLIGRAM(S): at 05:29

## 2023-05-08 RX ADMIN — GABAPENTIN 300 MILLIGRAM(S): 400 CAPSULE ORAL at 05:32

## 2023-05-08 RX ADMIN — Medication 3 UNIT(S): at 08:36

## 2023-05-08 RX ADMIN — Medication 4: at 17:22

## 2023-05-08 RX ADMIN — HEPARIN SODIUM 5000 UNIT(S): 5000 INJECTION INTRAVENOUS; SUBCUTANEOUS at 05:30

## 2023-05-08 RX ADMIN — PANTOPRAZOLE SODIUM 40 MILLIGRAM(S): 20 TABLET, DELAYED RELEASE ORAL at 05:30

## 2023-05-08 NOTE — PROGRESS NOTE ADULT - ASSESSMENT
77F w/ PMHx of unknown bleeding disorder (s/p workup 5/2021 by Dr. Louis; w/o diagnosis), severe transfusion reactions, diverticulitis, HFpEF, CAD (s/p CABG; Dr. Ramirez), AS (s/p surgical AVR, 2016), atrial fibrillation (s/p watchman, 2021), asthma, COPD, HTN, and DM p/w lower back pain + GI bleed.    # Hematochezia  # Normocytic anemia  # HO Internal hemorrhoids  Patient endorses bloody BMs + BRBPR. Patient has HO internal hemorrhoids but says intensity of bleed felt different.   - GI eval:       - EGD w/ multiple gastric polyps; non-erosive gastritis; biopsies sent       - Colonoscopy (poor prep) w/ moderate diverticulosis, medium internal hemorrhoids       - Likely diverticular vs hemorrhoidal bleed       - Protonix 40mg BID       - Repeat colonoscopy outpatient       - Repeat EGD in 2 months       - If large hematochezia w/ BRBPR + HD instability, get STAT CT angio  - C/w sucralfate 1g PO QID  - Bowel reg    # Severe lower back pain  # ?Sciatica  R straight leg raise (+).  - Pain management eval:  1) Start hydromorphone PO 4mg Q6h standing  2) Stop oxycodone  3) Continue hydromorphone IV 0.5mg Q8h prn  4) Continue acetaminophen 650mg Q6h standing  5) Continue gabapentin to 600mg TID  6) Continue methocarbamol 1000mg Q6h standing  7) Avoid NSAIDs in the setting of bleeding  8) Continue miralax, senna, lactulose  9) Start naloxegol 25mg daily.  - PT/physiatry    # CAD s/p CABG  # HFpEF  # Severe pHTN  # HTN  # pAfib s/p watchman  Not on AC 2/2 concern for bleed. TTE w/ EF 72%, severely enlarged LA, G2DD, mod reduced RV systolic function, severe mitral annular calcification, mod TR, PASP 66.7 mmHg.  - C/w ASA 81mg PO QD  - C/w atorvastatin 40mg PO QHS  - C/w metoprolol succinate ER 100mg PO QD  - C/w ranolazine 1000mg PO BID  - C/w Lasix 40mg IV BID  - Fluid restriction to 1200mL  - I/Os, daily weight    # COPD (not on home O2)  # ?LENY  # Obesity hypoventilation syndrome  # HO asthma  - C/w montelukast 10mg PO QD    # DM2  A1c 6.5.  - ISS    # To follow up  - Dispo: cleared for 4A by physiatry    # Misc  - DVT Prophylaxis: Compression Device Sequential  - GI Prophylaxis: pantoprazole 40mg IV BID  - Diet: Diet, DASH/CC  - Activity: Activity - Ambulate as Tolerated  - Code Status: Full

## 2023-05-08 NOTE — PROGRESS NOTE ADULT - ASSESSMENT
76yo F hx of diverticulitis, chronic HFpEF, CAD s/p CABG, AS s/p SAVR (2016), atrial fibrillation s/p watchman (2021), bleeding disorder, asthma, COPD, DM2, HTN presenting to the hospital for lower back pain, and GI bleed.      #Hematochezia/Internal hemorrhoids/Normocytic anemia  - monitor H/H, keep Hb above 7.5   - s/p EGD/colonoscopy showed gastritis, gastric polyps/ diverticulosis  - no active bleeding noted  - c/w PPIs, prevent constipation   - pt needs repeat colonoscopy as an OP      #Sciatica/Severe lower back pain  - pain management follow up appreciated   - c/w gabapentin  - c/w Dilaudid  - c/w Robaxin   - c/w prednisone   - PT as tolerated, physiatry eval, 4A candidate     #Opiate induced Constipation   - BM over weekend   - on bowel regimen including Movantik     #Right Foot Pain  - dorsum tender on palpation  - f/u r foot x-ray results - negative   - prednisone for 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 SCDs   GI ppx: protonix     #Progress Note Handoff  Pending (specify): 4A authorization   Family discussion: I spoke with pt, she agreed with a plan of care   Disposition: Acute rehab

## 2023-05-09 LAB
ALBUMIN SERPL ELPH-MCNC: 3.8 G/DL — SIGNIFICANT CHANGE UP (ref 3.5–5.2)
ALP SERPL-CCNC: 56 U/L — SIGNIFICANT CHANGE UP (ref 30–115)
ALT FLD-CCNC: 18 U/L — SIGNIFICANT CHANGE UP (ref 0–41)
ANION GAP SERPL CALC-SCNC: 16 MMOL/L — HIGH (ref 7–14)
AST SERPL-CCNC: 23 U/L — SIGNIFICANT CHANGE UP (ref 0–41)
BASOPHILS # BLD AUTO: 0.01 K/UL — SIGNIFICANT CHANGE UP (ref 0–0.2)
BASOPHILS NFR BLD AUTO: 0.1 % — SIGNIFICANT CHANGE UP (ref 0–1)
BILIRUB SERPL-MCNC: 0.6 MG/DL — SIGNIFICANT CHANGE UP (ref 0.2–1.2)
BUN SERPL-MCNC: 31 MG/DL — HIGH (ref 10–20)
CALCIUM SERPL-MCNC: 9.5 MG/DL — SIGNIFICANT CHANGE UP (ref 8.4–10.5)
CHLORIDE SERPL-SCNC: 98 MMOL/L — SIGNIFICANT CHANGE UP (ref 98–110)
CO2 SERPL-SCNC: 28 MMOL/L — SIGNIFICANT CHANGE UP (ref 17–32)
CREAT SERPL-MCNC: 1.2 MG/DL — SIGNIFICANT CHANGE UP (ref 0.7–1.5)
EGFR: 47 ML/MIN/1.73M2 — LOW
EOSINOPHIL # BLD AUTO: 0.01 K/UL — SIGNIFICANT CHANGE UP (ref 0–0.7)
EOSINOPHIL NFR BLD AUTO: 0.1 % — SIGNIFICANT CHANGE UP (ref 0–8)
GLUCOSE BLDC GLUCOMTR-MCNC: 191 MG/DL — HIGH (ref 70–99)
GLUCOSE BLDC GLUCOMTR-MCNC: 197 MG/DL — HIGH (ref 70–99)
GLUCOSE BLDC GLUCOMTR-MCNC: 213 MG/DL — HIGH (ref 70–99)
GLUCOSE BLDC GLUCOMTR-MCNC: 215 MG/DL — HIGH (ref 70–99)
GLUCOSE BLDC GLUCOMTR-MCNC: 216 MG/DL — HIGH (ref 70–99)
GLUCOSE SERPL-MCNC: 197 MG/DL — HIGH (ref 70–99)
HCT VFR BLD CALC: 31.7 % — LOW (ref 37–47)
HGB BLD-MCNC: 10.1 G/DL — LOW (ref 12–16)
IMM GRANULOCYTES NFR BLD AUTO: 0.4 % — HIGH (ref 0.1–0.3)
LYMPHOCYTES # BLD AUTO: 1.49 K/UL — SIGNIFICANT CHANGE UP (ref 1.2–3.4)
LYMPHOCYTES # BLD AUTO: 16.6 % — LOW (ref 20.5–51.1)
MAGNESIUM SERPL-MCNC: 2.1 MG/DL — SIGNIFICANT CHANGE UP (ref 1.8–2.4)
MCHC RBC-ENTMCNC: 29.8 PG — SIGNIFICANT CHANGE UP (ref 27–31)
MCHC RBC-ENTMCNC: 31.9 G/DL — LOW (ref 32–37)
MCV RBC AUTO: 93.5 FL — SIGNIFICANT CHANGE UP (ref 81–99)
MONOCYTES # BLD AUTO: 0.95 K/UL — HIGH (ref 0.1–0.6)
MONOCYTES NFR BLD AUTO: 10.6 % — HIGH (ref 1.7–9.3)
NEUTROPHILS # BLD AUTO: 6.45 K/UL — SIGNIFICANT CHANGE UP (ref 1.4–6.5)
NEUTROPHILS NFR BLD AUTO: 72.2 % — SIGNIFICANT CHANGE UP (ref 42.2–75.2)
NRBC # BLD: 0 /100 WBCS — SIGNIFICANT CHANGE UP (ref 0–0)
PLATELET # BLD AUTO: 135 K/UL — SIGNIFICANT CHANGE UP (ref 130–400)
PMV BLD: 9.8 FL — SIGNIFICANT CHANGE UP (ref 7.4–10.4)
POTASSIUM SERPL-MCNC: 4.1 MMOL/L — SIGNIFICANT CHANGE UP (ref 3.5–5)
POTASSIUM SERPL-SCNC: 4.1 MMOL/L — SIGNIFICANT CHANGE UP (ref 3.5–5)
PROT SERPL-MCNC: 6.1 G/DL — SIGNIFICANT CHANGE UP (ref 6–8)
RBC # BLD: 3.39 M/UL — LOW (ref 4.2–5.4)
RBC # FLD: 15 % — HIGH (ref 11.5–14.5)
SODIUM SERPL-SCNC: 142 MMOL/L — SIGNIFICANT CHANGE UP (ref 135–146)
WBC # BLD: 8.95 K/UL — SIGNIFICANT CHANGE UP (ref 4.8–10.8)
WBC # FLD AUTO: 8.95 K/UL — SIGNIFICANT CHANGE UP (ref 4.8–10.8)

## 2023-05-09 PROCEDURE — 99232 SBSQ HOSP IP/OBS MODERATE 35: CPT

## 2023-05-09 RX ORDER — PANTOPRAZOLE SODIUM 20 MG/1
40 TABLET, DELAYED RELEASE ORAL
Refills: 0 | Status: DISCONTINUED | OUTPATIENT
Start: 2023-05-09 | End: 2023-05-10

## 2023-05-09 RX ORDER — HYDROMORPHONE HYDROCHLORIDE 2 MG/ML
4 INJECTION INTRAMUSCULAR; INTRAVENOUS; SUBCUTANEOUS EVERY 6 HOURS
Refills: 0 | Status: DISCONTINUED | OUTPATIENT
Start: 2023-05-09 | End: 2023-05-09

## 2023-05-09 RX ORDER — HEPARIN SODIUM 5000 [USP'U]/ML
5000 INJECTION INTRAVENOUS; SUBCUTANEOUS EVERY 12 HOURS
Refills: 0 | Status: DISCONTINUED | OUTPATIENT
Start: 2023-05-09 | End: 2023-05-10

## 2023-05-09 RX ORDER — SODIUM CHLORIDE 9 MG/ML
250 INJECTION, SOLUTION INTRAVENOUS ONCE
Refills: 0 | Status: DISCONTINUED | OUTPATIENT
Start: 2023-05-09 | End: 2023-05-09

## 2023-05-09 RX ORDER — HYDROMORPHONE HYDROCHLORIDE 2 MG/ML
2 INJECTION INTRAMUSCULAR; INTRAVENOUS; SUBCUTANEOUS EVERY 4 HOURS
Refills: 0 | Status: DISCONTINUED | OUTPATIENT
Start: 2023-05-09 | End: 2023-05-10

## 2023-05-09 RX ADMIN — PANTOPRAZOLE SODIUM 40 MILLIGRAM(S): 20 TABLET, DELAYED RELEASE ORAL at 05:38

## 2023-05-09 RX ADMIN — Medication 81 MILLIGRAM(S): at 11:55

## 2023-05-09 RX ADMIN — MONTELUKAST 10 MILLIGRAM(S): 4 TABLET, CHEWABLE ORAL at 11:55

## 2023-05-09 RX ADMIN — Medication 2: at 11:56

## 2023-05-09 RX ADMIN — HYDROMORPHONE HYDROCHLORIDE 2 MILLIGRAM(S): 2 INJECTION INTRAMUSCULAR; INTRAVENOUS; SUBCUTANEOUS at 14:58

## 2023-05-09 RX ADMIN — Medication 40 MILLIGRAM(S): at 05:37

## 2023-05-09 RX ADMIN — Medication 4: at 08:49

## 2023-05-09 RX ADMIN — METHOCARBAMOL 1000 MILLIGRAM(S): 500 TABLET, FILM COATED ORAL at 05:38

## 2023-05-09 RX ADMIN — Medication 6 UNIT(S): at 11:57

## 2023-05-09 RX ADMIN — MAGNESIUM OXIDE 400 MG ORAL TABLET 400 MILLIGRAM(S): 241.3 TABLET ORAL at 17:31

## 2023-05-09 RX ADMIN — MAGNESIUM OXIDE 400 MG ORAL TABLET 400 MILLIGRAM(S): 241.3 TABLET ORAL at 11:55

## 2023-05-09 RX ADMIN — RANOLAZINE 1000 MILLIGRAM(S): 500 TABLET, FILM COATED, EXTENDED RELEASE ORAL at 17:31

## 2023-05-09 RX ADMIN — NALOXEGOL OXALATE 25 MILLIGRAM(S): 12.5 TABLET, FILM COATED ORAL at 11:56

## 2023-05-09 RX ADMIN — Medication 650 MILLIGRAM(S): at 17:31

## 2023-05-09 RX ADMIN — Medication 40 MILLIGRAM(S): at 05:38

## 2023-05-09 RX ADMIN — RANOLAZINE 1000 MILLIGRAM(S): 500 TABLET, FILM COATED, EXTENDED RELEASE ORAL at 05:37

## 2023-05-09 RX ADMIN — Medication 100 MILLIGRAM(S): at 05:37

## 2023-05-09 RX ADMIN — GABAPENTIN 300 MILLIGRAM(S): 400 CAPSULE ORAL at 14:59

## 2023-05-09 RX ADMIN — Medication 4: at 17:26

## 2023-05-09 RX ADMIN — METHOCARBAMOL 1000 MILLIGRAM(S): 500 TABLET, FILM COATED ORAL at 11:55

## 2023-05-09 RX ADMIN — Medication 6 UNIT(S): at 17:26

## 2023-05-09 RX ADMIN — METHOCARBAMOL 1000 MILLIGRAM(S): 500 TABLET, FILM COATED ORAL at 17:30

## 2023-05-09 RX ADMIN — GABAPENTIN 300 MILLIGRAM(S): 400 CAPSULE ORAL at 05:37

## 2023-05-09 RX ADMIN — PANTOPRAZOLE SODIUM 40 MILLIGRAM(S): 20 TABLET, DELAYED RELEASE ORAL at 18:51

## 2023-05-09 RX ADMIN — Medication 650 MILLIGRAM(S): at 12:30

## 2023-05-09 RX ADMIN — Medication 650 MILLIGRAM(S): at 11:55

## 2023-05-09 RX ADMIN — Medication 6 UNIT(S): at 08:49

## 2023-05-09 RX ADMIN — MAGNESIUM OXIDE 400 MG ORAL TABLET 400 MILLIGRAM(S): 241.3 TABLET ORAL at 08:55

## 2023-05-09 RX ADMIN — HEPARIN SODIUM 5000 UNIT(S): 5000 INJECTION INTRAVENOUS; SUBCUTANEOUS at 17:33

## 2023-05-09 RX ADMIN — Medication 650 MILLIGRAM(S): at 05:40

## 2023-05-09 RX ADMIN — Medication 650 MILLIGRAM(S): at 05:37

## 2023-05-09 RX ADMIN — LACTULOSE 30 GRAM(S): 10 SOLUTION ORAL at 05:39

## 2023-05-09 NOTE — PROGRESS NOTE ADULT - ATTENDING COMMENTS
78yo F hx of diverticulitis, chronic HFpEF, CAD s/p CABG, AS s/p SAVR (2016), atrial fibrillation s/p watchman (2021), bleeding disorder, asthma, COPD, DM2, HTN presenting to the hospital for lower back pain, and GI bleed.      #Hematochezia/Internal hemorrhoids/Normocytic anemia  - monitor H/H, keep Hb above 7.5   - s/p EGD/colonoscopy showed gastritis, gastric polyps/ diverticulosis  - no active bleeding noted  - c/w PPIs, prevent constipation   - pt needs repeat colonoscopy as an OP      #Sciatica/Severe lower back pain  - pain management follow up appreciated   - c/w gabapentin  - c/w Dilaudid, make it PRN   - c/w Robaxin   - c/w prednisone (5 day course)   - PT as tolerated, physiatry eval, 4A candidate - insurance auth denied for acute rehab     #Opiate induced Constipation   - resolved   - on bowel regimen including Movantik     #Right Foot Pain  - dorsum tender on palpation  - f/u r foot x-ray results - negative   - short course of prednisone for 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      #CAD s/p CABG  #Chronic diastolic CHF/Severe PHTN/ HTN  - cont metoprolol  - c/w, aspirin    - cont statin, ranolazine  - Lasix 40mg BID - IV for now, oral starting tomorrow on discharge     #Paroxysmal atrial fibrillation s/p Watchmann  - no longer on therapeutic AC due to concern of bleeding  - c/w BB     #COPD- not on home O2/ Hx of Asthma/ suspected LENY/ obesity hypoventilation syndrome   - stable for now   - cont montelukast    #DM2  - carb consistent diet   - monitor finger stick   - start insulin if FG>180    DVT ppx: start heparin   GI ppx: Protonix     #Progress Note Handoff  Pending (specify): insurance auth for acute rehab denied, refusing STR placement, anticipated for discharge home tomorrow   Family discussion: I spoke with pt, she agreed with a plan of care   Disposition: Home with home care 78yo F hx of diverticulitis, chronic HFpEF, CAD s/p CABG, AS s/p SAVR (2016), atrial fibrillation s/p watchman (2021), bleeding disorder, asthma, COPD, DM2, HTN presenting to the hospital for lower back pain, and GI bleed.      #Hematochezia/Internal hemorrhoids/Normocytic anemia  - monitor H/H, keep Hb above 7.5   - s/p EGD/colonoscopy showed gastritis, gastric polyps/ diverticulosis  - no active bleeding noted  - c/w PPIs, prevent constipation   - pt needs repeat colonoscopy as an OP      #Sciatica/Severe lower back pain  - pain management follow up appreciated   - c/w gabapentin  - c/w Dilaudid, make it PRN   - c/w Robaxin   - c/w prednisone (5 day course)   - PT as tolerated, physiatry eval, 4A candidate - insurance auth denied for acute rehab     #Opiate induced Constipation   - resolved   - on bowel regimen including Movantik     #Right Foot Pain  - dorsum tender on palpation  - f/u r foot x-ray results - negative   - short course of prednisone for 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      #CAD s/p CABG  #Chronic diastolic CHF/Severe PHTN/ HTN  - cont metoprolol  - c/w, aspirin    - cont statin, ranolazine  - Lasix 40mg BID - IV for now, oral starting tomorrow on discharge     #Paroxysmal atrial fibrillation s/p Watchmann  - no longer on therapeutic AC due to concern of bleeding  - c/w BB     #COPD- not on home O2/ Hx of Asthma/ suspected LENY/ obesity hypoventilation syndrome   - stable for now   - cont montelukast    #DM2  - carb consistent diet   - monitor finger stick   - start insulin if FG>180    DVT ppx: start heparin   GI ppx: Protonix     #Progress Note Handoff  Pending (specify): IV diuresis for 1-2 days, insurance auth for acute rehab denied, refusing STR placement, anticipated for discharge home tomorrow   Family discussion: I spoke with pt, she agreed with a plan of care   Disposition: Home with home care

## 2023-05-09 NOTE — PROGRESS NOTE ADULT - ASSESSMENT
77F w/ PMHx of unknown bleeding disorder (s/p workup 5/2021 by Dr. Louis; w/o diagnosis), severe transfusion reactions, diverticulitis, HFpEF, CAD (s/p CABG; Dr. Ramirez), AS (s/p surgical AVR, 2016), atrial fibrillation (s/p watchman, 2021), asthma, COPD, HTN, and DM p/w lower back pain + GI bleed.    # Hematochezia  # Normocytic anemia  # HO Internal hemorrhoids  Patient endorses bloody BMs + BRBPR. Patient has HO internal hemorrhoids but says intensity of bleed felt different.   - GI eval:       - EGD w/ multiple gastric polyps; non-erosive gastritis; biopsies sent       - Colonoscopy (poor prep) w/ moderate diverticulosis, medium internal hemorrhoids       - Likely diverticular vs hemorrhoidal bleed       - Protonix 40mg BID       - Repeat colonoscopy outpatient       - Repeat EGD in 2 months       - If large hematochezia w/ BRBPR + HD instability, get STAT CT angio  - C/w sucralfate 1g PO QID  - Bowel reg    # Severe lower back pain  # ?Sciatica  R straight leg raise (+).  - Pain management eval:  1) Start hydromorphone PO 4mg Q6h standing  2) Stop oxycodone  3) Continue hydromorphone IV 0.5mg Q8h prn  4) Continue acetaminophen 650mg Q6h standing  5) Continue gabapentin to 600mg TID  6) Continue methocarbamol 1000mg Q6h standing  7) Avoid NSAIDs in the setting of bleeding  8) Continue miralax, senna, lactulose  9) Start naloxegol 25mg daily.  - PT/physiatry    # CAD s/p CABG  # HFpEF  # Severe pHTN  # HTN  # pAfib s/p watchman  Not on AC 2/2 concern for bleed. TTE w/ EF 72%, severely enlarged LA, G2DD, mod reduced RV systolic function, severe mitral annular calcification, mod TR, PASP 66.7 mmHg.  - C/w ASA 81mg PO QD  - C/w atorvastatin 40mg PO QHS  - C/w metoprolol succinate ER 100mg PO QD  - C/w ranolazine 1000mg PO BID  - C/w Lasix 40mg IV BID  - Fluid restriction to 1200mL  - I/Os, daily weight    # COPD (not on home O2)  # ?LENY  # Obesity hypoventilation syndrome  # HO asthma  - C/w montelukast 10mg PO QD    # DM2  A1c 6.5.  - ISS    # To follow up  - Dispo: home w/ home PT    # Misc  - DVT Prophylaxis: Compression Device Sequential  - GI Prophylaxis: pantoprazole 40mg IV BID  - Diet: Diet, DASH/CC  - Activity: Activity - Ambulate as Tolerated  - Code Status: Full

## 2023-05-10 ENCOUNTER — TRANSCRIPTION ENCOUNTER (OUTPATIENT)
Age: 78
End: 2023-05-10

## 2023-05-10 VITALS
RESPIRATION RATE: 18 BRPM | HEART RATE: 74 BPM | SYSTOLIC BLOOD PRESSURE: 137 MMHG | DIASTOLIC BLOOD PRESSURE: 70 MMHG | OXYGEN SATURATION: 99 % | TEMPERATURE: 98 F

## 2023-05-10 LAB
ALBUMIN SERPL ELPH-MCNC: 4.1 G/DL — SIGNIFICANT CHANGE UP (ref 3.5–5.2)
ALP SERPL-CCNC: 62 U/L — SIGNIFICANT CHANGE UP (ref 30–115)
ALT FLD-CCNC: 26 U/L — SIGNIFICANT CHANGE UP (ref 0–41)
ANION GAP SERPL CALC-SCNC: 13 MMOL/L — SIGNIFICANT CHANGE UP (ref 7–14)
AST SERPL-CCNC: 36 U/L — SIGNIFICANT CHANGE UP (ref 0–41)
BASOPHILS # BLD AUTO: 0.02 K/UL — SIGNIFICANT CHANGE UP (ref 0–0.2)
BASOPHILS NFR BLD AUTO: 0.2 % — SIGNIFICANT CHANGE UP (ref 0–1)
BILIRUB SERPL-MCNC: 0.7 MG/DL — SIGNIFICANT CHANGE UP (ref 0.2–1.2)
BLD GP AB SCN SERPL QL: SIGNIFICANT CHANGE UP
BUN SERPL-MCNC: 34 MG/DL — HIGH (ref 10–20)
CALCIUM SERPL-MCNC: 9.7 MG/DL — SIGNIFICANT CHANGE UP (ref 8.4–10.5)
CHLORIDE SERPL-SCNC: 97 MMOL/L — LOW (ref 98–110)
CO2 SERPL-SCNC: 28 MMOL/L — SIGNIFICANT CHANGE UP (ref 17–32)
CREAT SERPL-MCNC: 1.1 MG/DL — SIGNIFICANT CHANGE UP (ref 0.7–1.5)
EGFR: 52 ML/MIN/1.73M2 — LOW
EOSINOPHIL # BLD AUTO: 0.04 K/UL — SIGNIFICANT CHANGE UP (ref 0–0.7)
EOSINOPHIL NFR BLD AUTO: 0.4 % — SIGNIFICANT CHANGE UP (ref 0–8)
GLUCOSE BLDC GLUCOMTR-MCNC: 174 MG/DL — HIGH (ref 70–99)
GLUCOSE BLDC GLUCOMTR-MCNC: 243 MG/DL — HIGH (ref 70–99)
GLUCOSE SERPL-MCNC: 145 MG/DL — HIGH (ref 70–99)
HCT VFR BLD CALC: 34.2 % — LOW (ref 37–47)
HGB BLD-MCNC: 10.9 G/DL — LOW (ref 12–16)
IMM GRANULOCYTES NFR BLD AUTO: 0.5 % — HIGH (ref 0.1–0.3)
LYMPHOCYTES # BLD AUTO: 2.32 K/UL — SIGNIFICANT CHANGE UP (ref 1.2–3.4)
LYMPHOCYTES # BLD AUTO: 22.3 % — SIGNIFICANT CHANGE UP (ref 20.5–51.1)
MAGNESIUM SERPL-MCNC: 2.4 MG/DL — SIGNIFICANT CHANGE UP (ref 1.8–2.4)
MCHC RBC-ENTMCNC: 29.9 PG — SIGNIFICANT CHANGE UP (ref 27–31)
MCHC RBC-ENTMCNC: 31.9 G/DL — LOW (ref 32–37)
MCV RBC AUTO: 94 FL — SIGNIFICANT CHANGE UP (ref 81–99)
MONOCYTES # BLD AUTO: 1.04 K/UL — HIGH (ref 0.1–0.6)
MONOCYTES NFR BLD AUTO: 10 % — HIGH (ref 1.7–9.3)
NEUTROPHILS # BLD AUTO: 6.93 K/UL — HIGH (ref 1.4–6.5)
NEUTROPHILS NFR BLD AUTO: 66.6 % — SIGNIFICANT CHANGE UP (ref 42.2–75.2)
NRBC # BLD: 0 /100 WBCS — SIGNIFICANT CHANGE UP (ref 0–0)
PLATELET # BLD AUTO: 181 K/UL — SIGNIFICANT CHANGE UP (ref 130–400)
PMV BLD: 9.5 FL — SIGNIFICANT CHANGE UP (ref 7.4–10.4)
POTASSIUM SERPL-MCNC: 4.2 MMOL/L — SIGNIFICANT CHANGE UP (ref 3.5–5)
POTASSIUM SERPL-SCNC: 4.2 MMOL/L — SIGNIFICANT CHANGE UP (ref 3.5–5)
PROT SERPL-MCNC: 6.7 G/DL — SIGNIFICANT CHANGE UP (ref 6–8)
RBC # BLD: 3.64 M/UL — LOW (ref 4.2–5.4)
RBC # FLD: 15 % — HIGH (ref 11.5–14.5)
SODIUM SERPL-SCNC: 138 MMOL/L — SIGNIFICANT CHANGE UP (ref 135–146)
WBC # BLD: 10.4 K/UL — SIGNIFICANT CHANGE UP (ref 4.8–10.8)
WBC # FLD AUTO: 10.4 K/UL — SIGNIFICANT CHANGE UP (ref 4.8–10.8)

## 2023-05-10 PROCEDURE — 99239 HOSP IP/OBS DSCHRG MGMT >30: CPT

## 2023-05-10 RX ORDER — GABAPENTIN 400 MG/1
1 CAPSULE ORAL
Qty: 0 | Refills: 0 | DISCHARGE
Start: 2023-05-10

## 2023-05-10 RX ORDER — GABAPENTIN 400 MG/1
1 CAPSULE ORAL
Qty: 63 | Refills: 0
Start: 2023-05-10 | End: 2023-05-30

## 2023-05-10 RX ORDER — HYDROMORPHONE HYDROCHLORIDE 2 MG/ML
1 INJECTION INTRAMUSCULAR; INTRAVENOUS; SUBCUTANEOUS
Qty: 15 | Refills: 0
Start: 2023-05-10 | End: 2023-05-14

## 2023-05-10 RX ORDER — PANTOPRAZOLE SODIUM 20 MG/1
1 TABLET, DELAYED RELEASE ORAL
Qty: 60 | Refills: 0
Start: 2023-05-10 | End: 2023-06-08

## 2023-05-10 RX ORDER — SUCRALFATE 1 G
1 TABLET ORAL
Qty: 120 | Refills: 0
Start: 2023-05-10 | End: 2023-06-08

## 2023-05-10 RX ORDER — METHOCARBAMOL 500 MG/1
2 TABLET, FILM COATED ORAL
Qty: 240 | Refills: 0
Start: 2023-05-10 | End: 2023-06-08

## 2023-05-10 RX ORDER — SUCRALFATE 1 G
1 TABLET ORAL
Refills: 0 | DISCHARGE

## 2023-05-10 RX ORDER — METHOCARBAMOL 500 MG/1
2 TABLET, FILM COATED ORAL
Qty: 0 | Refills: 0 | DISCHARGE
Start: 2023-05-10

## 2023-05-10 RX ORDER — METOLAZONE 5 MG/1
1 TABLET ORAL
Qty: 9 | Refills: 0
Start: 2023-05-10 | End: 2023-05-30

## 2023-05-10 RX ORDER — HYDROMORPHONE HYDROCHLORIDE 2 MG/ML
1 INJECTION INTRAMUSCULAR; INTRAVENOUS; SUBCUTANEOUS
Qty: 0 | Refills: 0 | DISCHARGE
Start: 2023-05-10

## 2023-05-10 RX ORDER — PANTOPRAZOLE SODIUM 20 MG/1
1 TABLET, DELAYED RELEASE ORAL
Qty: 60 | Refills: 0 | DISCHARGE
Start: 2023-05-10 | End: 2023-06-08

## 2023-05-10 RX ADMIN — ATORVASTATIN CALCIUM 40 MILLIGRAM(S): 80 TABLET, FILM COATED ORAL at 00:06

## 2023-05-10 RX ADMIN — RANOLAZINE 1000 MILLIGRAM(S): 500 TABLET, FILM COATED, EXTENDED RELEASE ORAL at 05:51

## 2023-05-10 RX ADMIN — MAGNESIUM OXIDE 400 MG ORAL TABLET 400 MILLIGRAM(S): 241.3 TABLET ORAL at 08:45

## 2023-05-10 RX ADMIN — Medication 650 MILLIGRAM(S): at 00:06

## 2023-05-10 RX ADMIN — Medication 81 MILLIGRAM(S): at 12:31

## 2023-05-10 RX ADMIN — METHOCARBAMOL 1000 MILLIGRAM(S): 500 TABLET, FILM COATED ORAL at 12:31

## 2023-05-10 RX ADMIN — Medication 20 MILLIGRAM(S): at 05:51

## 2023-05-10 RX ADMIN — MONTELUKAST 10 MILLIGRAM(S): 4 TABLET, CHEWABLE ORAL at 12:31

## 2023-05-10 RX ADMIN — Medication 650 MILLIGRAM(S): at 00:30

## 2023-05-10 RX ADMIN — PANTOPRAZOLE SODIUM 40 MILLIGRAM(S): 20 TABLET, DELAYED RELEASE ORAL at 05:52

## 2023-05-10 RX ADMIN — METHOCARBAMOL 1000 MILLIGRAM(S): 500 TABLET, FILM COATED ORAL at 05:51

## 2023-05-10 RX ADMIN — SENNA PLUS 2 TABLET(S): 8.6 TABLET ORAL at 00:06

## 2023-05-10 RX ADMIN — INSULIN GLARGINE 20 UNIT(S): 100 INJECTION, SOLUTION SUBCUTANEOUS at 00:05

## 2023-05-10 RX ADMIN — GABAPENTIN 300 MILLIGRAM(S): 400 CAPSULE ORAL at 05:52

## 2023-05-10 RX ADMIN — Medication 650 MILLIGRAM(S): at 05:51

## 2023-05-10 RX ADMIN — HEPARIN SODIUM 5000 UNIT(S): 5000 INJECTION INTRAVENOUS; SUBCUTANEOUS at 05:51

## 2023-05-10 RX ADMIN — METHOCARBAMOL 1000 MILLIGRAM(S): 500 TABLET, FILM COATED ORAL at 00:06

## 2023-05-10 RX ADMIN — GABAPENTIN 300 MILLIGRAM(S): 400 CAPSULE ORAL at 00:06

## 2023-05-10 RX ADMIN — MAGNESIUM OXIDE 400 MG ORAL TABLET 400 MILLIGRAM(S): 241.3 TABLET ORAL at 12:31

## 2023-05-10 RX ADMIN — HYDROMORPHONE HYDROCHLORIDE 2 MILLIGRAM(S): 2 INJECTION INTRAMUSCULAR; INTRAVENOUS; SUBCUTANEOUS at 00:16

## 2023-05-10 RX ADMIN — Medication 100 MILLIGRAM(S): at 05:51

## 2023-05-10 RX ADMIN — Medication 2: at 08:47

## 2023-05-10 RX ADMIN — Medication 650 MILLIGRAM(S): at 12:31

## 2023-05-10 RX ADMIN — Medication 6 UNIT(S): at 08:49

## 2023-05-10 NOTE — DISCHARGE NOTE PROVIDER - HOSPITAL COURSE
77F w/ PMHx of unknown bleeding disorder (s/p workup 5/2021 by Dr. Louis; w/o diagnosis), severe transfusion reactions, diverticulitis, HFpEF, CAD (s/p CABG; Dr. Ramirez), AS (s/p surgical AVR, 2016), atrial fibrillation (s/p watchman, 2021), asthma, COPD, HTN, and DM p/w lower back pain + GI bleed.    # Hematochezia  # Normocytic anemia  # HO Internal hemorrhoids  Patient endorses bloody BMs + BRBPR. Patient has HO internal hemorrhoids but says intensity of bleed felt different.   - GI eval:       - EGD w/ multiple gastric polyps; non-erosive gastritis; biopsies sent       - Colonoscopy (poor prep) w/ moderate diverticulosis, medium internal hemorrhoids       - Likely diverticular vs hemorrhoidal bleed       - Protonix 40mg BID       - Repeat colonoscopy outpatient       - Repeat EGD in 2 months       - If large hematochezia w/ BRBPR + HD instability, get STAT CT angio  - C/w sucralfate 1g PO QID  - Bowel reg    # Severe lower back pain  # Sciatica  R straight leg raise (+).  - Pain management eval:  1) Start hydromorphone PO 4mg Q6h standing  2) Stop oxycodone  3) Continue hydromorphone IV 0.5mg Q8h prn  4) Continue acetaminophen 650mg Q6h standing  5) Continue gabapentin to 600mg TID  6) Continue methocarbamol 1000mg Q6h standing  7) Avoid NSAIDs in the setting of bleeding  8) Continue miralax, senna, lactulose  9) Start naloxegol 25mg daily.  - PT/physiatry    # CAD s/p CABG  # HFpEF  # Severe pHTN  # HTN  # pAfib s/p watchman  Not on AC 2/2 concern for bleed. TTE w/ EF 72%, severely enlarged LA, G2DD, mod reduced RV systolic function, severe mitral annular calcification, mod TR, PASP 66.7 mmHg.  - C/w ASA 81mg PO QD  - C/w atorvastatin 40mg PO QHS  - C/w metoprolol succinate ER 100mg PO QD  - C/w ranolazine 1000mg PO BID  - C/w Lasix 40mg IV BID  - Fluid restriction to 1200mL  - I/Os, daily weight    # COPD (not on home O2)  # ?LENY  # Obesity hypoventilation syndrome  # HO asthma  - C/w montelukast 10mg PO QD    # DM2  A1c 6.5.  - ISS

## 2023-05-10 NOTE — PROGRESS NOTE ADULT - REASON FOR ADMISSION
GI bleed
GI bleed / LBP
GI bleed

## 2023-05-10 NOTE — PROGRESS NOTE ADULT - PROVIDER SPECIALTY LIST ADULT
Hospitalist
Internal Medicine
Hospitalist
Internal Medicine
Physiatry
Internal Medicine
Hospitalist
Hospitalist
Internal Medicine
Pain Medicine
Pain Medicine

## 2023-05-10 NOTE — DISCHARGE NOTE PROVIDER - CARE PROVIDER_API CALL
Daniel Canales)  Gastroenterology; Internal Medicine  [address] [place] [address]  Phone: [phone]  Fax: [phone]  Follow Up Time: 1 week

## 2023-05-10 NOTE — DISCHARGE NOTE PROVIDER - NSDCCPCAREPLAN_GEN_ALL_CORE_FT
Breath sounds clear and equal bilaterally. PRINCIPAL DISCHARGE DIAGNOSIS  Diagnosis: Rectal bleeding  Assessment and Plan of Treatment: You received a colonoscopy which did not show any signs of active bleeding. It is possible that the bloody stools you experienced might have been due to a low platelet count that you presented with. It is very important for you to limit taking Aspirin or any NSAIDS including Advil, Motrin, or Ibuprofen. You should also limit the use of caffeine and spicy foods as they can irritate your stomach lining. It is also imperative that you begin or continue taking PPIs to reduce the amount of acid production in your stomach. Please follow up with your Primary care doctor for continued monitoring of symptoms.  SEEK IMMEDIATE MEDICAL CARE IF YOU HAVE ANY OF THE FOLLOWING SYMPTOMS: nosebleed lasting longer than 20 minutes, unusual bleeding from or bruising on other parts of your body, dizziness or lightheadedness, fainting, nosebleed occurring after a head injury, fever or blood in stool.     Breath sounds clear and equal bilaterally. Speaking in full sentences. no SOB. 02 99%

## 2023-05-10 NOTE — DISCHARGE NOTE PROVIDER - NSDCMRMEDTOKEN_GEN_ALL_CORE_FT
aspirin 81 mg oral delayed release tablet: 1 tab(s) orally once a day  Centrum oral tablet: 1 tab(s) orally once a day  desvenlafaxine (as succinate) 25 mg oral tablet, extended release: 1 tab(s) orally once a day  furosemide 40 mg oral tablet: 1 tab(s) orally 2 times a day  glipiZIDE 5 mg oral tablet: 1 tab(s) orally once a day  K-Tab 20 mEq oral tablet, extended release: 1 tab(s) orally 3 times a day  magnesium oxide 500 mg oral tablet: 1 tab(s) orally once a day  metOLazone 5 mg oral tablet: 1 tab(s) orally 3 times a week, As Needed  metoprolol succinate 100 mg oral tablet, extended release: 1 tab(s) orally once a day   montelukast 10 mg oral tablet: 1 tab(s) orally once a day  pantoprazole 40 mg oral delayed release tablet: 1 tab(s) orally once a day   ranolazine 1000 mg oral tablet, extended release: 1 tab(s) orally once a day  rosuvastatin 10 mg oral tablet: 1 tab(s) orally once a day  sucralfate 1 g oral tablet: 1 orally 4 times a day  Vitamin B-12: 5000  orally 2 times a day   aspirin 81 mg oral delayed release tablet: 1 tab(s) orally once a day  Centrum oral tablet: 1 tab(s) orally once a day  desvenlafaxine (as succinate) 25 mg oral tablet, extended release: 1 tab(s) orally once a day  furosemide 40 mg oral tablet: 1 tab(s) orally 2 times a day  gabapentin 300 mg oral capsule: 1 cap(s) orally 3 times a day  glipiZIDE 5 mg oral tablet: 1 tab(s) orally once a day  HYDROmorphone 2 mg oral tablet: 1 tab(s) orally every 4 hours As needed Severe Pain (7 - 10)  HYDROmorphone 2 mg oral tablet: 1 tab(s) orally 3 times a day MDD: 3 pills  K-Tab 20 mEq oral tablet, extended release: 1 tab(s) orally 3 times a day  magnesium oxide 500 mg oral tablet: 1 tab(s) orally once a day  methocarbamol 500 mg oral tablet: 2 tab(s) orally every 6 hours  metOLazone 5 mg oral tablet: 1 tab(s) orally 3 times a week, As Needed  metoprolol succinate 100 mg oral tablet, extended release: 1 tab(s) orally once a day   montelukast 10 mg oral tablet: 1 tab(s) orally once a day  ranolazine 1000 mg oral tablet, extended release: 1 tab(s) orally once a day  rosuvastatin 10 mg oral tablet: 1 tab(s) orally once a day  sucralfate 1 g oral tablet: 1 orally 4 times a day  Vitamin B-12: 5000  orally 2 times a day

## 2023-05-10 NOTE — PROGRESS NOTE ADULT - ASSESSMENT
78yo F hx of diverticulitis, HFpEF, CAD s/p CABG, AS s/p SAVR (2016), atrial fibrillation s/p watchman (2021), bleeding disorder, asthma, COPD, DM2, HTN presenting to the hospital for lower back pain, and GI bleed.      A/P  #Hematochezia/ Internal hemorrhoids /Normocytic anemia  - resolved now    - pt was consulted by GI,  EGD/colonoscopy showed gastritis, gastric polyps/ diverticulosis  - c/w PPIs, prevent constipation   - pt needs repeat colonoscopy as an OP      # Sciatica/ severe lower back pain  -clinically improved   - pt is stable for discharge home with OP PT      #CAD s/p CABG/ chronic diastolic CHF/ severe PHTN/ HTN  - fluid restriction 1200 ml in 24 hours   - intake and output monitoring, daily weight   - monitor for fluid overload    - cont metoprolol  - diuretics, aspirin held on admission   - cont statin, ranolazine    #Paroxysmal atrial fibrillation s/p Watchmann  - no longer on therapeutic AC due to concern of bleeding  - c/w BB     #COPD- not on home O2/ Hx of Asthma/ suspected LENY/ obesity hypoventilation syndrome   - stable for now   - cont montelukast    #DM2  - carb consistent diet   - monitor finger stick   - check A1c  - start insulin if FG>180    #DVT ppx: none. SCDs  #GI ppx: protonix drip    # dispo: pt is stable for discharge home with OP PT, PMD and pain management follow up

## 2023-05-10 NOTE — PROGRESS NOTE ADULT - SUBJECTIVE AND OBJECTIVE BOX
76yo F hx of diverticulitis, HFpEF, CAD s/p CABG, AS s/p SAVR (), atrial fibrillation s/p watchman (), bleeding disorder, asthma, COPD, DM2, HTN, CKD 2-3 presenting to the hospital for lower back pain, and GI bleed. On 23, she was having lower back pain with radiation to LLE with no improvement from OTC meds and bloody BMs with associated abdominal pain. Today, back pain so severe that she called her daughter for help with assisting with cleaning her skin after having bloody BMs because she was having difficulty with ambulation. Patient was also having BRBPR, noticed by daughter. Patient does note she has internal hemorrhoids but felt this intensity of bleeding felt different. Has had diverticulitis in the past. Denies fevers, chills, dizziness, lightheadedness, shortness of breath, nausea/vomiting, dysuria.   Brought in via EMS. At EMS, was given fentanyl 200 mcg x1. At the ED, T: 98.7F, BP: 121/54, HR: 86bpm, RR: 18bpm, SpO2: 99% on RA. MILDRED (+) for blood and internal hemorrhoids on physical exam per ED. Labs significant for mild anemia (Hb- 11.1), hyperkalemia (K-5.3), elevated BUN (33), UA (+) for blood, only 2 RBC. CT A+P was not significant for acute pathology.   Today pt feels better, asking to get OOB, will try to use bedside commode during bowel prep.       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      CAD (coronary artery disease), native coronary artery      Anemia      History of bleeding disorder  having work up 21- HAD WORKUP BUT NO DIAGNOSIS "NOTHING WAS FOUND"      History of transfusion reaction      Hiatal hernia      H/O ascending aortic replacement  Bovine Replacement      S/P CABG x 1  complication of bleeding       H/O total knee replacement, right  complicated with fx femur bars screws in femur- b/l knee replacement      S/P hysterectomy      Presence of Watchman left atrial appendage closure device        Vital Signs Last 24 Hrs  T(C): 36 (2023 13:19), Max: 36.4 (2023 20:34)  T(F): 96.8 (2023 13:19), Max: 97.6 (2023 20:34)  HR: 63 (2023 13:19) (63 - 70)  BP: 110/47 (2023 13:19) (110/47 - 140/61)  BP(mean): --  RR: 18 (2023 13:19) (18 - 18)      PHYSICAL EXAM:  GENERAL: NAD, morbidly obese   HEAD:  Atraumatic, Normocephalic  EYES: EOMI, PERRLA, conjunctiva and sclera clear  ENMT: No tonsillar erythema, exudates, or enlargement; Moist mucous membranes, Good dentition, No lesions  NECK: Supple, No JVD, Normal thyroid, wide neck   NERVOUS SYSTEM:  Alert & Oriented X3, Good concentration; decrease ROM in LE due to lower back pain, straight leg test is positive   CHEST/LUNG: distant BS, no wheezing   HEART: S1,S2, murmur appreciated on PE   ABDOMEN: Soft, Nontender, Nondistended; Bowel sounds present  EXTREMITIES:  2+ Peripheral Pulses, No clubbing, cyanosis, or edema  LYMPH: No lymphadenopathy noted  SKIN: No rashes or lesions      LABS:                                   11.2   6.82  )-----------( 128      ( 2023 07:42 )             35.1   04-30    141  |  104  |  25<H>  ----------------------------<  77  4.2   |  24  |  1.1    Ca    9.0      2023 07:42    TPro  6.0  /  Alb  3.6  /  TBili  0.9  /  DBili  x   /  AST  26  /  ALT  15  /  AlkPhos  57  -30       PTT - ( 2023 17:54 )  PTT:28.6 sec  Urinalysis Basic - ( 2023 14:53 )    Color: Light Yellow / Appearance: Clear / S.013 / pH: x  Gluc: x / Ketone: Trace  / Bili: Negative / Urobili: <2 mg/dL   Blood: x / Protein: Negative / Nitrite: Negative   Leuk Esterase: Negative / RBC: 2 /HPF / WBC 0 /HPF   Sq Epi: x / Non Sq Epi: x / Bacteria: Negative    RADIOLOGY & ADDITIONAL TESTS:  < from: CT Abdomen and Pelvis w/ IV Cont (23 @ 17:09) >  IMPRESSION:  No CT evidence of an acute abdominopelvic pathology.    < end of copied text >  < from: CT Chest w/ IV Cont (23 @ 17:03) >    IMPRESSION:    Since  2021    Small hiatal hernia measuring approximately 2.9 cm.    Stable dilatation main pulmonary artery segment, measuring approximately   3.2 cm (303/85).    Stable pulmonary nodules.    < end of copied text >  < from: Transesophageal Echocardiogram (21 @ 09:38) >  Summary:   1. Left ventricular ejection fraction, by visual estimation, is 55 to 60%.   2. Severely enlarged left atrium.   3. Severely enlarged right atrium.   4. Watchman device in place, no anne-device leaks identified in multiple views.   5. Severe tricuspid regurgitation.   6. Aortic valve bioprosthetic valve in place, functioning normally without significant perivalvular leaks seen.   7. Estimated pulmonary artery systolic pressure is 51.6 mmHg assuming a right atrial pressure of 8 mmHg, which is consistent with moderate pulmonary hypertension.   8. Moderately dilated pulmonary artery.   9. Pulmonary hypertension is present.  10. Color Doppler demonstrates the presence of a shunt at the Atrial level.  < from: [facility] Duplex Lower Ext Vein Scan, Bilat (23 @ 09:06) >  Impression:    No evidence of deep venous thrombosis or superficial thrombophlebitis in   the bilateral lower extremities.    < end of copied text >  < from: TTE Echo Complete w/o Contrast w/ Doppler (23 @ 15:28) >  Summary:   1. Technically difficult study.   2. Normal global left ventricular systolic function.   3. Severely enlarged left atrium.   4. LV Ejection Fraction by Graves's Method with a biplane EF of 72 %.   5. Spectral Doppler shows pseudonormal pattern of left ventricular   myocardial filling (Grade II diastolic dysfunction).   6. Mildly enlarged right ventricle.   7.Moderately reduced RV systolic function.   8. Mildly enlarged right atrium.   9. Degenerative mitral valve.  10. Mild mitral valve regurgitation.  11. Severe mitral annular calcification.  12. Moderate tricuspid regurgitation.  13. Estimated pulmonary artery systolic pressure is 66.7 mmHg assuming a   right atrial pressure of 8 mmHg, which is consistent with severe   pulmonary hypertension.    < end of copied text >      MEDICATIONS  (STANDING):  acetaminophen     Tablet .. 650 milliGRAM(s) Oral every 6 hours  atorvastatin 40 milliGRAM(s) Oral at bedtime  gabapentin 300 milliGRAM(s) Oral three times a day  lidocaine   4% Patch 1 Patch Transdermal every 24 hours  methocarbamol 750 milliGRAM(s) Oral every 6 hours  metoprolol succinate  milliGRAM(s) Oral daily  montelukast 10 milliGRAM(s) Oral daily  pantoprazole  Injectable 40 milliGRAM(s) IV Push two times a day  polyethylene glycol 3350 17 Gram(s) Oral daily  ranolazine 1000 milliGRAM(s) Oral two times a day  senna 2 Tablet(s) Oral at bedtime  sucralfate 1 Gram(s) Oral four times a day    MEDICATIONS  (PRN):  HYDROmorphone  Injectable 0.5 milliGRAM(s) IV Push every 8 hours PRN Severe Pain (7 - 10)  oxyCODONE    IR 10 milliGRAM(s) Oral every 6 hours PRN Moderate Pain (4 - 6)                
76yo F hx of diverticulitis, HFpEF, CAD s/p CABG, AS s/p SAVR (), atrial fibrillation s/p watchman (), bleeding disorder, asthma, COPD, DM2, HTN, CKD 2-3 presenting to the hospital for lower back pain, and GI bleed. On 23, she was having lower back pain with radiation to LLE with no improvement from OTC meds and bloody BMs with associated abdominal pain. Today, back pain so severe that she called her daughter for help with assisting with cleaning her skin after having bloody BMs because she was having difficulty with ambulation. Patient was also having BRBPR, noticed by daughter. Patient does note she has internal hemorrhoids but felt this intensity of bleeding felt different. Has had diverticulitis in the past. Denies fevers, chills, dizziness, lightheadedness, shortness of breath, nausea/vomiting, dysuria.   Brought in via EMS. At EMS, was given fentanyl 200 mcg x1. At the ED, T: 98.7F, BP: 121/54, HR: 86bpm, RR: 18bpm, SpO2: 99% on RA. MILDRED (+) for blood and internal hemorrhoids on physical exam per ED. Labs significant for mild anemia (Hb- 11.1), hyperkalemia (K-5.3), elevated BUN (33), UA (+) for blood, only 2 RBC. CT A+P was not significant for acute pathology.   Today pt is c/o severe lower back pain, asking for pain medications, admits to have red blood per rectum with BMs, take ASA 81 mg at home only.     Vital Signs Last 24 Hrs  T(C): 36.1 (2023 05:30), Max: 37.2 (2023 16:18)  T(F): 96.9 (2023 05:30), Max: 98.9 (2023 16:18)  HR: 90 (2023 05:30) (83 - 90)  BP: 120/65 (2023 05:30) (120/65 - 140/66)  BP(mean): --  RR: 20 (2023 05:30) (17 - 20)  SpO2: 98% (2023 22:06) (98% - 99%)    Parameters below as of 2023 16:18  Patient On (Oxygen Delivery Method): room air      PHYSICAL EXAM:  GENERAL: NAD, morbidly obese   HEAD:  Atraumatic, Normocephalic  EYES: EOMI, PERRLA, conjunctiva and sclera clear  ENMT: No tonsillar erythema, exudates, or enlargement; Moist mucous membranes, Good dentition, No lesions  NECK: Supple, No JVD, Normal thyroid, wide neck   NERVOUS SYSTEM:  Alert & Oriented X3, Good concentration; decrease ROM in LE due to lower back pain, straight leg test is positive   CHEST/LUNG: distant BS, no wheezing   HEART: S1,S2, murmur appreciated on PE   ABDOMEN: Soft, Nontender, Nondistended; Bowel sounds present  EXTREMITIES:  2+ Peripheral Pulses, No clubbing, cyanosis, or edema  LYMPH: No lymphadenopathy noted  SKIN: No rashes or lesions      LABS:                        10.5   6.77  )-----------( 128      ( 2023 07:26 )             33.2         144  |  105  |  24<H>  ----------------------------<  153<H>  4.7   |  30  |  1.1    Ca    9.2      2023 07:26  Mg     2.2         TPro  6.0  /  Alb  3.7  /  TBili  1.0  /  DBili  x   /  AST  27  /  ALT  19  /  AlkPhos  62  -    PT/INR - ( 2023 17:54 )   PT: 12.90 sec;   INR: 1.13 ratio         PTT - ( 2023 17:54 )  PTT:28.6 sec  Urinalysis Basic - ( 2023 14:53 )    Color: Light Yellow / Appearance: Clear / S.013 / pH: x  Gluc: x / Ketone: Trace  / Bili: Negative / Urobili: <2 mg/dL   Blood: x / Protein: Negative / Nitrite: Negative   Leuk Esterase: Negative / RBC: 2 /HPF / WBC 0 /HPF   Sq Epi: x / Non Sq Epi: x / Bacteria: Negative    RADIOLOGY & ADDITIONAL TESTS:  < from: CT Abdomen and Pelvis w/ IV Cont (23 @ 17:09) >  IMPRESSION:  No CT evidence of an acute abdominopelvic pathology.    < end of copied text >  < from: CT Chest w/ IV Cont (23 @ 17:03) >    IMPRESSION:    Since  2021    Small hiatal hernia measuring approximately 2.9 cm.    Stable dilatation main pulmonary artery segment, measuring approximately   3.2 cm (303/85).    Stable pulmonary nodules.    < end of copied text >  < from: Transesophageal Echocardiogram (21 @ 09:38) >  Summary:   1. Left ventricular ejection fraction, by visual estimation, is 55 to 60%.   2. Severely enlarged left atrium.   3. Severely enlarged right atrium.   4. Watchman device in place, no anne-device leaks identified in multiple views.   5. Severe tricuspid regurgitation.   6. Aortic valve bioprosthetic valve in place, functioning normally without significant perivalvular leaks seen.   7. Estimated pulmonary artery systolic pressure is 51.6 mmHg assuming a right atrial pressure of 8 mmHg, which is consistent with moderate pulmonary hypertension.   8. Moderately dilated pulmonary artery.   9. Pulmonary hypertension is present.  10. Color Doppler demonstrates the presence of a shunt at the Atrial level.      MEDICATIONS  (STANDING):  acetaminophen     Tablet .. 650 milliGRAM(s) Oral every 6 hours  atorvastatin 40 milliGRAM(s) Oral at bedtime  gabapentin 200 milliGRAM(s) Oral three times a day  lidocaine   4% Patch 1 Patch Transdermal every 24 hours  methocarbamol 750 milliGRAM(s) Oral every 6 hours  metoprolol succinate  milliGRAM(s) Oral daily  montelukast 10 milliGRAM(s) Oral daily  pantoprazole Infusion 8 mG/Hr (10 mL/Hr) IV Continuous <Continuous>  ranolazine 1000 milliGRAM(s) Oral two times a day  sucralfate 1 Gram(s) Oral four times a day    MEDICATIONS  (PRN):  oxyCODONE    IR 10 milliGRAM(s) Oral every 6 hours PRN Severe Pain (7 - 10)            
HPI:  76yo F hx of diverticulitis, HFpEF, CAD s/p CABG, AS s/p SAVR (), atrial fibrillation s/p watchman (), bleeding disorder, asthma, COPD, DM2, HTN, CKD 2-3 presenting to the hospital for lower back pain, and GI bleed. On 23, she was having lower back pain with radiation to LLE with no improvement from OTC meds and bloody BMs with associated abdominal pain. Today, back pain so severe that she called her daughter for help with assisting with cleaning her skin after having bloody BMs because she was having difficulty with ambulation. Patient was also having BRBPR, noticed by daughter. Patient does note she has internal hemorrhoids but felt this intensity of bleeding felt different. Has had diverticulitis in the past. Denies fevers, chills, dizziness, lightheadedness, shortness of breath, nausea/vomiting, dysuria.     Brought in via EMS. At EMS, was given fentanyl 200 mcg x1. At the ED, T: 98.7F, BP: 121/54, HR: 86bpm, RR: 18bpm, SpO2: 99% on RA. MILDRED (+) for blood and internal hemorrhoids on physical exam per ED. Labs significant for mild anemia (Hb- 11.1), hyperkalemia (K-5.3), elevated BUN (33), UA (+) for blood, only 2 RBC. CT A+P was not significant for acute pathology.     Patient admitted to the hospital for intractable pain, GIB. Given ketorolac x1, metjocabamol x1, morphine 8mg IV, tramadol 25mg x1, and started on PPI infusion after PPI IVP. Minimal improvement in her back pain with the pain regimen. GI consulted. Possible EGD/colonoscopy tomorrow.      (2023 20:49)    Currently admitted to medicine with the primary diagnosis of Back pain       Today is hospital day 1d.     INTERVAL HPI / OVERNIGHT EVENTS:  Patient was examined and seen at bedside. This morning she is resting comfortably in bed and reports no new issues or overnight events.     ROS: Otherwise unremarkable     PAST MEDICAL & SURGICAL HISTORY  Aortic stenosis    Diabetes    Asthma with COPD  many years since last attack    CAD (coronary artery disease), native coronary artery    Anemia    History of bleeding disorder  having work up 21- HAD WORKUP BUT NO DIAGNOSIS "NOTHING WAS FOUND"    History of transfusion reaction    Hiatal hernia    H/O ascending aortic replacement  Bovine Replacement    S/P CABG x 1  complication of bleeding     H/O total knee replacement, right  complicated with fx femur bars screws in femur- b/l knee replacement    S/P hysterectomy    Presence of Watchman left atrial appendage closure device      ALLERGIES  penicillins (Hives; Rash)  Augmentin (Other)    MEDICATIONS  STANDING MEDICATIONS  acetaminophen     Tablet .. 650 milliGRAM(s) Oral every 6 hours  atorvastatin 40 milliGRAM(s) Oral at bedtime  gabapentin 200 milliGRAM(s) Oral three times a day  lidocaine   4% Patch 1 Patch Transdermal every 24 hours  methocarbamol 750 milliGRAM(s) Oral every 6 hours  metoprolol succinate  milliGRAM(s) Oral daily  montelukast 10 milliGRAM(s) Oral daily  pantoprazole Infusion 8 mG/Hr IV Continuous <Continuous>  ranolazine 1000 milliGRAM(s) Oral two times a day  sucralfate 1 Gram(s) Oral four times a day    PRN MEDICATIONS  oxyCODONE    IR 10 milliGRAM(s) Oral every 6 hours PRN    VITALS:  T(F): 96.9  HR: 90  BP: 120/65  RR: 20  SpO2: 98%    PHYSICAL EXAM  GENERAL: NAD, resting comfortably in bed  HEAD:  Atraumatic, Normocephalic  EYES: EOMI, conjunctiva and sclera clear  ENT: Moist mucous membranes  CHEST/LUNG: Clear to auscultation bilaterally; No rales, rhonchi, wheezing, or rubs. Unlabored respirations  HEART: Regular rate and rhythm; No murmurs, rubs, or gallops  ABDOMEN: Soft, nontender, nondistended  EXTREMITIES:  No clubbing, cyanosis, or edema  NERVOUS SYSTEM:  A&Ox3, follows commands, no sensory or motor deficits    LABS                        10.5   6.77  )-----------( 128      ( 2023 07:26 )             33.2     -    144  |  105  |  24<H>  ----------------------------<  153<H>  4.7   |  30  |  1.1    Ca    9.2      2023 07:26  Mg     2.2         TPro  6.0  /  Alb  3.7  /  TBili  1.0  /  DBili  x   /  AST  27  /  ALT  19  /  AlkPhos  62  04-28    PT/INR - ( 2023 17:54 )   PT: 12.90 sec;   INR: 1.13 ratio         PTT - ( 2023 17:54 )  PTT:28.6 sec  Urinalysis Basic - ( 2023 14:53 )    Color: Light Yellow / Appearance: Clear / S.013 / pH: x  Gluc: x / Ketone: Trace  / Bili: Negative / Urobili: <2 mg/dL   Blood: x / Protein: Negative / Nitrite: Negative   Leuk Esterase: Negative / RBC: 2 /HPF / WBC 0 /HPF   Sq Epi: x / Non Sq Epi: x / Bacteria: Negative                RADIOLOGY    < from: CT Abdomen and Pelvis w/ IV Cont (23 @ 17:09) >    ACC: [id number] EXAM:  CT ABDOMEN AND PELVIS IC   ORDERED BY: FLOIRAN BUENO     PROCEDURE DATE:  2023          INTERPRETATION:  CLINICAL STATEMENT: Back pain    TECHNIQUE: Contiguous axial CT images were obtained from the lower chest   tothe pubic symphysis following administration of 100cc Optiray 350   intravenous contrast.  Oral contrast was not administered.  Reformatted   images in the coronal and sagittal planes were acquired.    COMPARISON CT: 2021    OTHER STUDIES USED FOR CORRELATION: None.      FINDINGS:    LOWER CHEST: Unchanged 1.5 cm right lower lobe nodule. Post median   sternotomy and aortic valve replacement left atrial appendage exclusion   device with associated thrombosis. Coronary calcifications noted.    HEPATOBILIARY: Cholelithiasis. Calcified hepatic granuloma.    SPLEEN: Unremarkable.    PANCREAS: Unremarkable.    ADRENAL GLANDS: Unremarkable.    KIDNEYS: Bilateral renal angiomyolipomas, right measuring 2.8 cm and the   left measuring subcentimeter. Symmetric renal enhancement bilaterally. No   hydronephrosis    ABDOMINOPELVIC NODES: No lymphadenopathy.    PELVIC ORGANS: Post hysterectomy.    PERITONEUM/MESENTERY/BOWEL: No bowel obstruction, ascites or   pneumoperitoneum. Scattered colonicdiverticulosis without evidence for   acute diverticulitis. Appendix not visualized; no focal pericecal   inflammation. Small hiatal hernia.    BONES/SOFT TISSUES: Fat-containing subxiphoid/epigastric hernias. Post   median sternotomy. Multilevel degenerative changes of the spine noted.   Bilateral L5 pars defects without associated spondylolisthesis.    OTHER: Scattered atherosclerotic vascular calcifications.      IMPRESSION:  No CT evidence of an acute abdominopelvic pathology.    --- End of Report ---            CARMINA GOMEZ MD; Attending Radiologist  This document has been electronically signed. 2023  5:21PM    < end of copied text >  
HPI:  76yo F hx of diverticulitis, HFpEF, CAD s/p CABG, AS s/p SAVR (2016), atrial fibrillation s/p watchman (2021), bleeding disorder, asthma, COPD, DM2, HTN, CKD 2-3 presenting to the hospital for lower back pain, and GI bleed. On Monday 4/24/23, she was having lower back pain with radiation to LLE with no improvement from OTC meds and bloody BMs with associated abdominal pain. Today, back pain so severe that she called her daughter for help with assisting with cleaning her skin after having bloody BMs because she was having difficulty with ambulation. Patient was also having BRBPR, noticed by daughter. Patient does note she has internal hemorrhoids but felt this intensity of bleeding felt different. Has had diverticulitis in the past. Denies fevers, chills, dizziness, lightheadedness, shortness of breath, nausea/vomiting, dysuria.     Brought in via EMS. At EMS, was given fentanyl 200 mcg x1. At the ED, T: 98.7F, BP: 121/54, HR: 86bpm, RR: 18bpm, SpO2: 99% on RA. MILDRED (+) for blood and internal hemorrhoids on physical exam per ED. Labs significant for mild anemia (Hb- 11.1), hyperkalemia (K-5.3), elevated BUN (33), UA (+) for blood, only 2 RBC. CT A+P was not significant for acute pathology.     Patient admitted to the hospital for intractable pain, GIB. Given ketorolac x1, metjocabamol x1, morphine 8mg IV, tramadol 25mg x1, and started on PPI infusion after PPI IVP. Minimal improvement in her back pain with the pain regimen. GI consulted. Possible EGD/colonoscopy tomorrow.      (27 Apr 2023 20:49)    Currently admitted to medicine with the primary diagnosis of Back pain       Today is hospital day 8d.     INTERVAL HPI / OVERNIGHT EVENTS:  Patient was examined and seen at bedside. This morning she is resting comfortably in bed and reports no new issues or overnight events.     ROS: Otherwise unremarkable     PAST MEDICAL & SURGICAL HISTORY  Aortic stenosis    Diabetes    Asthma with COPD  many years since last attack    CAD (coronary artery disease), native coronary artery    Anemia    History of bleeding disorder  having work up 5/2/21- HAD WORKUP BUT NO DIAGNOSIS "NOTHING WAS FOUND"    History of transfusion reaction    Hiatal hernia    H/O ascending aortic replacement  Bovine Replacement    S/P CABG x 1  complication of bleeding 2016    H/O total knee replacement, right  complicated with fx femur bars screws in femur- b/l knee replacement    S/P hysterectomy    Presence of Watchman left atrial appendage closure device      ALLERGIES  penicillins (Hives; Rash)  Augmentin (Other)    MEDICATIONS  STANDING MEDICATIONS  acetaminophen     Tablet .. 650 milliGRAM(s) Oral every 6 hours  aspirin  chewable 81 milliGRAM(s) Oral daily  atorvastatin 40 milliGRAM(s) Oral at bedtime  dextrose 5%. 1000 milliLiter(s) IV Continuous <Continuous>  dextrose 5%. 1000 milliLiter(s) IV Continuous <Continuous>  dextrose 50% Injectable 12.5 Gram(s) IV Push once  dextrose 50% Injectable 25 Gram(s) IV Push once  dextrose 50% Injectable 25 Gram(s) IV Push once  gabapentin 600 milliGRAM(s) Oral three times a day  glucagon  Injectable 1 milliGRAM(s) IntraMuscular once  insulin lispro (ADMELOG) corrective regimen sliding scale   SubCutaneous three times a day before meals  lactulose Syrup 30 Gram(s) Oral four times a day  lidocaine   4% Patch 1 Patch Transdermal every 24 hours  methocarbamol 1000 milliGRAM(s) Oral every 6 hours  metoprolol succinate  milliGRAM(s) Oral daily  montelukast 10 milliGRAM(s) Oral daily  pantoprazole   Suspension 40 milliGRAM(s) Oral two times a day  polyethylene glycol 3350 17 Gram(s) Oral daily  ranolazine 1000 milliGRAM(s) Oral two times a day  senna 2 Tablet(s) Oral at bedtime  sucralfate 1 Gram(s) Oral four times a day    PRN MEDICATIONS  dextrose Oral Gel 15 Gram(s) Oral once PRN  HYDROmorphone  Injectable 0.5 milliGRAM(s) IV Push every 8 hours PRN  oxyCODONE    IR 10 milliGRAM(s) Oral every 6 hours PRN    VITALS:  T(F): 96.7  HR: 73  BP: 111/51  RR: 18  SpO2: 97%    PHYSICAL EXAM  GENERAL: NAD, resting comfortably in bed  HEAD:  Atraumatic, Normocephalic  EYES: EOMI, conjunctiva and sclera clear  ENT: Moist mucous membranes  CHEST/LUNG: Clear to auscultation bilaterally; No rales, rhonchi, wheezing, or rubs. Unlabored respirations  HEART: Regular rate and rhythm; No murmurs, rubs, or gallops  ABDOMEN: Soft, nontender, nondistended  EXTREMITIES:  No clubbing, cyanosis, or edema  NERVOUS SYSTEM:  A&Ox3, follows commands, no sensory or motor deficits    LABS                        10.9   6.57  )-----------( 136      ( 04 May 2023 08:42 )             34.0     05-05    142  |  103  |  19  ----------------------------<  166<H>  4.0   |  28  |  1.0    Ca    9.2      05 May 2023 08:49  Mg     1.7     05-05    TPro  5.8<L>  /  Alb  3.5  /  TBili  0.7  /  DBili  x   /  AST  30  /  ALT  19  /  AlkPhos  55  05-05    
     Pt seen and examined at bedside. Feels better. Back pain improving. OOBTC.         VITAL SIGNS (Last 24 hrs):  T(C): 35.7 (05-03-23 @ 20:56), Max: 35.7 (05-03-23 @ 20:56)  HR: 69 (05-03-23 @ 20:56) (69 - 69)  BP: 132/76 (05-03-23 @ 20:56) (132/76 - 132/76)  RR: 18 (05-03-23 @ 20:56) (18 - 18)  SpO2: 100% (05-04-23 @ 07:46) (100% - 100%)        I&O's Summary      PHYSICAL EXAM:  GENERAL: NAD, well-developed  HEAD:  Atraumatic, Normocephalic  EYES: EOMI, PERRLA, conjunctiva and sclera clear  NECK: Supple, No JVD  CHEST/LUNG: Clear to auscultation bilaterally; No wheeze  HEART: Regular rate and rhythm; No murmurs, rubs, or gallops  ABDOMEN: Soft, Nontender, Nondistended; Bowel sounds present  EXTREMITIES:  2+ Peripheral Pulses, No clubbing, cyanosis, or edema  PSYCH: AAOx3  NEUROLOGY: non-focal  SKIN: No rashes or lesions    Labs Reviewed        CBC Full  -  ( 04 May 2023 08:42 )  WBC Count : 6.57 K/uL  Hemoglobin : 10.9 g/dL  Hematocrit : 34.0 %  Platelet Count - Automated : 136 K/uL  Mean Cell Volume : 93.9 fL  Mean Cell Hemoglobin : 30.1 pg  Mean Cell Hemoglobin Concentration : 32.1 g/dL  Auto Neutrophil # : 4.06 K/uL  Auto Lymphocyte # : 1.60 K/uL  Auto Monocyte # : 0.71 K/uL  Auto Eosinophil # : 0.15 K/uL  Auto Basophil # : 0.03 K/uL  Auto Neutrophil % : 61.7 %  Auto Lymphocyte % : 24.4 %  Auto Monocyte % : 10.8 %  Auto Eosinophil % : 2.3 %  Auto Basophil % : 0.5 %    BMP:    05-04 @ 08:42    Blood Urea Nitrogen - 18  Calcium - 9.2  Carbond Dioxide - 25  Chloride - 104  Creatinine - 1.0  Glucose - 171  Potassium - 3.9  Sodium - 142      Hemoglobin A1c -     Urine Culture:            MEDICATIONS  (STANDING):  acetaminophen     Tablet .. 650 milliGRAM(s) Oral every 6 hours  aspirin  chewable 81 milliGRAM(s) Oral daily  atorvastatin 40 milliGRAM(s) Oral at bedtime  dextrose 5%. 1000 milliLiter(s) (100 mL/Hr) IV Continuous <Continuous>  dextrose 5%. 1000 milliLiter(s) (50 mL/Hr) IV Continuous <Continuous>  dextrose 50% Injectable 25 Gram(s) IV Push once  dextrose 50% Injectable 12.5 Gram(s) IV Push once  dextrose 50% Injectable 25 Gram(s) IV Push once  gabapentin 300 milliGRAM(s) Oral three times a day  glucagon  Injectable 1 milliGRAM(s) IntraMuscular once  insulin lispro (ADMELOG) corrective regimen sliding scale   SubCutaneous three times a day before meals  lidocaine   4% Patch 1 Patch Transdermal every 24 hours  methocarbamol 750 milliGRAM(s) Oral every 6 hours  metoprolol succinate  milliGRAM(s) Oral daily  montelukast 10 milliGRAM(s) Oral daily  pantoprazole  Injectable 40 milliGRAM(s) IV Push two times a day  polyethylene glycol 3350 17 Gram(s) Oral daily  ranolazine 1000 milliGRAM(s) Oral two times a day  senna 2 Tablet(s) Oral at bedtime  sucralfate 1 Gram(s) Oral four times a day    MEDICATIONS  (PRN):  dextrose Oral Gel 15 Gram(s) Oral once PRN Blood Glucose LESS THAN 70 milliGRAM(s)/deciliter  HYDROmorphone  Injectable 0.5 milliGRAM(s) IV Push every 8 hours PRN Severe Pain (7 - 10)  oxyCODONE    IR 10 milliGRAM(s) Oral every 6 hours PRN Moderate Pain (4 - 6)  
HPI:  76yo F hx of diverticulitis, HFpEF, CAD s/p CABG, AS s/p SAVR (2016), atrial fibrillation s/p watchman (2021), bleeding disorder, asthma, COPD, DM2, HTN, CKD 2-3 presenting to the hospital for lower back pain, and GI bleed. On Monday 4/24/23, she was having lower back pain with radiation to LLE with no improvement from OTC meds and bloody BMs with associated abdominal pain. Today, back pain so severe that she called her daughter for help with assisting with cleaning her skin after having bloody BMs because she was having difficulty with ambulation. Patient was also having BRBPR, noticed by daughter. Patient does note she has internal hemorrhoids but felt this intensity of bleeding felt different. Has had diverticulitis in the past. Denies fevers, chills, dizziness, lightheadedness, shortness of breath, nausea/vomiting, dysuria.     Brought in via EMS. At EMS, was given fentanyl 200 mcg x1. At the ED, T: 98.7F, BP: 121/54, HR: 86bpm, RR: 18bpm, SpO2: 99% on RA. MILDRED (+) for blood and internal hemorrhoids on physical exam per ED. Labs significant for mild anemia (Hb- 11.1), hyperkalemia (K-5.3), elevated BUN (33), UA (+) for blood, only 2 RBC. CT A+P was not significant for acute pathology.     Patient admitted to the hospital for intractable pain, GIB. Given ketorolac x1, metjocabamol x1, morphine 8mg IV, tramadol 25mg x1, and started on PPI infusion after PPI IVP. Minimal improvement in her back pain with the pain regimen. GI consulted. Possible EGD/colonoscopy tomorrow.      (27 Apr 2023 20:49)    Currently admitted to medicine with the primary diagnosis of Back pain       Today is hospital day 11d.     INTERVAL HPI / OVERNIGHT EVENTS:  Patient was examined and seen at bedside. This morning she is resting comfortably in bed and reports no new issues or overnight events.     ROS: Otherwise unremarkable     PAST MEDICAL & SURGICAL HISTORY  Aortic stenosis    Diabetes    Asthma with COPD  many years since last attack    CAD (coronary artery disease), native coronary artery    Anemia    History of bleeding disorder  having work up 5/2/21- HAD WORKUP BUT NO DIAGNOSIS "NOTHING WAS FOUND"    History of transfusion reaction    Hiatal hernia    H/O ascending aortic replacement  Bovine Replacement    S/P CABG x 1  complication of bleeding 2016    H/O total knee replacement, right  complicated with fx femur bars screws in femur- b/l knee replacement    S/P hysterectomy    Presence of Watchman left atrial appendage closure device      ALLERGIES  penicillins (Hives; Rash)  Augmentin (Other)    MEDICATIONS  STANDING MEDICATIONS  acetaminophen     Tablet .. 650 milliGRAM(s) Oral every 6 hours  albuterol/ipratropium for Nebulization 3 milliLiter(s) Nebulizer every 6 hours  aspirin  chewable 81 milliGRAM(s) Oral daily  atorvastatin 40 milliGRAM(s) Oral at bedtime  dextrose 5%. 1000 milliLiter(s) IV Continuous <Continuous>  dextrose 5%. 1000 milliLiter(s) IV Continuous <Continuous>  dextrose 50% Injectable 25 Gram(s) IV Push once  dextrose 50% Injectable 12.5 Gram(s) IV Push once  dextrose 50% Injectable 25 Gram(s) IV Push once  furosemide   Injectable 40 milliGRAM(s) IV Push two times a day  gabapentin 300 milliGRAM(s) Oral three times a day  glucagon  Injectable 1 milliGRAM(s) IntraMuscular once  HYDROmorphone   Tablet 4 milliGRAM(s) Oral every 6 hours  insulin glargine Injectable (LANTUS) 20 Unit(s) SubCutaneous at bedtime  insulin lispro (ADMELOG) corrective regimen sliding scale   SubCutaneous three times a day before meals  insulin lispro Injectable (ADMELOG) 6 Unit(s) SubCutaneous three times a day before meals  lactulose Syrup 30 Gram(s) Oral four times a day  lidocaine   4% Patch 1 Patch Transdermal every 24 hours  magnesium oxide 400 milliGRAM(s) Oral three times a day with meals  methocarbamol 1000 milliGRAM(s) Oral every 6 hours  metoprolol succinate  milliGRAM(s) Oral daily  montelukast 10 milliGRAM(s) Oral daily  naloxegol 25 milliGRAM(s) Oral daily  pantoprazole   Suspension 40 milliGRAM(s) Oral two times a day  polyethylene glycol 3350 17 Gram(s) Oral daily  predniSONE   Tablet 40 milliGRAM(s) Oral daily  ranolazine 1000 milliGRAM(s) Oral two times a day  senna 2 Tablet(s) Oral at bedtime    PRN MEDICATIONS  dextrose Oral Gel 15 Gram(s) Oral once PRN  HYDROmorphone  Injectable 0.5 milliGRAM(s) IV Push every 8 hours PRN  ondansetron Injectable 4 milliGRAM(s) IV Push every 6 hours PRN    VITALS:  T(F): 97.1  HR: 79  BP: 133/60  RR: 18  SpO2: 96%    PHYSICAL EXAM  GENERAL: NAD, resting comfortably in bed  HEAD:  Atraumatic, Normocephalic  EYES: EOMI, conjunctiva and sclera clear  ENT: Moist mucous membranes  CHEST/LUNG: Clear to auscultation bilaterally; No rales, rhonchi, wheezing, or rubs. Unlabored respirations  HEART: Regular rate and rhythm; No murmurs, rubs, or gallops  ABDOMEN: Soft, nontender, nondistended  EXTREMITIES:  Mild edema in bilateral LEs  NERVOUS SYSTEM:  A&Ox3, follows commands, no sensory or motor deficits    LABS                        9.9    6.39  )-----------( 131      ( 08 May 2023 04:30 )             31.1     05-08    135  |  99  |  25<H>  ----------------------------<  258<H>  4.6   |  28  |  1.1    Ca    9.2      08 May 2023 04:30  Mg     2.0     05-08    TPro  6.2  /  Alb  3.7  /  TBili  0.6  /  DBili  x   /  AST  24  /  ALT  17  /  AlkPhos  55  05-08    RADIOLOGY    < from: Xray Foot AP + Lateral, Right (05.06.23 @ 17:37) >    ACC: [id number] EXAM:  XR FOOT 2 VIEWS RT   ORDERED BY: CHAN LIZ     PROCEDURE DATE:  05/06/2023          INTERPRETATION:  CLINICAL HISTORY: Pain.    COMPARISON: None.    TECHNIQUE: 2 views of the right foot.      FINDINGS/  IMPRESSION:    Suboptimal evaluation of the midfoot/hindfoot on the frontal view due to   patient positioning.  Diffuse osteopenia. No definite displaced fracture. No dislocation.   Periostitis throughout the foot, nonspecific. Degenerative change   throughout the foot.    --- End of Report ---            KOMAL JOHN MD; Attending Radiologist  This document has been electronically signed. May  8 2023  8:47AM    < end of copied text >  
HPI:  76yo F hx of diverticulitis, HFpEF, CAD s/p CABG, AS s/p SAVR (2016), atrial fibrillation s/p watchman (2021), bleeding disorder, asthma, COPD, DM2, HTN, CKD 2-3 presenting to the hospital for lower back pain, and GI bleed. On Monday 4/24/23, she was having lower back pain with radiation to LLE with no improvement from OTC meds and bloody BMs with associated abdominal pain. Today, back pain so severe that she called her daughter for help with assisting with cleaning her skin after having bloody BMs because she was having difficulty with ambulation. Patient was also having BRBPR, noticed by daughter. Patient does note she has internal hemorrhoids but felt this intensity of bleeding felt different. Has had diverticulitis in the past. Denies fevers, chills, dizziness, lightheadedness, shortness of breath, nausea/vomiting, dysuria.     Brought in via EMS. At EMS, was given fentanyl 200 mcg x1. At the ED, T: 98.7F, BP: 121/54, HR: 86bpm, RR: 18bpm, SpO2: 99% on RA. MILDRED (+) for blood and internal hemorrhoids on physical exam per ED. Labs significant for mild anemia (Hb- 11.1), hyperkalemia (K-5.3), elevated BUN (33), UA (+) for blood, only 2 RBC. CT A+P was not significant for acute pathology.     Patient admitted to the hospital for intractable pain, GIB. Given ketorolac x1, metjocabamol x1, morphine 8mg IV, tramadol 25mg x1, and started on PPI infusion after PPI IVP. Minimal improvement in her back pain with the pain regimen. GI consulted. Possible EGD/colonoscopy tomorrow.      (27 Apr 2023 20:49)    Currently admitted to medicine with the primary diagnosis of Back pain       Today is hospital day 12d.     INTERVAL HPI / OVERNIGHT EVENTS:  Patient was examined and seen at bedside. This morning she is resting comfortably in bed and reports no new issues or overnight events.     ROS: Otherwise unremarkable     PAST MEDICAL & SURGICAL HISTORY  Aortic stenosis    Diabetes    Asthma with COPD  many years since last attack    CAD (coronary artery disease), native coronary artery    Anemia    History of bleeding disorder  having work up 5/2/21- HAD WORKUP BUT NO DIAGNOSIS "NOTHING WAS FOUND"    History of transfusion reaction    Hiatal hernia    H/O ascending aortic replacement  Bovine Replacement    S/P CABG x 1  complication of bleeding 2016    H/O total knee replacement, right  complicated with fx femur bars screws in femur- b/l knee replacement    S/P hysterectomy    Presence of Watchman left atrial appendage closure device      ALLERGIES  penicillins (Hives; Rash)  Augmentin (Other)    MEDICATIONS  STANDING MEDICATIONS  acetaminophen     Tablet .. 650 milliGRAM(s) Oral every 6 hours  albuterol/ipratropium for Nebulization 3 milliLiter(s) Nebulizer every 6 hours  aspirin  chewable 81 milliGRAM(s) Oral daily  atorvastatin 40 milliGRAM(s) Oral at bedtime  dextrose 5%. 1000 milliLiter(s) IV Continuous <Continuous>  dextrose 5%. 1000 milliLiter(s) IV Continuous <Continuous>  dextrose 50% Injectable 25 Gram(s) IV Push once  dextrose 50% Injectable 12.5 Gram(s) IV Push once  dextrose 50% Injectable 25 Gram(s) IV Push once  furosemide   Injectable 40 milliGRAM(s) IV Push two times a day  gabapentin 300 milliGRAM(s) Oral three times a day  glucagon  Injectable 1 milliGRAM(s) IntraMuscular once  insulin glargine Injectable (LANTUS) 20 Unit(s) SubCutaneous at bedtime  insulin lispro (ADMELOG) corrective regimen sliding scale   SubCutaneous three times a day before meals  insulin lispro Injectable (ADMELOG) 6 Unit(s) SubCutaneous three times a day before meals  lactulose Syrup 30 Gram(s) Oral four times a day  lidocaine   4% Patch 1 Patch Transdermal every 24 hours  magnesium oxide 400 milliGRAM(s) Oral three times a day with meals  methocarbamol 1000 milliGRAM(s) Oral every 6 hours  metoprolol succinate  milliGRAM(s) Oral daily  montelukast 10 milliGRAM(s) Oral daily  naloxegol 25 milliGRAM(s) Oral daily  pantoprazole   Suspension 40 milliGRAM(s) Oral two times a day  polyethylene glycol 3350 17 Gram(s) Oral daily  ranolazine 1000 milliGRAM(s) Oral two times a day  senna 2 Tablet(s) Oral at bedtime    PRN MEDICATIONS  dextrose Oral Gel 15 Gram(s) Oral once PRN  HYDROmorphone   Tablet 4 milliGRAM(s) Oral every 6 hours PRN  HYDROmorphone  Injectable 0.5 milliGRAM(s) IV Push every 8 hours PRN  ondansetron Injectable 4 milliGRAM(s) IV Push every 6 hours PRN    VITALS:  T(F): 96.7  HR: 75  BP: 136/62  RR: 18  SpO2: 98%    PHYSICAL EXAM  GENERAL: NAD, resting comfortably in bed  HEAD:  Atraumatic, Normocephalic  EYES: EOMI, conjunctiva and sclera clear  ENT: Moist mucous membranes  CHEST/LUNG: Clear to auscultation bilaterally; No rales, rhonchi, wheezing, or rubs. Unlabored respirations  HEART: Regular rate and rhythm; No murmurs, rubs, or gallops  ABDOMEN: Soft, nontender, nondistended  EXTREMITIES:  No clubbing, cyanosis, or edema  NERVOUS SYSTEM:  A&Ox3, follows commands, no sensory or motor deficits    LABS                        10.1   8.95  )-----------( 135      ( 09 May 2023 08:26 )             31.7     05-09    142  |  98  |  31<H>  ----------------------------<  197<H>  4.1   |  28  |  1.2    Ca    9.5      09 May 2023 08:26  Mg     2.1     05-09    TPro  6.1  /  Alb  3.8  /  TBili  0.6  /  DBili  x   /  AST  23  /  ALT  18  /  AlkPhos  56  05-09    
Pain Medicine Follow Up Visit    Subjective  Patient endorses having ongoing pain in the low back with radiation to the right posterolateral thigh to the anterior leg and dorsal foot. She notes pins and needles sensation over this distribution with no focal weakness. The patient feels that oxycodone is not helping significantly with her pain. She is still having constipation but denies nausea/vomiting.     Current Medication Regimen  acetaminophen     Tablet .. 650 milliGRAM(s) Oral every 6 hours  aspirin  chewable 81 milliGRAM(s) Oral daily  atorvastatin 40 milliGRAM(s) Oral at bedtime  dextrose 5%. 1000 milliLiter(s) IV Continuous <Continuous>  dextrose 5%. 1000 milliLiter(s) IV Continuous <Continuous>  dextrose 50% Injectable 25 Gram(s) IV Push once  dextrose 50% Injectable 12.5 Gram(s) IV Push once  dextrose 50% Injectable 25 Gram(s) IV Push once  dextrose Oral Gel 15 Gram(s) Oral once PRN  gabapentin 600 milliGRAM(s) Oral three times a day  glucagon  Injectable 1 milliGRAM(s) IntraMuscular once  HYDROmorphone  Injectable 0.5 milliGRAM(s) IV Push every 8 hours PRN  insulin lispro (ADMELOG) corrective regimen sliding scale   SubCutaneous three times a day before meals  lactulose Syrup 30 Gram(s) Oral four times a day  lidocaine   4% Patch 1 Patch Transdermal every 24 hours  methocarbamol 1000 milliGRAM(s) Oral every 6 hours  metoprolol succinate  milliGRAM(s) Oral daily  montelukast 10 milliGRAM(s) Oral daily  oxyCODONE    IR 10 milliGRAM(s) Oral every 6 hours PRN  pantoprazole   Suspension 40 milliGRAM(s) Oral two times a day  polyethylene glycol 3350 17 Gram(s) Oral daily  ranolazine 1000 milliGRAM(s) Oral two times a day  senna 2 Tablet(s) Oral at bedtime  sucralfate 1 Gram(s) Oral four times a day        Allergies  penicillins (Hives; Rash)  Augmentin (Other)          Physical Exam  T(C): 35.9 (05-05-23 @ 13:03), Max: 36.2 (05-05-23 @ 05:47)  HR: 73 (05-05-23 @ 13:03) (70 - 74)  BP: 111/51 (05-05-23 @ 13:03) (111/51 - 153/65)  RR: 18 (05-05-23 @ 13:03) (18 - 19)  SpO2: 97% (05-05-23 @ 05:47) (97% - 97%)  Gen: NAD  Eyes: no glasses or scleral icterus  Head: Normocephalic / Atraumatic  CV: no JVD  Lungs: nonlabored breathing  Abdomen: nondistended, soft  : no burden catheter in place  Neuro: AOx3, Cranial nerves intact  Psych: normal affect      Labs  CBC  6.57 > 10.9 g/dL / 34.0 % < 136 K/uL        Imaging  CT Abdomen/Pelvis (4/27/2023)  FINDINGS:    LOWER CHEST: Unchanged 1.5 cm right lower lobe nodule. Post median   sternotomy and aortic valve replacement left atrial appendage exclusion   device with associated thrombosis. Coronary calcifications noted.    HEPATOBILIARY: Cholelithiasis. Calcified hepatic granuloma.    SPLEEN: Unremarkable.    PANCREAS: Unremarkable.    ADRENAL GLANDS: Unremarkable.    KIDNEYS: Bilateral renal angiomyolipomas, right measuring 2.8 cm and the   left measuring subcentimeter. Symmetric renal enhancement bilaterally. No   hydronephrosis    ABDOMINOPELVIC NODES: No lymphadenopathy.    PELVIC ORGANS: Post hysterectomy.    PERITONEUM/MESENTERY/BOWEL: No bowel obstruction, ascites or   pneumoperitoneum. Scattered colonic diverticulosis without evidence for   acute diverticulitis. Appendix not visualized; no focal pericecal   inflammation. Small hiatal hernia.    BONES/SOFT TISSUES: Fat-containing subxiphoid/epigastric hernias. Post   median sternotomy. Multilevel degenerative changes of the spine noted.   Bilateral L5 pars defects without associated spondylolisthesis.    OTHER: Scattered atherosclerotic vascular calcifications.      IMPRESSION:  No CT evidence of an acute abdominopelvic pathology.          
SUBJECTIVE:    Patient is a 77y old Female who presents with a chief complaint of GI bleed (03 May 2023 14:09)    Currently admitted to medicine with the primary diagnosis of:    Today is hospital day 6d.     Overnight Events:     no overnight events     PAST MEDICAL & SURGICAL HISTORY  Aortic stenosis    Diabetes    Asthma with COPD  many years since last attack    CAD (coronary artery disease), native coronary artery    Anemia    History of bleeding disorder  having work up 5/2/21- HAD WORKUP BUT NO DIAGNOSIS "NOTHING WAS FOUND"    History of transfusion reaction    Hiatal hernia    H/O ascending aortic replacement  Bovine Replacement    S/P CABG x 1  complication of bleeding 2016    H/O total knee replacement, right  complicated with fx femur bars screws in femur- b/l knee replacement    S/P hysterectomy    Presence of Watchman left atrial appendage closure device        ALLERGIES:  penicillins (Hives; Rash)  Augmentin (Other)    MEDICATIONS:  STANDING MEDICATIONS  acetaminophen     Tablet .. 650 milliGRAM(s) Oral every 6 hours  aspirin  chewable 81 milliGRAM(s) Oral daily  atorvastatin 40 milliGRAM(s) Oral at bedtime  gabapentin 300 milliGRAM(s) Oral three times a day  lidocaine   4% Patch 1 Patch Transdermal every 24 hours  methocarbamol 750 milliGRAM(s) Oral every 6 hours  metoprolol succinate  milliGRAM(s) Oral daily  montelukast 10 milliGRAM(s) Oral daily  pantoprazole  Injectable 40 milliGRAM(s) IV Push two times a day  polyethylene glycol 3350 17 Gram(s) Oral daily  ranolazine 1000 milliGRAM(s) Oral two times a day  senna 2 Tablet(s) Oral at bedtime  sucralfate 1 Gram(s) Oral four times a day    PRN MEDICATIONS  HYDROmorphone  Injectable 0.5 milliGRAM(s) IV Push every 8 hours PRN  oxyCODONE    IR 10 milliGRAM(s) Oral every 6 hours PRN    VITALS:   ICU Vital Signs Last 24 Hrs  T(C): 37.2 (03 May 2023 05:37), Max: 37.2 (03 May 2023 05:37)  T(F): 98.9 (03 May 2023 05:37), Max: 98.9 (03 May 2023 05:37)  HR: 75 (03 May 2023 05:37) (73 - 75)  BP: 130/58 (03 May 2023 05:37) (130/58 - 132/60)  BP(mean): --  ABP: --  ABP(mean): --  RR: 18 (03 May 2023 05:37) (18 - 18)  SpO2: --        LABS:                        10.8   7.49  )-----------( 140      ( 03 May 2023 07:49 )             33.9     05-03    140  |  102  |  15  ----------------------------<  143<H>  3.6   |  27  |  1.0    Ca    9.0      03 May 2023 07:49  Mg     1.8     05-03    TPro  5.8<L>  /  Alb  3.4<L>  /  TBili  0.7  /  DBili  x   /  AST  27  /  ALT  15  /  AlkPhos  58  05-03                    RADIOLOGY:  < from: [facility] Duplex Lower Ext Vein Scan, Bilat (04.29.23 @ 09:06) >  No evidence of deep venous thrombosis or superficial thrombophlebitis in   the bilateral lower extremities.    < end of copied text >    PHYSICAL EXAM:  GEN: laying in bed  LUNGS: clear  HEART: s1 s2  ABD: nontender  EXT: no lower extremity edema  NEURO: ao3
76yo F hx of diverticulitis, HFpEF, CAD s/p CABG, AS s/p SAVR (), atrial fibrillation s/p watchman (), bleeding disorder, asthma, COPD, DM2, HTN, CKD 2-3 presenting to the hospital for lower back pain, and GI bleed. On 23, she was having lower back pain with radiation to LLE with no improvement from OTC meds and bloody BMs with associated abdominal pain. Today, back pain so severe that she called her daughter for help with assisting with cleaning her skin after having bloody BMs because she was having difficulty with ambulation. Patient was also having BRBPR, noticed by daughter. Patient does note she has internal hemorrhoids but felt this intensity of bleeding felt different. Has had diverticulitis in the past. Denies fevers, chills, dizziness, lightheadedness, shortness of breath, nausea/vomiting, dysuria.   Brought in via EMS. At EMS, was given fentanyl 200 mcg x1. At the ED, T: 98.7F, BP: 121/54, HR: 86bpm, RR: 18bpm, SpO2: 99% on RA. MILDRED (+) for blood and internal hemorrhoids on physical exam per ED. Labs significant for mild anemia (Hb- 11.1), hyperkalemia (K-5.3), elevated BUN (33), UA (+) for blood, only 2 RBC. CT A+P was not significant for acute pathology.   Today pt feels anxious about EGD/colonoscopy, not in pain at rest, bowel prep was challenging.       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      CAD (coronary artery disease), native coronary artery      Anemia      History of bleeding disorder  having work up 21- HAD WORKUP BUT NO DIAGNOSIS "NOTHING WAS FOUND"      History of transfusion reaction      Hiatal hernia      H/O ascending aortic replacement  Bovine Replacement      S/P CABG x 1  complication of bleeding       H/O total knee replacement, right  complicated with fx femur bars screws in femur- b/l knee replacement      S/P hysterectomy      Presence of Watchman left atrial appendage closure device        Vital Signs Last 24 Hrs  T(C): 36.6 (01 May 2023 13:23), Max: 36.6 (01 May 2023 13:23)  T(F): 97.8 (01 May 2023 13:23), Max: 97.8 (01 May 2023 13:23)  HR: 63 (01 May 2023 13:23) (63 - 71)  BP: 135/58 (01 May 2023 13:23) (104/55 - 135/58)  BP(mean): --  RR: 20 (01 May 2023 13:23) (18 - 20)  SpO2: 97% (01 May 2023 13:06) (97% - 97%)    Parameters below as of 01 May 2023 04:15  Patient On (Oxygen Delivery Method): room air          PHYSICAL EXAM:  GENERAL: NAD, morbidly obese   HEAD:  Atraumatic, Normocephalic  EYES: EOMI, PERRLA, conjunctiva and sclera clear  ENMT: No tonsillar erythema, exudates, or enlargement; Moist mucous membranes, Good dentition, No lesions  NECK: Supple, No JVD, Normal thyroid, wide neck   NERVOUS SYSTEM:  Alert & Oriented X3, Good concentration; decrease ROM in LE due to lower back pain, straight leg test is positive   CHEST/LUNG: distant BS, no wheezing   HEART: S1,S2, murmur appreciated on PE   ABDOMEN: Soft, Nontender, Nondistended; Bowel sounds present  EXTREMITIES:  2+ Peripheral Pulses, No clubbing, cyanosis, or edema  LYMPH: No lymphadenopathy noted  SKIN: No rashes or lesions      LABS:                          11.4   6.78  )-----------( 144      ( 01 May 2023 06:51 )             35.9   05-    x   |  x   |  x   ----------------------------<  x   4.0   |  x   |  x     Ca    8.4      01 May 2023 10:07    TPro  5.9<L>  /  Alb  3.5  /  TBili  0.8  /  DBili  x   /  AST  26  /  ALT  15  /  AlkPhos  58  05-       PTT - ( 2023 17:54 )  PTT:28.6 sec  Urinalysis Basic - ( 2023 14:53 )    Color: Light Yellow / Appearance: Clear / S.013 / pH: x  Gluc: x / Ketone: Trace  / Bili: Negative / Urobili: <2 mg/dL   Blood: x / Protein: Negative / Nitrite: Negative   Leuk Esterase: Negative / RBC: 2 /HPF / WBC 0 /HPF   Sq Epi: x / Non Sq Epi: x / Bacteria: Negative    RADIOLOGY & ADDITIONAL TESTS:  < from: CT Abdomen and Pelvis w/ IV Cont (23 @ 17:09) >  IMPRESSION:  No CT evidence of an acute abdominopelvic pathology.    < end of copied text >  < from: CT Chest w/ IV Cont (23 @ 17:03) >    IMPRESSION:    Since  2021    Small hiatal hernia measuring approximately 2.9 cm.    Stable dilatation main pulmonary artery segment, measuring approximately   3.2 cm (303/85).    Stable pulmonary nodules.    < end of copied text >  < from: Transesophageal Echocardiogram (21 @ 09:38) >  Summary:   1. Left ventricular ejection fraction, by visual estimation, is 55 to 60%.   2. Severely enlarged left atrium.   3. Severely enlarged right atrium.   4. Watchman device in place, no anne-device leaks identified in multiple views.   5. Severe tricuspid regurgitation.   6. Aortic valve bioprosthetic valve in place, functioning normally without significant perivalvular leaks seen.   7. Estimated pulmonary artery systolic pressure is 51.6 mmHg assuming a right atrial pressure of 8 mmHg, which is consistent with moderate pulmonary hypertension.   8. Moderately dilated pulmonary artery.   9. Pulmonary hypertension is present.  10. Color Doppler demonstrates the presence of a shunt at the Atrial level.  < from: [facility] Duplex Lower Ext Vein Scan, Bilat (23 @ 09:06) >  Impression:    No evidence of deep venous thrombosis or superficial thrombophlebitis in   the bilateral lower extremities.    < end of copied text >  < from: TTE Echo Complete w/o Contrast w/ Doppler (23 @ 15:28) >  Summary:   1. Technically difficult study.   2. Normal global left ventricular systolic function.   3. Severely enlarged left atrium.   4. LV Ejection Fraction by Graves's Method with a biplane EF of 72 %.   5. Spectral Doppler shows pseudonormal pattern of left ventricular   myocardial filling (Grade II diastolic dysfunction).   6. Mildly enlarged right ventricle.   7.Moderately reduced RV systolic function.   8. Mildly enlarged right atrium.   9. Degenerative mitral valve.  10. Mild mitral valve regurgitation.  11. Severe mitral annular calcification.  12. Moderate tricuspid regurgitation.  13. Estimated pulmonary artery systolic pressure is 66.7 mmHg assuming a   right atrial pressure of 8 mmHg, which is consistent with severe   pulmonary hypertension.            MEDICATIONS  (STANDING):  acetaminophen     Tablet .. 650 milliGRAM(s) Oral every 6 hours  atorvastatin 40 milliGRAM(s) Oral at bedtime  gabapentin 300 milliGRAM(s) Oral three times a day  lidocaine   4% Patch 1 Patch Transdermal every 24 hours  methocarbamol 750 milliGRAM(s) Oral every 6 hours  metoprolol succinate  milliGRAM(s) Oral daily  montelukast 10 milliGRAM(s) Oral daily  pantoprazole  Injectable 40 milliGRAM(s) IV Push two times a day  polyethylene glycol 3350 17 Gram(s) Oral daily  ranolazine 1000 milliGRAM(s) Oral two times a day  senna 2 Tablet(s) Oral at bedtime  sucralfate 1 Gram(s) Oral four times a day    MEDICATIONS  (PRN):  HYDROmorphone  Injectable 0.5 milliGRAM(s) IV Push every 8 hours PRN Severe Pain (7 - 10)  oxyCODONE    IR 10 milliGRAM(s) Oral every 6 hours PRN Moderate Pain (4 - 6)                
78yo F hx of diverticulitis, HFpEF, CAD s/p CABG, AS s/p SAVR (), atrial fibrillation s/p watchman (), bleeding disorder, asthma, COPD, DM2, HTN, CKD 2-3 presenting to the hospital for lower back pain, and GI bleed. On 23, she was having lower back pain with radiation to LLE with no improvement from OTC meds and bloody BMs with associated abdominal pain. Today, back pain so severe that she called her daughter for help with assisting with cleaning her skin after having bloody BMs because she was having difficulty with ambulation. Patient was also having BRBPR, noticed by daughter. Patient does note she has internal hemorrhoids but felt this intensity of bleeding felt different. Has had diverticulitis in the past. Denies fevers, chills, dizziness, lightheadedness, shortness of breath, nausea/vomiting, dysuria.   Brought in via EMS. At EMS, was given fentanyl 200 mcg x1. At the ED, T: 98.7F, BP: 121/54, HR: 86bpm, RR: 18bpm, SpO2: 99% on RA. MILDRED (+) for blood and internal hemorrhoids on physical exam per ED. Labs significant for mild anemia (Hb- 11.1), hyperkalemia (K-5.3), elevated BUN (33), UA (+) for blood, only 2 RBC. CT A+P was not significant for acute pathology.   Today pt feels better, has less pain in her lower back, able to move out of bed, denies bloody BMs ( did not eat much since the time she was admitted).     Vital Signs Last 24 Hrs  T(C): 35.6 (2023 05:05), Max: 36.2 (2023 19:52)  T(F): 96 (2023 05:05), Max: 97.1 (2023 19:52)  HR: 69 (2023 05:05) (69 - 79)  BP: 136/50 (2023 05:05) (120/59 - 138/66)  BP(mean): --  RR: 18 (2023 05:05) (18 - 18)  SpO2: 96% (2023 05:05) (95% - 96%)    Parameters below as of 2023 05:05  Patient On (Oxygen Delivery Method): room air      PHYSICAL EXAM:  GENERAL: NAD, morbidly obese   HEAD:  Atraumatic, Normocephalic  EYES: EOMI, PERRLA, conjunctiva and sclera clear  ENMT: No tonsillar erythema, exudates, or enlargement; Moist mucous membranes, Good dentition, No lesions  NECK: Supple, No JVD, Normal thyroid, wide neck   NERVOUS SYSTEM:  Alert & Oriented X3, Good concentration; decrease ROM in LE due to lower back pain, straight leg test is positive   CHEST/LUNG: distant BS, no wheezing   HEART: S1,S2, murmur appreciated on PE   ABDOMEN: Soft, Nontender, Nondistended; Bowel sounds present  EXTREMITIES:  2+ Peripheral Pulses, No clubbing, cyanosis, or edema  LYMPH: No lymphadenopathy noted  SKIN: No rashes or lesions      LABS:                                   10.8   7.41  )-----------( 129      ( 2023 17:27 )             34.8       144  |  105  |  24<H>  ----------------------------<  153<H>  4.7   |  30  |  1.1    Ca    9.2      2023 07:26  Mg     2.2         TPro  6.0  /  Alb  3.7  /  TBili  1.0  /  DBili  x   /  AST  27  /  ALT  19  /  AlkPhos  62         PTT - ( 2023 17:54 )  PTT:28.6 sec  Urinalysis Basic - ( 2023 14:53 )    Color: Light Yellow / Appearance: Clear / S.013 / pH: x  Gluc: x / Ketone: Trace  / Bili: Negative / Urobili: <2 mg/dL   Blood: x / Protein: Negative / Nitrite: Negative   Leuk Esterase: Negative / RBC: 2 /HPF / WBC 0 /HPF   Sq Epi: x / Non Sq Epi: x / Bacteria: Negative    RADIOLOGY & ADDITIONAL TESTS:  < from: CT Abdomen and Pelvis w/ IV Cont (23 @ 17:09) >  IMPRESSION:  No CT evidence of an acute abdominopelvic pathology.    < end of copied text >  < from: CT Chest w/ IV Cont (23 @ 17:03) >    IMPRESSION:    Since  2021    Small hiatal hernia measuring approximately 2.9 cm.    Stable dilatation main pulmonary artery segment, measuring approximately   3.2 cm (303/85).    Stable pulmonary nodules.    < end of copied text >  < from: Transesophageal Echocardiogram (21 @ 09:38) >  Summary:   1. Left ventricular ejection fraction, by visual estimation, is 55 to 60%.   2. Severely enlarged left atrium.   3. Severely enlarged right atrium.   4. Watchman device in place, no anne-device leaks identified in multiple views.   5. Severe tricuspid regurgitation.   6. Aortic valve bioprosthetic valve in place, functioning normally without significant perivalvular leaks seen.   7. Estimated pulmonary artery systolic pressure is 51.6 mmHg assuming a right atrial pressure of 8 mmHg, which is consistent with moderate pulmonary hypertension.   8. Moderately dilated pulmonary artery.   9. Pulmonary hypertension is present.  10. Color Doppler demonstrates the presence of a shunt at the Atrial level.  < from: [facility] Duplex Lower Ext Vein Scan, Bilat (23 @ 09:06) >  Impression:    No evidence of deep venous thrombosis or superficial thrombophlebitis in   the bilateral lower extremities.    < end of copied text >  < from: TTE Echo Complete w/o Contrast w/ Doppler (23 @ 15:28) >  Summary:   1. Technically difficult study.   2. Normal global left ventricular systolic function.   3. Severely enlarged left atrium.   4. LV Ejection Fraction by Graves's Method with a biplane EF of 72 %.   5. Spectral Doppler shows pseudonormal pattern of left ventricular   myocardial filling (Grade II diastolic dysfunction).   6. Mildly enlarged right ventricle.   7.Moderately reduced RV systolic function.   8. Mildly enlarged right atrium.   9. Degenerative mitral valve.  10. Mild mitral valve regurgitation.  11. Severe mitral annular calcification.  12. Moderate tricuspid regurgitation.  13. Estimated pulmonary artery systolic pressure is 66.7 mmHg assuming a   right atrial pressure of 8 mmHg, which is consistent with severe   pulmonary hypertension.    < end of copied text >      MEDICATIONS  (STANDING):  acetaminophen     Tablet .. 650 milliGRAM(s) Oral every 6 hours  atorvastatin 40 milliGRAM(s) Oral at bedtime  gabapentin 300 milliGRAM(s) Oral three times a day  lidocaine   4% Patch 1 Patch Transdermal every 24 hours  methocarbamol 750 milliGRAM(s) Oral every 6 hours  metoprolol succinate  milliGRAM(s) Oral daily  montelukast 10 milliGRAM(s) Oral daily  pantoprazole  Injectable 40 milliGRAM(s) IV Push two times a day  polyethylene glycol 3350 17 Gram(s) Oral daily  ranolazine 1000 milliGRAM(s) Oral two times a day  senna 2 Tablet(s) Oral at bedtime  sucralfate 1 Gram(s) Oral four times a day    MEDICATIONS  (PRN):  HYDROmorphone  Injectable 0.5 milliGRAM(s) IV Push every 8 hours PRN Severe Pain (7 - 10)  oxyCODONE    IR 10 milliGRAM(s) Oral every 6 hours PRN Moderate Pain (4 - 6)              
HPI:  76yo F hx of diverticulitis, HFpEF, CAD s/p CABG, AS s/p SAVR (2016), atrial fibrillation s/p watchman (2021), bleeding disorder, asthma, COPD, DM2, HTN, CKD 2-3 presenting to the hospital for lower back pain, and GI bleed. On Monday 4/24/23, she was having lower back pain with radiation to LLE with no improvement from OTC meds and bloody BMs with associated abdominal pain. Today, back pain so severe that she called her daughter for help with assisting with cleaning her skin after having bloody BMs because she was having difficulty with ambulation. Patient was also having BRBPR, noticed by daughter. Patient does note she has internal hemorrhoids but felt this intensity of bleeding felt different. Has had diverticulitis in the past. Denies fevers, chills, dizziness, lightheadedness, shortness of breath, nausea/vomiting, dysuria.     Brought in via EMS. At EMS, was given fentanyl 200 mcg x1. At the ED, T: 98.7F, BP: 121/54, HR: 86bpm, RR: 18bpm, SpO2: 99% on RA. MILDRED (+) for blood and internal hemorrhoids on physical exam per ED. Labs significant for mild anemia (Hb- 11.1), hyperkalemia (K-5.3), elevated BUN (33), UA (+) for blood, only 2 RBC. CT A+P was not significant for acute pathology.     Patient admitted to the hospital for intractable pain, GIB. Given ketorolac x1, metjocabamol x1, morphine 8mg IV, tramadol 25mg x1, and started on PPI infusion after PPI IVP. Minimal improvement in her back pain with the pain regimen. GI consulted. Possible EGD/colonoscopy tomorrow.      (27 Apr 2023 20:49)    Currently admitted to medicine with the primary diagnosis of Back pain       Today is hospital day 5d.     INTERVAL HPI / OVERNIGHT EVENTS:  Patient was examined and seen at bedside. This morning she is resting comfortably in bed and reports no new issues or overnight events.     ROS: Otherwise unremarkable     PAST MEDICAL & SURGICAL HISTORY  Aortic stenosis    Diabetes    Asthma with COPD  many years since last attack    CAD (coronary artery disease), native coronary artery    Anemia    History of bleeding disorder  having work up 5/2/21- HAD WORKUP BUT NO DIAGNOSIS "NOTHING WAS FOUND"    History of transfusion reaction    Hiatal hernia    H/O ascending aortic replacement  Bovine Replacement    S/P CABG x 1  complication of bleeding 2016    H/O total knee replacement, right  complicated with fx femur bars screws in femur- b/l knee replacement    S/P hysterectomy    Presence of Watchman left atrial appendage closure device      ALLERGIES  penicillins (Hives; Rash)  Augmentin (Other)    MEDICATIONS  STANDING MEDICATIONS  acetaminophen     Tablet .. 650 milliGRAM(s) Oral every 6 hours  atorvastatin 40 milliGRAM(s) Oral at bedtime  gabapentin 300 milliGRAM(s) Oral three times a day  lidocaine   4% Patch 1 Patch Transdermal every 24 hours  methocarbamol 750 milliGRAM(s) Oral every 6 hours  metoprolol succinate  milliGRAM(s) Oral daily  montelukast 10 milliGRAM(s) Oral daily  pantoprazole  Injectable 40 milliGRAM(s) IV Push two times a day  polyethylene glycol 3350 17 Gram(s) Oral daily  ranolazine 1000 milliGRAM(s) Oral two times a day  senna 2 Tablet(s) Oral at bedtime  sucralfate 1 Gram(s) Oral four times a day    PRN MEDICATIONS  HYDROmorphone  Injectable 0.5 milliGRAM(s) IV Push every 8 hours PRN  oxyCODONE    IR 10 milliGRAM(s) Oral every 6 hours PRN    VITALS:  T(F): 98.6  HR: 87  BP: 137/81  RR: 18  SpO2: 98%    PHYSICAL EXAM  GENERAL: NAD, resting comfortably in bed  HEAD:  Atraumatic, Normocephalic  EYES: EOMI, conjunctiva and sclera clear  ENT: Moist mucous membranes  CHEST/LUNG: Clear to auscultation bilaterally; No rales, rhonchi, wheezing, or rubs. Unlabored respirations  HEART: Regular rate and rhythm; No murmurs, rubs, or gallops  ABDOMEN: Soft, nontender, nondistended  EXTREMITIES:  No clubbing, cyanosis, or edema  NERVOUS SYSTEM:  A&Ox3, follows commands, no sensory or motor deficits    LABS                        10.9   6.45  )-----------( 132      ( 02 May 2023 07:15 )             34.0     05-02    140  |  103  |  12  ----------------------------<  105<H>  4.1   |  23  |  0.9    Ca    8.9      02 May 2023 07:15  Mg     1.9     05-02    TPro  5.7<L>  /  Alb  3.4<L>  /  TBili  0.7  /  DBili  x   /  AST  25  /  ALT  15  /  AlkPhos  58  05-02  
HPI:  76yo F hx of diverticulitis, HFpEF, CAD s/p CABG, AS s/p SAVR (2016), atrial fibrillation s/p watchman (2021), bleeding disorder, asthma, COPD, DM2, HTN, CKD 2-3 presenting to the hospital for lower back pain, and GI bleed. On Monday 4/24/23, she was having lower back pain with radiation to LLE with no improvement from OTC meds and bloody BMs with associated abdominal pain. Today, back pain so severe that she called her daughter for help with assisting with cleaning her skin after having bloody BMs because she was having difficulty with ambulation. Patient was also having BRBPR, noticed by daughter. Patient does note she has internal hemorrhoids but felt this intensity of bleeding felt different. Has had diverticulitis in the past. Denies fevers, chills, dizziness, lightheadedness, shortness of breath, nausea/vomiting, dysuria.     Brought in via EMS. At EMS, was given fentanyl 200 mcg x1. At the ED, T: 98.7F, BP: 121/54, HR: 86bpm, RR: 18bpm, SpO2: 99% on RA. MILDRED (+) for blood and internal hemorrhoids on physical exam per ED. Labs significant for mild anemia (Hb- 11.1), hyperkalemia (K-5.3), elevated BUN (33), UA (+) for blood, only 2 RBC. CT A+P was not significant for acute pathology.     Patient admitted to the hospital for intractable pain, GIB. Given ketorolac x1, metjocabamol x1, morphine 8mg IV, tramadol 25mg x1, and started on PPI infusion after PPI IVP. Minimal improvement in her back pain with the pain regimen. GI consulted. Possible EGD/colonoscopy tomorrow.      (27 Apr 2023 20:49)    Currently admitted to medicine with the primary diagnosis of Back pain       Today is hospital day 7d.     INTERVAL HPI / OVERNIGHT EVENTS:  Patient was examined and seen at bedside. This morning she is resting comfortably in bed and reports no new issues or overnight events.     ROS: Otherwise unremarkable     PAST MEDICAL & SURGICAL HISTORY  Aortic stenosis    Diabetes    Asthma with COPD  many years since last attack    CAD (coronary artery disease), native coronary artery    Anemia    History of bleeding disorder  having work up 5/2/21- HAD WORKUP BUT NO DIAGNOSIS "NOTHING WAS FOUND"    History of transfusion reaction    Hiatal hernia    H/O ascending aortic replacement  Bovine Replacement    S/P CABG x 1  complication of bleeding 2016    H/O total knee replacement, right  complicated with fx femur bars screws in femur- b/l knee replacement    S/P hysterectomy    Presence of Watchman left atrial appendage closure device      ALLERGIES  penicillins (Hives; Rash)  Augmentin (Other)    MEDICATIONS  STANDING MEDICATIONS  acetaminophen     Tablet .. 650 milliGRAM(s) Oral every 6 hours  aspirin  chewable 81 milliGRAM(s) Oral daily  atorvastatin 40 milliGRAM(s) Oral at bedtime  dextrose 5%. 1000 milliLiter(s) IV Continuous <Continuous>  dextrose 5%. 1000 milliLiter(s) IV Continuous <Continuous>  dextrose 50% Injectable 25 Gram(s) IV Push once  dextrose 50% Injectable 12.5 Gram(s) IV Push once  dextrose 50% Injectable 25 Gram(s) IV Push once  gabapentin 300 milliGRAM(s) Oral three times a day  glucagon  Injectable 1 milliGRAM(s) IntraMuscular once  insulin lispro (ADMELOG) corrective regimen sliding scale   SubCutaneous three times a day before meals  lidocaine   4% Patch 1 Patch Transdermal every 24 hours  methocarbamol 750 milliGRAM(s) Oral every 6 hours  metoprolol succinate  milliGRAM(s) Oral daily  montelukast 10 milliGRAM(s) Oral daily  pantoprazole  Injectable 40 milliGRAM(s) IV Push two times a day  polyethylene glycol 3350 17 Gram(s) Oral daily  ranolazine 1000 milliGRAM(s) Oral two times a day  senna 2 Tablet(s) Oral at bedtime  sucralfate 1 Gram(s) Oral four times a day    PRN MEDICATIONS  dextrose Oral Gel 15 Gram(s) Oral once PRN  HYDROmorphone  Injectable 0.5 milliGRAM(s) IV Push every 8 hours PRN  oxyCODONE    IR 10 milliGRAM(s) Oral every 6 hours PRN    VITALS:  T(F): 98.4  HR: 73  BP: 148/65  RR: 18  SpO2: 100%    PHYSICAL EXAM  GENERAL: NAD, resting comfortably in bed  HEAD:  Atraumatic, Normocephalic  EYES: EOMI, conjunctiva and sclera clear  ENT: Moist mucous membranes  CHEST/LUNG: Clear to auscultation bilaterally; No rales, rhonchi, wheezing, or rubs. Unlabored respirations  HEART: Regular rate and rhythm; No murmurs, rubs, or gallops  ABDOMEN: Soft, nontender, nondistended  EXTREMITIES:  No clubbing, cyanosis, or edema  NERVOUS SYSTEM:  A&Ox3, follows commands, no sensory or motor deficits    LABS                        10.9   6.57  )-----------( 136      ( 04 May 2023 08:42 )             34.0     05-04    142  |  104  |  18  ----------------------------<  171<H>  3.9   |  25  |  1.0    Ca    9.2      04 May 2023 08:42  Mg     1.8     05-04    TPro  5.9<L>  /  Alb  3.4<L>  /  TBili  0.6  /  DBili  x   /  AST  32  /  ALT  19  /  AlkPhos  58  05-04    
SUBJECTIVE:  HPI:  78yo F hx of diverticulitis, HFpEF, CAD s/p CABG, AS s/p SAVR (2016), atrial fibrillation s/p watchman (2021), bleeding disorder, asthma, COPD, DM2, HTN, CKD 2-3 presenting to the hospital for lower back pain, and GI bleed. On Monday 4/24/23, she was having lower back pain with radiation to LLE with no improvement from OTC meds and bloody BMs with associated abdominal pain. Today, back pain so severe that she called her daughter for help with assisting with cleaning her skin after having bloody BMs because she was having difficulty with ambulation. Patient was also having BRBPR, noticed by daughter. Patient does note she has internal hemorrhoids but felt this intensity of bleeding felt different. Has had diverticulitis in the past. Denies fevers, chills, dizziness, lightheadedness, shortness of breath, nausea/vomiting, dysuria.     Brought in via EMS. At EMS, was given fentanyl 200 mcg x1. At the ED, T: 98.7F, BP: 121/54, HR: 86bpm, RR: 18bpm, SpO2: 99% on RA. MILDRED (+) for blood and internal hemorrhoids on physical exam per ED. Labs significant for mild anemia (Hb- 11.1), hyperkalemia (K-5.3), elevated BUN (33), UA (+) for blood, only 2 RBC. CT A+P was not significant for acute pathology.     Patient admitted to the hospital for intractable pain, GIB. Given ketorolac x1, metjocabamol x1, morphine 8mg IV, tramadol 25mg x1, and started on PPI infusion after PPI IVP. Minimal improvement in her back pain with the pain regimen. GI consulted. Possible EGD/colonoscopy tomorrow.      (27 Apr 2023 20:49)      Patient is a 77y old Female who presents with a chief complaint of Other low back pain     (30 Apr 2023 20:49)    Currently admitted to medicine with the primary diagnosis of Back pain       Today is hospital day 4d.     PAST MEDICAL & SURGICAL HISTORY  Aortic stenosis    Diabetes    Asthma with COPD  many years since last attack    CAD (coronary artery disease), native coronary artery    Anemia    History of bleeding disorder  having work up 5/2/21- HAD WORKUP BUT NO DIAGNOSIS "NOTHING WAS FOUND"    History of transfusion reaction    Hiatal hernia    H/O ascending aortic replacement  Bovine Replacement    S/P CABG x 1  complication of bleeding 2016    H/O total knee replacement, right  complicated with fx femur bars screws in femur- b/l knee replacement    S/P hysterectomy    Presence of Watchman left atrial appendage closure device        ALLERGIES:  penicillins (Hives; Rash)  Augmentin (Other)    MEDICATIONS:  ACTIVE MEDICATIONS  acetaminophen     Tablet .. 650 milliGRAM(s) Oral every 6 hours  atorvastatin 40 milliGRAM(s) Oral at bedtime  gabapentin 300 milliGRAM(s) Oral three times a day  HYDROmorphone  Injectable 0.5 milliGRAM(s) IV Push every 8 hours PRN  lidocaine   4% Patch 1 Patch Transdermal every 24 hours  methocarbamol 750 milliGRAM(s) Oral every 6 hours  metoprolol succinate  milliGRAM(s) Oral daily  montelukast 10 milliGRAM(s) Oral daily  oxyCODONE    IR 10 milliGRAM(s) Oral every 6 hours PRN  pantoprazole  Injectable 40 milliGRAM(s) IV Push two times a day  polyethylene glycol 3350 17 Gram(s) Oral daily  ranolazine 1000 milliGRAM(s) Oral two times a day  senna 2 Tablet(s) Oral at bedtime  sucralfate 1 Gram(s) Oral four times a day      VITALS:   T(F): 97.8  HR: 63  BP: 135/58  RR: 20  SpO2: 97%    LABS:                        11.4   6.78  )-----------( 144      ( 01 May 2023 06:51 )             35.9     05-01    x   |  x   |  x   ----------------------------<  x   4.0   |  x   |  x     Ca    8.4      01 May 2023 10:07    TPro  5.9<L>  /  Alb  3.5  /  TBili  0.8  /  DBili  x   /  AST  26  /  ALT  15  /  AlkPhos  58  05-01              PHYSICAL EXAM:  GEN: No acute distress  LUNGS: Clear to auscultation bilaterally   HEART: S1/S2 present.    ABD: Soft, non-tender, non-distended.   EXT: No pedal edema, warm to touch, no discoloration  NEURO: AAOX3          
       Pt seen and examined at bedside. Back pain improving.     VITAL SIGNS (Last 24 hrs):  T(C): 36.2 (05-08-23 @ 06:06), Max: 36.7 (05-07-23 @ 14:49)  HR: 79 (05-08-23 @ 06:06) (68 - 81)  BP: 133/60 (05-08-23 @ 06:06) (130/60 - 149/63)  RR: 18 (05-08-23 @ 06:06) (18 - 19)  SpO2: 96% (05-08-23 @ 06:06) (96% - 96%)  Wt(kg): --  Daily     Daily     I&O's Summary      PHYSICAL EXAM:  GENERAL: NAD, obese   HEAD:  Atraumatic, Normocephalic  EYES:  conjunctiva and sclera clear  NECK: Supple, No JVD  CHEST/LUNG: Clear to auscultation bilaterally; No wheeze  HEART: Regular rate and rhythm; No murmurs, rubs, or gallops  ABDOMEN: Soft, Nontender, Nondistended; Bowel sounds present  EXTREMITIES:  2+ Peripheral Pulses, No clubbing, cyanosis, or edema  PSYCH: AAOx3  NEUROLOGY: non-focal  SKIN: No rashes or lesions    Labs Reviewed       CBC Full  -  ( 08 May 2023 04:30 )  WBC Count : 6.39 K/uL  Hemoglobin : 9.9 g/dL  Hematocrit : 31.1 %  Platelet Count - Automated : 131 K/uL  Mean Cell Volume : 93.4 fL  Mean Cell Hemoglobin : 29.7 pg  Mean Cell Hemoglobin Concentration : 31.8 g/dL  Auto Neutrophil # : 5.17 K/uL  Auto Lymphocyte # : 0.89 K/uL  Auto Monocyte # : 0.27 K/uL  Auto Eosinophil # : 0.00 K/uL  Auto Basophil # : 0.01 K/uL  Auto Neutrophil % : 80.9 %  Auto Lymphocyte % : 13.9 %  Auto Monocyte % : 4.2 %  Auto Eosinophil % : 0.0 %  Auto Basophil % : 0.2 %    BMP:    05-08 @ 04:30    Blood Urea Nitrogen - 25  Calcium - 9.2  Carbond Dioxide - 28  Chloride - 99  Creatinine - 1.1  Glucose - 258  Potassium - 4.6  Sodium - 135      Hemoglobin A1c -     Urine Culture:            MEDICATIONS  (STANDING):  acetaminophen     Tablet .. 650 milliGRAM(s) Oral every 6 hours  albuterol/ipratropium for Nebulization 3 milliLiter(s) Nebulizer every 6 hours  aspirin  chewable 81 milliGRAM(s) Oral daily  atorvastatin 40 milliGRAM(s) Oral at bedtime  dextrose 5%. 1000 milliLiter(s) (50 mL/Hr) IV Continuous <Continuous>  dextrose 5%. 1000 milliLiter(s) (100 mL/Hr) IV Continuous <Continuous>  dextrose 50% Injectable 25 Gram(s) IV Push once  dextrose 50% Injectable 12.5 Gram(s) IV Push once  dextrose 50% Injectable 25 Gram(s) IV Push once  furosemide   Injectable 40 milliGRAM(s) IV Push two times a day  gabapentin 300 milliGRAM(s) Oral three times a day  glucagon  Injectable 1 milliGRAM(s) IntraMuscular once  HYDROmorphone   Tablet 4 milliGRAM(s) Oral every 6 hours  insulin glargine Injectable (LANTUS) 20 Unit(s) SubCutaneous at bedtime  insulin lispro (ADMELOG) corrective regimen sliding scale   SubCutaneous three times a day before meals  insulin lispro Injectable (ADMELOG) 6 Unit(s) SubCutaneous three times a day before meals  lidocaine   4% Patch 1 Patch Transdermal every 24 hours  magnesium oxide 400 milliGRAM(s) Oral three times a day with meals  methocarbamol 1000 milliGRAM(s) Oral every 6 hours  metoprolol succinate  milliGRAM(s) Oral daily  montelukast 10 milliGRAM(s) Oral daily  naloxegol 25 milliGRAM(s) Oral daily  pantoprazole   Suspension 40 milliGRAM(s) Oral two times a day  polyethylene glycol 3350 17 Gram(s) Oral daily  predniSONE   Tablet 40 milliGRAM(s) Oral daily  ranolazine 1000 milliGRAM(s) Oral two times a day  senna 2 Tablet(s) Oral at bedtime    MEDICATIONS  (PRN):  dextrose Oral Gel 15 Gram(s) Oral once PRN Blood Glucose LESS THAN 70 milliGRAM(s)/deciliter  HYDROmorphone  Injectable 0.5 milliGRAM(s) IV Push every 8 hours PRN Severe Pain (7 - 10)  ondansetron Injectable 4 milliGRAM(s) IV Push every 6 hours PRN Nausea and/or Vomiting  
     Pt seen and examined at bedside. Had BM yesterday. Feels better. Continues to have foot pain.     VITAL SIGNS (Last 24 hrs):  T(C): 36.8 (05-07-23 @ 06:08), Max: 36.9 (05-06-23 @ 21:58)  HR: 85 (05-07-23 @ 06:08) (77 - 85)  BP: 120/56 (05-07-23 @ 06:08) (120/56 - 124/57)  RR: 18 (05-07-23 @ 06:08) (18 - 18)  SpO2: 95% (05-06-23 @ 19:57) (95% - 95%)  Wt(kg): --  Daily     Daily     I&O's Summary      PHYSICAL EXAM:  GENERAL: Obese   HEAD:  Atraumatic, Normocephalic  EYES: conjunctiva and sclera clear  NECK: Supple, No JVD  CHEST/LUNG: Clear to auscultation bilaterally; No wheeze  HEART: Regular rate and rhythm; No murmurs, rubs, or gallops  ABDOMEN: Soft, Nontender, Nondistended; Bowel sounds present  EXTREMITIES:  2+ Peripheral Pulses, No clubbing, cyanosis, or edema  PSYCH: AAOx3  NEUROLOGY: non-focal  SKIN: No rashes or lesions    Labs Reviewed     CBC Full  -  ( 07 May 2023 08:30 )  WBC Count : 5.69 K/uL  Hemoglobin : 10.2 g/dL  Hematocrit : 32.3 %  Platelet Count - Automated : 124 K/uL  Mean Cell Volume : 93.9 fL  Mean Cell Hemoglobin : 29.7 pg  Mean Cell Hemoglobin Concentration : 31.6 g/dL  Auto Neutrophil # : 3.01 K/uL  Auto Lymphocyte # : 1.85 K/uL  Auto Monocyte # : 0.66 K/uL  Auto Eosinophil # : 0.14 K/uL  Auto Basophil # : 0.01 K/uL  Auto Neutrophil % : 52.8 %  Auto Lymphocyte % : 32.5 %  Auto Monocyte % : 11.6 %  Auto Eosinophil % : 2.5 %  Auto Basophil % : 0.2 %    BMP:    05-07 @ 08:30    Blood Urea Nitrogen - 20  Calcium - 9.3  Carbond Dioxide - 26  Chloride - 103  Creatinine - 1.0  Glucose - 161  Potassium - 4.2  Sodium - 140      Hemoglobin A1c -     Urine Culture:            MEDICATIONS  (STANDING):  acetaminophen     Tablet .. 650 milliGRAM(s) Oral every 6 hours  albuterol/ipratropium for Nebulization 3 milliLiter(s) Nebulizer every 6 hours  aspirin  chewable 81 milliGRAM(s) Oral daily  atorvastatin 40 milliGRAM(s) Oral at bedtime  dextrose 5%. 1000 milliLiter(s) (100 mL/Hr) IV Continuous <Continuous>  dextrose 5%. 1000 milliLiter(s) (50 mL/Hr) IV Continuous <Continuous>  dextrose 50% Injectable 25 Gram(s) IV Push once  dextrose 50% Injectable 12.5 Gram(s) IV Push once  dextrose 50% Injectable 25 Gram(s) IV Push once  glucagon  Injectable 1 milliGRAM(s) IntraMuscular once  heparin   Injectable 5000 Unit(s) SubCutaneous every 12 hours  HYDROmorphone   Tablet 4 milliGRAM(s) Oral every 6 hours  insulin glargine Injectable (LANTUS) 10 Unit(s) SubCutaneous at bedtime  insulin lispro (ADMELOG) corrective regimen sliding scale   SubCutaneous three times a day before meals  insulin lispro Injectable (ADMELOG) 3 Unit(s) SubCutaneous three times a day before meals  lidocaine   4% Patch 1 Patch Transdermal every 24 hours  magnesium oxide 400 milliGRAM(s) Oral three times a day with meals  methocarbamol 1000 milliGRAM(s) Oral every 6 hours  metoprolol succinate  milliGRAM(s) Oral daily  montelukast 10 milliGRAM(s) Oral daily  naloxegol 25 milliGRAM(s) Oral daily  pantoprazole   Suspension 40 milliGRAM(s) Oral two times a day  polyethylene glycol 3350 17 Gram(s) Oral daily  predniSONE   Tablet 40 milliGRAM(s) Oral daily  ranolazine 1000 milliGRAM(s) Oral two times a day  senna 2 Tablet(s) Oral at bedtime    MEDICATIONS  (PRN):  dextrose Oral Gel 15 Gram(s) Oral once PRN Blood Glucose LESS THAN 70 milliGRAM(s)/deciliter  HYDROmorphone  Injectable 0.5 milliGRAM(s) IV Push every 8 hours PRN Severe Pain (7 - 10)  ondansetron Injectable 4 milliGRAM(s) IV Push every 6 hours PRN Nausea and/or Vomiting  
    Pt seen and examined at bedside.          VITAL SIGNS (Last 24 hrs):  T(C): 37.2 (05-03-23 @ 05:37), Max: 37.2 (05-03-23 @ 05:37)  HR: 75 (05-03-23 @ 05:37) (73 - 75)  BP: 130/58 (05-03-23 @ 05:37) (130/58 - 132/60)  RR: 18 (05-03-23 @ 05:37) (18 - 18)          I&O's Summary      PHYSICAL EXAM:  GENERAL: NAD   HEAD:  Atraumatic, Normocephalic  EYES:  conjunctiva and sclera clear  NECK: Supple, No JVD  CHEST/LUNG: Clear to auscultation bilaterally; No wheeze  HEART: Regular rate and rhythm; No murmurs, rubs, or gallops  ABDOMEN: Soft, Nontender, Nondistended; Bowel sounds present  EXTREMITIES:  2+ Peripheral Pulses, No clubbing, cyanosis, or edema  PSYCH: AAOx3  NEUROLOGY: non-focal  SKIN: No rashes or lesions    Labs Reviewed  Spoke to patient in regards to abnormal labs.    CBC Full  -  ( 03 May 2023 07:49 )  WBC Count : 7.49 K/uL  Hemoglobin : 10.8 g/dL  Hematocrit : 33.9 %  Platelet Count - Automated : 140 K/uL  Mean Cell Volume : 92.1 fL  Mean Cell Hemoglobin : 29.3 pg  Mean Cell Hemoglobin Concentration : 31.9 g/dL  Auto Neutrophil # : x  Auto Lymphocyte # : x  Auto Monocyte # : x  Auto Eosinophil # : x  Auto Basophil # : x  Auto Neutrophil % : x  Auto Lymphocyte % : x  Auto Monocyte % : x  Auto Eosinophil % : x  Auto Basophil % : x    BMP:    05-03 @ 07:49    Blood Urea Nitrogen - 15  Calcium - 9.0  Carbond Dioxide - 27  Chloride - 102  Creatinine - 1.0  Glucose - 143  Potassium - 3.6  Sodium - 140      Hemoglobin A1c -     Urine Culture:            MEDICATIONS  (STANDING):  acetaminophen     Tablet .. 650 milliGRAM(s) Oral every 6 hours  atorvastatin 40 milliGRAM(s) Oral at bedtime  gabapentin 300 milliGRAM(s) Oral three times a day  lidocaine   4% Patch 1 Patch Transdermal every 24 hours  methocarbamol 750 milliGRAM(s) Oral every 6 hours  metoprolol succinate  milliGRAM(s) Oral daily  montelukast 10 milliGRAM(s) Oral daily  pantoprazole  Injectable 40 milliGRAM(s) IV Push two times a day  polyethylene glycol 3350 17 Gram(s) Oral daily  ranolazine 1000 milliGRAM(s) Oral two times a day  senna 2 Tablet(s) Oral at bedtime  sucralfate 1 Gram(s) Oral four times a day    MEDICATIONS  (PRN):  HYDROmorphone  Injectable 0.5 milliGRAM(s) IV Push every 8 hours PRN Severe Pain (7 - 10)  oxyCODONE    IR 10 milliGRAM(s) Oral every 6 hours PRN Moderate Pain (4 - 6)  
76yo F hx of diverticulitis, HFpEF, CAD s/p CABG, AS s/p SAVR (), atrial fibrillation s/p watchman (), bleeding disorder, asthma, COPD, DM2, HTN, CKD 2-3 presenting to the hospital for lower back pain, and GI bleed. On 23, she was having lower back pain with radiation to LLE with no improvement from OTC meds and bloody BMs with associated abdominal pain. Today, back pain so severe that she called her daughter for help with assisting with cleaning her skin after having bloody BMs because she was having difficulty with ambulation. Patient was also having BRBPR, noticed by daughter. Patient does note she has internal hemorrhoids but felt this intensity of bleeding felt different. Has had diverticulitis in the past. Denies fevers, chills, dizziness, lightheadedness, shortness of breath, nausea/vomiting, dysuria.   Brought in via EMS. At EMS, was given fentanyl 200 mcg x1. At the ED, T: 98.7F, BP: 121/54, HR: 86bpm, RR: 18bpm, SpO2: 99% on RA. MILDRED (+) for blood and internal hemorrhoids on physical exam per ED. Labs significant for mild anemia (Hb- 11.1), hyperkalemia (K-5.3), elevated BUN (33), UA (+) for blood, only 2 RBC. CT A+P was not significant for acute pathology.   Today pt is comfortable in sitting position,  able to participate with PT, pt wanted to go to  , but was not approved for it by insurance.       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      CAD (coronary artery disease), native coronary artery      Anemia      History of bleeding disorder  having work up 21- HAD WORKUP BUT NO DIAGNOSIS "NOTHING WAS FOUND"      History of transfusion reaction      Hiatal hernia      H/O ascending aortic replacement  Bovine Replacement      S/P CABG x 1  complication of bleeding       H/O total knee replacement, right  complicated with fx femur bars screws in femur- b/l knee replacement      S/P hysterectomy      Presence of Watchman left atrial appendage closure device        Vital Signs Last 24 Hrs  T(C): 36.4 (10 May 2023 04:34), Max: 36.4 (10 May 2023 04:34)  T(F): 97.5 (10 May 2023 04:34), Max: 97.5 (10 May 2023 04:34)  HR: 74 (10 May 2023 04:34) (59 - 74)  BP: 137/70 (10 May 2023 04:34) (103/37 - 141/61)  BP(mean): --  RR: 18 (10 May 2023 04:34) (18 - 18)  SpO2: 99% (10 May 2023 04:34) (99% - 99%)    Parameters below as of 09 May 2023 14:30  Patient On (Oxygen Delivery Method): room air        PHYSICAL EXAM:  GENERAL: NAD, morbidly obese   HEAD:  Atraumatic, Normocephalic  EYES: EOMI, PERRLA, conjunctiva and sclera clear  ENMT: No tonsillar erythema, exudates, or enlargement; Moist mucous membranes, Good dentition, No lesions  NECK: Supple, No JVD, Normal thyroid, wide neck   NERVOUS SYSTEM:  Alert & Oriented X3, Good concentration; decrease ROM in LE due to lower back pain, straight leg test is positive   CHEST/LUNG: distant BS, no wheezing   HEART: S1,S2, murmur appreciated on PE   ABDOMEN: Soft, Nontender, Nondistended; Bowel sounds present  EXTREMITIES:  2+ Peripheral Pulses, No clubbing, cyanosis, or edema  LYMPH: No lymphadenopathy noted  SKIN: No rashes or lesions      LABS:                           10.9   10.40 )-----------( 181      ( 10 May 2023 07:28 )             34.2   05-10    138  |  97<L>  |  34<H>  ----------------------------<  145<H>  4.2   |  28  |  1.1    Ca    9.7      10 May 2023 07:28  Mg     2.4     05-10    TPro  6.7  /  Alb  4.1  /  TBili  0.7  /  DBili  x   /  AST  36  /  ALT  26  /  AlkPhos  62  05-10         PTT - ( 2023 17:54 )  PTT:28.6 sec  Urinalysis Basic - ( 2023 14:53 )    Color: Light Yellow / Appearance: Clear / S.013 / pH: x  Gluc: x / Ketone: Trace  / Bili: Negative / Urobili: <2 mg/dL   Blood: x / Protein: Negative / Nitrite: Negative   Leuk Esterase: Negative / RBC: 2 /HPF / WBC 0 /HPF   Sq Epi: x / Non Sq Epi: x / Bacteria: Negative    RADIOLOGY & ADDITIONAL TESTS:  < from: CT Abdomen and Pelvis w/ IV Cont (23 @ 17:09) >  IMPRESSION:  No CT evidence of an acute abdominopelvic pathology.    < end of copied text >  < from: CT Chest w/ IV Cont (23 @ 17:03) >    IMPRESSION:    Since  2021    Small hiatal hernia measuring approximately 2.9 cm.    Stable dilatation main pulmonary artery segment, measuring approximately   3.2 cm (303/85).    Stable pulmonary nodules.    < end of copied text >  < from: Transesophageal Echocardiogram (21 @ 09:38) >  Summary:   1. Left ventricular ejection fraction, by visual estimation, is 55 to 60%.   2. Severely enlarged left atrium.   3. Severely enlarged right atrium.   4. Watchman device in place, no anne-device leaks identified in multiple views.   5. Severe tricuspid regurgitation.   6. Aortic valve bioprosthetic valve in place, functioning normally without significant perivalvular leaks seen.   7. Estimated pulmonary artery systolic pressure is 51.6 mmHg assuming a right atrial pressure of 8 mmHg, which is consistent with moderate pulmonary hypertension.   8. Moderately dilated pulmonary artery.   9. Pulmonary hypertension is present.  10. Color Doppler demonstrates the presence of a shunt at the Atrial level.  < from: [facility] Duplex Lower Ext Vein Scan, Bilat (23 @ 09:06) >  Impression:    No evidence of deep venous thrombosis or superficial thrombophlebitis in   the bilateral lower extremities.    < end of copied text >  < from: TTE Echo Complete w/o Contrast w/ Doppler (23 @ 15:28) >  Summary:   1. Technically difficult study.   2. Normal global left ventricular systolic function.   3. Severely enlarged left atrium.   4. LV Ejection Fraction by Graves's Method with a biplane EF of 72 %.   5. Spectral Doppler shows pseudonormal pattern of left ventricular   myocardial filling (Grade II diastolic dysfunction).   6. Mildly enlarged right ventricle.   7.Moderately reduced RV systolic function.   8. Mildly enlarged right atrium.   9. Degenerative mitral valve.  10. Mild mitral valve regurgitation.  11. Severe mitral annular calcification.  12. Moderate tricuspid regurgitation.  13. Estimated pulmonary artery systolic pressure is 66.7 mmHg assuming a   right atrial pressure of 8 mmHg, which is consistent with severe   pulmonary hypertension.            MEDICATIONS  (STANDING):  acetaminophen     Tablet .. 650 milliGRAM(s) Oral every 6 hours  aspirin  chewable 81 milliGRAM(s) Oral daily  atorvastatin 40 milliGRAM(s) Oral at bedtime  dextrose 5%. 1000 milliLiter(s) (100 mL/Hr) IV Continuous <Continuous>  dextrose 5%. 1000 milliLiter(s) (50 mL/Hr) IV Continuous <Continuous>  dextrose 50% Injectable 12.5 Gram(s) IV Push once  dextrose 50% Injectable 25 Gram(s) IV Push once  dextrose 50% Injectable 25 Gram(s) IV Push once  gabapentin 300 milliGRAM(s) Oral three times a day  glucagon  Injectable 1 milliGRAM(s) IntraMuscular once  heparin   Injectable 5000 Unit(s) SubCutaneous every 12 hours  insulin glargine Injectable (LANTUS) 20 Unit(s) SubCutaneous at bedtime  insulin lispro (ADMELOG) corrective regimen sliding scale   SubCutaneous three times a day before meals  insulin lispro Injectable (ADMELOG) 6 Unit(s) SubCutaneous three times a day before meals  lactulose Syrup 30 Gram(s) Oral four times a day  lidocaine   4% Patch 1 Patch Transdermal every 24 hours  magnesium oxide 400 milliGRAM(s) Oral three times a day with meals  methocarbamol 1000 milliGRAM(s) Oral every 6 hours  metoprolol succinate  milliGRAM(s) Oral daily  montelukast 10 milliGRAM(s) Oral daily  naloxegol 25 milliGRAM(s) Oral daily  pantoprazole    Tablet 40 milliGRAM(s) Oral two times a day  polyethylene glycol 3350 17 Gram(s) Oral daily  predniSONE   Tablet 20 milliGRAM(s) Oral daily  ranolazine 1000 milliGRAM(s) Oral two times a day  senna 2 Tablet(s) Oral at bedtime    MEDICATIONS  (PRN):  dextrose Oral Gel 15 Gram(s) Oral once PRN Blood Glucose LESS THAN 70 milliGRAM(s)/deciliter  HYDROmorphone   Tablet 2 milliGRAM(s) Oral every 4 hours PRN Severe Pain (7 - 10)  HYDROmorphone  Injectable 0.5 milliGRAM(s) IV Push every 8 hours PRN Severe Pain (7 - 10)  ondansetron Injectable 4 milliGRAM(s) IV Push every 6 hours PRN Nausea and/or Vomiting      
78yo F hx of diverticulitis, HFpEF, CAD s/p CABG, AS s/p SAVR (), atrial fibrillation s/p watchman (), bleeding disorder, asthma, COPD, DM2, HTN, CKD 2-3 presenting to the hospital for lower back pain, and GI bleed. On 23, she was having lower back pain with radiation to LLE with no improvement from OTC meds and bloody BMs with associated abdominal pain. Today, back pain so severe that she called her daughter for help with assisting with cleaning her skin after having bloody BMs because she was having difficulty with ambulation. Patient was also having BRBPR, noticed by daughter. Patient does note she has internal hemorrhoids but felt this intensity of bleeding felt different. Has had diverticulitis in the past. Denies fevers, chills, dizziness, lightheadedness, shortness of breath, nausea/vomiting, dysuria.   Brought in via EMS. At EMS, was given fentanyl 200 mcg x1. At the ED, T: 98.7F, BP: 121/54, HR: 86bpm, RR: 18bpm, SpO2: 99% on RA. MILDRED (+) for blood and internal hemorrhoids on physical exam per ED. Labs significant for mild anemia (Hb- 11.1), hyperkalemia (K-5.3), elevated BUN (33), UA (+) for blood, only 2 RBC. CT A+P was not significant for acute pathology.   Today pt pain in her right leg at times, but she was able to participate with PT, asking for a transfer to .       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      CAD (coronary artery disease), native coronary artery      Anemia      History of bleeding disorder  having work up 21- HAD WORKUP BUT NO DIAGNOSIS "NOTHING WAS FOUND"      History of transfusion reaction      Hiatal hernia      H/O ascending aortic replacement  Bovine Replacement      S/P CABG x 1  complication of bleeding       H/O total knee replacement, right  complicated with fx femur bars screws in femur- b/l knee replacement      S/P hysterectomy      Presence of Watchman left atrial appendage closure device        Vital Signs Last 24 Hrs  T(C): 35.9 (02 May 2023 12:25), Max: 37 (02 May 2023 05:34)  T(F): 96.6 (02 May 2023 12:25), Max: 98.6 (02 May 2023 05:34)  HR: 57 (02 May 2023 12:25) (57 - 87)  BP: 116/55 (02 May 2023 12:25) (113/63 - 137/81)  BP(mean): --  RR: 18 (02 May 2023 12:25) (18 - 18)  SpO2: 98% (01 May 2023 20:25) (98% - 98%)        PHYSICAL EXAM:  GENERAL: NAD, morbidly obese   HEAD:  Atraumatic, Normocephalic  EYES: EOMI, PERRLA, conjunctiva and sclera clear  ENMT: No tonsillar erythema, exudates, or enlargement; Moist mucous membranes, Good dentition, No lesions  NECK: Supple, No JVD, Normal thyroid, wide neck   NERVOUS SYSTEM:  Alert & Oriented X3, Good concentration; decrease ROM in LE due to lower back pain, straight leg test is positive   CHEST/LUNG: distant BS, no wheezing   HEART: S1,S2, murmur appreciated on PE   ABDOMEN: Soft, Nontender, Nondistended; Bowel sounds present  EXTREMITIES:  2+ Peripheral Pulses, No clubbing, cyanosis, or edema  LYMPH: No lymphadenopathy noted  SKIN: No rashes or lesions      LABS:                          10.9   6.45  )-----------( 132      ( 02 May 2023 07:15 )             34.0   05-02    140  |  103  |  12  ----------------------------<  105<H>  4.1   |  23  |  0.9    Ca    8.9      02 May 2023 07:15  Mg     1.9     05-    TPro  5.7<L>  /  Alb  3.4<L>  /  TBili  0.7  /  DBili  x   /  AST  25  /  ALT  15  /  AlkPhos  58  05-       PTT - ( 2023 17:54 )  PTT:28.6 sec  Urinalysis Basic - ( 2023 14:53 )    Color: Light Yellow / Appearance: Clear / S.013 / pH: x  Gluc: x / Ketone: Trace  / Bili: Negative / Urobili: <2 mg/dL   Blood: x / Protein: Negative / Nitrite: Negative   Leuk Esterase: Negative / RBC: 2 /HPF / WBC 0 /HPF   Sq Epi: x / Non Sq Epi: x / Bacteria: Negative    RADIOLOGY & ADDITIONAL TESTS:  < from: CT Abdomen and Pelvis w/ IV Cont (23 @ 17:09) >  IMPRESSION:  No CT evidence of an acute abdominopelvic pathology.    < end of copied text >  < from: CT Chest w/ IV Cont (23 @ 17:03) >    IMPRESSION:    Since  2021    Small hiatal hernia measuring approximately 2.9 cm.    Stable dilatation main pulmonary artery segment, measuring approximately   3.2 cm (303/85).    Stable pulmonary nodules.    < end of copied text >  < from: Transesophageal Echocardiogram (21 @ 09:38) >  Summary:   1. Left ventricular ejection fraction, by visual estimation, is 55 to 60%.   2. Severely enlarged left atrium.   3. Severely enlarged right atrium.   4. Watchman device in place, no anne-device leaks identified in multiple views.   5. Severe tricuspid regurgitation.   6. Aortic valve bioprosthetic valve in place, functioning normally without significant perivalvular leaks seen.   7. Estimated pulmonary artery systolic pressure is 51.6 mmHg assuming a right atrial pressure of 8 mmHg, which is consistent with moderate pulmonary hypertension.   8. Moderately dilated pulmonary artery.   9. Pulmonary hypertension is present.  10. Color Doppler demonstrates the presence of a shunt at the Atrial level.  < from: [facility] Duplex Lower Ext Vein Scan, Bilat (23 @ 09:06) >  Impression:    No evidence of deep venous thrombosis or superficial thrombophlebitis in   the bilateral lower extremities.    < end of copied text >  < from: TTE Echo Complete w/o Contrast w/ Doppler (23 @ 15:28) >  Summary:   1. Technically difficult study.   2. Normal global left ventricular systolic function.   3. Severely enlarged left atrium.   4. LV Ejection Fraction by Graves's Method with a biplane EF of 72 %.   5. Spectral Doppler shows pseudonormal pattern of left ventricular   myocardial filling (Grade II diastolic dysfunction).   6. Mildly enlarged right ventricle.   7.Moderately reduced RV systolic function.   8. Mildly enlarged right atrium.   9. Degenerative mitral valve.  10. Mild mitral valve regurgitation.  11. Severe mitral annular calcification.  12. Moderate tricuspid regurgitation.  13. Estimated pulmonary artery systolic pressure is 66.7 mmHg assuming a   right atrial pressure of 8 mmHg, which is consistent with severe   pulmonary hypertension.            MEDICATIONS  (STANDING):  acetaminophen     Tablet .. 650 milliGRAM(s) Oral every 6 hours  atorvastatin 40 milliGRAM(s) Oral at bedtime  gabapentin 300 milliGRAM(s) Oral three times a day  lidocaine   4% Patch 1 Patch Transdermal every 24 hours  methocarbamol 750 milliGRAM(s) Oral every 6 hours  metoprolol succinate  milliGRAM(s) Oral daily  montelukast 10 milliGRAM(s) Oral daily  pantoprazole  Injectable 40 milliGRAM(s) IV Push two times a day  polyethylene glycol 3350 17 Gram(s) Oral daily  ranolazine 1000 milliGRAM(s) Oral two times a day  senna 2 Tablet(s) Oral at bedtime  sucralfate 1 Gram(s) Oral four times a day    MEDICATIONS  (PRN):  HYDROmorphone  Injectable 0.5 milliGRAM(s) IV Push every 8 hours PRN Severe Pain (7 - 10)  oxyCODONE    IR 10 milliGRAM(s) Oral every 6 hours PRN Moderate Pain (4 - 6)                  
HPI:  76yo F hx of diverticulitis, HFpEF, CAD s/p CABG, AS s/p SAVR (), atrial fibrillation s/p watchman (), bleeding disorder, asthma, COPD, DM2, HTN, CKD 2-3 presenting to the hospital for lower back pain, and GI bleed. On 23, she was having lower back pain with radiation to LLE with no improvement from OTC meds and bloody BMs with associated abdominal pain. Today, back pain so severe that she called her daughter for help with assisting with cleaning her skin after having bloody BMs because she was having difficulty with ambulation. Patient was also having BRBPR, noticed by daughter. Patient does note she has internal hemorrhoids but felt this intensity of bleeding felt different. Has had diverticulitis in the past. Denies fevers, chills, dizziness, lightheadedness, shortness of breath, nausea/vomiting, dysuria.     Brought in via EMS. At EMS, was given fentanyl 200 mcg x1. At the ED, T: 98.7F, BP: 121/54, HR: 86bpm, RR: 18bpm, SpO2: 99% on RA. MILDRED (+) for blood and internal hemorrhoids on physical exam per ED. Labs significant for mild anemia (Hb- 11.1), hyperkalemia (K-5.3), elevated BUN (33), UA (+) for blood, only 2 RBC. CT A+P was not significant for acute pathology.     Patient admitted to the hospital for intractable pain, GIB. Given ketorolac x1, metjocabamol x1, morphine 8mg IV, tramadol 25mg x1, and started on PPI infusion after PPI IVP. Minimal improvement in her back pain with the pain regimen. GI consulted. Possible EGD/colonoscopy tomorrow.      (2023 20:49)    Currently admitted to medicine with the primary diagnosis of Back pain       Today is hospital day 2d.     INTERVAL HPI / OVERNIGHT EVENTS:  Patient was examined and seen at bedside. This morning she is resting comfortably in bed and reports no new issues or overnight events.     ROS: Otherwise unremarkable     PAST MEDICAL & SURGICAL HISTORY  Aortic stenosis    Diabetes    Asthma with COPD  many years since last attack    CAD (coronary artery disease), native coronary artery    Anemia    History of bleeding disorder  having work up 21- HAD WORKUP BUT NO DIAGNOSIS "NOTHING WAS FOUND"    History of transfusion reaction    Hiatal hernia    H/O ascending aortic replacement  Bovine Replacement    S/P CABG x 1  complication of bleeding     H/O total knee replacement, right  complicated with fx femur bars screws in femur- b/l knee replacement    S/P hysterectomy    Presence of Watchman left atrial appendage closure device      ALLERGIES  penicillins (Hives; Rash)  Augmentin (Other)    MEDICATIONS  STANDING MEDICATIONS  acetaminophen     Tablet .. 650 milliGRAM(s) Oral every 6 hours  atorvastatin 40 milliGRAM(s) Oral at bedtime  gabapentin 300 milliGRAM(s) Oral three times a day  lidocaine   4% Patch 1 Patch Transdermal every 24 hours  methocarbamol 750 milliGRAM(s) Oral every 6 hours  metoprolol succinate  milliGRAM(s) Oral daily  montelukast 10 milliGRAM(s) Oral daily  pantoprazole  Injectable 40 milliGRAM(s) IV Push two times a day  polyethylene glycol 3350 17 Gram(s) Oral daily  ranolazine 1000 milliGRAM(s) Oral two times a day  senna 2 Tablet(s) Oral at bedtime  sucralfate 1 Gram(s) Oral four times a day    PRN MEDICATIONS  HYDROmorphone  Injectable 0.5 milliGRAM(s) IV Push every 8 hours PRN  oxyCODONE    IR 10 milliGRAM(s) Oral every 6 hours PRN    VITALS:  T(F): 96  HR: 69  BP: 136/50  RR: 18  SpO2: 96%    PHYSICAL EXAM  GENERAL: NAD, resting comfortably in bed  HEAD:  Atraumatic, Normocephalic  EYES: EOMI, conjunctiva and sclera clear  ENT: Moist mucous membranes  CHEST/LUNG: Clear to auscultation bilaterally; No rales, rhonchi, wheezing, or rubs. Unlabored respirations  HEART: Regular rate and rhythm; No murmurs, rubs, or gallops  ABDOMEN: Soft, nontender, nondistended  EXTREMITIES:  No clubbing, cyanosis, or edema  NERVOUS SYSTEM:  A&Ox3, follows commands, no sensory or motor deficits    LABS                        10.8   7.41  )-----------( 129      ( 2023 17:27 )             34.8     04-    144  |  105  |  24<H>  ----------------------------<  153<H>  4.7   |  30  |  1.1    Ca    9.2      2023 07:26  Mg     2.2         TPro  6.0  /  Alb  3.7  /  TBili  1.0  /  DBili  x   /  AST    /  ALT  19  /  AlkPhos  62  -    PT/INR - ( 2023 17:54 )   PT: 12.90 sec;   INR: 1.13 ratio         PTT - ( 2023 17:54 )  PTT:28.6 sec  Urinalysis Basic - ( 2023 14:53 )    Color: Light Yellow / Appearance: Clear / S.013 / pH: x  Gluc: x / Ketone: Trace  / Bili: Negative / Urobili: <2 mg/dL   Blood: x / Protein: Negative / Nitrite: Negative   Leuk Esterase: Negative / RBC: 2 /HPF / WBC 0 /HPF   Sq Epi: x / Non Sq Epi: x / Bacteria: Negative            Culture - Urine (collected 2023 14:53)  Source: Clean Catch Clean Catch (Midstream)  Final Report (2023 22:56):    <10,000 CFU/mL Normal Urogenital Lluvia  
HPI:  76yo F hx of diverticulitis, HFpEF, CAD s/p CABG, AS s/p SAVR (2016), atrial fibrillation s/p watchman (2021), bleeding disorder, asthma, COPD, DM2, HTN, CKD 2-3 presenting to the hospital for lower back pain, and GI bleed. On Monday 4/24/23, she was having lower back pain with radiation to LLE with no improvement from OTC meds and bloody BMs with associated abdominal pain. Today, back pain so severe that she called her daughter for help with assisting with cleaning her skin after having bloody BMs because she was having difficulty with ambulation. Patient was also having BRBPR, noticed by daughter. Patient does note she has internal hemorrhoids but felt this intensity of bleeding felt different. Has had diverticulitis in the past. Denies fevers, chills, dizziness, lightheadedness, shortness of breath, nausea/vomiting, dysuria.     Brought in via EMS. At EMS, was given fentanyl 200 mcg x1. At the ED, T: 98.7F, BP: 121/54, HR: 86bpm, RR: 18bpm, SpO2: 99% on RA. MILDRED (+) for blood and internal hemorrhoids on physical exam per ED. Labs significant for mild anemia (Hb- 11.1), hyperkalemia (K-5.3), elevated BUN (33), UA (+) for blood, only 2 RBC. CT A+P was not significant for acute pathology.     Patient admitted to the hospital for intractable pain, GIB. Given ketorolac x1, metjocabamol x1, morphine 8mg IV, tramadol 25mg x1, and started on PPI infusion after PPI IVP. Minimal improvement in her back pain with the pain regimen. GI consulted. Possible EGD/colonoscopy tomorrow.      (27 Apr 2023 20:49)    Currently admitted to medicine with the primary diagnosis of Back pain       Today is hospital day 9d.     INTERVAL HPI / OVERNIGHT EVENTS:  Patient was examined and seen at bedside. This morning she is resting comfortably in bed and reports no new issues or overnight events.     ROS: Otherwise unremarkable     PAST MEDICAL & SURGICAL HISTORY  Aortic stenosis    Diabetes    Asthma with COPD  many years since last attack    CAD (coronary artery disease), native coronary artery    Anemia    History of bleeding disorder  having work up 5/2/21- HAD WORKUP BUT NO DIAGNOSIS "NOTHING WAS FOUND"    History of transfusion reaction    Hiatal hernia    H/O ascending aortic replacement  Bovine Replacement    S/P CABG x 1  complication of bleeding 2016    H/O total knee replacement, right  complicated with fx femur bars screws in femur- b/l knee replacement    S/P hysterectomy    Presence of Watchman left atrial appendage closure device      ALLERGIES  penicillins (Hives; Rash)  Augmentin (Other)    MEDICATIONS  STANDING MEDICATIONS  acetaminophen     Tablet .. 650 milliGRAM(s) Oral every 6 hours  aspirin  chewable 81 milliGRAM(s) Oral daily  atorvastatin 40 milliGRAM(s) Oral at bedtime  dextrose 5%. 1000 milliLiter(s) IV Continuous <Continuous>  dextrose 5%. 1000 milliLiter(s) IV Continuous <Continuous>  dextrose 50% Injectable 12.5 Gram(s) IV Push once  dextrose 50% Injectable 25 Gram(s) IV Push once  dextrose 50% Injectable 25 Gram(s) IV Push once  gabapentin 600 milliGRAM(s) Oral three times a day  glucagon  Injectable 1 milliGRAM(s) IntraMuscular once  heparin   Injectable 5000 Unit(s) SubCutaneous every 12 hours  HYDROmorphone   Tablet 4 milliGRAM(s) Oral every 6 hours  insulin lispro (ADMELOG) corrective regimen sliding scale   SubCutaneous three times a day before meals  lidocaine   4% Patch 1 Patch Transdermal every 24 hours  magnesium oxide 400 milliGRAM(s) Oral three times a day with meals  methocarbamol 1000 milliGRAM(s) Oral every 6 hours  metoprolol succinate  milliGRAM(s) Oral daily  montelukast 10 milliGRAM(s) Oral daily  naloxegol 25 milliGRAM(s) Oral daily  pantoprazole   Suspension 40 milliGRAM(s) Oral two times a day  polyethylene glycol 3350 17 Gram(s) Oral daily  ranolazine 1000 milliGRAM(s) Oral two times a day  senna 2 Tablet(s) Oral at bedtime    PRN MEDICATIONS  dextrose Oral Gel 15 Gram(s) Oral once PRN  HYDROmorphone  Injectable 0.5 milliGRAM(s) IV Push every 8 hours PRN  ondansetron Injectable 4 milliGRAM(s) IV Push every 6 hours PRN    VITALS:  T(F): 96.4  HR: 71  BP: 136/63  RR: 18  SpO2: 97%    PHYSICAL EXAM  GENERAL: NAD, resting comfortably in bed  HEAD:  Atraumatic, Normocephalic  EYES: EOMI, conjunctiva and sclera clear  ENT: Moist mucous membranes  CHEST/LUNG: Clear to auscultation bilaterally; No rales, rhonchi, wheezing, or rubs. Unlabored respirations  HEART: Regular rate and rhythm; No murmurs, rubs, or gallops  ABDOMEN: Soft, nontender, nondistended  EXTREMITIES:  No clubbing, cyanosis, or edema  NERVOUS SYSTEM:  A&Ox3, follows commands, no sensory or motor deficits    LABS                        10.9   5.70  )-----------( 140      ( 06 May 2023 08:48 )             34.0     05-06    138  |  101  |  20  ----------------------------<  187<H>  4.0   |  25  |  0.9    Ca    9.6      06 May 2023 08:48  Mg     1.7     05-06    TPro  6.4  /  Alb  3.6  /  TBili  0.8  /  DBili  x   /  AST  28  /  ALT  18  /  AlkPhos  60  05-06    
Patient is a 77y old  Female who presents with a chief complaint of GI bleed      HPI:  78yo F hx of diverticulitis, HFpEF, CAD s/p CABG, AS s/p SAVR (2016), atrial fibrillation s/p watchman (2021), bleeding disorder, asthma, COPD, DM2, HTN, CKD 2-3 presenting to the hospital for lower back pain, and GI bleed. On Monday 4/24/23, she was having lower back pain with radiation to LLE with no improvement from OTC meds and bloody BMs with associated abdominal pain. Today, back pain so severe that she called her daughter for help with assisting with cleaning her skin after having bloody BMs because she was having difficulty with ambulation. Patient was also having BRBPR, noticed by daughter. Patient does note she has internal hemorrhoids but felt this intensity of bleeding felt different. Has had diverticulitis in the past. Denies fevers, chills, dizziness, lightheadedness, shortness of breath, nausea/vomiting, dysuria.     Brought in via EMS. At EMS, was given fentanyl 200 mcg x1. At the ED, T: 98.7F, BP: 121/54, HR: 86bpm, RR: 18bpm, SpO2: 99% on RA. MILDRED (+) for blood and internal hemorrhoids on physical exam per ED. Labs significant for mild anemia (Hb- 11.1), hyperkalemia (K-5.3), elevated BUN (33), UA (+) for blood, only 2 RBC. CT A+P was not significant for acute pathology.     Patient admitted to the hospital for intractable pain, GIB. Given ketorolac x1, metjocabamol x1, morphine 8mg IV, tramadol 25mg x1, and started on PPI infusion after PPI IVP. Minimal improvement in her back pain with the pain regimen. GI consulted. Possible EGD/colonoscopy tomorrow.     < from: CT Abdomen and Pelvis w/ IV Cont (04.27.23 @ 17:09) >    IMPRESSION:  No CT evidence of an acute abdominopelvic pathology.    < end of copied text >    < from: CT Chest w/ IV Cont (02.02.23 @ 17:03) >    IMPRESSION:    Since  6/23/2021    Small hiatal hernia measuring approximately 2.9 cm.    Stable dilatation main pulmonary artery segment, measuring approximately   3.2 cm (303/85).    Stable pulmonary nodules.    < end of copied text >    #Hematochezia/ Internal hemorrhoids /Normocytic anemia  - monitor H/H, keep Hb above7.5, if bleeding persists check CBC Q 12 hours    - pt was consulted by GI,  EGD/colonoscopy showed gastritis, gastric polyps/ diverticulosis  - no active bleeding noted  - c/w PPIs, prevent constipation   - pt needs repeat colonoscopy as an OP      # Sciatica/ severe lower back pain  - pain management follow up for recommendations for  discharge   - pt denies urinary or stool incontinence at this time, no clinical indications for imaging     Medical charts / labs / imaging studies / PT notes reviewed           PHYSICAL EXAM    Vital Signs Last 24 Hrs  T(C): 37.2 (03 May 2023 05:37), Max: 37.2 (03 May 2023 05:37)  T(F): 98.9 (03 May 2023 05:37), Max: 98.9 (03 May 2023 05:37)  HR: 75 (03 May 2023 05:37) (73 - 75)  BP: 130/58 (03 May 2023 05:37) (130/58 - 132/60)  BP(mean): --  RR: 18 (03 May 2023 05:37) (18 - 18)  SpO2: --        Constitutional - NAD  Chest - CTA  Cardiovascular - S1S2+  Abdomen -  Soft  Extremities -  No calf tenderness   Function : bed mobility min A / transfer mod A                 ambulate 25'RW min A    acetaminophen     Tablet .. 650 milliGRAM(s) Oral every 6 hours  aspirin  chewable 81 milliGRAM(s) Oral daily  atorvastatin 40 milliGRAM(s) Oral at bedtime  gabapentin 300 milliGRAM(s) Oral three times a day  HYDROmorphone  Injectable 0.5 milliGRAM(s) IV Push every 8 hours PRN  lidocaine   4% Patch 1 Patch Transdermal every 24 hours  methocarbamol 750 milliGRAM(s) Oral every 6 hours  metoprolol succinate  milliGRAM(s) Oral daily  montelukast 10 milliGRAM(s) Oral daily  oxyCODONE    IR 10 milliGRAM(s) Oral every 6 hours PRN  pantoprazole  Injectable 40 milliGRAM(s) IV Push two times a day  polyethylene glycol 3350 17 Gram(s) Oral daily  ranolazine 1000 milliGRAM(s) Oral two times a day  senna 2 Tablet(s) Oral at bedtime  sucralfate 1 Gram(s) Oral four times a day      RECENT LABS/IMAGING                        10.8   7.49  )-----------( 140      ( 03 May 2023 07:49 )             33.9     05-03    140  |  102  |  15  ----------------------------<  143<H>  3.6   |  27  |  1.0    Ca    9.0      03 May 2023 07:49  Mg     1.8     05-03    TPro  5.8<L>  /  Alb  3.4<L>  /  TBili  0.7  /  DBili  x   /  AST  27  /  ALT  15  /  AlkPhos  58  05-03              
Pain Medicine Follow Up Visit      Subjective  Patient continues to note pain in the right low back with radiation to the right posterior thigh and leg. She notes pain with weight bearing on the right lower extremity but notes that she was able to walk a short distance today with the assistance of physical therapy. Overall, the patient states that her pain is improving but still has significant limitations in her activities due to pain.       Current Medication Regimen  acetaminophen     Tablet .. 650 milliGRAM(s) Oral every 6 hours  atorvastatin 40 milliGRAM(s) Oral at bedtime  gabapentin 300 milliGRAM(s) Oral three times a day  HYDROmorphone  Injectable 0.5 milliGRAM(s) IV Push every 8 hours PRN  lidocaine   4% Patch 1 Patch Transdermal every 24 hours  methocarbamol 750 milliGRAM(s) Oral every 6 hours  metoprolol succinate  milliGRAM(s) Oral daily  montelukast 10 milliGRAM(s) Oral daily  oxyCODONE    IR 10 milliGRAM(s) Oral every 6 hours PRN  pantoprazole  Injectable 40 milliGRAM(s) IV Push two times a day  polyethylene glycol 3350 17 Gram(s) Oral daily  ranolazine 1000 milliGRAM(s) Oral two times a day  senna 2 Tablet(s) Oral at bedtime  sucralfate 1 Gram(s) Oral four times a day        Allergies  penicillins (Hives; Rash)  Augmentin (Other)          Physical Exam  T(C): 35.9 (05-02-23 @ 12:25), Max: 37 (05-02-23 @ 05:34)  HR: 57 (05-02-23 @ 12:25) (57 - 87)  BP: 116/55 (05-02-23 @ 12:25) (113/63 - 137/81)  RR: 18 (05-02-23 @ 12:25) (18 - 18)  SpO2: 98% (05-01-23 @ 20:25) (98% - 98%)  Gen: NAD  Eyes: no glasses or scleral icterus  Head: Normocephalic / Atraumatic  CV: no JVD  Lungs: nonlabored breathing  Abdomen: nondistended, soft  : no burden catheter in placew  Neuro: AOx3, Cranial nerves intact  Psych: normal affect      Labs  CBC  6.45 > 10.9 g/dL / 34.0 % < 132 K/uL      Imaging  CT Abdomen/Pelvis (4/27/2023)  FINDINGS:    LOWER CHEST: Unchanged 1.5 cm right lower lobe nodule. Post median   sternotomy and aortic valve replacement left atrial appendage exclusion   device with associated thrombosis. Coronary calcifications noted.    HEPATOBILIARY: Cholelithiasis. Calcified hepatic granuloma.    SPLEEN: Unremarkable.    PANCREAS: Unremarkable.    ADRENAL GLANDS: Unremarkable.    KIDNEYS: Bilateral renal angiomyolipomas, right measuring 2.8 cm and the   left measuring subcentimeter. Symmetric renal enhancement bilaterally. No   hydronephrosis    ABDOMINOPELVIC NODES: No lymphadenopathy.    PELVIC ORGANS: Post hysterectomy.    PERITONEUM/MESENTERY/BOWEL: No bowel obstruction, ascites or   pneumoperitoneum. Scattered colonic diverticulosis without evidence for   acute diverticulitis. Appendix not visualized; no focal pericecal   inflammation. Small hiatal hernia.    BONES/SOFT TISSUES: Fat-containing subxiphoid/epigastric hernias. Post   median sternotomy. Multilevel degenerative changes of the spine noted.   Bilateral L5 pars defects without associated spondylolisthesis.    OTHER: Scattered atherosclerotic vascular calcifications.      IMPRESSION:  No CT evidence of an acute abdominopelvic pathology.

## 2023-05-12 DIAGNOSIS — I27.20 PULMONARY HYPERTENSION, UNSPECIFIED: ICD-10-CM

## 2023-05-12 DIAGNOSIS — Z79.01 LONG TERM (CURRENT) USE OF ANTICOAGULANTS: ICD-10-CM

## 2023-05-12 DIAGNOSIS — K31.7 POLYP OF STOMACH AND DUODENUM: ICD-10-CM

## 2023-05-12 DIAGNOSIS — Z95.3 PRESENCE OF XENOGENIC HEART VALVE: ICD-10-CM

## 2023-05-12 DIAGNOSIS — M54.31 SCIATICA, RIGHT SIDE: ICD-10-CM

## 2023-05-12 DIAGNOSIS — N18.30 CHRONIC KIDNEY DISEASE, STAGE 3 UNSPECIFIED: ICD-10-CM

## 2023-05-12 DIAGNOSIS — E11.65 TYPE 2 DIABETES MELLITUS WITH HYPERGLYCEMIA: ICD-10-CM

## 2023-05-12 DIAGNOSIS — K29.61 OTHER GASTRITIS WITH BLEEDING: ICD-10-CM

## 2023-05-12 DIAGNOSIS — E11.22 TYPE 2 DIABETES MELLITUS WITH DIABETIC CHRONIC KIDNEY DISEASE: ICD-10-CM

## 2023-05-12 DIAGNOSIS — D50.0 IRON DEFICIENCY ANEMIA SECONDARY TO BLOOD LOSS (CHRONIC): ICD-10-CM

## 2023-05-12 DIAGNOSIS — I35.0 NONRHEUMATIC AORTIC (VALVE) STENOSIS: ICD-10-CM

## 2023-05-12 DIAGNOSIS — T40.2X5A ADVERSE EFFECT OF OTHER OPIOIDS, INITIAL ENCOUNTER: ICD-10-CM

## 2023-05-12 DIAGNOSIS — I50.32 CHRONIC DIASTOLIC (CONGESTIVE) HEART FAILURE: ICD-10-CM

## 2023-05-12 DIAGNOSIS — K57.31 DIVERTICULOSIS OF LARGE INTESTINE WITHOUT PERFORATION OR ABSCESS WITH BLEEDING: ICD-10-CM

## 2023-05-12 DIAGNOSIS — K62.5 HEMORRHAGE OF ANUS AND RECTUM: ICD-10-CM

## 2023-05-12 DIAGNOSIS — E66.2 MORBID (SEVERE) OBESITY WITH ALVEOLAR HYPOVENTILATION: ICD-10-CM

## 2023-05-12 DIAGNOSIS — I48.91 UNSPECIFIED ATRIAL FIBRILLATION: ICD-10-CM

## 2023-05-12 DIAGNOSIS — K64.8 OTHER HEMORRHOIDS: ICD-10-CM
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
## 2023-05-12 DIAGNOSIS — K44.9 DIAPHRAGMATIC HERNIA WITHOUT OBSTRUCTION OR GANGRENE: ICD-10-CM

## 2023-05-12 DIAGNOSIS — K25.4 CHRONIC OR UNSPECIFIED GASTRIC ULCER WITH HEMORRHAGE: ICD-10-CM

## 2023-05-12 DIAGNOSIS — S22.20XK: ICD-10-CM
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
## 2023-05-12 DIAGNOSIS — Z88.0 ALLERGY STATUS TO PENICILLIN: ICD-10-CM

## 2023-05-12 DIAGNOSIS — I13.0 HYPERTENSIVE HEART AND CHRONIC KIDNEY DISEASE WITH HEART FAILURE AND STAGE 1 THROUGH STAGE 4 CHRONIC KIDNEY DISEASE, OR UNSPECIFIED CHRONIC KIDNEY DISEASE: ICD-10-CM

## 2023-05-12 DIAGNOSIS — J44.9 CHRONIC OBSTRUCTIVE PULMONARY DISEASE, UNSPECIFIED: ICD-10-CM

## 2023-05-12 DIAGNOSIS — K59.03 DRUG INDUCED CONSTIPATION: ICD-10-CM
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
## 2023-05-12 DIAGNOSIS — E87.5 HYPERKALEMIA: ICD-10-CM

## 2023-05-12 DIAGNOSIS — Z95.5 PRESENCE OF CORONARY ANGIOPLASTY IMPLANT AND GRAFT: ICD-10-CM

## 2023-05-12 DIAGNOSIS — Z79.84 LONG TERM (CURRENT) USE OF ORAL HYPOGLYCEMIC DRUGS: ICD-10-CM

## 2023-05-12 DIAGNOSIS — I25.10 ATHEROSCLEROTIC HEART DISEASE OF NATIVE CORONARY ARTERY WITHOUT ANGINA PECTORIS: ICD-10-CM

## 2023-05-12 DIAGNOSIS — Z96.653 PRESENCE OF ARTIFICIAL KNEE JOINT, BILATERAL: ICD-10-CM

## 2023-05-25 ENCOUNTER — APPOINTMENT (OUTPATIENT)
Dept: PAIN MANAGEMENT | Facility: CLINIC | Age: 78
End: 2023-05-25
Payer: MEDICARE

## 2023-05-25 PROCEDURE — 99213 OFFICE O/P EST LOW 20 MIN: CPT

## 2023-05-25 NOTE — DISCUSSION/SUMMARY
[de-identified] : A discussion regarding available pain management treatment options occurred with the patient.  These included interventional, rehabilitative, pharmacological, and alternative modalities. We will proceed with the following:\par \par Interventional treatment options:\par - discussed a possible epidural injection once we obtain the MRI of the lumbar spine\par \par Rehabilitative options:\par - participation in active HEP was discussed\par \par Complementary treatment options:\par -  lifestyle modifications discussed\par \par Image:\par Ordered MRI of the lumbar spine\par The patient has been having persistent lower back, lumbar radicular pain. At this point we decided to proceed with an MRI of the lumbar spine without contrast to evaluate the pathology and give the patient treatment options to help with the pain. Potential treatment options such as further conservative therapy, injections, and surgery were also briefly discussed. The medications listed below were also prescribed today. The risks and benefits of these medications were also discussed as well as the manner in which they should be taken. The patient will follow up after the MRI.\par \par follow up after imaging \par \par I, Merari Hewitt, attest that this documentation has been prepared under the direction and in the presence of Provider Ayad Cuellar DO\par The documentation recorded by the scribe, in my presence, accurately reflects the service I personally performed, and the decisions made by me with my edits as appropriate.\par \par Best Regards, \par Ayad RYANO.\par \par \par

## 2023-05-25 NOTE — PHYSICAL EXAM
[de-identified] : Lumbar Spine Exam:\par \par Palpation:\par Midline lumbar tenderness:             (-)\par paraspinal tenderness:                  L (-) ; R (-)\par SI joint tenderness:                        L (-) ; R (-)\par GTB tenderness:                            L (-);  R (-)\par \par \par Special Tests:\par SLR:                           R (+) ; L (-)\par Facet loading:            R (-) ; L (-)\par HARESH test:               R (-) ; L (-)\par Gaenslen's:               R (-) ; L (-)\par compression test:               R (-) ; L (-)\par \par \par Gait:\par non- antalgic gait\par \par

## 2023-05-25 NOTE — HISTORY OF PRESENT ILLNESS
[FreeTextEntry1] : HISTORY OF PRESENT ILLNESS: Mrs. Pickering is a 77-year-old female complaining of lower back pain that started 1 month ago after an undetermined event. At its worse the pain has been a 10/10 on the pain scale but states it’s been a 7/10 at its best. The patient states the pain goes down to the right buttock down right anterior posterior  thigh, posterior of right calf, and past the knee into the top of right foot with a burning tingling sensation. She reports sometimes the pain is on the left side. The pain is worse with sitting. She states the low back and radiating pain is equal in pain. She went to the ER due to the severe pain and was discharged on 4/27/23. Patient describes the pain as severe. During the last month the pain has been nearly constant with symptoms worsening in no typical pattern. Pain described as burning, sharp, pressure-like, cramping, aching, throbbing, shooting.  Pain is associated with numbness/pins and needles into the right lower extremities. Patient has weakness in the lower extremities and states she loses balance.  Pain is increased with standing and sitting. Pain is decreased with lying down and exercise.  Pain is not changed with relaxation, coughing sneezing or bowel movements. Bowel or bladder habits have changed due to GI issues.\par \par ACTIVITIES: Patient could walk less than one block before the pain starts.  Patient can sit 10 minutes before pain starts.  Patient can stand 5 minutes before pain starts.  The patient constantly lies down because of pain.  Patient uses cane and walker at this time.  Patient has difficulty performing household chores, doing yard work or shopping, participating in recreational activities, and exercise at this time.\par PRIOR PAIN TREATMENTS: Moderate relief with nerve block injection, physical therapy, exercise, heat treatment.\par Prior Pain Medications: Morphine, Tylenol, gabapentin\par

## 2023-06-02 ENCOUNTER — APPOINTMENT (OUTPATIENT)
Dept: PAIN MANAGEMENT | Facility: CLINIC | Age: 78
End: 2023-06-02
Payer: MEDICARE

## 2023-06-02 PROCEDURE — 94761 N-INVAS EAR/PLS OXIMETRY MLT: CPT

## 2023-06-02 PROCEDURE — 62323 NJX INTERLAMINAR LMBR/SAC: CPT

## 2023-06-02 PROCEDURE — 93770 DETERMINATION VENOUS PRESS: CPT

## 2023-06-02 PROCEDURE — 99214 OFFICE O/P EST MOD 30 MIN: CPT

## 2023-06-04 NOTE — PROCEDURE
[FreeTextEntry3] : Date of Service: 06/04/2023 \par \par Account: [id number]\par \par Patient: EVAN QUINN \par \par YOB: 1945\par \par Age: 77 year\par \par Surgeon:      Ayad Cuellar DO\par \par Assistant:    None\par \par Pre-Operative Diagnosis:         Lumbosacral Radiculopathy (M54.16)\par \par Post Operative Diagnosis:       Lumbosacral Radiculopathy (M54.16)   \par \par Procedure:             Lumbar interlaminar (L4-5) epidural steroid injection under fluoroscopic guidance\par \par Anesthesia:           none\par \par This procedure was carried out using fluoroscopic guidance.  The risks and benefits of the procedure were discussed extensively with the patient.  The consent of the patient was obtained and the following procedure was performed. The patient was placed in the prone position on the fluoroscopy table and the area was prepped and draped in a sterile fashion.  A timeout was performed with all essential staff present and the site and side were verified.\par \par The patient was placed in the prone position with a pillow under the abdomen to minimize the lumbar lordosis.  The lumbar area was prepped and draped in a sterile fashion.  Under A/P view with slight cephalad-caudad angulation, the L4-5 interspace was identified and marked.  Using sterile technique the superficial skin was anesthetized with 1% Lidocaine.  A 20 gauge Tuohy needle was advanced into the epidural space under fluoroscopy using loss of resistance at the L4-5 level.  After negative aspiration for heme or CSF, an epidurogram was obtained in the A/P fluoroscopic views using 2-3 cc of Omnipaque contrast confirming epidural placement of the needle.  After this, 5 cc of of a mixture of preservative free normal saline and 10 mg dexamethasone were injected into the epidural space. \par \par The needle was subsequently removed.  Vital signs remained normal.  Pulse oximeter was used throughout the procedure and the patient's pulse and oxygen saturation remained within normal limits.  The patient tolerated the procedure well.  There were no complications.  The patient was instructed to apply ice over the injection sites for twenty minutes every two hours for the next 24 to 48 hours.\par \par Disposition:\par \par      1. The patient was advised to F/U in 1-2 weeks to assess the response to the injection.\par      2. The patient was also instructed to contact me immediately if there were any concerns related to the procedure performed.\par

## 2023-06-05 NOTE — PHYSICAL EXAM
[de-identified] : Lumbar Spine Exam:\par \par \par Special Tests:\par SLR:                           R (+) ; L (-)\par Facet loading:            R (-) ; L (-)\par HARESH test:               R (-) ; L (-)\par Gaenslen's:               R (-) ; L (-)\par compression test:               R (-) ; L (-)\par \par \par Gait:\par non- antalgic gait\par \par

## 2023-06-05 NOTE — DISCUSSION/SUMMARY
[de-identified] : A discussion regarding available pain management treatment options occurred with the patient.  These included interventional, rehabilitative, pharmacological, and alternative modalities. We will proceed with the following:\par \par Interventional treatment options:\par - Treatment options were discussed with the patient. The patient has been having persistent lower back and lumbar radicular pain with minimal improvement with conservative therapies. Given that the patient has severe pain and failed conservative treatment, the patient was given the option to proceed with a lumbar epidural steroid injection to try to get some pain relief.  \par \par The risks and benefits were discussed which included bleeding, infection, nerve injury, no pain relief or worse, increased pain. All questions were answered and concerns addressed.\par L4-5 LESI\par \par Rehabilitative options:\par - participation in active HEP was discussed\par \par Complementary treatment options:\par avoid bending, lifting, twisting\par \par Image:\par MRI lumbar spine NC was reviewed with the patient in detail and independently interpreted\par showing an L4-5 disc herniation with facet hypertrophy causing moderate central stenosis and moderate foraminal stenosis\par L5-s1 disc bulging and moderate bilateral foraminal stenosis, L3-4 disc herniation with moderate stenosis. \par \par f/u in 2 weeks s/p injection

## 2023-06-05 NOTE — HISTORY OF PRESENT ILLNESS
[FreeTextEntry1] : HISTORY OF PRESENT ILLNESS: Mrs. Pickering is a 77-year-old female complaining of lower back pain that started 1 month ago after an undetermined event. At its worse the pain has been a 10/10 on the pain scale but states it’s been a 7/10 at its best. The patient states the pain goes down to the right buttock down right anterior posterior  thigh, posterior of right calf, and past the knee into the top of right foot with a burning tingling sensation. She reports sometimes the pain is on the left side. The pain is worse with sitting. She states the low back and radiating pain is equal in pain. She went to the ER due to the severe pain and was discharged on 4/27/23. Patient describes the pain as severe. During the last month the pain has been nearly constant with symptoms worsening in no typical pattern. Pain described as burning, sharp, pressure-like, cramping, aching, throbbing, shooting.  Pain is associated with numbness/pins and needles into the right lower extremities. Patient has weakness in the lower extremities and states she loses balance.  Pain is increased with standing and sitting. Pain is decreased with lying down and exercise.  Pain is not changed with relaxation, coughing sneezing or bowel movements. Bowel or bladder habits have changed due to GI issues.\par \par ACTIVITIES: Patient could walk less than one block before the pain starts.  Patient can sit 10 minutes before pain starts.  Patient can stand 5 minutes before pain starts.  The patient constantly lies down because of pain.  Patient uses cane and walker at this time.  Patient has difficulty performing household chores, doing yard work or shopping, participating in recreational activities, and exercise at this time.\par PRIOR PAIN TREATMENTS: Moderate relief with nerve block injection, physical therapy, exercise, heat treatment.\par Prior Pain Medications: Morphine, Tylenol, gabapentin\par \par 6/2/2023\par Patient continues to have severe, 9/10, low back pain that radiates down the her right buttock, posterior/lateral thigh and sometimes anterior thigh that is sharp, burning, tingling in nature also going to her right posterior leg. Pt denies bowel/bladder incontinence, weakness, falls, unsteadiness.\par The pain is impeding ADLs and QOL. Tylenol not improving the pain.

## 2023-06-15 ENCOUNTER — APPOINTMENT (OUTPATIENT)
Dept: RADIOLOGY | Facility: CLINIC | Age: 78
End: 2023-06-15

## 2023-06-15 ENCOUNTER — APPOINTMENT (OUTPATIENT)
Dept: PAIN MANAGEMENT | Facility: CLINIC | Age: 78
End: 2023-06-15
Payer: MEDICARE

## 2023-06-15 VITALS — BODY MASS INDEX: 43.55 KG/M2 | HEIGHT: 66 IN | WEIGHT: 271 LBS

## 2023-06-15 DIAGNOSIS — M48.061 SPINAL STENOSIS, LUMBAR REGION WITHOUT NEUROGENIC CLAUDICATION: ICD-10-CM

## 2023-06-15 DIAGNOSIS — S72.91XA UNSPECIFIED FRACTURE OF RIGHT FEMUR, INITIAL ENCOUNTER FOR CLOSED FRACTURE: ICD-10-CM

## 2023-06-15 PROCEDURE — 99214 OFFICE O/P EST MOD 30 MIN: CPT | Mod: 25

## 2023-06-15 PROCEDURE — 73502 X-RAY EXAM HIP UNI 2-3 VIEWS: CPT

## 2023-06-15 PROCEDURE — 73552 X-RAY EXAM OF FEMUR 2/>: CPT | Mod: RT

## 2023-06-15 NOTE — DISCUSSION/SUMMARY
[de-identified] : A discussion regarding available pain management treatment options occurred with the patient.  These included interventional, rehabilitative, pharmacological, and alternative modalities. We will proceed with the following:\par \par Interventional treatment options:\par Will proceed with a BL SIJ injection \par Treatment options were discussed with the patient. The patient has been having persistent pain in the bilateral sacroiliac joint with minimal improvement with conservative therapies. The patient was given the option to proceed with a bilateral sacroiliac joint injection to try to get some pain relief.  The patient understands that the injections are meant to offer pain relief but do not always give pain relief, and thus it is partially diagnostic. If this is the case, this can add to our clinical picture and we can reconsider our options at that point. The risks and benefits were discussed which included bleeding, infection, nerve injury, no pain relief or worse, increased pain. All questions were answered and the patient will schedule for the injection on the next available date.\par \par Medication:\par Continue with  Lyrica \par also recommend extra strength Tylenol take 2 every 8 hours \par \par Rehabilitative options:\par - participation in active HEP was discussed\par \par Complementary treatment options:\par avoid bending, lifting, twisting\par when pain comes on change position \par \par Image:\par X-Ray of right  hip and pelvis \par X-ray of right Femur \par \par follow up\par \par I, Merari Hewitt, attest that this documentation has been prepared under the direction and in the presence of Provider Ayad Cuellar DO\par The documentation recorded by the scribe, in my presence, accurately reflects the service I personally performed, and the decisions made by me with my edits as appropriate.\par \par Best Regards, \par Ayad Cuellar D.O.\par \par

## 2023-06-15 NOTE — PHYSICAL EXAM
[de-identified] : Lumbar Spine Exam:\par \par \par Special Tests:\par SLR:                           R (+) ; L (-)\par Facet loading:            R (-) ; L (-)\par HARESH test:               R (+) ; L (-)\par Gaenslen's:               R (+) ; L (-)\par compression test:               R (+) ; L (-)\par \par SIJ tenderness right side\par \par Gait:\par non- antalgic gait\par \par

## 2023-06-15 NOTE — DATA REVIEWED
[FreeTextEntry1] : MRI lumbar spine NC was reviewed with the patient in detail and independently interpreted\par showing an L4-5 disc herniation with facet hypertrophy causing moderate central stenosis and moderate foraminal stenosis\par L5-s1 disc bulging and moderate bilateral foraminal stenosis, L3-4 disc herniation with moderate stenosis.

## 2023-06-15 NOTE — HISTORY OF PRESENT ILLNESS
[FreeTextEntry1] : HISTORY OF PRESENT ILLNESS: Mrs. Pickering is a 77-year-old female complaining of lower back pain that started 1 month ago after an undetermined event. At its worse the pain has been a 10/10 on the pain scale but states it’s been a 7/10 at its best. The patient states the pain goes down to the right buttock down right anterior posterior  thigh, posterior of right calf, and past the knee into the top of right foot with a burning tingling sensation. She reports sometimes the pain is on the left side. The pain is worse with sitting. She states the low back and radiating pain is equal in pain. She went to the ER due to the severe pain and was discharged on 4/27/23. Patient describes the pain as severe. During the last month the pain has been nearly constant with symptoms worsening in no typical pattern. Pain described as burning, sharp, pressure-like, cramping, aching, throbbing, shooting.  Pain is associated with numbness/pins and needles into the right lower extremities. Patient has weakness in the lower extremities and states she loses balance.  Pain is increased with standing and sitting. Pain is decreased with lying down and exercise.  Pain is not changed with relaxation, coughing sneezing or bowel movements. Bowel or bladder habits have changed due to GI issues.\par \par ACTIVITIES: Patient could walk less than one block before the pain starts.  Patient can sit 10 minutes before pain starts.  Patient can stand 5 minutes before pain starts.  The patient constantly lies down because of pain.  Patient uses cane and walker at this time.  Patient has difficulty performing household chores, doing yard work or shopping, participating in recreational activities, and exercise at this time.\par PRIOR PAIN TREATMENTS: Moderate relief with nerve block injection, physical therapy, exercise, heat treatment.\par Prior Pain Medications: Morphine, Tylenol, gabapentin\par \par 615/23: Today this patient is status post LESI on 6/2/23 with pain free for 2 to 3 days, however the pain came back on but with some improvement. She states she has been better at night with sleeping. She has utilized the lyrica medication that was given. The patients right low back/buttock pain radiates down her right buttock, to posterior/ lateral and anterior of thigh that is achy. The pain is worse with sitting and standing and better when laying down, specifically on her right side. The patient reports now she has dull pain in her left anterior thigh that is worse when standing. She denies no pain past the left and right knee. She states she can not bare weight on leg and can't mobilize with out her walker. The pain is impeding ADLs and QOL. Tylenol not improving the pain.  She rates this pain 8/10 on te pain scale. Pt denies bowel/bladder incontinence, weakness, falls, unsteadiness.  It is for this injection\par SCAR TISSUE\par She also reports of intermittent tingling in her foot at night.

## 2023-06-19 PROBLEM — M16.11 PRIMARY OSTEOARTHRITIS OF RIGHT HIP: Status: ACTIVE | Noted: 2023-06-15

## 2023-06-19 NOTE — DISCUSSION/SUMMARY
[de-identified] : A discussion regarding available pain management treatment options occurred with the patient.  These included interventional, rehabilitative, pharmacological, and alternative modalities. We will proceed with the following:\par \par Interventional treatment options:\par Will proceed with a BL SIJ injection \par Treatment options were discussed with the patient. The patient has been having persistent pain in the bilateral sacroiliac joint with minimal improvement with conservative therapies. The patient was given the option to proceed with a bilateral sacroiliac joint injection to try to get some pain relief.  The patient understands that the injections are meant to offer pain relief but do not always give pain relief, and thus it is partially diagnostic. If this is the case, this can add to our clinical picture and we can reconsider our options at that point. The risks and benefits were discussed which included bleeding, infection, nerve injury, no pain relief or worse, increased pain. All questions were answered and the patient will schedule for the injection on the next available date.\par \par Medication:\par Continue with  Lyrica \par also recommend extra strength Tylenol take 2 every 8 hours \par \par Rehabilitative options:\par - participation in active HEP was discussed\par \par Complementary treatment options:\par avoid bending, lifting, twisting\par when pain comes on change position \par \par Image:\par xray of right hip showed mild hip OA which was interpreted independently. xray of right femur showed no fractures, hardware migration as per the radiologist\par \par follow up this friday for the sij injection\par \par \par

## 2023-06-19 NOTE — HISTORY OF PRESENT ILLNESS
[FreeTextEntry1] : \par \par 6/15/23: Today this patient is status post LESI on 6/2/23 with pain free for 2 to 3 days, however the pain came back on but with some improvement. She states she has been better at night with sleeping. She has utilized the lyrica medication that was given. The patients right low back/buttock pain radiates down her right buttock, to posterior/ lateral and anterior of thigh that is achy. The pain is worse with sitting and standing and better when laying down, specifically on her right side. The patient reports now she has dull pain in her left anterior thigh that is worse when standing. She denies no pain past the left and right knee. She states she can not bare weight on leg and can't mobilize with out her walker. The pain is impeding ADLs and QOL. Tylenol not improving the pain.  She rates this pain 8/10 on te pain scale. Pt denies bowel/bladder incontinence, weakness, falls, unsteadiness.  \par \par 6/19/23\par Patient was asking to review the xray right femur and hip joint before her SIJ injection on friday. We attempted to do a video telemedicine visit with [employer] but the patient's camera did not work so we proceed with a telephone visit. Patient states lyrica 75mg BID has decreased the pain significantly at bedtime which allows her to sleep but she continues to have severe right leg pain with standing and walking, weightbearing. She has less pain with sitting. Pain is 10/10 with walking. Pain is sharp in nature.

## 2023-06-23 PROBLEM — M46.1 INFLAMMATION OF RIGHT SACROILIAC JOINT: Status: ACTIVE | Noted: 2023-06-15

## 2023-06-23 NOTE — PROCEDURE
[FreeTextEntry3] : Date of Service: 06/23/2023 \par \par Account: [id number]\par \par Patient: EVAN QUINN \par \par YOB: 1945\par \par Age: 77 year\par Surgeon:      Ayad Cuellar DO\par \par Pre - Operative Diagnosis:       Right-sided sacroiliitis      \par \par Post - Operative Diagnosis:     Same            \par \par Procedure:             Right-sided Sacroiliac arthrogram and joint injection under fluoroscopic guidance\par \par This procedure was carried out using fluoroscopic guidance.  The risks and benefits of the procedure were discussed extensively with the patient.  The consent of the patient was obtained and the following procedure was performed. The patient was placed in the prone position on the fluoroscopy table and the area was prepped and draped in a sterile fashion.  A timeout was performed with all essential staff present and the site and side were verified.\par \par The patient was placed in the prone position.  The patient's back was prepped and draped in a sterile fashion.  The fluoroscopic image intensifier was then positioned garfield maximize visualization of the joint.  This was achieved with slight cephalad and medial angulation. \par \par - The area corresponding to the right inferior SIJ space was then identified and marked.  A 25 gauge 3.5 inch spinal needle was then inserted and directed into the right sacroiliac joint space using fluoroscopic guidance.  After negative aspiration for heme and CSF, 2 cc of Omnipaque was injected and appeared to fill the joint space.  An injectate of 2.5 cc 0.25% Marcaine was then injected into the right sacroiliac joint space.\par \par The needle was subsequently removed.  Vital signs remained normal.  Pulse oximeter was used throughout the procedure and the patient's pulse and oxygen saturation remained within normal limits.  The patient tolerated the procedure well.  There were no complications.  The patient was instructed to apply ice over the injection sites for twenty minutes every two hours for the next 24 to 48 hours.\par \par Disposition:\par \par       1. The patient was advised to F/U in 1-2 weeks to assess the response to the injection.\par       2. The patient was also instructed to contact me immediately if there were any concerns related to the procedure performed.\par

## 2023-07-14 NOTE — PROCEDURE
[FreeTextEntry3] : Date of Service: 07/14/2023 \par \par Account: [id number]\par \par Patient: EVAN QUINN \par \par YOB: 1945\par \par Age: 77 year\par Surgeon:      Ayad Cuellar DO\par \par Pre - Operative Diagnosis:       right-sided sacroiliitis      \par \par Post - Operative Diagnosis:     Same            \par \par Procedure:             right sided Sacroiliac arthrogram and joint injection under fluoroscopic guidance\par \par This procedure was carried out using fluoroscopic guidance.  The risks and benefits of the procedure were discussed extensively with the patient.  The consent of the patient was obtained and the following procedure was performed. The patient was placed in the prone position on the fluoroscopy table and the area was prepped and draped in a sterile fashion.  A timeout was performed with all essential staff present and the site and side were verified.\par \par  The fluoroscopic image intensifier was then positioned to maximize visualization of the joint.  This was achieved with slight cephalad and medial angulation.  \par \par - The area corresponding to the right inferior SIJ space was then identified and marked.  A 25 gauge 3.5 inch spinal needle was then inserted and directed into the right sacroiliac joint space using fluoroscopic guidance.  After negative aspiration for heme and CSF, 2 cc of Omnipaque was injected and appeared to fill the joint space.  An injectate of 2.5 cc 0.25% Marcaine plus 10 mg of Dexamethasone was then injected into the right sacroiliac joint space.\par \par The needle was subsequently removed.  Vital signs remained normal.  Pulse oximeter was used throughout the procedure and the patient's pulse and oxygen saturation remained within normal limits.  The patient tolerated the procedure well.  There were no complications.  The patient was instructed to apply ice over the injection sites for twenty minutes every two hours for the next 24 to 48 hours.\par \par Disposition:\par \par       1. The patient was advised to F/U in 1-2 weeks to assess the response to the injection.\par       2. The patient was also instructed to contact me immediately if there were any concerns related to the procedure performed.\par

## 2023-08-03 PROBLEM — M48.061 SPINAL STENOSIS OF LUMBAR REGION WITHOUT NEUROGENIC CLAUDICATION: Status: ACTIVE | Noted: 2023-01-01

## 2023-08-03 PROBLEM — M46.1 INFLAMMATION OF BOTH SACROILIAC JOINTS: Status: ACTIVE | Noted: 2023-01-01

## 2023-08-03 NOTE — DISCUSSION/SUMMARY
[de-identified] : A discussion regarding available pain management treatment options occurred with the patient.  These included interventional, rehabilitative, pharmacological, and alternative modalities. We will proceed with the following:  Interventional treatment options: deferred  Medication: Continue with  Lyrica   Ordered physical therapy to focus on her spinal stenosis and SI joint pain  Follow-up in 6 weeks

## 2023-08-03 NOTE — HISTORY OF PRESENT ILLNESS
[FreeTextEntry1] :  6/15/23: Today this patient is status post LESI on 6/2/23 with pain free for 2 to 3 days, however the pain came back on but with some improvement. She states she has been better at night with sleeping. She has utilized the lyrica medication that was given. The patients right low back/buttock pain radiates down her right buttock, to posterior/ lateral and anterior of thigh that is achy. The pain is worse with sitting and standing and better when laying down, specifically on her right side. The patient reports now she has dull pain in her left anterior thigh that is worse when standing. She denies no pain past the left and right knee. She states she can not bare weight on leg and can't mobilize with out her walker. The pain is impeding ADLs and QOL. Tylenol not improving the pain.  She rates this pain 8/10 on te pain scale. Pt denies bowel/bladder incontinence, weakness, falls, unsteadiness.    6/19/23 Patient was asking to review the xray right femur and hip joint before her SIJ injection on friday. We attempted to do a video telemedicine visit with [employer] but the patient's camera did not work so we proceed with a telephone visit. Patient states lyrica 75mg BID has decreased the pain significantly at bedtime which allows her to sleep but she continues to have severe right leg pain with standing and walking, weightbearing. She has less pain with sitting. Pain is 10/10 with walking. Pain is sharp in nature.   8/3/2023 The patient presents with 85% pain relief to date after a right-sided SI joint injection with steroid from July 14th.  The first injection right-sided SI joint injection was only with bupivacaine because her sugars were above 200.  She had 100 pain relief after the first SI joint injection that did not last due to no steroid.  She no longer has right-sided low back buttock pain that radiates down the thigh.  She does complain of now left-sided low back buttock pain that is dull achy in nature that radiates down the buttock worse with transitional movements that reaches a 6-7 out of 10.  She is not interested in injection at this time. Pt denies bowel/bladder incontinence, weakness, falls, unsteadiness. 
Yes

## 2023-08-03 NOTE — PHYSICAL EXAM
[de-identified] : Lumbar spine Positive PSIS tenderness to palpation bilaterally Positive Johnathon's bilaterally Positive compression test bilaterally Positive Gaenslen's bilaterally

## 2023-08-31 PROBLEM — M46.1 INFLAMMATION OF LEFT SACROILIAC JOINT: Status: ACTIVE | Noted: 2023-01-01

## 2023-08-31 PROBLEM — M75.22 BICEPS TENDINITIS OF LEFT UPPER EXTREMITY: Status: ACTIVE | Noted: 2023-01-01

## 2023-08-31 PROBLEM — M75.22 LEFT BICIPITAL TENOSYNOVITIS: Status: ACTIVE | Noted: 2023-01-01

## 2023-08-31 NOTE — HISTORY OF PRESENT ILLNESS
[FreeTextEntry1] : 6/15/23: Today this patient is status post LESI on 6/2/23 with pain free for 2 to 3 days, however the pain came back on but with some improvement. She states she has been better at night with sleeping. She has utilized the lyrica medication that was given. The patients right low back/buttock pain radiates down her right buttock, to posterior/ lateral and anterior of thigh that is achy. The pain is worse with sitting and standing and better when laying down, specifically on her right side. The patient reports now she has dull pain in her left anterior thigh that is worse when standing. She denies no pain past the left and right knee. She states she can not bare weight on leg and can't mobilize with out her walker. The pain is impeding ADLs and QOL. Tylenol not improving the pain.  She rates this pain 8/10 on te pain scale. Pt denies bowel/bladder incontinence, weakness, falls, unsteadiness.    8/31/23 Patient is present for Anterior left shoulder pian that is dull/achy in nature.  She states she can't lift anything including a laundry basket without pain. Patient rates pain 8/10 on the pain scale. Cannot tolerate NSAIDS. pt continues to have left buttock pain worse with transitional movements. Rates pain a 8/10. Pt is currently in physical therapy for it with minor improvements. Pt denies bowel/bladder incontinence, weakness, falls, unsteadiness.

## 2023-08-31 NOTE — DISCUSSION/SUMMARY
[de-identified] : A discussion regarding available pain management treatment options occurred with the patient.  These included interventional, rehabilitative, pharmacological, and alternative modalities. We will proceed with the following:  Interventional treatment options: -XRAY left shoulder ordered  - Patient will proceed with left biceps injection -Patient will proceed with left SI joint inj   Rehabilitative options: - participation in active HEP was discussed  Medication based treatment options: -Lyrica 75mg was refilled -ordered Voltaren Gel for the left shoulder  I, Denise Walker, attest that this documentation has been prepared under the direction and in the presence of Provider Ayad Cuellar, DO The documentation recorded by the scribe, in my presence, accurately reflects the service I personally performed, and the decisions made by me with my edits as appropriate.  Best Regards,  Ayad Cuellar D.O.

## 2023-11-30 PROBLEM — M51.26 HERNIATED LUMBAR INTERVERTEBRAL DISC: Status: ACTIVE | Noted: 2023-06-05

## 2023-12-20 PROBLEM — M54.16 LUMBAR RADICULOPATHY, ACUTE: Status: ACTIVE | Noted: 2023-05-25

## 2023-12-20 NOTE — PROCEDURE
[FreeTextEntry3] : Date of Service: 12/20/2023   Account: [id number]  Patient: EVAN QUINN   YOB: 1945  Age: 78 year  Surgeon:      Ayad Cuellar DO  Assistant:    None  Pre-Operative Diagnosis:         Lumbosacral Radiculopathy (M54.16)  Post Operative Diagnosis:       Lumbosacral Radiculopathy (M54.16)     Procedure:             Lumbar interlaminar (L5-S1) epidural steroid injection under fluoroscopic guidance  Anesthesia:            none  This procedure was carried out using fluoroscopic guidance.  The risks and benefits of the procedure were discussed extensively with the patient.  The consent of the patient was obtained and the following procedure was performed. The patient was placed in the prone position on the fluoroscopy table and the area was prepped and draped in a sterile fashion.  A timeout was performed with all essential staff present and the site and side were verified.  The patient was placed in the prone position with a pillow under the abdomen to minimize the lumbar lordosis.  The lumbar area was prepped and draped in a sterile fashion.  Under A/P view with slight cephalad-caudad angulation, the L5-S1 interspace was identified and marked.  Using sterile technique the superficial skin was anesthetized with 1% Lidocaine.  A 20 gauge Tuohy needle was advanced into the epidural space under fluoroscopy using loss of resistance at the L5-S1 level.  After negative aspiration for heme or CSF, an epidurogram was obtained in the A/P fluoroscopic views using 2-3 cc of Omnipaque contrast confirming epidural placement of the needle.  After this, 5 cc of of a mixture of preservative free normal saline and 10mg dexamethasone were injected into the epidural space.   The needle was subsequently removed.  Vital signs remained normal.  Pulse oximeter was used throughout the procedure and the patient's pulse and oxygen saturation remained within normal limits.  The patient tolerated the procedure well.  There were no complications.  The patient was instructed to apply ice over the injection sites for twenty minutes every two hours for the next 24 to 48 hours.  Disposition:       1. The patient was advised to F/U in 1-2 weeks to assess the response to the injection.      2. The patient was also instructed to contact me immediately if there were any concerns related to the procedure performed.    Ayad Cuellar DO

## 2024-01-01 ENCOUNTER — INPATIENT (INPATIENT)
Facility: HOSPITAL | Age: 79
LOS: 2 days | DRG: 871 | End: 2024-06-19
Attending: INTERNAL MEDICINE | Admitting: STUDENT IN AN ORGANIZED HEALTH CARE EDUCATION/TRAINING PROGRAM
Payer: MEDICARE

## 2024-01-01 ENCOUNTER — INPATIENT (INPATIENT)
Facility: HOSPITAL | Age: 79
LOS: 14 days | Discharge: NURSING, REHAB OR LONG-TERM CARE | DRG: 682 | End: 2024-05-29
Attending: INTERNAL MEDICINE | Admitting: INTERNAL MEDICINE
Payer: MEDICARE

## 2024-01-01 ENCOUNTER — TRANSCRIPTION ENCOUNTER (OUTPATIENT)
Age: 79
End: 2024-01-01

## 2024-01-01 ENCOUNTER — RESULT CHARGE (OUTPATIENT)
Age: 79
End: 2024-01-01

## 2024-01-01 ENCOUNTER — INPATIENT (INPATIENT)
Facility: HOSPITAL | Age: 79
LOS: 3 days | Discharge: HOME HEALTH | DRG: 638 | End: 2024-04-21
Attending: INTERNAL MEDICINE | Admitting: FAMILY MEDICINE
Payer: MEDICARE

## 2024-01-01 ENCOUNTER — INPATIENT (INPATIENT)
Facility: HOSPITAL | Age: 79
LOS: 4 days | Discharge: NURSING, REHAB OR LONG-TERM CARE | DRG: 556 | End: 2024-01-23
Attending: INTERNAL MEDICINE | Admitting: STUDENT IN AN ORGANIZED HEALTH CARE EDUCATION/TRAINING PROGRAM
Payer: MEDICARE

## 2024-01-01 ENCOUNTER — INPATIENT (INPATIENT)
Facility: HOSPITAL | Age: 79
LOS: 12 days | Discharge: HOME HEALTH | DRG: 291 | End: 2024-01-18
Attending: HOSPITALIST | Admitting: INTERNAL MEDICINE
Payer: MEDICARE

## 2024-01-01 ENCOUNTER — RESULT REVIEW (OUTPATIENT)
Age: 79
End: 2024-01-01

## 2024-01-01 ENCOUNTER — APPOINTMENT (OUTPATIENT)
Dept: CARDIOLOGY | Facility: CLINIC | Age: 79
End: 2024-01-01

## 2024-01-01 ENCOUNTER — APPOINTMENT (OUTPATIENT)
Dept: NEUROSURGERY | Facility: CLINIC | Age: 79
End: 2024-01-01

## 2024-01-01 ENCOUNTER — INPATIENT (INPATIENT)
Facility: HOSPITAL | Age: 79
LOS: 1 days | Discharge: HOME HEALTH | DRG: 552 | End: 2024-05-11
Attending: SURGERY | Admitting: SURGERY
Payer: MEDICARE

## 2024-01-01 ENCOUNTER — RX RENEWAL (OUTPATIENT)
Age: 79
End: 2024-01-01

## 2024-01-01 ENCOUNTER — APPOINTMENT (OUTPATIENT)
Dept: PAIN MANAGEMENT | Facility: CLINIC | Age: 79
End: 2024-01-01

## 2024-01-01 ENCOUNTER — INPATIENT (INPATIENT)
Facility: HOSPITAL | Age: 79
LOS: 2 days | Discharge: NURSING, REHAB OR LONG-TERM CARE | DRG: 641 | End: 2024-02-08
Attending: INTERNAL MEDICINE | Admitting: HOSPITALIST
Payer: MEDICARE

## 2024-01-01 VITALS
TEMPERATURE: 98 F | RESPIRATION RATE: 18 BRPM | DIASTOLIC BLOOD PRESSURE: 61 MMHG | SYSTOLIC BLOOD PRESSURE: 100 MMHG | HEART RATE: 69 BPM

## 2024-01-01 VITALS
RESPIRATION RATE: 18 BRPM | OXYGEN SATURATION: 98 % | WEIGHT: 293 LBS | SYSTOLIC BLOOD PRESSURE: 146 MMHG | DIASTOLIC BLOOD PRESSURE: 83 MMHG | HEART RATE: 104 BPM | TEMPERATURE: 98 F

## 2024-01-01 VITALS
SYSTOLIC BLOOD PRESSURE: 99 MMHG | HEART RATE: 122 BPM | TEMPERATURE: 98 F | OXYGEN SATURATION: 99 % | DIASTOLIC BLOOD PRESSURE: 72 MMHG | RESPIRATION RATE: 18 BRPM | HEIGHT: 66 IN | WEIGHT: 250 LBS

## 2024-01-01 VITALS — TEMPERATURE: 99 F | DIASTOLIC BLOOD PRESSURE: 71 MMHG | HEART RATE: 87 BPM | SYSTOLIC BLOOD PRESSURE: 119 MMHG

## 2024-01-01 VITALS
SYSTOLIC BLOOD PRESSURE: 145 MMHG | TEMPERATURE: 99 F | DIASTOLIC BLOOD PRESSURE: 85 MMHG | HEART RATE: 98 BPM | RESPIRATION RATE: 25 BRPM | OXYGEN SATURATION: 100 %

## 2024-01-01 VITALS
RESPIRATION RATE: 18 BRPM | TEMPERATURE: 97 F | HEART RATE: 75 BPM | SYSTOLIC BLOOD PRESSURE: 100 MMHG | OXYGEN SATURATION: 100 % | DIASTOLIC BLOOD PRESSURE: 49 MMHG

## 2024-01-01 VITALS
OXYGEN SATURATION: 97 % | TEMPERATURE: 99 F | HEIGHT: 66 IN | RESPIRATION RATE: 18 BRPM | DIASTOLIC BLOOD PRESSURE: 70 MMHG | SYSTOLIC BLOOD PRESSURE: 140 MMHG | HEART RATE: 102 BPM | WEIGHT: 214.95 LBS

## 2024-01-01 VITALS
WEIGHT: 248.9 LBS | DIASTOLIC BLOOD PRESSURE: 75 MMHG | RESPIRATION RATE: 18 BRPM | SYSTOLIC BLOOD PRESSURE: 109 MMHG | HEIGHT: 66 IN | HEART RATE: 76 BPM | OXYGEN SATURATION: 96 % | TEMPERATURE: 96 F

## 2024-01-01 VITALS — WEIGHT: 265.88 LBS

## 2024-01-01 VITALS
WEIGHT: 235.01 LBS | RESPIRATION RATE: 20 BRPM | SYSTOLIC BLOOD PRESSURE: 162 MMHG | OXYGEN SATURATION: 99 % | TEMPERATURE: 98 F | DIASTOLIC BLOOD PRESSURE: 70 MMHG | HEART RATE: 75 BPM

## 2024-01-01 VITALS
DIASTOLIC BLOOD PRESSURE: 69 MMHG | HEART RATE: 119 BPM | TEMPERATURE: 98 F | SYSTOLIC BLOOD PRESSURE: 113 MMHG | WEIGHT: 248.9 LBS | RESPIRATION RATE: 22 BRPM | OXYGEN SATURATION: 100 %

## 2024-01-01 VITALS
HEART RATE: 109 BPM | SYSTOLIC BLOOD PRESSURE: 109 MMHG | RESPIRATION RATE: 18 BRPM | DIASTOLIC BLOOD PRESSURE: 68 MMHG | OXYGEN SATURATION: 98 % | TEMPERATURE: 97 F

## 2024-01-01 VITALS
HEART RATE: 170 BPM | RESPIRATION RATE: 26 BRPM | TEMPERATURE: 95 F | DIASTOLIC BLOOD PRESSURE: 59 MMHG | SYSTOLIC BLOOD PRESSURE: 87 MMHG | OXYGEN SATURATION: 100 % | WEIGHT: 293 LBS | HEIGHT: 66 IN

## 2024-01-01 VITALS
HEART RATE: 86 BPM | DIASTOLIC BLOOD PRESSURE: 80 MMHG | TEMPERATURE: 97 F | OXYGEN SATURATION: 96 % | RESPIRATION RATE: 18 BRPM | SYSTOLIC BLOOD PRESSURE: 114 MMHG

## 2024-01-01 DIAGNOSIS — N18.30 CHRONIC KIDNEY DISEASE, STAGE 3 UNSPECIFIED: ICD-10-CM

## 2024-01-01 DIAGNOSIS — Z90.710 ACQUIRED ABSENCE OF BOTH CERVIX AND UTERUS: ICD-10-CM

## 2024-01-01 DIAGNOSIS — Z91.81 HISTORY OF FALLING: ICD-10-CM

## 2024-01-01 DIAGNOSIS — D63.1 ANEMIA IN CHRONIC KIDNEY DISEASE: ICD-10-CM

## 2024-01-01 DIAGNOSIS — Z51.5 ENCOUNTER FOR PALLIATIVE CARE: ICD-10-CM

## 2024-01-01 DIAGNOSIS — R26.2 DIFFICULTY IN WALKING, NOT ELSEWHERE CLASSIFIED: ICD-10-CM

## 2024-01-01 DIAGNOSIS — E11.621 TYPE 2 DIABETES MELLITUS WITH FOOT ULCER: ICD-10-CM

## 2024-01-01 DIAGNOSIS — Z95.2 PRESENCE OF PROSTHETIC HEART VALVE: ICD-10-CM

## 2024-01-01 DIAGNOSIS — Z79.899 OTHER LONG TERM (CURRENT) DRUG THERAPY: ICD-10-CM

## 2024-01-01 DIAGNOSIS — F32.A DEPRESSION, UNSPECIFIED: ICD-10-CM

## 2024-01-01 DIAGNOSIS — I12.9 HYPERTENSIVE CHRONIC KIDNEY DISEASE WITH STAGE 1 THROUGH STAGE 4 CHRONIC KIDNEY DISEASE, OR UNSPECIFIED CHRONIC KIDNEY DISEASE: ICD-10-CM

## 2024-01-01 DIAGNOSIS — E11.9 TYPE 2 DIABETES MELLITUS WITHOUT COMPLICATIONS: ICD-10-CM

## 2024-01-01 DIAGNOSIS — R71.0 PRECIPITOUS DROP IN HEMATOCRIT: ICD-10-CM

## 2024-01-01 DIAGNOSIS — Z98.890 OTHER SPECIFIED POSTPROCEDURAL STATES: ICD-10-CM

## 2024-01-01 DIAGNOSIS — I21.A1 MYOCARDIAL INFARCTION TYPE 2: ICD-10-CM

## 2024-01-01 DIAGNOSIS — Z90.710 ACQUIRED ABSENCE OF BOTH CERVIX AND UTERUS: Chronic | ICD-10-CM

## 2024-01-01 DIAGNOSIS — I25.10 ATHEROSCLEROTIC HEART DISEASE OF NATIVE CORONARY ARTERY WITHOUT ANGINA PECTORIS: ICD-10-CM

## 2024-01-01 DIAGNOSIS — Z96.651 PRESENCE OF RIGHT ARTIFICIAL KNEE JOINT: Chronic | ICD-10-CM

## 2024-01-01 DIAGNOSIS — S80.211A ABRASION, RIGHT KNEE, INITIAL ENCOUNTER: ICD-10-CM

## 2024-01-01 DIAGNOSIS — N17.9 ACUTE KIDNEY FAILURE, UNSPECIFIED: ICD-10-CM

## 2024-01-01 DIAGNOSIS — E83.42 HYPOMAGNESEMIA: ICD-10-CM

## 2024-01-01 DIAGNOSIS — J45.909 UNSPECIFIED ASTHMA, UNCOMPLICATED: ICD-10-CM

## 2024-01-01 DIAGNOSIS — D50.9 IRON DEFICIENCY ANEMIA, UNSPECIFIED: ICD-10-CM

## 2024-01-01 DIAGNOSIS — K43.9 VENTRAL HERNIA WITHOUT OBSTRUCTION OR GANGRENE: ICD-10-CM

## 2024-01-01 DIAGNOSIS — I13.0 HYPERTENSIVE HEART AND CHRONIC KIDNEY DISEASE WITH HEART FAILURE AND STAGE 1 THROUGH STAGE 4 CHRONIC KIDNEY DISEASE, OR UNSPECIFIED CHRONIC KIDNEY DISEASE: ICD-10-CM

## 2024-01-01 DIAGNOSIS — A41.9 SEPSIS, UNSPECIFIED ORGANISM: ICD-10-CM

## 2024-01-01 DIAGNOSIS — Z86.2 PERSONAL HISTORY OF DISEASES OF THE BLOOD AND BLOOD-FORMING ORGANS AND CERTAIN DISORDERS INVOLVING THE IMMUNE MECHANISM: ICD-10-CM

## 2024-01-01 DIAGNOSIS — Z87.891 PERSONAL HISTORY OF NICOTINE DEPENDENCE: ICD-10-CM

## 2024-01-01 DIAGNOSIS — Z79.52 LONG TERM (CURRENT) USE OF SYSTEMIC STEROIDS: ICD-10-CM

## 2024-01-01 DIAGNOSIS — K72.00 ACUTE AND SUBACUTE HEPATIC FAILURE WITHOUT COMA: ICD-10-CM

## 2024-01-01 DIAGNOSIS — E11.649 TYPE 2 DIABETES MELLITUS WITH HYPOGLYCEMIA WITHOUT COMA: ICD-10-CM

## 2024-01-01 DIAGNOSIS — E87.6 HYPOKALEMIA: ICD-10-CM

## 2024-01-01 DIAGNOSIS — Z88.0 ALLERGY STATUS TO PENICILLIN: ICD-10-CM

## 2024-01-01 DIAGNOSIS — J44.9 CHRONIC OBSTRUCTIVE PULMONARY DISEASE, UNSPECIFIED: ICD-10-CM

## 2024-01-01 DIAGNOSIS — Z79.84 LONG TERM (CURRENT) USE OF ORAL HYPOGLYCEMIC DRUGS: ICD-10-CM

## 2024-01-01 DIAGNOSIS — Z95.1 PRESENCE OF AORTOCORONARY BYPASS GRAFT: Chronic | ICD-10-CM

## 2024-01-01 DIAGNOSIS — D84.89 OTHER IMMUNODEFICIENCIES: ICD-10-CM

## 2024-01-01 DIAGNOSIS — D17.71 BENIGN LIPOMATOUS NEOPLASM OF KIDNEY: ICD-10-CM

## 2024-01-01 DIAGNOSIS — E87.1 HYPO-OSMOLALITY AND HYPONATREMIA: ICD-10-CM

## 2024-01-01 DIAGNOSIS — N18.31 CHRONIC KIDNEY DISEASE, STAGE 3A: ICD-10-CM

## 2024-01-01 DIAGNOSIS — Z95.818 PRESENCE OF OTHER CARDIAC IMPLANTS AND GRAFTS: Chronic | ICD-10-CM

## 2024-01-01 DIAGNOSIS — Z95.1 PRESENCE OF AORTOCORONARY BYPASS GRAFT: ICD-10-CM

## 2024-01-01 DIAGNOSIS — G47.33 OBSTRUCTIVE SLEEP APNEA (ADULT) (PEDIATRIC): ICD-10-CM

## 2024-01-01 DIAGNOSIS — E78.5 HYPERLIPIDEMIA, UNSPECIFIED: ICD-10-CM

## 2024-01-01 DIAGNOSIS — N39.0 URINARY TRACT INFECTION, SITE NOT SPECIFIED: ICD-10-CM

## 2024-01-01 DIAGNOSIS — Z96.653 PRESENCE OF ARTIFICIAL KNEE JOINT, BILATERAL: ICD-10-CM

## 2024-01-01 DIAGNOSIS — L03.90 CELLULITIS, UNSPECIFIED: ICD-10-CM

## 2024-01-01 DIAGNOSIS — E11.65 TYPE 2 DIABETES MELLITUS WITH HYPERGLYCEMIA: ICD-10-CM

## 2024-01-01 DIAGNOSIS — I50.32 CHRONIC DIASTOLIC (CONGESTIVE) HEART FAILURE: ICD-10-CM

## 2024-01-01 DIAGNOSIS — E11.22 TYPE 2 DIABETES MELLITUS WITH DIABETIC CHRONIC KIDNEY DISEASE: ICD-10-CM

## 2024-01-01 DIAGNOSIS — I48.20 CHRONIC ATRIAL FIBRILLATION, UNSPECIFIED: ICD-10-CM

## 2024-01-01 DIAGNOSIS — L97.529 NON-PRESSURE CHRONIC ULCER OF OTHER PART OF LEFT FOOT WITH UNSPECIFIED SEVERITY: ICD-10-CM

## 2024-01-01 DIAGNOSIS — R11.0 NAUSEA: ICD-10-CM

## 2024-01-01 DIAGNOSIS — R57.0 CARDIOGENIC SHOCK: ICD-10-CM

## 2024-01-01 DIAGNOSIS — Z88.1 ALLERGY STATUS TO OTHER ANTIBIOTIC AGENTS STATUS: ICD-10-CM

## 2024-01-01 DIAGNOSIS — Z79.82 LONG TERM (CURRENT) USE OF ASPIRIN: ICD-10-CM

## 2024-01-01 DIAGNOSIS — R41.82 ALTERED MENTAL STATUS, UNSPECIFIED: ICD-10-CM

## 2024-01-01 DIAGNOSIS — Z96.651 PRESENCE OF RIGHT ARTIFICIAL KNEE JOINT: ICD-10-CM

## 2024-01-01 DIAGNOSIS — M10.9 GOUT, UNSPECIFIED: ICD-10-CM

## 2024-01-01 DIAGNOSIS — Z66 DO NOT RESUSCITATE: ICD-10-CM

## 2024-01-01 DIAGNOSIS — S12.111A POSTERIOR DISPLACED TYPE II DENS FRACTURE, INITIAL ENCOUNTER FOR CLOSED FRACTURE: ICD-10-CM

## 2024-01-01 DIAGNOSIS — J96.01 ACUTE RESPIRATORY FAILURE WITH HYPOXIA: ICD-10-CM

## 2024-01-01 DIAGNOSIS — Z95.828 PRESENCE OF OTHER VASCULAR IMPLANTS AND GRAFTS: Chronic | ICD-10-CM

## 2024-01-01 DIAGNOSIS — I50.810 RIGHT HEART FAILURE, UNSPECIFIED: ICD-10-CM

## 2024-01-01 DIAGNOSIS — I95.9 HYPOTENSION, UNSPECIFIED: ICD-10-CM

## 2024-01-01 DIAGNOSIS — I73.9 PERIPHERAL VASCULAR DISEASE, UNSPECIFIED: ICD-10-CM

## 2024-01-01 DIAGNOSIS — E11.40 TYPE 2 DIABETES MELLITUS WITH DIABETIC NEUROPATHY, UNSPECIFIED: ICD-10-CM

## 2024-01-01 DIAGNOSIS — D68.9 COAGULATION DEFECT, UNSPECIFIED: ICD-10-CM

## 2024-01-01 DIAGNOSIS — N17.0 ACUTE KIDNEY FAILURE WITH TUBULAR NECROSIS: ICD-10-CM

## 2024-01-01 DIAGNOSIS — I35.0 NONRHEUMATIC AORTIC (VALVE) STENOSIS: ICD-10-CM

## 2024-01-01 DIAGNOSIS — I87.2 VENOUS INSUFFICIENCY (CHRONIC) (PERIPHERAL): ICD-10-CM

## 2024-01-01 DIAGNOSIS — L03.116 CELLULITIS OF LEFT LOWER LIMB: ICD-10-CM

## 2024-01-01 DIAGNOSIS — Z95.3 PRESENCE OF XENOGENIC HEART VALVE: ICD-10-CM

## 2024-01-01 DIAGNOSIS — R53.1 WEAKNESS: ICD-10-CM

## 2024-01-01 DIAGNOSIS — S12.100A UNSPECIFIED DISPLACED FRACTURE OF SECOND CERVICAL VERTEBRA, INITIAL ENCOUNTER FOR CLOSED FRACTURE: ICD-10-CM

## 2024-01-01 DIAGNOSIS — R23.8 OTHER SKIN CHANGES: ICD-10-CM

## 2024-01-01 DIAGNOSIS — Z20.822 CONTACT WITH AND (SUSPECTED) EXPOSURE TO COVID-19: ICD-10-CM

## 2024-01-01 DIAGNOSIS — E87.3 ALKALOSIS: ICD-10-CM

## 2024-01-01 DIAGNOSIS — S01.511A LACERATION WITHOUT FOREIGN BODY OF LIP, INITIAL ENCOUNTER: ICD-10-CM
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
## 2024-01-01 DIAGNOSIS — J45.901 UNSPECIFIED ASTHMA WITH (ACUTE) EXACERBATION: ICD-10-CM

## 2024-01-01 DIAGNOSIS — L97.919 NON-PRESSURE CHRONIC ULCER OF UNSPECIFIED PART OF RIGHT LOWER LEG WITH UNSPECIFIED SEVERITY: ICD-10-CM

## 2024-01-01 DIAGNOSIS — Z91.119 PATIENT'S NONCOMPLIANCE WITH DIETARY REGIMEN DUE TO UNSPECIFIED REASON: ICD-10-CM

## 2024-01-01 DIAGNOSIS — N18.32 CHRONIC KIDNEY DISEASE, STAGE 3B: ICD-10-CM

## 2024-01-01 DIAGNOSIS — I27.20 PULMONARY HYPERTENSION, UNSPECIFIED: ICD-10-CM

## 2024-01-01 DIAGNOSIS — J10.1 INFLUENZA DUE TO OTHER IDENTIFIED INFLUENZA VIRUS WITH OTHER RESPIRATORY MANIFESTATIONS: ICD-10-CM

## 2024-01-01 DIAGNOSIS — I50.30 UNSPECIFIED DIASTOLIC (CONGESTIVE) HEART FAILURE: ICD-10-CM

## 2024-01-01 DIAGNOSIS — K21.9 GASTRO-ESOPHAGEAL REFLUX DISEASE WITHOUT ESOPHAGITIS: ICD-10-CM

## 2024-01-01 DIAGNOSIS — I48.91 UNSPECIFIED ATRIAL FIBRILLATION: ICD-10-CM

## 2024-01-01 DIAGNOSIS — E11.610 TYPE 2 DIABETES MELLITUS WITH DIABETIC NEUROPATHIC ARTHROPATHY: ICD-10-CM

## 2024-01-01 DIAGNOSIS — K59.00 CONSTIPATION, UNSPECIFIED: ICD-10-CM

## 2024-01-01 DIAGNOSIS — I50.33 ACUTE ON CHRONIC DIASTOLIC (CONGESTIVE) HEART FAILURE: ICD-10-CM

## 2024-01-01 DIAGNOSIS — I48.0 PAROXYSMAL ATRIAL FIBRILLATION: ICD-10-CM
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
## 2024-01-01 DIAGNOSIS — S12.091A OTHER NONDISPLACED FRACTURE OF FIRST CERVICAL VERTEBRA, INITIAL ENCOUNTER FOR CLOSED FRACTURE: ICD-10-CM

## 2024-01-01 DIAGNOSIS — D64.9 ANEMIA, UNSPECIFIED: ICD-10-CM

## 2024-01-01 DIAGNOSIS — E87.5 HYPERKALEMIA: ICD-10-CM

## 2024-01-01 DIAGNOSIS — I10 ESSENTIAL (PRIMARY) HYPERTENSION: ICD-10-CM
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
## 2024-01-01 DIAGNOSIS — E66.2 MORBID (SEVERE) OBESITY WITH ALVEOLAR HYPOVENTILATION: ICD-10-CM

## 2024-01-01 DIAGNOSIS — I50.9 HEART FAILURE, UNSPECIFIED: ICD-10-CM
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
## 2024-01-01 DIAGNOSIS — E11.51 TYPE 2 DIABETES MELLITUS WITH DIABETIC PERIPHERAL ANGIOPATHY WITHOUT GANGRENE: ICD-10-CM

## 2024-01-01 DIAGNOSIS — Z79.51 LONG TERM (CURRENT) USE OF INHALED STEROIDS: ICD-10-CM

## 2024-01-01 LAB
-  AMPICILLIN: SIGNIFICANT CHANGE UP
-  CIPROFLOXACIN: SIGNIFICANT CHANGE UP
-  LEVOFLOXACIN: SIGNIFICANT CHANGE UP
-  NITROFURANTOIN: SIGNIFICANT CHANGE UP
-  STAPHYLOCOCCUS EPIDERMIDIS, METHICILLIN RESISTANT: SIGNIFICANT CHANGE UP
-  TETRACYCLINE: SIGNIFICANT CHANGE UP
-  VANCOMYCIN: SIGNIFICANT CHANGE UP
A1C WITH ESTIMATED AVERAGE GLUCOSE RESULT: 5 % — SIGNIFICANT CHANGE UP (ref 4–5.6)
A1C WITH ESTIMATED AVERAGE GLUCOSE RESULT: 5 % — SIGNIFICANT CHANGE UP (ref 4–5.6)
A1C WITH ESTIMATED AVERAGE GLUCOSE RESULT: 7.2 % — HIGH (ref 4–5.6)
A1C WITH ESTIMATED AVERAGE GLUCOSE RESULT: 7.4 % — HIGH (ref 4–5.6)
ALBUMIN SERPL ELPH-MCNC: 2.9 G/DL — LOW (ref 3.5–5.2)
ALBUMIN SERPL ELPH-MCNC: 3 G/DL — LOW (ref 3.5–5.2)
ALBUMIN SERPL ELPH-MCNC: 3 G/DL — LOW (ref 3.5–5.2)
ALBUMIN SERPL ELPH-MCNC: 3.1 G/DL — LOW (ref 3.5–5.2)
ALBUMIN SERPL ELPH-MCNC: 3.2 G/DL — LOW (ref 3.5–5.2)
ALBUMIN SERPL ELPH-MCNC: 3.3 G/DL — LOW (ref 3.5–5.2)
ALBUMIN SERPL ELPH-MCNC: 3.4 G/DL — LOW (ref 3.5–5.2)
ALBUMIN SERPL ELPH-MCNC: 3.5 G/DL — SIGNIFICANT CHANGE UP (ref 3.5–5.2)
ALBUMIN SERPL ELPH-MCNC: 3.5 G/DL — SIGNIFICANT CHANGE UP (ref 3.5–5.2)
ALBUMIN SERPL ELPH-MCNC: 3.6 G/DL — SIGNIFICANT CHANGE UP (ref 3.5–5.2)
ALBUMIN SERPL ELPH-MCNC: 3.7 G/DL — SIGNIFICANT CHANGE UP (ref 3.5–5.2)
ALBUMIN SERPL ELPH-MCNC: 3.8 G/DL — SIGNIFICANT CHANGE UP (ref 3.5–5.2)
ALBUMIN SERPL ELPH-MCNC: 3.8 G/DL — SIGNIFICANT CHANGE UP (ref 3.5–5.2)
ALBUMIN SERPL ELPH-MCNC: 3.9 G/DL — SIGNIFICANT CHANGE UP (ref 3.5–5.2)
ALBUMIN SERPL ELPH-MCNC: 4.1 G/DL — SIGNIFICANT CHANGE UP (ref 3.5–5.2)
ALP SERPL-CCNC: 106 U/L — SIGNIFICANT CHANGE UP (ref 30–115)
ALP SERPL-CCNC: 108 U/L — SIGNIFICANT CHANGE UP (ref 30–115)
ALP SERPL-CCNC: 111 U/L — SIGNIFICANT CHANGE UP (ref 30–115)
ALP SERPL-CCNC: 118 U/L — HIGH (ref 30–115)
ALP SERPL-CCNC: 124 U/L — HIGH (ref 30–115)
ALP SERPL-CCNC: 131 U/L — HIGH (ref 30–115)
ALP SERPL-CCNC: 131 U/L — HIGH (ref 30–115)
ALP SERPL-CCNC: 132 U/L — HIGH (ref 30–115)
ALP SERPL-CCNC: 135 U/L — HIGH (ref 30–115)
ALP SERPL-CCNC: 137 U/L — HIGH (ref 30–115)
ALP SERPL-CCNC: 139 U/L — HIGH (ref 30–115)
ALP SERPL-CCNC: 144 U/L — HIGH (ref 30–115)
ALP SERPL-CCNC: 147 U/L — HIGH (ref 30–115)
ALP SERPL-CCNC: 149 U/L — HIGH (ref 30–115)
ALP SERPL-CCNC: 151 U/L — HIGH (ref 30–115)
ALP SERPL-CCNC: 51 U/L — SIGNIFICANT CHANGE UP (ref 30–115)
ALP SERPL-CCNC: 60 U/L — SIGNIFICANT CHANGE UP (ref 30–115)
ALP SERPL-CCNC: 61 U/L — SIGNIFICANT CHANGE UP (ref 30–115)
ALP SERPL-CCNC: 61 U/L — SIGNIFICANT CHANGE UP (ref 30–115)
ALP SERPL-CCNC: 62 U/L — SIGNIFICANT CHANGE UP (ref 30–115)
ALP SERPL-CCNC: 65 U/L — SIGNIFICANT CHANGE UP (ref 30–115)
ALP SERPL-CCNC: 70 U/L — SIGNIFICANT CHANGE UP (ref 30–115)
ALP SERPL-CCNC: 73 U/L — SIGNIFICANT CHANGE UP (ref 30–115)
ALP SERPL-CCNC: 73 U/L — SIGNIFICANT CHANGE UP (ref 30–115)
ALP SERPL-CCNC: 80 U/L — SIGNIFICANT CHANGE UP (ref 30–115)
ALP SERPL-CCNC: 84 U/L — SIGNIFICANT CHANGE UP (ref 30–115)
ALP SERPL-CCNC: 84 U/L — SIGNIFICANT CHANGE UP (ref 30–115)
ALP SERPL-CCNC: 89 U/L — SIGNIFICANT CHANGE UP (ref 30–115)
ALP SERPL-CCNC: 89 U/L — SIGNIFICANT CHANGE UP (ref 30–115)
ALT FLD-CCNC: 11 U/L — SIGNIFICANT CHANGE UP (ref 0–41)
ALT FLD-CCNC: 12 U/L — SIGNIFICANT CHANGE UP (ref 0–41)
ALT FLD-CCNC: 12 U/L — SIGNIFICANT CHANGE UP (ref 0–41)
ALT FLD-CCNC: 13 U/L — SIGNIFICANT CHANGE UP (ref 0–41)
ALT FLD-CCNC: 13 U/L — SIGNIFICANT CHANGE UP (ref 0–41)
ALT FLD-CCNC: 14 U/L — SIGNIFICANT CHANGE UP (ref 0–41)
ALT FLD-CCNC: 15 U/L — SIGNIFICANT CHANGE UP (ref 0–41)
ALT FLD-CCNC: 15 U/L — SIGNIFICANT CHANGE UP (ref 0–41)
ALT FLD-CCNC: 16 U/L — SIGNIFICANT CHANGE UP (ref 0–41)
ALT FLD-CCNC: 17 U/L — SIGNIFICANT CHANGE UP (ref 0–41)
ALT FLD-CCNC: 17 U/L — SIGNIFICANT CHANGE UP (ref 0–41)
ALT FLD-CCNC: 18 U/L — SIGNIFICANT CHANGE UP (ref 0–41)
ALT FLD-CCNC: 18 U/L — SIGNIFICANT CHANGE UP (ref 0–41)
ALT FLD-CCNC: 197 U/L — HIGH (ref 0–41)
ALT FLD-CCNC: 20 U/L — SIGNIFICANT CHANGE UP (ref 0–41)
ALT FLD-CCNC: 22 U/L — SIGNIFICANT CHANGE UP (ref 0–41)
ALT FLD-CCNC: 261 U/L — HIGH (ref 0–41)
ALT FLD-CCNC: 34 U/L — SIGNIFICANT CHANGE UP (ref 0–41)
ALT FLD-CCNC: 430 U/L — HIGH (ref 0–41)
ALT FLD-CCNC: 492 U/L — HIGH (ref 0–41)
ALT FLD-CCNC: 52 U/L — HIGH (ref 0–41)
ALT FLD-CCNC: 537 U/L — HIGH (ref 0–41)
ALT FLD-CCNC: 592 U/L — HIGH (ref 0–41)
ALT FLD-CCNC: 64 U/L — HIGH (ref 0–41)
ALT FLD-CCNC: 647 U/L — HIGH (ref 0–41)
ALT FLD-CCNC: 657 U/L — HIGH (ref 0–41)
ALT FLD-CCNC: 667 U/L — HIGH (ref 0–41)
ALT FLD-CCNC: 9 U/L — SIGNIFICANT CHANGE UP (ref 0–41)
ALT FLD-CCNC: 99 U/L — HIGH (ref 0–41)
AMMONIA BLD-MCNC: 195 UMOL/L — CRITICAL HIGH (ref 11–55)
AMMONIA BLD-MCNC: 27 UMOL/L — SIGNIFICANT CHANGE UP (ref 11–55)
ANION GAP SERPL CALC-SCNC: 10 MMOL/L — SIGNIFICANT CHANGE UP (ref 7–14)
ANION GAP SERPL CALC-SCNC: 11 MMOL/L — SIGNIFICANT CHANGE UP (ref 7–14)
ANION GAP SERPL CALC-SCNC: 12 MMOL/L — SIGNIFICANT CHANGE UP (ref 7–14)
ANION GAP SERPL CALC-SCNC: 13 MMOL/L — SIGNIFICANT CHANGE UP (ref 7–14)
ANION GAP SERPL CALC-SCNC: 14 MMOL/L — SIGNIFICANT CHANGE UP (ref 7–14)
ANION GAP SERPL CALC-SCNC: 15 MMOL/L — HIGH (ref 7–14)
ANION GAP SERPL CALC-SCNC: 16 MMOL/L — HIGH (ref 7–14)
ANION GAP SERPL CALC-SCNC: 17 MMOL/L — HIGH (ref 7–14)
ANION GAP SERPL CALC-SCNC: 18 MMOL/L — HIGH (ref 7–14)
ANION GAP SERPL CALC-SCNC: 19 MMOL/L — HIGH (ref 7–14)
ANION GAP SERPL CALC-SCNC: 19 MMOL/L — HIGH (ref 7–14)
ANION GAP SERPL CALC-SCNC: 20 MMOL/L — HIGH (ref 7–14)
ANION GAP SERPL CALC-SCNC: 21 MMOL/L — HIGH (ref 7–14)
ANION GAP SERPL CALC-SCNC: 22 MMOL/L — HIGH (ref 7–14)
ANION GAP SERPL CALC-SCNC: 22 MMOL/L — HIGH (ref 7–14)
ANION GAP SERPL CALC-SCNC: 7 MMOL/L — SIGNIFICANT CHANGE UP (ref 7–14)
ANION GAP SERPL CALC-SCNC: 9 MMOL/L — SIGNIFICANT CHANGE UP (ref 7–14)
ANION GAP SERPL CALC-SCNC: 9 MMOL/L — SIGNIFICANT CHANGE UP (ref 7–14)
APPEARANCE UR: ABNORMAL
APPEARANCE UR: CLEAR — SIGNIFICANT CHANGE UP
APTT BLD: 28.5 SEC — SIGNIFICANT CHANGE UP (ref 27–39.2)
APTT BLD: 29.1 SEC — SIGNIFICANT CHANGE UP (ref 27–39.2)
APTT BLD: 30 SEC — SIGNIFICANT CHANGE UP (ref 27–39.2)
APTT BLD: 31.4 SEC — SIGNIFICANT CHANGE UP (ref 27–39.2)
APTT BLD: 31.4 SEC — SIGNIFICANT CHANGE UP (ref 27–39.2)
APTT BLD: 37.7 SEC — SIGNIFICANT CHANGE UP (ref 27–39.2)
AST SERPL-CCNC: 1105 U/L — HIGH (ref 0–41)
AST SERPL-CCNC: 18 U/L — SIGNIFICANT CHANGE UP (ref 0–41)
AST SERPL-CCNC: 20 U/L — SIGNIFICANT CHANGE UP (ref 0–41)
AST SERPL-CCNC: 21 U/L — SIGNIFICANT CHANGE UP (ref 0–41)
AST SERPL-CCNC: 23 U/L — SIGNIFICANT CHANGE UP (ref 0–41)
AST SERPL-CCNC: 23 U/L — SIGNIFICANT CHANGE UP (ref 0–41)
AST SERPL-CCNC: 231 U/L — HIGH (ref 0–41)
AST SERPL-CCNC: 24 U/L — SIGNIFICANT CHANGE UP (ref 0–41)
AST SERPL-CCNC: 27 U/L — SIGNIFICANT CHANGE UP (ref 0–41)
AST SERPL-CCNC: 32 U/L — SIGNIFICANT CHANGE UP (ref 0–41)
AST SERPL-CCNC: 356 U/L — HIGH (ref 0–41)
AST SERPL-CCNC: 39 U/L — SIGNIFICANT CHANGE UP (ref 0–41)
AST SERPL-CCNC: 41 U/L — SIGNIFICANT CHANGE UP (ref 0–41)
AST SERPL-CCNC: 42 U/L — HIGH (ref 0–41)
AST SERPL-CCNC: 506 U/L — HIGH (ref 0–41)
AST SERPL-CCNC: 68 U/L — HIGH (ref 0–41)
AST SERPL-CCNC: 730 U/L — HIGH (ref 0–41)
AST SERPL-CCNC: 74 U/L — HIGH (ref 0–41)
AST SERPL-CCNC: 843 U/L — HIGH (ref 0–41)
AST SERPL-CCNC: 985 U/L — HIGH (ref 0–41)
B-OH-BUTYR SERPL-SCNC: <0.2 MMOL/L — SIGNIFICANT CHANGE UP
BASE EXCESS BLDA CALC-SCNC: -2.3 MMOL/L — LOW (ref -2–3)
BASE EXCESS BLDA CALC-SCNC: 10.7 MMOL/L — HIGH (ref -2–3)
BASE EXCESS BLDA CALC-SCNC: 10.7 MMOL/L — HIGH (ref -2–3)
BASE EXCESS BLDV CALC-SCNC: -1.7 MMOL/L — SIGNIFICANT CHANGE UP (ref -2–3)
BASE EXCESS BLDV CALC-SCNC: -4.9 MMOL/L — LOW (ref -2–3)
BASE EXCESS BLDV CALC-SCNC: 10.9 MMOL/L — HIGH (ref -2–3)
BASE EXCESS BLDV CALC-SCNC: 4.7 MMOL/L — HIGH (ref -2–3)
BASOPHILS # BLD AUTO: 0 K/UL — SIGNIFICANT CHANGE UP (ref 0–0.2)
BASOPHILS # BLD AUTO: 0.01 K/UL — SIGNIFICANT CHANGE UP (ref 0–0.2)
BASOPHILS # BLD AUTO: 0.02 K/UL — SIGNIFICANT CHANGE UP (ref 0–0.2)
BASOPHILS # BLD AUTO: 0.03 K/UL — SIGNIFICANT CHANGE UP (ref 0–0.2)
BASOPHILS # BLD AUTO: 0.05 K/UL — SIGNIFICANT CHANGE UP (ref 0–0.2)
BASOPHILS NFR BLD AUTO: 0 % — SIGNIFICANT CHANGE UP (ref 0–1)
BASOPHILS NFR BLD AUTO: 0.1 % — SIGNIFICANT CHANGE UP (ref 0–1)
BASOPHILS NFR BLD AUTO: 0.2 % — SIGNIFICANT CHANGE UP (ref 0–1)
BASOPHILS NFR BLD AUTO: 0.3 % — SIGNIFICANT CHANGE UP (ref 0–1)
BASOPHILS NFR BLD AUTO: 0.4 % — SIGNIFICANT CHANGE UP (ref 0–1)
BILIRUB SERPL-MCNC: 0.7 MG/DL — SIGNIFICANT CHANGE UP (ref 0.2–1.2)
BILIRUB SERPL-MCNC: 0.9 MG/DL — SIGNIFICANT CHANGE UP (ref 0.2–1.2)
BILIRUB SERPL-MCNC: 1 MG/DL — SIGNIFICANT CHANGE UP (ref 0.2–1.2)
BILIRUB SERPL-MCNC: 1.1 MG/DL — SIGNIFICANT CHANGE UP (ref 0.2–1.2)
BILIRUB SERPL-MCNC: 1.2 MG/DL — SIGNIFICANT CHANGE UP (ref 0.2–1.2)
BILIRUB SERPL-MCNC: 1.3 MG/DL — HIGH (ref 0.2–1.2)
BILIRUB SERPL-MCNC: 1.3 MG/DL — HIGH (ref 0.2–1.2)
BILIRUB SERPL-MCNC: 1.4 MG/DL — HIGH (ref 0.2–1.2)
BILIRUB SERPL-MCNC: 1.4 MG/DL — HIGH (ref 0.2–1.2)
BILIRUB SERPL-MCNC: 1.5 MG/DL — HIGH (ref 0.2–1.2)
BILIRUB SERPL-MCNC: 1.6 MG/DL — HIGH (ref 0.2–1.2)
BILIRUB SERPL-MCNC: 1.8 MG/DL — HIGH (ref 0.2–1.2)
BILIRUB SERPL-MCNC: 2 MG/DL — HIGH (ref 0.2–1.2)
BILIRUB SERPL-MCNC: 2 MG/DL — HIGH (ref 0.2–1.2)
BILIRUB SERPL-MCNC: 2.3 MG/DL — HIGH (ref 0.2–1.2)
BILIRUB SERPL-MCNC: 2.6 MG/DL — HIGH (ref 0.2–1.2)
BILIRUB SERPL-MCNC: 2.9 MG/DL — HIGH (ref 0.2–1.2)
BILIRUB SERPL-MCNC: 3 MG/DL — HIGH (ref 0.2–1.2)
BILIRUB SERPL-MCNC: 3 MG/DL — HIGH (ref 0.2–1.2)
BILIRUB SERPL-MCNC: 3.2 MG/DL — HIGH (ref 0.2–1.2)
BILIRUB SERPL-MCNC: 3.3 MG/DL — HIGH (ref 0.2–1.2)
BILIRUB SERPL-MCNC: 3.4 MG/DL — HIGH (ref 0.2–1.2)
BILIRUB UR-MCNC: ABNORMAL
BILIRUB UR-MCNC: ABNORMAL
BILIRUB UR-MCNC: NEGATIVE — SIGNIFICANT CHANGE UP
BILIRUB UR-MCNC: NEGATIVE — SIGNIFICANT CHANGE UP
BLD GP AB SCN SERPL QL: SIGNIFICANT CHANGE UP
BLD GP AB SCN SERPL QL: SIGNIFICANT CHANGE UP
BUN SERPL-MCNC: 28 MG/DL — HIGH (ref 10–20)
BUN SERPL-MCNC: 30 MG/DL — HIGH (ref 10–20)
BUN SERPL-MCNC: 33 MG/DL — HIGH (ref 10–20)
BUN SERPL-MCNC: 33 MG/DL — HIGH (ref 10–20)
BUN SERPL-MCNC: 34 MG/DL — HIGH (ref 10–20)
BUN SERPL-MCNC: 36 MG/DL — HIGH (ref 10–20)
BUN SERPL-MCNC: 37 MG/DL — HIGH (ref 10–20)
BUN SERPL-MCNC: 38 MG/DL — HIGH (ref 10–20)
BUN SERPL-MCNC: 39 MG/DL — HIGH (ref 10–20)
BUN SERPL-MCNC: 39 MG/DL — HIGH (ref 10–20)
BUN SERPL-MCNC: 40 MG/DL — HIGH (ref 10–20)
BUN SERPL-MCNC: 40 MG/DL — HIGH (ref 10–20)
BUN SERPL-MCNC: 41 MG/DL — HIGH (ref 10–20)
BUN SERPL-MCNC: 43 MG/DL — HIGH (ref 10–20)
BUN SERPL-MCNC: 45 MG/DL — HIGH (ref 10–20)
BUN SERPL-MCNC: 46 MG/DL — HIGH (ref 10–20)
BUN SERPL-MCNC: 47 MG/DL — HIGH (ref 10–20)
BUN SERPL-MCNC: 48 MG/DL — HIGH (ref 10–20)
BUN SERPL-MCNC: 48 MG/DL — HIGH (ref 10–20)
BUN SERPL-MCNC: 50 MG/DL — HIGH (ref 10–20)
BUN SERPL-MCNC: 52 MG/DL — HIGH (ref 10–20)
BUN SERPL-MCNC: 53 MG/DL — HIGH (ref 10–20)
BUN SERPL-MCNC: 54 MG/DL — HIGH (ref 10–20)
BUN SERPL-MCNC: 54 MG/DL — HIGH (ref 10–20)
BUN SERPL-MCNC: 55 MG/DL — HIGH (ref 10–20)
BUN SERPL-MCNC: 56 MG/DL — HIGH (ref 10–20)
BUN SERPL-MCNC: 56 MG/DL — HIGH (ref 10–20)
BUN SERPL-MCNC: 58 MG/DL — HIGH (ref 10–20)
BUN SERPL-MCNC: 59 MG/DL — HIGH (ref 10–20)
BUN SERPL-MCNC: 59 MG/DL — HIGH (ref 10–20)
BUN SERPL-MCNC: 62 MG/DL — CRITICAL HIGH (ref 10–20)
BUN SERPL-MCNC: 62 MG/DL — CRITICAL HIGH (ref 10–20)
BUN SERPL-MCNC: 64 MG/DL — CRITICAL HIGH (ref 10–20)
BUN SERPL-MCNC: 65 MG/DL — CRITICAL HIGH (ref 10–20)
BUN SERPL-MCNC: 69 MG/DL — CRITICAL HIGH (ref 10–20)
BUN SERPL-MCNC: 69 MG/DL — CRITICAL HIGH (ref 10–20)
BUN SERPL-MCNC: 76 MG/DL — CRITICAL HIGH (ref 10–20)
BUN SERPL-MCNC: 85 MG/DL — CRITICAL HIGH (ref 10–20)
BUN SERPL-MCNC: 87 MG/DL — CRITICAL HIGH (ref 10–20)
BUN SERPL-MCNC: 89 MG/DL — CRITICAL HIGH (ref 10–20)
BUN SERPL-MCNC: 89 MG/DL — CRITICAL HIGH (ref 10–20)
BUN SERPL-MCNC: 90 MG/DL — CRITICAL HIGH (ref 10–20)
BUN SERPL-MCNC: 90 MG/DL — CRITICAL HIGH (ref 10–20)
BUN SERPL-MCNC: 91 MG/DL — CRITICAL HIGH (ref 10–20)
BUN SERPL-MCNC: 91 MG/DL — CRITICAL HIGH (ref 10–20)
BUN SERPL-MCNC: 93 MG/DL — CRITICAL HIGH (ref 10–20)
BUN SERPL-MCNC: 95 MG/DL — CRITICAL HIGH (ref 10–20)
BUN SERPL-MCNC: 95 MG/DL — CRITICAL HIGH (ref 10–20)
BUN SERPL-MCNC: 96 MG/DL — CRITICAL HIGH (ref 10–20)
CA-I SERPL-SCNC: 1.15 MMOL/L — SIGNIFICANT CHANGE UP (ref 1.15–1.33)
CA-I SERPL-SCNC: 1.17 MMOL/L — SIGNIFICANT CHANGE UP (ref 1.15–1.33)
CA-I SERPL-SCNC: 1.17 MMOL/L — SIGNIFICANT CHANGE UP (ref 1.15–1.33)
CA-I SERPL-SCNC: 1.23 MMOL/L — SIGNIFICANT CHANGE UP (ref 1.15–1.33)
CALCIUM SERPL-MCNC: 10 MG/DL — SIGNIFICANT CHANGE UP (ref 8.4–10.5)
CALCIUM SERPL-MCNC: 8 MG/DL — LOW (ref 8.4–10.5)
CALCIUM SERPL-MCNC: 8.2 MG/DL — LOW (ref 8.4–10.4)
CALCIUM SERPL-MCNC: 8.3 MG/DL — LOW (ref 8.4–10.4)
CALCIUM SERPL-MCNC: 8.4 MG/DL — SIGNIFICANT CHANGE UP (ref 8.4–10.5)
CALCIUM SERPL-MCNC: 8.5 MG/DL — SIGNIFICANT CHANGE UP (ref 8.4–10.5)
CALCIUM SERPL-MCNC: 8.6 MG/DL — SIGNIFICANT CHANGE UP (ref 8.4–10.5)
CALCIUM SERPL-MCNC: 8.7 MG/DL — SIGNIFICANT CHANGE UP (ref 8.4–10.4)
CALCIUM SERPL-MCNC: 8.7 MG/DL — SIGNIFICANT CHANGE UP (ref 8.4–10.5)
CALCIUM SERPL-MCNC: 8.8 MG/DL — SIGNIFICANT CHANGE UP (ref 8.4–10.4)
CALCIUM SERPL-MCNC: 8.8 MG/DL — SIGNIFICANT CHANGE UP (ref 8.4–10.5)
CALCIUM SERPL-MCNC: 8.8 MG/DL — SIGNIFICANT CHANGE UP (ref 8.4–10.5)
CALCIUM SERPL-MCNC: 8.9 MG/DL — SIGNIFICANT CHANGE UP (ref 8.4–10.5)
CALCIUM SERPL-MCNC: 8.9 MG/DL — SIGNIFICANT CHANGE UP (ref 8.4–10.5)
CALCIUM SERPL-MCNC: 9 MG/DL — SIGNIFICANT CHANGE UP (ref 8.4–10.4)
CALCIUM SERPL-MCNC: 9 MG/DL — SIGNIFICANT CHANGE UP (ref 8.4–10.5)
CALCIUM SERPL-MCNC: 9.2 MG/DL — SIGNIFICANT CHANGE UP (ref 8.4–10.5)
CALCIUM SERPL-MCNC: 9.3 MG/DL — SIGNIFICANT CHANGE UP (ref 8.4–10.4)
CALCIUM SERPL-MCNC: 9.3 MG/DL — SIGNIFICANT CHANGE UP (ref 8.4–10.5)
CALCIUM SERPL-MCNC: 9.4 MG/DL — SIGNIFICANT CHANGE UP (ref 8.4–10.4)
CALCIUM SERPL-MCNC: 9.4 MG/DL — SIGNIFICANT CHANGE UP (ref 8.4–10.5)
CALCIUM SERPL-MCNC: 9.4 MG/DL — SIGNIFICANT CHANGE UP (ref 8.4–10.5)
CALCIUM SERPL-MCNC: 9.5 MG/DL — SIGNIFICANT CHANGE UP (ref 8.4–10.4)
CALCIUM SERPL-MCNC: 9.5 MG/DL — SIGNIFICANT CHANGE UP (ref 8.4–10.5)
CALCIUM SERPL-MCNC: 9.6 MG/DL — SIGNIFICANT CHANGE UP (ref 8.4–10.4)
CALCIUM SERPL-MCNC: 9.6 MG/DL — SIGNIFICANT CHANGE UP (ref 8.4–10.5)
CALCIUM SERPL-MCNC: 9.6 MG/DL — SIGNIFICANT CHANGE UP (ref 8.4–10.5)
CALCIUM SERPL-MCNC: 9.7 MG/DL — SIGNIFICANT CHANGE UP (ref 8.4–10.5)
CALCIUM SERPL-MCNC: 9.8 MG/DL — SIGNIFICANT CHANGE UP (ref 8.4–10.5)
CALCIUM SERPL-MCNC: 9.8 MG/DL — SIGNIFICANT CHANGE UP (ref 8.4–10.5)
CALCIUM SERPL-MCNC: 9.9 MG/DL — SIGNIFICANT CHANGE UP (ref 8.4–10.5)
CHLORIDE SERPL-SCNC: 100 MMOL/L — SIGNIFICANT CHANGE UP (ref 98–110)
CHLORIDE SERPL-SCNC: 102 MMOL/L — SIGNIFICANT CHANGE UP (ref 98–110)
CHLORIDE SERPL-SCNC: 103 MMOL/L — SIGNIFICANT CHANGE UP (ref 98–110)
CHLORIDE SERPL-SCNC: 87 MMOL/L — LOW (ref 98–110)
CHLORIDE SERPL-SCNC: 88 MMOL/L — LOW (ref 98–110)
CHLORIDE SERPL-SCNC: 91 MMOL/L — LOW (ref 98–110)
CHLORIDE SERPL-SCNC: 92 MMOL/L — LOW (ref 98–110)
CHLORIDE SERPL-SCNC: 93 MMOL/L — LOW (ref 98–110)
CHLORIDE SERPL-SCNC: 94 MMOL/L — LOW (ref 98–110)
CHLORIDE SERPL-SCNC: 95 MMOL/L — LOW (ref 98–110)
CHLORIDE SERPL-SCNC: 96 MMOL/L — LOW (ref 98–110)
CHLORIDE SERPL-SCNC: 97 MMOL/L — LOW (ref 98–110)
CHLORIDE SERPL-SCNC: 97 MMOL/L — LOW (ref 98–110)
CHLORIDE SERPL-SCNC: 98 MMOL/L — SIGNIFICANT CHANGE UP (ref 98–110)
CHLORIDE SERPL-SCNC: 99 MMOL/L — SIGNIFICANT CHANGE UP (ref 98–110)
CHOLEST SERPL-MCNC: 132 MG/DL — SIGNIFICANT CHANGE UP
CHOLEST SERPL-MCNC: 132 MG/DL — SIGNIFICANT CHANGE UP
CHOLEST SERPL-MCNC: 145 MG/DL — SIGNIFICANT CHANGE UP
CK MB CFR SERPL CALC: 3.5 NG/ML — SIGNIFICANT CHANGE UP (ref 0.6–6.3)
CK SERPL-CCNC: 49 U/L — SIGNIFICANT CHANGE UP (ref 0–225)
CK SERPL-CCNC: 50 U/L — SIGNIFICANT CHANGE UP (ref 0–225)
CO2 SERPL-SCNC: 15 MMOL/L — LOW (ref 17–32)
CO2 SERPL-SCNC: 17 MMOL/L — SIGNIFICANT CHANGE UP (ref 17–32)
CO2 SERPL-SCNC: 17 MMOL/L — SIGNIFICANT CHANGE UP (ref 17–32)
CO2 SERPL-SCNC: 18 MMOL/L — SIGNIFICANT CHANGE UP (ref 17–32)
CO2 SERPL-SCNC: 18 MMOL/L — SIGNIFICANT CHANGE UP (ref 17–32)
CO2 SERPL-SCNC: 19 MMOL/L — SIGNIFICANT CHANGE UP (ref 17–32)
CO2 SERPL-SCNC: 19 MMOL/L — SIGNIFICANT CHANGE UP (ref 17–32)
CO2 SERPL-SCNC: 20 MMOL/L — SIGNIFICANT CHANGE UP (ref 17–32)
CO2 SERPL-SCNC: 20 MMOL/L — SIGNIFICANT CHANGE UP (ref 17–32)
CO2 SERPL-SCNC: 21 MMOL/L — SIGNIFICANT CHANGE UP (ref 17–32)
CO2 SERPL-SCNC: 21 MMOL/L — SIGNIFICANT CHANGE UP (ref 17–32)
CO2 SERPL-SCNC: 22 MMOL/L — SIGNIFICANT CHANGE UP (ref 17–32)
CO2 SERPL-SCNC: 23 MMOL/L — SIGNIFICANT CHANGE UP (ref 17–32)
CO2 SERPL-SCNC: 23 MMOL/L — SIGNIFICANT CHANGE UP (ref 17–32)
CO2 SERPL-SCNC: 25 MMOL/L — SIGNIFICANT CHANGE UP (ref 17–32)
CO2 SERPL-SCNC: 27 MMOL/L — SIGNIFICANT CHANGE UP (ref 17–32)
CO2 SERPL-SCNC: 28 MMOL/L — SIGNIFICANT CHANGE UP (ref 17–32)
CO2 SERPL-SCNC: 29 MMOL/L — SIGNIFICANT CHANGE UP (ref 17–32)
CO2 SERPL-SCNC: 30 MMOL/L — SIGNIFICANT CHANGE UP (ref 17–32)
CO2 SERPL-SCNC: 31 MMOL/L — SIGNIFICANT CHANGE UP (ref 17–32)
CO2 SERPL-SCNC: 31 MMOL/L — SIGNIFICANT CHANGE UP (ref 17–32)
CO2 SERPL-SCNC: 32 MMOL/L — SIGNIFICANT CHANGE UP (ref 17–32)
CO2 SERPL-SCNC: 33 MMOL/L — HIGH (ref 17–32)
CO2 SERPL-SCNC: 34 MMOL/L — HIGH (ref 17–32)
CO2 SERPL-SCNC: 36 MMOL/L — HIGH (ref 17–32)
CO2 SERPL-SCNC: 36 MMOL/L — HIGH (ref 17–32)
COLOR SPEC: ABNORMAL
COLOR SPEC: ABNORMAL
COLOR SPEC: YELLOW — SIGNIFICANT CHANGE UP
COLOR SPEC: YELLOW — SIGNIFICANT CHANGE UP
CREAT ?TM UR-MCNC: 12 MG/DL — SIGNIFICANT CHANGE UP
CREAT ?TM UR-MCNC: 12 MG/DL — SIGNIFICANT CHANGE UP
CREAT SERPL-MCNC: 1.2 MG/DL — SIGNIFICANT CHANGE UP (ref 0.7–1.5)
CREAT SERPL-MCNC: 1.3 MG/DL — SIGNIFICANT CHANGE UP (ref 0.7–1.5)
CREAT SERPL-MCNC: 1.4 MG/DL — SIGNIFICANT CHANGE UP (ref 0.7–1.5)
CREAT SERPL-MCNC: 1.5 MG/DL — SIGNIFICANT CHANGE UP (ref 0.7–1.5)
CREAT SERPL-MCNC: 1.5 MG/DL — SIGNIFICANT CHANGE UP (ref 0.7–1.5)
CREAT SERPL-MCNC: 1.6 MG/DL — HIGH (ref 0.7–1.5)
CREAT SERPL-MCNC: 1.7 MG/DL — HIGH (ref 0.7–1.5)
CREAT SERPL-MCNC: 1.8 MG/DL — HIGH (ref 0.7–1.5)
CREAT SERPL-MCNC: 1.9 MG/DL — HIGH (ref 0.7–1.5)
CREAT SERPL-MCNC: 2 MG/DL — HIGH (ref 0.7–1.5)
CREAT SERPL-MCNC: 2.1 MG/DL — HIGH (ref 0.7–1.5)
CREAT SERPL-MCNC: 2.1 MG/DL — HIGH (ref 0.7–1.5)
CREAT SERPL-MCNC: 2.2 MG/DL — HIGH (ref 0.7–1.5)
CREAT SERPL-MCNC: 2.6 MG/DL — HIGH (ref 0.7–1.5)
CREAT SERPL-MCNC: 2.9 MG/DL — HIGH (ref 0.7–1.5)
CREAT SERPL-MCNC: 3.2 MG/DL — HIGH (ref 0.7–1.5)
CREAT SERPL-MCNC: 3.6 MG/DL — HIGH (ref 0.7–1.5)
CREAT SERPL-MCNC: 4.3 MG/DL — CRITICAL HIGH (ref 0.7–1.5)
CREAT SERPL-MCNC: 4.9 MG/DL — CRITICAL HIGH (ref 0.7–1.5)
CREAT SERPL-MCNC: 4.9 MG/DL — CRITICAL HIGH (ref 0.7–1.5)
CREAT SERPL-MCNC: 5.2 MG/DL — CRITICAL HIGH (ref 0.7–1.5)
CREAT SERPL-MCNC: 5.2 MG/DL — CRITICAL HIGH (ref 0.7–1.5)
CREAT SERPL-MCNC: 5.4 MG/DL — CRITICAL HIGH (ref 0.7–1.5)
CREAT SERPL-MCNC: 5.4 MG/DL — CRITICAL HIGH (ref 0.7–1.5)
CREAT SERPL-MCNC: 5.5 MG/DL — CRITICAL HIGH (ref 0.7–1.5)
CREAT SERPL-MCNC: 5.6 MG/DL — CRITICAL HIGH (ref 0.7–1.5)
CREAT SERPL-MCNC: 5.6 MG/DL — CRITICAL HIGH (ref 0.7–1.5)
CRP SERPL-MCNC: 8.1 MG/L — HIGH
CULTURE RESULTS: ABNORMAL
CULTURE RESULTS: ABNORMAL
CULTURE RESULTS: SIGNIFICANT CHANGE UP
D DIMER BLD IA.RAPID-MCNC: 1859 NG/ML DDU — HIGH
D DIMER BLD IA.RAPID-MCNC: 308 NG/ML DDU — HIGH
D DIMER BLD IA.RAPID-MCNC: 308 NG/ML DDU — HIGH
DIFF PNL FLD: ABNORMAL
DIFF PNL FLD: ABNORMAL
DIFF PNL FLD: NEGATIVE — SIGNIFICANT CHANGE UP
DIFF PNL FLD: NEGATIVE — SIGNIFICANT CHANGE UP
EGFR: 10 ML/MIN/1.73M2 — LOW
EGFR: 12 ML/MIN/1.73M2 — LOW
EGFR: 14 ML/MIN/1.73M2 — LOW
EGFR: 16 ML/MIN/1.73M2 — LOW
EGFR: 18 ML/MIN/1.73M2 — LOW
EGFR: 22 ML/MIN/1.73M2 — LOW
EGFR: 24 ML/MIN/1.73M2 — LOW
EGFR: 24 ML/MIN/1.73M2 — LOW
EGFR: 25 ML/MIN/1.73M2 — LOW
EGFR: 27 ML/MIN/1.73M2 — LOW
EGFR: 28 ML/MIN/1.73M2 — LOW
EGFR: 30 ML/MIN/1.73M2 — LOW
EGFR: 33 ML/MIN/1.73M2 — LOW
EGFR: 35 ML/MIN/1.73M2 — LOW
EGFR: 35 ML/MIN/1.73M2 — LOW
EGFR: 39 ML/MIN/1.73M2 — LOW
EGFR: 42 ML/MIN/1.73M2 — LOW
EGFR: 46 ML/MIN/1.73M2 — LOW
EGFR: 7 ML/MIN/1.73M2 — LOW
EGFR: 8 ML/MIN/1.73M2 — LOW
EGFR: 9 ML/MIN/1.73M2 — LOW
EGFR: 9 ML/MIN/1.73M2 — LOW
EOSINOPHIL # BLD AUTO: 0 K/UL — SIGNIFICANT CHANGE UP (ref 0–0.7)
EOSINOPHIL # BLD AUTO: 0.01 K/UL — SIGNIFICANT CHANGE UP (ref 0–0.7)
EOSINOPHIL # BLD AUTO: 0.02 K/UL — SIGNIFICANT CHANGE UP (ref 0–0.7)
EOSINOPHIL # BLD AUTO: 0.02 K/UL — SIGNIFICANT CHANGE UP (ref 0–0.7)
EOSINOPHIL # BLD AUTO: 0.03 K/UL — SIGNIFICANT CHANGE UP (ref 0–0.7)
EOSINOPHIL # BLD AUTO: 0.04 K/UL — SIGNIFICANT CHANGE UP (ref 0–0.7)
EOSINOPHIL # BLD AUTO: 0.05 K/UL — SIGNIFICANT CHANGE UP (ref 0–0.7)
EOSINOPHIL # BLD AUTO: 0.05 K/UL — SIGNIFICANT CHANGE UP (ref 0–0.7)
EOSINOPHIL # BLD AUTO: 0.06 K/UL — SIGNIFICANT CHANGE UP (ref 0–0.7)
EOSINOPHIL # BLD AUTO: 0.07 K/UL — SIGNIFICANT CHANGE UP (ref 0–0.7)
EOSINOPHIL # BLD AUTO: 0.08 K/UL — SIGNIFICANT CHANGE UP (ref 0–0.7)
EOSINOPHIL # BLD AUTO: 0.1 K/UL — SIGNIFICANT CHANGE UP (ref 0–0.7)
EOSINOPHIL NFR BLD AUTO: 0 % — SIGNIFICANT CHANGE UP (ref 0–8)
EOSINOPHIL NFR BLD AUTO: 0.1 % — SIGNIFICANT CHANGE UP (ref 0–8)
EOSINOPHIL NFR BLD AUTO: 0.2 % — SIGNIFICANT CHANGE UP (ref 0–8)
EOSINOPHIL NFR BLD AUTO: 0.3 % — SIGNIFICANT CHANGE UP (ref 0–8)
EOSINOPHIL NFR BLD AUTO: 0.3 % — SIGNIFICANT CHANGE UP (ref 0–8)
EOSINOPHIL NFR BLD AUTO: 0.5 % — SIGNIFICANT CHANGE UP (ref 0–8)
EOSINOPHIL NFR BLD AUTO: 0.5 % — SIGNIFICANT CHANGE UP (ref 0–8)
EOSINOPHIL NFR BLD AUTO: 0.6 % — SIGNIFICANT CHANGE UP (ref 0–8)
EOSINOPHIL NFR BLD AUTO: 0.7 % — SIGNIFICANT CHANGE UP (ref 0–8)
EOSINOPHIL NFR BLD AUTO: 0.8 % — SIGNIFICANT CHANGE UP (ref 0–8)
EOSINOPHIL NFR BLD AUTO: 0.9 % — SIGNIFICANT CHANGE UP (ref 0–8)
EOSINOPHIL NFR BLD AUTO: 1 % — SIGNIFICANT CHANGE UP (ref 0–8)
EOSINOPHIL NFR BLD AUTO: 1 % — SIGNIFICANT CHANGE UP (ref 0–8)
EOSINOPHIL NFR BLD AUTO: 1.7 % — SIGNIFICANT CHANGE UP (ref 0–8)
ERYTHROCYTE [SEDIMENTATION RATE] IN BLOOD: 25 MM/HR — HIGH (ref 0–20)
ESTIMATED AVERAGE GLUCOSE: 160 MG/DL — HIGH (ref 68–114)
ESTIMATED AVERAGE GLUCOSE: 166 MG/DL — HIGH (ref 68–114)
ESTIMATED AVERAGE GLUCOSE: 97 MG/DL — SIGNIFICANT CHANGE UP (ref 68–114)
ESTIMATED AVERAGE GLUCOSE: 97 MG/DL — SIGNIFICANT CHANGE UP (ref 68–114)
ETHANOL SERPL-MCNC: <10 MG/DL — SIGNIFICANT CHANGE UP
FERRITIN SERPL-MCNC: 83 NG/ML — SIGNIFICANT CHANGE UP (ref 13–330)
FERRITIN SERPL-MCNC: 83 NG/ML — SIGNIFICANT CHANGE UP (ref 13–330)
FIBRINOGEN PPP-MCNC: 365 MG/DL — SIGNIFICANT CHANGE UP (ref 200–435)
FLUAV AG NPH QL: SIGNIFICANT CHANGE UP
FLUAV H3 RNA SPEC QL NAA+PROBE: DETECTED
FLUAV H3 RNA SPEC QL NAA+PROBE: DETECTED
FLUBV AG NPH QL: SIGNIFICANT CHANGE UP
FOLATE SERPL-MCNC: >20 NG/ML — SIGNIFICANT CHANGE UP
FOLATE SERPL-MCNC: >20 NG/ML — SIGNIFICANT CHANGE UP
GAS PNL BLDA: SIGNIFICANT CHANGE UP
GAS PNL BLDV: 123 MMOL/L — LOW (ref 136–145)
GAS PNL BLDV: 124 MMOL/L — LOW (ref 136–145)
GAS PNL BLDV: 131 MMOL/L — LOW (ref 136–145)
GAS PNL BLDV: 133 MMOL/L — LOW (ref 136–145)
GAS PNL BLDV: SIGNIFICANT CHANGE UP
GLUCOSE BLDC GLUCOMTR-MCNC: 102 MG/DL — HIGH (ref 70–99)
GLUCOSE BLDC GLUCOMTR-MCNC: 104 MG/DL — HIGH (ref 70–99)
GLUCOSE BLDC GLUCOMTR-MCNC: 104 MG/DL — HIGH (ref 70–99)
GLUCOSE BLDC GLUCOMTR-MCNC: 106 MG/DL — HIGH (ref 70–99)
GLUCOSE BLDC GLUCOMTR-MCNC: 106 MG/DL — HIGH (ref 70–99)
GLUCOSE BLDC GLUCOMTR-MCNC: 107 MG/DL — HIGH (ref 70–99)
GLUCOSE BLDC GLUCOMTR-MCNC: 108 MG/DL — HIGH (ref 70–99)
GLUCOSE BLDC GLUCOMTR-MCNC: 110 MG/DL — HIGH (ref 70–99)
GLUCOSE BLDC GLUCOMTR-MCNC: 114 MG/DL — HIGH (ref 70–99)
GLUCOSE BLDC GLUCOMTR-MCNC: 116 MG/DL — HIGH (ref 70–99)
GLUCOSE BLDC GLUCOMTR-MCNC: 117 MG/DL — HIGH (ref 70–99)
GLUCOSE BLDC GLUCOMTR-MCNC: 120 MG/DL — HIGH (ref 70–99)
GLUCOSE BLDC GLUCOMTR-MCNC: 121 MG/DL — HIGH (ref 70–99)
GLUCOSE BLDC GLUCOMTR-MCNC: 121 MG/DL — HIGH (ref 70–99)
GLUCOSE BLDC GLUCOMTR-MCNC: 122 MG/DL — HIGH (ref 70–99)
GLUCOSE BLDC GLUCOMTR-MCNC: 122 MG/DL — HIGH (ref 70–99)
GLUCOSE BLDC GLUCOMTR-MCNC: 124 MG/DL — HIGH (ref 70–99)
GLUCOSE BLDC GLUCOMTR-MCNC: 124 MG/DL — HIGH (ref 70–99)
GLUCOSE BLDC GLUCOMTR-MCNC: 125 MG/DL — HIGH (ref 70–99)
GLUCOSE BLDC GLUCOMTR-MCNC: 125 MG/DL — HIGH (ref 70–99)
GLUCOSE BLDC GLUCOMTR-MCNC: 128 MG/DL — HIGH (ref 70–99)
GLUCOSE BLDC GLUCOMTR-MCNC: 129 MG/DL — HIGH (ref 70–99)
GLUCOSE BLDC GLUCOMTR-MCNC: 131 MG/DL — HIGH (ref 70–99)
GLUCOSE BLDC GLUCOMTR-MCNC: 132 MG/DL — HIGH (ref 70–99)
GLUCOSE BLDC GLUCOMTR-MCNC: 134 MG/DL — HIGH (ref 70–99)
GLUCOSE BLDC GLUCOMTR-MCNC: 135 MG/DL — HIGH (ref 70–99)
GLUCOSE BLDC GLUCOMTR-MCNC: 138 MG/DL — HIGH (ref 70–99)
GLUCOSE BLDC GLUCOMTR-MCNC: 138 MG/DL — HIGH (ref 70–99)
GLUCOSE BLDC GLUCOMTR-MCNC: 142 MG/DL — HIGH (ref 70–99)
GLUCOSE BLDC GLUCOMTR-MCNC: 142 MG/DL — HIGH (ref 70–99)
GLUCOSE BLDC GLUCOMTR-MCNC: 145 MG/DL — HIGH (ref 70–99)
GLUCOSE BLDC GLUCOMTR-MCNC: 146 MG/DL — HIGH (ref 70–99)
GLUCOSE BLDC GLUCOMTR-MCNC: 148 MG/DL — HIGH (ref 70–99)
GLUCOSE BLDC GLUCOMTR-MCNC: 148 MG/DL — HIGH (ref 70–99)
GLUCOSE BLDC GLUCOMTR-MCNC: 152 MG/DL — HIGH (ref 70–99)
GLUCOSE BLDC GLUCOMTR-MCNC: 153 MG/DL — HIGH (ref 70–99)
GLUCOSE BLDC GLUCOMTR-MCNC: 154 MG/DL — HIGH (ref 70–99)
GLUCOSE BLDC GLUCOMTR-MCNC: 156 MG/DL — HIGH (ref 70–99)
GLUCOSE BLDC GLUCOMTR-MCNC: 158 MG/DL — HIGH (ref 70–99)
GLUCOSE BLDC GLUCOMTR-MCNC: 159 MG/DL — HIGH (ref 70–99)
GLUCOSE BLDC GLUCOMTR-MCNC: 163 MG/DL — HIGH (ref 70–99)
GLUCOSE BLDC GLUCOMTR-MCNC: 163 MG/DL — HIGH (ref 70–99)
GLUCOSE BLDC GLUCOMTR-MCNC: 164 MG/DL — HIGH (ref 70–99)
GLUCOSE BLDC GLUCOMTR-MCNC: 166 MG/DL — HIGH (ref 70–99)
GLUCOSE BLDC GLUCOMTR-MCNC: 170 MG/DL — HIGH (ref 70–99)
GLUCOSE BLDC GLUCOMTR-MCNC: 173 MG/DL — HIGH (ref 70–99)
GLUCOSE BLDC GLUCOMTR-MCNC: 175 MG/DL — HIGH (ref 70–99)
GLUCOSE BLDC GLUCOMTR-MCNC: 175 MG/DL — HIGH (ref 70–99)
GLUCOSE BLDC GLUCOMTR-MCNC: 176 MG/DL — HIGH (ref 70–99)
GLUCOSE BLDC GLUCOMTR-MCNC: 176 MG/DL — HIGH (ref 70–99)
GLUCOSE BLDC GLUCOMTR-MCNC: 177 MG/DL — HIGH (ref 70–99)
GLUCOSE BLDC GLUCOMTR-MCNC: 177 MG/DL — HIGH (ref 70–99)
GLUCOSE BLDC GLUCOMTR-MCNC: 179 MG/DL — HIGH (ref 70–99)
GLUCOSE BLDC GLUCOMTR-MCNC: 183 MG/DL — HIGH (ref 70–99)
GLUCOSE BLDC GLUCOMTR-MCNC: 184 MG/DL — HIGH (ref 70–99)
GLUCOSE BLDC GLUCOMTR-MCNC: 185 MG/DL — HIGH (ref 70–99)
GLUCOSE BLDC GLUCOMTR-MCNC: 187 MG/DL — HIGH (ref 70–99)
GLUCOSE BLDC GLUCOMTR-MCNC: 187 MG/DL — HIGH (ref 70–99)
GLUCOSE BLDC GLUCOMTR-MCNC: 189 MG/DL — HIGH (ref 70–99)
GLUCOSE BLDC GLUCOMTR-MCNC: 189 MG/DL — HIGH (ref 70–99)
GLUCOSE BLDC GLUCOMTR-MCNC: 191 MG/DL — HIGH (ref 70–99)
GLUCOSE BLDC GLUCOMTR-MCNC: 192 MG/DL — HIGH (ref 70–99)
GLUCOSE BLDC GLUCOMTR-MCNC: 192 MG/DL — HIGH (ref 70–99)
GLUCOSE BLDC GLUCOMTR-MCNC: 194 MG/DL — HIGH (ref 70–99)
GLUCOSE BLDC GLUCOMTR-MCNC: 199 MG/DL — HIGH (ref 70–99)
GLUCOSE BLDC GLUCOMTR-MCNC: 199 MG/DL — HIGH (ref 70–99)
GLUCOSE BLDC GLUCOMTR-MCNC: 200 MG/DL — HIGH (ref 70–99)
GLUCOSE BLDC GLUCOMTR-MCNC: 201 MG/DL — HIGH (ref 70–99)
GLUCOSE BLDC GLUCOMTR-MCNC: 202 MG/DL — HIGH (ref 70–99)
GLUCOSE BLDC GLUCOMTR-MCNC: 202 MG/DL — HIGH (ref 70–99)
GLUCOSE BLDC GLUCOMTR-MCNC: 203 MG/DL — HIGH (ref 70–99)
GLUCOSE BLDC GLUCOMTR-MCNC: 203 MG/DL — HIGH (ref 70–99)
GLUCOSE BLDC GLUCOMTR-MCNC: 205 MG/DL — HIGH (ref 70–99)
GLUCOSE BLDC GLUCOMTR-MCNC: 209 MG/DL — HIGH (ref 70–99)
GLUCOSE BLDC GLUCOMTR-MCNC: 209 MG/DL — HIGH (ref 70–99)
GLUCOSE BLDC GLUCOMTR-MCNC: 211 MG/DL — HIGH (ref 70–99)
GLUCOSE BLDC GLUCOMTR-MCNC: 213 MG/DL — HIGH (ref 70–99)
GLUCOSE BLDC GLUCOMTR-MCNC: 214 MG/DL — HIGH (ref 70–99)
GLUCOSE BLDC GLUCOMTR-MCNC: 214 MG/DL — HIGH (ref 70–99)
GLUCOSE BLDC GLUCOMTR-MCNC: 216 MG/DL — HIGH (ref 70–99)
GLUCOSE BLDC GLUCOMTR-MCNC: 218 MG/DL — HIGH (ref 70–99)
GLUCOSE BLDC GLUCOMTR-MCNC: 218 MG/DL — HIGH (ref 70–99)
GLUCOSE BLDC GLUCOMTR-MCNC: 219 MG/DL — HIGH (ref 70–99)
GLUCOSE BLDC GLUCOMTR-MCNC: 221 MG/DL — HIGH (ref 70–99)
GLUCOSE BLDC GLUCOMTR-MCNC: 222 MG/DL — HIGH (ref 70–99)
GLUCOSE BLDC GLUCOMTR-MCNC: 223 MG/DL — HIGH (ref 70–99)
GLUCOSE BLDC GLUCOMTR-MCNC: 224 MG/DL — HIGH (ref 70–99)
GLUCOSE BLDC GLUCOMTR-MCNC: 226 MG/DL — HIGH (ref 70–99)
GLUCOSE BLDC GLUCOMTR-MCNC: 226 MG/DL — HIGH (ref 70–99)
GLUCOSE BLDC GLUCOMTR-MCNC: 228 MG/DL — HIGH (ref 70–99)
GLUCOSE BLDC GLUCOMTR-MCNC: 228 MG/DL — HIGH (ref 70–99)
GLUCOSE BLDC GLUCOMTR-MCNC: 238 MG/DL — HIGH (ref 70–99)
GLUCOSE BLDC GLUCOMTR-MCNC: 239 MG/DL — HIGH (ref 70–99)
GLUCOSE BLDC GLUCOMTR-MCNC: 245 MG/DL — HIGH (ref 70–99)
GLUCOSE BLDC GLUCOMTR-MCNC: 248 MG/DL — HIGH (ref 70–99)
GLUCOSE BLDC GLUCOMTR-MCNC: 253 MG/DL — HIGH (ref 70–99)
GLUCOSE BLDC GLUCOMTR-MCNC: 253 MG/DL — HIGH (ref 70–99)
GLUCOSE BLDC GLUCOMTR-MCNC: 255 MG/DL — HIGH (ref 70–99)
GLUCOSE BLDC GLUCOMTR-MCNC: 256 MG/DL — HIGH (ref 70–99)
GLUCOSE BLDC GLUCOMTR-MCNC: 257 MG/DL — HIGH (ref 70–99)
GLUCOSE BLDC GLUCOMTR-MCNC: 257 MG/DL — HIGH (ref 70–99)
GLUCOSE BLDC GLUCOMTR-MCNC: 258 MG/DL — HIGH (ref 70–99)
GLUCOSE BLDC GLUCOMTR-MCNC: 258 MG/DL — HIGH (ref 70–99)
GLUCOSE BLDC GLUCOMTR-MCNC: 262 MG/DL — HIGH (ref 70–99)
GLUCOSE BLDC GLUCOMTR-MCNC: 263 MG/DL — HIGH (ref 70–99)
GLUCOSE BLDC GLUCOMTR-MCNC: 269 MG/DL — HIGH (ref 70–99)
GLUCOSE BLDC GLUCOMTR-MCNC: 270 MG/DL — HIGH (ref 70–99)
GLUCOSE BLDC GLUCOMTR-MCNC: 273 MG/DL — HIGH (ref 70–99)
GLUCOSE BLDC GLUCOMTR-MCNC: 284 MG/DL — HIGH (ref 70–99)
GLUCOSE BLDC GLUCOMTR-MCNC: 286 MG/DL — HIGH (ref 70–99)
GLUCOSE BLDC GLUCOMTR-MCNC: 295 MG/DL — HIGH (ref 70–99)
GLUCOSE BLDC GLUCOMTR-MCNC: 297 MG/DL — HIGH (ref 70–99)
GLUCOSE BLDC GLUCOMTR-MCNC: 302 MG/DL — HIGH (ref 70–99)
GLUCOSE BLDC GLUCOMTR-MCNC: 302 MG/DL — HIGH (ref 70–99)
GLUCOSE BLDC GLUCOMTR-MCNC: 303 MG/DL — HIGH (ref 70–99)
GLUCOSE BLDC GLUCOMTR-MCNC: 311 MG/DL — HIGH (ref 70–99)
GLUCOSE BLDC GLUCOMTR-MCNC: 314 MG/DL — HIGH (ref 70–99)
GLUCOSE BLDC GLUCOMTR-MCNC: 314 MG/DL — HIGH (ref 70–99)
GLUCOSE BLDC GLUCOMTR-MCNC: 316 MG/DL — HIGH (ref 70–99)
GLUCOSE BLDC GLUCOMTR-MCNC: 319 MG/DL — HIGH (ref 70–99)
GLUCOSE BLDC GLUCOMTR-MCNC: 324 MG/DL — HIGH (ref 70–99)
GLUCOSE BLDC GLUCOMTR-MCNC: 331 MG/DL — HIGH (ref 70–99)
GLUCOSE BLDC GLUCOMTR-MCNC: 334 MG/DL — HIGH (ref 70–99)
GLUCOSE BLDC GLUCOMTR-MCNC: 338 MG/DL — HIGH (ref 70–99)
GLUCOSE BLDC GLUCOMTR-MCNC: 352 MG/DL — HIGH (ref 70–99)
GLUCOSE BLDC GLUCOMTR-MCNC: 352 MG/DL — HIGH (ref 70–99)
GLUCOSE BLDC GLUCOMTR-MCNC: 357 MG/DL — HIGH (ref 70–99)
GLUCOSE BLDC GLUCOMTR-MCNC: 360 MG/DL — HIGH (ref 70–99)
GLUCOSE BLDC GLUCOMTR-MCNC: 360 MG/DL — HIGH (ref 70–99)
GLUCOSE BLDC GLUCOMTR-MCNC: 370 MG/DL — HIGH (ref 70–99)
GLUCOSE BLDC GLUCOMTR-MCNC: 375 MG/DL — HIGH (ref 70–99)
GLUCOSE BLDC GLUCOMTR-MCNC: 375 MG/DL — HIGH (ref 70–99)
GLUCOSE BLDC GLUCOMTR-MCNC: 401 MG/DL — HIGH (ref 70–99)
GLUCOSE BLDC GLUCOMTR-MCNC: 417 MG/DL — HIGH (ref 70–99)
GLUCOSE BLDC GLUCOMTR-MCNC: 417 MG/DL — HIGH (ref 70–99)
GLUCOSE BLDC GLUCOMTR-MCNC: 422 MG/DL — HIGH (ref 70–99)
GLUCOSE BLDC GLUCOMTR-MCNC: 495 MG/DL — CRITICAL HIGH (ref 70–99)
GLUCOSE BLDC GLUCOMTR-MCNC: 58 MG/DL — LOW (ref 70–99)
GLUCOSE BLDC GLUCOMTR-MCNC: 58 MG/DL — LOW (ref 70–99)
GLUCOSE BLDC GLUCOMTR-MCNC: 62 MG/DL — LOW (ref 70–99)
GLUCOSE BLDC GLUCOMTR-MCNC: 65 MG/DL — LOW (ref 70–99)
GLUCOSE BLDC GLUCOMTR-MCNC: 70 MG/DL — SIGNIFICANT CHANGE UP (ref 70–99)
GLUCOSE BLDC GLUCOMTR-MCNC: 75 MG/DL — SIGNIFICANT CHANGE UP (ref 70–99)
GLUCOSE BLDC GLUCOMTR-MCNC: 78 MG/DL — SIGNIFICANT CHANGE UP (ref 70–99)
GLUCOSE BLDC GLUCOMTR-MCNC: 80 MG/DL — SIGNIFICANT CHANGE UP (ref 70–99)
GLUCOSE BLDC GLUCOMTR-MCNC: 80 MG/DL — SIGNIFICANT CHANGE UP (ref 70–99)
GLUCOSE BLDC GLUCOMTR-MCNC: 84 MG/DL — SIGNIFICANT CHANGE UP (ref 70–99)
GLUCOSE BLDC GLUCOMTR-MCNC: 85 MG/DL — SIGNIFICANT CHANGE UP (ref 70–99)
GLUCOSE BLDC GLUCOMTR-MCNC: 85 MG/DL — SIGNIFICANT CHANGE UP (ref 70–99)
GLUCOSE BLDC GLUCOMTR-MCNC: 86 MG/DL — SIGNIFICANT CHANGE UP (ref 70–99)
GLUCOSE BLDC GLUCOMTR-MCNC: 88 MG/DL — SIGNIFICANT CHANGE UP (ref 70–99)
GLUCOSE BLDC GLUCOMTR-MCNC: 90 MG/DL — SIGNIFICANT CHANGE UP (ref 70–99)
GLUCOSE BLDC GLUCOMTR-MCNC: 94 MG/DL — SIGNIFICANT CHANGE UP (ref 70–99)
GLUCOSE BLDC GLUCOMTR-MCNC: 97 MG/DL — SIGNIFICANT CHANGE UP (ref 70–99)
GLUCOSE SERPL-MCNC: 108 MG/DL — HIGH (ref 70–99)
GLUCOSE SERPL-MCNC: 113 MG/DL — HIGH (ref 70–99)
GLUCOSE SERPL-MCNC: 113 MG/DL — HIGH (ref 70–99)
GLUCOSE SERPL-MCNC: 123 MG/DL — HIGH (ref 70–99)
GLUCOSE SERPL-MCNC: 123 MG/DL — HIGH (ref 70–99)
GLUCOSE SERPL-MCNC: 125 MG/DL — HIGH (ref 70–99)
GLUCOSE SERPL-MCNC: 125 MG/DL — HIGH (ref 70–99)
GLUCOSE SERPL-MCNC: 128 MG/DL — HIGH (ref 70–99)
GLUCOSE SERPL-MCNC: 129 MG/DL — HIGH (ref 70–99)
GLUCOSE SERPL-MCNC: 129 MG/DL — HIGH (ref 70–99)
GLUCOSE SERPL-MCNC: 130 MG/DL — HIGH (ref 70–99)
GLUCOSE SERPL-MCNC: 135 MG/DL — HIGH (ref 70–99)
GLUCOSE SERPL-MCNC: 135 MG/DL — HIGH (ref 70–99)
GLUCOSE SERPL-MCNC: 136 MG/DL — HIGH (ref 70–99)
GLUCOSE SERPL-MCNC: 146 MG/DL — HIGH (ref 70–99)
GLUCOSE SERPL-MCNC: 146 MG/DL — HIGH (ref 70–99)
GLUCOSE SERPL-MCNC: 153 MG/DL — HIGH (ref 70–99)
GLUCOSE SERPL-MCNC: 153 MG/DL — HIGH (ref 70–99)
GLUCOSE SERPL-MCNC: 158 MG/DL — HIGH (ref 70–99)
GLUCOSE SERPL-MCNC: 169 MG/DL — HIGH (ref 70–99)
GLUCOSE SERPL-MCNC: 171 MG/DL — HIGH (ref 70–99)
GLUCOSE SERPL-MCNC: 178 MG/DL — HIGH (ref 70–99)
GLUCOSE SERPL-MCNC: 178 MG/DL — HIGH (ref 70–99)
GLUCOSE SERPL-MCNC: 181 MG/DL — HIGH (ref 70–99)
GLUCOSE SERPL-MCNC: 183 MG/DL — HIGH (ref 70–99)
GLUCOSE SERPL-MCNC: 190 MG/DL — HIGH (ref 70–99)
GLUCOSE SERPL-MCNC: 195 MG/DL — HIGH (ref 70–99)
GLUCOSE SERPL-MCNC: 203 MG/DL — HIGH (ref 70–99)
GLUCOSE SERPL-MCNC: 205 MG/DL — HIGH (ref 70–99)
GLUCOSE SERPL-MCNC: 206 MG/DL — HIGH (ref 70–99)
GLUCOSE SERPL-MCNC: 227 MG/DL — HIGH (ref 70–99)
GLUCOSE SERPL-MCNC: 227 MG/DL — HIGH (ref 70–99)
GLUCOSE SERPL-MCNC: 231 MG/DL — HIGH (ref 70–99)
GLUCOSE SERPL-MCNC: 232 MG/DL — HIGH (ref 70–99)
GLUCOSE SERPL-MCNC: 232 MG/DL — HIGH (ref 70–99)
GLUCOSE SERPL-MCNC: 242 MG/DL — HIGH (ref 70–99)
GLUCOSE SERPL-MCNC: 252 MG/DL — HIGH (ref 70–99)
GLUCOSE SERPL-MCNC: 254 MG/DL — HIGH (ref 70–99)
GLUCOSE SERPL-MCNC: 254 MG/DL — HIGH (ref 70–99)
GLUCOSE SERPL-MCNC: 255 MG/DL — HIGH (ref 70–99)
GLUCOSE SERPL-MCNC: 262 MG/DL — HIGH (ref 70–99)
GLUCOSE SERPL-MCNC: 262 MG/DL — HIGH (ref 70–99)
GLUCOSE SERPL-MCNC: 268 MG/DL — HIGH (ref 70–99)
GLUCOSE SERPL-MCNC: 272 MG/DL — HIGH (ref 70–99)
GLUCOSE SERPL-MCNC: 279 MG/DL — HIGH (ref 70–99)
GLUCOSE SERPL-MCNC: 307 MG/DL — HIGH (ref 70–99)
GLUCOSE SERPL-MCNC: 330 MG/DL — HIGH (ref 70–99)
GLUCOSE SERPL-MCNC: 330 MG/DL — HIGH (ref 70–99)
GLUCOSE SERPL-MCNC: 47 MG/DL — CRITICAL LOW (ref 70–99)
GLUCOSE SERPL-MCNC: 47 MG/DL — CRITICAL LOW (ref 70–99)
GLUCOSE SERPL-MCNC: 55 MG/DL — LOW (ref 70–99)
GLUCOSE SERPL-MCNC: 59 MG/DL — LOW (ref 70–99)
GLUCOSE SERPL-MCNC: 62 MG/DL — LOW (ref 70–99)
GLUCOSE SERPL-MCNC: 62 MG/DL — LOW (ref 70–99)
GLUCOSE SERPL-MCNC: 66 MG/DL — LOW (ref 70–99)
GLUCOSE SERPL-MCNC: 70 MG/DL — SIGNIFICANT CHANGE UP (ref 70–99)
GLUCOSE SERPL-MCNC: 78 MG/DL — SIGNIFICANT CHANGE UP (ref 70–99)
GLUCOSE SERPL-MCNC: 79 MG/DL — SIGNIFICANT CHANGE UP (ref 70–99)
GLUCOSE SERPL-MCNC: 79 MG/DL — SIGNIFICANT CHANGE UP (ref 70–99)
GLUCOSE SERPL-MCNC: 80 MG/DL — SIGNIFICANT CHANGE UP (ref 70–99)
GLUCOSE SERPL-MCNC: 80 MG/DL — SIGNIFICANT CHANGE UP (ref 70–99)
GLUCOSE SERPL-MCNC: 87 MG/DL — SIGNIFICANT CHANGE UP (ref 70–99)
GLUCOSE UR QL: NEGATIVE MG/DL — SIGNIFICANT CHANGE UP
GRAM STN FLD: ABNORMAL
HCO3 BLDA-SCNC: 21 MMOL/L — SIGNIFICANT CHANGE UP (ref 21–28)
HCO3 BLDA-SCNC: 37 MMOL/L — HIGH (ref 21–28)
HCO3 BLDA-SCNC: 37 MMOL/L — HIGH (ref 21–28)
HCO3 BLDV-SCNC: 22 MMOL/L — SIGNIFICANT CHANGE UP (ref 22–29)
HCO3 BLDV-SCNC: 24 MMOL/L — SIGNIFICANT CHANGE UP (ref 22–29)
HCO3 BLDV-SCNC: 30 MMOL/L — HIGH (ref 22–29)
HCO3 BLDV-SCNC: 38 MMOL/L — HIGH (ref 22–29)
HCT VFR BLD CALC: 25.4 % — LOW (ref 37–47)
HCT VFR BLD CALC: 25.4 % — LOW (ref 37–47)
HCT VFR BLD CALC: 25.7 % — LOW (ref 37–47)
HCT VFR BLD CALC: 27 % — LOW (ref 37–47)
HCT VFR BLD CALC: 27 % — LOW (ref 37–47)
HCT VFR BLD CALC: 27.6 % — LOW (ref 37–47)
HCT VFR BLD CALC: 27.6 % — LOW (ref 37–47)
HCT VFR BLD CALC: 29.9 % — LOW (ref 37–47)
HCT VFR BLD CALC: 29.9 % — LOW (ref 37–47)
HCT VFR BLD CALC: 30.7 % — LOW (ref 37–47)
HCT VFR BLD CALC: 30.7 % — LOW (ref 37–47)
HCT VFR BLD CALC: 31.7 % — LOW (ref 37–47)
HCT VFR BLD CALC: 31.7 % — LOW (ref 37–47)
HCT VFR BLD CALC: 32.4 % — LOW (ref 37–47)
HCT VFR BLD CALC: 32.8 % — LOW (ref 37–47)
HCT VFR BLD CALC: 32.8 % — LOW (ref 37–47)
HCT VFR BLD CALC: 33.8 % — LOW (ref 37–47)
HCT VFR BLD CALC: 33.8 % — LOW (ref 37–47)
HCT VFR BLD CALC: 34 % — LOW (ref 37–47)
HCT VFR BLD CALC: 34.1 % — LOW (ref 37–47)
HCT VFR BLD CALC: 34.5 % — LOW (ref 37–47)
HCT VFR BLD CALC: 34.6 % — LOW (ref 37–47)
HCT VFR BLD CALC: 34.6 % — LOW (ref 37–47)
HCT VFR BLD CALC: 35.1 % — LOW (ref 37–47)
HCT VFR BLD CALC: 35.1 % — LOW (ref 37–47)
HCT VFR BLD CALC: 35.2 % — LOW (ref 37–47)
HCT VFR BLD CALC: 35.5 % — LOW (ref 37–47)
HCT VFR BLD CALC: 35.6 % — LOW (ref 37–47)
HCT VFR BLD CALC: 35.7 % — LOW (ref 37–47)
HCT VFR BLD CALC: 35.8 % — LOW (ref 37–47)
HCT VFR BLD CALC: 36.1 % — LOW (ref 37–47)
HCT VFR BLD CALC: 36.7 % — LOW (ref 37–47)
HCT VFR BLD CALC: 37.1 % — SIGNIFICANT CHANGE UP (ref 37–47)
HCT VFR BLD CALC: 37.6 % — SIGNIFICANT CHANGE UP (ref 37–47)
HCT VFR BLD CALC: 37.6 % — SIGNIFICANT CHANGE UP (ref 37–47)
HCT VFR BLD CALC: 37.7 % — SIGNIFICANT CHANGE UP (ref 37–47)
HCT VFR BLD CALC: 38.4 % — SIGNIFICANT CHANGE UP (ref 37–47)
HCT VFR BLD CALC: 38.8 % — SIGNIFICANT CHANGE UP (ref 37–47)
HCT VFR BLD CALC: 39.1 % — SIGNIFICANT CHANGE UP (ref 37–47)
HCT VFR BLD CALC: 39.7 % — SIGNIFICANT CHANGE UP (ref 37–47)
HCT VFR BLD CALC: 39.8 % — SIGNIFICANT CHANGE UP (ref 37–47)
HCT VFR BLD CALC: 39.9 % — SIGNIFICANT CHANGE UP (ref 37–47)
HCT VFR BLD CALC: 40.2 % — SIGNIFICANT CHANGE UP (ref 37–47)
HCT VFR BLD CALC: 40.4 % — SIGNIFICANT CHANGE UP (ref 37–47)
HCT VFR BLD CALC: 40.5 % — SIGNIFICANT CHANGE UP (ref 37–47)
HCT VFR BLD CALC: 40.8 % — SIGNIFICANT CHANGE UP (ref 37–47)
HCT VFR BLD CALC: 40.9 % — SIGNIFICANT CHANGE UP (ref 37–47)
HCT VFR BLD CALC: 41.2 % — SIGNIFICANT CHANGE UP (ref 37–47)
HCT VFR BLD CALC: 41.8 % — SIGNIFICANT CHANGE UP (ref 37–47)
HCT VFR BLDA CALC: 30 % — LOW (ref 34.5–46.5)
HCT VFR BLDA CALC: 35 % — SIGNIFICANT CHANGE UP (ref 34.5–46.5)
HCT VFR BLDA CALC: 40 % — SIGNIFICANT CHANGE UP (ref 34.5–46.5)
HCT VFR BLDA CALC: 45 % — SIGNIFICANT CHANGE UP (ref 34.5–46.5)
HDLC SERPL-MCNC: 72 MG/DL — SIGNIFICANT CHANGE UP
HDLC SERPL-MCNC: 72 MG/DL — SIGNIFICANT CHANGE UP
HDLC SERPL-MCNC: 82 MG/DL — SIGNIFICANT CHANGE UP
HEPARIN-PF4 AB RESULT: <0.6 U/ML — SIGNIFICANT CHANGE UP (ref 0–0.9)
HGB BLD CALC-MCNC: 11.8 G/DL — SIGNIFICANT CHANGE UP (ref 11.7–16.1)
HGB BLD CALC-MCNC: 13.2 G/DL — SIGNIFICANT CHANGE UP (ref 11.7–16.1)
HGB BLD CALC-MCNC: 14.9 G/DL — SIGNIFICANT CHANGE UP (ref 11.7–16.1)
HGB BLD CALC-MCNC: 9.9 G/DL — LOW (ref 11.7–16.1)
HGB BLD-MCNC: 10 G/DL — LOW (ref 12–16)
HGB BLD-MCNC: 10 G/DL — LOW (ref 12–16)
HGB BLD-MCNC: 10.1 G/DL — LOW (ref 12–16)
HGB BLD-MCNC: 10.1 G/DL — LOW (ref 12–16)
HGB BLD-MCNC: 10.3 G/DL — LOW (ref 12–16)
HGB BLD-MCNC: 10.4 G/DL — LOW (ref 12–16)
HGB BLD-MCNC: 10.5 G/DL — LOW (ref 12–16)
HGB BLD-MCNC: 10.5 G/DL — LOW (ref 12–16)
HGB BLD-MCNC: 10.6 G/DL — LOW (ref 12–16)
HGB BLD-MCNC: 10.7 G/DL — LOW (ref 12–16)
HGB BLD-MCNC: 10.8 G/DL — LOW (ref 12–16)
HGB BLD-MCNC: 10.9 G/DL — LOW (ref 12–16)
HGB BLD-MCNC: 11.1 G/DL — LOW (ref 12–16)
HGB BLD-MCNC: 11.2 G/DL — LOW (ref 12–16)
HGB BLD-MCNC: 11.3 G/DL — LOW (ref 12–16)
HGB BLD-MCNC: 11.4 G/DL — LOW (ref 12–16)
HGB BLD-MCNC: 11.5 G/DL — LOW (ref 12–16)
HGB BLD-MCNC: 11.6 G/DL — LOW (ref 12–16)
HGB BLD-MCNC: 11.7 G/DL — LOW (ref 12–16)
HGB BLD-MCNC: 11.7 G/DL — LOW (ref 12–16)
HGB BLD-MCNC: 11.8 G/DL — LOW (ref 12–16)
HGB BLD-MCNC: 11.9 G/DL — LOW (ref 12–16)
HGB BLD-MCNC: 12 G/DL — SIGNIFICANT CHANGE UP (ref 12–16)
HGB BLD-MCNC: 12 G/DL — SIGNIFICANT CHANGE UP (ref 12–16)
HGB BLD-MCNC: 12.1 G/DL — SIGNIFICANT CHANGE UP (ref 12–16)
HGB BLD-MCNC: 12.4 G/DL — SIGNIFICANT CHANGE UP (ref 12–16)
HGB BLD-MCNC: 12.5 G/DL — SIGNIFICANT CHANGE UP (ref 12–16)
HGB BLD-MCNC: 12.5 G/DL — SIGNIFICANT CHANGE UP (ref 12–16)
HGB BLD-MCNC: 12.7 G/DL — SIGNIFICANT CHANGE UP (ref 12–16)
HGB BLD-MCNC: 12.8 G/DL — SIGNIFICANT CHANGE UP (ref 12–16)
HGB BLD-MCNC: 12.8 G/DL — SIGNIFICANT CHANGE UP (ref 12–16)
HGB BLD-MCNC: 12.9 G/DL — SIGNIFICANT CHANGE UP (ref 12–16)
HGB BLD-MCNC: 13.1 G/DL — SIGNIFICANT CHANGE UP (ref 12–16)
HGB BLD-MCNC: 13.3 G/DL — SIGNIFICANT CHANGE UP (ref 12–16)
HGB BLD-MCNC: 7.2 G/DL — LOW (ref 12–16)
HGB BLD-MCNC: 7.2 G/DL — LOW (ref 12–16)
HGB BLD-MCNC: 7.6 G/DL — LOW (ref 12–16)
HGB BLD-MCNC: 7.6 G/DL — LOW (ref 12–16)
HGB BLD-MCNC: 8.2 G/DL — LOW (ref 12–16)
HGB BLD-MCNC: 8.3 G/DL — LOW (ref 12–16)
HGB BLD-MCNC: 8.3 G/DL — LOW (ref 12–16)
HGB BLD-MCNC: 8.7 G/DL — LOW (ref 12–16)
HGB BLD-MCNC: 8.7 G/DL — LOW (ref 12–16)
HGB BLD-MCNC: 9.1 G/DL — LOW (ref 12–16)
HGB BLD-MCNC: 9.1 G/DL — LOW (ref 12–16)
HGB BLD-MCNC: 9.4 G/DL — LOW (ref 12–16)
HGB BLD-MCNC: 9.4 G/DL — LOW (ref 12–16)
IMM GRANULOCYTES NFR BLD AUTO: 0.2 % — SIGNIFICANT CHANGE UP (ref 0.1–0.3)
IMM GRANULOCYTES NFR BLD AUTO: 0.3 % — SIGNIFICANT CHANGE UP (ref 0.1–0.3)
IMM GRANULOCYTES NFR BLD AUTO: 0.4 % — HIGH (ref 0.1–0.3)
IMM GRANULOCYTES NFR BLD AUTO: 0.5 % — HIGH (ref 0.1–0.3)
IMM GRANULOCYTES NFR BLD AUTO: 0.6 % — HIGH (ref 0.1–0.3)
IMM GRANULOCYTES NFR BLD AUTO: 0.7 % — HIGH (ref 0.1–0.3)
IMM GRANULOCYTES NFR BLD AUTO: 0.8 % — HIGH (ref 0.1–0.3)
IMM GRANULOCYTES NFR BLD AUTO: 1.2 % — HIGH (ref 0.1–0.3)
IMM GRANULOCYTES NFR BLD AUTO: 1.2 % — HIGH (ref 0.1–0.3)
IMM GRANULOCYTES NFR BLD AUTO: 1.3 % — HIGH (ref 0.1–0.3)
INR BLD: 1.13 RATIO — SIGNIFICANT CHANGE UP (ref 0.65–1.3)
INR BLD: 1.16 RATIO — SIGNIFICANT CHANGE UP (ref 0.65–1.3)
INR BLD: 1.4 RATIO — HIGH (ref 0.65–1.3)
INR BLD: 1.4 RATIO — HIGH (ref 0.65–1.3)
INR BLD: 1.56 RATIO — HIGH (ref 0.65–1.3)
INR BLD: 1.59 RATIO — HIGH (ref 0.65–1.3)
IRON SATN MFR SERPL: 20 UG/DL — LOW (ref 35–150)
IRON SATN MFR SERPL: 20 UG/DL — LOW (ref 35–150)
IRON SATN MFR SERPL: 6 % — LOW (ref 15–50)
IRON SATN MFR SERPL: 6 % — LOW (ref 15–50)
KETONES UR-MCNC: NEGATIVE MG/DL — SIGNIFICANT CHANGE UP
LACTATE BLDV-MCNC: 1.4 MMOL/L — SIGNIFICANT CHANGE UP (ref 0.5–2)
LACTATE BLDV-MCNC: 2.1 MMOL/L — HIGH (ref 0.5–2)
LACTATE BLDV-MCNC: 2.5 MMOL/L — HIGH (ref 0.5–2)
LACTATE BLDV-MCNC: 2.9 MMOL/L — HIGH (ref 0.5–2)
LACTATE SERPL-SCNC: 1.3 MMOL/L — SIGNIFICANT CHANGE UP (ref 0.7–2)
LACTATE SERPL-SCNC: 1.6 MMOL/L — SIGNIFICANT CHANGE UP (ref 0.7–2)
LACTATE SERPL-SCNC: 1.6 MMOL/L — SIGNIFICANT CHANGE UP (ref 0.7–2)
LACTATE SERPL-SCNC: 1.7 MMOL/L — SIGNIFICANT CHANGE UP (ref 0.7–2)
LACTATE SERPL-SCNC: 2.1 MMOL/L — HIGH (ref 0.7–2)
LACTATE SERPL-SCNC: 2.6 MMOL/L — HIGH (ref 0.7–2)
LACTATE SERPL-SCNC: 6.1 MMOL/L — CRITICAL HIGH (ref 0.7–2)
LEUKOCYTE ESTERASE UR-ACNC: ABNORMAL
LEUKOCYTE ESTERASE UR-ACNC: ABNORMAL
LEUKOCYTE ESTERASE UR-ACNC: NEGATIVE — SIGNIFICANT CHANGE UP
LEUKOCYTE ESTERASE UR-ACNC: NEGATIVE — SIGNIFICANT CHANGE UP
LIDOCAIN IGE QN: 54 U/L — SIGNIFICANT CHANGE UP (ref 7–60)
LIPID PNL WITH DIRECT LDL SERPL: 43 MG/DL — SIGNIFICANT CHANGE UP
LIPID PNL WITH DIRECT LDL SERPL: 44 MG/DL — SIGNIFICANT CHANGE UP
LIPID PNL WITH DIRECT LDL SERPL: 44 MG/DL — SIGNIFICANT CHANGE UP
LYMPHOCYTES # BLD AUTO: 0 % — LOW (ref 20.5–51.1)
LYMPHOCYTES # BLD AUTO: 0 K/UL — LOW (ref 1.2–3.4)
LYMPHOCYTES # BLD AUTO: 0.43 K/UL — LOW (ref 1.2–3.4)
LYMPHOCYTES # BLD AUTO: 0.43 K/UL — LOW (ref 1.2–3.4)
LYMPHOCYTES # BLD AUTO: 0.56 K/UL — LOW (ref 1.2–3.4)
LYMPHOCYTES # BLD AUTO: 0.56 K/UL — LOW (ref 1.2–3.4)
LYMPHOCYTES # BLD AUTO: 0.57 K/UL — LOW (ref 1.2–3.4)
LYMPHOCYTES # BLD AUTO: 0.6 K/UL — LOW (ref 1.2–3.4)
LYMPHOCYTES # BLD AUTO: 0.6 K/UL — LOW (ref 1.2–3.4)
LYMPHOCYTES # BLD AUTO: 0.67 K/UL — LOW (ref 1.2–3.4)
LYMPHOCYTES # BLD AUTO: 0.67 K/UL — LOW (ref 1.2–3.4)
LYMPHOCYTES # BLD AUTO: 0.8 K/UL — LOW (ref 1.2–3.4)
LYMPHOCYTES # BLD AUTO: 0.8 K/UL — LOW (ref 1.2–3.4)
LYMPHOCYTES # BLD AUTO: 0.81 K/UL — LOW (ref 1.2–3.4)
LYMPHOCYTES # BLD AUTO: 0.94 K/UL — LOW (ref 1.2–3.4)
LYMPHOCYTES # BLD AUTO: 1.05 K/UL — LOW (ref 1.2–3.4)
LYMPHOCYTES # BLD AUTO: 1.13 K/UL — LOW (ref 1.2–3.4)
LYMPHOCYTES # BLD AUTO: 1.17 K/UL — LOW (ref 1.2–3.4)
LYMPHOCYTES # BLD AUTO: 1.18 K/UL — LOW (ref 1.2–3.4)
LYMPHOCYTES # BLD AUTO: 1.2 K/UL — SIGNIFICANT CHANGE UP (ref 1.2–3.4)
LYMPHOCYTES # BLD AUTO: 1.24 K/UL — SIGNIFICANT CHANGE UP (ref 1.2–3.4)
LYMPHOCYTES # BLD AUTO: 1.24 K/UL — SIGNIFICANT CHANGE UP (ref 1.2–3.4)
LYMPHOCYTES # BLD AUTO: 1.32 K/UL — SIGNIFICANT CHANGE UP (ref 1.2–3.4)
LYMPHOCYTES # BLD AUTO: 1.33 K/UL — SIGNIFICANT CHANGE UP (ref 1.2–3.4)
LYMPHOCYTES # BLD AUTO: 1.38 K/UL — SIGNIFICANT CHANGE UP (ref 1.2–3.4)
LYMPHOCYTES # BLD AUTO: 1.43 K/UL — SIGNIFICANT CHANGE UP (ref 1.2–3.4)
LYMPHOCYTES # BLD AUTO: 1.43 K/UL — SIGNIFICANT CHANGE UP (ref 1.2–3.4)
LYMPHOCYTES # BLD AUTO: 1.44 K/UL — SIGNIFICANT CHANGE UP (ref 1.2–3.4)
LYMPHOCYTES # BLD AUTO: 1.46 K/UL — SIGNIFICANT CHANGE UP (ref 1.2–3.4)
LYMPHOCYTES # BLD AUTO: 1.61 K/UL — SIGNIFICANT CHANGE UP (ref 1.2–3.4)
LYMPHOCYTES # BLD AUTO: 1.64 K/UL — SIGNIFICANT CHANGE UP (ref 1.2–3.4)
LYMPHOCYTES # BLD AUTO: 1.64 K/UL — SIGNIFICANT CHANGE UP (ref 1.2–3.4)
LYMPHOCYTES # BLD AUTO: 1.78 K/UL — SIGNIFICANT CHANGE UP (ref 1.2–3.4)
LYMPHOCYTES # BLD AUTO: 1.91 K/UL — SIGNIFICANT CHANGE UP (ref 1.2–3.4)
LYMPHOCYTES # BLD AUTO: 1.93 K/UL — SIGNIFICANT CHANGE UP (ref 1.2–3.4)
LYMPHOCYTES # BLD AUTO: 10.3 % — LOW (ref 20.5–51.1)
LYMPHOCYTES # BLD AUTO: 11 % — LOW (ref 20.5–51.1)
LYMPHOCYTES # BLD AUTO: 13.2 % — LOW (ref 20.5–51.1)
LYMPHOCYTES # BLD AUTO: 13.3 % — LOW (ref 20.5–51.1)
LYMPHOCYTES # BLD AUTO: 14.5 % — LOW (ref 20.5–51.1)
LYMPHOCYTES # BLD AUTO: 14.9 % — LOW (ref 20.5–51.1)
LYMPHOCYTES # BLD AUTO: 15 % — LOW (ref 20.5–51.1)
LYMPHOCYTES # BLD AUTO: 15.3 % — LOW (ref 20.5–51.1)
LYMPHOCYTES # BLD AUTO: 15.4 % — LOW (ref 20.5–51.1)
LYMPHOCYTES # BLD AUTO: 16.1 % — LOW (ref 20.5–51.1)
LYMPHOCYTES # BLD AUTO: 17.4 % — LOW (ref 20.5–51.1)
LYMPHOCYTES # BLD AUTO: 18.3 % — LOW (ref 20.5–51.1)
LYMPHOCYTES # BLD AUTO: 19.7 % — LOW (ref 20.5–51.1)
LYMPHOCYTES # BLD AUTO: 2.08 K/UL — SIGNIFICANT CHANGE UP (ref 1.2–3.4)
LYMPHOCYTES # BLD AUTO: 2.2 K/UL — SIGNIFICANT CHANGE UP (ref 1.2–3.4)
LYMPHOCYTES # BLD AUTO: 22 % — SIGNIFICANT CHANGE UP (ref 20.5–51.1)
LYMPHOCYTES # BLD AUTO: 23.2 % — SIGNIFICANT CHANGE UP (ref 20.5–51.1)
LYMPHOCYTES # BLD AUTO: 23.2 % — SIGNIFICANT CHANGE UP (ref 20.5–51.1)
LYMPHOCYTES # BLD AUTO: 23.8 % — SIGNIFICANT CHANGE UP (ref 20.5–51.1)
LYMPHOCYTES # BLD AUTO: 24 % — SIGNIFICANT CHANGE UP (ref 20.5–51.1)
LYMPHOCYTES # BLD AUTO: 24 % — SIGNIFICANT CHANGE UP (ref 20.5–51.1)
LYMPHOCYTES # BLD AUTO: 24.2 % — SIGNIFICANT CHANGE UP (ref 20.5–51.1)
LYMPHOCYTES # BLD AUTO: 24.3 % — SIGNIFICANT CHANGE UP (ref 20.5–51.1)
LYMPHOCYTES # BLD AUTO: 29 % — SIGNIFICANT CHANGE UP (ref 20.5–51.1)
LYMPHOCYTES # BLD AUTO: 4.3 % — LOW (ref 20.5–51.1)
LYMPHOCYTES # BLD AUTO: 4.3 % — LOW (ref 20.5–51.1)
LYMPHOCYTES # BLD AUTO: 4.7 % — LOW (ref 20.5–51.1)
LYMPHOCYTES # BLD AUTO: 4.7 % — LOW (ref 20.5–51.1)
LYMPHOCYTES # BLD AUTO: 5.4 % — LOW (ref 20.5–51.1)
LYMPHOCYTES # BLD AUTO: 7.6 % — LOW (ref 20.5–51.1)
LYMPHOCYTES # BLD AUTO: 9.6 % — LOW (ref 20.5–51.1)
LYMPHOCYTES # BLD AUTO: 9.6 % — LOW (ref 20.5–51.1)
MAGNESIUM SERPL-MCNC: 1.7 MG/DL — LOW (ref 1.8–2.4)
MAGNESIUM SERPL-MCNC: 1.8 MG/DL — SIGNIFICANT CHANGE UP (ref 1.8–2.4)
MAGNESIUM SERPL-MCNC: 1.9 MG/DL — SIGNIFICANT CHANGE UP (ref 1.8–2.4)
MAGNESIUM SERPL-MCNC: 2 MG/DL — SIGNIFICANT CHANGE UP (ref 1.8–2.4)
MAGNESIUM SERPL-MCNC: 2 MG/DL — SIGNIFICANT CHANGE UP (ref 1.8–2.4)
MAGNESIUM SERPL-MCNC: 2.1 MG/DL — SIGNIFICANT CHANGE UP (ref 1.8–2.4)
MAGNESIUM SERPL-MCNC: 2.1 MG/DL — SIGNIFICANT CHANGE UP (ref 1.8–2.4)
MAGNESIUM SERPL-MCNC: 2.2 MG/DL — SIGNIFICANT CHANGE UP (ref 1.8–2.4)
MAGNESIUM SERPL-MCNC: 2.3 MG/DL — SIGNIFICANT CHANGE UP (ref 1.8–2.4)
MAGNESIUM SERPL-MCNC: 2.4 MG/DL — SIGNIFICANT CHANGE UP (ref 1.8–2.4)
MAGNESIUM SERPL-MCNC: 2.5 MG/DL — HIGH (ref 1.8–2.4)
MAGNESIUM SERPL-MCNC: 2.5 MG/DL — HIGH (ref 1.8–2.4)
MCHC RBC-ENTMCNC: 24.4 PG — LOW (ref 27–31)
MCHC RBC-ENTMCNC: 24.4 PG — LOW (ref 27–31)
MCHC RBC-ENTMCNC: 25 PG — LOW (ref 27–31)
MCHC RBC-ENTMCNC: 25 PG — LOW (ref 27–31)
MCHC RBC-ENTMCNC: 25.6 PG — LOW (ref 27–31)
MCHC RBC-ENTMCNC: 25.6 PG — LOW (ref 27–31)
MCHC RBC-ENTMCNC: 25.9 PG — LOW (ref 27–31)
MCHC RBC-ENTMCNC: 26.1 PG — LOW (ref 27–31)
MCHC RBC-ENTMCNC: 26.1 PG — LOW (ref 27–31)
MCHC RBC-ENTMCNC: 26.3 PG — LOW (ref 27–31)
MCHC RBC-ENTMCNC: 26.4 PG — LOW (ref 27–31)
MCHC RBC-ENTMCNC: 26.5 PG — LOW (ref 27–31)
MCHC RBC-ENTMCNC: 26.6 PG — LOW (ref 27–31)
MCHC RBC-ENTMCNC: 26.7 PG — LOW (ref 27–31)
MCHC RBC-ENTMCNC: 26.8 PG — LOW (ref 27–31)
MCHC RBC-ENTMCNC: 26.9 PG — LOW (ref 27–31)
MCHC RBC-ENTMCNC: 27 PG — SIGNIFICANT CHANGE UP (ref 27–31)
MCHC RBC-ENTMCNC: 27.1 PG — SIGNIFICANT CHANGE UP (ref 27–31)
MCHC RBC-ENTMCNC: 27.1 PG — SIGNIFICANT CHANGE UP (ref 27–31)
MCHC RBC-ENTMCNC: 27.2 PG — SIGNIFICANT CHANGE UP (ref 27–31)
MCHC RBC-ENTMCNC: 27.3 PG — SIGNIFICANT CHANGE UP (ref 27–31)
MCHC RBC-ENTMCNC: 27.5 PG — SIGNIFICANT CHANGE UP (ref 27–31)
MCHC RBC-ENTMCNC: 27.5 PG — SIGNIFICANT CHANGE UP (ref 27–31)
MCHC RBC-ENTMCNC: 27.8 PG — SIGNIFICANT CHANGE UP (ref 27–31)
MCHC RBC-ENTMCNC: 28.1 G/DL — LOW (ref 32–37)
MCHC RBC-ENTMCNC: 28.1 G/DL — LOW (ref 32–37)
MCHC RBC-ENTMCNC: 28.3 G/DL — LOW (ref 32–37)
MCHC RBC-ENTMCNC: 28.3 G/DL — LOW (ref 32–37)
MCHC RBC-ENTMCNC: 28.8 G/DL — LOW (ref 32–37)
MCHC RBC-ENTMCNC: 28.8 G/DL — LOW (ref 32–37)
MCHC RBC-ENTMCNC: 28.9 G/DL — LOW (ref 32–37)
MCHC RBC-ENTMCNC: 28.9 G/DL — LOW (ref 32–37)
MCHC RBC-ENTMCNC: 29.1 G/DL — LOW (ref 32–37)
MCHC RBC-ENTMCNC: 29.1 G/DL — LOW (ref 32–37)
MCHC RBC-ENTMCNC: 29.2 G/DL — LOW (ref 32–37)
MCHC RBC-ENTMCNC: 29.2 G/DL — LOW (ref 32–37)
MCHC RBC-ENTMCNC: 29.6 G/DL — LOW (ref 32–37)
MCHC RBC-ENTMCNC: 29.7 G/DL — LOW (ref 32–37)
MCHC RBC-ENTMCNC: 29.7 G/DL — LOW (ref 32–37)
MCHC RBC-ENTMCNC: 29.8 G/DL — LOW (ref 32–37)
MCHC RBC-ENTMCNC: 29.9 G/DL — LOW (ref 32–37)
MCHC RBC-ENTMCNC: 29.9 G/DL — LOW (ref 32–37)
MCHC RBC-ENTMCNC: 30.1 G/DL — LOW (ref 32–37)
MCHC RBC-ENTMCNC: 30.1 G/DL — LOW (ref 32–37)
MCHC RBC-ENTMCNC: 30.2 G/DL — LOW (ref 32–37)
MCHC RBC-ENTMCNC: 30.3 G/DL — LOW (ref 32–37)
MCHC RBC-ENTMCNC: 30.3 G/DL — LOW (ref 32–37)
MCHC RBC-ENTMCNC: 30.4 G/DL — LOW (ref 32–37)
MCHC RBC-ENTMCNC: 30.5 G/DL — LOW (ref 32–37)
MCHC RBC-ENTMCNC: 30.7 G/DL — LOW (ref 32–37)
MCHC RBC-ENTMCNC: 30.9 G/DL — LOW (ref 32–37)
MCHC RBC-ENTMCNC: 31 G/DL — LOW (ref 32–37)
MCHC RBC-ENTMCNC: 31.1 G/DL — LOW (ref 32–37)
MCHC RBC-ENTMCNC: 31.3 G/DL — LOW (ref 32–37)
MCHC RBC-ENTMCNC: 31.4 G/DL — LOW (ref 32–37)
MCHC RBC-ENTMCNC: 31.5 G/DL — LOW (ref 32–37)
MCHC RBC-ENTMCNC: 31.6 G/DL — LOW (ref 32–37)
MCHC RBC-ENTMCNC: 31.7 G/DL — LOW (ref 32–37)
MCHC RBC-ENTMCNC: 31.7 G/DL — LOW (ref 32–37)
MCHC RBC-ENTMCNC: 31.8 G/DL — LOW (ref 32–37)
MCHC RBC-ENTMCNC: 31.9 G/DL — LOW (ref 32–37)
MCHC RBC-ENTMCNC: 32.1 G/DL — SIGNIFICANT CHANGE UP (ref 32–37)
MCHC RBC-ENTMCNC: 32.2 G/DL — SIGNIFICANT CHANGE UP (ref 32–37)
MCHC RBC-ENTMCNC: 32.6 G/DL — SIGNIFICANT CHANGE UP (ref 32–37)
MCHC RBC-ENTMCNC: 32.7 G/DL — SIGNIFICANT CHANGE UP (ref 32–37)
MCV RBC AUTO: 82.6 FL — SIGNIFICANT CHANGE UP (ref 81–99)
MCV RBC AUTO: 82.6 FL — SIGNIFICANT CHANGE UP (ref 81–99)
MCV RBC AUTO: 82.7 FL — SIGNIFICANT CHANGE UP (ref 81–99)
MCV RBC AUTO: 82.8 FL — SIGNIFICANT CHANGE UP (ref 81–99)
MCV RBC AUTO: 83.2 FL — SIGNIFICANT CHANGE UP (ref 81–99)
MCV RBC AUTO: 83.4 FL — SIGNIFICANT CHANGE UP (ref 81–99)
MCV RBC AUTO: 83.6 FL — SIGNIFICANT CHANGE UP (ref 81–99)
MCV RBC AUTO: 83.6 FL — SIGNIFICANT CHANGE UP (ref 81–99)
MCV RBC AUTO: 83.7 FL — SIGNIFICANT CHANGE UP (ref 81–99)
MCV RBC AUTO: 84 FL — SIGNIFICANT CHANGE UP (ref 81–99)
MCV RBC AUTO: 84.1 FL — SIGNIFICANT CHANGE UP (ref 81–99)
MCV RBC AUTO: 84.5 FL — SIGNIFICANT CHANGE UP (ref 81–99)
MCV RBC AUTO: 84.6 FL — SIGNIFICANT CHANGE UP (ref 81–99)
MCV RBC AUTO: 84.8 FL — SIGNIFICANT CHANGE UP (ref 81–99)
MCV RBC AUTO: 84.9 FL — SIGNIFICANT CHANGE UP (ref 81–99)
MCV RBC AUTO: 85.2 FL — SIGNIFICANT CHANGE UP (ref 81–99)
MCV RBC AUTO: 85.8 FL — SIGNIFICANT CHANGE UP (ref 81–99)
MCV RBC AUTO: 86.1 FL — SIGNIFICANT CHANGE UP (ref 81–99)
MCV RBC AUTO: 86.3 FL — SIGNIFICANT CHANGE UP (ref 81–99)
MCV RBC AUTO: 86.3 FL — SIGNIFICANT CHANGE UP (ref 81–99)
MCV RBC AUTO: 86.6 FL — SIGNIFICANT CHANGE UP (ref 81–99)
MCV RBC AUTO: 86.7 FL — SIGNIFICANT CHANGE UP (ref 81–99)
MCV RBC AUTO: 86.8 FL — SIGNIFICANT CHANGE UP (ref 81–99)
MCV RBC AUTO: 86.9 FL — SIGNIFICANT CHANGE UP (ref 81–99)
MCV RBC AUTO: 86.9 FL — SIGNIFICANT CHANGE UP (ref 81–99)
MCV RBC AUTO: 87 FL — SIGNIFICANT CHANGE UP (ref 81–99)
MCV RBC AUTO: 87.3 FL — SIGNIFICANT CHANGE UP (ref 81–99)
MCV RBC AUTO: 87.3 FL — SIGNIFICANT CHANGE UP (ref 81–99)
MCV RBC AUTO: 87.5 FL — SIGNIFICANT CHANGE UP (ref 81–99)
MCV RBC AUTO: 87.5 FL — SIGNIFICANT CHANGE UP (ref 81–99)
MCV RBC AUTO: 87.6 FL — SIGNIFICANT CHANGE UP (ref 81–99)
MCV RBC AUTO: 87.9 FL — SIGNIFICANT CHANGE UP (ref 81–99)
MCV RBC AUTO: 87.9 FL — SIGNIFICANT CHANGE UP (ref 81–99)
MCV RBC AUTO: 88.4 FL — SIGNIFICANT CHANGE UP (ref 81–99)
MCV RBC AUTO: 88.8 FL — SIGNIFICANT CHANGE UP (ref 81–99)
MCV RBC AUTO: 89 FL — SIGNIFICANT CHANGE UP (ref 81–99)
MCV RBC AUTO: 89.2 FL — SIGNIFICANT CHANGE UP (ref 81–99)
MCV RBC AUTO: 89.2 FL — SIGNIFICANT CHANGE UP (ref 81–99)
MCV RBC AUTO: 90.2 FL — SIGNIFICANT CHANGE UP (ref 81–99)
MCV RBC AUTO: 90.3 FL — SIGNIFICANT CHANGE UP (ref 81–99)
MCV RBC AUTO: 90.3 FL — SIGNIFICANT CHANGE UP (ref 81–99)
MCV RBC AUTO: 90.6 FL — SIGNIFICANT CHANGE UP (ref 81–99)
MCV RBC AUTO: 90.6 FL — SIGNIFICANT CHANGE UP (ref 81–99)
MCV RBC AUTO: 90.7 FL — SIGNIFICANT CHANGE UP (ref 81–99)
MCV RBC AUTO: 91.2 FL — SIGNIFICANT CHANGE UP (ref 81–99)
MCV RBC AUTO: 91.2 FL — SIGNIFICANT CHANGE UP (ref 81–99)
MCV RBC AUTO: 91.5 FL — SIGNIFICANT CHANGE UP (ref 81–99)
METHOD TYPE: SIGNIFICANT CHANGE UP
METHOD TYPE: SIGNIFICANT CHANGE UP
MONOCYTES # BLD AUTO: 0.38 K/UL — SIGNIFICANT CHANGE UP (ref 0.1–0.6)
MONOCYTES # BLD AUTO: 0.51 K/UL — SIGNIFICANT CHANGE UP (ref 0.1–0.6)
MONOCYTES # BLD AUTO: 0.52 K/UL — SIGNIFICANT CHANGE UP (ref 0.1–0.6)
MONOCYTES # BLD AUTO: 0.52 K/UL — SIGNIFICANT CHANGE UP (ref 0.1–0.6)
MONOCYTES # BLD AUTO: 0.63 K/UL — HIGH (ref 0.1–0.6)
MONOCYTES # BLD AUTO: 0.67 K/UL — HIGH (ref 0.1–0.6)
MONOCYTES # BLD AUTO: 0.67 K/UL — HIGH (ref 0.1–0.6)
MONOCYTES # BLD AUTO: 0.69 K/UL — HIGH (ref 0.1–0.6)
MONOCYTES # BLD AUTO: 0.7 K/UL — HIGH (ref 0.1–0.6)
MONOCYTES # BLD AUTO: 0.74 K/UL — HIGH (ref 0.1–0.6)
MONOCYTES # BLD AUTO: 0.74 K/UL — HIGH (ref 0.1–0.6)
MONOCYTES # BLD AUTO: 0.75 K/UL — HIGH (ref 0.1–0.6)
MONOCYTES # BLD AUTO: 0.79 K/UL — HIGH (ref 0.1–0.6)
MONOCYTES # BLD AUTO: 0.83 K/UL — HIGH (ref 0.1–0.6)
MONOCYTES # BLD AUTO: 0.87 K/UL — HIGH (ref 0.1–0.6)
MONOCYTES # BLD AUTO: 0.88 K/UL — HIGH (ref 0.1–0.6)
MONOCYTES # BLD AUTO: 0.97 K/UL — HIGH (ref 0.1–0.6)
MONOCYTES # BLD AUTO: 0.99 K/UL — HIGH (ref 0.1–0.6)
MONOCYTES # BLD AUTO: 0.99 K/UL — HIGH (ref 0.1–0.6)
MONOCYTES # BLD AUTO: 1 K/UL — HIGH (ref 0.1–0.6)
MONOCYTES # BLD AUTO: 1.1 K/UL — HIGH (ref 0.1–0.6)
MONOCYTES # BLD AUTO: 1.1 K/UL — HIGH (ref 0.1–0.6)
MONOCYTES # BLD AUTO: 1.11 K/UL — HIGH (ref 0.1–0.6)
MONOCYTES # BLD AUTO: 1.11 K/UL — HIGH (ref 0.1–0.6)
MONOCYTES # BLD AUTO: 1.2 K/UL — HIGH (ref 0.1–0.6)
MONOCYTES # BLD AUTO: 1.39 K/UL — HIGH (ref 0.1–0.6)
MONOCYTES # BLD AUTO: 1.41 K/UL — HIGH (ref 0.1–0.6)
MONOCYTES # BLD AUTO: 1.54 K/UL — HIGH (ref 0.1–0.6)
MONOCYTES # BLD AUTO: 1.71 K/UL — HIGH (ref 0.1–0.6)
MONOCYTES # BLD AUTO: 1.79 K/UL — HIGH (ref 0.1–0.6)
MONOCYTES # BLD AUTO: 2.16 K/UL — HIGH (ref 0.1–0.6)
MONOCYTES NFR BLD AUTO: 10.6 % — HIGH (ref 1.7–9.3)
MONOCYTES NFR BLD AUTO: 12.1 % — HIGH (ref 1.7–9.3)
MONOCYTES NFR BLD AUTO: 12.7 % — HIGH (ref 1.7–9.3)
MONOCYTES NFR BLD AUTO: 12.7 % — HIGH (ref 1.7–9.3)
MONOCYTES NFR BLD AUTO: 12.9 % — HIGH (ref 1.7–9.3)
MONOCYTES NFR BLD AUTO: 12.9 % — HIGH (ref 1.7–9.3)
MONOCYTES NFR BLD AUTO: 13 % — HIGH (ref 1.7–9.3)
MONOCYTES NFR BLD AUTO: 13.1 % — HIGH (ref 1.7–9.3)
MONOCYTES NFR BLD AUTO: 13.5 % — HIGH (ref 1.7–9.3)
MONOCYTES NFR BLD AUTO: 13.7 % — HIGH (ref 1.7–9.3)
MONOCYTES NFR BLD AUTO: 13.8 % — HIGH (ref 1.7–9.3)
MONOCYTES NFR BLD AUTO: 14.3 % — HIGH (ref 1.7–9.3)
MONOCYTES NFR BLD AUTO: 14.3 % — HIGH (ref 1.7–9.3)
MONOCYTES NFR BLD AUTO: 14.8 % — HIGH (ref 1.7–9.3)
MONOCYTES NFR BLD AUTO: 15.3 % — HIGH (ref 1.7–9.3)
MONOCYTES NFR BLD AUTO: 16.1 % — HIGH (ref 1.7–9.3)
MONOCYTES NFR BLD AUTO: 16.6 % — HIGH (ref 1.7–9.3)
MONOCYTES NFR BLD AUTO: 16.6 % — HIGH (ref 1.7–9.3)
MONOCYTES NFR BLD AUTO: 16.7 % — HIGH (ref 1.7–9.3)
MONOCYTES NFR BLD AUTO: 16.8 % — HIGH (ref 1.7–9.3)
MONOCYTES NFR BLD AUTO: 5.1 % — SIGNIFICANT CHANGE UP (ref 1.7–9.3)
MONOCYTES NFR BLD AUTO: 5.1 % — SIGNIFICANT CHANGE UP (ref 1.7–9.3)
MONOCYTES NFR BLD AUTO: 5.7 % — SIGNIFICANT CHANGE UP (ref 1.7–9.3)
MONOCYTES NFR BLD AUTO: 5.7 % — SIGNIFICANT CHANGE UP (ref 1.7–9.3)
MONOCYTES NFR BLD AUTO: 6 % — SIGNIFICANT CHANGE UP (ref 1.7–9.3)
MONOCYTES NFR BLD AUTO: 7 % — SIGNIFICANT CHANGE UP (ref 1.7–9.3)
MONOCYTES NFR BLD AUTO: 8 % — SIGNIFICANT CHANGE UP (ref 1.7–9.3)
MONOCYTES NFR BLD AUTO: 8.2 % — SIGNIFICANT CHANGE UP (ref 1.7–9.3)
MONOCYTES NFR BLD AUTO: 8.2 % — SIGNIFICANT CHANGE UP (ref 1.7–9.3)
MONOCYTES NFR BLD AUTO: 8.4 % — SIGNIFICANT CHANGE UP (ref 1.7–9.3)
MONOCYTES NFR BLD AUTO: 8.9 % — SIGNIFICANT CHANGE UP (ref 1.7–9.3)
MONOCYTES NFR BLD AUTO: 9.4 % — HIGH (ref 1.7–9.3)
MONOCYTES NFR BLD AUTO: 9.8 % — HIGH (ref 1.7–9.3)
MONOCYTES NFR BLD AUTO: 9.8 % — HIGH (ref 1.7–9.3)
MRSA PCR RESULT.: NEGATIVE — SIGNIFICANT CHANGE UP
NEUTROPHILS # BLD AUTO: 10.58 K/UL — HIGH (ref 1.4–6.5)
NEUTROPHILS # BLD AUTO: 10.58 K/UL — HIGH (ref 1.4–6.5)
NEUTROPHILS # BLD AUTO: 11.74 K/UL — HIGH (ref 1.4–6.5)
NEUTROPHILS # BLD AUTO: 11.74 K/UL — HIGH (ref 1.4–6.5)
NEUTROPHILS # BLD AUTO: 2.86 K/UL — SIGNIFICANT CHANGE UP (ref 1.4–6.5)
NEUTROPHILS # BLD AUTO: 3.35 K/UL — SIGNIFICANT CHANGE UP (ref 1.4–6.5)
NEUTROPHILS # BLD AUTO: 3.35 K/UL — SIGNIFICANT CHANGE UP (ref 1.4–6.5)
NEUTROPHILS # BLD AUTO: 3.48 K/UL — SIGNIFICANT CHANGE UP (ref 1.4–6.5)
NEUTROPHILS # BLD AUTO: 3.48 K/UL — SIGNIFICANT CHANGE UP (ref 1.4–6.5)
NEUTROPHILS # BLD AUTO: 3.55 K/UL — SIGNIFICANT CHANGE UP (ref 1.4–6.5)
NEUTROPHILS # BLD AUTO: 3.97 K/UL — SIGNIFICANT CHANGE UP (ref 1.4–6.5)
NEUTROPHILS # BLD AUTO: 4.05 K/UL — SIGNIFICANT CHANGE UP (ref 1.4–6.5)
NEUTROPHILS # BLD AUTO: 4.05 K/UL — SIGNIFICANT CHANGE UP (ref 1.4–6.5)
NEUTROPHILS # BLD AUTO: 4.27 K/UL — SIGNIFICANT CHANGE UP (ref 1.4–6.5)
NEUTROPHILS # BLD AUTO: 4.64 K/UL — SIGNIFICANT CHANGE UP (ref 1.4–6.5)
NEUTROPHILS # BLD AUTO: 4.81 K/UL — SIGNIFICANT CHANGE UP (ref 1.4–6.5)
NEUTROPHILS # BLD AUTO: 4.88 K/UL — SIGNIFICANT CHANGE UP (ref 1.4–6.5)
NEUTROPHILS # BLD AUTO: 4.94 K/UL — SIGNIFICANT CHANGE UP (ref 1.4–6.5)
NEUTROPHILS # BLD AUTO: 5.16 K/UL — SIGNIFICANT CHANGE UP (ref 1.4–6.5)
NEUTROPHILS # BLD AUTO: 5.42 K/UL — SIGNIFICANT CHANGE UP (ref 1.4–6.5)
NEUTROPHILS # BLD AUTO: 6.05 K/UL — SIGNIFICANT CHANGE UP (ref 1.4–6.5)
NEUTROPHILS # BLD AUTO: 6.14 K/UL — SIGNIFICANT CHANGE UP (ref 1.4–6.5)
NEUTROPHILS # BLD AUTO: 6.23 K/UL — SIGNIFICANT CHANGE UP (ref 1.4–6.5)
NEUTROPHILS # BLD AUTO: 6.51 K/UL — HIGH (ref 1.4–6.5)
NEUTROPHILS # BLD AUTO: 6.78 K/UL — HIGH (ref 1.4–6.5)
NEUTROPHILS # BLD AUTO: 6.78 K/UL — HIGH (ref 1.4–6.5)
NEUTROPHILS # BLD AUTO: 7.57 K/UL — HIGH (ref 1.4–6.5)
NEUTROPHILS # BLD AUTO: 8.16 K/UL — HIGH (ref 1.4–6.5)
NEUTROPHILS # BLD AUTO: 8.16 K/UL — HIGH (ref 1.4–6.5)
NEUTROPHILS # BLD AUTO: 8.37 K/UL — HIGH (ref 1.4–6.5)
NEUTROPHILS # BLD AUTO: 8.51 K/UL — HIGH (ref 1.4–6.5)
NEUTROPHILS # BLD AUTO: 8.54 K/UL — HIGH (ref 1.4–6.5)
NEUTROPHILS # BLD AUTO: 8.69 K/UL — HIGH (ref 1.4–6.5)
NEUTROPHILS # BLD AUTO: 9.35 K/UL — HIGH (ref 1.4–6.5)
NEUTROPHILS # BLD AUTO: 9.35 K/UL — HIGH (ref 1.4–6.5)
NEUTROPHILS # BLD AUTO: 9.47 K/UL — HIGH (ref 1.4–6.5)
NEUTROPHILS NFR BLD AUTO: 57.7 % — SIGNIFICANT CHANGE UP (ref 42.2–75.2)
NEUTROPHILS NFR BLD AUTO: 57.7 % — SIGNIFICANT CHANGE UP (ref 42.2–75.2)
NEUTROPHILS NFR BLD AUTO: 58.3 % — SIGNIFICANT CHANGE UP (ref 42.2–75.2)
NEUTROPHILS NFR BLD AUTO: 58.3 % — SIGNIFICANT CHANGE UP (ref 42.2–75.2)
NEUTROPHILS NFR BLD AUTO: 59.7 % — SIGNIFICANT CHANGE UP (ref 42.2–75.2)
NEUTROPHILS NFR BLD AUTO: 59.8 % — SIGNIFICANT CHANGE UP (ref 42.2–75.2)
NEUTROPHILS NFR BLD AUTO: 59.8 % — SIGNIFICANT CHANGE UP (ref 42.2–75.2)
NEUTROPHILS NFR BLD AUTO: 62.7 % — SIGNIFICANT CHANGE UP (ref 42.2–75.2)
NEUTROPHILS NFR BLD AUTO: 62.7 % — SIGNIFICANT CHANGE UP (ref 42.2–75.2)
NEUTROPHILS NFR BLD AUTO: 64.8 % — SIGNIFICANT CHANGE UP (ref 42.2–75.2)
NEUTROPHILS NFR BLD AUTO: 65.6 % — SIGNIFICANT CHANGE UP (ref 42.2–75.2)
NEUTROPHILS NFR BLD AUTO: 66.5 % — SIGNIFICANT CHANGE UP (ref 42.2–75.2)
NEUTROPHILS NFR BLD AUTO: 66.6 % — SIGNIFICANT CHANGE UP (ref 42.2–75.2)
NEUTROPHILS NFR BLD AUTO: 67.7 % — SIGNIFICANT CHANGE UP (ref 42.2–75.2)
NEUTROPHILS NFR BLD AUTO: 68.2 % — SIGNIFICANT CHANGE UP (ref 42.2–75.2)
NEUTROPHILS NFR BLD AUTO: 68.4 % — SIGNIFICANT CHANGE UP (ref 42.2–75.2)
NEUTROPHILS NFR BLD AUTO: 69.4 % — SIGNIFICANT CHANGE UP (ref 42.2–75.2)
NEUTROPHILS NFR BLD AUTO: 70.5 % — SIGNIFICANT CHANGE UP (ref 42.2–75.2)
NEUTROPHILS NFR BLD AUTO: 71.4 % — SIGNIFICANT CHANGE UP (ref 42.2–75.2)
NEUTROPHILS NFR BLD AUTO: 71.8 % — SIGNIFICANT CHANGE UP (ref 42.2–75.2)
NEUTROPHILS NFR BLD AUTO: 72.1 % — SIGNIFICANT CHANGE UP (ref 42.2–75.2)
NEUTROPHILS NFR BLD AUTO: 78.1 % — HIGH (ref 42.2–75.2)
NEUTROPHILS NFR BLD AUTO: 79.2 % — HIGH (ref 42.2–75.2)
NEUTROPHILS NFR BLD AUTO: 81 % — HIGH (ref 42.2–75.2)
NEUTROPHILS NFR BLD AUTO: 81 % — HIGH (ref 42.2–75.2)
NEUTROPHILS NFR BLD AUTO: 81.1 % — HIGH (ref 42.2–75.2)
NEUTROPHILS NFR BLD AUTO: 81.2 % — HIGH (ref 42.2–75.2)
NEUTROPHILS NFR BLD AUTO: 83.7 % — HIGH (ref 42.2–75.2)
NEUTROPHILS NFR BLD AUTO: 83.7 % — HIGH (ref 42.2–75.2)
NEUTROPHILS NFR BLD AUTO: 84.9 % — HIGH (ref 42.2–75.2)
NEUTROPHILS NFR BLD AUTO: 84.9 % — HIGH (ref 42.2–75.2)
NEUTROPHILS NFR BLD AUTO: 89.2 % — HIGH (ref 42.2–75.2)
NEUTROPHILS NFR BLD AUTO: 89.2 % — HIGH (ref 42.2–75.2)
NEUTROPHILS NFR BLD AUTO: 90 % — HIGH (ref 42.2–75.2)
NEUTROPHILS NFR BLD AUTO: 90 % — HIGH (ref 42.2–75.2)
NEUTROPHILS NFR BLD AUTO: 91.1 % — HIGH (ref 42.2–75.2)
NITRITE UR-MCNC: NEGATIVE — SIGNIFICANT CHANGE UP
NON HDL CHOLESTEROL: 60 MG/DL — SIGNIFICANT CHANGE UP
NON HDL CHOLESTEROL: 60 MG/DL — SIGNIFICANT CHANGE UP
NON HDL CHOLESTEROL: 63 MG/DL — SIGNIFICANT CHANGE UP
NRBC # BLD: 0 /100 WBCS — SIGNIFICANT CHANGE UP (ref 0–0)
NRBC # BLD: 1 /100 WBCS — HIGH (ref 0–0)
NRBC # BLD: 1 /100 WBCS — HIGH (ref 0–0)
NRBC # BLD: SIGNIFICANT CHANGE UP /100 WBCS (ref 0–0)
NT-PROBNP SERPL-SCNC: 2006 PG/ML — HIGH (ref 0–300)
NT-PROBNP SERPL-SCNC: 2006 PG/ML — HIGH (ref 0–300)
NT-PROBNP SERPL-SCNC: HIGH PG/ML (ref 0–300)
ORGANISM # SPEC MICROSCOPIC CNT: ABNORMAL
ORGANISM # SPEC MICROSCOPIC CNT: ABNORMAL
ORGANISM # SPEC MICROSCOPIC CNT: SIGNIFICANT CHANGE UP
ORGANISM # SPEC MICROSCOPIC CNT: SIGNIFICANT CHANGE UP
OSMOLALITY UR: 327 MOS/KG — SIGNIFICANT CHANGE UP (ref 50–1200)
OSMOLALITY UR: 327 MOS/KG — SIGNIFICANT CHANGE UP (ref 50–1200)
PCO2 BLDA: 31 MMHG — LOW (ref 32–45)
PCO2 BLDA: 56 MMHG — HIGH (ref 32–45)
PCO2 BLDA: 56 MMHG — HIGH (ref 32–45)
PCO2 BLDV: 46 MMHG — HIGH (ref 39–42)
PCO2 BLDV: 47 MMHG — HIGH (ref 39–42)
PCO2 BLDV: 49 MMHG — HIGH (ref 39–42)
PCO2 BLDV: 58 MMHG — HIGH (ref 39–42)
PF4 HEPARIN CMPLX AB SER-ACNC: NEGATIVE — SIGNIFICANT CHANGE UP
PH BLDA: 7.43 — SIGNIFICANT CHANGE UP (ref 7.35–7.45)
PH BLDA: 7.43 — SIGNIFICANT CHANGE UP (ref 7.35–7.45)
PH BLDA: 7.44 — SIGNIFICANT CHANGE UP (ref 7.35–7.45)
PH BLDV: 7.28 — LOW (ref 7.32–7.43)
PH BLDV: 7.33 — SIGNIFICANT CHANGE UP (ref 7.32–7.43)
PH BLDV: 7.4 — SIGNIFICANT CHANGE UP (ref 7.32–7.43)
PH BLDV: 7.42 — SIGNIFICANT CHANGE UP (ref 7.32–7.43)
PH UR: 5.5 — SIGNIFICANT CHANGE UP (ref 5–8)
PH UR: 6.5 — SIGNIFICANT CHANGE UP (ref 5–8)
PH UR: 7 — SIGNIFICANT CHANGE UP (ref 5–8)
PH UR: 7 — SIGNIFICANT CHANGE UP (ref 5–8)
PHOSPHATE SERPL-MCNC: 3 MG/DL — SIGNIFICANT CHANGE UP (ref 2.1–4.9)
PHOSPHATE SERPL-MCNC: 3.1 MG/DL — SIGNIFICANT CHANGE UP (ref 2.1–4.9)
PHOSPHATE SERPL-MCNC: 3.6 MG/DL — SIGNIFICANT CHANGE UP (ref 2.1–4.9)
PHOSPHATE SERPL-MCNC: 3.8 MG/DL — SIGNIFICANT CHANGE UP (ref 2.1–4.9)
PHOSPHATE SERPL-MCNC: 4.3 MG/DL — SIGNIFICANT CHANGE UP (ref 2.1–4.9)
PHOSPHATE SERPL-MCNC: 4.7 MG/DL — SIGNIFICANT CHANGE UP (ref 2.1–4.9)
PHOSPHATE SERPL-MCNC: 7.9 MG/DL — HIGH (ref 2.1–4.9)
PLATELET # BLD AUTO: 100 K/UL — LOW (ref 130–400)
PLATELET # BLD AUTO: 101 K/UL — LOW (ref 130–400)
PLATELET # BLD AUTO: 102 K/UL — LOW (ref 130–400)
PLATELET # BLD AUTO: 102 K/UL — LOW (ref 130–400)
PLATELET # BLD AUTO: 105 K/UL — LOW (ref 130–400)
PLATELET # BLD AUTO: 105 K/UL — LOW (ref 130–400)
PLATELET # BLD AUTO: 111 K/UL — LOW (ref 130–400)
PLATELET # BLD AUTO: 112 K/UL — LOW (ref 130–400)
PLATELET # BLD AUTO: 114 K/UL — LOW (ref 130–400)
PLATELET # BLD AUTO: 114 K/UL — LOW (ref 130–400)
PLATELET # BLD AUTO: 119 K/UL — LOW (ref 130–400)
PLATELET # BLD AUTO: 120 K/UL — LOW (ref 130–400)
PLATELET # BLD AUTO: 122 K/UL — LOW (ref 130–400)
PLATELET # BLD AUTO: 123 K/UL — LOW (ref 130–400)
PLATELET # BLD AUTO: 124 K/UL — LOW (ref 130–400)
PLATELET # BLD AUTO: 125 K/UL — LOW (ref 130–400)
PLATELET # BLD AUTO: 126 K/UL — LOW (ref 130–400)
PLATELET # BLD AUTO: 126 K/UL — LOW (ref 130–400)
PLATELET # BLD AUTO: 127 K/UL — LOW (ref 130–400)
PLATELET # BLD AUTO: 129 K/UL — LOW (ref 130–400)
PLATELET # BLD AUTO: 129 K/UL — LOW (ref 130–400)
PLATELET # BLD AUTO: 134 K/UL — SIGNIFICANT CHANGE UP (ref 130–400)
PLATELET # BLD AUTO: 135 K/UL — SIGNIFICANT CHANGE UP (ref 130–400)
PLATELET # BLD AUTO: 137 K/UL — SIGNIFICANT CHANGE UP (ref 130–400)
PLATELET # BLD AUTO: 138 K/UL — SIGNIFICANT CHANGE UP (ref 130–400)
PLATELET # BLD AUTO: 138 K/UL — SIGNIFICANT CHANGE UP (ref 130–400)
PLATELET # BLD AUTO: 140 K/UL — SIGNIFICANT CHANGE UP (ref 130–400)
PLATELET # BLD AUTO: 140 K/UL — SIGNIFICANT CHANGE UP (ref 130–400)
PLATELET # BLD AUTO: 141 K/UL — SIGNIFICANT CHANGE UP (ref 130–400)
PLATELET # BLD AUTO: 144 K/UL — SIGNIFICANT CHANGE UP (ref 130–400)
PLATELET # BLD AUTO: 145 K/UL — SIGNIFICANT CHANGE UP (ref 130–400)
PLATELET # BLD AUTO: 147 K/UL — SIGNIFICANT CHANGE UP (ref 130–400)
PLATELET # BLD AUTO: 169 K/UL — SIGNIFICANT CHANGE UP (ref 130–400)
PLATELET # BLD AUTO: 180 K/UL — SIGNIFICANT CHANGE UP (ref 130–400)
PLATELET # BLD AUTO: 203 K/UL — SIGNIFICANT CHANGE UP (ref 130–400)
PLATELET # BLD AUTO: 48 K/UL — LOW (ref 130–400)
PLATELET # BLD AUTO: 50 K/UL — LOW (ref 130–400)
PLATELET # BLD AUTO: 60 K/UL — LOW (ref 130–400)
PLATELET # BLD AUTO: 60 K/UL — LOW (ref 130–400)
PLATELET # BLD AUTO: 67 K/UL — LOW (ref 130–400)
PLATELET # BLD AUTO: 81 K/UL — LOW (ref 130–400)
PLATELET # BLD AUTO: 83 K/UL — LOW (ref 130–400)
PLATELET # BLD AUTO: 83 K/UL — LOW (ref 130–400)
PLATELET # BLD AUTO: 84 K/UL — LOW (ref 130–400)
PLATELET # BLD AUTO: 86 K/UL — LOW (ref 130–400)
PLATELET # BLD AUTO: 90 K/UL — LOW (ref 130–400)
PLATELET # BLD AUTO: 91 K/UL — LOW (ref 130–400)
PLATELET # BLD AUTO: 95 K/UL — LOW (ref 130–400)
PMV BLD: 10 FL — SIGNIFICANT CHANGE UP (ref 7.4–10.4)
PMV BLD: 10 FL — SIGNIFICANT CHANGE UP (ref 7.4–10.4)
PMV BLD: 10.1 FL — SIGNIFICANT CHANGE UP (ref 7.4–10.4)
PMV BLD: 10.2 FL — SIGNIFICANT CHANGE UP (ref 7.4–10.4)
PMV BLD: 10.3 FL — SIGNIFICANT CHANGE UP (ref 7.4–10.4)
PMV BLD: 10.4 FL — SIGNIFICANT CHANGE UP (ref 7.4–10.4)
PMV BLD: 10.5 FL — HIGH (ref 7.4–10.4)
PMV BLD: 10.6 FL — HIGH (ref 7.4–10.4)
PMV BLD: 10.7 FL — HIGH (ref 7.4–10.4)
PMV BLD: 10.8 FL — HIGH (ref 7.4–10.4)
PMV BLD: 10.9 FL — HIGH (ref 7.4–10.4)
PMV BLD: 11.2 FL — HIGH (ref 7.4–10.4)
PMV BLD: 11.6 FL — HIGH (ref 7.4–10.4)
PMV BLD: 9.2 FL — SIGNIFICANT CHANGE UP (ref 7.4–10.4)
PMV BLD: 9.6 FL — SIGNIFICANT CHANGE UP (ref 7.4–10.4)
PMV BLD: 9.6 FL — SIGNIFICANT CHANGE UP (ref 7.4–10.4)
PMV BLD: 9.7 FL — SIGNIFICANT CHANGE UP (ref 7.4–10.4)
PMV BLD: 9.8 FL — SIGNIFICANT CHANGE UP (ref 7.4–10.4)
PMV BLD: 9.9 FL — SIGNIFICANT CHANGE UP (ref 7.4–10.4)
PO2 BLDA: 65 MMHG — LOW (ref 83–108)
PO2 BLDA: 94 MMHG — SIGNIFICANT CHANGE UP (ref 83–108)
PO2 BLDA: 94 MMHG — SIGNIFICANT CHANGE UP (ref 83–108)
PO2 BLDV: 19 MMHG — LOW (ref 25–45)
PO2 BLDV: 37 MMHG — SIGNIFICANT CHANGE UP (ref 25–45)
PO2 BLDV: 42 MMHG — SIGNIFICANT CHANGE UP (ref 25–45)
PO2 BLDV: 44 MMHG — SIGNIFICANT CHANGE UP (ref 25–45)
POTASSIUM BLDV-SCNC: 3 MMOL/L — LOW (ref 3.5–5.1)
POTASSIUM BLDV-SCNC: 3.4 MMOL/L — LOW (ref 3.5–5.1)
POTASSIUM BLDV-SCNC: 5.2 MMOL/L — HIGH (ref 3.5–5.1)
POTASSIUM BLDV-SCNC: 6.8 MMOL/L — CRITICAL HIGH (ref 3.5–5.1)
POTASSIUM SERPL-MCNC: 2.6 MMOL/L — CRITICAL LOW (ref 3.5–5)
POTASSIUM SERPL-MCNC: 2.6 MMOL/L — CRITICAL LOW (ref 3.5–5)
POTASSIUM SERPL-MCNC: 2.9 MMOL/L — LOW (ref 3.5–5)
POTASSIUM SERPL-MCNC: 3 MMOL/L — LOW (ref 3.5–5)
POTASSIUM SERPL-MCNC: 3.1 MMOL/L — LOW (ref 3.5–5)
POTASSIUM SERPL-MCNC: 3.2 MMOL/L — LOW (ref 3.5–5)
POTASSIUM SERPL-MCNC: 3.3 MMOL/L — LOW (ref 3.5–5)
POTASSIUM SERPL-MCNC: 3.4 MMOL/L — LOW (ref 3.5–5)
POTASSIUM SERPL-MCNC: 3.5 MMOL/L — SIGNIFICANT CHANGE UP (ref 3.5–5)
POTASSIUM SERPL-MCNC: 3.6 MMOL/L — SIGNIFICANT CHANGE UP (ref 3.5–5)
POTASSIUM SERPL-MCNC: 3.7 MMOL/L — SIGNIFICANT CHANGE UP (ref 3.5–5)
POTASSIUM SERPL-MCNC: 3.7 MMOL/L — SIGNIFICANT CHANGE UP (ref 3.5–5)
POTASSIUM SERPL-MCNC: 3.8 MMOL/L — SIGNIFICANT CHANGE UP (ref 3.5–5)
POTASSIUM SERPL-MCNC: 3.9 MMOL/L — SIGNIFICANT CHANGE UP (ref 3.5–5)
POTASSIUM SERPL-MCNC: 4 MMOL/L — SIGNIFICANT CHANGE UP (ref 3.5–5)
POTASSIUM SERPL-MCNC: 4.1 MMOL/L — SIGNIFICANT CHANGE UP (ref 3.5–5)
POTASSIUM SERPL-MCNC: 4.2 MMOL/L — SIGNIFICANT CHANGE UP (ref 3.5–5)
POTASSIUM SERPL-MCNC: 4.4 MMOL/L — SIGNIFICANT CHANGE UP (ref 3.5–5)
POTASSIUM SERPL-MCNC: 4.4 MMOL/L — SIGNIFICANT CHANGE UP (ref 3.5–5)
POTASSIUM SERPL-MCNC: 4.8 MMOL/L — SIGNIFICANT CHANGE UP (ref 3.5–5)
POTASSIUM SERPL-MCNC: 4.9 MMOL/L — SIGNIFICANT CHANGE UP (ref 3.5–5)
POTASSIUM SERPL-MCNC: 5 MMOL/L — SIGNIFICANT CHANGE UP (ref 3.5–5)
POTASSIUM SERPL-MCNC: 5.2 MMOL/L — HIGH (ref 3.5–5)
POTASSIUM SERPL-MCNC: 5.4 MMOL/L — HIGH (ref 3.5–5)
POTASSIUM SERPL-MCNC: 5.6 MMOL/L — HIGH (ref 3.5–5)
POTASSIUM SERPL-MCNC: 5.8 MMOL/L — HIGH (ref 3.5–5)
POTASSIUM SERPL-MCNC: 6.2 MMOL/L — CRITICAL HIGH (ref 3.5–5)
POTASSIUM SERPL-MCNC: SIGNIFICANT CHANGE UP MMOL/L (ref 3.5–5)
POTASSIUM SERPL-MCNC: SIGNIFICANT CHANGE UP MMOL/L (ref 3.5–5)
POTASSIUM SERPL-SCNC: 2.6 MMOL/L — CRITICAL LOW (ref 3.5–5)
POTASSIUM SERPL-SCNC: 2.6 MMOL/L — CRITICAL LOW (ref 3.5–5)
POTASSIUM SERPL-SCNC: 2.9 MMOL/L — LOW (ref 3.5–5)
POTASSIUM SERPL-SCNC: 3 MMOL/L — LOW (ref 3.5–5)
POTASSIUM SERPL-SCNC: 3.1 MMOL/L — LOW (ref 3.5–5)
POTASSIUM SERPL-SCNC: 3.2 MMOL/L — LOW (ref 3.5–5)
POTASSIUM SERPL-SCNC: 3.3 MMOL/L — LOW (ref 3.5–5)
POTASSIUM SERPL-SCNC: 3.4 MMOL/L — LOW (ref 3.5–5)
POTASSIUM SERPL-SCNC: 3.5 MMOL/L — SIGNIFICANT CHANGE UP (ref 3.5–5)
POTASSIUM SERPL-SCNC: 3.6 MMOL/L — SIGNIFICANT CHANGE UP (ref 3.5–5)
POTASSIUM SERPL-SCNC: 3.7 MMOL/L — SIGNIFICANT CHANGE UP (ref 3.5–5)
POTASSIUM SERPL-SCNC: 3.7 MMOL/L — SIGNIFICANT CHANGE UP (ref 3.5–5)
POTASSIUM SERPL-SCNC: 3.8 MMOL/L — SIGNIFICANT CHANGE UP (ref 3.5–5)
POTASSIUM SERPL-SCNC: 3.9 MMOL/L — SIGNIFICANT CHANGE UP (ref 3.5–5)
POTASSIUM SERPL-SCNC: 4 MMOL/L — SIGNIFICANT CHANGE UP (ref 3.5–5)
POTASSIUM SERPL-SCNC: 4.1 MMOL/L — SIGNIFICANT CHANGE UP (ref 3.5–5)
POTASSIUM SERPL-SCNC: 4.2 MMOL/L — SIGNIFICANT CHANGE UP (ref 3.5–5)
POTASSIUM SERPL-SCNC: 4.4 MMOL/L — SIGNIFICANT CHANGE UP (ref 3.5–5)
POTASSIUM SERPL-SCNC: 4.4 MMOL/L — SIGNIFICANT CHANGE UP (ref 3.5–5)
POTASSIUM SERPL-SCNC: 4.8 MMOL/L — SIGNIFICANT CHANGE UP (ref 3.5–5)
POTASSIUM SERPL-SCNC: 4.9 MMOL/L — SIGNIFICANT CHANGE UP (ref 3.5–5)
POTASSIUM SERPL-SCNC: 5 MMOL/L — SIGNIFICANT CHANGE UP (ref 3.5–5)
POTASSIUM SERPL-SCNC: 5.2 MMOL/L — HIGH (ref 3.5–5)
POTASSIUM SERPL-SCNC: 5.4 MMOL/L — HIGH (ref 3.5–5)
POTASSIUM SERPL-SCNC: 5.6 MMOL/L — HIGH (ref 3.5–5)
POTASSIUM SERPL-SCNC: 5.8 MMOL/L — HIGH (ref 3.5–5)
POTASSIUM SERPL-SCNC: 6.2 MMOL/L — CRITICAL HIGH (ref 3.5–5)
POTASSIUM SERPL-SCNC: SIGNIFICANT CHANGE UP MMOL/L (ref 3.5–5)
POTASSIUM SERPL-SCNC: SIGNIFICANT CHANGE UP MMOL/L (ref 3.5–5)
PROT ?TM UR-MCNC: 8 MG/DLG/24H — SIGNIFICANT CHANGE UP
PROT ?TM UR-MCNC: 8 MG/DLG/24H — SIGNIFICANT CHANGE UP
PROT SERPL-MCNC: 5 G/DL — LOW (ref 6–8)
PROT SERPL-MCNC: 5.2 G/DL — LOW (ref 6–8)
PROT SERPL-MCNC: 5.3 G/DL — LOW (ref 6–8)
PROT SERPL-MCNC: 5.4 G/DL — LOW (ref 6–8)
PROT SERPL-MCNC: 5.5 G/DL — LOW (ref 6–8)
PROT SERPL-MCNC: 5.8 G/DL — LOW (ref 6–8)
PROT SERPL-MCNC: 5.9 G/DL — LOW (ref 6–8)
PROT SERPL-MCNC: 6 G/DL — SIGNIFICANT CHANGE UP (ref 6–8)
PROT SERPL-MCNC: 6.1 G/DL — SIGNIFICANT CHANGE UP (ref 6–8)
PROT SERPL-MCNC: 6.2 G/DL — SIGNIFICANT CHANGE UP (ref 6–8)
PROT SERPL-MCNC: 6.3 G/DL — SIGNIFICANT CHANGE UP (ref 6–8)
PROT SERPL-MCNC: 6.5 G/DL — SIGNIFICANT CHANGE UP (ref 6–8)
PROT SERPL-MCNC: 6.5 G/DL — SIGNIFICANT CHANGE UP (ref 6–8)
PROT SERPL-MCNC: 6.8 G/DL — SIGNIFICANT CHANGE UP (ref 6–8)
PROT SERPL-MCNC: 7.3 G/DL — SIGNIFICANT CHANGE UP (ref 6–8)
PROT UR-MCNC: 100 MG/DL
PROT UR-MCNC: 300 MG/DL
PROT UR-MCNC: NEGATIVE MG/DL — SIGNIFICANT CHANGE UP
PROT UR-MCNC: NEGATIVE MG/DL — SIGNIFICANT CHANGE UP
PROT/CREAT UR-RTO: 0.7 RATIO — HIGH (ref 0–0.2)
PROT/CREAT UR-RTO: 0.7 RATIO — HIGH (ref 0–0.2)
PROTHROM AB SERPL-ACNC: 12.9 SEC — HIGH (ref 9.95–12.87)
PROTHROM AB SERPL-ACNC: 13.3 SEC — HIGH (ref 9.95–12.87)
PROTHROM AB SERPL-ACNC: 16 SEC — HIGH (ref 9.95–12.87)
PROTHROM AB SERPL-ACNC: 16 SEC — HIGH (ref 9.95–12.87)
PROTHROM AB SERPL-ACNC: 17.9 SEC — HIGH (ref 9.95–12.87)
PROTHROM AB SERPL-ACNC: 18.2 SEC — HIGH (ref 9.95–12.87)
RAPID RVP RESULT: DETECTED
RAPID RVP RESULT: DETECTED
RAPID RVP RESULT: SIGNIFICANT CHANGE UP
RAPID RVP RESULT: SIGNIFICANT CHANGE UP
RBC # BLD: 2.95 M/UL — LOW (ref 4.2–5.4)
RBC # BLD: 2.95 M/UL — LOW (ref 4.2–5.4)
RBC # BLD: 3.04 M/UL — LOW (ref 4.2–5.4)
RBC # BLD: 3.04 M/UL — LOW (ref 4.2–5.4)
RBC # BLD: 3.06 M/UL — LOW (ref 4.2–5.4)
RBC # BLD: 3.2 M/UL — LOW (ref 4.2–5.4)
RBC # BLD: 3.2 M/UL — LOW (ref 4.2–5.4)
RBC # BLD: 3.4 M/UL — LOW (ref 4.2–5.4)
RBC # BLD: 3.4 M/UL — LOW (ref 4.2–5.4)
RBC # BLD: 3.51 M/UL — LOW (ref 4.2–5.4)
RBC # BLD: 3.51 M/UL — LOW (ref 4.2–5.4)
RBC # BLD: 3.63 M/UL — LOW (ref 4.2–5.4)
RBC # BLD: 3.63 M/UL — LOW (ref 4.2–5.4)
RBC # BLD: 3.78 M/UL — LOW (ref 4.2–5.4)
RBC # BLD: 3.79 M/UL — LOW (ref 4.2–5.4)
RBC # BLD: 3.79 M/UL — LOW (ref 4.2–5.4)
RBC # BLD: 3.82 M/UL — LOW (ref 4.2–5.4)
RBC # BLD: 3.83 M/UL — LOW (ref 4.2–5.4)
RBC # BLD: 3.83 M/UL — LOW (ref 4.2–5.4)
RBC # BLD: 3.87 M/UL — LOW (ref 4.2–5.4)
RBC # BLD: 3.87 M/UL — LOW (ref 4.2–5.4)
RBC # BLD: 3.89 M/UL — LOW (ref 4.2–5.4)
RBC # BLD: 3.94 M/UL — LOW (ref 4.2–5.4)
RBC # BLD: 3.95 M/UL — LOW (ref 4.2–5.4)
RBC # BLD: 3.95 M/UL — LOW (ref 4.2–5.4)
RBC # BLD: 3.96 M/UL — LOW (ref 4.2–5.4)
RBC # BLD: 3.96 M/UL — LOW (ref 4.2–5.4)
RBC # BLD: 3.97 M/UL — LOW (ref 4.2–5.4)
RBC # BLD: 4.03 M/UL — LOW (ref 4.2–5.4)
RBC # BLD: 4.09 M/UL — LOW (ref 4.2–5.4)
RBC # BLD: 4.14 M/UL — LOW (ref 4.2–5.4)
RBC # BLD: 4.15 M/UL — LOW (ref 4.2–5.4)
RBC # BLD: 4.21 M/UL — SIGNIFICANT CHANGE UP (ref 4.2–5.4)
RBC # BLD: 4.21 M/UL — SIGNIFICANT CHANGE UP (ref 4.2–5.4)
RBC # BLD: 4.26 M/UL — SIGNIFICANT CHANGE UP (ref 4.2–5.4)
RBC # BLD: 4.31 M/UL — SIGNIFICANT CHANGE UP (ref 4.2–5.4)
RBC # BLD: 4.34 M/UL — SIGNIFICANT CHANGE UP (ref 4.2–5.4)
RBC # BLD: 4.38 M/UL — SIGNIFICANT CHANGE UP (ref 4.2–5.4)
RBC # BLD: 4.41 M/UL — SIGNIFICANT CHANGE UP (ref 4.2–5.4)
RBC # BLD: 4.45 M/UL — SIGNIFICANT CHANGE UP (ref 4.2–5.4)
RBC # BLD: 4.51 M/UL — SIGNIFICANT CHANGE UP (ref 4.2–5.4)
RBC # BLD: 4.54 M/UL — SIGNIFICANT CHANGE UP (ref 4.2–5.4)
RBC # BLD: 4.55 M/UL — SIGNIFICANT CHANGE UP (ref 4.2–5.4)
RBC # BLD: 4.57 M/UL — SIGNIFICANT CHANGE UP (ref 4.2–5.4)
RBC # BLD: 4.69 M/UL — SIGNIFICANT CHANGE UP (ref 4.2–5.4)
RBC # BLD: 4.69 M/UL — SIGNIFICANT CHANGE UP (ref 4.2–5.4)
RBC # BLD: 4.71 M/UL — SIGNIFICANT CHANGE UP (ref 4.2–5.4)
RBC # BLD: 4.76 M/UL — SIGNIFICANT CHANGE UP (ref 4.2–5.4)
RBC # BLD: 4.82 M/UL — SIGNIFICANT CHANGE UP (ref 4.2–5.4)
RBC # BLD: 4.83 M/UL — SIGNIFICANT CHANGE UP (ref 4.2–5.4)
RBC # BLD: 4.87 M/UL — SIGNIFICANT CHANGE UP (ref 4.2–5.4)
RBC # BLD: 4.88 M/UL — SIGNIFICANT CHANGE UP (ref 4.2–5.4)
RBC # BLD: 5.01 M/UL — SIGNIFICANT CHANGE UP (ref 4.2–5.4)
RBC # FLD: 17.7 % — HIGH (ref 11.5–14.5)
RBC # FLD: 17.8 % — HIGH (ref 11.5–14.5)
RBC # FLD: 17.8 % — HIGH (ref 11.5–14.5)
RBC # FLD: 17.9 % — HIGH (ref 11.5–14.5)
RBC # FLD: 17.9 % — HIGH (ref 11.5–14.5)
RBC # FLD: 18.1 % — HIGH (ref 11.5–14.5)
RBC # FLD: 18.3 % — HIGH (ref 11.5–14.5)
RBC # FLD: 18.4 % — HIGH (ref 11.5–14.5)
RBC # FLD: 18.4 % — HIGH (ref 11.5–14.5)
RBC # FLD: 19 % — HIGH (ref 11.5–14.5)
RBC # FLD: 19.3 % — HIGH (ref 11.5–14.5)
RBC # FLD: 19.8 % — HIGH (ref 11.5–14.5)
RBC # FLD: 19.9 % — HIGH (ref 11.5–14.5)
RBC # FLD: 20 % — HIGH (ref 11.5–14.5)
RBC # FLD: 20.4 % — HIGH (ref 11.5–14.5)
RBC # FLD: 20.6 % — HIGH (ref 11.5–14.5)
RBC # FLD: 20.8 % — HIGH (ref 11.5–14.5)
RBC # FLD: 21.3 % — HIGH (ref 11.5–14.5)
RBC # FLD: 21.4 % — HIGH (ref 11.5–14.5)
RBC # FLD: 21.5 % — HIGH (ref 11.5–14.5)
RBC # FLD: 21.6 % — HIGH (ref 11.5–14.5)
RBC # FLD: 21.8 % — HIGH (ref 11.5–14.5)
RBC # FLD: 21.8 % — HIGH (ref 11.5–14.5)
RBC # FLD: 21.9 % — HIGH (ref 11.5–14.5)
RBC # FLD: 21.9 % — HIGH (ref 11.5–14.5)
RBC # FLD: 22 % — HIGH (ref 11.5–14.5)
RBC # FLD: 22.1 % — HIGH (ref 11.5–14.5)
RBC # FLD: 22.2 % — HIGH (ref 11.5–14.5)
RBC # FLD: 22.2 % — HIGH (ref 11.5–14.5)
RBC # FLD: 22.3 % — HIGH (ref 11.5–14.5)
RBC CASTS # UR COMP ASSIST: 5 /HPF — HIGH (ref 0–4)
RSV RNA NPH QL NAA+NON-PROBE: SIGNIFICANT CHANGE UP
SAO2 % BLDA: 94.2 % — SIGNIFICANT CHANGE UP (ref 94–98)
SAO2 % BLDA: 98.1 % — HIGH (ref 94–98)
SAO2 % BLDA: 98.1 % — HIGH (ref 94–98)
SAO2 % BLDV: 17.5 % — LOW (ref 67–88)
SAO2 % BLDV: 51.8 % — LOW (ref 67–88)
SAO2 % BLDV: 55.1 % — LOW (ref 67–88)
SAO2 % BLDV: 71.7 % — SIGNIFICANT CHANGE UP (ref 67–88)
SARS-COV-2 RNA SPEC QL NAA+PROBE: SIGNIFICANT CHANGE UP
SODIUM SERPL-SCNC: 125 MMOL/L — LOW (ref 135–146)
SODIUM SERPL-SCNC: 127 MMOL/L — LOW (ref 135–146)
SODIUM SERPL-SCNC: 129 MMOL/L — LOW (ref 135–146)
SODIUM SERPL-SCNC: 131 MMOL/L — LOW (ref 135–146)
SODIUM SERPL-SCNC: 132 MMOL/L — LOW (ref 135–146)
SODIUM SERPL-SCNC: 133 MMOL/L — LOW (ref 135–146)
SODIUM SERPL-SCNC: 134 MMOL/L — LOW (ref 135–146)
SODIUM SERPL-SCNC: 135 MMOL/L — SIGNIFICANT CHANGE UP (ref 135–146)
SODIUM SERPL-SCNC: 136 MMOL/L — SIGNIFICANT CHANGE UP (ref 135–146)
SODIUM SERPL-SCNC: 136 MMOL/L — SIGNIFICANT CHANGE UP (ref 135–146)
SODIUM SERPL-SCNC: 137 MMOL/L — SIGNIFICANT CHANGE UP (ref 135–146)
SODIUM SERPL-SCNC: 138 MMOL/L — SIGNIFICANT CHANGE UP (ref 135–146)
SODIUM SERPL-SCNC: 139 MMOL/L — SIGNIFICANT CHANGE UP (ref 135–146)
SODIUM SERPL-SCNC: 140 MMOL/L — SIGNIFICANT CHANGE UP (ref 135–146)
SODIUM SERPL-SCNC: 141 MMOL/L — SIGNIFICANT CHANGE UP (ref 135–146)
SODIUM SERPL-SCNC: 142 MMOL/L — SIGNIFICANT CHANGE UP (ref 135–146)
SODIUM SERPL-SCNC: 143 MMOL/L — SIGNIFICANT CHANGE UP (ref 135–146)
SODIUM SERPL-SCNC: 144 MMOL/L — SIGNIFICANT CHANGE UP (ref 135–146)
SODIUM SERPL-SCNC: 144 MMOL/L — SIGNIFICANT CHANGE UP (ref 135–146)
SODIUM SERPL-SCNC: 146 MMOL/L — SIGNIFICANT CHANGE UP (ref 135–146)
SODIUM SERPL-SCNC: 146 MMOL/L — SIGNIFICANT CHANGE UP (ref 135–146)
SODIUM UR-SCNC: 121 MMOL/L — SIGNIFICANT CHANGE UP
SODIUM UR-SCNC: 121 MMOL/L — SIGNIFICANT CHANGE UP
SP GR SPEC: 1.01 — SIGNIFICANT CHANGE UP (ref 1–1.03)
SP GR SPEC: 1.01 — SIGNIFICANT CHANGE UP (ref 1–1.03)
SP GR SPEC: 1.02 — SIGNIFICANT CHANGE UP (ref 1–1.03)
SP GR SPEC: 1.02 — SIGNIFICANT CHANGE UP (ref 1–1.03)
SPECIMEN SOURCE: SIGNIFICANT CHANGE UP
TIBC SERPL-MCNC: 334 UG/DL — SIGNIFICANT CHANGE UP (ref 220–430)
TIBC SERPL-MCNC: 334 UG/DL — SIGNIFICANT CHANGE UP (ref 220–430)
TRIGL SERPL-MCNC: 100 MG/DL — SIGNIFICANT CHANGE UP
TRIGL SERPL-MCNC: 81 MG/DL — SIGNIFICANT CHANGE UP
TRIGL SERPL-MCNC: 81 MG/DL — SIGNIFICANT CHANGE UP
TROPONIN T, HIGH SENSITIVITY RESULT: 109 NG/L — CRITICAL HIGH (ref 6–13)
TROPONIN T, HIGH SENSITIVITY RESULT: 39 NG/L — HIGH (ref 6–13)
TROPONIN T, HIGH SENSITIVITY RESULT: 39 NG/L — HIGH (ref 6–13)
TROPONIN T, HIGH SENSITIVITY RESULT: 41 NG/L — HIGH (ref 6–13)
TROPONIN T, HIGH SENSITIVITY RESULT: 41 NG/L — HIGH (ref 6–13)
TROPONIN T, HIGH SENSITIVITY RESULT: 58 NG/L — CRITICAL HIGH (ref 6–13)
TROPONIN T, HIGH SENSITIVITY RESULT: 58 NG/L — CRITICAL HIGH (ref 6–13)
TROPONIN T, HIGH SENSITIVITY RESULT: 61 NG/L — CRITICAL HIGH (ref 6–13)
TROPONIN T, HIGH SENSITIVITY RESULT: 61 NG/L — CRITICAL HIGH (ref 6–13)
TROPONIN T, HIGH SENSITIVITY RESULT: 71 NG/L — CRITICAL HIGH (ref 6–13)
TROPONIN T, HIGH SENSITIVITY RESULT: 88 NG/L — CRITICAL HIGH (ref 6–13)
TROPONIN T, HIGH SENSITIVITY RESULT: 89 NG/L — CRITICAL HIGH (ref 6–13)
TROPONIN T, HIGH SENSITIVITY RESULT: 91 NG/L — CRITICAL HIGH (ref 6–13)
TROPONIN T, HIGH SENSITIVITY RESULT: 96 NG/L — CRITICAL HIGH (ref 6–13)
TSH SERPL-MCNC: 2.64 UIU/ML — SIGNIFICANT CHANGE UP (ref 0.27–4.2)
UIBC SERPL-MCNC: 314 UG/DL — SIGNIFICANT CHANGE UP (ref 110–370)
UIBC SERPL-MCNC: 314 UG/DL — SIGNIFICANT CHANGE UP (ref 110–370)
URATE SERPL-MCNC: 10.9 MG/DL — HIGH (ref 2.5–7)
UROBILINOGEN FLD QL: 0.2 MG/DL — SIGNIFICANT CHANGE UP (ref 0.2–1)
UROBILINOGEN FLD QL: 0.2 MG/DL — SIGNIFICANT CHANGE UP (ref 0.2–1)
UROBILINOGEN FLD QL: 1 MG/DL — SIGNIFICANT CHANGE UP (ref 0.2–1)
UROBILINOGEN FLD QL: 1 MG/DL — SIGNIFICANT CHANGE UP (ref 0.2–1)
UUN UR-MCNC: 170 MG/DL — SIGNIFICANT CHANGE UP
UUN UR-MCNC: 170 MG/DL — SIGNIFICANT CHANGE UP
VIT B12 SERPL-MCNC: 1198 PG/ML — SIGNIFICANT CHANGE UP (ref 232–1245)
VIT B12 SERPL-MCNC: 1198 PG/ML — SIGNIFICANT CHANGE UP (ref 232–1245)
WBC # BLD: 10.03 K/UL — SIGNIFICANT CHANGE UP (ref 4.8–10.8)
WBC # BLD: 10.48 K/UL — SIGNIFICANT CHANGE UP (ref 4.8–10.8)
WBC # BLD: 10.71 K/UL — SIGNIFICANT CHANGE UP (ref 4.8–10.8)
WBC # BLD: 11.17 K/UL — HIGH (ref 4.8–10.8)
WBC # BLD: 11.17 K/UL — HIGH (ref 4.8–10.8)
WBC # BLD: 12.09 K/UL — HIGH (ref 4.8–10.8)
WBC # BLD: 12.45 K/UL — HIGH (ref 4.8–10.8)
WBC # BLD: 12.45 K/UL — HIGH (ref 4.8–10.8)
WBC # BLD: 12.84 K/UL — HIGH (ref 4.8–10.8)
WBC # BLD: 13.04 K/UL — HIGH (ref 4.8–10.8)
WBC # BLD: 13.04 K/UL — HIGH (ref 4.8–10.8)
WBC # BLD: 13.17 K/UL — HIGH (ref 4.8–10.8)
WBC # BLD: 4.96 K/UL — SIGNIFICANT CHANGE UP (ref 4.8–10.8)
WBC # BLD: 5.34 K/UL — SIGNIFICANT CHANGE UP (ref 4.8–10.8)
WBC # BLD: 5.34 K/UL — SIGNIFICANT CHANGE UP (ref 4.8–10.8)
WBC # BLD: 5.46 K/UL — SIGNIFICANT CHANGE UP (ref 4.8–10.8)
WBC # BLD: 5.6 K/UL — SIGNIFICANT CHANGE UP (ref 4.8–10.8)
WBC # BLD: 5.96 K/UL — SIGNIFICANT CHANGE UP (ref 4.8–10.8)
WBC # BLD: 5.97 K/UL — SIGNIFICANT CHANGE UP (ref 4.8–10.8)
WBC # BLD: 5.97 K/UL — SIGNIFICANT CHANGE UP (ref 4.8–10.8)
WBC # BLD: 6.05 K/UL — SIGNIFICANT CHANGE UP (ref 4.8–10.8)
WBC # BLD: 6.14 K/UL — SIGNIFICANT CHANGE UP (ref 4.8–10.8)
WBC # BLD: 6.32 K/UL — SIGNIFICANT CHANGE UP (ref 4.8–10.8)
WBC # BLD: 6.41 K/UL — SIGNIFICANT CHANGE UP (ref 4.8–10.8)
WBC # BLD: 6.57 K/UL — SIGNIFICANT CHANGE UP (ref 4.8–10.8)
WBC # BLD: 6.57 K/UL — SIGNIFICANT CHANGE UP (ref 4.8–10.8)
WBC # BLD: 6.58 K/UL — SIGNIFICANT CHANGE UP (ref 4.8–10.8)
WBC # BLD: 6.69 K/UL — SIGNIFICANT CHANGE UP (ref 4.8–10.8)
WBC # BLD: 6.7 K/UL — SIGNIFICANT CHANGE UP (ref 4.8–10.8)
WBC # BLD: 6.78 K/UL — SIGNIFICANT CHANGE UP (ref 4.8–10.8)
WBC # BLD: 6.78 K/UL — SIGNIFICANT CHANGE UP (ref 4.8–10.8)
WBC # BLD: 6.84 K/UL — SIGNIFICANT CHANGE UP (ref 4.8–10.8)
WBC # BLD: 7.03 K/UL — SIGNIFICANT CHANGE UP (ref 4.8–10.8)
WBC # BLD: 7.14 K/UL — SIGNIFICANT CHANGE UP (ref 4.8–10.8)
WBC # BLD: 7.14 K/UL — SIGNIFICANT CHANGE UP (ref 4.8–10.8)
WBC # BLD: 7.25 K/UL — SIGNIFICANT CHANGE UP (ref 4.8–10.8)
WBC # BLD: 7.36 K/UL — SIGNIFICANT CHANGE UP (ref 4.8–10.8)
WBC # BLD: 7.46 K/UL — SIGNIFICANT CHANGE UP (ref 4.8–10.8)
WBC # BLD: 7.63 K/UL — SIGNIFICANT CHANGE UP (ref 4.8–10.8)
WBC # BLD: 7.86 K/UL — SIGNIFICANT CHANGE UP (ref 4.8–10.8)
WBC # BLD: 7.97 K/UL — SIGNIFICANT CHANGE UP (ref 4.8–10.8)
WBC # BLD: 8 K/UL — SIGNIFICANT CHANGE UP (ref 4.8–10.8)
WBC # BLD: 8.37 K/UL — SIGNIFICANT CHANGE UP (ref 4.8–10.8)
WBC # BLD: 8.37 K/UL — SIGNIFICANT CHANGE UP (ref 4.8–10.8)
WBC # BLD: 8.41 K/UL — SIGNIFICANT CHANGE UP (ref 4.8–10.8)
WBC # BLD: 8.63 K/UL — SIGNIFICANT CHANGE UP (ref 4.8–10.8)
WBC # BLD: 8.69 K/UL — SIGNIFICANT CHANGE UP (ref 4.8–10.8)
WBC # BLD: 9.07 K/UL — SIGNIFICANT CHANGE UP (ref 4.8–10.8)
WBC # BLD: 9.15 K/UL — SIGNIFICANT CHANGE UP (ref 4.8–10.8)
WBC # BLD: 9.15 K/UL — SIGNIFICANT CHANGE UP (ref 4.8–10.8)
WBC # BLD: 9.19 K/UL — SIGNIFICANT CHANGE UP (ref 4.8–10.8)
WBC # BLD: 9.47 K/UL — SIGNIFICANT CHANGE UP (ref 4.8–10.8)
WBC # BLD: 9.55 K/UL — SIGNIFICANT CHANGE UP (ref 4.8–10.8)
WBC # FLD AUTO: 10.03 K/UL — SIGNIFICANT CHANGE UP (ref 4.8–10.8)
WBC # FLD AUTO: 10.48 K/UL — SIGNIFICANT CHANGE UP (ref 4.8–10.8)
WBC # FLD AUTO: 10.71 K/UL — SIGNIFICANT CHANGE UP (ref 4.8–10.8)
WBC # FLD AUTO: 11.17 K/UL — HIGH (ref 4.8–10.8)
WBC # FLD AUTO: 11.17 K/UL — HIGH (ref 4.8–10.8)
WBC # FLD AUTO: 12.09 K/UL — HIGH (ref 4.8–10.8)
WBC # FLD AUTO: 12.45 K/UL — HIGH (ref 4.8–10.8)
WBC # FLD AUTO: 12.45 K/UL — HIGH (ref 4.8–10.8)
WBC # FLD AUTO: 12.84 K/UL — HIGH (ref 4.8–10.8)
WBC # FLD AUTO: 13.04 K/UL — HIGH (ref 4.8–10.8)
WBC # FLD AUTO: 13.04 K/UL — HIGH (ref 4.8–10.8)
WBC # FLD AUTO: 13.17 K/UL — HIGH (ref 4.8–10.8)
WBC # FLD AUTO: 4.96 K/UL — SIGNIFICANT CHANGE UP (ref 4.8–10.8)
WBC # FLD AUTO: 5.34 K/UL — SIGNIFICANT CHANGE UP (ref 4.8–10.8)
WBC # FLD AUTO: 5.34 K/UL — SIGNIFICANT CHANGE UP (ref 4.8–10.8)
WBC # FLD AUTO: 5.46 K/UL — SIGNIFICANT CHANGE UP (ref 4.8–10.8)
WBC # FLD AUTO: 5.6 K/UL — SIGNIFICANT CHANGE UP (ref 4.8–10.8)
WBC # FLD AUTO: 5.96 K/UL — SIGNIFICANT CHANGE UP (ref 4.8–10.8)
WBC # FLD AUTO: 5.97 K/UL — SIGNIFICANT CHANGE UP (ref 4.8–10.8)
WBC # FLD AUTO: 5.97 K/UL — SIGNIFICANT CHANGE UP (ref 4.8–10.8)
WBC # FLD AUTO: 6.05 K/UL — SIGNIFICANT CHANGE UP (ref 4.8–10.8)
WBC # FLD AUTO: 6.14 K/UL — SIGNIFICANT CHANGE UP (ref 4.8–10.8)
WBC # FLD AUTO: 6.32 K/UL — SIGNIFICANT CHANGE UP (ref 4.8–10.8)
WBC # FLD AUTO: 6.41 K/UL — SIGNIFICANT CHANGE UP (ref 4.8–10.8)
WBC # FLD AUTO: 6.57 K/UL — SIGNIFICANT CHANGE UP (ref 4.8–10.8)
WBC # FLD AUTO: 6.57 K/UL — SIGNIFICANT CHANGE UP (ref 4.8–10.8)
WBC # FLD AUTO: 6.58 K/UL — SIGNIFICANT CHANGE UP (ref 4.8–10.8)
WBC # FLD AUTO: 6.69 K/UL — SIGNIFICANT CHANGE UP (ref 4.8–10.8)
WBC # FLD AUTO: 6.7 K/UL — SIGNIFICANT CHANGE UP (ref 4.8–10.8)
WBC # FLD AUTO: 6.78 K/UL — SIGNIFICANT CHANGE UP (ref 4.8–10.8)
WBC # FLD AUTO: 6.78 K/UL — SIGNIFICANT CHANGE UP (ref 4.8–10.8)
WBC # FLD AUTO: 6.84 K/UL — SIGNIFICANT CHANGE UP (ref 4.8–10.8)
WBC # FLD AUTO: 7.03 K/UL — SIGNIFICANT CHANGE UP (ref 4.8–10.8)
WBC # FLD AUTO: 7.14 K/UL — SIGNIFICANT CHANGE UP (ref 4.8–10.8)
WBC # FLD AUTO: 7.14 K/UL — SIGNIFICANT CHANGE UP (ref 4.8–10.8)
WBC # FLD AUTO: 7.25 K/UL — SIGNIFICANT CHANGE UP (ref 4.8–10.8)
WBC # FLD AUTO: 7.36 K/UL — SIGNIFICANT CHANGE UP (ref 4.8–10.8)
WBC # FLD AUTO: 7.46 K/UL — SIGNIFICANT CHANGE UP (ref 4.8–10.8)
WBC # FLD AUTO: 7.63 K/UL — SIGNIFICANT CHANGE UP (ref 4.8–10.8)
WBC # FLD AUTO: 7.86 K/UL — SIGNIFICANT CHANGE UP (ref 4.8–10.8)
WBC # FLD AUTO: 7.97 K/UL — SIGNIFICANT CHANGE UP (ref 4.8–10.8)
WBC # FLD AUTO: 8 K/UL — SIGNIFICANT CHANGE UP (ref 4.8–10.8)
WBC # FLD AUTO: 8.37 K/UL — SIGNIFICANT CHANGE UP (ref 4.8–10.8)
WBC # FLD AUTO: 8.37 K/UL — SIGNIFICANT CHANGE UP (ref 4.8–10.8)
WBC # FLD AUTO: 8.41 K/UL — SIGNIFICANT CHANGE UP (ref 4.8–10.8)
WBC # FLD AUTO: 8.63 K/UL — SIGNIFICANT CHANGE UP (ref 4.8–10.8)
WBC # FLD AUTO: 8.69 K/UL — SIGNIFICANT CHANGE UP (ref 4.8–10.8)
WBC # FLD AUTO: 9.07 K/UL — SIGNIFICANT CHANGE UP (ref 4.8–10.8)
WBC # FLD AUTO: 9.15 K/UL — SIGNIFICANT CHANGE UP (ref 4.8–10.8)
WBC # FLD AUTO: 9.15 K/UL — SIGNIFICANT CHANGE UP (ref 4.8–10.8)
WBC # FLD AUTO: 9.19 K/UL — SIGNIFICANT CHANGE UP (ref 4.8–10.8)
WBC # FLD AUTO: 9.47 K/UL — SIGNIFICANT CHANGE UP (ref 4.8–10.8)
WBC # FLD AUTO: 9.55 K/UL — SIGNIFICANT CHANGE UP (ref 4.8–10.8)
WBC UR QL: 5 /HPF — SIGNIFICANT CHANGE UP (ref 0–5)

## 2024-01-01 PROCEDURE — 71045 X-RAY EXAM CHEST 1 VIEW: CPT | Mod: 26

## 2024-01-01 PROCEDURE — 99223 1ST HOSP IP/OBS HIGH 75: CPT

## 2024-01-01 PROCEDURE — 73110 X-RAY EXAM OF WRIST: CPT | Mod: 26,LT

## 2024-01-01 PROCEDURE — 83540 ASSAY OF IRON: CPT

## 2024-01-01 PROCEDURE — 99233 SBSQ HOSP IP/OBS HIGH 50: CPT

## 2024-01-01 PROCEDURE — 99239 HOSP IP/OBS DSCHRG MGMT >30: CPT

## 2024-01-01 PROCEDURE — 99232 SBSQ HOSP IP/OBS MODERATE 35: CPT

## 2024-01-01 PROCEDURE — 70450 CT HEAD/BRAIN W/O DYE: CPT | Mod: 26,MC

## 2024-01-01 PROCEDURE — 81001 URINALYSIS AUTO W/SCOPE: CPT

## 2024-01-01 PROCEDURE — 92526 ORAL FUNCTION THERAPY: CPT | Mod: GN

## 2024-01-01 PROCEDURE — 94660 CPAP INITIATION&MGMT: CPT

## 2024-01-01 PROCEDURE — 93320 DOPPLER ECHO COMPLETE: CPT | Mod: 26

## 2024-01-01 PROCEDURE — 70450 CT HEAD/BRAIN W/O DYE: CPT | Mod: 26

## 2024-01-01 PROCEDURE — 99231 SBSQ HOSP IP/OBS SF/LOW 25: CPT

## 2024-01-01 PROCEDURE — 73630 X-RAY EXAM OF FOOT: CPT | Mod: 26,LT

## 2024-01-01 PROCEDURE — 73560 X-RAY EXAM OF KNEE 1 OR 2: CPT | Mod: 26,RT

## 2024-01-01 PROCEDURE — 83605 ASSAY OF LACTIC ACID: CPT

## 2024-01-01 PROCEDURE — 85018 HEMOGLOBIN: CPT

## 2024-01-01 PROCEDURE — 84132 ASSAY OF SERUM POTASSIUM: CPT

## 2024-01-01 PROCEDURE — 83935 ASSAY OF URINE OSMOLALITY: CPT

## 2024-01-01 PROCEDURE — 97110 THERAPEUTIC EXERCISES: CPT | Mod: GP

## 2024-01-01 PROCEDURE — 86022 PLATELET ANTIBODIES: CPT

## 2024-01-01 PROCEDURE — 85027 COMPLETE CBC AUTOMATED: CPT

## 2024-01-01 PROCEDURE — 83735 ASSAY OF MAGNESIUM: CPT

## 2024-01-01 PROCEDURE — 97530 THERAPEUTIC ACTIVITIES: CPT | Mod: GP

## 2024-01-01 PROCEDURE — 85379 FIBRIN DEGRADATION QUANT: CPT

## 2024-01-01 PROCEDURE — 86923 COMPATIBILITY TEST ELECTRIC: CPT

## 2024-01-01 PROCEDURE — 80048 BASIC METABOLIC PNL TOTAL CA: CPT

## 2024-01-01 PROCEDURE — 93005 ELECTROCARDIOGRAM TRACING: CPT

## 2024-01-01 PROCEDURE — 73130 X-RAY EXAM OF HAND: CPT | Mod: 26,LT

## 2024-01-01 PROCEDURE — 97116 GAIT TRAINING THERAPY: CPT | Mod: GP

## 2024-01-01 PROCEDURE — 99291 CRITICAL CARE FIRST HOUR: CPT

## 2024-01-01 PROCEDURE — 82550 ASSAY OF CK (CPK): CPT

## 2024-01-01 PROCEDURE — 76705 ECHO EXAM OF ABDOMEN: CPT | Mod: 26

## 2024-01-01 PROCEDURE — 82140 ASSAY OF AMMONIA: CPT

## 2024-01-01 PROCEDURE — 93010 ELECTROCARDIOGRAM REPORT: CPT | Mod: 77

## 2024-01-01 PROCEDURE — 80053 COMPREHEN METABOLIC PANEL: CPT

## 2024-01-01 PROCEDURE — 85014 HEMATOCRIT: CPT

## 2024-01-01 PROCEDURE — 97163 PT EVAL HIGH COMPLEX 45 MIN: CPT | Mod: GP

## 2024-01-01 PROCEDURE — 0225U NFCT DS DNA&RNA 21 SARSCOV2: CPT

## 2024-01-01 PROCEDURE — 85652 RBC SED RATE AUTOMATED: CPT

## 2024-01-01 PROCEDURE — 94640 AIRWAY INHALATION TREATMENT: CPT

## 2024-01-01 PROCEDURE — 93010 ELECTROCARDIOGRAM REPORT: CPT

## 2024-01-01 PROCEDURE — 84540 ASSAY OF URINE/UREA-N: CPT

## 2024-01-01 PROCEDURE — 85610 PROTHROMBIN TIME: CPT

## 2024-01-01 PROCEDURE — 82728 ASSAY OF FERRITIN: CPT

## 2024-01-01 PROCEDURE — 99497 ADVNCD CARE PLAN 30 MIN: CPT | Mod: 25

## 2024-01-01 PROCEDURE — 93970 EXTREMITY STUDY: CPT | Mod: 26

## 2024-01-01 PROCEDURE — 74018 RADEX ABDOMEN 1 VIEW: CPT | Mod: 26

## 2024-01-01 PROCEDURE — 82962 GLUCOSE BLOOD TEST: CPT

## 2024-01-01 PROCEDURE — 97166 OT EVAL MOD COMPLEX 45 MIN: CPT | Mod: GO

## 2024-01-01 PROCEDURE — 70450 CT HEAD/BRAIN W/O DYE: CPT

## 2024-01-01 PROCEDURE — 97162 PT EVAL MOD COMPLEX 30 MIN: CPT | Mod: GP

## 2024-01-01 PROCEDURE — 82607 VITAMIN B-12: CPT

## 2024-01-01 PROCEDURE — 86901 BLOOD TYPING SEROLOGIC RH(D): CPT

## 2024-01-01 PROCEDURE — 36415 COLL VENOUS BLD VENIPUNCTURE: CPT

## 2024-01-01 PROCEDURE — 87186 SC STD MICRODIL/AGAR DIL: CPT

## 2024-01-01 PROCEDURE — 87640 STAPH A DNA AMP PROBE: CPT

## 2024-01-01 PROCEDURE — 85730 THROMBOPLASTIN TIME PARTIAL: CPT

## 2024-01-01 PROCEDURE — 83036 HEMOGLOBIN GLYCOSYLATED A1C: CPT

## 2024-01-01 PROCEDURE — 99222 1ST HOSP IP/OBS MODERATE 55: CPT

## 2024-01-01 PROCEDURE — 85025 COMPLETE CBC W/AUTO DIFF WBC: CPT

## 2024-01-01 PROCEDURE — 84295 ASSAY OF SERUM SODIUM: CPT

## 2024-01-01 PROCEDURE — 36430 TRANSFUSION BLD/BLD COMPNT: CPT

## 2024-01-01 PROCEDURE — 87077 CULTURE AEROBIC IDENTIFY: CPT

## 2024-01-01 PROCEDURE — 74018 RADEX ABDOMEN 1 VIEW: CPT

## 2024-01-01 PROCEDURE — 72141 MRI NECK SPINE W/O DYE: CPT | Mod: MC

## 2024-01-01 PROCEDURE — 71045 X-RAY EXAM CHEST 1 VIEW: CPT | Mod: 26,77

## 2024-01-01 PROCEDURE — 76705 ECHO EXAM OF ABDOMEN: CPT

## 2024-01-01 PROCEDURE — 71045 X-RAY EXAM CHEST 1 VIEW: CPT

## 2024-01-01 PROCEDURE — 84100 ASSAY OF PHOSPHORUS: CPT

## 2024-01-01 PROCEDURE — 84550 ASSAY OF BLOOD/URIC ACID: CPT

## 2024-01-01 PROCEDURE — 70498 CT ANGIOGRAPHY NECK: CPT | Mod: MC

## 2024-01-01 PROCEDURE — 80061 LIPID PANEL: CPT

## 2024-01-01 PROCEDURE — 76775 US EXAM ABDO BACK WALL LIM: CPT

## 2024-01-01 PROCEDURE — 72170 X-RAY EXAM OF PELVIS: CPT | Mod: 26

## 2024-01-01 PROCEDURE — 82553 CREATINE MB FRACTION: CPT

## 2024-01-01 PROCEDURE — 99285 EMERGENCY DEPT VISIT HI MDM: CPT

## 2024-01-01 PROCEDURE — 71260 CT THORAX DX C+: CPT | Mod: 26,MC

## 2024-01-01 PROCEDURE — 83550 IRON BINDING TEST: CPT

## 2024-01-01 PROCEDURE — 70486 CT MAXILLOFACIAL W/O DYE: CPT | Mod: 26,MC

## 2024-01-01 PROCEDURE — 76770 US EXAM ABDO BACK WALL COMP: CPT | Mod: 26

## 2024-01-01 PROCEDURE — 87635 SARS-COV-2 COVID-19 AMP PRB: CPT

## 2024-01-01 PROCEDURE — 93312 ECHO TRANSESOPHAGEAL: CPT | Mod: 26

## 2024-01-01 PROCEDURE — 93925 LOWER EXTREMITY STUDY: CPT | Mod: 26

## 2024-01-01 PROCEDURE — 84484 ASSAY OF TROPONIN QUANT: CPT

## 2024-01-01 PROCEDURE — 83880 ASSAY OF NATRIURETIC PEPTIDE: CPT

## 2024-01-01 PROCEDURE — 92610 EVALUATE SWALLOWING FUNCTION: CPT | Mod: GN

## 2024-01-01 PROCEDURE — P9016: CPT

## 2024-01-01 PROCEDURE — 86140 C-REACTIVE PROTEIN: CPT

## 2024-01-01 PROCEDURE — 93306 TTE W/DOPPLER COMPLETE: CPT | Mod: 26

## 2024-01-01 PROCEDURE — 73590 X-RAY EXAM OF LOWER LEG: CPT | Mod: 26,RT

## 2024-01-01 PROCEDURE — 84156 ASSAY OF PROTEIN URINE: CPT

## 2024-01-01 PROCEDURE — 73562 X-RAY EXAM OF KNEE 3: CPT | Mod: 26,50

## 2024-01-01 PROCEDURE — 99233 SBSQ HOSP IP/OBS HIGH 50: CPT | Mod: 25

## 2024-01-01 PROCEDURE — 72141 MRI NECK SPINE W/O DYE: CPT | Mod: 26

## 2024-01-01 PROCEDURE — 87070 CULTURE OTHR SPECIMN AEROBIC: CPT

## 2024-01-01 PROCEDURE — 93307 TTE W/O DOPPLER COMPLETE: CPT

## 2024-01-01 PROCEDURE — 93325 DOPPLER ECHO COLOR FLOW MAPG: CPT | Mod: 26

## 2024-01-01 PROCEDURE — 76775 US EXAM ABDO BACK WALL LIM: CPT | Mod: 26

## 2024-01-01 PROCEDURE — 84300 ASSAY OF URINE SODIUM: CPT

## 2024-01-01 PROCEDURE — 84443 ASSAY THYROID STIM HORMONE: CPT

## 2024-01-01 PROCEDURE — 70498 CT ANGIOGRAPHY NECK: CPT | Mod: 26

## 2024-01-01 PROCEDURE — 81003 URINALYSIS AUTO W/O SCOPE: CPT

## 2024-01-01 PROCEDURE — 99223 1ST HOSP IP/OBS HIGH 75: CPT | Mod: GC

## 2024-01-01 PROCEDURE — 82330 ASSAY OF CALCIUM: CPT

## 2024-01-01 PROCEDURE — 72125 CT NECK SPINE W/O DYE: CPT | Mod: 26,MC

## 2024-01-01 PROCEDURE — 87641 MR-STAPH DNA AMP PROBE: CPT

## 2024-01-01 PROCEDURE — 82803 BLOOD GASES ANY COMBINATION: CPT

## 2024-01-01 PROCEDURE — 76770 US EXAM ABDO BACK WALL COMP: CPT

## 2024-01-01 PROCEDURE — 85384 FIBRINOGEN ACTIVITY: CPT

## 2024-01-01 PROCEDURE — 87086 URINE CULTURE/COLONY COUNT: CPT

## 2024-01-01 PROCEDURE — 86850 RBC ANTIBODY SCREEN: CPT

## 2024-01-01 PROCEDURE — 93306 TTE W/DOPPLER COMPLETE: CPT

## 2024-01-01 PROCEDURE — 93970 EXTREMITY STUDY: CPT

## 2024-01-01 PROCEDURE — 86900 BLOOD TYPING SEROLOGIC ABO: CPT

## 2024-01-01 PROCEDURE — 82570 ASSAY OF URINE CREATININE: CPT

## 2024-01-01 PROCEDURE — 82746 ASSAY OF FOLIC ACID SERUM: CPT

## 2024-01-01 PROCEDURE — 97165 OT EVAL LOW COMPLEX 30 MIN: CPT | Mod: GO

## 2024-01-01 PROCEDURE — 73610 X-RAY EXAM OF ANKLE: CPT | Mod: 26,LT

## 2024-01-01 PROCEDURE — 82248 BILIRUBIN DIRECT: CPT

## 2024-01-01 PROCEDURE — 74177 CT ABD & PELVIS W/CONTRAST: CPT | Mod: 26,MC

## 2024-01-01 RX ORDER — SODIUM ZIRCONIUM CYCLOSILICATE 10 G/10G
10 POWDER, FOR SUSPENSION ORAL ONCE
Refills: 0 | Status: COMPLETED | OUTPATIENT
Start: 2024-01-01 | End: 2024-01-01

## 2024-01-01 RX ORDER — HYDROCORTISONE 20 MG
50 TABLET ORAL
Refills: 0 | Status: DISCONTINUED | OUTPATIENT
Start: 2024-01-01 | End: 2024-01-01

## 2024-01-01 RX ORDER — FUROSEMIDE 40 MG
1 TABLET ORAL
Qty: 0 | Refills: 0 | DISCHARGE

## 2024-01-01 RX ORDER — METOPROLOL TARTRATE 50 MG
1 TABLET ORAL
Qty: 0 | Refills: 0 | DISCHARGE
Start: 2024-01-01

## 2024-01-01 RX ORDER — ATORVASTATIN CALCIUM 20 MG/1
40 TABLET, FILM COATED ORAL AT BEDTIME
Refills: 0 | Status: DISCONTINUED | OUTPATIENT
Start: 2024-01-01 | End: 2024-01-01

## 2024-01-01 RX ORDER — INSULIN LISPRO 100/ML
14 VIAL (ML) SUBCUTANEOUS
Refills: 0 | Status: DISCONTINUED | OUTPATIENT
Start: 2024-01-01 | End: 2024-01-01

## 2024-01-01 RX ORDER — INSULIN GLARGINE 100 [IU]/ML
15 INJECTION, SOLUTION SUBCUTANEOUS AT BEDTIME
Refills: 0 | Status: DISCONTINUED | OUTPATIENT
Start: 2024-01-01 | End: 2024-01-01

## 2024-01-01 RX ORDER — PANTOPRAZOLE SODIUM 20 MG/1
40 TABLET, DELAYED RELEASE ORAL
Refills: 0 | Status: DISCONTINUED | OUTPATIENT
Start: 2024-01-01 | End: 2024-01-01

## 2024-01-01 RX ORDER — DEXTROSE 50 % IN WATER 50 %
25 SYRINGE (ML) INTRAVENOUS ONCE
Refills: 0 | Status: DISCONTINUED | OUTPATIENT
Start: 2024-01-01 | End: 2024-01-01

## 2024-01-01 RX ORDER — POTASSIUM CHLORIDE 20 MEQ
1 PACKET (EA) ORAL
Qty: 30 | Refills: 0
Start: 2024-01-01 | End: 2024-01-01

## 2024-01-01 RX ORDER — DEXTROSE 50 % IN WATER 50 %
12.5 SYRINGE (ML) INTRAVENOUS ONCE
Refills: 0 | Status: DISCONTINUED | OUTPATIENT
Start: 2024-01-01 | End: 2024-01-01

## 2024-01-01 RX ORDER — ACETAMINOPHEN 500 MG
650 TABLET ORAL EVERY 6 HOURS
Refills: 0 | Status: DISCONTINUED | OUTPATIENT
Start: 2024-01-01 | End: 2024-01-01

## 2024-01-01 RX ORDER — METOPROLOL TARTRATE 50 MG
75 TABLET ORAL THREE TIMES A DAY
Refills: 0 | Status: DISCONTINUED | OUTPATIENT
Start: 2024-01-01 | End: 2024-01-01

## 2024-01-01 RX ORDER — ACETAZOLAMIDE 250 MG/1
500 TABLET ORAL ONCE
Refills: 0 | Status: COMPLETED | OUTPATIENT
Start: 2024-01-01 | End: 2024-01-01

## 2024-01-01 RX ORDER — POLYETHYLENE GLYCOL 3350 1 G/G
17 POWDER ORAL
Refills: 0 | Status: DISCONTINUED | OUTPATIENT
Start: 2024-01-01 | End: 2024-01-01

## 2024-01-01 RX ORDER — ATORVASTATIN CALCIUM 80 MG/1
20 TABLET, FILM COATED ORAL AT BEDTIME
Refills: 0 | Status: DISCONTINUED | OUTPATIENT
Start: 2024-01-01 | End: 2024-01-01

## 2024-01-01 RX ORDER — METOPROLOL TARTRATE 50 MG
100 TABLET ORAL
Refills: 0 | Status: DISCONTINUED | OUTPATIENT
Start: 2024-01-01 | End: 2024-01-01

## 2024-01-01 RX ORDER — HYDROMORPHONE HCL 0.2 MG/ML
0.5 INJECTION, SOLUTION INTRAVENOUS EVERY 4 HOURS
Refills: 0 | Status: DISCONTINUED | OUTPATIENT
Start: 2024-01-01 | End: 2024-01-01

## 2024-01-01 RX ORDER — TIOTROPIUM BROMIDE 18 UG/1
2 CAPSULE ORAL; RESPIRATORY (INHALATION) DAILY
Refills: 0 | Status: DISCONTINUED | OUTPATIENT
Start: 2024-01-01 | End: 2024-01-01

## 2024-01-01 RX ORDER — DEXTROSE MONOHYDRATE AND SODIUM CHLORIDE 5; .3 G/100ML; G/100ML
1000 INJECTION, SOLUTION INTRAVENOUS
Refills: 0 | Status: DISCONTINUED | OUTPATIENT
Start: 2024-01-01 | End: 2024-01-01

## 2024-01-01 RX ORDER — SENNA PLUS 8.6 MG/1
2 TABLET ORAL
Qty: 0 | Refills: 0 | DISCHARGE
Start: 2024-01-01

## 2024-01-01 RX ORDER — LACTULOSE 10 G/15ML
15 SOLUTION ORAL
Refills: 0 | Status: DISCONTINUED | OUTPATIENT
Start: 2024-01-01 | End: 2024-01-01

## 2024-01-01 RX ORDER — HEPARIN SODIUM 5000 [USP'U]/ML
5000 INJECTION INTRAVENOUS; SUBCUTANEOUS EVERY 8 HOURS
Refills: 0 | Status: DISCONTINUED | OUTPATIENT
Start: 2024-01-01 | End: 2024-01-01

## 2024-01-01 RX ORDER — IPRATROPIUM/ALBUTEROL SULFATE 18-103MCG
3 AEROSOL WITH ADAPTER (GRAM) INHALATION EVERY 6 HOURS
Refills: 0 | Status: DISCONTINUED | OUTPATIENT
Start: 2024-01-01 | End: 2024-01-01

## 2024-01-01 RX ORDER — ALBUTEROL 90 UG/1
2 AEROSOL, METERED ORAL EVERY 6 HOURS
Refills: 0 | Status: DISCONTINUED | OUTPATIENT
Start: 2024-01-01 | End: 2024-01-01

## 2024-01-01 RX ORDER — RANOLAZINE 500 MG/1
1 TABLET, FILM COATED, EXTENDED RELEASE ORAL
Qty: 0 | Refills: 0 | DISCHARGE

## 2024-01-01 RX ORDER — POLYETHYLENE GLYCOL 3350 17 G/17G
17 POWDER, FOR SOLUTION ORAL AT BEDTIME
Refills: 0 | Status: DISCONTINUED | OUTPATIENT
Start: 2024-01-01 | End: 2024-01-01

## 2024-01-01 RX ORDER — SPIRONOLACTONE 25 MG/1
0.5 TABLET, FILM COATED ORAL
Qty: 15 | Refills: 0
Start: 2024-01-01 | End: 2024-01-01

## 2024-01-01 RX ORDER — TAMSULOSIN HYDROCHLORIDE 0.4 MG/1
0.4 CAPSULE ORAL DAILY
Refills: 0 | Status: ACTIVE | OUTPATIENT
Start: 2024-01-01 | End: 2025-04-13

## 2024-01-01 RX ORDER — POLYETHYLENE GLYCOL 3350 17 G/17G
17 POWDER, FOR SOLUTION ORAL
Qty: 0 | Refills: 0 | DISCHARGE
Start: 2024-01-01

## 2024-01-01 RX ORDER — INSULIN GLARGINE 100 [IU]/ML
10 INJECTION, SOLUTION SUBCUTANEOUS AT BEDTIME
Refills: 0 | Status: DISCONTINUED | OUTPATIENT
Start: 2024-01-01 | End: 2024-01-01

## 2024-01-01 RX ORDER — SODIUM CHLORIDE 9 MG/ML
500 INJECTION, SOLUTION INTRAVENOUS ONCE
Refills: 0 | Status: COMPLETED | OUTPATIENT
Start: 2024-01-01 | End: 2024-01-01

## 2024-01-01 RX ORDER — ATORVASTATIN CALCIUM 80 MG/1
40 TABLET, FILM COATED ORAL AT BEDTIME
Refills: 0 | Status: DISCONTINUED | OUTPATIENT
Start: 2024-01-01 | End: 2024-01-01

## 2024-01-01 RX ORDER — INSULIN GLARGINE 100 [IU]/ML
25 INJECTION, SOLUTION SUBCUTANEOUS AT BEDTIME
Refills: 0 | Status: DISCONTINUED | OUTPATIENT
Start: 2024-01-01 | End: 2024-01-01

## 2024-01-01 RX ORDER — ACETAMINOPHEN 500 MG
1000 TABLET ORAL ONCE
Refills: 0 | Status: COMPLETED | OUTPATIENT
Start: 2024-01-01 | End: 2024-01-01

## 2024-01-01 RX ORDER — SENNA PLUS 8.6 MG/1
2 TABLET ORAL AT BEDTIME
Refills: 0 | Status: DISCONTINUED | OUTPATIENT
Start: 2024-01-01 | End: 2024-01-01

## 2024-01-01 RX ORDER — OXYCODONE HYDROCHLORIDE 5 MG/1
2.5 TABLET ORAL EVERY 6 HOURS
Refills: 0 | Status: DISCONTINUED | OUTPATIENT
Start: 2024-01-01 | End: 2024-01-01

## 2024-01-01 RX ORDER — SENNOSIDES 8.6 MG
2 TABLET ORAL AT BEDTIME
Refills: 0 | Status: DISCONTINUED | OUTPATIENT
Start: 2024-01-01 | End: 2024-01-01

## 2024-01-01 RX ORDER — INSULIN GLARGINE 100 [IU]/ML
4 INJECTION, SOLUTION SUBCUTANEOUS EVERY MORNING
Refills: 0 | Status: DISCONTINUED | OUTPATIENT
Start: 2024-01-01 | End: 2024-01-01

## 2024-01-01 RX ORDER — DESVENLAFAXINE 50 MG/1
25 TABLET, EXTENDED RELEASE ORAL AT BEDTIME
Refills: 0 | Status: DISCONTINUED | OUTPATIENT
Start: 2024-01-01 | End: 2024-01-01

## 2024-01-01 RX ORDER — VASOPRESSIN 20 [USP'U]/ML
0.04 INJECTION INTRAVENOUS
Qty: 40 | Refills: 0 | Status: DISCONTINUED | OUTPATIENT
Start: 2024-01-01 | End: 2024-01-01

## 2024-01-01 RX ORDER — INSULIN LISPRO 100/ML
5 VIAL (ML) SUBCUTANEOUS
Refills: 0 | Status: DISCONTINUED | OUTPATIENT
Start: 2024-01-01 | End: 2024-01-01

## 2024-01-01 RX ORDER — GLUCAGON INJECTION, SOLUTION 0.5 MG/.1ML
1 INJECTION, SOLUTION SUBCUTANEOUS ONCE
Refills: 0 | Status: DISCONTINUED | OUTPATIENT
Start: 2024-01-01 | End: 2024-01-01

## 2024-01-01 RX ORDER — BUDESONIDE AND FORMOTEROL FUMARATE DIHYDRATE 160; 4.5 UG/1; UG/1
2 AEROSOL RESPIRATORY (INHALATION)
Refills: 0 | Status: DISCONTINUED | OUTPATIENT
Start: 2024-01-01 | End: 2024-01-01

## 2024-01-01 RX ORDER — ALLOPURINOL 300 MG
0.5 TABLET ORAL
Qty: 30 | Refills: 1
Start: 2024-01-01 | End: 2024-01-01

## 2024-01-01 RX ORDER — DEXTROSE 30 % IN WATER 30 %
25 VIAL (ML) INTRAVENOUS ONCE
Refills: 0 | Status: DISCONTINUED | OUTPATIENT
Start: 2024-01-01 | End: 2024-01-01

## 2024-01-01 RX ORDER — CHLORHEXIDINE GLUCONATE 213 G/1000ML
1 SOLUTION TOPICAL
Refills: 0 | Status: DISCONTINUED | OUTPATIENT
Start: 2024-01-01 | End: 2024-01-01

## 2024-01-01 RX ORDER — ASPIRIN/CALCIUM CARB/MAGNESIUM 324 MG
1 TABLET ORAL
Refills: 0 | DISCHARGE

## 2024-01-01 RX ORDER — FUROSEMIDE 40 MG
60 TABLET ORAL EVERY 12 HOURS
Refills: 0 | Status: DISCONTINUED | OUTPATIENT
Start: 2024-01-01 | End: 2024-01-01

## 2024-01-01 RX ORDER — SODIUM POLYSTYRENE SULFONATE 4.1 MEQ/G
30 POWDER, FOR SUSPENSION ORAL ONCE
Refills: 0 | Status: DISCONTINUED | OUTPATIENT
Start: 2024-01-01 | End: 2024-01-01

## 2024-01-01 RX ORDER — DOXAZOSIN MESYLATE 4 MG
1 TABLET ORAL
Qty: 0 | Refills: 0 | DISCHARGE
Start: 2024-01-01

## 2024-01-01 RX ORDER — SODIUM CHLORIDE 9 MG/ML
1000 INJECTION INTRAMUSCULAR; INTRAVENOUS; SUBCUTANEOUS
Refills: 0 | Status: DISCONTINUED | OUTPATIENT
Start: 2024-01-01 | End: 2024-01-01

## 2024-01-01 RX ORDER — LACTULOSE 10 G/15ML
15 SOLUTION ORAL EVERY 12 HOURS
Refills: 0 | Status: DISCONTINUED | OUTPATIENT
Start: 2024-01-01 | End: 2024-01-01

## 2024-01-01 RX ORDER — DESVENLAFAXINE 50 MG/1
25 TABLET, EXTENDED RELEASE ORAL ONCE
Refills: 0 | Status: COMPLETED | OUTPATIENT
Start: 2024-01-01 | End: 2024-01-01

## 2024-01-01 RX ORDER — CHLORHEXIDINE GLUCONATE 213 G/1000ML
1 SOLUTION TOPICAL DAILY
Refills: 0 | Status: DISCONTINUED | OUTPATIENT
Start: 2024-01-01 | End: 2024-01-01

## 2024-01-01 RX ORDER — MONTELUKAST 4 MG/1
10 TABLET, CHEWABLE ORAL ONCE
Refills: 0 | Status: DISCONTINUED | OUTPATIENT
Start: 2024-01-01 | End: 2024-01-01

## 2024-01-01 RX ORDER — MONTELUKAST 4 MG/1
10 TABLET, CHEWABLE ORAL AT BEDTIME
Refills: 0 | Status: DISCONTINUED | OUTPATIENT
Start: 2024-01-01 | End: 2024-01-01

## 2024-01-01 RX ORDER — MORPHINE SULFATE 50 MG/1
2 CAPSULE, EXTENDED RELEASE ORAL ONCE
Refills: 0 | Status: DISCONTINUED | OUTPATIENT
Start: 2024-01-01 | End: 2024-01-01

## 2024-01-01 RX ORDER — FUROSEMIDE 40 MG
40 TABLET ORAL EVERY 12 HOURS
Refills: 0 | Status: DISCONTINUED | OUTPATIENT
Start: 2024-01-01 | End: 2024-01-01

## 2024-01-01 RX ORDER — SCOPOLAMINE 1.5 MG/1
1 PATCH, EXTENDED RELEASE TRANSDERMAL
Refills: 0 | Status: DISCONTINUED | OUTPATIENT
Start: 2024-01-01 | End: 2024-01-01

## 2024-01-01 RX ORDER — ONDANSETRON 8 MG/1
4 TABLET, FILM COATED ORAL ONCE
Refills: 0 | Status: COMPLETED | OUTPATIENT
Start: 2024-01-01 | End: 2024-01-01

## 2024-01-01 RX ORDER — CEFEPIME 1 G/1
INJECTION, POWDER, FOR SOLUTION INTRAMUSCULAR; INTRAVENOUS
Refills: 0 | Status: DISCONTINUED | OUTPATIENT
Start: 2024-01-01 | End: 2024-01-01

## 2024-01-01 RX ORDER — POTASSIUM CHLORIDE 20 MEQ
40 PACKET (EA) ORAL ONCE
Refills: 0 | Status: COMPLETED | OUTPATIENT
Start: 2024-01-01 | End: 2024-01-01

## 2024-01-01 RX ORDER — FUROSEMIDE 40 MG
20 TABLET ORAL ONCE
Refills: 0 | Status: COMPLETED | OUTPATIENT
Start: 2024-01-01 | End: 2024-01-01

## 2024-01-01 RX ORDER — DESVENLAFAXINE 50 MG/1
1 TABLET, EXTENDED RELEASE ORAL
Qty: 0 | Refills: 0 | DISCHARGE

## 2024-01-01 RX ORDER — POTASSIUM CHLORIDE 20 MEQ
20 PACKET (EA) ORAL ONCE
Refills: 0 | Status: COMPLETED | OUTPATIENT
Start: 2024-01-01 | End: 2024-01-01

## 2024-01-01 RX ORDER — INSULIN LISPRO 100 [IU]/ML
5 INJECTION, SOLUTION SUBCUTANEOUS
Refills: 0 | Status: DISCONTINUED | OUTPATIENT
Start: 2024-01-01 | End: 2024-01-01

## 2024-01-01 RX ORDER — HEPARIN SODIUM 5000 [USP'U]/ML
5000 INJECTION INTRAVENOUS; SUBCUTANEOUS EVERY 12 HOURS
Refills: 0 | Status: DISCONTINUED | OUTPATIENT
Start: 2024-01-01 | End: 2024-01-01

## 2024-01-01 RX ORDER — INSULIN LISPRO 100/ML
VIAL (ML) SUBCUTANEOUS
Refills: 0 | Status: DISCONTINUED | OUTPATIENT
Start: 2024-01-01 | End: 2024-01-01

## 2024-01-01 RX ORDER — MONTELUKAST 4 MG/1
10 TABLET, CHEWABLE ORAL DAILY
Refills: 0 | Status: DISCONTINUED | OUTPATIENT
Start: 2024-01-01 | End: 2024-01-01

## 2024-01-01 RX ORDER — SODIUM CHLORIDE 9 MG/ML
1000 INJECTION, SOLUTION INTRAVENOUS
Refills: 0 | Status: DISCONTINUED | OUTPATIENT
Start: 2024-01-01 | End: 2024-01-01

## 2024-01-01 RX ORDER — METOCLOPRAMIDE HCL 10 MG
2.5 TABLET ORAL
Refills: 0 | Status: DISCONTINUED | OUTPATIENT
Start: 2024-01-01 | End: 2024-01-01

## 2024-01-01 RX ORDER — METRONIDAZOLE 500 MG
500 TABLET ORAL EVERY 8 HOURS
Refills: 0 | Status: DISCONTINUED | OUTPATIENT
Start: 2024-01-01 | End: 2024-01-01

## 2024-01-01 RX ORDER — POTASSIUM CHLORIDE 20 MEQ
40 PACKET (EA) ORAL EVERY 4 HOURS
Refills: 0 | Status: COMPLETED | OUTPATIENT
Start: 2024-01-01 | End: 2024-01-01

## 2024-01-01 RX ORDER — DILTIAZEM HCL 120 MG
5 CAPSULE, EXT RELEASE 24 HR ORAL ONCE
Refills: 0 | Status: COMPLETED | OUTPATIENT
Start: 2024-01-01 | End: 2024-01-01

## 2024-01-01 RX ORDER — MAGNESIUM SULFATE 500 MG/ML
2 VIAL (ML) INJECTION ONCE
Refills: 0 | Status: COMPLETED | OUTPATIENT
Start: 2024-01-01 | End: 2024-01-01

## 2024-01-01 RX ORDER — INSULIN LISPRO 100 [IU]/ML
INJECTION, SOLUTION SUBCUTANEOUS
Refills: 0 | Status: DISCONTINUED | OUTPATIENT
Start: 2024-01-01 | End: 2024-01-01

## 2024-01-01 RX ORDER — SODIUM CHLORIDE 9 MG/ML
500 INJECTION, SOLUTION INTRAVENOUS
Refills: 0 | Status: DISCONTINUED | OUTPATIENT
Start: 2024-01-01 | End: 2024-01-01

## 2024-01-01 RX ORDER — PREGABALIN 225 MG/1
5000 CAPSULE ORAL
Qty: 0 | Refills: 0 | DISCHARGE

## 2024-01-01 RX ORDER — POLYETHYLENE GLYCOL 3350 17 G/17G
17 POWDER, FOR SOLUTION ORAL DAILY
Refills: 0 | Status: DISCONTINUED | OUTPATIENT
Start: 2024-01-01 | End: 2024-01-01

## 2024-01-01 RX ORDER — DEXTROSE 50 % IN WATER 50 %
50 SYRINGE (ML) INTRAVENOUS ONCE
Refills: 0 | Status: COMPLETED | OUTPATIENT
Start: 2024-01-01 | End: 2024-01-01

## 2024-01-01 RX ORDER — PANTOPRAZOLE SODIUM 20 MG/1
40 TABLET, DELAYED RELEASE ORAL DAILY
Refills: 0 | Status: DISCONTINUED | OUTPATIENT
Start: 2024-01-01 | End: 2024-01-01

## 2024-01-01 RX ORDER — BUDESONIDE/FORMOTEROL FUMARATE 160-4.5MCG
2 HFA AEROSOL WITH ADAPTER (GRAM) INHALATION
Refills: 0 | Status: DISCONTINUED | OUTPATIENT
Start: 2024-01-01 | End: 2024-01-01

## 2024-01-01 RX ORDER — INSULIN GLARGINE 100 [IU]/ML
10 INJECTION, SOLUTION SUBCUTANEOUS ONCE
Refills: 0 | Status: COMPLETED | OUTPATIENT
Start: 2024-01-01 | End: 2024-01-01

## 2024-01-01 RX ORDER — ONDANSETRON 8 MG/1
4 TABLET, FILM COATED ORAL EVERY 8 HOURS
Refills: 0 | Status: DISCONTINUED | OUTPATIENT
Start: 2024-01-01 | End: 2024-01-01

## 2024-01-01 RX ORDER — IPRATROPIUM BROMIDE 0.2 MG/ML
500 SOLUTION, NON-ORAL INHALATION EVERY 6 HOURS
Refills: 0 | Status: DISCONTINUED | OUTPATIENT
Start: 2024-01-01 | End: 2024-01-01

## 2024-01-01 RX ORDER — CALCIUM GLUCONATE 98 MG/ML
1 INJECTION, SOLUTION INTRAVENOUS ONCE
Refills: 0 | Status: COMPLETED | OUTPATIENT
Start: 2024-01-01 | End: 2024-01-01

## 2024-01-01 RX ORDER — ALLOPURINOL 300 MG
50 TABLET ORAL DAILY
Refills: 0 | Status: DISCONTINUED | OUTPATIENT
Start: 2024-01-01 | End: 2024-01-01

## 2024-01-01 RX ORDER — DEXTROSE 50 % IN WATER 50 %
25 SYRINGE (ML) INTRAVENOUS ONCE
Refills: 0 | Status: COMPLETED | OUTPATIENT
Start: 2024-01-01 | End: 2024-01-01

## 2024-01-01 RX ORDER — RANOLAZINE 500 MG/1
1 TABLET, FILM COATED, EXTENDED RELEASE ORAL
Refills: 0 | DISCHARGE

## 2024-01-01 RX ORDER — LACTULOSE 10 G/15ML
15 SOLUTION ORAL ONCE
Refills: 0 | Status: COMPLETED | OUTPATIENT
Start: 2024-01-01 | End: 2024-01-01

## 2024-01-01 RX ORDER — INSULIN HUMAN 100 [IU]/ML
10 INJECTION, SOLUTION SUBCUTANEOUS ONCE
Refills: 0 | Status: COMPLETED | OUTPATIENT
Start: 2024-01-01 | End: 2024-01-01

## 2024-01-01 RX ORDER — FUROSEMIDE 40 MG
40 TABLET ORAL
Refills: 0 | Status: DISCONTINUED | OUTPATIENT
Start: 2024-01-01 | End: 2024-01-01

## 2024-01-01 RX ORDER — METOPROLOL TARTRATE 50 MG
25 TABLET ORAL ONCE
Refills: 0 | Status: COMPLETED | OUTPATIENT
Start: 2024-01-01 | End: 2024-01-01

## 2024-01-01 RX ORDER — INSULIN LISPRO 100/ML
VIAL (ML) SUBCUTANEOUS AT BEDTIME
Refills: 0 | Status: DISCONTINUED | OUTPATIENT
Start: 2024-01-01 | End: 2024-01-01

## 2024-01-01 RX ORDER — LORAZEPAM 0.5 MG
1 TABLET ORAL EVERY 4 HOURS
Refills: 0 | Status: DISCONTINUED | OUTPATIENT
Start: 2024-01-01 | End: 2024-01-01

## 2024-01-01 RX ORDER — POTASSIUM CHLORIDE 20 MEQ
2 PACKET (EA) ORAL
Qty: 60 | Refills: 0
Start: 2024-01-01 | End: 2024-01-01

## 2024-01-01 RX ORDER — LACTULOSE 10 G/15ML
10 SOLUTION ORAL ONCE
Refills: 0 | Status: COMPLETED | OUTPATIENT
Start: 2024-01-01 | End: 2024-01-01

## 2024-01-01 RX ORDER — RANOLAZINE 500 MG/1
500 TABLET, FILM COATED, EXTENDED RELEASE ORAL
Refills: 0 | Status: DISCONTINUED | OUTPATIENT
Start: 2024-01-01 | End: 2024-01-01

## 2024-01-01 RX ORDER — POTASSIUM CHLORIDE 20 MEQ
40 PACKET (EA) ORAL ONCE
Refills: 0 | Status: DISCONTINUED | OUTPATIENT
Start: 2024-01-01 | End: 2024-01-01

## 2024-01-01 RX ORDER — PREGABALIN 50 MG/1
75 CAPSULE ORAL DAILY
Refills: 0 | Status: DISCONTINUED | OUTPATIENT
Start: 2024-01-01 | End: 2024-01-01

## 2024-01-01 RX ORDER — DILTIAZEM HCL 120 MG
30 CAPSULE, EXT RELEASE 24 HR ORAL EVERY 8 HOURS
Refills: 0 | Status: DISCONTINUED | OUTPATIENT
Start: 2024-01-01 | End: 2024-01-01

## 2024-01-01 RX ORDER — INSULIN GLARGINE 100 [IU]/ML
27 INJECTION, SOLUTION SUBCUTANEOUS AT BEDTIME
Refills: 0 | Status: DISCONTINUED | OUTPATIENT
Start: 2024-01-01 | End: 2024-01-01

## 2024-01-01 RX ORDER — SEVELAMER CARBONATE 2400 MG/1
800 POWDER, FOR SUSPENSION ORAL EVERY 8 HOURS
Refills: 0 | Status: DISCONTINUED | OUTPATIENT
Start: 2024-01-01 | End: 2024-01-01

## 2024-01-01 RX ORDER — CALCIUM GLUCONATE 100 MG/ML
1 VIAL (ML) INTRAVENOUS ONCE
Refills: 0 | Status: COMPLETED | OUTPATIENT
Start: 2024-01-01 | End: 2024-01-01

## 2024-01-01 RX ORDER — FUROSEMIDE 40 MG
40 TABLET ORAL DAILY
Refills: 0 | Status: DISCONTINUED | OUTPATIENT
Start: 2024-01-01 | End: 2024-01-01

## 2024-01-01 RX ORDER — AMIODARONE HYDROCHLORIDE 400 MG/1
1 TABLET ORAL
Qty: 450 | Refills: 0 | Status: DISCONTINUED | OUTPATIENT
Start: 2024-01-01 | End: 2024-01-01

## 2024-01-01 RX ORDER — DOXAZOSIN MESYLATE 4 MG
2 TABLET ORAL AT BEDTIME
Refills: 0 | Status: DISCONTINUED | OUTPATIENT
Start: 2024-01-01 | End: 2024-01-01

## 2024-01-01 RX ORDER — VANCOMYCIN HCL 1 G
1000 VIAL (EA) INTRAVENOUS EVERY 24 HOURS
Refills: 0 | Status: DISCONTINUED | OUTPATIENT
Start: 2024-01-01 | End: 2024-01-01

## 2024-01-01 RX ORDER — INSULIN GLARGINE 100 [IU]/ML
18 INJECTION, SOLUTION SUBCUTANEOUS AT BEDTIME
Refills: 0 | Status: DISCONTINUED | OUTPATIENT
Start: 2024-01-01 | End: 2024-01-01

## 2024-01-01 RX ORDER — NOREPINEPHRINE BITARTRATE/D5W 8 MG/250ML
0.05 PLASTIC BAG, INJECTION (ML) INTRAVENOUS
Qty: 8 | Refills: 0 | Status: DISCONTINUED | OUTPATIENT
Start: 2024-01-01 | End: 2024-01-01

## 2024-01-01 RX ORDER — IPRATROPIUM BROMIDE AND ALBUTEROL SULFATE .5; 3 MG/3ML; MG/3ML
3 SOLUTION RESPIRATORY (INHALATION) ONCE
Refills: 0 | Status: COMPLETED | OUTPATIENT
Start: 2024-01-01 | End: 2024-01-01

## 2024-01-01 RX ORDER — ASPIRIN 325 MG/1
81 TABLET, FILM COATED ORAL DAILY
Refills: 0 | Status: DISCONTINUED | OUTPATIENT
Start: 2024-01-01 | End: 2024-01-01

## 2024-01-01 RX ORDER — IPRATROPIUM/ALBUTEROL SULFATE 18-103MCG
3 AEROSOL WITH ADAPTER (GRAM) INHALATION ONCE
Refills: 0 | Status: COMPLETED | OUTPATIENT
Start: 2024-01-01 | End: 2024-01-01

## 2024-01-01 RX ORDER — METOPROLOL TARTRATE 50 MG
100 TABLET ORAL ONCE
Refills: 0 | Status: COMPLETED | OUTPATIENT
Start: 2024-01-01 | End: 2024-01-01

## 2024-01-01 RX ORDER — METOPROLOL TARTRATE 50 MG
5 TABLET ORAL ONCE
Refills: 0 | Status: COMPLETED | OUTPATIENT
Start: 2024-01-01 | End: 2024-01-01

## 2024-01-01 RX ORDER — ATORVASTATIN CALCIUM 80 MG/1
40 TABLET, FILM COATED ORAL ONCE
Refills: 0 | Status: COMPLETED | OUTPATIENT
Start: 2024-01-01 | End: 2024-01-01

## 2024-01-01 RX ORDER — GLYCOPYRROLATE 0.2 MG/ML
0.2 INJECTION, SOLUTION INTRAMUSCULAR; INTRAVENOUS EVERY 6 HOURS
Refills: 0 | Status: DISCONTINUED | OUTPATIENT
Start: 2024-01-01 | End: 2024-01-01

## 2024-01-01 RX ORDER — ENOXAPARIN SODIUM 100 MG/ML
40 INJECTION SUBCUTANEOUS EVERY 24 HOURS
Refills: 0 | Status: DISCONTINUED | OUTPATIENT
Start: 2024-01-01 | End: 2024-01-01

## 2024-01-01 RX ORDER — SODIUM CHLORIDE 9 MG/ML
1000 INJECTION, SOLUTION INTRAVENOUS ONCE
Refills: 0 | Status: DISCONTINUED | OUTPATIENT
Start: 2024-01-01 | End: 2024-01-01

## 2024-01-01 RX ORDER — POLYETHYLENE GLYCOL 3350 17 G/17G
17 POWDER, FOR SOLUTION ORAL
Refills: 0 | Status: DISCONTINUED | OUTPATIENT
Start: 2024-01-01 | End: 2024-01-01

## 2024-01-01 RX ORDER — METOPROLOL TARTRATE 50 MG
50 TABLET ORAL THREE TIMES A DAY
Refills: 0 | Status: DISCONTINUED | OUTPATIENT
Start: 2024-01-01 | End: 2024-01-01

## 2024-01-01 RX ORDER — SODIUM ZIRCONIUM CYCLOSILICATE 10 G/10G
10 POWDER, FOR SUSPENSION ORAL EVERY 12 HOURS
Refills: 0 | Status: DISCONTINUED | OUTPATIENT
Start: 2024-01-01 | End: 2024-01-01

## 2024-01-01 RX ORDER — DEXTROSE 10 % IN WATER 10 %
125 INTRAVENOUS SOLUTION INTRAVENOUS ONCE
Refills: 0 | Status: DISCONTINUED | OUTPATIENT
Start: 2024-01-01 | End: 2024-01-01

## 2024-01-01 RX ORDER — CEFEPIME 1 G/1
2000 INJECTION, POWDER, FOR SOLUTION INTRAMUSCULAR; INTRAVENOUS DAILY
Refills: 0 | Status: DISCONTINUED | OUTPATIENT
Start: 2024-01-01 | End: 2024-01-01

## 2024-01-01 RX ORDER — LANOLIN ALCOHOL/MO/W.PET/CERES
5 CREAM (GRAM) TOPICAL ONCE
Refills: 0 | Status: COMPLETED | OUTPATIENT
Start: 2024-01-01 | End: 2024-01-01

## 2024-01-01 RX ORDER — AMIODARONE HYDROCHLORIDE 400 MG/1
200 TABLET ORAL
Refills: 0 | Status: DISCONTINUED | OUTPATIENT
Start: 2024-01-01 | End: 2024-01-01

## 2024-01-01 RX ORDER — DEXTROSE 50 % IN WATER 50 %
15 SYRINGE (ML) INTRAVENOUS ONCE
Refills: 0 | Status: DISCONTINUED | OUTPATIENT
Start: 2024-01-01 | End: 2024-01-01

## 2024-01-01 RX ORDER — MORPHINE SULFATE 50 MG/1
4 CAPSULE, EXTENDED RELEASE ORAL ONCE
Refills: 0 | Status: DISCONTINUED | OUTPATIENT
Start: 2024-01-01 | End: 2024-01-01

## 2024-01-01 RX ORDER — SODIUM CHLORIDE 9 MG/ML
1000 INJECTION, SOLUTION INTRAVENOUS ONCE
Refills: 0 | Status: COMPLETED | OUTPATIENT
Start: 2024-01-01 | End: 2024-01-01

## 2024-01-01 RX ORDER — RANOLAZINE 500 MG/1
500 TABLET, FILM COATED, EXTENDED RELEASE ORAL
Refills: 0 | Status: ACTIVE | OUTPATIENT
Start: 2024-01-01 | End: 2025-04-13

## 2024-01-01 RX ORDER — FUROSEMIDE 40 MG
60 TABLET ORAL DAILY
Refills: 0 | Status: DISCONTINUED | OUTPATIENT
Start: 2024-01-01 | End: 2024-01-01

## 2024-01-01 RX ORDER — SPIRONOLACTONE 25 MG/1
12.5 TABLET, FILM COATED ORAL DAILY
Refills: 0 | Status: DISCONTINUED | OUTPATIENT
Start: 2024-01-01 | End: 2024-01-01

## 2024-01-01 RX ORDER — ROSUVASTATIN CALCIUM 5 MG/1
1 TABLET ORAL
Qty: 0 | Refills: 0 | DISCHARGE

## 2024-01-01 RX ORDER — METOPROLOL TARTRATE 50 MG
2.5 TABLET ORAL ONCE
Refills: 0 | Status: COMPLETED | OUTPATIENT
Start: 2024-01-01 | End: 2024-01-01

## 2024-01-01 RX ORDER — INSULIN GLARGINE 100 [IU]/ML
8 INJECTION, SOLUTION SUBCUTANEOUS EVERY MORNING
Refills: 0 | Status: DISCONTINUED | OUTPATIENT
Start: 2024-01-01 | End: 2024-01-01

## 2024-01-01 RX ORDER — METOPROLOL TARTRATE 50 MG
10 TABLET ORAL ONCE
Refills: 0 | Status: DISCONTINUED | OUTPATIENT
Start: 2024-01-01 | End: 2024-01-01

## 2024-01-01 RX ORDER — ONDANSETRON 8 MG/1
4 TABLET, FILM COATED ORAL
Refills: 0 | Status: DISCONTINUED | OUTPATIENT
Start: 2024-01-01 | End: 2024-01-01

## 2024-01-01 RX ORDER — METRONIDAZOLE 500 MG
TABLET ORAL
Refills: 0 | Status: DISCONTINUED | OUTPATIENT
Start: 2024-01-01 | End: 2024-01-01

## 2024-01-01 RX ORDER — SODIUM BICARBONATE 1 MEQ/ML
650 SYRINGE (ML) INTRAVENOUS EVERY 12 HOURS
Refills: 0 | Status: DISCONTINUED | OUTPATIENT
Start: 2024-01-01 | End: 2024-01-01

## 2024-01-01 RX ORDER — DEXTROSE MONOHYDRATE 100 MG/ML
125 INJECTION, SOLUTION INTRAVENOUS ONCE
Refills: 0 | Status: DISCONTINUED | OUTPATIENT
Start: 2024-01-01 | End: 2024-01-01

## 2024-01-01 RX ORDER — METOCLOPRAMIDE HCL 10 MG
2.5 TABLET ORAL AT BEDTIME
Refills: 0 | Status: DISCONTINUED | OUTPATIENT
Start: 2024-01-01 | End: 2024-01-01

## 2024-01-01 RX ORDER — FLUDROCORTISONE ACETATE 0.1 MG/1
0.1 TABLET ORAL DAILY
Refills: 0 | Status: DISCONTINUED | OUTPATIENT
Start: 2024-01-01 | End: 2024-01-01

## 2024-01-01 RX ORDER — ASPIRIN/CALCIUM CARB/MAGNESIUM 324 MG
81 TABLET ORAL DAILY
Refills: 0 | Status: DISCONTINUED | OUTPATIENT
Start: 2024-01-01 | End: 2024-01-01

## 2024-01-01 RX ORDER — DESVENLAFAXINE 50 MG/1
25 TABLET, EXTENDED RELEASE ORAL DAILY
Refills: 0 | Status: DISCONTINUED | OUTPATIENT
Start: 2024-01-01 | End: 2024-01-01

## 2024-01-01 RX ORDER — LANOLIN ALCOHOL/MO/W.PET/CERES
5 CREAM (GRAM) TOPICAL AT BEDTIME
Refills: 0 | Status: DISCONTINUED | OUTPATIENT
Start: 2024-01-01 | End: 2024-01-01

## 2024-01-01 RX ORDER — CEFTRIAXONE 500 MG/1
2000 INJECTION, POWDER, FOR SOLUTION INTRAMUSCULAR; INTRAVENOUS EVERY 24 HOURS
Refills: 0 | Status: DISCONTINUED | OUTPATIENT
Start: 2024-01-01 | End: 2024-01-01

## 2024-01-01 RX ORDER — METOPROLOL TARTRATE 50 MG
200 TABLET ORAL DAILY
Refills: 0 | Status: DISCONTINUED | OUTPATIENT
Start: 2024-01-01 | End: 2024-01-01

## 2024-01-01 RX ORDER — METOPROLOL TARTRATE 50 MG
5 TABLET ORAL EVERY 12 HOURS
Refills: 0 | Status: DISCONTINUED | OUTPATIENT
Start: 2024-01-01 | End: 2024-01-01

## 2024-01-01 RX ORDER — SODIUM CHLORIDE 9 MG/ML
500 INJECTION INTRAMUSCULAR; INTRAVENOUS; SUBCUTANEOUS ONCE
Refills: 0 | Status: COMPLETED | OUTPATIENT
Start: 2024-01-01 | End: 2024-01-01

## 2024-01-01 RX ORDER — DEXTROSE 30 % IN WATER 30 %
25 VIAL (ML) INTRAVENOUS ONCE
Refills: 0 | Status: COMPLETED | OUTPATIENT
Start: 2024-01-01 | End: 2024-01-01

## 2024-01-01 RX ORDER — ALBUTEROL 90 MCG
2 AEROSOL REFILL (GRAM) INHALATION EVERY 6 HOURS
Refills: 0 | Status: DISCONTINUED | OUTPATIENT
Start: 2024-01-01 | End: 2024-01-01

## 2024-01-01 RX ORDER — METOPROLOL TARTRATE 50 MG
1 TABLET ORAL
Qty: 90 | Refills: 0
Start: 2024-01-01 | End: 2024-01-01

## 2024-01-01 RX ORDER — PHENYLEPHRINE HYDROCHLORIDE 10 MG/ML
0.05 INJECTION INTRAVENOUS
Qty: 40 | Refills: 0 | Status: DISCONTINUED | OUTPATIENT
Start: 2024-01-01 | End: 2024-01-01

## 2024-01-01 RX ORDER — MEROPENEM 1 G/30ML
750 INJECTION INTRAVENOUS ONCE
Refills: 0 | Status: COMPLETED | OUTPATIENT
Start: 2024-01-01 | End: 2024-01-01

## 2024-01-01 RX ORDER — HYDROMORPHONE HCL 0.2 MG/ML
0.5 INJECTION, SOLUTION INTRAVENOUS
Refills: 0 | Status: DISCONTINUED | OUTPATIENT
Start: 2024-01-01 | End: 2024-01-01

## 2024-01-01 RX ORDER — ALBUTEROL 90 MCG
2.5 AEROSOL REFILL (GRAM) INHALATION ONCE
Refills: 0 | Status: COMPLETED | OUTPATIENT
Start: 2024-01-01 | End: 2024-01-01

## 2024-01-01 RX ORDER — DOBUTAMINE HCL 250MG/20ML
2.5 VIAL (ML) INTRAVENOUS
Qty: 500 | Refills: 0 | Status: DISCONTINUED | OUTPATIENT
Start: 2024-01-01 | End: 2024-01-01

## 2024-01-01 RX ORDER — METOLAZONE 5 MG/1
1 TABLET ORAL
Qty: 0 | Refills: 0 | DISCHARGE

## 2024-01-01 RX ORDER — INSULIN LISPRO 100/ML
4 VIAL (ML) SUBCUTANEOUS
Refills: 0 | Status: DISCONTINUED | OUTPATIENT
Start: 2024-01-01 | End: 2024-01-01

## 2024-01-01 RX ORDER — AMIODARONE HYDROCHLORIDE 400 MG/1
0.5 TABLET ORAL
Qty: 450 | Refills: 0 | Status: DISCONTINUED | OUTPATIENT
Start: 2024-01-01 | End: 2024-01-01

## 2024-01-01 RX ORDER — TRAMADOL HYDROCHLORIDE 50 MG/1
50 TABLET ORAL EVERY 4 HOURS
Refills: 0 | Status: DISCONTINUED | OUTPATIENT
Start: 2024-01-01 | End: 2024-01-01

## 2024-01-01 RX ORDER — DEXTROSE 30 % IN WATER 30 %
15 VIAL (ML) INTRAVENOUS ONCE
Refills: 0 | Status: DISCONTINUED | OUTPATIENT
Start: 2024-01-01 | End: 2024-01-01

## 2024-01-01 RX ORDER — METOCLOPRAMIDE HCL 10 MG
2.5 TABLET ORAL EVERY 6 HOURS
Refills: 0 | Status: DISCONTINUED | OUTPATIENT
Start: 2024-01-01 | End: 2024-01-01

## 2024-01-01 RX ORDER — VENLAFAXINE HCL 75 MG
25 CAPSULE, EXT RELEASE 24 HR ORAL DAILY
Refills: 0 | Status: DISCONTINUED | OUTPATIENT
Start: 2024-01-01 | End: 2024-01-01

## 2024-01-01 RX ORDER — POTASSIUM CHLORIDE 20 MEQ
1 PACKET (EA) ORAL
Refills: 0 | DISCHARGE

## 2024-01-01 RX ORDER — INSULIN LISPRO 100/ML
3 VIAL (ML) SUBCUTANEOUS ONCE
Refills: 0 | Status: COMPLETED | OUTPATIENT
Start: 2024-01-01 | End: 2024-01-01

## 2024-01-01 RX ORDER — BUDESONIDE AND FORMOTEROL FUMARATE DIHYDRATE 160; 4.5 UG/1; UG/1
2 AEROSOL RESPIRATORY (INHALATION)
Qty: 3 | Refills: 0
Start: 2024-01-01 | End: 2024-01-01

## 2024-01-01 RX ORDER — HYDROMORPHONE HCL 0.2 MG/ML
0.5 INJECTION, SOLUTION INTRAVENOUS EVERY 6 HOURS
Refills: 0 | Status: DISCONTINUED | OUTPATIENT
Start: 2024-01-01 | End: 2024-01-01

## 2024-01-01 RX ORDER — IRON SUCROSE 20 MG/ML
200 INJECTION, SOLUTION INTRAVENOUS EVERY 24 HOURS
Refills: 0 | Status: COMPLETED | OUTPATIENT
Start: 2024-01-01 | End: 2024-01-01

## 2024-01-01 RX ORDER — CEFEPIME 1 G/1
2000 INJECTION, POWDER, FOR SOLUTION INTRAMUSCULAR; INTRAVENOUS ONCE
Refills: 0 | Status: COMPLETED | OUTPATIENT
Start: 2024-01-01 | End: 2024-01-01

## 2024-01-01 RX ORDER — ONDANSETRON HYDROCHLORIDE 2 MG/ML
4 INJECTION INTRAMUSCULAR; INTRAVENOUS EVERY 8 HOURS
Refills: 0 | Status: DISCONTINUED | OUTPATIENT
Start: 2024-01-01 | End: 2024-01-01

## 2024-01-01 RX ORDER — CALCIUM CARBONATE 500(1250)
1 TABLET ORAL ONCE
Refills: 0 | Status: DISCONTINUED | OUTPATIENT
Start: 2024-01-01 | End: 2024-01-01

## 2024-01-01 RX ORDER — INSULIN LISPRO 100/ML
6 VIAL (ML) SUBCUTANEOUS ONCE
Refills: 0 | Status: COMPLETED | OUTPATIENT
Start: 2024-01-01 | End: 2024-01-01

## 2024-01-01 RX ORDER — PIPERACILLIN AND TAZOBACTAM 4; .5 G/20ML; G/20ML
3.38 INJECTION, POWDER, LYOPHILIZED, FOR SOLUTION INTRAVENOUS EVERY 12 HOURS
Refills: 0 | Status: DISCONTINUED | OUTPATIENT
Start: 2024-01-01 | End: 2024-01-01

## 2024-01-01 RX ORDER — HYDROCORTISONE 20 MG
50 TABLET ORAL THREE TIMES A DAY
Refills: 0 | Status: DISCONTINUED | OUTPATIENT
Start: 2024-01-01 | End: 2024-01-01

## 2024-01-01 RX ORDER — POTASSIUM CHLORIDE 20 MEQ
40 PACKET (EA) ORAL
Refills: 0 | Status: COMPLETED | OUTPATIENT
Start: 2024-01-01 | End: 2024-01-01

## 2024-01-01 RX ORDER — TETANUS TOXOID, REDUCED DIPHTHERIA TOXOID AND ACELLULAR PERTUSSIS VACCINE, ADSORBED 5; 2.5; 8; 8; 2.5 [IU]/.5ML; [IU]/.5ML; UG/.5ML; UG/.5ML; UG/.5ML
0.5 SUSPENSION INTRAMUSCULAR ONCE
Refills: 0 | Status: COMPLETED | OUTPATIENT
Start: 2024-01-01 | End: 2024-01-01

## 2024-01-01 RX ORDER — SODIUM POLYSTYRENE SULFONATE 4.1 MEQ/G
30 POWDER, FOR SUSPENSION ORAL ONCE
Refills: 0 | Status: COMPLETED | OUTPATIENT
Start: 2024-01-01 | End: 2024-01-01

## 2024-01-01 RX ORDER — MAGNESIUM SULFATE 500 MG/ML
2 VIAL (ML) INJECTION
Refills: 0 | Status: COMPLETED | OUTPATIENT
Start: 2024-01-01 | End: 2024-01-01

## 2024-01-01 RX ORDER — ACETAMINOPHEN 500 MG
650 TABLET ORAL ONCE
Refills: 0 | Status: COMPLETED | OUTPATIENT
Start: 2024-01-01 | End: 2024-01-01

## 2024-01-01 RX ORDER — PANTOPRAZOLE SODIUM 40 MG/10ML
40 INJECTION, POWDER, FOR SOLUTION INTRAVENOUS
Refills: 0 | Status: DISCONTINUED | OUTPATIENT
Start: 2024-01-01 | End: 2024-01-01

## 2024-01-01 RX ORDER — METOPROLOL TARTRATE 50 MG
50 TABLET ORAL
Refills: 0 | Status: DISCONTINUED | OUTPATIENT
Start: 2024-01-01 | End: 2024-01-01

## 2024-01-01 RX ORDER — METOLAZONE 5 MG/1
1 TABLET ORAL
Qty: 90 | Refills: 0
Start: 2024-01-01 | End: 2024-01-01

## 2024-01-01 RX ORDER — NYSTATIN CREAM 100000 [USP'U]/G
1 CREAM TOPICAL THREE TIMES A DAY
Refills: 0 | Status: DISCONTINUED | OUTPATIENT
Start: 2024-01-01 | End: 2024-01-01

## 2024-01-01 RX ORDER — SODIUM CHLORIDE 0.9 % (FLUSH) 0.9 %
1850 SYRINGE (ML) INJECTION ONCE
Refills: 0 | Status: COMPLETED | OUTPATIENT
Start: 2024-01-01 | End: 2024-01-01

## 2024-01-01 RX ORDER — MEROPENEM 1 G/30ML
750 INJECTION INTRAVENOUS EVERY 24 HOURS
Refills: 0 | Status: DISCONTINUED | OUTPATIENT
Start: 2024-01-01 | End: 2024-01-01

## 2024-01-01 RX ORDER — ALLOPURINOL 300 MG/1
100 TABLET ORAL DAILY
Refills: 0 | Status: DISCONTINUED | OUTPATIENT
Start: 2024-01-01 | End: 2024-01-01

## 2024-01-01 RX ORDER — ONDANSETRON 8 MG/1
4 TABLET, FILM COATED ORAL ONCE
Refills: 0 | Status: DISCONTINUED | OUTPATIENT
Start: 2024-01-01 | End: 2024-01-01

## 2024-01-01 RX ORDER — ACETAMINOPHEN 500 MG
975 TABLET ORAL ONCE
Refills: 0 | Status: COMPLETED | OUTPATIENT
Start: 2024-01-01 | End: 2024-01-01

## 2024-01-01 RX ORDER — POTASSIUM CHLORIDE 20 MEQ
40 PACKET (EA) ORAL EVERY 4 HOURS
Refills: 0 | Status: DISCONTINUED | OUTPATIENT
Start: 2024-01-01 | End: 2024-01-01

## 2024-01-01 RX ORDER — ONDANSETRON 8 MG/1
2 TABLET, FILM COATED ORAL ONCE
Refills: 0 | Status: COMPLETED | OUTPATIENT
Start: 2024-01-01 | End: 2024-01-01

## 2024-01-01 RX ORDER — DAPTOMYCIN 500 MG/10ML
450 INJECTION, POWDER, LYOPHILIZED, FOR SOLUTION INTRAVENOUS
Refills: 0 | Status: DISCONTINUED | OUTPATIENT
Start: 2024-01-01 | End: 2024-01-01

## 2024-01-01 RX ORDER — BACITRACIN ZINC 500 UNIT/G
1 OINTMENT IN PACKET (EA) TOPICAL DAILY
Refills: 0 | Status: DISCONTINUED | OUTPATIENT
Start: 2024-01-01 | End: 2024-01-01

## 2024-01-01 RX ORDER — NYSTATIN 100000/G
1 POWDER (GRAM) TOPICAL
Refills: 0 | Status: DISCONTINUED | OUTPATIENT
Start: 2024-01-01 | End: 2024-01-01

## 2024-01-01 RX ORDER — HEPARIN SODIUM 50 [USP'U]/ML
5000 INJECTION, SOLUTION INTRAVENOUS EVERY 12 HOURS
Refills: 0 | Status: DISCONTINUED | OUTPATIENT
Start: 2024-01-01 | End: 2024-01-01

## 2024-01-01 RX ORDER — PHENYLEPHRINE HYDROCHLORIDE 10 MG/ML
0.05 INJECTION INTRAVENOUS
Qty: 160 | Refills: 0 | Status: DISCONTINUED | OUTPATIENT
Start: 2024-01-01 | End: 2024-01-01

## 2024-01-01 RX ORDER — INSULIN LISPRO 100/ML
6 VIAL (ML) SUBCUTANEOUS
Refills: 0 | Status: DISCONTINUED | OUTPATIENT
Start: 2024-01-01 | End: 2024-01-01

## 2024-01-01 RX ORDER — INSULIN GLARGINE 100 [IU]/ML
13 INJECTION, SOLUTION SUBCUTANEOUS AT BEDTIME
Refills: 0 | Status: DISCONTINUED | OUTPATIENT
Start: 2024-01-01 | End: 2024-01-01

## 2024-01-01 RX ORDER — VANCOMYCIN HYDROCHLORIDE 50 MG/ML
1000 KIT ORAL ONCE
Refills: 0 | Status: COMPLETED | OUTPATIENT
Start: 2024-01-01 | End: 2024-01-01

## 2024-01-01 RX ORDER — METOPROLOL TARTRATE 50 MG
50 TABLET ORAL ONCE
Refills: 0 | Status: COMPLETED | OUTPATIENT
Start: 2024-01-01 | End: 2024-01-01

## 2024-01-01 RX ORDER — IPRATROPIUM/ALBUTEROL SULFATE 18-103MCG
3 AEROSOL WITH ADAPTER (GRAM) INHALATION EVERY 4 HOURS
Refills: 0 | Status: DISCONTINUED | OUTPATIENT
Start: 2024-01-01 | End: 2024-01-01

## 2024-01-01 RX ORDER — POTASSIUM CHLORIDE 1500 MG/1
20 TABLET, EXTENDED RELEASE ORAL
Qty: 180 | Refills: 3 | Status: ACTIVE | COMMUNITY
Start: 2022-08-31 | End: 1900-01-01

## 2024-01-01 RX ORDER — CALCIUM GLUCONATE 100 MG/ML
2 VIAL (ML) INTRAVENOUS ONCE
Refills: 0 | Status: COMPLETED | OUTPATIENT
Start: 2024-01-01 | End: 2024-01-01

## 2024-01-01 RX ORDER — VANCOMYCIN HYDROCHLORIDE 50 MG/ML
2000 KIT ORAL EVERY 24 HOURS
Refills: 0 | Status: DISCONTINUED | OUTPATIENT
Start: 2024-01-01 | End: 2024-01-01

## 2024-01-01 RX ORDER — ACETAMINOPHEN 325 MG
650 TABLET ORAL EVERY 6 HOURS
Refills: 0 | Status: DISCONTINUED | OUTPATIENT
Start: 2024-01-01 | End: 2024-01-01

## 2024-01-01 RX ORDER — METRONIDAZOLE 500 MG
500 TABLET ORAL EVERY 12 HOURS
Refills: 0 | Status: DISCONTINUED | OUTPATIENT
Start: 2024-01-01 | End: 2024-01-01

## 2024-01-01 RX ORDER — NOREPINEPHRINE BITARTRATE/D5W 8 MG/250ML
0.05 PLASTIC BAG, INJECTION (ML) INTRAVENOUS
Qty: 16 | Refills: 0 | Status: DISCONTINUED | OUTPATIENT
Start: 2024-01-01 | End: 2024-01-01

## 2024-01-01 RX ORDER — MONTELUKAST 4 MG/1
1 TABLET, CHEWABLE ORAL
Qty: 0 | Refills: 0 | DISCHARGE

## 2024-01-01 RX ORDER — INSULIN LISPRO 100/ML
3 VIAL (ML) SUBCUTANEOUS
Refills: 0 | Status: DISCONTINUED | OUTPATIENT
Start: 2024-01-01 | End: 2024-01-01

## 2024-01-01 RX ORDER — METRONIDAZOLE 500 MG
500 TABLET ORAL ONCE
Refills: 0 | Status: COMPLETED | OUTPATIENT
Start: 2024-01-01 | End: 2024-01-01

## 2024-01-01 RX ORDER — NYSTATIN CREAM 100000 [USP'U]/G
1 CREAM TOPICAL
Qty: 0 | Refills: 0 | DISCHARGE
Start: 2024-01-01

## 2024-01-01 RX ORDER — METOPROLOL TARTRATE 50 MG
100 TABLET ORAL DAILY
Refills: 0 | Status: DISCONTINUED | OUTPATIENT
Start: 2024-01-01 | End: 2024-01-01

## 2024-01-01 RX ORDER — VANCOMYCIN HCL 1 G
1500 VIAL (EA) INTRAVENOUS EVERY 24 HOURS
Refills: 0 | Status: DISCONTINUED | OUTPATIENT
Start: 2024-01-01 | End: 2024-01-01

## 2024-01-01 RX ORDER — INSULIN GLARGINE 100 [IU]/ML
16 INJECTION, SOLUTION SUBCUTANEOUS AT BEDTIME
Refills: 0 | Status: DISCONTINUED | OUTPATIENT
Start: 2024-01-01 | End: 2024-01-01

## 2024-01-01 RX ORDER — LANOLIN ALCOHOL/MO/W.PET/CERES
3 CREAM (GRAM) TOPICAL AT BEDTIME
Refills: 0 | Status: DISCONTINUED | OUTPATIENT
Start: 2024-01-01 | End: 2024-01-01

## 2024-01-01 RX ORDER — ONDANSETRON 8 MG/1
4 TABLET, FILM COATED ORAL THREE TIMES A DAY
Refills: 0 | Status: DISCONTINUED | OUTPATIENT
Start: 2024-01-01 | End: 2024-01-01

## 2024-01-01 RX ORDER — POTASSIUM CHLORIDE 20 MEQ
20 PACKET (EA) ORAL DAILY
Refills: 0 | Status: DISCONTINUED | OUTPATIENT
Start: 2024-01-01 | End: 2024-01-01

## 2024-01-01 RX ORDER — METHYLPREDNISOLONE ACETATE 20 MG/ML
125 VIAL (ML) INJECTION ONCE
Refills: 0 | Status: COMPLETED | OUTPATIENT
Start: 2024-01-01 | End: 2024-01-01

## 2024-01-01 RX ORDER — MULTIVIT-MIN/FERROUS GLUCONATE 9 MG/15 ML
1 LIQUID (ML) ORAL
Qty: 0 | Refills: 0 | DISCHARGE

## 2024-01-01 RX ORDER — MEROPENEM 1 G/30ML
INJECTION INTRAVENOUS
Refills: 0 | Status: DISCONTINUED | OUTPATIENT
Start: 2024-01-01 | End: 2024-01-01

## 2024-01-01 RX ORDER — VANCOMYCIN HCL 1 G
1000 VIAL (EA) INTRAVENOUS ONCE
Refills: 0 | Status: COMPLETED | OUTPATIENT
Start: 2024-01-01 | End: 2024-01-01

## 2024-01-01 RX ORDER — MAGNESIUM OXIDE 400 MG ORAL TABLET 241.3 MG
1 TABLET ORAL
Qty: 0 | Refills: 0 | DISCHARGE

## 2024-01-01 RX ORDER — INSULIN LISPRO 100/ML
10 VIAL (ML) SUBCUTANEOUS
Refills: 0 | Status: DISCONTINUED | OUTPATIENT
Start: 2024-01-01 | End: 2024-01-01

## 2024-01-01 RX ORDER — TRAMADOL HYDROCHLORIDE 50 MG/1
25 TABLET ORAL ONCE
Refills: 0 | Status: DISCONTINUED | OUTPATIENT
Start: 2024-01-01 | End: 2024-01-01

## 2024-01-01 RX ORDER — AMIODARONE HYDROCHLORIDE 400 MG/1
150 TABLET ORAL ONCE
Refills: 0 | Status: COMPLETED | OUTPATIENT
Start: 2024-01-01 | End: 2024-01-01

## 2024-01-01 RX ORDER — INSULIN GLARGINE 100 [IU]/ML
10 INJECTION, SOLUTION SUBCUTANEOUS EVERY MORNING
Refills: 0 | Status: DISCONTINUED | OUTPATIENT
Start: 2024-01-01 | End: 2024-01-01

## 2024-01-01 RX ORDER — AMIODARONE HYDROCHLORIDE 400 MG/1
200 TABLET ORAL DAILY
Refills: 0 | Status: DISCONTINUED | OUTPATIENT
Start: 2024-01-01 | End: 2024-01-01

## 2024-01-01 RX ORDER — FUROSEMIDE 40 MG
20 TABLET ORAL ONCE
Refills: 0 | Status: DISCONTINUED | OUTPATIENT
Start: 2024-01-01 | End: 2024-01-01

## 2024-01-01 RX ORDER — MAGNESIUM OXIDE 400 MG ORAL TABLET 241.3 MG
400 TABLET ORAL ONCE
Refills: 0 | Status: COMPLETED | OUTPATIENT
Start: 2024-01-01 | End: 2024-01-01

## 2024-01-01 RX ORDER — MEROPENEM 500 MG/1
500 INJECTION, POWDER, FOR SOLUTION INTRAVENOUS ONCE
Refills: 0 | Status: DISCONTINUED | OUTPATIENT
Start: 2024-01-01 | End: 2024-01-01

## 2024-01-01 RX ORDER — FUROSEMIDE 40 MG
40 TABLET ORAL ONCE
Refills: 0 | Status: COMPLETED | OUTPATIENT
Start: 2024-01-01 | End: 2024-01-01

## 2024-01-01 RX ORDER — SODIUM CHLORIDE 9 MG/ML
250 INJECTION INTRAMUSCULAR; INTRAVENOUS; SUBCUTANEOUS ONCE
Refills: 0 | Status: COMPLETED | OUTPATIENT
Start: 2024-01-01 | End: 2024-01-01

## 2024-01-01 RX ORDER — LIDOCAINE 4 G/100G
1 CREAM TOPICAL EVERY 24 HOURS
Refills: 0 | Status: DISCONTINUED | OUTPATIENT
Start: 2024-01-01 | End: 2024-01-01

## 2024-01-01 RX ORDER — OXYCODONE HYDROCHLORIDE 5 MG/1
5 TABLET ORAL ONCE
Refills: 0 | Status: DISCONTINUED | OUTPATIENT
Start: 2024-01-01 | End: 2024-01-01

## 2024-01-01 RX ORDER — IPRATROPIUM/ALBUTEROL SULFATE 18-103MCG
3 AEROSOL WITH ADAPTER (GRAM) INHALATION
Qty: 1620 | Refills: 0
Start: 2024-01-01 | End: 2024-01-01

## 2024-01-01 RX ORDER — DOXAZOSIN MESYLATE 2 MG/1
2 TABLET ORAL AT BEDTIME
Refills: 0 | Status: DISCONTINUED | OUTPATIENT
Start: 2024-01-01 | End: 2024-01-01

## 2024-01-01 RX ORDER — DEXTROSE 30 % IN WATER 30 %
12.5 VIAL (ML) INTRAVENOUS ONCE
Refills: 0 | Status: DISCONTINUED | OUTPATIENT
Start: 2024-01-01 | End: 2024-01-01

## 2024-01-01 RX ORDER — INSULIN REGULAR, HUMAN 100/ML
10 VIAL (ML) INJECTION ONCE
Refills: 0 | Status: COMPLETED | OUTPATIENT
Start: 2024-01-01 | End: 2024-01-01

## 2024-01-01 RX ORDER — DAPTOMYCIN 500 MG/10ML
450 INJECTION, POWDER, LYOPHILIZED, FOR SOLUTION INTRAVENOUS ONCE
Refills: 0 | Status: COMPLETED | OUTPATIENT
Start: 2024-01-01 | End: 2024-01-01

## 2024-01-01 RX ORDER — SODIUM BICARBONATE 1 MEQ/ML
1300 SYRINGE (ML) INTRAVENOUS THREE TIMES A DAY
Refills: 0 | Status: DISCONTINUED | OUTPATIENT
Start: 2024-01-01 | End: 2024-01-01

## 2024-01-01 RX ORDER — POTASSIUM CHLORIDE 20 MEQ
20 PACKET (EA) ORAL
Refills: 0 | Status: COMPLETED | OUTPATIENT
Start: 2024-01-01 | End: 2024-01-01

## 2024-01-01 RX ORDER — INSULIN GLARGINE 100 [IU]/ML
13 INJECTION, SOLUTION SUBCUTANEOUS EVERY MORNING
Refills: 0 | Status: DISCONTINUED | OUTPATIENT
Start: 2024-01-01 | End: 2024-01-01

## 2024-01-01 RX ORDER — LORAZEPAM 0.5 MG
0.5 TABLET ORAL EVERY 6 HOURS
Refills: 0 | Status: DISCONTINUED | OUTPATIENT
Start: 2024-01-01 | End: 2024-01-01

## 2024-01-01 RX ORDER — DOBUTAMINE HCL 250MG/20ML
2.5 VIAL (ML) INTRAVENOUS
Qty: 1000 | Refills: 0 | Status: DISCONTINUED | OUTPATIENT
Start: 2024-01-01 | End: 2024-01-01

## 2024-01-01 RX ORDER — LACTULOSE 10 G/15ML
20 SOLUTION ORAL
Refills: 0 | Status: COMPLETED | OUTPATIENT
Start: 2024-01-01 | End: 2024-01-01

## 2024-01-01 RX ORDER — GLUCAGON HYDROCHLORIDE 1 MG/ML
1 INJECTION, POWDER, FOR SOLUTION INTRAMUSCULAR; INTRAVENOUS; SUBCUTANEOUS ONCE
Refills: 0 | Status: DISCONTINUED | OUTPATIENT
Start: 2024-01-01 | End: 2024-01-01

## 2024-01-01 RX ORDER — ZOLPIDEM TARTRATE 10 MG/1
5 TABLET ORAL AT BEDTIME
Refills: 0 | Status: DISCONTINUED | OUTPATIENT
Start: 2024-01-01 | End: 2024-01-01

## 2024-01-01 RX ORDER — LACTULOSE 10 G/15ML
20 SOLUTION ORAL ONCE
Refills: 0 | Status: COMPLETED | OUTPATIENT
Start: 2024-01-01 | End: 2024-01-01

## 2024-01-01 RX ORDER — NYSTATIN CREAM 100000 [USP'U]/G
1 CREAM TOPICAL EVERY 12 HOURS
Refills: 0 | Status: DISCONTINUED | OUTPATIENT
Start: 2024-01-01 | End: 2024-01-01

## 2024-01-01 RX ORDER — INSULIN LISPRO 100/ML
4 VIAL (ML) SUBCUTANEOUS ONCE
Refills: 0 | Status: COMPLETED | OUTPATIENT
Start: 2024-01-01 | End: 2024-01-01

## 2024-01-01 RX ORDER — METOPROLOL TARTRATE 50 MG
5 TABLET ORAL ONCE
Refills: 0 | Status: DISCONTINUED | OUTPATIENT
Start: 2024-01-01 | End: 2024-01-01

## 2024-01-01 RX ORDER — SPIRONOLACTONE 25 MG/1
25 TABLET, FILM COATED ORAL DAILY
Refills: 0 | Status: DISCONTINUED | OUTPATIENT
Start: 2024-01-01 | End: 2024-01-01

## 2024-01-01 RX ORDER — METOCLOPRAMIDE HCL 10 MG
10 TABLET ORAL ONCE
Refills: 0 | Status: COMPLETED | OUTPATIENT
Start: 2024-01-01 | End: 2024-01-01

## 2024-01-01 RX ADMIN — Medication 10 UNIT(S): at 11:30

## 2024-01-01 RX ADMIN — SENNA PLUS 2 TABLET(S): 8.6 TABLET ORAL at 21:42

## 2024-01-01 RX ADMIN — NYSTATIN CREAM 1 APPLICATION(S): 100000 CREAM TOPICAL at 22:48

## 2024-01-01 RX ADMIN — HEPARIN SODIUM 5000 UNIT(S): 5000 INJECTION INTRAVENOUS; SUBCUTANEOUS at 14:01

## 2024-01-01 RX ADMIN — SODIUM CHLORIDE 500 MILLILITER(S): 9 INJECTION, SOLUTION INTRAVENOUS at 18:44

## 2024-01-01 RX ADMIN — Medication 2 PUFF(S): at 20:37

## 2024-01-01 RX ADMIN — RANOLAZINE 500 MILLIGRAM(S): 500 TABLET, FILM COATED, EXTENDED RELEASE ORAL at 05:06

## 2024-01-01 RX ADMIN — AMIODARONE HYDROCHLORIDE 200 MILLIGRAM(S): 400 TABLET ORAL at 05:17

## 2024-01-01 RX ADMIN — Medication 100 MILLIGRAM(S): at 21:31

## 2024-01-01 RX ADMIN — Medication 3 MILLILITER(S): at 19:44

## 2024-01-01 RX ADMIN — HYDROMORPHONE HCL 0.5 MILLIGRAM(S): 0.2 INJECTION, SOLUTION INTRAVENOUS at 18:37

## 2024-01-01 RX ADMIN — Medication 3 UNIT(S): at 22:06

## 2024-01-01 RX ADMIN — RANOLAZINE 500 MILLIGRAM(S): 500 TABLET, FILM COATED, EXTENDED RELEASE ORAL at 05:08

## 2024-01-01 RX ADMIN — Medication 40 MILLIEQUIVALENT(S): at 15:06

## 2024-01-01 RX ADMIN — Medication 2.5 MILLIGRAM(S): at 11:34

## 2024-01-01 RX ADMIN — Medication 100 MILLIGRAM(S): at 06:04

## 2024-01-01 RX ADMIN — ATORVASTATIN CALCIUM 40 MILLIGRAM(S): 80 TABLET, FILM COATED ORAL at 21:11

## 2024-01-01 RX ADMIN — ONDANSETRON 4 MILLIGRAM(S): 8 TABLET, FILM COATED ORAL at 11:50

## 2024-01-01 RX ADMIN — MONTELUKAST 10 MILLIGRAM(S): 4 TABLET, CHEWABLE ORAL at 11:17

## 2024-01-01 RX ADMIN — INSULIN GLARGINE 15 UNIT(S): 100 INJECTION, SOLUTION SUBCUTANEOUS at 22:16

## 2024-01-01 RX ADMIN — Medication 75 MILLIGRAM(S): at 05:50

## 2024-01-01 RX ADMIN — Medication 5: at 17:21

## 2024-01-01 RX ADMIN — Medication 5 UNIT(S): at 17:17

## 2024-01-01 RX ADMIN — Medication 2 MILLIGRAM(S): at 21:02

## 2024-01-01 RX ADMIN — CHLORHEXIDINE GLUCONATE 1 APPLICATION(S): 213 SOLUTION TOPICAL at 12:38

## 2024-01-01 RX ADMIN — ATORVASTATIN CALCIUM 20 MILLIGRAM(S): 80 TABLET, FILM COATED ORAL at 21:05

## 2024-01-01 RX ADMIN — ONDANSETRON 4 MILLIGRAM(S): 8 TABLET, FILM COATED ORAL at 06:32

## 2024-01-01 RX ADMIN — RANOLAZINE 500 MILLIGRAM(S): 500 TABLET, FILM COATED, EXTENDED RELEASE ORAL at 17:22

## 2024-01-01 RX ADMIN — HEPARIN SODIUM 5000 UNIT(S): 5000 INJECTION INTRAVENOUS; SUBCUTANEOUS at 05:05

## 2024-01-01 RX ADMIN — OXYCODONE HYDROCHLORIDE 2.5 MILLIGRAM(S): 5 TABLET ORAL at 00:08

## 2024-01-01 RX ADMIN — Medication 10 UNIT(S): at 08:38

## 2024-01-01 RX ADMIN — IPRATROPIUM BROMIDE AND ALBUTEROL SULFATE 3 MILLILITER(S): .5; 3 SOLUTION RESPIRATORY (INHALATION) at 11:30

## 2024-01-01 RX ADMIN — BUDESONIDE AND FORMOTEROL FUMARATE DIHYDRATE 2 PUFF(S): 160; 4.5 AEROSOL RESPIRATORY (INHALATION) at 21:03

## 2024-01-01 RX ADMIN — INSULIN GLARGINE 4 UNIT(S): 100 INJECTION, SOLUTION SUBCUTANEOUS at 08:30

## 2024-01-01 RX ADMIN — Medication 3 MILLILITER(S): at 16:39

## 2024-01-01 RX ADMIN — Medication 75 MILLIGRAM(S): at 11:47

## 2024-01-01 RX ADMIN — MONTELUKAST 10 MILLIGRAM(S): 4 TABLET, CHEWABLE ORAL at 11:48

## 2024-01-01 RX ADMIN — CHLORHEXIDINE GLUCONATE 1 APPLICATION(S): 213 SOLUTION TOPICAL at 12:21

## 2024-01-01 RX ADMIN — Medication 40 MILLIEQUIVALENT(S): at 22:22

## 2024-01-01 RX ADMIN — Medication 1 APPLICATION(S): at 11:49

## 2024-01-01 RX ADMIN — Medication 75 MILLIGRAM(S): at 11:34

## 2024-01-01 RX ADMIN — Medication 100 MILLIGRAM(S): at 21:07

## 2024-01-01 RX ADMIN — Medication 650 MILLIGRAM(S): at 05:12

## 2024-01-01 RX ADMIN — Medication 975 MILLIGRAM(S): at 02:57

## 2024-01-01 RX ADMIN — Medication 3 UNIT(S): at 17:07

## 2024-01-01 RX ADMIN — Medication 5 UNIT(S): at 17:16

## 2024-01-01 RX ADMIN — OXYCODONE HYDROCHLORIDE 2.5 MILLIGRAM(S): 5 TABLET ORAL at 22:16

## 2024-01-01 RX ADMIN — Medication 50 MILLIGRAM(S): at 00:01

## 2024-01-01 RX ADMIN — MONTELUKAST 10 MILLIGRAM(S): 4 TABLET, CHEWABLE ORAL at 11:26

## 2024-01-01 RX ADMIN — FLUDROCORTISONE ACETATE 0.1 MILLIGRAM(S): 0.1 TABLET ORAL at 05:46

## 2024-01-01 RX ADMIN — Medication 40 MILLIGRAM(S): at 05:07

## 2024-01-01 RX ADMIN — Medication 2.5 MILLIGRAM(S): at 23:54

## 2024-01-01 RX ADMIN — ATORVASTATIN CALCIUM 40 MILLIGRAM(S): 80 TABLET, FILM COATED ORAL at 21:26

## 2024-01-01 RX ADMIN — PANTOPRAZOLE SODIUM 40 MILLIGRAM(S): 20 TABLET, DELAYED RELEASE ORAL at 05:06

## 2024-01-01 RX ADMIN — IRON SUCROSE 110 MILLIGRAM(S): 20 INJECTION, SOLUTION INTRAVENOUS at 11:27

## 2024-01-01 RX ADMIN — Medication 75 MILLIGRAM(S): at 11:48

## 2024-01-01 RX ADMIN — Medication 5 UNIT(S): at 17:32

## 2024-01-01 RX ADMIN — INSULIN HUMAN 10 UNIT(S): 100 INJECTION, SOLUTION SUBCUTANEOUS at 22:57

## 2024-01-01 RX ADMIN — Medication 5.32 MICROGRAM(S)/KG/MIN: at 02:17

## 2024-01-01 RX ADMIN — Medication 3 UNIT(S): at 07:55

## 2024-01-01 RX ADMIN — AMIODARONE HYDROCHLORIDE 200 MILLIGRAM(S): 400 TABLET ORAL at 05:05

## 2024-01-01 RX ADMIN — Medication 30 MILLIGRAM(S): at 05:23

## 2024-01-01 RX ADMIN — Medication 650 MILLIGRAM(S): at 18:53

## 2024-01-01 RX ADMIN — Medication 650 MILLIGRAM(S): at 02:39

## 2024-01-01 RX ADMIN — Medication 3 MILLILITER(S): at 19:46

## 2024-01-01 RX ADMIN — TRAMADOL HYDROCHLORIDE 50 MILLIGRAM(S): 50 TABLET ORAL at 12:14

## 2024-01-01 RX ADMIN — MEROPENEM 100 MILLIGRAM(S): 1 INJECTION INTRAVENOUS at 23:14

## 2024-01-01 RX ADMIN — HYDROMORPHONE HCL 0.5 MILLIGRAM(S): 0.2 INJECTION, SOLUTION INTRAVENOUS at 14:48

## 2024-01-01 RX ADMIN — HYDROMORPHONE HCL 0.5 MILLIGRAM(S): 0.2 INJECTION, SOLUTION INTRAVENOUS at 21:36

## 2024-01-01 RX ADMIN — RANOLAZINE 500 MILLIGRAM(S): 500 TABLET, FILM COATED, EXTENDED RELEASE ORAL at 17:59

## 2024-01-01 RX ADMIN — Medication 14 UNIT(S): at 08:21

## 2024-01-01 RX ADMIN — Medication 50 MILLIGRAM(S): at 12:38

## 2024-01-01 RX ADMIN — Medication 14 UNIT(S): at 17:14

## 2024-01-01 RX ADMIN — RANOLAZINE 500 MILLIGRAM(S): 500 TABLET, FILM COATED, EXTENDED RELEASE ORAL at 17:15

## 2024-01-01 RX ADMIN — Medication 75 MILLIGRAM(S): at 12:33

## 2024-01-01 RX ADMIN — Medication 650 MILLIGRAM(S): at 14:48

## 2024-01-01 RX ADMIN — Medication 30 MILLILITER(S): at 12:20

## 2024-01-01 RX ADMIN — Medication 50 MILLIGRAM(S): at 11:10

## 2024-01-01 RX ADMIN — SENNA PLUS 2 TABLET(S): 8.6 TABLET ORAL at 21:54

## 2024-01-01 RX ADMIN — PANTOPRAZOLE SODIUM 40 MILLIGRAM(S): 20 TABLET, DELAYED RELEASE ORAL at 05:36

## 2024-01-01 RX ADMIN — Medication 1: at 21:02

## 2024-01-01 RX ADMIN — Medication 75 MILLIGRAM(S): at 11:18

## 2024-01-01 RX ADMIN — MONTELUKAST 10 MILLIGRAM(S): 4 TABLET, CHEWABLE ORAL at 21:26

## 2024-01-01 RX ADMIN — ENOXAPARIN SODIUM 40 MILLIGRAM(S): 100 INJECTION SUBCUTANEOUS at 12:12

## 2024-01-01 RX ADMIN — GLYCOPYRROLATE 0.2 MILLIGRAM(S): 0.2 INJECTION, SOLUTION INTRAMUSCULAR; INTRAVENOUS at 05:56

## 2024-01-01 RX ADMIN — FLUDROCORTISONE ACETATE 0.1 MILLIGRAM(S): 0.1 TABLET ORAL at 05:17

## 2024-01-01 RX ADMIN — SENNA PLUS 2 TABLET(S): 8.6 TABLET ORAL at 22:20

## 2024-01-01 RX ADMIN — Medication 25 MILLIGRAM(S): at 17:45

## 2024-01-01 RX ADMIN — CHLORHEXIDINE GLUCONATE 1 APPLICATION(S): 213 SOLUTION TOPICAL at 05:40

## 2024-01-01 RX ADMIN — Medication 500 MILLIGRAM(S): at 06:05

## 2024-01-01 RX ADMIN — AMIODARONE HYDROCHLORIDE 16.7 MG/MIN: 400 TABLET ORAL at 13:36

## 2024-01-01 RX ADMIN — Medication 1300 MILLIGRAM(S): at 05:17

## 2024-01-01 RX ADMIN — Medication 5 MILLIGRAM(S): at 21:34

## 2024-01-01 RX ADMIN — NYSTATIN CREAM 1 APPLICATION(S): 100000 CREAM TOPICAL at 17:24

## 2024-01-01 RX ADMIN — RANOLAZINE 500 MILLIGRAM(S): 500 TABLET, FILM COATED, EXTENDED RELEASE ORAL at 06:02

## 2024-01-01 RX ADMIN — ATORVASTATIN CALCIUM 40 MILLIGRAM(S): 80 TABLET, FILM COATED ORAL at 21:36

## 2024-01-01 RX ADMIN — HEPARIN SODIUM 5000 UNIT(S): 5000 INJECTION INTRAVENOUS; SUBCUTANEOUS at 13:47

## 2024-01-01 RX ADMIN — Medication 75 MILLIGRAM(S): at 11:08

## 2024-01-01 RX ADMIN — Medication 81 MILLIGRAM(S): at 12:16

## 2024-01-01 RX ADMIN — Medication 100 MILLIGRAM(S): at 13:57

## 2024-01-01 RX ADMIN — CHLORHEXIDINE GLUCONATE 1 APPLICATION(S): 213 SOLUTION TOPICAL at 12:15

## 2024-01-01 RX ADMIN — Medication 75 MILLIGRAM(S): at 12:19

## 2024-01-01 RX ADMIN — SENNA PLUS 2 TABLET(S): 8.6 TABLET ORAL at 22:25

## 2024-01-01 RX ADMIN — Medication 81 MILLIGRAM(S): at 15:49

## 2024-01-01 RX ADMIN — Medication 1: at 13:32

## 2024-01-01 RX ADMIN — Medication 1300 MILLIGRAM(S): at 13:24

## 2024-01-01 RX ADMIN — Medication 5 MILLIGRAM(S): at 22:15

## 2024-01-01 RX ADMIN — Medication 1 APPLICATION(S): at 05:09

## 2024-01-01 RX ADMIN — Medication 3: at 08:14

## 2024-01-01 RX ADMIN — Medication 1: at 17:52

## 2024-01-01 RX ADMIN — Medication 1000 MILLIGRAM(S): at 01:50

## 2024-01-01 RX ADMIN — Medication 650 MILLIGRAM(S): at 11:09

## 2024-01-01 RX ADMIN — AMIODARONE HYDROCHLORIDE 200 MILLIGRAM(S): 400 TABLET ORAL at 17:07

## 2024-01-01 RX ADMIN — DAPTOMYCIN 118 MILLIGRAM(S): 500 INJECTION, POWDER, LYOPHILIZED, FOR SOLUTION INTRAVENOUS at 13:13

## 2024-01-01 RX ADMIN — BUDESONIDE AND FORMOTEROL FUMARATE DIHYDRATE 2 PUFF(S): 160; 4.5 AEROSOL RESPIRATORY (INHALATION) at 21:36

## 2024-01-01 RX ADMIN — Medication 50 MILLIGRAM(S): at 17:05

## 2024-01-01 RX ADMIN — Medication 40 MILLIGRAM(S): at 06:10

## 2024-01-01 RX ADMIN — MEROPENEM 100 MILLIGRAM(S): 1 INJECTION INTRAVENOUS at 21:01

## 2024-01-01 RX ADMIN — TIOTROPIUM BROMIDE 2 PUFF(S): 18 CAPSULE ORAL; RESPIRATORY (INHALATION) at 08:12

## 2024-01-01 RX ADMIN — Medication 40 MILLIEQUIVALENT(S): at 04:00

## 2024-01-01 RX ADMIN — Medication 75 MILLIGRAM(S): at 11:22

## 2024-01-01 RX ADMIN — Medication 40 MILLIEQUIVALENT(S): at 15:16

## 2024-01-01 RX ADMIN — NYSTATIN CREAM 1 APPLICATION(S): 100000 CREAM TOPICAL at 05:47

## 2024-01-01 RX ADMIN — Medication 3 MILLILITER(S): at 12:41

## 2024-01-01 RX ADMIN — AMIODARONE HYDROCHLORIDE 200 MILLIGRAM(S): 400 TABLET ORAL at 05:54

## 2024-01-01 RX ADMIN — Medication 3 MILLILITER(S): at 20:12

## 2024-01-01 RX ADMIN — Medication 6: at 12:17

## 2024-01-01 RX ADMIN — Medication 2.5 MILLIGRAM(S): at 11:38

## 2024-01-01 RX ADMIN — MORPHINE SULFATE 2 MILLIGRAM(S): 50 CAPSULE, EXTENDED RELEASE ORAL at 06:59

## 2024-01-01 RX ADMIN — Medication 50 MILLIGRAM(S): at 11:08

## 2024-01-01 RX ADMIN — Medication 3 MILLILITER(S): at 17:34

## 2024-01-01 RX ADMIN — ATORVASTATIN CALCIUM 20 MILLIGRAM(S): 80 TABLET, FILM COATED ORAL at 21:22

## 2024-01-01 RX ADMIN — AMIODARONE HYDROCHLORIDE 200 MILLIGRAM(S): 400 TABLET ORAL at 17:14

## 2024-01-01 RX ADMIN — Medication 75 MILLIGRAM(S): at 14:27

## 2024-01-01 RX ADMIN — NYSTATIN CREAM 1 APPLICATION(S): 100000 CREAM TOPICAL at 05:57

## 2024-01-01 RX ADMIN — Medication 75 MILLIGRAM(S): at 11:17

## 2024-01-01 RX ADMIN — Medication 50 MILLIGRAM(S): at 05:56

## 2024-01-01 RX ADMIN — Medication 40 MILLIGRAM(S): at 05:34

## 2024-01-01 RX ADMIN — Medication 30 MILLIGRAM(S): at 05:36

## 2024-01-01 RX ADMIN — POLYETHYLENE GLYCOL 3350 17 GRAM(S): 17 POWDER, FOR SOLUTION ORAL at 11:40

## 2024-01-01 RX ADMIN — HEPARIN SODIUM 5000 UNIT(S): 5000 INJECTION INTRAVENOUS; SUBCUTANEOUS at 05:29

## 2024-01-01 RX ADMIN — Medication 3 MILLILITER(S): at 07:51

## 2024-01-01 RX ADMIN — HYDROMORPHONE HCL 0.5 MILLIGRAM(S): 0.2 INJECTION, SOLUTION INTRAVENOUS at 17:58

## 2024-01-01 RX ADMIN — TETANUS TOXOID, REDUCED DIPHTHERIA TOXOID AND ACELLULAR PERTUSSIS VACCINE, ADSORBED 0.5 MILLILITER(S): 5; 2.5; 8; 8; 2.5 SUSPENSION INTRAMUSCULAR at 02:24

## 2024-01-01 RX ADMIN — Medication 100 MILLIGRAM(S): at 06:07

## 2024-01-01 RX ADMIN — Medication 650 MILLIGRAM(S): at 02:58

## 2024-01-01 RX ADMIN — Medication 3 MILLILITER(S): at 23:34

## 2024-01-01 RX ADMIN — Medication 5 MILLIGRAM(S): at 10:39

## 2024-01-01 RX ADMIN — GLYCOPYRROLATE 0.2 MILLIGRAM(S): 0.2 INJECTION, SOLUTION INTRAMUSCULAR; INTRAVENOUS at 08:53

## 2024-01-01 RX ADMIN — Medication 50 MILLIGRAM(S): at 18:08

## 2024-01-01 RX ADMIN — AMIODARONE HYDROCHLORIDE 200 MILLIGRAM(S): 400 TABLET ORAL at 17:20

## 2024-01-01 RX ADMIN — NYSTATIN CREAM 1 APPLICATION(S): 100000 CREAM TOPICAL at 17:53

## 2024-01-01 RX ADMIN — NYSTATIN CREAM 1 APPLICATION(S): 100000 CREAM TOPICAL at 17:50

## 2024-01-01 RX ADMIN — Medication 650 MILLIGRAM(S): at 00:47

## 2024-01-01 RX ADMIN — RANOLAZINE 500 MILLIGRAM(S): 500 TABLET, FILM COATED, EXTENDED RELEASE ORAL at 17:47

## 2024-01-01 RX ADMIN — HEPARIN SODIUM 5000 UNIT(S): 5000 INJECTION INTRAVENOUS; SUBCUTANEOUS at 06:02

## 2024-01-01 RX ADMIN — Medication 100 MILLIGRAM(S): at 06:02

## 2024-01-01 RX ADMIN — Medication 2: at 07:51

## 2024-01-01 RX ADMIN — Medication 5 UNIT(S): at 17:37

## 2024-01-01 RX ADMIN — ENOXAPARIN SODIUM 40 MILLIGRAM(S): 100 INJECTION SUBCUTANEOUS at 11:58

## 2024-01-01 RX ADMIN — Medication 2 MILLIGRAM(S): at 21:54

## 2024-01-01 RX ADMIN — IRON SUCROSE 110 MILLIGRAM(S): 20 INJECTION, SOLUTION INTRAVENOUS at 08:24

## 2024-01-01 RX ADMIN — NYSTATIN CREAM 1 APPLICATION(S): 100000 CREAM TOPICAL at 14:05

## 2024-01-01 RX ADMIN — AMIODARONE HYDROCHLORIDE 200 MILLIGRAM(S): 400 TABLET ORAL at 17:49

## 2024-01-01 RX ADMIN — MONTELUKAST 10 MILLIGRAM(S): 4 TABLET, CHEWABLE ORAL at 11:21

## 2024-01-01 RX ADMIN — Medication 1: at 08:21

## 2024-01-01 RX ADMIN — Medication 1300 MILLIGRAM(S): at 22:28

## 2024-01-01 RX ADMIN — Medication 4: at 06:00

## 2024-01-01 RX ADMIN — PANTOPRAZOLE SODIUM 40 MILLIGRAM(S): 20 TABLET, DELAYED RELEASE ORAL at 06:04

## 2024-01-01 RX ADMIN — NYSTATIN CREAM 1 APPLICATION(S): 100000 CREAM TOPICAL at 05:36

## 2024-01-01 RX ADMIN — Medication 20 MILLIGRAM(S): at 06:41

## 2024-01-01 RX ADMIN — Medication 650 MILLIGRAM(S): at 06:20

## 2024-01-01 RX ADMIN — PANTOPRAZOLE SODIUM 40 MILLIGRAM(S): 20 TABLET, DELAYED RELEASE ORAL at 21:00

## 2024-01-01 RX ADMIN — Medication 50 MILLIGRAM(S): at 05:17

## 2024-01-01 RX ADMIN — SODIUM CHLORIDE 75 MILLILITER(S): 9 INJECTION, SOLUTION INTRAVENOUS at 08:38

## 2024-01-01 RX ADMIN — CHLORHEXIDINE GLUCONATE 1 APPLICATION(S): 213 SOLUTION TOPICAL at 11:41

## 2024-01-01 RX ADMIN — RANOLAZINE 500 MILLIGRAM(S): 500 TABLET, FILM COATED, EXTENDED RELEASE ORAL at 06:09

## 2024-01-01 RX ADMIN — Medication 40 MILLIGRAM(S): at 05:32

## 2024-01-01 RX ADMIN — Medication 1 APPLICATION(S): at 11:27

## 2024-01-01 RX ADMIN — HEPARIN SODIUM 5000 UNIT(S): 5000 INJECTION INTRAVENOUS; SUBCUTANEOUS at 05:35

## 2024-01-01 RX ADMIN — Medication 6: at 17:13

## 2024-01-01 RX ADMIN — HEPARIN SODIUM 5000 UNIT(S): 5000 INJECTION INTRAVENOUS; SUBCUTANEOUS at 22:23

## 2024-01-01 RX ADMIN — Medication 60 MILLIGRAM(S): at 05:58

## 2024-01-01 RX ADMIN — Medication 3 UNIT(S): at 12:17

## 2024-01-01 RX ADMIN — Medication 75 MILLIGRAM(S): at 15:00

## 2024-01-01 RX ADMIN — Medication 3 MILLILITER(S): at 20:28

## 2024-01-01 RX ADMIN — NYSTATIN CREAM 1 APPLICATION(S): 100000 CREAM TOPICAL at 17:37

## 2024-01-01 RX ADMIN — MEROPENEM 100 MILLIGRAM(S): 1 INJECTION INTRAVENOUS at 21:23

## 2024-01-01 RX ADMIN — Medication 30 MILLIGRAM(S): at 17:15

## 2024-01-01 RX ADMIN — Medication 8: at 17:08

## 2024-01-01 RX ADMIN — NYSTATIN CREAM 1 APPLICATION(S): 100000 CREAM TOPICAL at 14:16

## 2024-01-01 RX ADMIN — AMIODARONE HYDROCHLORIDE 200 MILLIGRAM(S): 400 TABLET ORAL at 06:20

## 2024-01-01 RX ADMIN — INSULIN GLARGINE 10 UNIT(S): 100 INJECTION, SOLUTION SUBCUTANEOUS at 22:21

## 2024-01-01 RX ADMIN — FLUDROCORTISONE ACETATE 0.1 MILLIGRAM(S): 0.1 TABLET ORAL at 05:04

## 2024-01-01 RX ADMIN — Medication 4 UNIT(S): at 12:00

## 2024-01-01 RX ADMIN — HEPARIN SODIUM 5000 UNIT(S): 5000 INJECTION INTRAVENOUS; SUBCUTANEOUS at 18:28

## 2024-01-01 RX ADMIN — RANOLAZINE 500 MILLIGRAM(S): 500 TABLET, FILM COATED, EXTENDED RELEASE ORAL at 06:08

## 2024-01-01 RX ADMIN — CEFEPIME 100 MILLIGRAM(S): 1 INJECTION, POWDER, FOR SOLUTION INTRAMUSCULAR; INTRAVENOUS at 10:23

## 2024-01-01 RX ADMIN — INSULIN GLARGINE 18 UNIT(S): 100 INJECTION, SOLUTION SUBCUTANEOUS at 21:30

## 2024-01-01 RX ADMIN — Medication 4: at 13:47

## 2024-01-01 RX ADMIN — SEVELAMER CARBONATE 800 MILLIGRAM(S): 2400 POWDER, FOR SUSPENSION ORAL at 05:18

## 2024-01-01 RX ADMIN — RANOLAZINE 500 MILLIGRAM(S): 500 TABLET, FILM COATED, EXTENDED RELEASE ORAL at 17:56

## 2024-01-01 RX ADMIN — CEFTRIAXONE 100 MILLIGRAM(S): 500 INJECTION, POWDER, FOR SOLUTION INTRAMUSCULAR; INTRAVENOUS at 17:09

## 2024-01-01 RX ADMIN — PANTOPRAZOLE SODIUM 40 MILLIGRAM(S): 20 TABLET, DELAYED RELEASE ORAL at 06:09

## 2024-01-01 RX ADMIN — Medication 104 MILLIGRAM(S): at 01:25

## 2024-01-01 RX ADMIN — Medication 1: at 08:29

## 2024-01-01 RX ADMIN — Medication 4: at 11:45

## 2024-01-01 RX ADMIN — ALBUTEROL 2 PUFF(S): 90 AEROSOL, METERED ORAL at 19:23

## 2024-01-01 RX ADMIN — POLYETHYLENE GLYCOL 3350 17 GRAM(S): 17 POWDER, FOR SOLUTION ORAL at 05:54

## 2024-01-01 RX ADMIN — Medication 50 MILLIGRAM(S): at 17:25

## 2024-01-01 RX ADMIN — Medication 20 MILLIEQUIVALENT(S): at 16:09

## 2024-01-01 RX ADMIN — Medication 1: at 17:31

## 2024-01-01 RX ADMIN — SEVELAMER CARBONATE 800 MILLIGRAM(S): 2400 POWDER, FOR SUSPENSION ORAL at 05:08

## 2024-01-01 RX ADMIN — LACTULOSE 15 GRAM(S): 10 SOLUTION ORAL at 05:07

## 2024-01-01 RX ADMIN — Medication 1 APPLICATION(S): at 23:46

## 2024-01-01 RX ADMIN — MONTELUKAST 10 MILLIGRAM(S): 4 TABLET, CHEWABLE ORAL at 12:12

## 2024-01-01 RX ADMIN — HEPARIN SODIUM 5000 UNIT(S): 5000 INJECTION INTRAVENOUS; SUBCUTANEOUS at 05:11

## 2024-01-01 RX ADMIN — SENNA PLUS 2 TABLET(S): 8.6 TABLET ORAL at 21:07

## 2024-01-01 RX ADMIN — Medication 100 MILLIGRAM(S): at 05:56

## 2024-01-01 RX ADMIN — Medication 125 MILLIGRAM(S): at 12:22

## 2024-01-01 RX ADMIN — Medication 50 MILLIGRAM(S): at 23:02

## 2024-01-01 RX ADMIN — NYSTATIN CREAM 1 APPLICATION(S): 100000 CREAM TOPICAL at 14:29

## 2024-01-01 RX ADMIN — Medication 3: at 11:50

## 2024-01-01 RX ADMIN — Medication 75 MILLIGRAM(S): at 06:05

## 2024-01-01 RX ADMIN — POLYETHYLENE GLYCOL 3350 17 GRAM(S): 17 POWDER, FOR SOLUTION ORAL at 15:48

## 2024-01-01 RX ADMIN — Medication 40 MILLIGRAM(S): at 12:21

## 2024-01-01 RX ADMIN — SEVELAMER CARBONATE 800 MILLIGRAM(S): 2400 POWDER, FOR SUSPENSION ORAL at 21:20

## 2024-01-01 RX ADMIN — Medication 650 MILLIGRAM(S): at 14:05

## 2024-01-01 RX ADMIN — Medication 4: at 16:38

## 2024-01-01 RX ADMIN — RANOLAZINE 500 MILLIGRAM(S): 500 TABLET, FILM COATED, EXTENDED RELEASE ORAL at 17:12

## 2024-01-01 RX ADMIN — Medication 25 MILLIGRAM(S): at 17:16

## 2024-01-01 RX ADMIN — CEFTRIAXONE 100 MILLIGRAM(S): 500 INJECTION, POWDER, FOR SOLUTION INTRAMUSCULAR; INTRAVENOUS at 18:02

## 2024-01-01 RX ADMIN — Medication 75 MILLIGRAM(S): at 11:27

## 2024-01-01 RX ADMIN — Medication 2.5 MILLIGRAM(S): at 17:31

## 2024-01-01 RX ADMIN — PANTOPRAZOLE SODIUM 40 MILLIGRAM(S): 20 TABLET, DELAYED RELEASE ORAL at 05:32

## 2024-01-01 RX ADMIN — PANTOPRAZOLE SODIUM 40 MILLIGRAM(S): 20 TABLET, DELAYED RELEASE ORAL at 06:03

## 2024-01-01 RX ADMIN — SENNA PLUS 2 TABLET(S): 8.6 TABLET ORAL at 21:22

## 2024-01-01 RX ADMIN — Medication 8: at 12:16

## 2024-01-01 RX ADMIN — Medication 1 APPLICATION(S): at 11:38

## 2024-01-01 RX ADMIN — NYSTATIN CREAM 1 APPLICATION(S): 100000 CREAM TOPICAL at 17:13

## 2024-01-01 RX ADMIN — Medication 100 MILLIGRAM(S): at 05:46

## 2024-01-01 RX ADMIN — NYSTATIN CREAM 1 APPLICATION(S): 100000 CREAM TOPICAL at 06:30

## 2024-01-01 RX ADMIN — TIOTROPIUM BROMIDE 2 PUFF(S): 18 CAPSULE ORAL; RESPIRATORY (INHALATION) at 07:56

## 2024-01-01 RX ADMIN — RANOLAZINE 500 MILLIGRAM(S): 500 TABLET, FILM COATED, EXTENDED RELEASE ORAL at 05:27

## 2024-01-01 RX ADMIN — Medication 3 MILLILITER(S): at 11:49

## 2024-01-01 RX ADMIN — PANTOPRAZOLE SODIUM 40 MILLIGRAM(S): 20 TABLET, DELAYED RELEASE ORAL at 06:28

## 2024-01-01 RX ADMIN — ACETAZOLAMIDE 110 MILLIGRAM(S): 250 TABLET ORAL at 20:34

## 2024-01-01 RX ADMIN — PANTOPRAZOLE SODIUM 40 MILLIGRAM(S): 20 TABLET, DELAYED RELEASE ORAL at 06:05

## 2024-01-01 RX ADMIN — SENNA PLUS 2 TABLET(S): 8.6 TABLET ORAL at 21:11

## 2024-01-01 RX ADMIN — RANOLAZINE 500 MILLIGRAM(S): 500 TABLET, FILM COATED, EXTENDED RELEASE ORAL at 06:41

## 2024-01-01 RX ADMIN — Medication 75 MILLIGRAM(S): at 21:05

## 2024-01-01 RX ADMIN — MONTELUKAST 10 MILLIGRAM(S): 4 TABLET, CHEWABLE ORAL at 22:22

## 2024-01-01 RX ADMIN — Medication 25 MILLIGRAM(S): at 06:29

## 2024-01-01 RX ADMIN — SENNA PLUS 2 TABLET(S): 8.6 TABLET ORAL at 21:57

## 2024-01-01 RX ADMIN — PANTOPRAZOLE SODIUM 40 MILLIGRAM(S): 20 TABLET, DELAYED RELEASE ORAL at 05:47

## 2024-01-01 RX ADMIN — TIOTROPIUM BROMIDE 2 PUFF(S): 18 CAPSULE ORAL; RESPIRATORY (INHALATION) at 07:34

## 2024-01-01 RX ADMIN — POLYETHYLENE GLYCOL 3350 17 GRAM(S): 17 POWDER, FOR SOLUTION ORAL at 11:49

## 2024-01-01 RX ADMIN — Medication 50 MILLIGRAM(S): at 17:49

## 2024-01-01 RX ADMIN — Medication 81 MILLIGRAM(S): at 11:37

## 2024-01-01 RX ADMIN — Medication 50 MILLIGRAM(S): at 12:11

## 2024-01-01 RX ADMIN — Medication 3 MILLILITER(S): at 16:42

## 2024-01-01 RX ADMIN — BUDESONIDE AND FORMOTEROL FUMARATE DIHYDRATE 2 PUFF(S): 160; 4.5 AEROSOL RESPIRATORY (INHALATION) at 17:23

## 2024-01-01 RX ADMIN — Medication 4 UNIT(S): at 17:06

## 2024-01-01 RX ADMIN — Medication 60 MILLIGRAM(S): at 05:07

## 2024-01-01 RX ADMIN — Medication 100 MILLIGRAM(S): at 22:50

## 2024-01-01 RX ADMIN — POLYETHYLENE GLYCOL 3350 17 GRAM(S): 17 POWDER, FOR SOLUTION ORAL at 05:07

## 2024-01-01 RX ADMIN — TIOTROPIUM BROMIDE 2 PUFF(S): 18 CAPSULE ORAL; RESPIRATORY (INHALATION) at 07:39

## 2024-01-01 RX ADMIN — BUDESONIDE AND FORMOTEROL FUMARATE DIHYDRATE 2 PUFF(S): 160; 4.5 AEROSOL RESPIRATORY (INHALATION) at 20:58

## 2024-01-01 RX ADMIN — Medication 50 MILLIGRAM(S): at 11:40

## 2024-01-01 RX ADMIN — RANOLAZINE 500 MILLIGRAM(S): 500 TABLET, FILM COATED, EXTENDED RELEASE ORAL at 05:49

## 2024-01-01 RX ADMIN — NYSTATIN CREAM 1 APPLICATION(S): 100000 CREAM TOPICAL at 21:59

## 2024-01-01 RX ADMIN — RANOLAZINE 500 MILLIGRAM(S): 500 TABLET, FILM COATED, EXTENDED RELEASE ORAL at 06:05

## 2024-01-01 RX ADMIN — HEPARIN SODIUM 5000 UNIT(S): 5000 INJECTION INTRAVENOUS; SUBCUTANEOUS at 16:09

## 2024-01-01 RX ADMIN — SPIRONOLACTONE 25 MILLIGRAM(S): 25 TABLET, FILM COATED ORAL at 11:34

## 2024-01-01 RX ADMIN — MONTELUKAST 10 MILLIGRAM(S): 4 TABLET, CHEWABLE ORAL at 11:18

## 2024-01-01 RX ADMIN — Medication 100 MILLIGRAM(S): at 21:47

## 2024-01-01 RX ADMIN — NYSTATIN CREAM 1 APPLICATION(S): 100000 CREAM TOPICAL at 12:57

## 2024-01-01 RX ADMIN — FLUDROCORTISONE ACETATE 0.1 MILLIGRAM(S): 0.1 TABLET ORAL at 06:03

## 2024-01-01 RX ADMIN — Medication 5 UNIT(S): at 17:53

## 2024-01-01 RX ADMIN — Medication 40 MILLIEQUIVALENT(S): at 09:58

## 2024-01-01 RX ADMIN — IPRATROPIUM BROMIDE AND ALBUTEROL SULFATE 3 MILLILITER(S): .5; 3 SOLUTION RESPIRATORY (INHALATION) at 11:00

## 2024-01-01 RX ADMIN — ATORVASTATIN CALCIUM 40 MILLIGRAM(S): 80 TABLET, FILM COATED ORAL at 23:20

## 2024-01-01 RX ADMIN — POLYETHYLENE GLYCOL 3350 17 GRAM(S): 17 POWDER, FOR SOLUTION ORAL at 11:20

## 2024-01-01 RX ADMIN — Medication 30 MILLIGRAM(S): at 06:09

## 2024-01-01 RX ADMIN — RANOLAZINE 500 MILLIGRAM(S): 500 TABLET, FILM COATED, EXTENDED RELEASE ORAL at 05:12

## 2024-01-01 RX ADMIN — Medication 2: at 17:21

## 2024-01-01 RX ADMIN — Medication 100 MILLIGRAM(S): at 13:48

## 2024-01-01 RX ADMIN — VANCOMYCIN HYDROCHLORIDE 250 MILLIGRAM(S): KIT at 11:30

## 2024-01-01 RX ADMIN — Medication 25 GRAM(S): at 12:06

## 2024-01-01 RX ADMIN — SENNA PLUS 2 TABLET(S): 8.6 TABLET ORAL at 21:36

## 2024-01-01 RX ADMIN — Medication 40 MILLIEQUIVALENT(S): at 21:54

## 2024-01-01 RX ADMIN — DESVENLAFAXINE 25 MILLIGRAM(S): 50 TABLET, EXTENDED RELEASE ORAL at 21:35

## 2024-01-01 RX ADMIN — Medication 3 MILLILITER(S): at 04:34

## 2024-01-01 RX ADMIN — ENOXAPARIN SODIUM 40 MILLIGRAM(S): 100 INJECTION SUBCUTANEOUS at 12:15

## 2024-01-01 RX ADMIN — SENNA PLUS 2 TABLET(S): 8.6 TABLET ORAL at 21:47

## 2024-01-01 RX ADMIN — Medication 5 MILLIGRAM(S): at 12:58

## 2024-01-01 RX ADMIN — NYSTATIN CREAM 1 APPLICATION(S): 100000 CREAM TOPICAL at 05:28

## 2024-01-01 RX ADMIN — Medication 30 MILLIGRAM(S): at 05:49

## 2024-01-01 RX ADMIN — BUDESONIDE AND FORMOTEROL FUMARATE DIHYDRATE 2 PUFF(S): 160; 4.5 AEROSOL RESPIRATORY (INHALATION) at 08:01

## 2024-01-01 RX ADMIN — INSULIN GLARGINE 4 UNIT(S): 100 INJECTION, SOLUTION SUBCUTANEOUS at 09:51

## 2024-01-01 RX ADMIN — Medication 1300 MILLIGRAM(S): at 05:08

## 2024-01-01 RX ADMIN — Medication 2 MILLIGRAM(S): at 22:25

## 2024-01-01 RX ADMIN — Medication 50 MILLIGRAM(S): at 11:26

## 2024-01-01 RX ADMIN — SENNA PLUS 2 TABLET(S): 8.6 TABLET ORAL at 22:49

## 2024-01-01 RX ADMIN — Medication 81 MILLIGRAM(S): at 11:48

## 2024-01-01 RX ADMIN — Medication 40 MILLIGRAM(S): at 06:09

## 2024-01-01 RX ADMIN — Medication 50 MILLIGRAM(S): at 12:12

## 2024-01-01 RX ADMIN — NYSTATIN CREAM 1 APPLICATION(S): 100000 CREAM TOPICAL at 05:06

## 2024-01-01 RX ADMIN — Medication 30 MILLILITER(S): at 22:28

## 2024-01-01 RX ADMIN — LACTULOSE 20 GRAM(S): 10 SOLUTION ORAL at 17:19

## 2024-01-01 RX ADMIN — Medication 75 MILLIGRAM(S): at 11:15

## 2024-01-01 RX ADMIN — SENNA PLUS 2 TABLET(S): 8.6 TABLET ORAL at 22:22

## 2024-01-01 RX ADMIN — Medication 50 MILLIGRAM(S): at 12:10

## 2024-01-01 RX ADMIN — Medication 200 MILLIGRAM(S): at 05:17

## 2024-01-01 RX ADMIN — Medication 6: at 06:16

## 2024-01-01 RX ADMIN — Medication 81 MILLIGRAM(S): at 11:18

## 2024-01-01 RX ADMIN — Medication 81 MILLIGRAM(S): at 12:17

## 2024-01-01 RX ADMIN — Medication 75 MILLIGRAM(S): at 11:13

## 2024-01-01 RX ADMIN — Medication 40 MILLIGRAM(S): at 05:33

## 2024-01-01 RX ADMIN — POLYETHYLENE GLYCOL 3350 17 GRAM(S): 17 POWDER, FOR SOLUTION ORAL at 12:18

## 2024-01-01 RX ADMIN — INSULIN GLARGINE 15 UNIT(S): 100 INJECTION, SOLUTION SUBCUTANEOUS at 21:54

## 2024-01-01 RX ADMIN — OXYCODONE HYDROCHLORIDE 2.5 MILLIGRAM(S): 5 TABLET ORAL at 23:38

## 2024-01-01 RX ADMIN — Medication 2.5 MILLIGRAM(S): at 06:03

## 2024-01-01 RX ADMIN — LACTULOSE 15 GRAM(S): 10 SOLUTION ORAL at 15:19

## 2024-01-01 RX ADMIN — HEPARIN SODIUM 5000 UNIT(S): 5000 INJECTION INTRAVENOUS; SUBCUTANEOUS at 05:39

## 2024-01-01 RX ADMIN — Medication 100 MILLIGRAM(S): at 13:59

## 2024-01-01 RX ADMIN — Medication 200 MILLIGRAM(S): at 20:59

## 2024-01-01 RX ADMIN — Medication 6: at 18:01

## 2024-01-01 RX ADMIN — HEPARIN SODIUM 5000 UNIT(S): 5000 INJECTION INTRAVENOUS; SUBCUTANEOUS at 17:09

## 2024-01-01 RX ADMIN — Medication 81 MILLIGRAM(S): at 12:08

## 2024-01-01 RX ADMIN — HEPARIN SODIUM 5000 UNIT(S): 5000 INJECTION INTRAVENOUS; SUBCUTANEOUS at 18:05

## 2024-01-01 RX ADMIN — Medication 200 MILLIGRAM(S): at 11:25

## 2024-01-01 RX ADMIN — CEFTRIAXONE 100 MILLIGRAM(S): 500 INJECTION, POWDER, FOR SOLUTION INTRAMUSCULAR; INTRAVENOUS at 17:04

## 2024-01-01 RX ADMIN — HYDROMORPHONE HCL 0.5 MILLIGRAM(S): 0.2 INJECTION, SOLUTION INTRAVENOUS at 10:47

## 2024-01-01 RX ADMIN — NYSTATIN CREAM 1 APPLICATION(S): 100000 CREAM TOPICAL at 05:55

## 2024-01-01 RX ADMIN — Medication 5 UNIT(S): at 17:23

## 2024-01-01 RX ADMIN — Medication 30 MILLILITER(S): at 17:25

## 2024-01-01 RX ADMIN — INSULIN GLARGINE 10 UNIT(S): 100 INJECTION, SOLUTION SUBCUTANEOUS at 12:39

## 2024-01-01 RX ADMIN — Medication 50 MILLIGRAM(S): at 17:14

## 2024-01-01 RX ADMIN — CHLORHEXIDINE GLUCONATE 1 APPLICATION(S): 213 SOLUTION TOPICAL at 11:14

## 2024-01-01 RX ADMIN — Medication 25 MILLIGRAM(S): at 17:28

## 2024-01-01 RX ADMIN — Medication 4.25 MICROGRAM(S)/KG/MIN: at 12:38

## 2024-01-01 RX ADMIN — Medication 14 UNIT(S): at 11:57

## 2024-01-01 RX ADMIN — DESVENLAFAXINE 25 MILLIGRAM(S): 50 TABLET, EXTENDED RELEASE ORAL at 22:06

## 2024-01-01 RX ADMIN — Medication 50 MILLIGRAM(S): at 13:35

## 2024-01-01 RX ADMIN — Medication 500 MILLIGRAM(S): at 17:03

## 2024-01-01 RX ADMIN — HYDROMORPHONE HCL 0.5 MILLIGRAM(S): 0.2 INJECTION, SOLUTION INTRAVENOUS at 13:42

## 2024-01-01 RX ADMIN — TIOTROPIUM BROMIDE 2 PUFF(S): 18 CAPSULE ORAL; RESPIRATORY (INHALATION) at 13:59

## 2024-01-01 RX ADMIN — Medication 1300 MILLIGRAM(S): at 21:19

## 2024-01-01 RX ADMIN — Medication 1300 MILLIGRAM(S): at 14:25

## 2024-01-01 RX ADMIN — Medication 200 MILLIGRAM(S): at 05:40

## 2024-01-01 RX ADMIN — Medication 50 MILLIGRAM(S): at 05:30

## 2024-01-01 RX ADMIN — SENNA PLUS 2 TABLET(S): 8.6 TABLET ORAL at 21:41

## 2024-01-01 RX ADMIN — BUDESONIDE AND FORMOTEROL FUMARATE DIHYDRATE 2 PUFF(S): 160; 4.5 AEROSOL RESPIRATORY (INHALATION) at 20:57

## 2024-01-01 RX ADMIN — Medication 5 UNIT(S): at 09:33

## 2024-01-01 RX ADMIN — INSULIN GLARGINE 13 UNIT(S): 100 INJECTION, SOLUTION SUBCUTANEOUS at 21:08

## 2024-01-01 RX ADMIN — ATORVASTATIN CALCIUM 20 MILLIGRAM(S): 80 TABLET, FILM COATED ORAL at 21:46

## 2024-01-01 RX ADMIN — ALBUTEROL 2 PUFF(S): 90 AEROSOL, METERED ORAL at 20:43

## 2024-01-01 RX ADMIN — POLYETHYLENE GLYCOL 3350 17 GRAM(S): 17 POWDER, FOR SOLUTION ORAL at 05:16

## 2024-01-01 RX ADMIN — Medication 1 APPLICATION(S): at 17:58

## 2024-01-01 RX ADMIN — Medication 3 MILLILITER(S): at 20:09

## 2024-01-01 RX ADMIN — POLYETHYLENE GLYCOL 3350 17 GRAM(S): 17 POWDER, FOR SOLUTION ORAL at 17:18

## 2024-01-01 RX ADMIN — Medication 250 MILLIGRAM(S): at 05:13

## 2024-01-01 RX ADMIN — PANTOPRAZOLE SODIUM 40 MILLIGRAM(S): 20 TABLET, DELAYED RELEASE ORAL at 06:06

## 2024-01-01 RX ADMIN — INSULIN GLARGINE 16 UNIT(S): 100 INJECTION, SOLUTION SUBCUTANEOUS at 22:28

## 2024-01-01 RX ADMIN — Medication 3 MILLILITER(S): at 07:30

## 2024-01-01 RX ADMIN — HEPARIN SODIUM 5000 UNIT(S): 5000 INJECTION INTRAVENOUS; SUBCUTANEOUS at 05:57

## 2024-01-01 RX ADMIN — Medication 2.5 MILLIGRAM(S): at 11:39

## 2024-01-01 RX ADMIN — INSULIN GLARGINE 27 UNIT(S): 100 INJECTION, SOLUTION SUBCUTANEOUS at 22:30

## 2024-01-01 RX ADMIN — Medication 100 MILLIGRAM(S): at 21:57

## 2024-01-01 RX ADMIN — INSULIN GLARGINE 16 UNIT(S): 100 INJECTION, SOLUTION SUBCUTANEOUS at 21:38

## 2024-01-01 RX ADMIN — Medication 6: at 08:04

## 2024-01-01 RX ADMIN — Medication 50 MILLIGRAM(S): at 13:36

## 2024-01-01 RX ADMIN — NYSTATIN CREAM 1 APPLICATION(S): 100000 CREAM TOPICAL at 05:58

## 2024-01-01 RX ADMIN — RANOLAZINE 500 MILLIGRAM(S): 500 TABLET, FILM COATED, EXTENDED RELEASE ORAL at 17:27

## 2024-01-01 RX ADMIN — Medication 200 MILLIGRAM(S): at 12:16

## 2024-01-01 RX ADMIN — HYDROMORPHONE HCL 0.5 MILLIGRAM(S): 0.2 INJECTION, SOLUTION INTRAVENOUS at 16:05

## 2024-01-01 RX ADMIN — Medication 100 MILLIGRAM(S): at 22:18

## 2024-01-01 RX ADMIN — Medication 40 MILLIGRAM(S): at 05:23

## 2024-01-01 RX ADMIN — RANOLAZINE 500 MILLIGRAM(S): 500 TABLET, FILM COATED, EXTENDED RELEASE ORAL at 17:00

## 2024-01-01 RX ADMIN — RANOLAZINE 500 MILLIGRAM(S): 500 TABLET, FILM COATED, EXTENDED RELEASE ORAL at 17:17

## 2024-01-01 RX ADMIN — Medication 2 MILLIGRAM(S): at 21:20

## 2024-01-01 RX ADMIN — Medication 40 MILLIEQUIVALENT(S): at 11:17

## 2024-01-01 RX ADMIN — BUDESONIDE AND FORMOTEROL FUMARATE DIHYDRATE 2 PUFF(S): 160; 4.5 AEROSOL RESPIRATORY (INHALATION) at 08:47

## 2024-01-01 RX ADMIN — Medication 200 MILLIGRAM(S): at 06:41

## 2024-01-01 RX ADMIN — Medication 3 UNIT(S): at 11:32

## 2024-01-01 RX ADMIN — ATORVASTATIN CALCIUM 40 MILLIGRAM(S): 80 TABLET, FILM COATED ORAL at 01:12

## 2024-01-01 RX ADMIN — Medication 60 MILLIGRAM(S): at 17:29

## 2024-01-01 RX ADMIN — HYDROMORPHONE HCL 0.5 MILLIGRAM(S): 0.2 INJECTION, SOLUTION INTRAVENOUS at 14:36

## 2024-01-01 RX ADMIN — Medication 250 MILLIGRAM(S): at 21:46

## 2024-01-01 RX ADMIN — CHLORHEXIDINE GLUCONATE 1 APPLICATION(S): 213 SOLUTION TOPICAL at 11:43

## 2024-01-01 RX ADMIN — MONTELUKAST 10 MILLIGRAM(S): 4 TABLET, CHEWABLE ORAL at 11:08

## 2024-01-01 RX ADMIN — MONTELUKAST 10 MILLIGRAM(S): 4 TABLET, CHEWABLE ORAL at 11:38

## 2024-01-01 RX ADMIN — Medication 40 MILLIEQUIVALENT(S): at 14:38

## 2024-01-01 RX ADMIN — CHLORHEXIDINE GLUCONATE 1 APPLICATION(S): 213 SOLUTION TOPICAL at 11:17

## 2024-01-01 RX ADMIN — Medication 75 MILLIGRAM(S): at 06:08

## 2024-01-01 RX ADMIN — CHLORHEXIDINE GLUCONATE 1 APPLICATION(S): 213 SOLUTION TOPICAL at 12:00

## 2024-01-01 RX ADMIN — Medication 50 MILLIGRAM(S): at 16:15

## 2024-01-01 RX ADMIN — Medication 100 MILLIGRAM(S): at 06:09

## 2024-01-01 RX ADMIN — HYDROMORPHONE HCL 0.5 MILLIGRAM(S): 0.2 INJECTION, SOLUTION INTRAVENOUS at 05:56

## 2024-01-01 RX ADMIN — MONTELUKAST 10 MILLIGRAM(S): 4 TABLET, CHEWABLE ORAL at 11:25

## 2024-01-01 RX ADMIN — PANTOPRAZOLE SODIUM 40 MILLIGRAM(S): 20 TABLET, DELAYED RELEASE ORAL at 05:27

## 2024-01-01 RX ADMIN — Medication 25 MILLIGRAM(S): at 06:28

## 2024-01-01 RX ADMIN — Medication 4: at 17:40

## 2024-01-01 RX ADMIN — Medication 2: at 16:59

## 2024-01-01 RX ADMIN — PANTOPRAZOLE SODIUM 40 MILLIGRAM(S): 20 TABLET, DELAYED RELEASE ORAL at 11:15

## 2024-01-01 RX ADMIN — Medication 2 MILLIGRAM(S): at 21:07

## 2024-01-01 RX ADMIN — HYDROMORPHONE HCL 0.5 MILLIGRAM(S): 0.2 INJECTION, SOLUTION INTRAVENOUS at 00:16

## 2024-01-01 RX ADMIN — CHLORHEXIDINE GLUCONATE 1 APPLICATION(S): 213 SOLUTION TOPICAL at 11:30

## 2024-01-01 RX ADMIN — BUDESONIDE AND FORMOTEROL FUMARATE DIHYDRATE 2 PUFF(S): 160; 4.5 AEROSOL RESPIRATORY (INHALATION) at 09:19

## 2024-01-01 RX ADMIN — Medication 100 GRAM(S): at 00:01

## 2024-01-01 RX ADMIN — PANTOPRAZOLE SODIUM 40 MILLIGRAM(S): 20 TABLET, DELAYED RELEASE ORAL at 06:01

## 2024-01-01 RX ADMIN — Medication 3 MILLILITER(S): at 07:40

## 2024-01-01 RX ADMIN — ALBUTEROL 2 PUFF(S): 90 AEROSOL, METERED ORAL at 22:09

## 2024-01-01 RX ADMIN — NYSTATIN CREAM 1 APPLICATION(S): 100000 CREAM TOPICAL at 05:08

## 2024-01-01 RX ADMIN — Medication 40 MILLIGRAM(S): at 05:49

## 2024-01-01 RX ADMIN — SENNA PLUS 2 TABLET(S): 8.6 TABLET ORAL at 21:20

## 2024-01-01 RX ADMIN — Medication 81 MILLIGRAM(S): at 11:33

## 2024-01-01 RX ADMIN — AMIODARONE HYDROCHLORIDE 200 MILLIGRAM(S): 400 TABLET ORAL at 05:29

## 2024-01-01 RX ADMIN — ALBUTEROL 2 PUFF(S): 90 AEROSOL, METERED ORAL at 14:45

## 2024-01-01 RX ADMIN — INSULIN GLARGINE 10 UNIT(S): 100 INJECTION, SOLUTION SUBCUTANEOUS at 22:36

## 2024-01-01 RX ADMIN — Medication 3 MILLILITER(S): at 13:54

## 2024-01-01 RX ADMIN — Medication 3 UNIT(S): at 08:04

## 2024-01-01 RX ADMIN — POLYETHYLENE GLYCOL 3350 17 GRAM(S): 17 POWDER, FOR SOLUTION ORAL at 22:31

## 2024-01-01 RX ADMIN — TAMSULOSIN HYDROCHLORIDE 0.4 MILLIGRAM(S): 0.4 CAPSULE ORAL at 15:49

## 2024-01-01 RX ADMIN — SENNA PLUS 2 TABLET(S): 8.6 TABLET ORAL at 22:31

## 2024-01-01 RX ADMIN — ALBUTEROL 2 PUFF(S): 90 AEROSOL, METERED ORAL at 11:38

## 2024-01-01 RX ADMIN — RANOLAZINE 500 MILLIGRAM(S): 500 TABLET, FILM COATED, EXTENDED RELEASE ORAL at 05:23

## 2024-01-01 RX ADMIN — RANOLAZINE 500 MILLIGRAM(S): 500 TABLET, FILM COATED, EXTENDED RELEASE ORAL at 05:58

## 2024-01-01 RX ADMIN — ENOXAPARIN SODIUM 40 MILLIGRAM(S): 100 INJECTION SUBCUTANEOUS at 11:37

## 2024-01-01 RX ADMIN — AMIODARONE HYDROCHLORIDE 200 MILLIGRAM(S): 400 TABLET ORAL at 17:37

## 2024-01-01 RX ADMIN — Medication 4 UNIT(S): at 22:07

## 2024-01-01 RX ADMIN — Medication 100 MILLIGRAM(S): at 05:36

## 2024-01-01 RX ADMIN — POLYETHYLENE GLYCOL 3350 17 GRAM(S): 17 POWDER, FOR SOLUTION ORAL at 21:07

## 2024-01-01 RX ADMIN — NYSTATIN CREAM 1 APPLICATION(S): 100000 CREAM TOPICAL at 06:32

## 2024-01-01 RX ADMIN — Medication 2.5 MILLIGRAM(S): at 17:51

## 2024-01-01 RX ADMIN — Medication 5 MILLIGRAM(S): at 22:28

## 2024-01-01 RX ADMIN — HYDROMORPHONE HCL 0.5 MILLIGRAM(S): 0.2 INJECTION, SOLUTION INTRAVENOUS at 22:06

## 2024-01-01 RX ADMIN — BUDESONIDE AND FORMOTEROL FUMARATE DIHYDRATE 2 PUFF(S): 160; 4.5 AEROSOL RESPIRATORY (INHALATION) at 07:58

## 2024-01-01 RX ADMIN — LIDOCAINE 1 PATCH: 4 CREAM TOPICAL at 12:14

## 2024-01-01 RX ADMIN — RANOLAZINE 500 MILLIGRAM(S): 500 TABLET, FILM COATED, EXTENDED RELEASE ORAL at 17:32

## 2024-01-01 RX ADMIN — ATORVASTATIN CALCIUM 40 MILLIGRAM(S): 80 TABLET, FILM COATED ORAL at 22:21

## 2024-01-01 RX ADMIN — Medication 4: at 07:55

## 2024-01-01 RX ADMIN — Medication 30 MILLILITER(S): at 10:05

## 2024-01-01 RX ADMIN — SENNA PLUS 2 TABLET(S): 8.6 TABLET ORAL at 21:08

## 2024-01-01 RX ADMIN — Medication 50 MILLIGRAM(S): at 12:20

## 2024-01-01 RX ADMIN — DESVENLAFAXINE 25 MILLIGRAM(S): 50 TABLET, EXTENDED RELEASE ORAL at 21:49

## 2024-01-01 RX ADMIN — BUDESONIDE AND FORMOTEROL FUMARATE DIHYDRATE 2 PUFF(S): 160; 4.5 AEROSOL RESPIRATORY (INHALATION) at 08:28

## 2024-01-01 RX ADMIN — Medication 650 MILLIGRAM(S): at 15:54

## 2024-01-01 RX ADMIN — HEPARIN SODIUM 5000 UNIT(S): 5000 INJECTION INTRAVENOUS; SUBCUTANEOUS at 05:32

## 2024-01-01 RX ADMIN — Medication 81 MILLIGRAM(S): at 11:26

## 2024-01-01 RX ADMIN — Medication 2: at 17:24

## 2024-01-01 RX ADMIN — Medication 81 MILLIGRAM(S): at 11:08

## 2024-01-01 RX ADMIN — Medication 40 MILLIGRAM(S): at 05:11

## 2024-01-01 RX ADMIN — Medication 650 MILLIGRAM(S): at 00:33

## 2024-01-01 RX ADMIN — Medication 1300 MILLIGRAM(S): at 21:08

## 2024-01-01 RX ADMIN — Medication 100 MILLIGRAM(S): at 05:08

## 2024-01-01 RX ADMIN — PANTOPRAZOLE SODIUM 40 MILLIGRAM(S): 20 TABLET, DELAYED RELEASE ORAL at 06:49

## 2024-01-01 RX ADMIN — INSULIN GLARGINE 15 UNIT(S): 100 INJECTION, SOLUTION SUBCUTANEOUS at 22:49

## 2024-01-01 RX ADMIN — Medication 20 MILLIGRAM(S): at 05:06

## 2024-01-01 RX ADMIN — SODIUM CHLORIDE 500 MILLILITER(S): 9 INJECTION, SOLUTION INTRAVENOUS at 06:58

## 2024-01-01 RX ADMIN — MONTELUKAST 10 MILLIGRAM(S): 4 TABLET, CHEWABLE ORAL at 12:13

## 2024-01-01 RX ADMIN — MORPHINE SULFATE 2 MILLIGRAM(S): 50 CAPSULE, EXTENDED RELEASE ORAL at 21:45

## 2024-01-01 RX ADMIN — MORPHINE SULFATE 2 MILLIGRAM(S): 50 CAPSULE, EXTENDED RELEASE ORAL at 02:39

## 2024-01-01 RX ADMIN — NYSTATIN CREAM 1 APPLICATION(S): 100000 CREAM TOPICAL at 21:33

## 2024-01-01 RX ADMIN — MONTELUKAST 10 MILLIGRAM(S): 4 TABLET, CHEWABLE ORAL at 12:19

## 2024-01-01 RX ADMIN — BUDESONIDE AND FORMOTEROL FUMARATE DIHYDRATE 2 PUFF(S): 160; 4.5 AEROSOL RESPIRATORY (INHALATION) at 08:35

## 2024-01-01 RX ADMIN — NYSTATIN CREAM 1 APPLICATION(S): 100000 CREAM TOPICAL at 21:32

## 2024-01-01 RX ADMIN — Medication 40 MILLIGRAM(S): at 05:25

## 2024-01-01 RX ADMIN — Medication 100 MILLIGRAM(S): at 06:16

## 2024-01-01 RX ADMIN — RANOLAZINE 500 MILLIGRAM(S): 500 TABLET, FILM COATED, EXTENDED RELEASE ORAL at 17:29

## 2024-01-01 RX ADMIN — NYSTATIN CREAM 1 APPLICATION(S): 100000 CREAM TOPICAL at 21:07

## 2024-01-01 RX ADMIN — Medication 1300 MILLIGRAM(S): at 21:02

## 2024-01-01 RX ADMIN — SENNA PLUS 2 TABLET(S): 8.6 TABLET ORAL at 22:06

## 2024-01-01 RX ADMIN — Medication 4: at 11:21

## 2024-01-01 RX ADMIN — Medication 5 UNIT(S): at 12:15

## 2024-01-01 RX ADMIN — BUDESONIDE AND FORMOTEROL FUMARATE DIHYDRATE 2 PUFF(S): 160; 4.5 AEROSOL RESPIRATORY (INHALATION) at 09:53

## 2024-01-01 RX ADMIN — RANOLAZINE 500 MILLIGRAM(S): 500 TABLET, FILM COATED, EXTENDED RELEASE ORAL at 17:03

## 2024-01-01 RX ADMIN — Medication 200 MILLIGRAM(S): at 05:39

## 2024-01-01 RX ADMIN — Medication 1 APPLICATION(S): at 11:30

## 2024-01-01 RX ADMIN — ALBUTEROL 2 PUFF(S): 90 AEROSOL, METERED ORAL at 20:05

## 2024-01-01 RX ADMIN — Medication 3 UNIT(S): at 17:04

## 2024-01-01 RX ADMIN — Medication 100 MILLIGRAM(S): at 13:04

## 2024-01-01 RX ADMIN — Medication 6: at 11:57

## 2024-01-01 RX ADMIN — TIOTROPIUM BROMIDE 2 PUFF(S): 18 CAPSULE ORAL; RESPIRATORY (INHALATION) at 08:01

## 2024-01-01 RX ADMIN — ATORVASTATIN CALCIUM 20 MILLIGRAM(S): 80 TABLET, FILM COATED ORAL at 22:50

## 2024-01-01 RX ADMIN — SEVELAMER CARBONATE 800 MILLIGRAM(S): 2400 POWDER, FOR SUSPENSION ORAL at 13:01

## 2024-01-01 RX ADMIN — NYSTATIN CREAM 1 APPLICATION(S): 100000 CREAM TOPICAL at 14:59

## 2024-01-01 RX ADMIN — LACTULOSE 15 GRAM(S): 10 SOLUTION ORAL at 05:19

## 2024-01-01 RX ADMIN — TIOTROPIUM BROMIDE 2 PUFF(S): 18 CAPSULE ORAL; RESPIRATORY (INHALATION) at 09:46

## 2024-01-01 RX ADMIN — PANTOPRAZOLE SODIUM 40 MILLIGRAM(S): 20 TABLET, DELAYED RELEASE ORAL at 06:02

## 2024-01-01 RX ADMIN — Medication 100 MILLIGRAM(S): at 14:59

## 2024-01-01 RX ADMIN — Medication 60 MILLIGRAM(S): at 06:09

## 2024-01-01 RX ADMIN — Medication 50 MILLIGRAM(S): at 05:08

## 2024-01-01 RX ADMIN — SPIRONOLACTONE 12.5 MILLIGRAM(S): 25 TABLET, FILM COATED ORAL at 05:20

## 2024-01-01 RX ADMIN — CHLORHEXIDINE GLUCONATE 1 APPLICATION(S): 213 SOLUTION TOPICAL at 12:34

## 2024-01-01 RX ADMIN — HEPARIN SODIUM 5000 UNIT(S): 5000 INJECTION INTRAVENOUS; SUBCUTANEOUS at 22:39

## 2024-01-01 RX ADMIN — Medication 100 MILLIGRAM(S): at 14:05

## 2024-01-01 RX ADMIN — ONDANSETRON 4 MILLIGRAM(S): 8 TABLET, FILM COATED ORAL at 21:25

## 2024-01-01 RX ADMIN — Medication 2.5 MILLIGRAM(S): at 17:17

## 2024-01-01 RX ADMIN — Medication 50 MILLIGRAM(S): at 11:48

## 2024-01-01 RX ADMIN — Medication 50 MILLIGRAM(S): at 05:05

## 2024-01-01 RX ADMIN — ATORVASTATIN CALCIUM 20 MILLIGRAM(S): 80 TABLET, FILM COATED ORAL at 21:31

## 2024-01-01 RX ADMIN — CHLORHEXIDINE GLUCONATE 1 APPLICATION(S): 213 SOLUTION TOPICAL at 06:05

## 2024-01-01 RX ADMIN — Medication 2: at 08:28

## 2024-01-01 RX ADMIN — Medication 200 MILLIGRAM(S): at 05:30

## 2024-01-01 RX ADMIN — Medication 75 MILLIGRAM(S): at 11:26

## 2024-01-01 RX ADMIN — RANOLAZINE 500 MILLIGRAM(S): 500 TABLET, FILM COATED, EXTENDED RELEASE ORAL at 05:40

## 2024-01-01 RX ADMIN — PANTOPRAZOLE SODIUM 40 MILLIGRAM(S): 20 TABLET, DELAYED RELEASE ORAL at 12:10

## 2024-01-01 RX ADMIN — PANTOPRAZOLE SODIUM 40 MILLIGRAM(S): 20 TABLET, DELAYED RELEASE ORAL at 05:17

## 2024-01-01 RX ADMIN — Medication 14 UNIT(S): at 11:50

## 2024-01-01 RX ADMIN — Medication 3 UNIT(S): at 08:58

## 2024-01-01 RX ADMIN — Medication 4: at 17:23

## 2024-01-01 RX ADMIN — Medication 75 MILLIGRAM(S): at 11:28

## 2024-01-01 RX ADMIN — ATORVASTATIN CALCIUM 40 MILLIGRAM(S): 80 TABLET, FILM COATED ORAL at 22:39

## 2024-01-01 RX ADMIN — PANTOPRAZOLE SODIUM 40 MILLIGRAM(S): 20 TABLET, DELAYED RELEASE ORAL at 06:00

## 2024-01-01 RX ADMIN — Medication 3 UNIT(S): at 17:13

## 2024-01-01 RX ADMIN — Medication 40 MILLIEQUIVALENT(S): at 17:51

## 2024-01-01 RX ADMIN — POLYETHYLENE GLYCOL 3350 17 GRAM(S): 17 POWDER, FOR SOLUTION ORAL at 17:57

## 2024-01-01 RX ADMIN — HEPARIN SODIUM 5000 UNIT(S): 5000 INJECTION INTRAVENOUS; SUBCUTANEOUS at 05:17

## 2024-01-01 RX ADMIN — Medication 1 APPLICATION(S): at 05:56

## 2024-01-01 RX ADMIN — OXYCODONE HYDROCHLORIDE 2.5 MILLIGRAM(S): 5 TABLET ORAL at 21:34

## 2024-01-01 RX ADMIN — ATORVASTATIN CALCIUM 40 MILLIGRAM(S): 80 TABLET, FILM COATED ORAL at 21:54

## 2024-01-01 RX ADMIN — ATORVASTATIN CALCIUM 40 MILLIGRAM(S): 80 TABLET, FILM COATED ORAL at 21:35

## 2024-01-01 RX ADMIN — OXYCODONE HYDROCHLORIDE 2.5 MILLIGRAM(S): 5 TABLET ORAL at 05:46

## 2024-01-01 RX ADMIN — PANTOPRAZOLE SODIUM 40 MILLIGRAM(S): 20 TABLET, DELAYED RELEASE ORAL at 05:20

## 2024-01-01 RX ADMIN — Medication 1 APPLICATION(S): at 12:21

## 2024-01-01 RX ADMIN — Medication 3 MILLILITER(S): at 16:34

## 2024-01-01 RX ADMIN — Medication 14 UNIT(S): at 17:26

## 2024-01-01 RX ADMIN — NYSTATIN CREAM 1 APPLICATION(S): 100000 CREAM TOPICAL at 05:31

## 2024-01-01 RX ADMIN — MONTELUKAST 10 MILLIGRAM(S): 4 TABLET, CHEWABLE ORAL at 12:33

## 2024-01-01 RX ADMIN — Medication 600 MILLIGRAM(S): at 17:22

## 2024-01-01 RX ADMIN — Medication 100 MILLIGRAM(S): at 05:26

## 2024-01-01 RX ADMIN — Medication 1300 MILLIGRAM(S): at 05:29

## 2024-01-01 RX ADMIN — Medication 1300 MILLIGRAM(S): at 13:01

## 2024-01-01 RX ADMIN — Medication 6: at 12:09

## 2024-01-01 RX ADMIN — Medication 25 GRAM(S): at 10:00

## 2024-01-01 RX ADMIN — Medication 100 MILLIGRAM(S): at 05:49

## 2024-01-01 RX ADMIN — Medication 3 MILLILITER(S): at 12:56

## 2024-01-01 RX ADMIN — BUDESONIDE AND FORMOTEROL FUMARATE DIHYDRATE 2 PUFF(S): 160; 4.5 AEROSOL RESPIRATORY (INHALATION) at 17:22

## 2024-01-01 RX ADMIN — CEFEPIME 2000 MILLIGRAM(S): 1 INJECTION, POWDER, FOR SOLUTION INTRAMUSCULAR; INTRAVENOUS at 11:30

## 2024-01-01 RX ADMIN — MONTELUKAST 10 MILLIGRAM(S): 4 TABLET, CHEWABLE ORAL at 21:54

## 2024-01-01 RX ADMIN — Medication 40 MILLIGRAM(S): at 13:21

## 2024-01-01 RX ADMIN — Medication 650 MILLIGRAM(S): at 13:51

## 2024-01-01 RX ADMIN — CHLORHEXIDINE GLUCONATE 1 APPLICATION(S): 213 SOLUTION TOPICAL at 11:54

## 2024-01-01 RX ADMIN — POLYETHYLENE GLYCOL 3350 17 GRAM(S): 17 POWDER, FOR SOLUTION ORAL at 12:12

## 2024-01-01 RX ADMIN — OXYCODONE HYDROCHLORIDE 2.5 MILLIGRAM(S): 5 TABLET ORAL at 20:49

## 2024-01-01 RX ADMIN — Medication 30 MILLIGRAM(S): at 05:16

## 2024-01-01 RX ADMIN — Medication 25 MILLIGRAM(S): at 05:56

## 2024-01-01 RX ADMIN — INSULIN GLARGINE 10 UNIT(S): 100 INJECTION, SOLUTION SUBCUTANEOUS at 12:16

## 2024-01-01 RX ADMIN — Medication 5 MILLIGRAM(S): at 21:40

## 2024-01-01 RX ADMIN — Medication 81 MILLIGRAM(S): at 15:16

## 2024-01-01 RX ADMIN — Medication 1300 MILLIGRAM(S): at 21:41

## 2024-01-01 RX ADMIN — Medication 25 MILLIGRAM(S): at 05:46

## 2024-01-01 RX ADMIN — RANOLAZINE 500 MILLIGRAM(S): 500 TABLET, FILM COATED, EXTENDED RELEASE ORAL at 17:14

## 2024-01-01 RX ADMIN — HEPARIN SODIUM 5000 UNIT(S): 5000 INJECTION INTRAVENOUS; SUBCUTANEOUS at 21:33

## 2024-01-01 RX ADMIN — ONDANSETRON 4 MILLIGRAM(S): 8 TABLET, FILM COATED ORAL at 11:47

## 2024-01-01 RX ADMIN — BUDESONIDE AND FORMOTEROL FUMARATE DIHYDRATE 2 PUFF(S): 160; 4.5 AEROSOL RESPIRATORY (INHALATION) at 22:28

## 2024-01-01 RX ADMIN — Medication 60 MILLIGRAM(S): at 06:01

## 2024-01-01 RX ADMIN — Medication 40 MILLIGRAM(S): at 20:59

## 2024-01-01 RX ADMIN — MONTELUKAST 10 MILLIGRAM(S): 4 TABLET, CHEWABLE ORAL at 11:50

## 2024-01-01 RX ADMIN — POLYETHYLENE GLYCOL 3350 17 GRAM(S): 17 POWDER, FOR SOLUTION ORAL at 17:01

## 2024-01-01 RX ADMIN — HYDROMORPHONE HCL 0.5 MILLIGRAM(S): 0.2 INJECTION, SOLUTION INTRAVENOUS at 16:20

## 2024-01-01 RX ADMIN — MONTELUKAST 10 MILLIGRAM(S): 4 TABLET, CHEWABLE ORAL at 11:35

## 2024-01-01 RX ADMIN — ALBUTEROL 2 PUFF(S): 90 AEROSOL, METERED ORAL at 08:29

## 2024-01-01 RX ADMIN — MONTELUKAST 10 MILLIGRAM(S): 4 TABLET, CHEWABLE ORAL at 11:58

## 2024-01-01 RX ADMIN — Medication 50 MILLILITER(S): at 22:57

## 2024-01-01 RX ADMIN — Medication 100 MILLIGRAM(S): at 05:54

## 2024-01-01 RX ADMIN — MONTELUKAST 10 MILLIGRAM(S): 4 TABLET, CHEWABLE ORAL at 11:33

## 2024-01-01 RX ADMIN — Medication 25 GRAM(S): at 11:30

## 2024-01-01 RX ADMIN — NYSTATIN CREAM 1 APPLICATION(S): 100000 CREAM TOPICAL at 06:09

## 2024-01-01 RX ADMIN — Medication 5 UNIT(S): at 12:32

## 2024-01-01 RX ADMIN — INSULIN GLARGINE 16 UNIT(S): 100 INJECTION, SOLUTION SUBCUTANEOUS at 21:02

## 2024-01-01 RX ADMIN — ATORVASTATIN CALCIUM 40 MILLIGRAM(S): 80 TABLET, FILM COATED ORAL at 21:33

## 2024-01-01 RX ADMIN — Medication 200 MILLIGRAM(S): at 05:35

## 2024-01-01 RX ADMIN — Medication 4: at 12:24

## 2024-01-01 RX ADMIN — Medication 50 MILLIGRAM(S): at 12:06

## 2024-01-01 RX ADMIN — Medication 1300 MILLIGRAM(S): at 05:54

## 2024-01-01 RX ADMIN — AMIODARONE HYDROCHLORIDE 600 MILLIGRAM(S): 400 TABLET ORAL at 23:52

## 2024-01-01 RX ADMIN — Medication 40 MILLIEQUIVALENT(S): at 14:03

## 2024-01-01 RX ADMIN — POLYETHYLENE GLYCOL 3350 17 GRAM(S): 17 POWDER, FOR SOLUTION ORAL at 11:32

## 2024-01-01 RX ADMIN — HEPARIN SODIUM 5000 UNIT(S): 5000 INJECTION INTRAVENOUS; SUBCUTANEOUS at 05:46

## 2024-01-01 RX ADMIN — Medication 25 MILLIGRAM(S): at 17:23

## 2024-01-01 RX ADMIN — Medication 100 MILLIGRAM(S): at 05:25

## 2024-01-01 RX ADMIN — Medication 4: at 08:15

## 2024-01-01 RX ADMIN — RANOLAZINE 500 MILLIGRAM(S): 500 TABLET, FILM COATED, EXTENDED RELEASE ORAL at 06:18

## 2024-01-01 RX ADMIN — Medication 5 MILLIGRAM(S): at 00:26

## 2024-01-01 RX ADMIN — MONTELUKAST 10 MILLIGRAM(S): 4 TABLET, CHEWABLE ORAL at 12:20

## 2024-01-01 RX ADMIN — Medication 5 MILLIGRAM(S): at 22:35

## 2024-01-01 RX ADMIN — LIDOCAINE 1 PATCH: 4 CREAM TOPICAL at 05:17

## 2024-01-01 RX ADMIN — SENNA PLUS 2 TABLET(S): 8.6 TABLET ORAL at 22:23

## 2024-01-01 RX ADMIN — LACTULOSE 20 GRAM(S): 10 SOLUTION ORAL at 13:34

## 2024-01-01 RX ADMIN — Medication 60 MILLIGRAM(S): at 17:23

## 2024-01-01 RX ADMIN — Medication 4: at 11:50

## 2024-01-01 RX ADMIN — Medication 40 MILLIEQUIVALENT(S): at 12:11

## 2024-01-01 RX ADMIN — ATORVASTATIN CALCIUM 20 MILLIGRAM(S): 80 TABLET, FILM COATED ORAL at 22:31

## 2024-01-01 RX ADMIN — BUDESONIDE AND FORMOTEROL FUMARATE DIHYDRATE 2 PUFF(S): 160; 4.5 AEROSOL RESPIRATORY (INHALATION) at 05:33

## 2024-01-01 RX ADMIN — Medication 75 MILLIGRAM(S): at 13:48

## 2024-01-01 RX ADMIN — Medication 40 MILLIEQUIVALENT(S): at 14:30

## 2024-01-01 RX ADMIN — Medication 100 MILLIGRAM(S): at 12:13

## 2024-01-01 RX ADMIN — Medication 40 MILLIEQUIVALENT(S): at 11:20

## 2024-01-01 RX ADMIN — ONDANSETRON 4 MILLIGRAM(S): 8 TABLET, FILM COATED ORAL at 22:25

## 2024-01-01 RX ADMIN — Medication 3 UNIT(S): at 18:02

## 2024-01-01 RX ADMIN — Medication 81 MILLIGRAM(S): at 11:49

## 2024-01-01 RX ADMIN — Medication 6 UNIT(S): at 11:31

## 2024-01-01 RX ADMIN — SODIUM CHLORIDE 500 MILLILITER(S): 9 INJECTION INTRAMUSCULAR; INTRAVENOUS; SUBCUTANEOUS at 22:19

## 2024-01-01 RX ADMIN — Medication 40 MILLIGRAM(S): at 05:20

## 2024-01-01 RX ADMIN — SODIUM CHLORIDE 100 MILLILITER(S): 9 INJECTION, SOLUTION INTRAVENOUS at 21:03

## 2024-01-01 RX ADMIN — CHLORHEXIDINE GLUCONATE 1 APPLICATION(S): 213 SOLUTION TOPICAL at 12:18

## 2024-01-01 RX ADMIN — Medication 5 MILLIGRAM(S): at 21:01

## 2024-01-01 RX ADMIN — TIOTROPIUM BROMIDE 2 PUFF(S): 18 CAPSULE ORAL; RESPIRATORY (INHALATION) at 09:19

## 2024-01-01 RX ADMIN — CHLORHEXIDINE GLUCONATE 1 APPLICATION(S): 213 SOLUTION TOPICAL at 11:18

## 2024-01-01 RX ADMIN — Medication 4: at 07:46

## 2024-01-01 RX ADMIN — NYSTATIN CREAM 1 APPLICATION(S): 100000 CREAM TOPICAL at 05:18

## 2024-01-01 RX ADMIN — AMIODARONE HYDROCHLORIDE 200 MILLIGRAM(S): 400 TABLET ORAL at 17:23

## 2024-01-01 RX ADMIN — LIDOCAINE 1 PATCH: 4 CREAM TOPICAL at 00:00

## 2024-01-01 RX ADMIN — POLYETHYLENE GLYCOL 3350 17 GRAM(S): 17 POWDER, FOR SOLUTION ORAL at 20:11

## 2024-01-01 RX ADMIN — Medication 25 GRAM(S): at 21:54

## 2024-01-01 RX ADMIN — Medication 1 APPLICATION(S): at 11:15

## 2024-01-01 RX ADMIN — Medication 6: at 15:53

## 2024-01-01 RX ADMIN — POLYETHYLENE GLYCOL 3350 17 GRAM(S): 17 POWDER, FOR SOLUTION ORAL at 21:41

## 2024-01-01 RX ADMIN — ALBUTEROL 2 PUFF(S): 90 AEROSOL, METERED ORAL at 08:26

## 2024-01-01 RX ADMIN — Medication 1850 MILLILITER(S): at 11:50

## 2024-01-01 RX ADMIN — PANTOPRAZOLE SODIUM 40 MILLIGRAM(S): 20 TABLET, DELAYED RELEASE ORAL at 05:49

## 2024-01-01 RX ADMIN — Medication 650 MILLIGRAM(S): at 11:48

## 2024-01-01 RX ADMIN — Medication 50 MILLIGRAM(S): at 23:25

## 2024-01-01 RX ADMIN — LACTULOSE 15 GRAM(S): 10 SOLUTION ORAL at 02:44

## 2024-01-01 RX ADMIN — Medication 60 MILLIGRAM(S): at 22:38

## 2024-01-01 RX ADMIN — Medication 50 MILLIGRAM(S): at 05:28

## 2024-01-01 RX ADMIN — SODIUM CHLORIDE 500 MILLILITER(S): 9 INJECTION INTRAMUSCULAR; INTRAVENOUS; SUBCUTANEOUS at 14:18

## 2024-01-01 RX ADMIN — IPRATROPIUM BROMIDE AND ALBUTEROL SULFATE 3 MILLILITER(S): .5; 3 SOLUTION RESPIRATORY (INHALATION) at 11:15

## 2024-01-01 RX ADMIN — IRON SUCROSE 110 MILLIGRAM(S): 20 INJECTION, SOLUTION INTRAVENOUS at 08:32

## 2024-01-01 RX ADMIN — Medication 40 MILLIEQUIVALENT(S): at 14:04

## 2024-01-01 RX ADMIN — Medication 200 MILLIGRAM(S): at 05:19

## 2024-01-01 RX ADMIN — Medication 60 MILLIGRAM(S): at 05:49

## 2024-01-01 RX ADMIN — Medication 100 MILLIGRAM(S): at 05:18

## 2024-01-01 RX ADMIN — RANOLAZINE 500 MILLIGRAM(S): 500 TABLET, FILM COATED, EXTENDED RELEASE ORAL at 05:16

## 2024-01-01 RX ADMIN — TIOTROPIUM BROMIDE 2 PUFF(S): 18 CAPSULE ORAL; RESPIRATORY (INHALATION) at 09:27

## 2024-01-01 RX ADMIN — PANTOPRAZOLE SODIUM 40 MILLIGRAM(S): 20 TABLET, DELAYED RELEASE ORAL at 06:10

## 2024-01-01 RX ADMIN — CHLORHEXIDINE GLUCONATE 1 APPLICATION(S): 213 SOLUTION TOPICAL at 13:50

## 2024-01-01 RX ADMIN — HEPARIN SODIUM 5000 UNIT(S): 5000 INJECTION INTRAVENOUS; SUBCUTANEOUS at 17:05

## 2024-01-01 RX ADMIN — Medication 1300 MILLIGRAM(S): at 05:18

## 2024-01-01 RX ADMIN — Medication 50 MILLIGRAM(S): at 05:54

## 2024-01-01 RX ADMIN — ENOXAPARIN SODIUM 40 MILLIGRAM(S): 100 INJECTION SUBCUTANEOUS at 12:21

## 2024-01-01 RX ADMIN — Medication 1 MILLIGRAM(S): at 14:47

## 2024-01-01 RX ADMIN — Medication 30 MILLIGRAM(S): at 17:32

## 2024-01-01 RX ADMIN — Medication 1 MILLIGRAM(S): at 05:59

## 2024-01-01 RX ADMIN — ONDANSETRON 4 MILLIGRAM(S): 8 TABLET, FILM COATED ORAL at 04:13

## 2024-01-01 RX ADMIN — Medication 1: at 17:17

## 2024-01-01 RX ADMIN — BUDESONIDE AND FORMOTEROL FUMARATE DIHYDRATE 2 PUFF(S): 160; 4.5 AEROSOL RESPIRATORY (INHALATION) at 09:03

## 2024-01-01 RX ADMIN — OXYCODONE HYDROCHLORIDE 2.5 MILLIGRAM(S): 5 TABLET ORAL at 22:24

## 2024-01-01 RX ADMIN — Medication 2: at 11:33

## 2024-01-01 RX ADMIN — RANOLAZINE 500 MILLIGRAM(S): 500 TABLET, FILM COATED, EXTENDED RELEASE ORAL at 06:04

## 2024-01-01 RX ADMIN — Medication 500 MILLIGRAM(S): at 17:10

## 2024-01-01 RX ADMIN — VANCOMYCIN HYDROCHLORIDE 1000 MILLIGRAM(S): KIT at 12:30

## 2024-01-01 RX ADMIN — Medication 5 MILLIGRAM(S): at 22:25

## 2024-01-01 RX ADMIN — MONTELUKAST 10 MILLIGRAM(S): 4 TABLET, CHEWABLE ORAL at 11:37

## 2024-01-01 RX ADMIN — Medication 1 APPLICATION(S): at 12:13

## 2024-01-01 RX ADMIN — Medication 75 MILLIGRAM(S): at 11:49

## 2024-01-01 RX ADMIN — TIOTROPIUM BROMIDE 2 PUFF(S): 18 CAPSULE ORAL; RESPIRATORY (INHALATION) at 08:07

## 2024-01-01 RX ADMIN — RANOLAZINE 500 MILLIGRAM(S): 500 TABLET, FILM COATED, EXTENDED RELEASE ORAL at 05:35

## 2024-01-01 RX ADMIN — HEPARIN SODIUM 5000 UNIT(S): 5000 INJECTION INTRAVENOUS; SUBCUTANEOUS at 21:39

## 2024-01-01 RX ADMIN — Medication 3 MILLILITER(S): at 19:55

## 2024-01-01 RX ADMIN — TRAMADOL HYDROCHLORIDE 50 MILLIGRAM(S): 50 TABLET ORAL at 14:01

## 2024-01-01 RX ADMIN — RANOLAZINE 500 MILLIGRAM(S): 500 TABLET, FILM COATED, EXTENDED RELEASE ORAL at 05:30

## 2024-01-01 RX ADMIN — MORPHINE SULFATE 2 MILLIGRAM(S): 50 CAPSULE, EXTENDED RELEASE ORAL at 02:57

## 2024-01-01 RX ADMIN — NYSTATIN CREAM 1 APPLICATION(S): 100000 CREAM TOPICAL at 18:50

## 2024-01-01 RX ADMIN — Medication 2: at 07:59

## 2024-01-01 RX ADMIN — Medication 5: at 12:15

## 2024-01-01 RX ADMIN — DESVENLAFAXINE 25 MILLIGRAM(S): 50 TABLET, EXTENDED RELEASE ORAL at 22:49

## 2024-01-01 RX ADMIN — Medication 50 MILLIGRAM(S): at 23:35

## 2024-01-01 RX ADMIN — Medication 2 MILLIGRAM(S): at 23:05

## 2024-01-01 RX ADMIN — PANTOPRAZOLE SODIUM 40 MILLIGRAM(S): 20 TABLET, DELAYED RELEASE ORAL at 05:34

## 2024-01-01 RX ADMIN — Medication 75 MILLIGRAM(S): at 11:16

## 2024-01-01 RX ADMIN — PHENYLEPHRINE HYDROCHLORIDE 1.06 MICROGRAM(S)/KG/MIN: 10 INJECTION INTRAVENOUS at 02:17

## 2024-01-01 RX ADMIN — MONTELUKAST 10 MILLIGRAM(S): 4 TABLET, CHEWABLE ORAL at 13:41

## 2024-01-01 RX ADMIN — Medication 5: at 11:31

## 2024-01-01 RX ADMIN — BUDESONIDE AND FORMOTEROL FUMARATE DIHYDRATE 2 PUFF(S): 160; 4.5 AEROSOL RESPIRATORY (INHALATION) at 08:04

## 2024-01-01 RX ADMIN — POLYETHYLENE GLYCOL 3350 17 GRAM(S): 17 POWDER, FOR SOLUTION ORAL at 21:58

## 2024-01-01 RX ADMIN — Medication 75 MILLIGRAM(S): at 20:58

## 2024-01-01 RX ADMIN — Medication 100 MILLIGRAM(S): at 17:57

## 2024-01-01 RX ADMIN — Medication 60 MILLIGRAM(S): at 06:29

## 2024-01-01 RX ADMIN — AMIODARONE HYDROCHLORIDE 200 MILLIGRAM(S): 400 TABLET ORAL at 20:07

## 2024-01-01 RX ADMIN — NYSTATIN CREAM 1 APPLICATION(S): 100000 CREAM TOPICAL at 06:25

## 2024-01-01 RX ADMIN — TRAMADOL HYDROCHLORIDE 50 MILLIGRAM(S): 50 TABLET ORAL at 18:37

## 2024-01-01 RX ADMIN — HEPARIN SODIUM 5000 UNIT(S): 5000 INJECTION INTRAVENOUS; SUBCUTANEOUS at 06:05

## 2024-01-01 RX ADMIN — NYSTATIN CREAM 1 APPLICATION(S): 100000 CREAM TOPICAL at 17:31

## 2024-01-01 RX ADMIN — Medication 4 UNIT(S): at 17:14

## 2024-01-01 RX ADMIN — Medication 25 MILLIGRAM(S): at 17:51

## 2024-01-01 RX ADMIN — POLYETHYLENE GLYCOL 3350 17 GRAM(S): 17 POWDER, FOR SOLUTION ORAL at 05:47

## 2024-01-01 RX ADMIN — NYSTATIN CREAM 1 APPLICATION(S): 100000 CREAM TOPICAL at 13:49

## 2024-01-01 RX ADMIN — PANTOPRAZOLE SODIUM 40 MILLIGRAM(S): 20 TABLET, DELAYED RELEASE ORAL at 15:20

## 2024-01-01 RX ADMIN — HEPARIN SODIUM 5000 UNIT(S): 5000 INJECTION INTRAVENOUS; SUBCUTANEOUS at 21:21

## 2024-01-01 RX ADMIN — PANTOPRAZOLE SODIUM 40 MILLIGRAM(S): 20 TABLET, DELAYED RELEASE ORAL at 05:58

## 2024-01-01 RX ADMIN — Medication 50 MILLIGRAM(S): at 12:18

## 2024-01-01 RX ADMIN — BUDESONIDE AND FORMOTEROL FUMARATE DIHYDRATE 2 PUFF(S): 160; 4.5 AEROSOL RESPIRATORY (INHALATION) at 21:33

## 2024-01-01 RX ADMIN — SODIUM CHLORIDE 75 MILLILITER(S): 9 INJECTION, SOLUTION INTRAVENOUS at 13:13

## 2024-01-01 RX ADMIN — LACTULOSE 15 GRAM(S): 10 SOLUTION ORAL at 21:19

## 2024-01-01 RX ADMIN — SENNA PLUS 2 TABLET(S): 8.6 TABLET ORAL at 04:52

## 2024-01-01 RX ADMIN — Medication 5 MILLIGRAM(S): at 22:34

## 2024-01-01 RX ADMIN — RANOLAZINE 500 MILLIGRAM(S): 500 TABLET, FILM COATED, EXTENDED RELEASE ORAL at 18:05

## 2024-01-01 RX ADMIN — Medication 2.5 MILLIGRAM(S): at 06:29

## 2024-01-01 RX ADMIN — SODIUM CHLORIDE 100 MILLILITER(S): 9 INJECTION, SOLUTION INTRAVENOUS at 11:42

## 2024-01-01 RX ADMIN — SENNA PLUS 2 TABLET(S): 8.6 TABLET ORAL at 21:31

## 2024-01-01 RX ADMIN — CHLORHEXIDINE GLUCONATE 1 APPLICATION(S): 213 SOLUTION TOPICAL at 13:37

## 2024-01-01 RX ADMIN — ONDANSETRON 4 MILLIGRAM(S): 8 TABLET, FILM COATED ORAL at 13:10

## 2024-01-01 RX ADMIN — IRON SUCROSE 110 MILLIGRAM(S): 20 INJECTION, SOLUTION INTRAVENOUS at 08:30

## 2024-01-01 RX ADMIN — Medication 100 MILLIGRAM(S): at 06:29

## 2024-01-01 RX ADMIN — Medication 6 UNIT(S): at 08:14

## 2024-01-01 RX ADMIN — TRAMADOL HYDROCHLORIDE 50 MILLIGRAM(S): 50 TABLET ORAL at 14:30

## 2024-01-01 RX ADMIN — DESVENLAFAXINE 25 MILLIGRAM(S): 50 TABLET, EXTENDED RELEASE ORAL at 01:02

## 2024-01-01 RX ADMIN — INSULIN GLARGINE 25 UNIT(S): 100 INJECTION, SOLUTION SUBCUTANEOUS at 21:57

## 2024-01-01 RX ADMIN — Medication 40 MILLIEQUIVALENT(S): at 08:22

## 2024-01-01 RX ADMIN — CHLORHEXIDINE GLUCONATE 1 APPLICATION(S): 213 SOLUTION TOPICAL at 11:34

## 2024-01-01 RX ADMIN — Medication 500 MILLIGRAM(S): at 18:03

## 2024-01-01 RX ADMIN — RANOLAZINE 500 MILLIGRAM(S): 500 TABLET, FILM COATED, EXTENDED RELEASE ORAL at 17:09

## 2024-01-01 RX ADMIN — SENNA PLUS 2 TABLET(S): 8.6 TABLET ORAL at 21:05

## 2024-01-01 RX ADMIN — Medication 30 MILLILITER(S): at 03:08

## 2024-01-01 RX ADMIN — MONTELUKAST 10 MILLIGRAM(S): 4 TABLET, CHEWABLE ORAL at 12:15

## 2024-01-01 RX ADMIN — ALBUTEROL 2 PUFF(S): 90 AEROSOL, METERED ORAL at 14:07

## 2024-01-01 RX ADMIN — Medication 3 UNIT(S): at 12:24

## 2024-01-01 RX ADMIN — HEPARIN SODIUM 5000 UNIT(S): 5000 INJECTION INTRAVENOUS; SUBCUTANEOUS at 05:18

## 2024-01-01 RX ADMIN — TIOTROPIUM BROMIDE 2 PUFF(S): 18 CAPSULE ORAL; RESPIRATORY (INHALATION) at 08:56

## 2024-01-01 RX ADMIN — Medication 81 MILLIGRAM(S): at 11:35

## 2024-01-01 RX ADMIN — SODIUM CHLORIDE 1000 MILLILITER(S): 9 INJECTION, SOLUTION INTRAVENOUS at 10:11

## 2024-01-01 RX ADMIN — Medication 2 MILLIGRAM(S): at 21:34

## 2024-01-01 RX ADMIN — Medication 975 MILLIGRAM(S): at 02:38

## 2024-01-01 RX ADMIN — Medication 10.6 MICROGRAM(S)/KG/MIN: at 08:28

## 2024-01-01 RX ADMIN — Medication 650 MILLIGRAM(S): at 20:30

## 2024-01-01 RX ADMIN — Medication 100 MILLIGRAM(S): at 06:28

## 2024-01-01 RX ADMIN — MONTELUKAST 10 MILLIGRAM(S): 4 TABLET, CHEWABLE ORAL at 12:10

## 2024-01-01 RX ADMIN — Medication 6: at 11:47

## 2024-01-01 RX ADMIN — HYDROMORPHONE HCL 0.5 MILLIGRAM(S): 0.2 INJECTION, SOLUTION INTRAVENOUS at 12:39

## 2024-01-01 RX ADMIN — Medication 200 MILLIGRAM(S): at 05:12

## 2024-01-01 RX ADMIN — HYDROMORPHONE HCL 0.5 MILLIGRAM(S): 0.2 INJECTION, SOLUTION INTRAVENOUS at 10:44

## 2024-01-01 RX ADMIN — RANOLAZINE 500 MILLIGRAM(S): 500 TABLET, FILM COATED, EXTENDED RELEASE ORAL at 05:36

## 2024-01-01 RX ADMIN — Medication 650 MILLIGRAM(S): at 18:27

## 2024-01-01 RX ADMIN — OXYCODONE HYDROCHLORIDE 2.5 MILLIGRAM(S): 5 TABLET ORAL at 19:49

## 2024-01-01 RX ADMIN — Medication 200 MILLIGRAM(S): at 05:33

## 2024-01-01 RX ADMIN — SODIUM CHLORIDE 75 MILLILITER(S): 9 INJECTION, SOLUTION INTRAVENOUS at 15:19

## 2024-01-01 RX ADMIN — Medication 3: at 12:15

## 2024-01-01 RX ADMIN — INSULIN GLARGINE 10 UNIT(S): 100 INJECTION, SOLUTION SUBCUTANEOUS at 23:05

## 2024-01-01 RX ADMIN — POLYETHYLENE GLYCOL 3350 17 GRAM(S): 17 POWDER, FOR SOLUTION ORAL at 17:20

## 2024-01-01 RX ADMIN — SENNA PLUS 2 TABLET(S): 8.6 TABLET ORAL at 21:27

## 2024-01-01 RX ADMIN — Medication 400 MILLIGRAM(S): at 01:35

## 2024-01-01 RX ADMIN — Medication 50 MILLIGRAM(S): at 11:32

## 2024-01-01 RX ADMIN — PANTOPRAZOLE SODIUM 40 MILLIGRAM(S): 20 TABLET, DELAYED RELEASE ORAL at 05:33

## 2024-01-01 RX ADMIN — Medication 75 MILLIGRAM(S): at 12:17

## 2024-01-01 RX ADMIN — Medication 1: at 12:16

## 2024-01-01 RX ADMIN — Medication 5 UNIT(S): at 12:00

## 2024-01-01 RX ADMIN — Medication 1 APPLICATION(S): at 12:57

## 2024-01-01 RX ADMIN — Medication 1: at 11:36

## 2024-01-01 RX ADMIN — LACTULOSE 15 GRAM(S): 10 SOLUTION ORAL at 11:49

## 2024-01-01 RX ADMIN — NYSTATIN CREAM 1 APPLICATION(S): 100000 CREAM TOPICAL at 17:20

## 2024-01-01 RX ADMIN — Medication 100 MILLIGRAM(S): at 05:23

## 2024-01-01 RX ADMIN — Medication 50 MILLIGRAM(S): at 21:07

## 2024-01-01 RX ADMIN — HEPARIN SODIUM 5000 UNIT(S): 5000 INJECTION INTRAVENOUS; SUBCUTANEOUS at 22:20

## 2024-01-01 RX ADMIN — NYSTATIN CREAM 1 APPLICATION(S): 100000 CREAM TOPICAL at 05:19

## 2024-01-01 RX ADMIN — PANTOPRAZOLE SODIUM 40 MILLIGRAM(S): 20 TABLET, DELAYED RELEASE ORAL at 06:17

## 2024-01-01 RX ADMIN — Medication 5: at 17:59

## 2024-01-01 RX ADMIN — Medication 100 MILLIGRAM(S): at 10:22

## 2024-01-01 RX ADMIN — Medication 30 MILLILITER(S): at 15:44

## 2024-01-01 RX ADMIN — HYDROMORPHONE HCL 0.5 MILLIGRAM(S): 0.2 INJECTION, SOLUTION INTRAVENOUS at 10:14

## 2024-01-01 RX ADMIN — Medication 5 MILLIGRAM(S): at 22:16

## 2024-01-01 RX ADMIN — Medication 100 MILLIGRAM(S): at 00:27

## 2024-01-01 RX ADMIN — LACTULOSE 15 GRAM(S): 10 SOLUTION ORAL at 17:20

## 2024-01-01 RX ADMIN — BUDESONIDE AND FORMOTEROL FUMARATE DIHYDRATE 2 PUFF(S): 160; 4.5 AEROSOL RESPIRATORY (INHALATION) at 18:00

## 2024-01-01 RX ADMIN — Medication 81 MILLIGRAM(S): at 12:12

## 2024-01-01 RX ADMIN — Medication 75 MILLIGRAM(S): at 11:24

## 2024-01-01 RX ADMIN — SODIUM CHLORIDE 250 MILLILITER(S): 9 INJECTION, SOLUTION INTRAVENOUS at 17:04

## 2024-01-01 RX ADMIN — POLYETHYLENE GLYCOL 3350 17 GRAM(S): 17 POWDER, FOR SOLUTION ORAL at 12:20

## 2024-01-01 RX ADMIN — Medication 81 MILLIGRAM(S): at 12:18

## 2024-01-01 RX ADMIN — Medication 40 MILLIGRAM(S): at 17:45

## 2024-01-01 RX ADMIN — HYDROMORPHONE HCL 0.5 MILLIGRAM(S): 0.2 INJECTION, SOLUTION INTRAVENOUS at 05:40

## 2024-01-01 RX ADMIN — Medication 81 MILLIGRAM(S): at 11:22

## 2024-01-01 RX ADMIN — Medication 20 MILLIEQUIVALENT(S): at 12:47

## 2024-01-01 RX ADMIN — Medication 3 UNIT(S): at 13:47

## 2024-01-01 RX ADMIN — Medication 3 MILLILITER(S): at 00:35

## 2024-01-01 RX ADMIN — Medication 40 MILLIEQUIVALENT(S): at 15:26

## 2024-01-01 RX ADMIN — FLUDROCORTISONE ACETATE 0.1 MILLIGRAM(S): 0.1 TABLET ORAL at 12:38

## 2024-01-01 RX ADMIN — Medication 650 MILLIGRAM(S): at 12:18

## 2024-01-01 RX ADMIN — Medication 4: at 12:01

## 2024-01-01 RX ADMIN — Medication 5 UNIT(S): at 11:33

## 2024-01-01 RX ADMIN — ONDANSETRON 4 MILLIGRAM(S): 8 TABLET, FILM COATED ORAL at 22:02

## 2024-01-01 RX ADMIN — Medication 5 MILLIGRAM(S): at 11:47

## 2024-01-01 RX ADMIN — FLUDROCORTISONE ACETATE 0.1 MILLIGRAM(S): 0.1 TABLET ORAL at 06:28

## 2024-01-01 RX ADMIN — POLYETHYLENE GLYCOL 3350 17 GRAM(S): 17 POWDER, FOR SOLUTION ORAL at 18:05

## 2024-01-01 RX ADMIN — Medication 3 MILLILITER(S): at 00:10

## 2024-01-01 RX ADMIN — ONDANSETRON 4 MILLIGRAM(S): 8 TABLET, FILM COATED ORAL at 16:24

## 2024-01-01 RX ADMIN — NYSTATIN CREAM 1 APPLICATION(S): 100000 CREAM TOPICAL at 05:51

## 2024-01-01 RX ADMIN — PANTOPRAZOLE SODIUM 40 MILLIGRAM(S): 20 TABLET, DELAYED RELEASE ORAL at 05:30

## 2024-01-01 RX ADMIN — HEPARIN SODIUM 5000 UNIT(S): 5000 INJECTION INTRAVENOUS; SUBCUTANEOUS at 14:07

## 2024-01-01 RX ADMIN — LACTULOSE 15 GRAM(S): 10 SOLUTION ORAL at 17:25

## 2024-01-01 RX ADMIN — NYSTATIN CREAM 1 APPLICATION(S): 100000 CREAM TOPICAL at 06:20

## 2024-01-01 RX ADMIN — Medication 200 MILLIGRAM(S): at 08:13

## 2024-01-01 RX ADMIN — Medication 650 MILLIGRAM(S): at 05:30

## 2024-01-01 RX ADMIN — TIOTROPIUM BROMIDE 2 PUFF(S): 18 CAPSULE ORAL; RESPIRATORY (INHALATION) at 21:00

## 2024-01-01 RX ADMIN — Medication 20 MILLIGRAM(S): at 17:15

## 2024-01-01 RX ADMIN — Medication 4 UNIT(S): at 07:45

## 2024-01-01 RX ADMIN — MONTELUKAST 10 MILLIGRAM(S): 4 TABLET, CHEWABLE ORAL at 13:36

## 2024-01-01 RX ADMIN — Medication 650 MILLIGRAM(S): at 17:57

## 2024-01-01 RX ADMIN — Medication 40 MILLIEQUIVALENT(S): at 09:04

## 2024-01-01 RX ADMIN — Medication 5 MILLIGRAM(S): at 06:31

## 2024-01-01 RX ADMIN — Medication 8: at 17:04

## 2024-01-01 RX ADMIN — Medication 5: at 12:08

## 2024-01-01 RX ADMIN — SEVELAMER CARBONATE 800 MILLIGRAM(S): 2400 POWDER, FOR SUSPENSION ORAL at 14:07

## 2024-01-01 RX ADMIN — IRON SUCROSE 110 MILLIGRAM(S): 20 INJECTION, SOLUTION INTRAVENOUS at 11:08

## 2024-01-01 RX ADMIN — Medication 3 UNIT(S): at 06:00

## 2024-01-01 RX ADMIN — Medication 2 MILLIGRAM(S): at 21:01

## 2024-01-01 RX ADMIN — Medication 500 MILLIGRAM(S): at 05:17

## 2024-01-01 RX ADMIN — Medication 50 MILLIGRAM(S): at 05:36

## 2024-01-01 RX ADMIN — AMIODARONE HYDROCHLORIDE 200 MILLIGRAM(S): 400 TABLET ORAL at 17:09

## 2024-01-01 RX ADMIN — Medication 650 MILLIGRAM(S): at 11:08

## 2024-01-01 RX ADMIN — Medication 200 MILLIGRAM(S): at 05:32

## 2024-01-01 RX ADMIN — Medication 30 MILLIGRAM(S): at 18:45

## 2024-01-01 RX ADMIN — Medication 30 MILLIGRAM(S): at 05:39

## 2024-01-01 RX ADMIN — CHLORHEXIDINE GLUCONATE 1 APPLICATION(S): 213 SOLUTION TOPICAL at 12:13

## 2024-01-01 RX ADMIN — HEPARIN SODIUM 5000 UNIT(S): 5000 INJECTION INTRAVENOUS; SUBCUTANEOUS at 22:35

## 2024-01-01 RX ADMIN — ALBUTEROL 2 PUFF(S): 90 AEROSOL, METERED ORAL at 20:57

## 2024-01-01 RX ADMIN — HYDROMORPHONE HCL 0.5 MILLIGRAM(S): 0.2 INJECTION, SOLUTION INTRAVENOUS at 07:37

## 2024-01-01 RX ADMIN — Medication 6: at 11:14

## 2024-01-01 RX ADMIN — MORPHINE SULFATE 4 MILLIGRAM(S): 50 CAPSULE, EXTENDED RELEASE ORAL at 01:19

## 2024-01-01 RX ADMIN — Medication 40 MILLIGRAM(S): at 06:05

## 2024-01-01 RX ADMIN — Medication 25 MILLIGRAM(S): at 11:48

## 2024-01-01 RX ADMIN — Medication 1 APPLICATION(S): at 11:58

## 2024-01-01 RX ADMIN — ATORVASTATIN CALCIUM 20 MILLIGRAM(S): 80 TABLET, FILM COATED ORAL at 22:39

## 2024-01-01 RX ADMIN — POLYETHYLENE GLYCOL 3350 17 GRAM(S): 17 POWDER, FOR SOLUTION ORAL at 06:30

## 2024-01-01 RX ADMIN — HYDROMORPHONE HCL 0.5 MILLIGRAM(S): 0.2 INJECTION, SOLUTION INTRAVENOUS at 02:53

## 2024-01-01 RX ADMIN — Medication 4 UNIT(S): at 12:10

## 2024-01-01 RX ADMIN — Medication 20 MILLIGRAM(S): at 05:33

## 2024-01-01 RX ADMIN — Medication 30 MILLIGRAM(S): at 06:04

## 2024-01-01 RX ADMIN — Medication 50 MILLIGRAM(S): at 18:27

## 2024-01-01 RX ADMIN — MEROPENEM 100 MILLIGRAM(S): 1 INJECTION INTRAVENOUS at 22:26

## 2024-01-01 RX ADMIN — Medication 100 MILLIGRAM(S): at 13:20

## 2024-01-01 RX ADMIN — Medication 250 MILLIGRAM(S): at 05:40

## 2024-01-01 RX ADMIN — CHLORHEXIDINE GLUCONATE 1 APPLICATION(S): 213 SOLUTION TOPICAL at 12:20

## 2024-01-01 RX ADMIN — Medication 50 MILLIGRAM(S): at 12:07

## 2024-01-01 RX ADMIN — Medication 2 MILLIGRAM(S): at 22:17

## 2024-01-01 RX ADMIN — TIOTROPIUM BROMIDE 2 PUFF(S): 18 CAPSULE ORAL; RESPIRATORY (INHALATION) at 07:23

## 2024-01-01 RX ADMIN — Medication 40 MILLIGRAM(S): at 18:05

## 2024-01-01 RX ADMIN — MAGNESIUM OXIDE 400 MG ORAL TABLET 400 MILLIGRAM(S): 241.3 TABLET ORAL at 18:26

## 2024-01-01 RX ADMIN — FLUDROCORTISONE ACETATE 0.1 MILLIGRAM(S): 0.1 TABLET ORAL at 05:34

## 2024-01-01 RX ADMIN — HEPARIN SODIUM 5000 UNIT(S): 5000 INJECTION INTRAVENOUS; SUBCUTANEOUS at 13:26

## 2024-01-01 RX ADMIN — Medication 4: at 12:09

## 2024-01-01 RX ADMIN — Medication 2: at 12:03

## 2024-01-01 RX ADMIN — CHLORHEXIDINE GLUCONATE 1 APPLICATION(S): 213 SOLUTION TOPICAL at 12:19

## 2024-01-01 RX ADMIN — Medication 2 MILLIGRAM(S): at 22:35

## 2024-01-01 RX ADMIN — Medication 40 MILLIGRAM(S): at 05:48

## 2024-01-01 RX ADMIN — SODIUM CHLORIDE 75 MILLILITER(S): 9 INJECTION INTRAMUSCULAR; INTRAVENOUS; SUBCUTANEOUS at 11:09

## 2024-01-01 RX ADMIN — RANOLAZINE 500 MILLIGRAM(S): 500 TABLET, FILM COATED, EXTENDED RELEASE ORAL at 17:25

## 2024-01-01 RX ADMIN — PANTOPRAZOLE SODIUM 40 MILLIGRAM(S): 20 TABLET, DELAYED RELEASE ORAL at 08:02

## 2024-01-01 RX ADMIN — Medication 1 APPLICATION(S): at 12:34

## 2024-01-01 RX ADMIN — Medication 2 MILLIGRAM(S): at 21:41

## 2024-01-01 RX ADMIN — Medication 75 MILLIGRAM(S): at 01:12

## 2024-01-01 RX ADMIN — Medication 1300 MILLIGRAM(S): at 13:47

## 2024-01-01 RX ADMIN — Medication 75 MILLIGRAM(S): at 12:08

## 2024-01-01 RX ADMIN — POLYETHYLENE GLYCOL 3350 17 GRAM(S): 17 POWDER, FOR SOLUTION ORAL at 17:31

## 2024-01-01 RX ADMIN — Medication 4 UNIT(S): at 07:52

## 2024-01-01 RX ADMIN — Medication 75 MILLIGRAM(S): at 11:07

## 2024-01-01 RX ADMIN — Medication 600 MILLIGRAM(S): at 05:19

## 2024-01-01 RX ADMIN — TIOTROPIUM BROMIDE 2 PUFF(S): 18 CAPSULE ORAL; RESPIRATORY (INHALATION) at 08:45

## 2024-01-01 RX ADMIN — Medication 75 MILLIGRAM(S): at 11:38

## 2024-01-01 RX ADMIN — Medication 1300 MILLIGRAM(S): at 13:05

## 2024-01-01 RX ADMIN — FLUDROCORTISONE ACETATE 0.1 MILLIGRAM(S): 0.1 TABLET ORAL at 06:16

## 2024-01-01 RX ADMIN — Medication 100 MILLIGRAM(S): at 22:31

## 2024-01-01 RX ADMIN — Medication 25 MILLIGRAM(S): at 06:02

## 2024-01-01 RX ADMIN — Medication 40 MILLIEQUIVALENT(S): at 01:33

## 2024-01-01 RX ADMIN — CHLORHEXIDINE GLUCONATE 1 APPLICATION(S): 213 SOLUTION TOPICAL at 11:33

## 2024-01-01 RX ADMIN — MONTELUKAST 10 MILLIGRAM(S): 4 TABLET, CHEWABLE ORAL at 15:49

## 2024-01-01 RX ADMIN — Medication 50 MILLIGRAM(S): at 23:41

## 2024-01-01 RX ADMIN — NYSTATIN CREAM 1 APPLICATION(S): 100000 CREAM TOPICAL at 05:37

## 2024-01-01 RX ADMIN — TRAMADOL HYDROCHLORIDE 50 MILLIGRAM(S): 50 TABLET ORAL at 00:41

## 2024-01-01 RX ADMIN — HEPARIN SODIUM 5000 UNIT(S): 5000 INJECTION INTRAVENOUS; SUBCUTANEOUS at 06:31

## 2024-01-01 RX ADMIN — FLUDROCORTISONE ACETATE 0.1 MILLIGRAM(S): 0.1 TABLET ORAL at 05:53

## 2024-01-01 RX ADMIN — SPIRONOLACTONE 12.5 MILLIGRAM(S): 25 TABLET, FILM COATED ORAL at 05:31

## 2024-01-01 RX ADMIN — Medication 50 MILLIEQUIVALENT(S): at 16:39

## 2024-01-01 RX ADMIN — Medication 40 MILLIEQUIVALENT(S): at 12:20

## 2024-01-01 RX ADMIN — Medication 10 UNIT(S): at 12:08

## 2024-01-01 RX ADMIN — OXYCODONE HYDROCHLORIDE 2.5 MILLIGRAM(S): 5 TABLET ORAL at 22:22

## 2024-01-01 RX ADMIN — INSULIN GLARGINE 18 UNIT(S): 100 INJECTION, SOLUTION SUBCUTANEOUS at 21:02

## 2024-01-01 RX ADMIN — Medication 1850 MILLILITER(S): at 10:00

## 2024-01-01 RX ADMIN — SENNA PLUS 2 TABLET(S): 8.6 TABLET ORAL at 22:17

## 2024-01-01 RX ADMIN — Medication 3: at 08:38

## 2024-01-01 RX ADMIN — MONTELUKAST 10 MILLIGRAM(S): 4 TABLET, CHEWABLE ORAL at 11:15

## 2024-01-01 RX ADMIN — Medication 8: at 17:05

## 2024-01-01 RX ADMIN — Medication 3 MILLILITER(S): at 04:36

## 2024-01-01 RX ADMIN — ATORVASTATIN CALCIUM 20 MILLIGRAM(S): 80 TABLET, FILM COATED ORAL at 21:41

## 2024-01-01 RX ADMIN — HEPARIN SODIUM 5000 UNIT(S): 5000 INJECTION INTRAVENOUS; SUBCUTANEOUS at 18:09

## 2024-01-01 RX ADMIN — Medication 1 APPLICATION(S): at 11:18

## 2024-01-01 RX ADMIN — Medication 2.5 MILLIGRAM(S): at 05:47

## 2024-01-01 RX ADMIN — NYSTATIN CREAM 1 APPLICATION(S): 100000 CREAM TOPICAL at 14:00

## 2024-01-01 RX ADMIN — Medication 40 MILLIEQUIVALENT(S): at 10:23

## 2024-01-01 RX ADMIN — SODIUM CHLORIDE 500 MILLILITER(S): 9 INJECTION, SOLUTION INTRAVENOUS at 22:16

## 2024-01-01 RX ADMIN — Medication 30 MILLIGRAM(S): at 17:27

## 2024-01-01 RX ADMIN — Medication 3: at 17:13

## 2024-01-01 RX ADMIN — Medication 81 MILLIGRAM(S): at 12:07

## 2024-01-01 RX ADMIN — FLUDROCORTISONE ACETATE 0.1 MILLIGRAM(S): 0.1 TABLET ORAL at 05:43

## 2024-01-01 RX ADMIN — HEPARIN SODIUM 5000 UNIT(S): 5000 INJECTION INTRAVENOUS; SUBCUTANEOUS at 06:42

## 2024-01-01 RX ADMIN — SEVELAMER CARBONATE 800 MILLIGRAM(S): 2400 POWDER, FOR SUSPENSION ORAL at 13:23

## 2024-01-01 RX ADMIN — SODIUM ZIRCONIUM CYCLOSILICATE 10 GRAM(S): 10 POWDER, FOR SUSPENSION ORAL at 10:23

## 2024-01-01 RX ADMIN — Medication 75 MILLIGRAM(S): at 10:23

## 2024-01-01 RX ADMIN — AMIODARONE HYDROCHLORIDE 33.3 MG/MIN: 400 TABLET ORAL at 00:01

## 2024-01-01 RX ADMIN — Medication 25 GRAM(S): at 12:33

## 2024-01-01 RX ADMIN — Medication 40 MILLIEQUIVALENT(S): at 11:34

## 2024-01-01 RX ADMIN — SPIRONOLACTONE 12.5 MILLIGRAM(S): 25 TABLET, FILM COATED ORAL at 05:11

## 2024-01-01 RX ADMIN — ENOXAPARIN SODIUM 40 MILLIGRAM(S): 100 INJECTION SUBCUTANEOUS at 11:18

## 2024-01-01 RX ADMIN — Medication 50 MILLIGRAM(S): at 11:49

## 2024-01-01 RX ADMIN — RANOLAZINE 500 MILLIGRAM(S): 500 TABLET, FILM COATED, EXTENDED RELEASE ORAL at 05:54

## 2024-01-01 RX ADMIN — Medication 1300 MILLIGRAM(S): at 14:27

## 2024-01-01 RX ADMIN — GLYCOPYRROLATE 0.2 MILLIGRAM(S): 0.2 INJECTION, SOLUTION INTRAMUSCULAR; INTRAVENOUS at 12:38

## 2024-01-01 RX ADMIN — Medication 40 MILLIGRAM(S): at 05:10

## 2024-01-01 RX ADMIN — Medication 50 MILLIGRAM(S): at 11:22

## 2024-01-01 RX ADMIN — Medication 5 MILLIGRAM(S): at 21:54

## 2024-01-01 RX ADMIN — Medication 650 MILLIGRAM(S): at 13:24

## 2024-01-01 RX ADMIN — HEPARIN SODIUM 5000 UNIT(S): 5000 INJECTION INTRAVENOUS; SUBCUTANEOUS at 17:49

## 2024-01-01 RX ADMIN — Medication 40 MILLIEQUIVALENT(S): at 11:47

## 2024-01-01 RX ADMIN — Medication 3 MILLILITER(S): at 00:04

## 2024-01-01 RX ADMIN — OXYCODONE HYDROCHLORIDE 2.5 MILLIGRAM(S): 5 TABLET ORAL at 06:02

## 2024-01-01 RX ADMIN — SEVELAMER CARBONATE 800 MILLIGRAM(S): 2400 POWDER, FOR SUSPENSION ORAL at 21:09

## 2024-01-01 RX ADMIN — NYSTATIN CREAM 1 APPLICATION(S): 100000 CREAM TOPICAL at 17:28

## 2024-01-01 RX ADMIN — Medication 650 MILLIGRAM(S): at 05:33

## 2024-01-01 RX ADMIN — Medication 2.5 MILLIGRAM(S): at 12:16

## 2024-01-01 RX ADMIN — HEPARIN SODIUM 5000 UNIT(S): 5000 INJECTION INTRAVENOUS; SUBCUTANEOUS at 17:14

## 2024-01-01 RX ADMIN — Medication 1300 MILLIGRAM(S): at 05:05

## 2024-01-01 RX ADMIN — Medication 50 MILLIGRAM(S): at 14:27

## 2024-01-01 RX ADMIN — ALBUTEROL 2 PUFF(S): 90 AEROSOL, METERED ORAL at 08:44

## 2024-01-01 RX ADMIN — CHLORHEXIDINE GLUCONATE 1 APPLICATION(S): 213 SOLUTION TOPICAL at 12:09

## 2024-01-01 RX ADMIN — ALBUTEROL 2 PUFF(S): 90 AEROSOL, METERED ORAL at 20:15

## 2024-01-01 RX ADMIN — Medication 50 MILLIEQUIVALENT(S): at 09:05

## 2024-01-01 RX ADMIN — TRAMADOL HYDROCHLORIDE 50 MILLIGRAM(S): 50 TABLET ORAL at 12:45

## 2024-01-01 RX ADMIN — ONDANSETRON 4 MILLIGRAM(S): 8 TABLET, FILM COATED ORAL at 22:27

## 2024-01-01 RX ADMIN — Medication 1300 MILLIGRAM(S): at 21:04

## 2024-01-01 RX ADMIN — ATORVASTATIN CALCIUM 20 MILLIGRAM(S): 80 TABLET, FILM COATED ORAL at 22:22

## 2024-01-01 RX ADMIN — RANOLAZINE 500 MILLIGRAM(S): 500 TABLET, FILM COATED, EXTENDED RELEASE ORAL at 05:19

## 2024-01-01 RX ADMIN — PANTOPRAZOLE SODIUM 40 MILLIGRAM(S): 20 TABLET, DELAYED RELEASE ORAL at 06:26

## 2024-01-01 RX ADMIN — POLYETHYLENE GLYCOL 3350 17 GRAM(S): 17 POWDER, FOR SOLUTION ORAL at 05:36

## 2024-01-01 RX ADMIN — TIOTROPIUM BROMIDE 2 PUFF(S): 18 CAPSULE ORAL; RESPIRATORY (INHALATION) at 07:48

## 2024-01-01 RX ADMIN — Medication 3 MILLILITER(S): at 17:30

## 2024-01-01 RX ADMIN — Medication 4: at 06:11

## 2024-01-01 RX ADMIN — Medication 75 MILLIGRAM(S): at 13:20

## 2024-01-01 RX ADMIN — Medication 3 MILLILITER(S): at 23:44

## 2024-01-01 RX ADMIN — Medication 40 MILLIGRAM(S): at 02:10

## 2024-01-01 RX ADMIN — OXYCODONE HYDROCHLORIDE 2.5 MILLIGRAM(S): 5 TABLET ORAL at 22:06

## 2024-01-01 RX ADMIN — NYSTATIN CREAM 1 APPLICATION(S): 100000 CREAM TOPICAL at 13:57

## 2024-01-01 RX ADMIN — Medication 5 UNIT(S): at 08:08

## 2024-01-01 RX ADMIN — Medication 5.32 MICROGRAM(S)/KG/MIN: at 10:31

## 2024-01-01 RX ADMIN — RANOLAZINE 500 MILLIGRAM(S): 500 TABLET, FILM COATED, EXTENDED RELEASE ORAL at 17:49

## 2024-01-01 RX ADMIN — BUDESONIDE AND FORMOTEROL FUMARATE DIHYDRATE 2 PUFF(S): 160; 4.5 AEROSOL RESPIRATORY (INHALATION) at 07:42

## 2024-01-01 RX ADMIN — RANOLAZINE 500 MILLIGRAM(S): 500 TABLET, FILM COATED, EXTENDED RELEASE ORAL at 18:07

## 2024-01-01 RX ADMIN — Medication 3 MILLIGRAM(S): at 21:19

## 2024-01-01 RX ADMIN — Medication 81 MILLIGRAM(S): at 12:21

## 2024-01-01 RX ADMIN — Medication 60 MILLIGRAM(S): at 06:11

## 2024-01-01 RX ADMIN — HEPARIN SODIUM 5000 UNIT(S): 5000 INJECTION INTRAVENOUS; SUBCUTANEOUS at 13:46

## 2024-01-01 RX ADMIN — Medication 100 MILLIGRAM(S): at 13:24

## 2024-01-01 RX ADMIN — BUDESONIDE AND FORMOTEROL FUMARATE DIHYDRATE 2 PUFF(S): 160; 4.5 AEROSOL RESPIRATORY (INHALATION) at 21:42

## 2024-01-01 RX ADMIN — Medication 3 MILLILITER(S): at 07:48

## 2024-01-01 RX ADMIN — Medication 3 MILLIGRAM(S): at 21:36

## 2024-01-01 RX ADMIN — Medication 25 GRAM(S): at 00:00

## 2024-01-01 RX ADMIN — MONTELUKAST 10 MILLIGRAM(S): 4 TABLET, CHEWABLE ORAL at 11:14

## 2024-01-01 RX ADMIN — HEPARIN SODIUM 5000 UNIT(S): 5000 INJECTION INTRAVENOUS; SUBCUTANEOUS at 17:17

## 2024-01-01 RX ADMIN — PANTOPRAZOLE SODIUM 40 MILLIGRAM(S): 20 TABLET, DELAYED RELEASE ORAL at 05:39

## 2024-01-01 RX ADMIN — Medication 1 MILLIGRAM(S): at 13:49

## 2024-01-01 RX ADMIN — PHENYLEPHRINE HYDROCHLORIDE 2.13 MICROGRAM(S)/KG/MIN: 10 INJECTION INTRAVENOUS at 22:47

## 2024-01-01 RX ADMIN — SODIUM ZIRCONIUM CYCLOSILICATE 10 GRAM(S): 10 POWDER, FOR SUSPENSION ORAL at 04:22

## 2024-01-01 RX ADMIN — CALCIUM GLUCONATE 1 GRAM(S): 98 INJECTION, SOLUTION INTRAVENOUS at 12:10

## 2024-01-01 RX ADMIN — Medication 2: at 17:29

## 2024-01-01 RX ADMIN — HEPARIN SODIUM 5000 UNIT(S): 5000 INJECTION INTRAVENOUS; SUBCUTANEOUS at 17:08

## 2024-01-01 RX ADMIN — Medication 40 MILLIGRAM(S): at 05:30

## 2024-01-01 RX ADMIN — Medication 10 UNIT(S): at 18:04

## 2024-01-01 RX ADMIN — Medication 4: at 08:29

## 2024-01-01 RX ADMIN — Medication 50 MILLIGRAM(S): at 21:02

## 2024-01-01 RX ADMIN — POLYETHYLENE GLYCOL 3350 17 GRAM(S): 17 POWDER, FOR SOLUTION ORAL at 12:14

## 2024-01-01 RX ADMIN — INSULIN GLARGINE 10 UNIT(S): 100 INJECTION, SOLUTION SUBCUTANEOUS at 21:40

## 2024-01-01 RX ADMIN — Medication 50 MILLILITER(S): at 04:35

## 2024-01-01 RX ADMIN — Medication 81 MILLIGRAM(S): at 11:40

## 2024-01-01 RX ADMIN — Medication 2.5 MILLIGRAM(S): at 22:23

## 2024-01-01 RX ADMIN — BUDESONIDE AND FORMOTEROL FUMARATE DIHYDRATE 2 PUFF(S): 160; 4.5 AEROSOL RESPIRATORY (INHALATION) at 12:20

## 2024-01-01 RX ADMIN — MONTELUKAST 10 MILLIGRAM(S): 4 TABLET, CHEWABLE ORAL at 20:59

## 2024-01-01 RX ADMIN — FLUDROCORTISONE ACETATE 0.1 MILLIGRAM(S): 0.1 TABLET ORAL at 05:36

## 2024-01-01 RX ADMIN — Medication 5 UNIT(S): at 08:20

## 2024-01-01 RX ADMIN — CHLORHEXIDINE GLUCONATE 1 APPLICATION(S): 213 SOLUTION TOPICAL at 05:32

## 2024-01-01 RX ADMIN — Medication 40 MILLIEQUIVALENT(S): at 11:28

## 2024-01-01 RX ADMIN — MEROPENEM 100 MILLIGRAM(S): 1 INJECTION INTRAVENOUS at 21:09

## 2024-01-01 RX ADMIN — PANTOPRAZOLE SODIUM 40 MILLIGRAM(S): 20 TABLET, DELAYED RELEASE ORAL at 06:41

## 2024-01-01 RX ADMIN — HEPARIN SODIUM 5000 UNIT(S): 5000 INJECTION INTRAVENOUS; SUBCUTANEOUS at 17:36

## 2024-01-01 RX ADMIN — HEPARIN SODIUM 5000 UNIT(S): 5000 INJECTION INTRAVENOUS; SUBCUTANEOUS at 05:21

## 2024-01-01 RX ADMIN — Medication 50 MILLIGRAM(S): at 12:15

## 2024-01-01 RX ADMIN — HEPARIN SODIUM 5000 UNIT(S): 5000 INJECTION INTRAVENOUS; SUBCUTANEOUS at 13:12

## 2024-01-01 RX ADMIN — SENNA PLUS 2 TABLET(S): 8.6 TABLET ORAL at 22:28

## 2024-01-01 RX ADMIN — HYDROMORPHONE HCL 0.5 MILLIGRAM(S): 0.2 INJECTION, SOLUTION INTRAVENOUS at 15:03

## 2024-01-01 RX ADMIN — MONTELUKAST 10 MILLIGRAM(S): 4 TABLET, CHEWABLE ORAL at 11:34

## 2024-01-01 RX ADMIN — NYSTATIN CREAM 1 APPLICATION(S): 100000 CREAM TOPICAL at 17:14

## 2024-01-01 RX ADMIN — Medication 2: at 11:55

## 2024-01-01 RX ADMIN — HEPARIN SODIUM 5000 UNIT(S): 5000 INJECTION INTRAVENOUS; SUBCUTANEOUS at 05:54

## 2024-01-01 RX ADMIN — NYSTATIN CREAM 1 APPLICATION(S): 100000 CREAM TOPICAL at 17:25

## 2024-01-01 RX ADMIN — INSULIN GLARGINE 10 UNIT(S): 100 INJECTION, SOLUTION SUBCUTANEOUS at 21:26

## 2024-01-01 RX ADMIN — HEPARIN SODIUM 5000 UNIT(S): 5000 INJECTION INTRAVENOUS; SUBCUTANEOUS at 21:54

## 2024-01-01 RX ADMIN — Medication 2.5 MILLIGRAM(S): at 22:35

## 2024-01-01 RX ADMIN — ONDANSETRON 2 MILLIGRAM(S): 8 TABLET, FILM COATED ORAL at 05:08

## 2024-01-01 RX ADMIN — HYDROMORPHONE HCL 0.5 MILLIGRAM(S): 0.2 INJECTION, SOLUTION INTRAVENOUS at 23:46

## 2024-01-01 RX ADMIN — Medication 40 MILLIEQUIVALENT(S): at 13:52

## 2024-01-01 RX ADMIN — HEPARIN SODIUM 5000 UNIT(S): 5000 INJECTION INTRAVENOUS; SUBCUTANEOUS at 17:12

## 2024-01-01 RX ADMIN — ATORVASTATIN CALCIUM 40 MILLIGRAM(S): 80 TABLET, FILM COATED ORAL at 21:19

## 2024-01-01 RX ADMIN — Medication 50 MILLIGRAM(S): at 17:09

## 2024-01-01 RX ADMIN — Medication 100 MILLIGRAM(S): at 06:17

## 2024-01-01 RX ADMIN — INSULIN GLARGINE 4 UNIT(S): 100 INJECTION, SOLUTION SUBCUTANEOUS at 08:23

## 2024-01-01 RX ADMIN — HEPARIN SODIUM 5000 UNIT(S): 5000 INJECTION INTRAVENOUS; SUBCUTANEOUS at 06:01

## 2024-01-01 RX ADMIN — Medication 40 MILLIEQUIVALENT(S): at 02:01

## 2024-01-01 RX ADMIN — PANTOPRAZOLE SODIUM 40 MILLIGRAM(S): 20 TABLET, DELAYED RELEASE ORAL at 06:20

## 2024-01-01 RX ADMIN — NYSTATIN CREAM 1 APPLICATION(S): 100000 CREAM TOPICAL at 17:40

## 2024-01-01 RX ADMIN — SENNA PLUS 2 TABLET(S): 8.6 TABLET ORAL at 21:34

## 2024-01-01 RX ADMIN — Medication 60 MILLIGRAM(S): at 06:18

## 2024-01-01 RX ADMIN — Medication 20 MILLIGRAM(S): at 20:58

## 2024-01-01 RX ADMIN — Medication 50 MILLIGRAM(S): at 11:18

## 2024-01-01 RX ADMIN — TRAMADOL HYDROCHLORIDE 50 MILLIGRAM(S): 50 TABLET ORAL at 18:04

## 2024-01-01 RX ADMIN — Medication 25 GRAM(S): at 12:17

## 2024-01-01 RX ADMIN — Medication 5 MILLIGRAM(S): at 22:22

## 2024-01-01 RX ADMIN — HEPARIN SODIUM 5000 UNIT(S): 5000 INJECTION INTRAVENOUS; SUBCUTANEOUS at 08:30

## 2024-01-01 RX ADMIN — OXYCODONE HYDROCHLORIDE 2.5 MILLIGRAM(S): 5 TABLET ORAL at 23:00

## 2024-01-01 RX ADMIN — RANOLAZINE 500 MILLIGRAM(S): 500 TABLET, FILM COATED, EXTENDED RELEASE ORAL at 05:32

## 2024-01-01 RX ADMIN — Medication 5 UNIT(S): at 13:32

## 2024-01-01 RX ADMIN — HYDROMORPHONE HCL 0.5 MILLIGRAM(S): 0.2 INJECTION, SOLUTION INTRAVENOUS at 05:09

## 2024-01-01 RX ADMIN — PANTOPRAZOLE SODIUM 40 MILLIGRAM(S): 20 TABLET, DELAYED RELEASE ORAL at 05:12

## 2024-01-01 RX ADMIN — NYSTATIN CREAM 1 APPLICATION(S): 100000 CREAM TOPICAL at 21:22

## 2024-01-01 RX ADMIN — Medication 40 MILLIGRAM(S): at 11:09

## 2024-01-01 RX ADMIN — NYSTATIN CREAM 1 APPLICATION(S): 100000 CREAM TOPICAL at 05:24

## 2024-01-01 RX ADMIN — ALBUTEROL 2 PUFF(S): 90 AEROSOL, METERED ORAL at 21:45

## 2024-01-01 RX ADMIN — Medication 2.5 MILLIGRAM(S): at 05:37

## 2024-01-01 RX ADMIN — Medication 3 MILLILITER(S): at 14:56

## 2024-01-01 RX ADMIN — Medication 2: at 11:32

## 2024-01-01 RX ADMIN — Medication 14 UNIT(S): at 17:13

## 2024-01-01 RX ADMIN — Medication 6 UNIT(S): at 12:40

## 2024-01-01 RX ADMIN — HEPARIN SODIUM 5000 UNIT(S): 5000 INJECTION INTRAVENOUS; SUBCUTANEOUS at 06:00

## 2024-01-01 RX ADMIN — POLYETHYLENE GLYCOL 3350 17 GRAM(S): 17 POWDER, FOR SOLUTION ORAL at 05:33

## 2024-01-01 RX ADMIN — BUDESONIDE AND FORMOTEROL FUMARATE DIHYDRATE 2 PUFF(S): 160; 4.5 AEROSOL RESPIRATORY (INHALATION) at 11:23

## 2024-01-01 RX ADMIN — Medication 250 MILLIGRAM(S): at 06:04

## 2024-01-01 RX ADMIN — INSULIN HUMAN 10 UNIT(S): 100 INJECTION, SOLUTION SUBCUTANEOUS at 04:35

## 2024-01-01 RX ADMIN — SODIUM POLYSTYRENE SULFONATE 30 GRAM(S): 4.1 POWDER, FOR SUSPENSION ORAL at 04:47

## 2024-01-01 RX ADMIN — Medication 1: at 12:31

## 2024-01-01 RX ADMIN — ATORVASTATIN CALCIUM 20 MILLIGRAM(S): 80 TABLET, FILM COATED ORAL at 21:15

## 2024-01-01 RX ADMIN — Medication 2 PUFF(S): at 20:59

## 2024-01-01 RX ADMIN — HEPARIN SODIUM 5000 UNIT(S): 5000 INJECTION INTRAVENOUS; SUBCUTANEOUS at 17:25

## 2024-01-01 RX ADMIN — ALBUTEROL 2 PUFF(S): 90 AEROSOL, METERED ORAL at 16:09

## 2024-01-01 RX ADMIN — CEFEPIME 100 MILLIGRAM(S): 1 INJECTION, POWDER, FOR SOLUTION INTRAMUSCULAR; INTRAVENOUS at 11:00

## 2024-01-01 RX ADMIN — Medication 2.5 MILLIGRAM(S): at 12:09

## 2024-01-01 RX ADMIN — Medication 81 MILLIGRAM(S): at 13:42

## 2024-01-01 RX ADMIN — Medication 975 MILLIGRAM(S): at 15:47

## 2024-01-01 RX ADMIN — Medication 50 MILLIGRAM(S): at 17:20

## 2024-01-01 RX ADMIN — Medication 14 UNIT(S): at 17:30

## 2024-01-01 RX ADMIN — Medication 5 MILLIGRAM(S): at 21:26

## 2024-01-01 RX ADMIN — Medication 81 MILLIGRAM(S): at 11:57

## 2024-01-01 RX ADMIN — NYSTATIN CREAM 1 APPLICATION(S): 100000 CREAM TOPICAL at 17:05

## 2024-01-01 RX ADMIN — RANOLAZINE 500 MILLIGRAM(S): 500 TABLET, FILM COATED, EXTENDED RELEASE ORAL at 06:16

## 2024-01-01 RX ADMIN — MEROPENEM 100 MILLIGRAM(S): 1 INJECTION INTRAVENOUS at 22:27

## 2024-01-01 RX ADMIN — NYSTATIN CREAM 1 APPLICATION(S): 100000 CREAM TOPICAL at 22:33

## 2024-01-01 RX ADMIN — Medication 1 ENEMA: at 11:45

## 2024-01-01 RX ADMIN — Medication 50 MILLIGRAM(S): at 11:33

## 2024-01-01 RX ADMIN — Medication 1300 MILLIGRAM(S): at 13:00

## 2024-01-01 RX ADMIN — Medication 2.5 MILLIGRAM(S): at 17:38

## 2024-01-01 RX ADMIN — TIOTROPIUM BROMIDE 2 PUFF(S): 18 CAPSULE ORAL; RESPIRATORY (INHALATION) at 07:36

## 2024-01-01 RX ADMIN — AMIODARONE HYDROCHLORIDE 200 MILLIGRAM(S): 400 TABLET ORAL at 05:36

## 2024-01-01 RX ADMIN — ALBUTEROL 2 PUFF(S): 90 AEROSOL, METERED ORAL at 08:12

## 2024-01-01 RX ADMIN — RANOLAZINE 500 MILLIGRAM(S): 500 TABLET, FILM COATED, EXTENDED RELEASE ORAL at 06:10

## 2024-01-01 RX ADMIN — DESVENLAFAXINE 25 MILLIGRAM(S): 50 TABLET, EXTENDED RELEASE ORAL at 00:26

## 2024-01-01 RX ADMIN — Medication 2.5 MILLIGRAM(S): at 13:43

## 2024-01-01 RX ADMIN — MONTELUKAST 10 MILLIGRAM(S): 4 TABLET, CHEWABLE ORAL at 12:06

## 2024-01-01 RX ADMIN — HEPARIN SODIUM 5000 UNIT(S): 5000 INJECTION INTRAVENOUS; SUBCUTANEOUS at 05:55

## 2024-01-01 RX ADMIN — NYSTATIN CREAM 1 APPLICATION(S): 100000 CREAM TOPICAL at 21:41

## 2024-01-01 RX ADMIN — Medication 25 MILLIGRAM(S): at 12:57

## 2024-01-01 RX ADMIN — LIDOCAINE 1 PATCH: 4 CREAM TOPICAL at 12:10

## 2024-01-01 RX ADMIN — Medication 975 MILLIGRAM(S): at 16:17

## 2024-01-01 RX ADMIN — PANTOPRAZOLE SODIUM 40 MILLIGRAM(S): 20 TABLET, DELAYED RELEASE ORAL at 06:19

## 2024-01-01 RX ADMIN — Medication 100 MILLIGRAM(S): at 14:28

## 2024-01-01 RX ADMIN — SENNA PLUS 2 TABLET(S): 8.6 TABLET ORAL at 21:01

## 2024-01-01 RX ADMIN — INSULIN HUMAN 10 UNIT(S): 100 INJECTION, SOLUTION SUBCUTANEOUS at 23:52

## 2024-01-01 RX ADMIN — Medication 50 MILLIGRAM(S): at 14:25

## 2024-01-01 RX ADMIN — MONTELUKAST 10 MILLIGRAM(S): 4 TABLET, CHEWABLE ORAL at 11:41

## 2024-01-01 RX ADMIN — ATORVASTATIN CALCIUM 40 MILLIGRAM(S): 80 TABLET, FILM COATED ORAL at 22:20

## 2024-01-01 RX ADMIN — HEPARIN SODIUM 5000 UNIT(S): 5000 INJECTION INTRAVENOUS; SUBCUTANEOUS at 22:16

## 2024-01-01 RX ADMIN — Medication 4: at 11:32

## 2024-01-01 RX ADMIN — ACETAZOLAMIDE 10 MILLIGRAM(S): 250 TABLET ORAL at 20:56

## 2024-01-01 RX ADMIN — Medication 100 MILLIGRAM(S): at 21:19

## 2024-01-01 RX ADMIN — Medication 40 MILLIGRAM(S): at 06:12

## 2024-01-01 RX ADMIN — Medication 100 MILLIGRAM(S): at 05:59

## 2024-01-01 RX ADMIN — Medication 75 MILLIGRAM(S): at 22:50

## 2024-01-01 RX ADMIN — Medication 25 MILLIGRAM(S): at 16:14

## 2024-01-01 RX ADMIN — NYSTATIN CREAM 1 APPLICATION(S): 100000 CREAM TOPICAL at 21:48

## 2024-01-01 RX ADMIN — CHLORHEXIDINE GLUCONATE 1 APPLICATION(S): 213 SOLUTION TOPICAL at 11:19

## 2024-01-01 RX ADMIN — AMIODARONE HYDROCHLORIDE 16.7 MG/MIN: 400 TABLET ORAL at 02:44

## 2024-01-01 RX ADMIN — CHLORHEXIDINE GLUCONATE 1 APPLICATION(S): 213 SOLUTION TOPICAL at 11:26

## 2024-01-01 RX ADMIN — SODIUM CHLORIDE 250 MILLILITER(S): 9 INJECTION INTRAMUSCULAR; INTRAVENOUS; SUBCUTANEOUS at 01:02

## 2024-01-01 RX ADMIN — Medication 2.5 MILLIGRAM(S): at 11:25

## 2024-01-01 RX ADMIN — ALBUTEROL 2 PUFF(S): 90 AEROSOL, METERED ORAL at 08:56

## 2024-01-01 RX ADMIN — LACTULOSE 15 GRAM(S): 10 SOLUTION ORAL at 08:18

## 2024-01-01 RX ADMIN — HYDROMORPHONE HCL 0.5 MILLIGRAM(S): 0.2 INJECTION, SOLUTION INTRAVENOUS at 15:33

## 2024-01-01 RX ADMIN — MONTELUKAST 10 MILLIGRAM(S): 4 TABLET, CHEWABLE ORAL at 11:28

## 2024-01-01 RX ADMIN — Medication 500 MILLIGRAM(S): at 05:40

## 2024-01-01 RX ADMIN — Medication 200 GRAM(S): at 23:10

## 2024-01-01 RX ADMIN — INSULIN GLARGINE 27 UNIT(S): 100 INJECTION, SOLUTION SUBCUTANEOUS at 21:48

## 2024-01-01 RX ADMIN — Medication 5.32 MICROGRAM(S)/KG/MIN: at 21:35

## 2024-01-01 RX ADMIN — Medication 2 MILLIGRAM(S): at 22:29

## 2024-01-01 RX ADMIN — Medication 650 MILLIGRAM(S): at 14:06

## 2024-01-01 RX ADMIN — Medication 3 UNIT(S): at 17:50

## 2024-01-01 RX ADMIN — LACTULOSE 20 GRAM(S): 10 SOLUTION ORAL at 15:15

## 2024-01-01 RX ADMIN — HEPARIN SODIUM 5000 UNIT(S): 5000 INJECTION INTRAVENOUS; SUBCUTANEOUS at 17:50

## 2024-01-01 RX ADMIN — Medication 75 MILLIGRAM(S): at 21:47

## 2024-01-01 RX ADMIN — NYSTATIN CREAM 1 APPLICATION(S): 100000 CREAM TOPICAL at 06:10

## 2024-01-01 RX ADMIN — AMIODARONE HYDROCHLORIDE 200 MILLIGRAM(S): 400 TABLET ORAL at 05:19

## 2024-01-01 RX ADMIN — RANOLAZINE 500 MILLIGRAM(S): 500 TABLET, FILM COATED, EXTENDED RELEASE ORAL at 18:59

## 2024-01-01 RX ADMIN — SENNA PLUS 2 TABLET(S): 8.6 TABLET ORAL at 21:02

## 2024-01-01 RX ADMIN — PANTOPRAZOLE SODIUM 40 MILLIGRAM(S): 20 TABLET, DELAYED RELEASE ORAL at 06:29

## 2024-01-01 RX ADMIN — Medication 50 MILLIGRAM(S): at 13:41

## 2024-01-01 RX ADMIN — MONTELUKAST 10 MILLIGRAM(S): 4 TABLET, CHEWABLE ORAL at 11:19

## 2024-01-01 RX ADMIN — FLUDROCORTISONE ACETATE 0.1 MILLIGRAM(S): 0.1 TABLET ORAL at 06:20

## 2024-01-01 RX ADMIN — Medication 50 MILLIGRAM(S): at 13:00

## 2024-01-01 RX ADMIN — Medication 2: at 22:39

## 2024-01-01 RX ADMIN — Medication 50 MILLIGRAM(S): at 06:19

## 2024-01-01 RX ADMIN — BUDESONIDE AND FORMOTEROL FUMARATE DIHYDRATE 2 PUFF(S): 160; 4.5 AEROSOL RESPIRATORY (INHALATION) at 08:40

## 2024-01-01 RX ADMIN — POLYETHYLENE GLYCOL 3350 17 GRAM(S): 17 POWDER, FOR SOLUTION ORAL at 09:24

## 2024-01-01 RX ADMIN — Medication 3: at 17:50

## 2024-01-01 RX ADMIN — Medication 2: at 08:58

## 2024-01-01 RX ADMIN — Medication 25 GRAM(S): at 00:33

## 2024-01-01 RX ADMIN — HYDROMORPHONE HCL 0.5 MILLIGRAM(S): 0.2 INJECTION, SOLUTION INTRAVENOUS at 09:38

## 2024-01-01 RX ADMIN — POLYETHYLENE GLYCOL 3350 17 GRAM(S): 17 POWDER, FOR SOLUTION ORAL at 05:38

## 2024-01-01 RX ADMIN — POLYETHYLENE GLYCOL 3350 17 GRAM(S): 17 POWDER, FOR SOLUTION ORAL at 08:13

## 2024-01-01 RX ADMIN — Medication 50 MILLIGRAM(S): at 05:26

## 2024-01-01 RX ADMIN — Medication 40 MILLIGRAM(S): at 06:44

## 2024-01-01 RX ADMIN — CALCIUM GLUCONATE 100 GRAM(S): 98 INJECTION, SOLUTION INTRAVENOUS at 11:38

## 2024-01-01 RX ADMIN — MONTELUKAST 10 MILLIGRAM(S): 4 TABLET, CHEWABLE ORAL at 12:17

## 2024-01-01 RX ADMIN — INSULIN GLARGINE 27 UNIT(S): 100 INJECTION, SOLUTION SUBCUTANEOUS at 21:12

## 2024-01-01 RX ADMIN — Medication 1 APPLICATION(S): at 12:20

## 2024-01-01 RX ADMIN — ATORVASTATIN CALCIUM 20 MILLIGRAM(S): 80 TABLET, FILM COATED ORAL at 21:56

## 2024-01-01 RX ADMIN — POLYETHYLENE GLYCOL 3350 17 GRAM(S): 17 POWDER, FOR SOLUTION ORAL at 17:16

## 2024-01-01 RX ADMIN — SODIUM CHLORIDE 75 MILLILITER(S): 9 INJECTION, SOLUTION INTRAVENOUS at 22:15

## 2024-01-01 RX ADMIN — ATORVASTATIN CALCIUM 40 MILLIGRAM(S): 80 TABLET, FILM COATED ORAL at 22:07

## 2024-01-01 RX ADMIN — HYDROMORPHONE HCL 0.5 MILLIGRAM(S): 0.2 INJECTION, SOLUTION INTRAVENOUS at 03:26

## 2024-01-01 RX ADMIN — HEPARIN SODIUM 5000 UNIT(S): 5000 INJECTION INTRAVENOUS; SUBCUTANEOUS at 05:47

## 2024-01-01 RX ADMIN — BUDESONIDE AND FORMOTEROL FUMARATE DIHYDRATE 2 PUFF(S): 160; 4.5 AEROSOL RESPIRATORY (INHALATION) at 08:13

## 2024-01-05 NOTE — ED ADULT NURSE NOTE - HISTORY OF COVID-19 VACCINATION
Tylenol, Aleve or Motrin first   See a PMD or neurology [reference provided] if persists or worsens-for higher level testing /treatments /scanning  
Vaccine status unknown

## 2024-01-05 NOTE — ED ADULT NURSE NOTE - NSFALLUNIVINTERV_ED_ALL_ED
Bed/Stretcher in lowest position, wheels locked, appropriate side rails in place/Call bell, personal items and telephone in reach/Instruct patient to call for assistance before getting out of bed/chair/stretcher/Non-slip footwear applied when patient is off stretcher/[place] to call system/Physically safe environment - no spills, clutter or unnecessary equipment/Purposeful proactive rounding/Room/bathroom lighting operational, light cord in reach Bed/Stretcher in lowest position, wheels locked, appropriate side rails in place/Call bell, personal items and telephone in reach/Instruct patient to call for assistance before getting out of bed/chair/stretcher/Non-slip footwear applied when patient is off stretcher/[place] to call system/Physically safe environment - no spills, clutter or unnecessary equipment/Purposeful proactive rounding/Room/bathroom lighting operational, light cord in reach

## 2024-01-05 NOTE — ED PROVIDER NOTE - OBJECTIVE STATEMENT
78 y female, PMHx of unknown bleeding disorder (s/p workup 5/2021 by Dr. Louis; w/o diagnosis), severe transfusion reactions, diverticulitis, HFpEF, CAD (s/p CABG; Dr. Ramirez), AS (s/p surgical AVR, 2016), atrial fibrillation (s/p watchman, 2021), asthma, COPD, HTN, and DM, presenting for worsening shortness of breath x2 weeks, now unable to walk more than 20 feet, associated with leg swelling. She states she feels like she is retaining fluid and has increased her lasix the past three days but has not had improvement.

## 2024-01-05 NOTE — ED ADULT NURSE NOTE - PRIMARY CARE PROVIDER
Patient with significant fluctuations in mood with episodes of paranoia and intrusive thoughts  Concern for worsening anxiety, possible due to significant health anxiety  Patient has a therapist who is helping with coping mechanisms   Given significant symptoms recommended to contact [facility]  Will continue to address at follow-up visits   PMD

## 2024-01-05 NOTE — ED PROVIDER NOTE - CLINICAL SUMMARY MEDICAL DECISION MAKING FREE TEXT BOX
77 yo female, PMHx of unknown bleeding disorder (s/p workup 5/2021 by Dr. Louis; w/o diagnosis), severe transfusion reactions, diverticulitis, HFpEF, CAD (s/p CABG; Dr. Ramirez), AS (s/p surgical AVR, 2016), atrial fibrillation (s/p watchman, 2021), asthma, COPD, HTN, and DM, presenting for worsening shortness of breath x2 weeks, now unable to walk more than 20 feet, associated with leg swelling. She states she feels like she is retaining fluid and has increased her lasix the past three days but has not had improvement. Denies fevers, chest pain, nausea, vomiting, headache, dizziness, abdominal pain. Bilateral lower extremity pitting edema with superficial skin sloughing right distal LE. Decreased breath sounds bilateral lungs. Heart RRR. Abdomen soft NDNT. Labs and EKG were ordered and reviewed.  Imaging was ordered and reviewed by me.  Appropriate medications for patient's presenting complaints were ordered and effects were reassessed.  Patient's records (prior hospital) were reviewed.  Escalation to admission/observation was considered. Patient requires inpatient hospitalization. 79 yo female, PMHx of unknown bleeding disorder (s/p workup 5/2021 by Dr. Louis; w/o diagnosis), severe transfusion reactions, diverticulitis, HFpEF, CAD (s/p CABG; Dr. Ramirez), AS (s/p surgical AVR, 2016), atrial fibrillation (s/p watchman, 2021), asthma, COPD, HTN, and DM, presenting for worsening shortness of breath x2 weeks, now unable to walk more than 20 feet, associated with leg swelling. She states she feels like she is retaining fluid and has increased her lasix the past three days but has not had improvement. Denies fevers, chest pain, nausea, vomiting, headache, dizziness, abdominal pain. Bilateral lower extremity pitting edema with superficial skin sloughing right distal LE. Decreased breath sounds bilateral lungs. Heart RRR. Abdomen soft NDNT. Labs and EKG were ordered and reviewed.  Imaging was ordered and reviewed by me.  Appropriate medications for patient's presenting complaints were ordered and effects were reassessed.  Patient's records (prior hospital) were reviewed.  Escalation to admission/observation was considered. Patient requires inpatient hospitalization. 77 yo female, PMHx of unknown bleeding disorder (s/p workup 5/2021 by Dr. Louis; w/o diagnosis), severe transfusion reactions, diverticulitis, HFpEF, CAD (s/p CABG; Dr. Ramirez), AS (s/p surgical AVR, 2016), atrial fibrillation (s/p watchman, 2021), asthma, COPD, HTN, and DM, presenting for worsening shortness of breath x2 weeks, now unable to walk more than 20 feet, associated with leg swelling. She states she feels like she is retaining fluid and has increased her lasix the past three days but has not had improvement. Denies fevers, chest pain, nausea, vomiting, headache, dizziness, abdominal pain. Bilateral lower extremity pitting edema with superficial skin sloughing right distal LE. Decreased breath sounds bilateral lungs. Heart RRR. Abdomen soft NDNT. Labs and EKG were ordered and reviewed. Elevated troponin and BNP. Mildly decreased Hgb. Imaging was ordered and reviewed by me.  Appropriate medications for patient's presenting complaints were ordered and effects were reassessed.  Patient's records (prior hospital) were reviewed.  Escalation to admission/observation was considered. Patient requires inpatient hospitalization. 79 yo female, PMHx of unknown bleeding disorder (s/p workup 5/2021 by Dr. Louis; w/o diagnosis), severe transfusion reactions, diverticulitis, HFpEF, CAD (s/p CABG; Dr. Ramirez), AS (s/p surgical AVR, 2016), atrial fibrillation (s/p watchman, 2021), asthma, COPD, HTN, and DM, presenting for worsening shortness of breath x2 weeks, now unable to walk more than 20 feet, associated with leg swelling. She states she feels like she is retaining fluid and has increased her lasix the past three days but has not had improvement. Denies fevers, chest pain, nausea, vomiting, headache, dizziness, abdominal pain. Bilateral lower extremity pitting edema with superficial skin sloughing right distal LE. Decreased breath sounds bilateral lungs. Heart RRR. Abdomen soft NDNT. Labs and EKG were ordered and reviewed. Elevated troponin and BNP. Mildly decreased Hgb. Imaging was ordered and reviewed by me.  Appropriate medications for patient's presenting complaints were ordered and effects were reassessed.  Patient's records (prior hospital) were reviewed.  Escalation to admission/observation was considered. Patient requires inpatient hospitalization.

## 2024-01-05 NOTE — ED PROVIDER NOTE - PHYSICAL EXAMINATION
VITAL SIGNS: I have reviewed nursing notes and confirm.  CONSTITUTIONAL: in no acute distress.  SKIN: leg swelling b/l with deroofed blister to right lower ankle  HEAD: Normocephalic; atraumatic.  EYES: EOM intact; conjunctiva and sclera clear.  ENT: No nasal discharge; airway clear.  NECK: Supple; non tender.  CARD: S1, S2 normal; no murmurs, gallops, or rubs. Regular rate and rhythm.  RESP: No wheezes, rales or rhonchi. Speaking in full sentences.   ABD: Normal bowel sounds; soft; non-distended; non-tender; No rebound or guarding. No CVA tenderness.  EXT: Normal ROM. No clubbing, cyanosis or edema.  NEURO: Alert, oriented. Grossly unremarkable. No focal deficits.

## 2024-01-05 NOTE — ED ADULT NURSE NOTE - TEMPLATE LIST FOR HEAD TO TOE ASSESSMENT
Pseudohyponatremia in the setting of hyperglycemia, corrects to 139   Blood glucose of 946 present on admission  Recent Labs     03/20/21 1947   SODIUM 125* General

## 2024-01-06 NOTE — CONSULT NOTE ADULT - ATTENDING COMMENTS
77 yo F w HFpEF, severe pulmHTN, CAD s/p CABG and SAVR in 2016 , AF s/o Watchman device in 2021, COPD,HTN, CKD presented with c/o SCHAFFER, leg swelling for 2 weeks.   VSS, bilateral pedal edema on exam   creatinine 2.1, HST 58 > 61, LFTs wnl, pBNP 2000   EKG unchanged   Worsening anemia    Impression:     Decompensated HFpEF   AKASH on CKD , likely CRS  severe pulm HTN , Gr 2 or 3   AF sp watchman on aspirin   CAD s/p CABG   AS s/p SAVR  COPD   anemia      -needs strict I/Os , daily weights   -monitor K and mag   -cont rest of home meds -metoprolol 100 mg merchant and ranolazine and aspirin   -trend H&H , keep > 8 check iron panel, may benefit from IV iron sucrose.  - consider hematology evaluation.

## 2024-01-06 NOTE — PROGRESS NOTE ADULT - SUBJECTIVE AND OBJECTIVE BOX
24H events:    Patient is a 78y old Female who presents with a chief complaint of CHF exacerbation (06 Jan 2024 02:52)    Primary diagnosis of AKASH (acute kidney injury)    Today is hospital day 1d. This morning patient was seen and examined at bedside, resting comfortably in bed.    No acute or major events overnight.      PAST MEDICAL & SURGICAL HISTORY  Aortic stenosis    Diabetes    Asthma with COPD  many years since last attack    CAD (coronary artery disease), native coronary artery    Anemia    History of bleeding disorder  having work up 5/2/21- HAD WORKUP BUT NO DIAGNOSIS "NOTHING WAS FOUND"    History of transfusion reaction    Hiatal hernia    H/O ascending aortic replacement  Bovine Replacement    S/P CABG x 1  complication of bleeding 2016    H/O total knee replacement, right  complicated with fx femur bars screws in femur- b/l knee replacement    S/P hysterectomy    Presence of Watchman left atrial appendage closure device      SOCIAL HISTORY:  Social History:      ALLERGIES:  Augmentin (Other)  penicillins (Hives; Rash)    MEDICATIONS:  STANDING MEDICATIONS  atorvastatin 20 milliGRAM(s) Oral at bedtime  furosemide   Injectable 40 milliGRAM(s) IV Push every 12 hours  iron sucrose IVPB 200 milliGRAM(s) IV Intermittent every 24 hours  metolazone 5 milliGRAM(s) Oral daily  metoprolol succinate  milliGRAM(s) Oral daily  montelukast 10 milliGRAM(s) Oral daily  pantoprazole    Tablet 40 milliGRAM(s) Oral before breakfast  pregabalin 75 milliGRAM(s) Oral daily  ranolazine 500 milliGRAM(s) Oral two times a day    PRN MEDICATIONS    VITALS:   T(F): 98  HR: 79  BP: 123/58  RR: 18  SpO2: 99%    PHYSICAL EXAM:  GENERAL:   ( x ) NAD, lying in bed comfortably     (  ) obtunded     (  ) lethargic     (  ) somnolent    HEAD:   ( x ) Atraumatic     (  ) hematoma     (  ) laceration (specify location:       )     NECK:  ( x ) Supple     (  ) neck stiffness     (  ) nuchal rigidity     (  )  no JVD     (  ) JVD present ( -- cm)    HEART:  Rate -->     ( x ) normal rate     (  ) bradycardic     (  ) tachycardic  Rhythm -->     (  ) regular     (  ) regularly irregular     ( x ) irregularly irregular  Murmurs -->     ( x ) normal s1s2     (  ) systolic murmur     (  ) diastolic murmur     (  ) continuous murmur      (  ) S3 present     (  ) S4 present    LUNGS:   ( x )Unlabored respirations     (  ) tachypnea  ( x ) B/L air entry     (  ) decreased breath sounds in:  (location     )    (  ) no adventitious sound     (  ) crackles     (  ) wheezing      (  ) rhonchi      (specify location:       )  (  ) chest wall tenderness (specify location:       )    ABDOMEN:   ( x ) Soft     (  ) tense   |   (  x) nondistended     (  ) distended   |   (  ) +BS     (  ) hypoactive bowel sounds     (  ) hyperactive bowel sounds  (  ) nontender     (  ) RUQ tenderness     (  ) RLQ tenderness     (  ) LLQ tenderness     (  ) epigastric tenderness     (  ) diffuse tenderness  (  ) Splenomegaly      (  ) Hepatomegaly      (  ) Jaundice     (  ) ecchymosis     EXTREMITIES:  ( x ) Normal     (  ) Rash     (  ) ecchymosis     (  ) varicose veins      (  ) pitting edema     (  ) non-pitting edema   (  ) ulceration     (  ) gangrene:     (location:     )    NERVOUS SYSTEM:    ( x ) A&Ox3     (  ) confused     (  ) lethargic  CN II-XII:     (  ) Intact     (  ) deficits found     (Specify:     )   Upper extremities:     (  ) no sensorimotor deficits     (  ) weakness     (  ) loss of proprioception/vibration     (  ) loss of touch/temperature (specify:    )  Lower extremities:     (  ) no sensorimotor deficits     (  ) weakness     (  ) loss of proprioception/vibration     (  ) loss of touch/temperature (specify:    )    (  ) Indwelling Wang Catheter:   Date insterted:    Reason (  ) Critical illness     (  ) urinary retention    (  ) Accurate Ins/Outs Monitoring     (  ) CMO patient    (  ) Central Line:   Date inserted:  Location: (  ) Right IJ     (  ) Left IJ     (  ) Right Fem     (  ) Left Fem    (  ) SPC        (  ) pigtail       (  ) PEG tube       (  ) colostomy       (  ) jejunostomy  (  ) U-Dall    LABS:                        7.2    5.97  )-----------( 140      ( 06 Jan 2024 06:46 )             25.4     01-06    146  |  103  |  53<H>  ----------------------------<  47<LL>  3.3<L>   |  27  |  1.9<H>    Ca    9.5      06 Jan 2024 06:46    TPro  5.9<L>  /  Alb  3.7  /  TBili  1.0  /  DBili  x   /  AST  24  /  ALT  17  /  AlkPhos  84  01-06    PT/INR - ( 06 Jan 2024 06:46 )   PT: 16.00 sec;   INR: 1.40 ratio         PTT - ( 06 Jan 2024 06:46 )  PTT:31.4 sec  Urinalysis Basic - ( 06 Jan 2024 06:46 )    Color: x / Appearance: x / SG: x / pH: x  Gluc: 47 mg/dL / Ketone: x  / Bili: x / Urobili: x   Blood: x / Protein: x / Nitrite: x   Leuk Esterase: x / RBC: x / WBC x   Sq Epi: x / Non Sq Epi: x / Bacteria: x                RADIOLOGY:

## 2024-01-06 NOTE — H&P ADULT - HISTORY OF PRESENT ILLNESS
77yo F hx of diverticulitis, HFpEF, CAD s/p CABG, AS s/p SAVR (2016), atrial fibrillation s/p watchman (2021), bleeding disorder, asthma, COPD, DM2, HTN, CKD 2-3. She presented to the ED complaining of progressively worsening shortness of breath of 2 weeks duration, limiting her ambulation, associated with worsening lower limb edema up to the thigh, not responding to Lasix and metolazone. She denied chest pain, cough, fever, chills or rigors, no recent illness. She is not compliant with her diet, not restricting fluid intake.       Vital Signs T(F): 97.1 HR: 73 BP: 130/53 RR: 18 SpO2: 100% nasal cannula     EKG repeated twice showed no acute ischemic changes  troponin is stable twice     admitted to telemetry for further evaluation    79yo F hx of diverticulitis, HFpEF, CAD s/p CABG, AS s/p SAVR (2016), atrial fibrillation s/p watchman (2021), bleeding disorder, asthma, COPD, DM2, HTN, CKD 2-3. She presented to the ED complaining of progressively worsening shortness of breath of 2 weeks duration, limiting her ambulation, associated with worsening lower limb edema up to the thigh, not responding to Lasix and metolazone. She denied chest pain, cough, fever, chills or rigors, no recent illness. She is not compliant with her diet, not restricting fluid intake.       Vital Signs T(F): 97.1 HR: 73 BP: 130/53 RR: 18 SpO2: 100% nasal cannula     EKG repeated twice showed no acute ischemic changes  troponin is stable twice     admitted to telemetry for further evaluation

## 2024-01-06 NOTE — CONSULT NOTE ADULT - SUBJECTIVE AND OBJECTIVE BOX
Cardiologist:    HPI:  79yo F hx of diverticulitis, HFpEF, CAD s/p CABG, AS s/p SAVR (2016), atrial fibrillation s/p watchman (2021), bleeding disorder, asthma, COPD, DM2, HTN, CKD 2-3. She presented to the ED complaining of progressively worsening shortness of breath of 2 weeks duration, limiting her ambulation, associated with worsening lower limb edema up to the thigh, not responding to Lasix and metolazone. She denied chest pain, cough, fever, chills or rigors, no recent illness. She is not compliant with her diet, not restricting fluid intake.       Vital Signs T(F): 97.1 HR: 73 BP: 130/53 RR: 18 SpO2: 100% nasal cannula     EKG repeated twice showed no acute ischemic changes  troponin is stable twice     admitted to telemetry for further evaluation    (06 Jan 2024 00:11)      Cardiology Consult HPI:  77 yo F w HFpEF, severe pulmHTN, CAD s/p CABG and SAVR in 2016 , AF s/o Watchman device in 2021, COPD,HTN, CKD presented with c/o SCHAFFER, leg swelling for 2 weeks.   PAST MEDICAL & SURGICAL HISTORY  Aortic stenosis    Diabetes    Asthma with COPD  many years since last attack    CAD (coronary artery disease), native coronary artery    Anemia    History of bleeding disorder  having work up 5/2/21- HAD WORKUP BUT NO DIAGNOSIS "NOTHING WAS FOUND"    History of transfusion reaction    Hiatal hernia    H/O ascending aortic replacement  Bovine Replacement    S/P CABG x 1  complication of bleeding 2016    H/O total knee replacement, right  complicated with fx femur bars screws in femur- b/l knee replacement    S/P hysterectomy    Presence of Watchman left atrial appendage closure device        FAMILY HISTORY:  FAMILY HISTORY:  Family history of pseudocholinesterase deficiency (Child)      [ ] no pertinent family history of premature cardiovascular disease in first degree relatives.  Mother:   Father:   Siblings:     SOCIAL HISTORY:  []smoker  []Alcohol  []Drug    ALLERGIES:  Augmentin (Other)  penicillins (Hives; Rash)      MEDICATIONS:  MEDICATIONS  (STANDING):  atorvastatin 20 milliGRAM(s) Oral at bedtime  furosemide   Injectable 40 milliGRAM(s) IV Push every 12 hours  insulin glargine Injectable (LANTUS) 15 Unit(s) SubCutaneous at bedtime  insulin lispro (ADMELOG) corrective regimen sliding scale   SubCutaneous three times a day before meals  insulin lispro Injectable (ADMELOG) 5 Unit(s) SubCutaneous three times a day before meals  metolazone 5 milliGRAM(s) Oral daily  metoprolol succinate  milliGRAM(s) Oral daily  montelukast 10 milliGRAM(s) Oral daily  pantoprazole    Tablet 40 milliGRAM(s) Oral before breakfast  pregabalin 75 milliGRAM(s) Oral daily  ranolazine 500 milliGRAM(s) Oral two times a day    MEDICATIONS  (PRN):      HOME MEDICATIONS:  Home Medications:  desvenlafaxine (as succinate) 25 mg oral tablet, extended release: 1 tab(s) orally once a day (06 Jan 2024 00:21)  furosemide 40 mg oral tablet: 1 tab(s) orally 2 times a day (06 Jan 2024 00:21)  glipiZIDE 5 mg oral tablet: 1 tab(s) orally once a day (06 Jan 2024 00:21)  Lyrica 75 mg oral capsule: 1 cap(s) orally once a day (06 Jan 2024 00:21)  montelukast 10 mg oral tablet: 1 tab(s) orally once a day (06 Jan 2024 00:21)  potassium chloride 15 mEq oral tablet, extended release: 1 tab(s) orally 2 times a day (06 Jan 2024 00:21)  Ranexa 500 mg oral tablet, extended release: 1 tab(s) orally 2 times a day (06 Jan 2024 00:21)  rosuvastatin 10 mg oral tablet: 1 tab(s) orally once a day (06 Jan 2024 00:21)      VITALS:   T(F): 98.4 (01-06 @ 00:00), Max: 98.4 (01-06 @ 00:00)  HR: 77 (01-06 @ 00:00) (66 - 77)  BP: 133/56 (01-06 @ 00:00) (118/50 - 162/70)  BP(mean): 80 (01-06 @ 00:00) (80 - 80)  RR: 18 (01-06 @ 00:00) (18 - 20)  SpO2: 99% (01-06 @ 00:00) (99% - 100%)    I&O's Summary      REVIEW OF SYSTEMS:    Negative except as mentioned in HPI    CONSTITUTIONAL: No weakness, fevers or chills  EYES: No visual changes  ENT: No vertigo or throat pain   NECK: No pain or stiffness  RESPIRATORY: No cough, wheezing, hemoptysis; No shortness of breath  CARDIOVASCULAR: No chest pain or palpitations  GASTROINTESTINAL: No abdominal or epigastric pain. No nausea, vomiting, or hematemesis; No diarrhea or constipation. No melena or hematochezia.  GENITOURINARY: No dysuria, frequency or hematuria  NEUROLOGICAL: No numbness or weakness  SKIN: No itching, no rashes  MSK: FROM x4    PHYSICAL EXAM:  NEURO: Awake , alert and oriented  GEN: Not in acute distress  NECK: No JVD  LUNGS: Clear to auscultation bilaterally   CARDIOVASCULAR: S1/S2 present, RRR , no murmurs or rubs, no carotid bruits,  + PP bilaterally  ABD: Soft, non-tender, non-distended, +BS  EXT: 1 +pitting edema b/l  SKIN: Intact    LABS:                        7.6    5.34  )-----------( 138      ( 05 Jan 2024 14:58 )             27.0     01-05    142  |  102  |  59<H>  ----------------------------<  80  4.4   |  27  |  2.1<H>    Ca    9.4      05 Jan 2024 14:58    TPro  6.3  /  Alb  3.8  /  TBili  0.9  /  DBili  x   /  AST  32  /  ALT  22  /  AlkPhos  89  01-05              Troponin trend:          RADIOLOGY:  -CXR: pulmonary vascular congestion   -TTE: 4/2023:  Normal global left ventricular systolic function.   3. Severely enlarged left atrium.   4. LV Ejection Fraction by Graves's Method with a biplane EF of 72 %.   5. Spectral Doppler shows pseudonormal pattern of left ventricular   myocardial filling (Grade II diastolic dysfunction).   6. Mildly enlarged right ventricle.   7.Moderately reduced RV systolic function.   8. Mildly enlarged right atrium.   9. Degenerative mitral valve.  10. Mild mitral valve regurgitation.  11. Severe mitral annular calcification.  12. Moderate tricuspid regurgitation.  13. Estimated pulmonary artery systolic pressure is 66.7 mmHg assuming a   right atrial pressure of 8 mmHg, which is consistent with severe   pulmonary hypertension.      -STRESS TEST: 7/2019: normal       ECG:  NSR, TWI in I,avL old   TELEMETRY EVENTS:   Cardiologist:    HPI:  77yo F hx of diverticulitis, HFpEF, CAD s/p CABG, AS s/p SAVR (2016), atrial fibrillation s/p watchman (2021), bleeding disorder, asthma, COPD, DM2, HTN, CKD 2-3. She presented to the ED complaining of progressively worsening shortness of breath of 2 weeks duration, limiting her ambulation, associated with worsening lower limb edema up to the thigh, not responding to Lasix and metolazone. She denied chest pain, cough, fever, chills or rigors, no recent illness. She is not compliant with her diet, not restricting fluid intake.       Vital Signs T(F): 97.1 HR: 73 BP: 130/53 RR: 18 SpO2: 100% nasal cannula     EKG repeated twice showed no acute ischemic changes  troponin is stable twice     admitted to telemetry for further evaluation    (06 Jan 2024 00:11)      Cardiology Consult HPI:  77 yo F w HFpEF, severe pulmHTN, CAD s/p CABG and SAVR in 2016 , AF s/o Watchman device in 2021, COPD,HTN, CKD presented with c/o SCHAFFER, leg swelling for 2 weeks.   PAST MEDICAL & SURGICAL HISTORY  Aortic stenosis    Diabetes    Asthma with COPD  many years since last attack    CAD (coronary artery disease), native coronary artery    Anemia    History of bleeding disorder  having work up 5/2/21- HAD WORKUP BUT NO DIAGNOSIS "NOTHING WAS FOUND"    History of transfusion reaction    Hiatal hernia    H/O ascending aortic replacement  Bovine Replacement    S/P CABG x 1  complication of bleeding 2016    H/O total knee replacement, right  complicated with fx femur bars screws in femur- b/l knee replacement    S/P hysterectomy    Presence of Watchman left atrial appendage closure device        FAMILY HISTORY:  FAMILY HISTORY:  Family history of pseudocholinesterase deficiency (Child)      [ ] no pertinent family history of premature cardiovascular disease in first degree relatives.  Mother:   Father:   Siblings:     SOCIAL HISTORY:  []smoker  []Alcohol  []Drug    ALLERGIES:  Augmentin (Other)  penicillins (Hives; Rash)      MEDICATIONS:  MEDICATIONS  (STANDING):  atorvastatin 20 milliGRAM(s) Oral at bedtime  furosemide   Injectable 40 milliGRAM(s) IV Push every 12 hours  insulin glargine Injectable (LANTUS) 15 Unit(s) SubCutaneous at bedtime  insulin lispro (ADMELOG) corrective regimen sliding scale   SubCutaneous three times a day before meals  insulin lispro Injectable (ADMELOG) 5 Unit(s) SubCutaneous three times a day before meals  metolazone 5 milliGRAM(s) Oral daily  metoprolol succinate  milliGRAM(s) Oral daily  montelukast 10 milliGRAM(s) Oral daily  pantoprazole    Tablet 40 milliGRAM(s) Oral before breakfast  pregabalin 75 milliGRAM(s) Oral daily  ranolazine 500 milliGRAM(s) Oral two times a day    MEDICATIONS  (PRN):      HOME MEDICATIONS:  Home Medications:  desvenlafaxine (as succinate) 25 mg oral tablet, extended release: 1 tab(s) orally once a day (06 Jan 2024 00:21)  furosemide 40 mg oral tablet: 1 tab(s) orally 2 times a day (06 Jan 2024 00:21)  glipiZIDE 5 mg oral tablet: 1 tab(s) orally once a day (06 Jan 2024 00:21)  Lyrica 75 mg oral capsule: 1 cap(s) orally once a day (06 Jan 2024 00:21)  montelukast 10 mg oral tablet: 1 tab(s) orally once a day (06 Jan 2024 00:21)  potassium chloride 15 mEq oral tablet, extended release: 1 tab(s) orally 2 times a day (06 Jan 2024 00:21)  Ranexa 500 mg oral tablet, extended release: 1 tab(s) orally 2 times a day (06 Jan 2024 00:21)  rosuvastatin 10 mg oral tablet: 1 tab(s) orally once a day (06 Jan 2024 00:21)      VITALS:   T(F): 98.4 (01-06 @ 00:00), Max: 98.4 (01-06 @ 00:00)  HR: 77 (01-06 @ 00:00) (66 - 77)  BP: 133/56 (01-06 @ 00:00) (118/50 - 162/70)  BP(mean): 80 (01-06 @ 00:00) (80 - 80)  RR: 18 (01-06 @ 00:00) (18 - 20)  SpO2: 99% (01-06 @ 00:00) (99% - 100%)    I&O's Summary      REVIEW OF SYSTEMS:    Negative except as mentioned in HPI    CONSTITUTIONAL: No weakness, fevers or chills  EYES: No visual changes  ENT: No vertigo or throat pain   NECK: No pain or stiffness  RESPIRATORY: No cough, wheezing, hemoptysis; No shortness of breath  CARDIOVASCULAR: No chest pain or palpitations  GASTROINTESTINAL: No abdominal or epigastric pain. No nausea, vomiting, or hematemesis; No diarrhea or constipation. No melena or hematochezia.  GENITOURINARY: No dysuria, frequency or hematuria  NEUROLOGICAL: No numbness or weakness  SKIN: No itching, no rashes  MSK: FROM x4    PHYSICAL EXAM:  NEURO: Awake , alert and oriented  GEN: Not in acute distress  NECK: No JVD  LUNGS: Clear to auscultation bilaterally   CARDIOVASCULAR: S1/S2 present, RRR , no murmurs or rubs, no carotid bruits,  + PP bilaterally  ABD: Soft, non-tender, non-distended, +BS  EXT: 1 +pitting edema b/l  SKIN: Intact    LABS:                        7.6    5.34  )-----------( 138      ( 05 Jan 2024 14:58 )             27.0     01-05    142  |  102  |  59<H>  ----------------------------<  80  4.4   |  27  |  2.1<H>    Ca    9.4      05 Jan 2024 14:58    TPro  6.3  /  Alb  3.8  /  TBili  0.9  /  DBili  x   /  AST  32  /  ALT  22  /  AlkPhos  89  01-05              Troponin trend:          RADIOLOGY:  -CXR: pulmonary vascular congestion   -TTE: 4/2023:  Normal global left ventricular systolic function.   3. Severely enlarged left atrium.   4. LV Ejection Fraction by Graves's Method with a biplane EF of 72 %.   5. Spectral Doppler shows pseudonormal pattern of left ventricular   myocardial filling (Grade II diastolic dysfunction).   6. Mildly enlarged right ventricle.   7.Moderately reduced RV systolic function.   8. Mildly enlarged right atrium.   9. Degenerative mitral valve.  10. Mild mitral valve regurgitation.  11. Severe mitral annular calcification.  12. Moderate tricuspid regurgitation.  13. Estimated pulmonary artery systolic pressure is 66.7 mmHg assuming a   right atrial pressure of 8 mmHg, which is consistent with severe   pulmonary hypertension.      -STRESS TEST: 7/2019: normal       ECG:  NSR, TWI in I,avL old   TELEMETRY EVENTS:   Cardiologist:    HPI:    79yo F hx of diverticulitis, HFpEF, CAD s/p CABG, AS s/p SAVR (2016), atrial fibrillation s/p watchman (2021), bleeding disorder, asthma, COPD, DM2, HTN, CKD 2-3. She presented to the ED complaining of progressively worsening shortness of breath of 2 weeks duration, limiting her ambulation, associated with worsening lower limb edema up to the thigh, not responding to Lasix and metolazone. She denied chest pain, cough, fever, chills or rigors, no recent illness. She is not compliant with her diet, not restricting fluid intake.       Vital Signs T(F): 97.1 HR: 73 BP: 130/53 RR: 18 SpO2: 100% nasal cannula     EKG repeated twice showed no acute ischemic changes  troponin is stable twice     admitted to telemetry for further evaluation    (06 Jan 2024 00:11)      Cardiology Consult HPI:  79 yo F w HFpEF, severe pulmHTN, CAD s/p CABG and SAVR in 2016 , AF s/o Watchman device in 2021, COPD,HTN, CKD presented with c/o SCHAFFER, leg swelling for 2 weeks.   PAST MEDICAL & SURGICAL HISTORY  Aortic stenosis    Diabetes    Asthma with COPD  many years since last attack    CAD (coronary artery disease), native coronary artery    Anemia    History of bleeding disorder  having work up 5/2/21- HAD WORKUP BUT NO DIAGNOSIS "NOTHING WAS FOUND"    History of transfusion reaction    Hiatal hernia    H/O ascending aortic replacement  Bovine Replacement    S/P CABG x 1  complication of bleeding 2016    H/O total knee replacement, right  complicated with fx femur bars screws in femur- b/l knee replacement    S/P hysterectomy    Presence of Watchman left atrial appendage closure device        FAMILY HISTORY:  FAMILY HISTORY:  Family history of pseudocholinesterase deficiency (Child)      [ ] no pertinent family history of premature cardiovascular disease in first degree relatives.  Mother:   Father:   Siblings:     SOCIAL HISTORY:  []smoker  []Alcohol  []Drug    ALLERGIES:  Augmentin (Other)  penicillins (Hives; Rash)      MEDICATIONS:  MEDICATIONS  (STANDING):  atorvastatin 20 milliGRAM(s) Oral at bedtime  furosemide   Injectable 40 milliGRAM(s) IV Push every 12 hours  insulin glargine Injectable (LANTUS) 15 Unit(s) SubCutaneous at bedtime  insulin lispro (ADMELOG) corrective regimen sliding scale   SubCutaneous three times a day before meals  insulin lispro Injectable (ADMELOG) 5 Unit(s) SubCutaneous three times a day before meals  metolazone 5 milliGRAM(s) Oral daily  metoprolol succinate  milliGRAM(s) Oral daily  montelukast 10 milliGRAM(s) Oral daily  pantoprazole    Tablet 40 milliGRAM(s) Oral before breakfast  pregabalin 75 milliGRAM(s) Oral daily  ranolazine 500 milliGRAM(s) Oral two times a day    MEDICATIONS  (PRN):      HOME MEDICATIONS:  Home Medications:  desvenlafaxine (as succinate) 25 mg oral tablet, extended release: 1 tab(s) orally once a day (06 Jan 2024 00:21)  furosemide 40 mg oral tablet: 1 tab(s) orally 2 times a day (06 Jan 2024 00:21)  glipiZIDE 5 mg oral tablet: 1 tab(s) orally once a day (06 Jan 2024 00:21)  Lyrica 75 mg oral capsule: 1 cap(s) orally once a day (06 Jan 2024 00:21)  montelukast 10 mg oral tablet: 1 tab(s) orally once a day (06 Jan 2024 00:21)  potassium chloride 15 mEq oral tablet, extended release: 1 tab(s) orally 2 times a day (06 Jan 2024 00:21)  Ranexa 500 mg oral tablet, extended release: 1 tab(s) orally 2 times a day (06 Jan 2024 00:21)  rosuvastatin 10 mg oral tablet: 1 tab(s) orally once a day (06 Jan 2024 00:21)      VITALS:   T(F): 98.4 (01-06 @ 00:00), Max: 98.4 (01-06 @ 00:00)  HR: 77 (01-06 @ 00:00) (66 - 77)  BP: 133/56 (01-06 @ 00:00) (118/50 - 162/70)  BP(mean): 80 (01-06 @ 00:00) (80 - 80)  RR: 18 (01-06 @ 00:00) (18 - 20)  SpO2: 99% (01-06 @ 00:00) (99% - 100%)    I&O's Summary      REVIEW OF SYSTEMS:    Negative except as mentioned in HPI    CONSTITUTIONAL: No weakness, fevers or chills  EYES: No visual changes  ENT: No vertigo or throat pain   NECK: No pain or stiffness  RESPIRATORY: No cough, wheezing, hemoptysis; No shortness of breath  CARDIOVASCULAR: No chest pain or palpitations  GASTROINTESTINAL: No abdominal or epigastric pain. No nausea, vomiting, or hematemesis; No diarrhea or constipation. No melena or hematochezia.  GENITOURINARY: No dysuria, frequency or hematuria  NEUROLOGICAL: No numbness or weakness  SKIN: No itching, no rashes  MSK: FROM x4    PHYSICAL EXAM:  NEURO: Awake , alert and oriented  GEN: Not in acute distress  NECK: No JVD  LUNGS: Clear to auscultation bilaterally   CARDIOVASCULAR: S1/S2 present, RRR , no murmurs or rubs, no carotid bruits,  + PP bilaterally  ABD: Soft, non-tender, non-distended, +BS  EXT: 1 +pitting edema b/l  SKIN: Intact    LABS:                        7.6    5.34  )-----------( 138      ( 05 Jan 2024 14:58 )             27.0     01-05    142  |  102  |  59<H>  ----------------------------<  80  4.4   |  27  |  2.1<H>    Ca    9.4      05 Jan 2024 14:58    TPro  6.3  /  Alb  3.8  /  TBili  0.9  /  DBili  x   /  AST  32  /  ALT  22  /  AlkPhos  89  01-05              Troponin trend:          RADIOLOGY:  -CXR: pulmonary vascular congestion   -TTE: 4/2023:  Normal global left ventricular systolic function.   3. Severely enlarged left atrium.   4. LV Ejection Fraction by Graves's Method with a biplane EF of 72 %.   5. Spectral Doppler shows pseudonormal pattern of left ventricular   myocardial filling (Grade II diastolic dysfunction).   6. Mildly enlarged right ventricle.   7.Moderately reduced RV systolic function.   8. Mildly enlarged right atrium.   9. Degenerative mitral valve.  10. Mild mitral valve regurgitation.  11. Severe mitral annular calcification.  12. Moderate tricuspid regurgitation.  13. Estimated pulmonary artery systolic pressure is 66.7 mmHg assuming a   right atrial pressure of 8 mmHg, which is consistent with severe   pulmonary hypertension.      -STRESS TEST: 7/2019: normal       ECG:  NSR, TWI in I,avL old   TELEMETRY EVENTS:

## 2024-01-06 NOTE — PROGRESS NOTE ADULT - ASSESSMENT
77yo F hx of diverticulitis, HFpEF, CAD s/p CABG, AS s/p SAVR (2016), atrial fibrillation s/p watchman (2021), bleeding disorder, asthma, COPD, DM2, HTN, CKD 2-3    # Acute CHF exacerbation last EF 72%, mostly right sided failure  # Severe pulmonary hypertension  # CAD s/p CABG  # AS s/p SAVR (2016)  # atrial fibrillation s/p watchman (2021)  # bleeding disorder  # HTN  - On home Lasix 40X2, metolazone as needed   - Currently on nasal cannula   - Labs: Pro-BNP 2K, troponin stable twice  - EKG twice showed no acute ischemic changes   - CXR showing no pulmonary edema   - Lasix 40 IV BID , metolazone 5 mg - cardio recs appreciated - inc to lasix 80 IV if renal fxn worsens  - strict I/O, monitor urine output, daily weight  - Telemetry monitoring   - Continue home metoprolol 100mg, Ranexa, rosuvastatin     # AKASH on CKD stage 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 no reported hematemesis, epistaxis, melena or hematochezia   - off AC prophylaxis now until making sure Hb is stable  - Follow up Hb   - 1 unit PRBC today - lasix after    # GI prophylaxis: pantoprazole  # DVT prophylaxis: none    # Full code  # Diet: regular        79yo F hx of diverticulitis, HFpEF, CAD s/p CABG, AS s/p SAVR (2016), atrial fibrillation s/p watchman (2021), bleeding disorder, asthma, COPD, DM2, HTN, CKD 2-3    # Acute CHF exacerbation last EF 72%, mostly right sided failure  # Severe pulmonary hypertension  # CAD s/p CABG  # AS s/p SAVR (2016)  # atrial fibrillation s/p watchman (2021)  # bleeding disorder  # HTN  - On home Lasix 40X2, metolazone as needed   - Currently on nasal cannula   - Labs: Pro-BNP 2K, troponin stable twice  - EKG twice showed no acute ischemic changes   - CXR showing no pulmonary edema   - Lasix 40 IV BID , metolazone 5 mg - cardio recs appreciated - inc to lasix 80 IV if renal fxn worsens  - strict I/O, monitor urine output, daily weight  - Telemetry monitoring   - Continue home metoprolol 100mg, Ranexa, rosuvastatin     # AKASH on CKD stage 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 no reported hematemesis, epistaxis, melena or hematochezia   - off AC prophylaxis now until making sure Hb is stable  - Follow up Hb   - 1 unit PRBC today - lasix after    # GI prophylaxis: pantoprazole  # DVT prophylaxis: none    # Full code  # Diet: regular

## 2024-01-06 NOTE — PATIENT PROFILE ADULT - FUNCTIONAL ASSESSMENT - BASIC MOBILITY 6.
3-calculated by average/Not able to assess (calculate score using [facility] averaging method)  3-calculated by average/Not able to assess (calculate score using [facility] averaging method)

## 2024-01-06 NOTE — PROGRESS NOTE ADULT - ATTENDING COMMENTS
79yo F hx of diverticulitis, HFpEF, CAD s/p CABG, AS s/p SAVR (2016), atrial fibrillation s/p watchman (2021), bleeding disorder, asthma, COPD, DM2, HTN, CKD 2-3    # Acute CHF exacerbation last EF 72%, mostly right sided failure  # Severe pulmonary hypertension  # CAD s/p CABG  # AS s/p SAVR (2016)  # atrial fibrillation s/p watchman (2021)  # bleeding disorder  # HTN  - On home Lasix 40X2, metolazone as needed   - CXR showing no pulmonary edema   - Lasix 60 IV BID, metolazone 5 mg - cardio recs appreciated    - strict I/O, monitor urine output, daily weight  - Telemetry monitoring   - Continue home metoprolol 100mg, Ranexa, rosuvastatin     # AKASH on CKD stage 2-3, possible cardiorenal    - c/w IV Lasix   - Monitor UOP     # Asthma  # COPD - not in exacerbation   - on montelukast     # DM with hypoglycemia   - On home glipizide  - ISS for now   - Follow up fingersticks, A1c    # Chronic anemia   - Hx of bleeding disorder with multiple bruises and bleeding. no reported hematemesis, epistaxis, melena or hematochezia   - NATALIO on labs   - GI w/u last year done   - transfuse one unit today   - c/w Venofer     dvt ppx        Pending: IV diuresis 77yo F hx of diverticulitis, HFpEF, CAD s/p CABG, AS s/p SAVR (2016), atrial fibrillation s/p watchman (2021), bleeding disorder, asthma, COPD, DM2, HTN, CKD 2-3    # Acute CHF exacerbation last EF 72%, mostly right sided failure  # Severe pulmonary hypertension  # CAD s/p CABG  # AS s/p SAVR (2016)  # atrial fibrillation s/p watchman (2021)  # bleeding disorder  # HTN  - On home Lasix 40X2, metolazone as needed   - CXR showing no pulmonary edema   - Lasix 60 IV BID, metolazone 5 mg - cardio recs appreciated    - strict I/O, monitor urine output, daily weight  - Telemetry monitoring   - Continue home metoprolol 100mg, Ranexa, rosuvastatin     # AKASH on CKD stage 2-3, possible cardiorenal    - c/w IV Lasix   - Monitor UOP     # Asthma  # COPD - not in exacerbation   - on montelukast     # DM with hypoglycemia   - On home glipizide  - ISS for now   - Follow up fingersticks, A1c    # Chronic anemia   - Hx of bleeding disorder with multiple bruises and bleeding. no reported hematemesis, epistaxis, melena or hematochezia   - NATALIO on labs   - GI w/u last year done   - transfuse one unit today   - c/w Venofer     dvt ppx        Pending: IV diuresis

## 2024-01-06 NOTE — H&P ADULT - NSHPPHYSICALEXAM_GEN_ALL_CORE
PHYSICAL EXAM:  GENERAL: NAD  EYES: conjunctiva and sclera clear  ENMT: No tonsillar erythema, exudates, or enlargement; Moist mucous membranes  NECK: Supple, No JVD, Normal thyroid  HEART: Regular rate and rhythm; No murmurs, rubs, or gallops, Normal S1 S2   RESPIRATORY: decreased air entry bilaterally with no added sounds   ABDOMEN: Soft, Nontender, Nondistended; Bowel sounds present  NEUROLOGY: A&Ox3, grossly intact power and sensation   EXTREMITIES: bilateral severe lower limb edema.   SKIN: right anterior shin open wound, covered with dressing PHYSICAL EXAM:  GENERAL: NAD  EYES: conjunctiva and sclera clear  ENMT: No tonsillar erythema, exudates, or enlargement; Moist mucous membranes  NECK: Supple, No JVD, Normal thyroid  HEART: Regular rate and rhythm; No murmurs, rubs, or gallops, Normal S1 S2   RESPIRATORY: decreased air entry bilaterally with no added sounds   ABDOMEN: Soft, Nontender, Nondistended; Bowel sounds present  NEUROLOGY: A&Ox3, grossly intact power and sensation   EXTREMITIES: b/l 2+ pitting edema b/l   SKIN: right anterior shin superficial erosion, covered with dressing

## 2024-01-06 NOTE — H&P ADULT - NSHPLABSRESULTS_GEN_ALL_CORE
7.6    5.34  )-----------( 138      ( 05 Jan 2024 14:58 )             27.0       01-05    142  |  102  |  59<H>  ----------------------------<  80  4.4   |  27  |  2.1<H>    Ca    9.4      05 Jan 2024 14:58    TPro  6.3  /  Alb  3.8  /  TBili  0.9  /  DBili  x   /  AST  32  /  ALT  22  /  AlkPhos  89  01-05              Urinalysis Basic - ( 05 Jan 2024 14:58 )    Color: x / Appearance: x / SG: x / pH: x  Gluc: 80 mg/dL / Ketone: x  / Bili: x / Urobili: x   Blood: x / Protein: x / Nitrite: x   Leuk Esterase: x / RBC: x / WBC x   Sq Epi: x / Non Sq Epi: x / Bacteria: x            Lactate Trend            CAPILLARY BLOOD GLUCOSE

## 2024-01-06 NOTE — H&P ADULT - ASSESSMENT
79yo F hx of diverticulitis, HFpEF, CAD s/p CABG, AS s/p SAVR (2016), atrial fibrillation s/p watchman (2021), bleeding disorder, asthma, COPD, DM2, HTN, CKD 2-3      # Acute CHF exacerbation last EF 72%, mostly right sided failure  # Severe pulmonary hypertension  # CAD s/p CABG  # AS s/p SAVR (2016)  # atrial fibrillation s/p watchman (2021)  # bleeding disorder  # HTN  - On home Lasix 40X2, metolazone as needed   - Currently on nasal cannula   - Labs: Pro-BNP 2K, troponin stable twice  - EKG twice showed no acute ischemic changes   - CXR showing no pulmonary edema   - Start Lasix 40X2, metolazone 5 mg   - strict I/O, monitor urine output   - Telemetry monitoring   - Cardiology consult  - Consider HF team consult if no improvement for PHTN evaluation   - Continue home metoprolol 100mg, Ranexa, rosuvastatin       # AKASH on CKD stage 2-3, possible cardiorenal VS medication-induced   - Cr 1.1 baseline, Today 2.1  - Decreased urine output reported  - She has been taking meloxicam for pain control   - Start Diuresis  - Send urinalysis  - Consider nephrology consult if worsening       # Asthma  # COPD - not in exacerbation   - on montelukast       # DM   - On home glipizide  - Start Lantus 15 units, Lispro 5X3  - Follow up fingersticks, A1c      # Hb drop  - Baseline 10, today 7  - Hx of bleeding disorder with multiple bruises and bleeding. no reported hematemesis, epistaxis, melena or hematochezia   - off AC prophylaxis now until making sure Hb is stable  - Follow up Hb   - transfuse if less than 7       # GI prophylaxis: pantoprazole  # DVT prophylaxis: none (make sure Hb is stable before   # Full code  # Diet: regular

## 2024-01-06 NOTE — CONSULT NOTE ADULT - ASSESSMENT
79 yo F w HFpEF, severe pulmHTN, CAD s/p CABG and SAVR in 2016 , AF s/o Watchman device in 2021, COPD,HTN, CKD presented with c/o SCHAFFER, leg swelling for 2 weeks.   VSS, bilateral pedal edema on exam   creatinine 2.1, HST 58 > 61, LFTs wnl, pBNP 2000   EKG unchanged     Impression:   Decompensated HFpEF   AKASH on CKD , likely CRS  severe pulm HTN , Gr 2 or 3   AF sp watchman on aspirin   CAD s/p CABG   AS s/p SAVR  COPD     -IV lasix 40 mg BID for goal net negative 1-2 L, increase dose to 80 IV BID if renal function worsens , cont metolazone 5 mg daily   -needs strict I/Os , daily weights   -monitor K and mag   -cont rest of home meds -metoprolol 100 mg merchant and ranolazine and aspirin   -trend H&H , keep > 8

## 2024-01-07 NOTE — PROGRESS NOTE ADULT - ASSESSMENT
79yo F hx of diverticulitis, HFpEF, CAD s/p CABG, AS s/p SAVR (2016), atrial fibrillation s/p watchman (2021), bleeding disorder, asthma, COPD, DM2, HTN, CKD 2-3    # Acute CHF exacerbation last EF 72%, mostly right sided failure  # Severe pulmonary hypertension  # CAD s/p CABG  # AS s/p SAVR (2016)  # atrial fibrillation s/p watchman (2021)  # bleeding disorder  # HTN  - On home Lasix 40X2, metolazone as needed   - CXR showing no pulmonary edema   - Lasix 60 IV BID, metolazone 5 mg    - strict I/O, monitor urine output, daily weight  - Telemetry monitoring   - Continue home metoprolol 100mg, Ranexa, rosuvastatin     # AKASH on CKD stage 2-3, possible cardiorenal    - c/w IV Lasix   - Monitor UOP     # Asthma  # COPD - not in exacerbation   - on montelukast     # DM with hypoglycemia   - On home glipizide  - ISS for now   - Follow up fingersticks     # Chronic anemia   - Hx of bleeding disorder with multiple bruises and bleeding. no reported hematemesis, epistaxis, melena or hematochezia   - NATALIO on labs   - GI w/u last year done   - transfused one unit  1/6  - c/w Venofer     dvt ppx        Pending: IV diuresis, f/u labs   Plan of care d/w patient  77yo F hx of diverticulitis, HFpEF, CAD s/p CABG, AS s/p SAVR (2016), atrial fibrillation s/p watchman (2021), bleeding disorder, asthma, COPD, DM2, HTN, CKD 2-3    # Acute CHF exacerbation last EF 72%, mostly right sided failure  # Severe pulmonary hypertension  # CAD s/p CABG  # AS s/p SAVR (2016)  # atrial fibrillation s/p watchman (2021)  # bleeding disorder  # HTN  - On home Lasix 40X2, metolazone as needed   - CXR showing no pulmonary edema   - Lasix 60 IV BID, metolazone 5 mg    - strict I/O, monitor urine output, daily weight  - Telemetry monitoring   - Continue home metoprolol 100mg, Ranexa, rosuvastatin     # AKASH on CKD stage 2-3, possible cardiorenal    - c/w IV Lasix   - Monitor UOP     # Asthma  # COPD - not in exacerbation   - on montelukast     # DM with hypoglycemia   - On home glipizide  - ISS for now   - Follow up fingersticks     # Chronic anemia   - Hx of bleeding disorder with multiple bruises and bleeding. no reported hematemesis, epistaxis, melena or hematochezia   - NATALIO on labs   - GI w/u last year done   - transfused one unit  1/6  - c/w Venofer     dvt ppx        Pending: IV diuresis, f/u labs   Plan of care d/w patient

## 2024-01-07 NOTE — PROGRESS NOTE ADULT - SUBJECTIVE AND OBJECTIVE BOX
Pt seen and examined at bedside. Feels better.       VITAL SIGNS (Last 24 hrs):  T(C): 37 (01-07-24 @ 14:29), Max: 37 (01-07-24 @ 14:29)  HR: 78 (01-07-24 @ 14:29) (78 - 110)  BP: 107/56 (01-07-24 @ 14:29) (100/49 - 113/55)  RR: 18 (01-07-24 @ 14:29) (18 - 18)  SpO2: --  Wt(kg): --  Daily     Daily     I&O's Summary    06 Jan 2024 07:01  -  07 Jan 2024 07:00  --------------------------------------------------------  IN: 812 mL / OUT: 4275 mL / NET: -3463 mL    07 Jan 2024 07:01  -  07 Jan 2024 17:37  --------------------------------------------------------  IN: 670 mL / OUT: 1600 mL / NET: -930 mL        PHYSICAL EXAM:  GENERAL: NAD, well-developed  HEAD:  Atraumatic, Normocephalic  EYES: EOMI, PERRLA, conjunctiva and sclera clear  NECK: Supple, No JVD  CHEST/LUNG: Clear to auscultation bilaterally; No wheeze  HEART: Regular rate and rhythm; No murmurs, rubs, or gallops  ABDOMEN: Soft, Nontender, Nondistended; Bowel sounds present  EXTREMITIES:  2+ Peripheral Pulses, No clubbing, cyanosis +3 pitting b/l edema up to the thighs   PSYCH: AAOx3  NEUROLOGY: non-focal  SKIN: No rashes or lesions    Labs Reviewed  Spoke to patient in regards to abnormal labs.    CBC Full  -  ( 06 Jan 2024 06:46 )  WBC Count : 5.97 K/uL  Hemoglobin : 7.2 g/dL  Hematocrit : 25.4 %  Platelet Count - Automated : 140 K/uL  Mean Cell Volume : 86.1 fL  Mean Cell Hemoglobin : 24.4 pg  Mean Cell Hemoglobin Concentration : 28.3 g/dL  Auto Neutrophil # : 3.48 K/uL  Auto Lymphocyte # : 1.43 K/uL  Auto Monocyte # : 0.99 K/uL  Auto Eosinophil # : 0.03 K/uL  Auto Basophil # : 0.02 K/uL  Auto Neutrophil % : 58.3 %  Auto Lymphocyte % : 24.0 %  Auto Monocyte % : 16.6 %  Auto Eosinophil % : 0.5 %  Auto Basophil % : 0.3 %    BMP:    01-06 @ 06:46    Blood Urea Nitrogen - 53  Calcium - 9.5  Carbond Dioxide - 27  Chloride - 103  Creatinine - 1.9  Glucose - 47  Potassium - 3.3  Sodium - 146      Hemoglobin A1c -   PT/INR - ( 06 Jan 2024 06:46 )   PT: 16.00 sec;   INR: 1.40 ratio         PTT - ( 06 Jan 2024 06:46 )  PTT:31.4 sec  Urine Culture:            MEDICATIONS  (STANDING):  atorvastatin 20 milliGRAM(s) Oral at bedtime  bacitracin   Ointment 1 Application(s) Topical daily  furosemide   Injectable 60 milliGRAM(s) IV Push every 12 hours  iron sucrose IVPB 200 milliGRAM(s) IV Intermittent every 24 hours  metolazone 5 milliGRAM(s) Oral daily  metoprolol succinate  milliGRAM(s) Oral daily  montelukast 10 milliGRAM(s) Oral daily  pantoprazole    Tablet 40 milliGRAM(s) Oral before breakfast  pregabalin 75 milliGRAM(s) Oral daily  ranolazine 500 milliGRAM(s) Oral two times a day    MEDICATIONS  (PRN):  acetaminophen     Tablet .. 650 milliGRAM(s) Oral every 6 hours PRN Temp greater or equal to 38C (100.4F), Moderate Pain (4 - 6)

## 2024-01-08 NOTE — PROGRESS NOTE ADULT - SUBJECTIVE AND OBJECTIVE BOX
KAI EVAN  78y  Female      Patient is a 78y old  Female who presents with a chief complaint of CHF exacerbation     INTERVAL HPI/OVERNIGHT EVENTS:      ******************************* REVIEW OF SYSTEMS:**********************************************    All other review of systems negative    *********************** VITALS ******************************************    T(F): 97.8 (01-08-24 @ 13:45)  HR: 72 (01-08-24 @ 13:45) (72 - 79)  BP: 131/56 (01-08-24 @ 13:45) (111/46 - 131/56)  RR: 18 (01-08-24 @ 13:45) (18 - 18)  SpO2: 97% (01-08-24 @ 02:58) (97% - 97%)    01-07-24 @ 07:01  -  01-08-24 @ 07:00  --------------------------------------------------------  IN: 750 mL / OUT: 2900 mL / NET: -2150 mL    01-08-24 @ 07:01  -  01-08-24 @ 16:16  --------------------------------------------------------  IN: 1210 mL / OUT: 2160 mL / NET: -950 mL            01-07-24 @ 07:01  -  01-08-24 @ 07:00  --------------------------------------------------------  IN: 750 mL / OUT: 2900 mL / NET: -2150 mL    01-08-24 @ 07:01  -  01-08-24 @ 16:16  --------------------------------------------------------  IN: 1210 mL / OUT: 2160 mL / NET: -950 mL        ******************************** PHYSICAL EXAM:**************************************************  GENERAL: NAD    PSYCH: no agitation, baseline mentation  HEENT:     NERVOUS SYSTEM:  Alert & Oriented X3,  PULMONARY: LIANET, CTA    CARDIOVASCULAR: S1S2 RRR    GI: Soft, NT, ND; BS present.    EXTREMITIES:  2+ Peripheral  edema LE B/L     LYMPH: No lymphadenopathy noted    SKIN: No rashes or lesions      **************************** LABS *******************************************************                          8.7    7.14  )-----------( 126      ( 07 Jan 2024 17:29 )             29.9     01-07    144  |  100  |  47<H>  ----------------------------<  153<H>  3.1<L>   |  33<H>  |  2.0<H>    Ca    9.2      07 Jan 2024 17:29  Mg     2.2     01-07        Urinalysis Basic - ( 07 Jan 2024 17:29 )    Color: x / Appearance: x / SG: x / pH: x  Gluc: 153 mg/dL / Ketone: x  / Bili: x / Urobili: x   Blood: x / Protein: x / Nitrite: x   Leuk Esterase: x / RBC: x / WBC x   Sq Epi: x / Non Sq Epi: x / Bacteria: x        Lactate Trend        CAPILLARY BLOOD GLUCOSE      POCT Blood Glucose.: 179 mg/dL (08 Jan 2024 11:23)          **************************Active Medications *******************************************  Augmentin (Other)  penicillins (Hives; Rash)      acetaminophen     Tablet .. 650 milliGRAM(s) Oral every 6 hours PRN  aluminum hydroxide/magnesium hydroxide/simethicone Suspension 30 milliLiter(s) Oral every 4 hours PRN  atorvastatin 20 milliGRAM(s) Oral at bedtime  bacitracin   Ointment 1 Application(s) Topical daily  furosemide   Injectable 60 milliGRAM(s) IV Push every 12 hours  iron sucrose IVPB 200 milliGRAM(s) IV Intermittent every 24 hours  metolazone 5 milliGRAM(s) Oral daily  metoprolol succinate  milliGRAM(s) Oral daily  montelukast 10 milliGRAM(s) Oral daily  pantoprazole    Tablet 40 milliGRAM(s) Oral before breakfast  pregabalin 75 milliGRAM(s) Oral daily  ranolazine 500 milliGRAM(s) Oral two times a day      ***************************************************  RADIOLOGY & ADDITIONAL TESTS:    Imaging Personally Reviewed:  [ ] YES  [ ] NO    HEALTH ISSUES - PROBLEM Dx:

## 2024-01-08 NOTE — ADVANCED PRACTICE NURSE CONSULT - ASSESSMENT
Reason for Admission: CHF exacerbation  History of Present Illness:   79yo F hx of diverticulitis, HFpEF, CAD s/p CABG, AS s/p SAVR (2016), atrial fibrillation s/p watchman (2021), bleeding disorder, asthma, COPD, DM2, HTN, CKD 2-3. She presented to the ED complaining of progressively worsening shortness of breath of 2 weeks duration, limiting her ambulation, associated with worsening lower limb edema up to the thigh, not responding to Lasix and metolazone. She denied chest pain, cough, fever, chills or rigors, no recent illness. She is not compliant with her diet, not restricting fluid intake.     Allergies and Intolerances:        Allergies:  	penicillins: Drug Category, Hives, Rash  	Augmentin: Drug, Other, Originally Entered as [RASH] reaction to [Augmentin]    Home Medications:   * Patient Currently Takes Medications as of 06-Jan-2024 00:21 documented in Structured Notes  •?	metOLazone 5 mg oral tablet: Last Dose Taken:  , 1 tab(s) orally 3 times a week as needed for fluid overload  •?	Protonix 40 mg oral delayed release tablet: Last Dose Taken:  , 1 tab(s) orally 2 times a day  •?	metoprolol succinate 100 mg oral tablet, extended release: Last Dose Taken:  , 1 tab(s) orally once a day   •?	desvenlafaxine (as succinate) 25 mg oral tablet, extended release: Last Dose Taken:  , 1 tab(s) orally once a day  •?	Ranexa 500 mg oral tablet, extended release: 1 tab(s) orally 2 times a day  •?	Lyrica 75 mg oral capsule: 1 cap(s) orally once a day  •?	rosuvastatin 10 mg oral tablet: Last Dose Taken:  , 1 tab(s) orally once a day  •?	montelukast 10 mg oral tablet: Last Dose Taken:  , 1 tab(s) orally once a day  •?	glipiZIDE 5 mg oral tablet: Last Dose Taken:  , 1 tab(s) orally once a day  •?	furosemide 40 mg oral tablet: Last Dose Taken:  , 1 tab(s) orally 2 times a day  •?	potassium chloride 15 mEq oral tablet, extended release: 1 tab(s) orally 2 times a day    Patient received in bed, limited mobility, high risk for pressure injury development or progression.    Wound  Type & Location: Skin Tear to Right arm and Right Lower Extremity  Tissue Description: Partial Thickness, red  Wound Exudate: moderate, sanguineous    Wound Edge: irregular   Periwound Condition: intact

## 2024-01-08 NOTE — PROGRESS NOTE ADULT - ASSESSMENT
77yo F hx of diverticulitis, HFpEF, CAD s/p CABG, AS s/p SAVR (2016), atrial fibrillation s/p watchman (2021), bleeding disorder, asthma, COPD, DM2, HTN, CKD 2-3    # Acute CHF exacerbation last EF 72%, mostly right sided failure  # Severe pulmonary hypertension  # CAD s/p CABG  # AS s/p SAVR (2016)  # atrial fibrillation s/p watchman (2021)  # bleeding disorder  # HTN  - On home Lasix 40X2, metolazone as needed   - CXR showing no pulmonary edema   - Currently on Lasix 60 IV BID, metolazone 5 mg    - strict I/O, monitor urine output, daily weight  - Telemetry monitoring   - Continue home metoprolol 100mg, Ranexa, rosuvastatin     # AKASH on CKD stage 2-3, possible cardiorenal    - c/w IV Lasix   - Monitor UOP, stable Cr     # Asthma  # COPD - not in exacerbation   - on montelukast     # DM with hypoglycemia   - On home glipizide  - ISS for now   - Follow up fingersticks     # Chronic anemia   - Hx of bleeding disorder with multiple bruises and bleeding. no reported hematemesis, epistaxis, melena or hematochezia   - NATALIO on labs   - GI w/u last year done   - transfused one unit  1/6  - c/w Venofer     # Repeated fall at home > check Orthostatic vitals     dvt ppx        Pending: IV diuresis, CRS /PT for fall at home.   Dispo: TBD   Plan of care d/w patient  79yo F hx of diverticulitis, HFpEF, CAD s/p CABG, AS s/p SAVR (2016), atrial fibrillation s/p watchman (2021), bleeding disorder, asthma, COPD, DM2, HTN, CKD 2-3    # Acute CHF exacerbation last EF 72%, mostly right sided failure  # Severe pulmonary hypertension  # CAD s/p CABG  # AS s/p SAVR (2016)  # atrial fibrillation s/p watchman (2021)  # bleeding disorder  # HTN  - On home Lasix 40X2, metolazone as needed   - CXR showing no pulmonary edema   - Currently on Lasix 60 IV BID, metolazone 5 mg    - strict I/O, monitor urine output, daily weight  - Telemetry monitoring   - Continue home metoprolol 100mg, Ranexa, rosuvastatin     # AKASH on CKD stage 2-3, possible cardiorenal    - c/w IV Lasix   - Monitor UOP, stable Cr     # Asthma  # COPD - not in exacerbation   - on montelukast     # DM with hypoglycemia   - On home glipizide  - ISS for now   - Follow up fingersticks     # Chronic anemia   - Hx of bleeding disorder with multiple bruises and bleeding. no reported hematemesis, epistaxis, melena or hematochezia   - NATALIO on labs   - GI w/u last year done   - transfused one unit  1/6  - c/w Venofer     # Repeated fall at home > check Orthostatic vitals     dvt ppx        Pending: IV diuresis, CRS /PT for fall at home.   Dispo: TBD   Plan of care d/w patient

## 2024-01-08 NOTE — ADVANCED PRACTICE NURSE CONSULT - RECOMMEDATIONS
Wound and Skin care recs.   Please apply saline to old dressing prior to removal of old dressing. Once removed  Cleanse wound with saline, pat dry then apply bacitracin as per orders. Cover wound with xeroform and kerlex, avoid adhesives directly on skin.  Skin and incontinence care.  Pressure Injury Prevention.  Assess wound and inform primary provider of any changes.   Case discussed with primary RN.  Wound/ ostomy specialist to f/u as needed.   Other Recs. per Primary team.

## 2024-01-09 NOTE — PROGRESS NOTE ADULT - ASSESSMENT
79yo F hx of diverticulitis, HFpEF, CAD s/p CABG, AS s/p SAVR (2016), atrial fibrillation s/p watchman (2021), bleeding disorder, asthma, COPD, DM2, HTN, CKD 2-3    # Acute CHF exacerbation last EF 72%, mostly right sided failure  # Severe pulmonary hypertension  # CAD s/p CABG  # AS s/p SAVR (2016)  # atrial fibrillation s/p watchman (2021)  # bleeding disorder  # HTN  - On home Lasix 40X2, metolazone as needed   - CXR showing no pulmonary edema   - Currently on Lasix 60 IV BID, metolazone 5 mg    - strict I/O, monitor urine output, daily weight  - Telemetry monitoring   - Continue home metoprolol 100mg, Ranexa, rosuvastatin     # 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 no reported hematemesis, epistaxis, melena or hematochezia   - NATALIO on labs   - GI w/u last year done   - transfused one unit  1/6  - c/w Venofer     # Repeated fall at home > check Orthostatic vitals     dvt ppx        Pending: IV diuresis, CRS /PT for fall at home.   Dispo: TBD   Plan of care d/w patient  77yo F hx of diverticulitis, HFpEF, CAD s/p CABG, AS s/p SAVR (2016), atrial fibrillation s/p watchman (2021), bleeding disorder, asthma, COPD, DM2, HTN, CKD 2-3    # Acute CHF exacerbation last EF 72%, mostly right sided failure  # Severe pulmonary hypertension  # CAD s/p CABG  # AS s/p SAVR (2016)  # atrial fibrillation s/p watchman (2021)  # bleeding disorder  # HTN  - On home Lasix 40X2, metolazone as needed   - CXR showing no pulmonary edema   - Currently on Lasix 60 IV BID, metolazone 5 mg    - strict I/O, monitor urine output, daily weight  - Telemetry monitoring   - Continue home metoprolol 100mg, Ranexa, rosuvastatin     # 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 no reported hematemesis, epistaxis, melena or hematochezia   - NATALIO on labs   - GI w/u last year done   - transfused one unit  1/6  - c/w Venofer     # Repeated fall at home > check Orthostatic vitals     dvt ppx        Pending: IV diuresis, CRS /PT for fall at home.   Dispo: TBD   Plan of care d/w patient

## 2024-01-09 NOTE — PROGRESS NOTE ADULT - SUBJECTIVE AND OBJECTIVE BOX
KAI EVAN  78y  Female      Patient is a 78y old  Female who presents with a chief complaint of CHF exacerbation     INTERVAL HPI/OVERNIGHT EVENTS:      ******************************* REVIEW OF SYSTEMS:**********************************************    All other review of systems negative    *********************** VITALS ******************************************    T(F): 97.6 (01-09-24 @ 13:36)  HR: 73 (01-09-24 @ 13:36) (71 - 77)  BP: 100/55 (01-09-24 @ 13:36) (100/55 - 130/58)  RR: 18 (01-09-24 @ 13:36) (18 - 18)  SpO2: 98% (01-08-24 @ 20:27) (98% - 98%)    01-08-24 @ 07:01  -  01-09-24 @ 07:00  --------------------------------------------------------  IN: 1446 mL / OUT: 4010 mL / NET: -2564 mL    01-09-24 @ 07:01  -  01-09-24 @ 14:04  --------------------------------------------------------  IN: 853 mL / OUT: 2400 mL / NET: -1547 mL            01-08-24 @ 07:01  -  01-09-24 @ 07:00  --------------------------------------------------------  IN: 1446 mL / OUT: 4010 mL / NET: -2564 mL    01-09-24 @ 07:01  -  01-09-24 @ 14:04  --------------------------------------------------------  IN: 853 mL / OUT: 2400 mL / NET: -1547 mL        ******************************** PHYSICAL EXAM:**************************************************  GENERAL: NAD    PSYCH: no agitation, baseline mentation  HEENT:     NERVOUS SYSTEM:  Alert & Oriented X3,     PULMONARY: LIANET, CTA    CARDIOVASCULAR: S1S2 RRR    GI: Soft, NT, ND; BS present.    EXTREMITIES:  2+ Peripheral  edema LE B/L , improving.   LYMPH: No lymphadenopathy noted    SKIN: No rashes or lesions      **************************** LABS *******************************************************                          8.3    6.57  )-----------( 122      ( 09 Jan 2024 05:57 )             27.6     01-09    142  |  99  |  41<H>  ----------------------------<  113<H>  3.4<L>   |  28  |  1.8<H>    Ca    8.7      09 Jan 2024 05:57  Mg     1.8     01-09        Urinalysis Basic - ( 09 Jan 2024 05:57 )    Color: x / Appearance: x / SG: x / pH: x  Gluc: 113 mg/dL / Ketone: x  / Bili: x / Urobili: x   Blood: x / Protein: x / Nitrite: x   Leuk Esterase: x / RBC: x / WBC x   Sq Epi: x / Non Sq Epi: x / Bacteria: x        Lactate Trend        CAPILLARY BLOOD GLUCOSE      POCT Blood Glucose.: 209 mg/dL (09 Jan 2024 11:29)          **************************Active Medications *******************************************  Augmentin (Other)  penicillins (Hives; Rash)      acetaminophen     Tablet .. 650 milliGRAM(s) Oral every 6 hours PRN  aluminum hydroxide/magnesium hydroxide/simethicone Suspension 30 milliLiter(s) Oral every 4 hours PRN  atorvastatin 20 milliGRAM(s) Oral at bedtime  bacitracin   Ointment 1 Application(s) Topical daily  chlorhexidine 2% Cloths 1 Application(s) Topical daily  furosemide   Injectable 60 milliGRAM(s) IV Push every 12 hours  iron sucrose IVPB 200 milliGRAM(s) IV Intermittent every 24 hours  metolazone 5 milliGRAM(s) Oral daily  metoprolol succinate  milliGRAM(s) Oral daily  montelukast 10 milliGRAM(s) Oral daily  nystatin Powder 1 Application(s) Topical three times a day  pantoprazole    Tablet 40 milliGRAM(s) Oral before breakfast  pregabalin 75 milliGRAM(s) Oral daily  ranolazine 500 milliGRAM(s) Oral two times a day      ***************************************************  RADIOLOGY & ADDITIONAL TESTS:    Imaging Personally Reviewed:  [ ] YES  [ ] NO    HEALTH ISSUES - PROBLEM Dx:

## 2024-01-10 NOTE — PROGRESS NOTE ADULT - SUBJECTIVE AND OBJECTIVE BOX
EVAN QUINN 78y Female  MRN#: [id number]   Hospital Day: 5d    SUBJECTIVE  Patient is a 78y old Female who presents with a chief complaint of CHF exacerbation (09 Jan 2024 14:03)  Currently admitted to medicine with the primary diagnosis of AKASH (acute kidney injury)      INTERVAL HPI AND OVERNIGHT EVENTS:  Patient was examined and seen at bedside. This morning she is resting comfortably in bed and reports no issues or overnight events.    OBJECTIVE  PAST MEDICAL & SURGICAL HISTORY  Aortic stenosis    Diabetes    Asthma with COPD  many years since last attack    CAD (coronary artery disease), native coronary artery    Anemia    History of bleeding disorder  having work up 5/2/21- HAD WORKUP BUT NO DIAGNOSIS "NOTHING WAS FOUND"    History of transfusion reaction    Hiatal hernia    H/O ascending aortic replacement  Bovine Replacement    S/P CABG x 1  complication of bleeding 2016    H/O total knee replacement, right  complicated with fx femur bars screws in femur- b/l knee replacement    S/P hysterectomy    Presence of Watchman left atrial appendage closure device      ALLERGIES:  Augmentin (Other)  penicillins (Hives; Rash)    MEDICATIONS:  STANDING MEDICATIONS  atorvastatin 20 milliGRAM(s) Oral at bedtime  bacitracin   Ointment 1 Application(s) Topical daily  chlorhexidine 2% Cloths 1 Application(s) Topical daily  metolazone 5 milliGRAM(s) Oral daily  metoprolol succinate  milliGRAM(s) Oral daily  montelukast 10 milliGRAM(s) Oral daily  nystatin Powder 1 Application(s) Topical three times a day  pantoprazole    Tablet 40 milliGRAM(s) Oral before breakfast  polyethylene glycol 3350 17 Gram(s) Oral at bedtime  pregabalin 75 milliGRAM(s) Oral daily  ranolazine 500 milliGRAM(s) Oral two times a day  senna 2 Tablet(s) Oral at bedtime    PRN MEDICATIONS  acetaminophen     Tablet .. 650 milliGRAM(s) Oral every 6 hours PRN  aluminum hydroxide/magnesium hydroxide/simethicone Suspension 30 milliLiter(s) Oral every 4 hours PRN      VITAL SIGNS: Last 24 Hours  T(C): 36.3 (10 Bhaskar 2024 12:51), Max: 37 (09 Jan 2024 20:14)  T(F): 97.3 (10 Bhaskar 2024 12:51), Max: 98.6 (09 Jan 2024 20:14)  HR: 69 (10 Bhaskar 2024 14:24) (69 - 82)  BP: 108/53 (10 Bhaskar 2024 14:24) (96/50 - 134/60)  BP(mean): --  RR: 17 (10 Bhaskar 2024 12:51) (17 - 18)  SpO2: 96% (10 Bhaskar 2024 05:53) (96% - 96%)    LABS:                        9.1    6.78  )-----------( 135      ( 10 Bhaskar 2024 06:45 )             30.7     01-10    143  |  96<L>  |  40<H>  ----------------------------<  135<H>  3.1<L>   |  36<H>  |  1.7<H>    Ca    9.3      10 Bhaskar 2024 06:45  Mg     1.7     01-10    TPro  5.9<L>  /  Alb  3.6  /  TBili  1.2  /  DBili  x   /  AST  20  /  ALT  13  /  AlkPhos  73  01-10      Urinalysis Basic - ( 10 Bhaskar 2024 06:45 )    Color: x / Appearance: x / SG: x / pH: x  Gluc: 135 mg/dL / Ketone: x  / Bili: x / Urobili: x   Blood: x / Protein: x / Nitrite: x   Leuk Esterase: x / RBC: x / WBC x   Sq Epi: x / Non Sq Epi: x / Bacteria: x                RADIOLOGY:      PHYSICAL EXAM:  CONSTITUTIONAL: friendly, No acute distress, AAOx3  HEAD: Atraumatic, normocephalic  EYES: EOM intact, PERRLA, conjunctiva and sclera clear  ENT: moist mucous membranes  PULMONARY: Clear to auscultation bilaterally  CARDIOVASCULAR: Regular rate and rhythm  GASTROINTESTINAL: Soft, non-tender, non-distended; bowel sounds present  MUSCULOSKELETAL: no edema  NEUROLOGY: non-focal  SKIN: warm and dry, RLE wound     EVAN QUINN 78y Female  MRN#: [id number]   Hospital Day: 5d    SUBJECTIVE  Patient is a 78y old Female who presents with a chief complaint of CHF exacerbation (09 Jan 2024 14:03)  Currently admitted to medicine with the primary diagnosis of AKASH (acute kidney injury)      INTERVAL HPI AND OVERNIGHT EVENTS:  Patient was examined and seen at bedside. This morning she is resting comfortably in bed and reports no issues or overnight events.    OBJECTIVE  PAST MEDICAL & SURGICAL HISTORY  Aortic stenosis    Diabetes    Asthma with COPD  many years since last attack    CAD (coronary artery disease), native coronary artery    Anemia    History of bleeding disorder  having work up 5/2/21- HAD WORKUP BUT NO DIAGNOSIS "NOTHING WAS FOUND"    History of transfusion reaction    Hiatal hernia    H/O ascending aortic replacement  Bovine Replacement    S/P CABG x 1  complication of bleeding 2016    H/O total knee replacement, right  complicated with fx femur bars screws in femur- b/l knee replacement    S/P hysterectomy    Presence of Watchman left atrial appendage closure device      ALLERGIES:  Augmentin (Other)  penicillins (Hives; Rash)    MEDICATIONS:  STANDING MEDICATIONS  atorvastatin 20 milliGRAM(s) Oral at bedtime  bacitracin   Ointment 1 Application(s) Topical daily  chlorhexidine 2% Cloths 1 Application(s) Topical daily  metolazone 5 milliGRAM(s) Oral daily  metoprolol succinate  milliGRAM(s) Oral daily  montelukast 10 milliGRAM(s) Oral daily  nystatin Powder 1 Application(s) Topical three times a day  pantoprazole    Tablet 40 milliGRAM(s) Oral before breakfast  polyethylene glycol 3350 17 Gram(s) Oral at bedtime  pregabalin 75 milliGRAM(s) Oral daily  ranolazine 500 milliGRAM(s) Oral two times a day  senna 2 Tablet(s) Oral at bedtime    PRN MEDICATIONS  acetaminophen     Tablet .. 650 milliGRAM(s) Oral every 6 hours PRN  aluminum hydroxide/magnesium hydroxide/simethicone Suspension 30 milliLiter(s) Oral every 4 hours PRN      VITAL SIGNS: Last 24 Hours  T(C): 36.3 (10 Bhaskar 2024 12:51), Max: 37 (09 Jan 2024 20:14)  T(F): 97.3 (10 Bhaskar 2024 12:51), Max: 98.6 (09 Jan 2024 20:14)  HR: 69 (10 Bhaskar 2024 14:24) (69 - 82)  BP: 108/53 (10 Bhaskar 2024 14:24) (96/50 - 134/60)  BP(mean): --  RR: 17 (10 Bhaskar 2024 12:51) (17 - 18)  SpO2: 96% (10 Bhaskar 2024 05:53) (96% - 96%)    LABS:                        9.1    6.78  )-----------( 135      ( 10 Bhaskar 2024 06:45 )             30.7     01-10    143  |  96<L>  |  40<H>  ----------------------------<  135<H>  3.1<L>   |  36<H>  |  1.7<H>    Ca    9.3      10 Bhaskar 2024 06:45  Mg     1.7     01-10    TPro  5.9<L>  /  Alb  3.6  /  TBili  1.2  /  DBili  x   /  AST  20  /  ALT  13  /  AlkPhos  73  01-10      Urinalysis Basic - ( 10 Bhaskar 2024 06:45 )    Color: x / Appearance: x / SG: x / pH: x  Gluc: 135 mg/dL / Ketone: x  / Bili: x / Urobili: x   Blood: x / Protein: x / Nitrite: x   Leuk Esterase: x / RBC: x / WBC x   Sq Epi: x / Non Sq Epi: x / Bacteria: x                RADIOLOGY:      PHYSICAL EXAM:  CONSTITUTIONAL: friendly, No acute distress, AAOx3  HEAD: Atraumatic, normocephalic  EYES: EOM intact, PERRLA, conjunctiva and sclera clear  ENT: moist mucous membranes  PULMONARY: Clear to auscultation bilaterally  CARDIOVASCULAR: Regular rate and rhythm  GASTROINTESTINAL: Soft, non-tender, non-distended; bowel sounds present  MUSCULOSKELETAL: no edema  NEUROLOGY: non-focal  SKIN: warm and dry, RLE wound

## 2024-01-10 NOTE — PHYSICAL THERAPY INITIAL EVALUATION ADULT - GENERAL OBSERVATIONS, REHAB EVAL
8:40-9:15 pt encountered in bed in NAD, VSS, +tele, IV, cooperative.  Patient performed bed mobility, transfers, and ambulated with assistance, limited by decreased endurance.  Pt would benefit from Home PT to restore prior level of mobility and safety.

## 2024-01-10 NOTE — PHYSICAL THERAPY INITIAL EVALUATION ADULT - PERTINENT HX OF CURRENT PROBLEM, REHAB EVAL
77yo F hx of diverticulitis, HFpEF, CAD s/p CABG, AS s/p SAVR (2016), atrial fibrillation s/p watchman (2021), bleeding disorder, asthma, COPD, DM2, HTN, CKD 2-3    # Acute CHF exacerbation last EF 72%, mostly right sided failure  # Severe pulmonary hypertension  # CAD s/p CABG  # AS s/p SAVR (2016)  # atrial fibrillation s/p watchman (2021) 79yo F hx of diverticulitis, HFpEF, CAD s/p CABG, AS s/p SAVR (2016), atrial fibrillation s/p watchman (2021), bleeding disorder, asthma, COPD, DM2, HTN, CKD 2-3    # Acute CHF exacerbation last EF 72%, mostly right sided failure  # Severe pulmonary hypertension  # CAD s/p CABG  # AS s/p SAVR (2016)  # atrial fibrillation s/p watchman (2021)

## 2024-01-10 NOTE — PROGRESS NOTE ADULT - ATTENDING COMMENTS
Patient seen at bedside. Changes made within note as well. Discussed with medical team that includes resident(s), medical student(s), nursing staff, case management.     77yo F hx of diverticulitis, HFpEF, CAD s/p CABG, AS s/p SAVR (2016), atrial fibrillation s/p watchman (2021), bleeding disorder, asthma, COPD, DM2, HTN, CKD 2-3    # Acute CHF exacerbation last EF 72%, mostly right sided failure  # Severe pulmonary hypertension  # CAD s/p CABG  # AS s/p SAVR (2016)  # atrial fibrillation s/p watchman (2021)  # bleeding disorder  # HTN  - On home Lasix 40X2, metolazone as needed   - CXR showing no pulmonary edema   - Currently on Lasix 60 IV BID--> changed to 60 iv daily . 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 no reported hematemesis, epistaxis, melena or hematochezia   - NATALIO on labs   - GI w/u last year done   - transfused one unit  1/6  - c/w Venofer     # Repeated fall at home > check Orthostatic vitals     dvt ppx        Pending: IV diuresis, improvement in serum creatinine, can be switched to oral lasix in 24-48hours pending clinical improvement.   lasix cut down to 60 daily IV from BID today   possible discharge in 24 to 48 hours pending clinical improvement.     Plan of care d/w patient Patient seen at bedside. Changes made within note as well. Discussed with medical team that includes resident(s), medical student(s), nursing staff, case management.     79yo F hx of diverticulitis, HFpEF, CAD s/p CABG, AS s/p SAVR (2016), atrial fibrillation s/p watchman (2021), bleeding disorder, asthma, COPD, DM2, HTN, CKD 2-3    # Acute CHF exacerbation last EF 72%, mostly right sided failure  # Severe pulmonary hypertension  # CAD s/p CABG  # AS s/p SAVR (2016)  # atrial fibrillation s/p watchman (2021)  # bleeding disorder  # HTN  - On home Lasix 40X2, metolazone as needed   - CXR showing no pulmonary edema   - Currently on Lasix 60 IV BID--> changed to 60 iv daily . 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 no reported hematemesis, epistaxis, melena or hematochezia   - NATALIO on labs   - GI w/u last year done   - transfused one unit  1/6  - c/w Venofer     # Repeated fall at home > check Orthostatic vitals     dvt ppx        Pending: IV diuresis, improvement in serum creatinine, can be switched to oral lasix in 24-48hours pending clinical improvement.   lasix cut down to 60 daily IV from BID today   possible discharge in 24 to 48 hours pending clinical improvement.     Plan of care d/w patient Patient seen at bedside. Changes made within note as well. Discussed with medical team that includes resident(s), medical student(s), nursing staff, case management.     77yo F hx of diverticulitis, HFpEF, CAD s/p CABG, AS s/p SAVR (2016), atrial fibrillation s/p watchman (2021), bleeding disorder, asthma, COPD, DM2, HTN, CKD 2-3    # Acute CHF exacerbation last EF 72%, mostly right sided failure  # Severe pulmonary hypertension  # CAD s/p CABG  # AS s/p SAVR (2016)  # atrial fibrillation s/p watchman (2021)  # bleeding disorder  # HTN  - On home Lasix 40X2, metolazone as needed   - CXR showing no pulmonary edema   - Currently on Lasix 60 IV BID--> changed to 60 iv daily . 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 no reported hematemesis, epistaxis, melena or hematochezia   - NATALIO on labs   - GI w/u last year done   - transfused one unit  1/6  - c/w Venofer     # Repeated fall at home > check Orthostatic vitals     dvt ppx        Pending: IV diuresis, improvement in serum creatinine, can be switched to oral lasix in 24-48hours pending clinical improvement.   lasix cut down to 60 daily IV from BID today   monitor potassium   possible discharge in 24 to 48 hours pending clinical improvement.     Plan of care d/w patient

## 2024-01-10 NOTE — PROGRESS NOTE ADULT - ASSESSMENT
77yo F hx of diverticulitis, HFpEF, CAD s/p CABG, AS s/p SAVR (2016), atrial fibrillation s/p watchman (2021), bleeding disorder, asthma, COPD, DM2, HTN, CKD 2-3    # Acute CHF exacerbation last EF 72%, mostly right sided failure  # Severe pulmonary hypertension  # CAD s/p CABG  # AS s/p SAVR (2016)  # atrial fibrillation s/p watchman (2021)  # bleeding disorder  # HTN  - On home Lasix 40X2, metolazone as needed   - CXR showing no pulmonary edema   - Currently on Lasix 60 IV BID--> qd today 1/10  - c/w metolazone 5 mg    - strict I/O, monitor urine output, daily weight  - Telemetry monitoring   - Continue home metoprolol 100mg, Ranexa, rosuvastatin     # 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 no reported hematemesis, epistaxis, melena or hematochezia   - NATALIO on labs   - GI w/u last year done   - transfused one unit  1/6  - c/w Venofer     # Repeated fall at home > check Orthostatic vitals     dvt ppx        Pending: IV diuresis, CRS /PT for fall at home.   Dispo: TBD   Plan of care d/w patient    79yo F hx of diverticulitis, HFpEF, CAD s/p CABG, AS s/p SAVR (2016), atrial fibrillation s/p watchman (2021), bleeding disorder, asthma, COPD, DM2, HTN, CKD 2-3    # Acute CHF exacerbation last EF 72%, mostly right sided failure  # Severe pulmonary hypertension  # CAD s/p CABG  # AS s/p SAVR (2016)  # atrial fibrillation s/p watchman (2021)  # bleeding disorder  # HTN  - On home Lasix 40X2, metolazone as needed   - CXR showing no pulmonary edema   - Currently on Lasix 60 IV BID--> qd today 1/10  - c/w metolazone 5 mg    - strict I/O, monitor urine output, daily weight  - Telemetry monitoring   - Continue home metoprolol 100mg, Ranexa, rosuvastatin     # 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 no reported hematemesis, epistaxis, melena or hematochezia   - NATALIO on labs   - GI w/u last year done   - transfused one unit  1/6  - c/w Venofer     # Repeated fall at home > check Orthostatic vitals     dvt ppx        Pending: IV diuresis, CRS /PT for fall at home.   Dispo: TBD   Plan of care d/w patient

## 2024-01-11 NOTE — PROGRESS NOTE ADULT - ASSESSMENT
77yo F hx of diverticulitis, HFpEF, CAD s/p CABG, AS s/p SAVR (2016), atrial fibrillation s/p watchman (2021), bleeding disorder, asthma, COPD, DM2, HTN, CKD 2-3    A/P:   Acute HFpEF:   Severe pulmonary hypertension  AS s/p SAVR (2016)  Patient with severe leg edema, SOB, Pro-BNP  CXR showed improved vascular congestion.   Continue Lasix 40mg IV BID and Metolazone.   Low sodium diet and fluid restriction.   Monitor daily weight.     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     CAD s/p CABG  Continue metoprolol 100mg, Ranexa, rosuvastatin   Paroxysmal Atrial Fibrillation      # Asthma  # COPD - not in exacerbation   - on montelukast     # DM with hypoglycemia   - On home glipizide  - ISS for now   - Follow up fingersticks     # Chronic anemia   Iron deficiency anemia:   - Hx of bleeding disorder with multiple bruises and bleeding. no reported hematemesis, epistaxis, melena or hematochezia   - NATALIO on labs   - GI w/u last year done   - transfused one unit  1/6  Completed venofer 200mg IV daily for 5 doses.     # Repeated fall at home > check Orthostatic vitals     dvt ppx      #Progress Note Handoff:  Pending (specify):  improving leg edema.   Family discussion:  Disposition: STR 79yo F hx of diverticulitis, HFpEF, CAD s/p CABG, AS s/p SAVR (2016), atrial fibrillation s/p watchman (2021), bleeding disorder, asthma, COPD, DM2, HTN, CKD 2-3    A/P:   Acute HFpEF:   Severe pulmonary hypertension  AS s/p SAVR (2016)  Patient with severe leg edema, SOB, Pro-BNP  CXR showed improved vascular congestion.   Continue Lasix 40mg IV BID and Metolazone.   Low sodium diet and fluid restriction.   Monitor daily weight.     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     CAD s/p CABG  Continue metoprolol 100mg, Ranexa, rosuvastatin   Paroxysmal Atrial Fibrillation      # Asthma  # COPD - not in exacerbation   - on montelukast     # DM with hypoglycemia   - On home glipizide  - ISS for now   - Follow up fingersticks     # Chronic anemia   Iron deficiency anemia:   - Hx of bleeding disorder with multiple bruises and bleeding. no reported hematemesis, epistaxis, melena or hematochezia   - NATALIO on labs   - GI w/u last year done   - transfused one unit  1/6  Completed venofer 200mg IV daily for 5 doses.     # Repeated fall at home > check Orthostatic vitals     dvt ppx      #Progress Note Handoff:  Pending (specify):  improving leg edema.   Family discussion:  Disposition: STR

## 2024-01-11 NOTE — PROGRESS NOTE ADULT - SUBJECTIVE AND OBJECTIVE BOX
EVAN QUINN  78y  Female      Patient is a 78y old  Female who presents with a chief complaint of CHF exacerbation (10 Bhaskar 2024 16:45)      INTERVAL HPI/OVERNIGHT EVENTS:  She c/o cough, no chest pain, no fever.   Vital Signs Last 24 Hrs  T(C): 37 (11 Jan 2024 12:57), Max: 37.1 (11 Jan 2024 04:33)  T(F): 98.6 (11 Jan 2024 12:57), Max: 98.7 (11 Jan 2024 04:33)  HR: 77 (11 Jan 2024 12:57) (75 - 86)  BP: 120/58 (11 Jan 2024 12:57) (107/55 - 120/58)  BP(mean): --  RR: 18 (11 Jan 2024 12:57) (18 - 18)  SpO2: 98% (10 Bhaskar 2024 20:38) (98% - 98%)          01-10-24 @ 07:01 - 01-11-24 @ 07:00  --------------------------------------------------------  IN: 868 mL / OUT: 1050 mL / NET: -182 mL    01-11-24 @ 07:01  - 01-11-24 @ 17:22  --------------------------------------------------------  IN: 768 mL / OUT: 1000 mL / NET: -232 mL            Consultant(s) Notes Reviewed:  [x ] YES  [ ] NO          MEDICATIONS  (STANDING):  atorvastatin 20 milliGRAM(s) Oral at bedtime  bacitracin   Ointment 1 Application(s) Topical daily  benzonatate 100 milliGRAM(s) Oral three times a day  chlorhexidine 2% Cloths 1 Application(s) Topical daily  furosemide   Injectable 60 milliGRAM(s) IV Push daily  metolazone 5 milliGRAM(s) Oral daily  metoprolol succinate  milliGRAM(s) Oral daily  montelukast 10 milliGRAM(s) Oral daily  nystatin Powder 1 Application(s) Topical three times a day  pantoprazole    Tablet 40 milliGRAM(s) Oral before breakfast  polyethylene glycol 3350 17 Gram(s) Oral at bedtime  pregabalin 75 milliGRAM(s) Oral daily  ranolazine 500 milliGRAM(s) Oral two times a day  senna 2 Tablet(s) Oral at bedtime    MEDICATIONS  (PRN):  acetaminophen     Tablet .. 650 milliGRAM(s) Oral every 6 hours PRN Temp greater or equal to 38C (100.4F), Moderate Pain (4 - 6)  aluminum hydroxide/magnesium hydroxide/simethicone Suspension 30 milliLiter(s) Oral every 4 hours PRN Dyspepsia      LABS                          9.4    8.37  )-----------( 114      ( 11 Jan 2024 06:43 )             31.7     01-11    139  |  95<L>  |  39<H>  ----------------------------<  169<H>  3.2<L>   |  33<H>  |  1.6<H>    Ca    9.3      11 Jan 2024 06:43  Mg     1.7     01-11    TPro  6.0  /  Alb  3.7  /  TBili  1.4<H>  /  DBili  x   /  AST  21  /  ALT  12  /  AlkPhos  70  01-11      Urinalysis Basic - ( 11 Jan 2024 06:43 )    Color: x / Appearance: x / SG: x / pH: x  Gluc: 169 mg/dL / Ketone: x  / Bili: x / Urobili: x   Blood: x / Protein: x / Nitrite: x   Leuk Esterase: x / RBC: x / WBC x   Sq Epi: x / Non Sq Epi: x / Bacteria: x        Lactate Trend        CAPILLARY BLOOD GLUCOSE      POCT Blood Glucose.: 199 mg/dL (11 Jan 2024 16:34)        RADIOLOGY & ADDITIONAL TESTS:    Imaging Personally Reviewed:  [ ] YES  [ ] NO    HEALTH ISSUES - PROBLEM Dx:          PHYSICAL EXAM:  GENERAL: NAD, well-developed.  HEAD:  Atraumatic, Normocephalic.  EYES: EOMI, PERRLA, conjunctiva and sclera clear.  NECK: Supple, No JVD.  CHEST/LUNG: Clear to auscultation bilaterally; No wheeze.  HEART: Regular rate and rhythm; S1 S2.   ABDOMEN: Soft, Nontender, Nondistended; Bowel sounds present.  EXTREMITIES: leg edema 3+  PSYCH: AAOx3.  NEUROLOGY: non-focal.  SKIN: No rashes or lesions.

## 2024-01-12 NOTE — CONSULT NOTE ADULT - SUBJECTIVE AND OBJECTIVE BOX
Patient is a 78y old  Female who presents with a chief complaint of CHF exacerbation (11 Jan 2024 17:17)      HPI:  79yo F hx of diverticulitis, HFpEF, CAD s/p CABG, AS s/p SAVR (2016), atrial fibrillation s/p watchman (2021), bleeding disorder, asthma, COPD, DM2, HTN, CKD 2-3. She presented to the ED complaining of progressively worsening shortness of breath of 2 weeks duration, limiting her ambulation, associated with worsening lower limb edema up to the thigh, not responding to Lasix and metolazone. She denied chest pain, cough, fever, chills or rigors, no recent illness. She is not compliant with her diet, not restricting fluid intake.       Vital Signs T(F): 97.1 HR: 73 BP: 130/53 RR: 18 SpO2: 100% nasal cannula     EKG repeated twice showed no acute ischemic changes  troponin is stable twice     admitted to telemetry for further evaluation    (06 Jan 2024 00:11)      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      CAD (coronary artery disease), native coronary artery      Anemia      History of bleeding disorder  having work up 5/2/21- HAD WORKUP BUT NO DIAGNOSIS "NOTHING WAS FOUND"      History of transfusion reaction      Hiatal hernia      H/O ascending aortic replacement  Bovine Replacement      S/P CABG x 1  complication of bleeding 2016      H/O total knee replacement, right  complicated with fx femur bars screws in femur- b/l knee replacement      S/P hysterectomy      Presence of Watchman left atrial appendage closure device          SOCIAL HX:   Smoking                         ETOH                            Other    FAMILY HISTORY:  Family history of pseudocholinesterase deficiency (Child)    .  No cardiovascular or pulmonary family history     REVIEW OF SYSTEMS:    All ROS are negative exept per HPI       Allergies    Augmentin (Other)  penicillins (Hives; Rash)    Intolerances          PHYSICAL EXAM  Vital Signs Last 24 Hrs  T(C): 36.6 (12 Jan 2024 13:01), Max: 36.8 (11 Jan 2024 20:20)  T(F): 97.9 (12 Jan 2024 13:01), Max: 98.3 (11 Jan 2024 20:20)  HR: 77 (12 Jan 2024 13:01) (77 - 89)  BP: 136/60 (12 Jan 2024 13:01) (118/56 - 136/60)  BP(mean): --  RR: 18 (12 Jan 2024 13:01) (18 - 18)  SpO2: 95% (11 Jan 2024 20:00) (95% - 95%)    Parameters below as of 11 Jan 2024 20:00  Patient On (Oxygen Delivery Method): nasal cannula  O2 Flow (L/min): 2      CONSTITUTIONAL:  Moderate respiratory distress      CARDIAC:   Normal rate,   regular rhythm.    no edema    RESPIRATORY:   poor inspiratory effort    GASTROINTESTINAL:  Abdomen soft, non-tender,   No guarding    MUSCULOSKELETAL:   Range of motion is not limited,  No clubbing, cyanosis    NEUROLOGICAL:   Alert and oriented     SKIN:   Skin normal color for race,   No evidence of rash.      LABS:                          10.0   11.17 )-----------( 105      ( 12 Jan 2024 07:22 )             33.8                                               01-12    141  |  95<L>  |  28<H>  ----------------------------<  227<H>  3.2<L>   |  32  |  1.3    Ca    9.5      12 Jan 2024 07:22  Mg     2.4     01-12    TPro  6.0  /  Alb  3.7  /  TBili  1.4<H>  /  DBili  x   /  AST  21  /  ALT  12  /  AlkPhos  70  01-11                                             Urinalysis Basic - ( 12 Jan 2024 07:22 )    Color: x / Appearance: x / SG: x / pH: x  Gluc: 227 mg/dL / Ketone: x  / Bili: x / Urobili: x   Blood: x / Protein: x / Nitrite: x   Leuk Esterase: x / RBC: x / WBC x   Sq Epi: x / Non Sq Epi: x / Bacteria: x                                                  LIVER FUNCTIONS - ( 11 Jan 2024 06:43 )  Alb: 3.7 g/dL / Pro: 6.0 g/dL / ALK PHOS: 70 U/L / ALT: 12 U/L / AST: 21 U/L / GGT: x                                                                                                MEDICATIONS  (STANDING):  albuterol/ipratropium for Nebulization 3 milliLiter(s) Nebulizer every 4 hours  atorvastatin 20 milliGRAM(s) Oral at bedtime  bacitracin   Ointment 1 Application(s) Topical daily  benzonatate 100 milliGRAM(s) Oral three times a day  chlorhexidine 2% Cloths 1 Application(s) Topical daily  desvenlafaxine ER 25 milliGRAM(s) Oral at bedtime  enoxaparin Injectable 40 milliGRAM(s) SubCutaneous every 24 hours  furosemide   Injectable 60 milliGRAM(s) IV Push daily  insulin glargine Injectable (LANTUS) 18 Unit(s) SubCutaneous at bedtime  insulin lispro (ADMELOG) corrective regimen sliding scale   SubCutaneous three times a day before meals  insulin lispro Injectable (ADMELOG) 6 Unit(s) SubCutaneous three times a day before meals  methylPREDNISolone sodium succinate Injectable 40 milliGRAM(s) IV Push two times a day  metolazone 5 milliGRAM(s) Oral daily  metoprolol succinate  milliGRAM(s) Oral daily  montelukast 10 milliGRAM(s) Oral daily  nystatin Powder 1 Application(s) Topical three times a day  pantoprazole    Tablet 40 milliGRAM(s) Oral before breakfast  polyethylene glycol 3350 17 Gram(s) Oral at bedtime  pregabalin 75 milliGRAM(s) Oral daily  ranolazine 500 milliGRAM(s) Oral two times a day  senna 2 Tablet(s) Oral at bedtime    MEDICATIONS  (PRN):  acetaminophen     Tablet .. 650 milliGRAM(s) Oral every 6 hours PRN Temp greater or equal to 38C (100.4F), Moderate Pain (4 - 6)  aluminum hydroxide/magnesium hydroxide/simethicone Suspension 30 milliLiter(s) Oral every 4 hours PRN Dyspepsia      X-Rays reviewed: Patient is a 78y old  Female who presents with a chief complaint of CHF exacerbation (11 Jan 2024 17:17)      HPI:  77yo F hx of diverticulitis, HFpEF, CAD s/p CABG, AS s/p SAVR (2016), atrial fibrillation s/p watchman (2021), bleeding disorder, asthma, COPD, DM2, HTN, CKD 2-3. She presented to the ED complaining of progressively worsening shortness of breath of 2 weeks duration, limiting her ambulation, associated with worsening lower limb edema up to the thigh, not responding to Lasix and metolazone. She denied chest pain, cough, fever, chills or rigors, no recent illness. She is not compliant with her diet, not restricting fluid intake.       Vital Signs T(F): 97.1 HR: 73 BP: 130/53 RR: 18 SpO2: 100% nasal cannula     EKG repeated twice showed no acute ischemic changes  troponin is stable twice     admitted to telemetry for further evaluation    (06 Jan 2024 00:11)      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      CAD (coronary artery disease), native coronary artery      Anemia      History of bleeding disorder  having work up 5/2/21- HAD WORKUP BUT NO DIAGNOSIS "NOTHING WAS FOUND"      History of transfusion reaction      Hiatal hernia      H/O ascending aortic replacement  Bovine Replacement      S/P CABG x 1  complication of bleeding 2016      H/O total knee replacement, right  complicated with fx femur bars screws in femur- b/l knee replacement      S/P hysterectomy      Presence of Watchman left atrial appendage closure device          SOCIAL HX:   Smoking                         ETOH                            Other    FAMILY HISTORY:  Family history of pseudocholinesterase deficiency (Child)    .  No cardiovascular or pulmonary family history     REVIEW OF SYSTEMS:    All ROS are negative exept per HPI       Allergies    Augmentin (Other)  penicillins (Hives; Rash)    Intolerances          PHYSICAL EXAM  Vital Signs Last 24 Hrs  T(C): 36.6 (12 Jan 2024 13:01), Max: 36.8 (11 Jan 2024 20:20)  T(F): 97.9 (12 Jan 2024 13:01), Max: 98.3 (11 Jan 2024 20:20)  HR: 77 (12 Jan 2024 13:01) (77 - 89)  BP: 136/60 (12 Jan 2024 13:01) (118/56 - 136/60)  BP(mean): --  RR: 18 (12 Jan 2024 13:01) (18 - 18)  SpO2: 95% (11 Jan 2024 20:00) (95% - 95%)    Parameters below as of 11 Jan 2024 20:00  Patient On (Oxygen Delivery Method): nasal cannula  O2 Flow (L/min): 2      CONSTITUTIONAL:  Moderate respiratory distress      CARDIAC:   Normal rate,   regular rhythm.    no edema    RESPIRATORY:   poor inspiratory effort    GASTROINTESTINAL:  Abdomen soft, non-tender,   No guarding    MUSCULOSKELETAL:   Range of motion is not limited,  No clubbing, cyanosis    NEUROLOGICAL:   Alert and oriented     SKIN:   Skin normal color for race,   No evidence of rash.      LABS:                          10.0   11.17 )-----------( 105      ( 12 Jan 2024 07:22 )             33.8                                               01-12    141  |  95<L>  |  28<H>  ----------------------------<  227<H>  3.2<L>   |  32  |  1.3    Ca    9.5      12 Jan 2024 07:22  Mg     2.4     01-12    TPro  6.0  /  Alb  3.7  /  TBili  1.4<H>  /  DBili  x   /  AST  21  /  ALT  12  /  AlkPhos  70  01-11                                             Urinalysis Basic - ( 12 Jan 2024 07:22 )    Color: x / Appearance: x / SG: x / pH: x  Gluc: 227 mg/dL / Ketone: x  / Bili: x / Urobili: x   Blood: x / Protein: x / Nitrite: x   Leuk Esterase: x / RBC: x / WBC x   Sq Epi: x / Non Sq Epi: x / Bacteria: x                                                  LIVER FUNCTIONS - ( 11 Jan 2024 06:43 )  Alb: 3.7 g/dL / Pro: 6.0 g/dL / ALK PHOS: 70 U/L / ALT: 12 U/L / AST: 21 U/L / GGT: x                                                                                                MEDICATIONS  (STANDING):  albuterol/ipratropium for Nebulization 3 milliLiter(s) Nebulizer every 4 hours  atorvastatin 20 milliGRAM(s) Oral at bedtime  bacitracin   Ointment 1 Application(s) Topical daily  benzonatate 100 milliGRAM(s) Oral three times a day  chlorhexidine 2% Cloths 1 Application(s) Topical daily  desvenlafaxine ER 25 milliGRAM(s) Oral at bedtime  enoxaparin Injectable 40 milliGRAM(s) SubCutaneous every 24 hours  furosemide   Injectable 60 milliGRAM(s) IV Push daily  insulin glargine Injectable (LANTUS) 18 Unit(s) SubCutaneous at bedtime  insulin lispro (ADMELOG) corrective regimen sliding scale   SubCutaneous three times a day before meals  insulin lispro Injectable (ADMELOG) 6 Unit(s) SubCutaneous three times a day before meals  methylPREDNISolone sodium succinate Injectable 40 milliGRAM(s) IV Push two times a day  metolazone 5 milliGRAM(s) Oral daily  metoprolol succinate  milliGRAM(s) Oral daily  montelukast 10 milliGRAM(s) Oral daily  nystatin Powder 1 Application(s) Topical three times a day  pantoprazole    Tablet 40 milliGRAM(s) Oral before breakfast  polyethylene glycol 3350 17 Gram(s) Oral at bedtime  pregabalin 75 milliGRAM(s) Oral daily  ranolazine 500 milliGRAM(s) Oral two times a day  senna 2 Tablet(s) Oral at bedtime    MEDICATIONS  (PRN):  acetaminophen     Tablet .. 650 milliGRAM(s) Oral every 6 hours PRN Temp greater or equal to 38C (100.4F), Moderate Pain (4 - 6)  aluminum hydroxide/magnesium hydroxide/simethicone Suspension 30 milliLiter(s) Oral every 4 hours PRN Dyspepsia      X-Rays reviewed:

## 2024-01-12 NOTE — PROGRESS NOTE ADULT - SUBJECTIVE AND OBJECTIVE BOX
EVAN QUINN  78y  Female      Patient is a 78y old  Female who presents with a chief complaint of CHF exacerbation (12 Jan 2024 13:30)      INTERVAL HPI/OVERNIGHT EVENTS:  Patient was feeling more SOB today with cough, no fever.   Vital Signs Last 24 Hrs  T(C): 36.6 (12 Jan 2024 13:01), Max: 36.8 (11 Jan 2024 20:20)  T(F): 97.9 (12 Jan 2024 13:01), Max: 98.3 (11 Jan 2024 20:20)  HR: 77 (12 Jan 2024 13:01) (77 - 89)  BP: 136/60 (12 Jan 2024 13:01) (118/56 - 136/60)  BP(mean): --  RR: 18 (12 Jan 2024 13:01) (18 - 18)  SpO2: 95% (11 Jan 2024 20:00) (95% - 95%)    Parameters below as of 11 Jan 2024 20:00  Patient On (Oxygen Delivery Method): nasal cannula  O2 Flow (L/min): 2        01-11-24 @ 07:01  -  01-12-24 @ 07:00  --------------------------------------------------------  IN: 1488 mL / OUT: 2500 mL / NET: -1012 mL    01-12-24 @ 07:01  -  01-12-24 @ 17:53  --------------------------------------------------------  IN: 0 mL / OUT: 801 mL / NET: -801 mL            Consultant(s) Notes Reviewed:  [x ] YES  [ ] NO          MEDICATIONS  (STANDING):  albuterol/ipratropium for Nebulization 3 milliLiter(s) Nebulizer every 4 hours  atorvastatin 20 milliGRAM(s) Oral at bedtime  bacitracin   Ointment 1 Application(s) Topical daily  benzonatate 100 milliGRAM(s) Oral three times a day  chlorhexidine 2% Cloths 1 Application(s) Topical daily  desvenlafaxine ER 25 milliGRAM(s) Oral at bedtime  enoxaparin Injectable 40 milliGRAM(s) SubCutaneous every 24 hours  furosemide   Injectable 60 milliGRAM(s) IV Push daily  insulin glargine Injectable (LANTUS) 18 Unit(s) SubCutaneous at bedtime  insulin lispro (ADMELOG) corrective regimen sliding scale   SubCutaneous three times a day before meals  insulin lispro Injectable (ADMELOG) 6 Unit(s) SubCutaneous three times a day before meals  methylPREDNISolone sodium succinate Injectable 40 milliGRAM(s) IV Push two times a day  metolazone 5 milliGRAM(s) Oral daily  metoprolol succinate  milliGRAM(s) Oral daily  montelukast 10 milliGRAM(s) Oral daily  nystatin Powder 1 Application(s) Topical three times a day  ondansetron Injectable 4 milliGRAM(s) IV Push once  pantoprazole    Tablet 40 milliGRAM(s) Oral before breakfast  polyethylene glycol 3350 17 Gram(s) Oral at bedtime  pregabalin 75 milliGRAM(s) Oral daily  ranolazine 500 milliGRAM(s) Oral two times a day  senna 2 Tablet(s) Oral at bedtime    MEDICATIONS  (PRN):  acetaminophen     Tablet .. 650 milliGRAM(s) Oral every 6 hours PRN Temp greater or equal to 38C (100.4F), Moderate Pain (4 - 6)  aluminum hydroxide/magnesium hydroxide/simethicone Suspension 30 milliLiter(s) Oral every 4 hours PRN Dyspepsia      LABS                          10.0   11.17 )-----------( 105      ( 12 Jan 2024 07:22 )             33.8     01-12    141  |  95<L>  |  28<H>  ----------------------------<  227<H>  3.2<L>   |  32  |  1.3    Ca    9.5      12 Jan 2024 07:22  Mg     2.4     01-12    TPro  6.0  /  Alb  3.7  /  TBili  1.4<H>  /  DBili  x   /  AST  21  /  ALT  12  /  AlkPhos  70  01-11    ABG - ( 12 Jan 2024 13:30 )  pH, Arterial: 7.43  pH, Blood: x     /  pCO2: 56    /  pO2: 94    / HCO3: 37    / Base Excess: 10.7  /  SaO2: 98.1              Urinalysis Basic - ( 12 Jan 2024 07:22 )    Color: x / Appearance: x / SG: x / pH: x  Gluc: 227 mg/dL / Ketone: x  / Bili: x / Urobili: x   Blood: x / Protein: x / Nitrite: x   Leuk Esterase: x / RBC: x / WBC x   Sq Epi: x / Non Sq Epi: x / Bacteria: x        Lactate Trend        CAPILLARY BLOOD GLUCOSE      POCT Blood Glucose.: 218 mg/dL (12 Jan 2024 16:46)        RADIOLOGY & ADDITIONAL TESTS:    Imaging Personally Reviewed:  [ ] YES  [ ] NO    HEALTH ISSUES - PROBLEM Dx:          PHYSICAL EXAM:  GENERAL: mild respiratory distress, obese  HEAD:  Atraumatic, Normocephalic.  EYES: EOMI, PERRLA, conjunctiva and sclera clear.  NECK: Supple, No JVD.  CHEST/LUNG: mild ronchi and mild scattered wheezing.   HEART: Regular rate and rhythm; S1 S2.   ABDOMEN: Soft, Nontender, Nondistended; Bowel sounds present.  EXTREMITIES: leg edema 3+, lower leg wounds with dressing.   PSYCH: AAOx3.  NEUROLOGY: non-focal.  SKIN: No rashes or lesions.

## 2024-01-12 NOTE — CHART NOTE - NSCHARTNOTEFT_GEN_A_CORE
Floor Transfer Note    Transfer from: Floor Transfer to: SDU                            HPI:  9yo F hx of diverticulitis, HFpEF, CAD s/p CABG, AS s/p SAVR (2016), atrial fibrillation s/p watchman (2021), bleeding disorder, asthma, COPD, DM2, HTN, CKD 2-3. She presented to the ED complaining of progressively worsening shortness of breath of 2 weeks duration, limiting her ambulation, associated with worsening lower limb edema up to the thigh, not responding to Lasix and metolazone. She denied chest pain, cough, fever, chills or rigors, no recent illness. She is not compliant with her diet, not restricting fluid intake.       Vital Signs T(F): 97.1 HR: 73 BP: 130/53 RR: 18 SpO2: 100% nasal cannula     EKG repeated twice showed no acute ischemic changes. troponin is stable twice     admitted to telemetry for further evaluation     Tele COURSE:      Patient is clinically and hemodynamically stable for  transfer.    ASSESSMENT AND PLAN:    # Misc  - DVT Prophylaxis:   - GI Prophylaxis:   - Diet:   - Activity:    - Code Status:   -Dispo:     PENDINGS:    ICU Vital Signs Last 24 Hrs  T(C): 36.6 (12 Jan 2024 13:01), Max: 36.8 (11 Jan 2024 20:20)  T(F): 97.9 (12 Jan 2024 13:01), Max: 98.3 (11 Jan 2024 20:20)  HR: 77 (12 Jan 2024 13:01) (77 - 89)  BP: 136/60 (12 Jan 2024 13:01) (118/56 - 136/60)  BP(mean): --  ABP: --  ABP(mean): --  RR: 18 (12 Jan 2024 13:01) (18 - 18)  SpO2: 95% (11 Jan 2024 20:00) (95% - 95%)    O2 Parameters below as of 11 Jan 2024 20:00  Patient On (Oxygen Delivery Method): nasal cannula  O2 Flow (L/min): 2        I&O's Summary    11 Jan 2024 07:01  -  12 Jan 2024 07:00  --------------------------------------------------------  IN: 1488 mL / OUT: 2500 mL / NET: -1012 mL    12 Jan 2024 07:01  -  12 Jan 2024 18:16  --------------------------------------------------------  IN: 0 mL / OUT: 801 mL / NET: -801 mL          LABS:                        10.0   11.17 )-----------( 105      ( 12 Jan 2024 07:22 )             33.8     01-12    141  |  95<L>  |  28<H>  ----------------------------<  227<H>  3.2<L>   |  32  |  1.3    Ca    9.5      12 Jan 2024 07:22  Mg     2.4     01-12    TPro  6.0  /  Alb  3.7  /  TBili  1.4<H>  /  DBili  x   /  AST  21  /  ALT  12  /  AlkPhos  70  01-11      Urinalysis Basic - ( 12 Jan 2024 07:22 )    Color: x / Appearance: x / SG: x / pH: x  Gluc: 227 mg/dL / Ketone: x  / Bili: x / Urobili: x   Blood: x / Protein: x / Nitrite: x   Leuk Esterase: x / RBC: x / WBC x   Sq Epi: x / Non Sq Epi: x / Bacteria: x      CAPILLARY BLOOD GLUCOSE      POCT Blood Glucose.: 218 mg/dL (12 Jan 2024 16:46)  POCT Blood Glucose.: 189 mg/dL (12 Jan 2024 16:28)  POCT Blood Glucose.: 216 mg/dL (12 Jan 2024 13:52)  POCT Blood Glucose.: 238 mg/dL (12 Jan 2024 12:38)  POCT Blood Glucose.: 352 mg/dL (12 Jan 2024 11:42)  POCT Blood Glucose.: 417 mg/dL (12 Jan 2024 11:38)  POCT Blood Glucose.: 226 mg/dL (12 Jan 2024 07:59)    ABG - ( 12 Jan 2024 13:30 )  pH, Arterial: 7.43  pH, Blood: x     /  pCO2: 56    /  pO2: 94    / HCO3: 37    / Base Excess: 10.7  /  SaO2: 98.1                RADIOLOGY & ADDITIONAL TESTS:    MEDICATIONS  (STANDING):  acetaZOLAMIDE  IVPB 500 milliGRAM(s) IV Intermittent once  albuterol/ipratropium for Nebulization 3 milliLiter(s) Nebulizer every 4 hours  atorvastatin 20 milliGRAM(s) Oral at bedtime  bacitracin   Ointment 1 Application(s) Topical daily  benzonatate 100 milliGRAM(s) Oral three times a day  chlorhexidine 2% Cloths 1 Application(s) Topical daily  desvenlafaxine ER 25 milliGRAM(s) Oral at bedtime  enoxaparin Injectable 40 milliGRAM(s) SubCutaneous every 24 hours  furosemide   Injectable 60 milliGRAM(s) IV Push daily  insulin glargine Injectable (LANTUS) 18 Unit(s) SubCutaneous at bedtime  insulin lispro (ADMELOG) corrective regimen sliding scale   SubCutaneous three times a day before meals  insulin lispro Injectable (ADMELOG) 6 Unit(s) SubCutaneous three times a day before meals  methylPREDNISolone sodium succinate Injectable 40 milliGRAM(s) IV Push two times a day  metolazone 5 milliGRAM(s) Oral daily  metoprolol succinate  milliGRAM(s) Oral daily  montelukast 10 milliGRAM(s) Oral daily  nystatin Powder 1 Application(s) Topical three times a day  ondansetron Injectable 4 milliGRAM(s) IV Push once  pantoprazole    Tablet 40 milliGRAM(s) Oral before breakfast  polyethylene glycol 3350 17 Gram(s) Oral at bedtime  pregabalin 75 milliGRAM(s) Oral daily  ranolazine 500 milliGRAM(s) Oral two times a day  senna 2 Tablet(s) Oral at bedtime    MEDICATIONS  (PRN):  acetaminophen     Tablet .. 650 milliGRAM(s) Oral every 6 hours PRN Temp greater or equal to 38C (100.4F), Moderate Pain (4 - 6)  aluminum hydroxide/magnesium hydroxide/simethicone Suspension 30 milliLiter(s) Oral every 4 hours PRN Dyspepsia      PHYSICAL EXAM:  GENERAL: well built, well nourished  HEAD:  Atraumatic, Normocephalic  EYES: EOMI, PERRLA, conjunctiva and sclera clear  ENT: No tonsillar erythema, exudates, or enlargement; Moist mucous membranes, Good dentition, No lesions  NECK: Supple, No JVD, Normal thyroid, no enlarged nodes  NERVOUS SYSTEM:  Alert & Oriented X3, Good concentration; Motor Strength 5/5 B/L upper and lower extremities; DTRs 2+ intact and symmetric, sensory intact  CHEST/LUNG: B/L good air entry; No rales, rhonchi, or wheezing  HEART: S1S2 normal, no S3, Regular rate and rhythm; No murmurs, rubs, or gallops  ABDOMEN: Soft, Nontender, Nondistended; Bowel sounds present  EXTREMITIES:  2+ Peripheral Pulses, No clubbing, cyanosis, or edema  LYMPH: No lymphadenopathy noted  SKIN: No rashes or lesions  Wound:       Staci Carney PGY1 Floor Transfer Note    Transfer from: Floor Transfer to: SDU                            HPI:  7yo F hx of diverticulitis, HFpEF, CAD s/p CABG, AS s/p SAVR (2016), atrial fibrillation s/p watchman (2021), bleeding disorder, asthma, COPD, DM2, HTN, CKD 2-3. She presented to the ED complaining of progressively worsening shortness of breath of 2 weeks duration, limiting her ambulation, associated with worsening lower limb edema up to the thigh, not responding to Lasix and metolazone. She denied chest pain, cough, fever, chills or rigors, no recent illness. She is not compliant with her diet, not restricting fluid intake.       Vital Signs T(F): 97.1 HR: 73 BP: 130/53 RR: 18 SpO2: 100% nasal cannula     EKG repeated twice showed no acute ischemic changes. troponin is stable twice     admitted to telemetry for further evaluation     Tele COURSE:  In tele, patient was treated with IV Lasix and currently on 60mg daily. On admission, her creatinine was 2.1 (Cr 1 in May 2023). Renal US on 1/7 showed no hydronephrosis but     Patient is clinically and hemodynamically stable for  transfer.    ASSESSMENT AND PLAN:    # Misc  - DVT Prophylaxis:   - GI Prophylaxis:   - Diet:   - Activity:    - Code Status:   -Dispo:     PENDINGS:    ICU Vital Signs Last 24 Hrs  T(C): 36.6 (12 Jan 2024 13:01), Max: 36.8 (11 Jan 2024 20:20)  T(F): 97.9 (12 Jan 2024 13:01), Max: 98.3 (11 Jan 2024 20:20)  HR: 77 (12 Jan 2024 13:01) (77 - 89)  BP: 136/60 (12 Jan 2024 13:01) (118/56 - 136/60)  BP(mean): --  ABP: --  ABP(mean): --  RR: 18 (12 Jan 2024 13:01) (18 - 18)  SpO2: 95% (11 Jan 2024 20:00) (95% - 95%)    O2 Parameters below as of 11 Jan 2024 20:00  Patient On (Oxygen Delivery Method): nasal cannula  O2 Flow (L/min): 2        I&O's Summary    11 Jan 2024 07:01  -  12 Jan 2024 07:00  --------------------------------------------------------  IN: 1488 mL / OUT: 2500 mL / NET: -1012 mL    12 Jan 2024 07:01  -  12 Jan 2024 18:16  --------------------------------------------------------  IN: 0 mL / OUT: 801 mL / NET: -801 mL          LABS:                        10.0   11.17 )-----------( 105      ( 12 Jan 2024 07:22 )             33.8     01-12    141  |  95<L>  |  28<H>  ----------------------------<  227<H>  3.2<L>   |  32  |  1.3    Ca    9.5      12 Jan 2024 07:22  Mg     2.4     01-12    TPro  6.0  /  Alb  3.7  /  TBili  1.4<H>  /  DBili  x   /  AST  21  /  ALT  12  /  AlkPhos  70  01-11      Urinalysis Basic - ( 12 Jan 2024 07:22 )    Color: x / Appearance: x / SG: x / pH: x  Gluc: 227 mg/dL / Ketone: x  / Bili: x / Urobili: x   Blood: x / Protein: x / Nitrite: x   Leuk Esterase: x / RBC: x / WBC x   Sq Epi: x / Non Sq Epi: x / Bacteria: x      CAPILLARY BLOOD GLUCOSE      POCT Blood Glucose.: 218 mg/dL (12 Jan 2024 16:46)  POCT Blood Glucose.: 189 mg/dL (12 Jan 2024 16:28)  POCT Blood Glucose.: 216 mg/dL (12 Jan 2024 13:52)  POCT Blood Glucose.: 238 mg/dL (12 Jan 2024 12:38)  POCT Blood Glucose.: 352 mg/dL (12 Jan 2024 11:42)  POCT Blood Glucose.: 417 mg/dL (12 Jan 2024 11:38)  POCT Blood Glucose.: 226 mg/dL (12 Jan 2024 07:59)    ABG - ( 12 Jan 2024 13:30 )  pH, Arterial: 7.43  pH, Blood: x     /  pCO2: 56    /  pO2: 94    / HCO3: 37    / Base Excess: 10.7  /  SaO2: 98.1                RADIOLOGY & ADDITIONAL TESTS:    MEDICATIONS  (STANDING):  acetaZOLAMIDE  IVPB 500 milliGRAM(s) IV Intermittent once  albuterol/ipratropium for Nebulization 3 milliLiter(s) Nebulizer every 4 hours  atorvastatin 20 milliGRAM(s) Oral at bedtime  bacitracin   Ointment 1 Application(s) Topical daily  benzonatate 100 milliGRAM(s) Oral three times a day  chlorhexidine 2% Cloths 1 Application(s) Topical daily  desvenlafaxine ER 25 milliGRAM(s) Oral at bedtime  enoxaparin Injectable 40 milliGRAM(s) SubCutaneous every 24 hours  furosemide   Injectable 60 milliGRAM(s) IV Push daily  insulin glargine Injectable (LANTUS) 18 Unit(s) SubCutaneous at bedtime  insulin lispro (ADMELOG) corrective regimen sliding scale   SubCutaneous three times a day before meals  insulin lispro Injectable (ADMELOG) 6 Unit(s) SubCutaneous three times a day before meals  methylPREDNISolone sodium succinate Injectable 40 milliGRAM(s) IV Push two times a day  metolazone 5 milliGRAM(s) Oral daily  metoprolol succinate  milliGRAM(s) Oral daily  montelukast 10 milliGRAM(s) Oral daily  nystatin Powder 1 Application(s) Topical three times a day  ondansetron Injectable 4 milliGRAM(s) IV Push once  pantoprazole    Tablet 40 milliGRAM(s) Oral before breakfast  polyethylene glycol 3350 17 Gram(s) Oral at bedtime  pregabalin 75 milliGRAM(s) Oral daily  ranolazine 500 milliGRAM(s) Oral two times a day  senna 2 Tablet(s) Oral at bedtime    MEDICATIONS  (PRN):  acetaminophen     Tablet .. 650 milliGRAM(s) Oral every 6 hours PRN Temp greater or equal to 38C (100.4F), Moderate Pain (4 - 6)  aluminum hydroxide/magnesium hydroxide/simethicone Suspension 30 milliLiter(s) Oral every 4 hours PRN Dyspepsia      PHYSICAL EXAM:  GENERAL: well built, well nourished  HEAD:  Atraumatic, Normocephalic  EYES: EOMI, PERRLA, conjunctiva and sclera clear  ENT: No tonsillar erythema, exudates, or enlargement; Moist mucous membranes, Good dentition, No lesions  NECK: Supple, No JVD, Normal thyroid, no enlarged nodes  NERVOUS SYSTEM:  Alert & Oriented X3, Good concentration; Motor Strength 5/5 B/L upper and lower extremities; DTRs 2+ intact and symmetric, sensory intact  CHEST/LUNG: B/L good air entry; No rales, rhonchi, or wheezing  HEART: S1S2 normal, no S3, Regular rate and rhythm; No murmurs, rubs, or gallops  ABDOMEN: Soft, Nontender, Nondistended; Bowel sounds present  EXTREMITIES:  2+ Peripheral Pulses, No clubbing, cyanosis, or edema  LYMPH: No lymphadenopathy noted  SKIN: No rashes or lesions  Wound:       Staci Carney PGY1 Floor Transfer Note    Transfer from: Floor Transfer to: SDU                            HPI:  9yo F hx of diverticulitis, HFpEF, CAD s/p CABG, AS s/p SAVR (2016), atrial fibrillation s/p watchman (2021), bleeding disorder, asthma, COPD, DM2, HTN, CKD 2-3. She presented to the ED complaining of progressively worsening shortness of breath of 2 weeks duration, limiting her ambulation, associated with worsening lower limb edema up to the thigh, not responding to Lasix and metolazone. She denied chest pain, cough, fever, chills or rigors, no recent illness. She is not compliant with her diet, not restricting fluid intake.       Vital Signs T(F): 97.1 HR: 73 BP: 130/53 RR: 18 SpO2: 100% nasal cannula     EKG repeated twice showed no acute ischemic changes. troponin is stable twice     admitted to telemetry for further evaluation     Tele COURSE:  In tele, patient was treated with IV Lasix and currently on 60mg daily. On admission, her creatinine was 2.1 (Cr 1 in May 2023). Renal US on 1/7 showed no hydronephrosis but     Patient is clinically and hemodynamically stable for  transfer.    ASSESSMENT AND PLAN:    # Misc  - DVT Prophylaxis:   - GI Prophylaxis:   - Diet:   - Activity:    - Code Status:   -Dispo:     PENDINGS:    ICU Vital Signs Last 24 Hrs  T(C): 36.6 (12 Jan 2024 13:01), Max: 36.8 (11 Jan 2024 20:20)  T(F): 97.9 (12 Jan 2024 13:01), Max: 98.3 (11 Jan 2024 20:20)  HR: 77 (12 Jan 2024 13:01) (77 - 89)  BP: 136/60 (12 Jan 2024 13:01) (118/56 - 136/60)  BP(mean): --  ABP: --  ABP(mean): --  RR: 18 (12 Jan 2024 13:01) (18 - 18)  SpO2: 95% (11 Jan 2024 20:00) (95% - 95%)    O2 Parameters below as of 11 Jan 2024 20:00  Patient On (Oxygen Delivery Method): nasal cannula  O2 Flow (L/min): 2        I&O's Summary    11 Jan 2024 07:01  -  12 Jan 2024 07:00  --------------------------------------------------------  IN: 1488 mL / OUT: 2500 mL / NET: -1012 mL    12 Jan 2024 07:01  -  12 Jan 2024 18:16  --------------------------------------------------------  IN: 0 mL / OUT: 801 mL / NET: -801 mL          LABS:                        10.0   11.17 )-----------( 105      ( 12 Jan 2024 07:22 )             33.8     01-12    141  |  95<L>  |  28<H>  ----------------------------<  227<H>  3.2<L>   |  32  |  1.3    Ca    9.5      12 Jan 2024 07:22  Mg     2.4     01-12    TPro  6.0  /  Alb  3.7  /  TBili  1.4<H>  /  DBili  x   /  AST  21  /  ALT  12  /  AlkPhos  70  01-11      Urinalysis Basic - ( 12 Jan 2024 07:22 )    Color: x / Appearance: x / SG: x / pH: x  Gluc: 227 mg/dL / Ketone: x  / Bili: x / Urobili: x   Blood: x / Protein: x / Nitrite: x   Leuk Esterase: x / RBC: x / WBC x   Sq Epi: x / Non Sq Epi: x / Bacteria: x      CAPILLARY BLOOD GLUCOSE      POCT Blood Glucose.: 218 mg/dL (12 Jan 2024 16:46)  POCT Blood Glucose.: 189 mg/dL (12 Jan 2024 16:28)  POCT Blood Glucose.: 216 mg/dL (12 Jan 2024 13:52)  POCT Blood Glucose.: 238 mg/dL (12 Jan 2024 12:38)  POCT Blood Glucose.: 352 mg/dL (12 Jan 2024 11:42)  POCT Blood Glucose.: 417 mg/dL (12 Jan 2024 11:38)  POCT Blood Glucose.: 226 mg/dL (12 Jan 2024 07:59)    ABG - ( 12 Jan 2024 13:30 )  pH, Arterial: 7.43  pH, Blood: x     /  pCO2: 56    /  pO2: 94    / HCO3: 37    / Base Excess: 10.7  /  SaO2: 98.1                RADIOLOGY & ADDITIONAL TESTS:    MEDICATIONS  (STANDING):  acetaZOLAMIDE  IVPB 500 milliGRAM(s) IV Intermittent once  albuterol/ipratropium for Nebulization 3 milliLiter(s) Nebulizer every 4 hours  atorvastatin 20 milliGRAM(s) Oral at bedtime  bacitracin   Ointment 1 Application(s) Topical daily  benzonatate 100 milliGRAM(s) Oral three times a day  chlorhexidine 2% Cloths 1 Application(s) Topical daily  desvenlafaxine ER 25 milliGRAM(s) Oral at bedtime  enoxaparin Injectable 40 milliGRAM(s) SubCutaneous every 24 hours  furosemide   Injectable 60 milliGRAM(s) IV Push daily  insulin glargine Injectable (LANTUS) 18 Unit(s) SubCutaneous at bedtime  insulin lispro (ADMELOG) corrective regimen sliding scale   SubCutaneous three times a day before meals  insulin lispro Injectable (ADMELOG) 6 Unit(s) SubCutaneous three times a day before meals  methylPREDNISolone sodium succinate Injectable 40 milliGRAM(s) IV Push two times a day  metolazone 5 milliGRAM(s) Oral daily  metoprolol succinate  milliGRAM(s) Oral daily  montelukast 10 milliGRAM(s) Oral daily  nystatin Powder 1 Application(s) Topical three times a day  ondansetron Injectable 4 milliGRAM(s) IV Push once  pantoprazole    Tablet 40 milliGRAM(s) Oral before breakfast  polyethylene glycol 3350 17 Gram(s) Oral at bedtime  pregabalin 75 milliGRAM(s) Oral daily  ranolazine 500 milliGRAM(s) Oral two times a day  senna 2 Tablet(s) Oral at bedtime    MEDICATIONS  (PRN):  acetaminophen     Tablet .. 650 milliGRAM(s) Oral every 6 hours PRN Temp greater or equal to 38C (100.4F), Moderate Pain (4 - 6)  aluminum hydroxide/magnesium hydroxide/simethicone Suspension 30 milliLiter(s) Oral every 4 hours PRN Dyspepsia      PHYSICAL EXAM:  GENERAL: well built, well nourished  HEAD:  Atraumatic, Normocephalic  EYES: EOMI, PERRLA, conjunctiva and sclera clear  ENT: No tonsillar erythema, exudates, or enlargement; Moist mucous membranes, Good dentition, No lesions  NECK: Supple, No JVD, Normal thyroid, no enlarged nodes  NERVOUS SYSTEM:  Alert & Oriented X3, Good concentration; Motor Strength 5/5 B/L upper and lower extremities; DTRs 2+ intact and symmetric, sensory intact  CHEST/LUNG: B/L good air entry; No rales, rhonchi, or wheezing  HEART: S1S2 normal, no S3, Regular rate and rhythm; No murmurs, rubs, or gallops  ABDOMEN: Soft, Nontender, Nondistended; Bowel sounds present  EXTREMITIES:  2+ Peripheral Pulses, No clubbing, cyanosis, or edema  LYMPH: No lymphadenopathy noted  SKIN: No rashes or lesions  Wound:       Staci Carney PGY1 Floor Transfer Note    Transfer from: Floor Transfer to: SDU                            HPI:  7yo F hx of diverticulitis, HFpEF, CAD s/p CABG, AS s/p SAVR (2016), atrial fibrillation s/p watchman (2021), bleeding disorder, asthma, COPD, DM2, HTN, CKD 2-3. She presented to the ED complaining of progressively worsening shortness of breath of 2 weeks duration, limiting her ambulation, associated with worsening lower limb edema up to the thigh, not responding to Lasix and metolazone. She denied chest pain, cough, fever, chills or rigors, no recent illness. She is not compliant with her diet, not restricting fluid intake.     Vital Signs T(F): 97.1 HR: 73 BP: 130/53 RR: 18 SpO2: 100% nasal cannula     EKG repeated twice showed no acute ischemic changes. troponin 58 -> 61. BNP 2k (4.8K in Aug 2020).     Admitted to telemetry for further evaluation     Tele COURSE:  In the hospital, patient was treated with IV Lasix and currently on 60mg daily. On admission, her creatinine was 2.1; currently 1.3 (Cr 1 in May 2023). Renal US on 1/7 showed b/l angiomyolipoma but no hydronephrosis. Patient had COVID exposure but has tested negative as recently as 1/10. Repeat RVP done on 1/12 pending. Additionally, patient has chronic anemia and on 1/6, her hgb was 7.2. Patient received one unit of pRBCs and has completed 5 day course of Venofer on this admission. On 1/12, patient breathing was more labored. Patient has been on NC and saturating well. Duonebs ordered, but patient continued to be labored. STAT ABG pH 7.43, pCO2 56, HCO3 37. CXR showed no acute infiltrates. Patient started on BIPAP and Solu-medrol. Seen by pulm and approved for SDU upgrade.     ASSESSMENT AND PLAN:  79yo F hx of diverticulitis, HFpEF, CAD s/p CABG, AS s/p SAVR (2016), atrial fibrillation s/p watchman (2021), bleeding disorder, asthma, COPD, DM2, HTN, CKD 2-3    #Acute HFpEF  #Severe pulmonary hypertension  #AS s/p SAVR (2016)  - Patient with severe leg edema, SOB, Pro-BNP  - CXR showed improved vascular congestion.   - c/w Lasix 60mg IV daily and Metolazone. Acetazolamide 500mg for two days.   - Low sodium diet and fluid restriction.   - Monitor daily weight    #Asthma  #LENY  - on montelukast   - Possible asthma exacerbation, worsening SOB today 1/12, with wheezing.   - CXR showed no acute infiltrates, ABG reviewed.   - Started on Solu-Medrol   - now on BiPAP   - Pulmonary following    #AKASH on CKD stage 2-3, possible cardiorenal    - Cr improved 1.3 from 2.1 on admission  - Monitor closely whole on diuresis    #COVID-19 exposure  - Patient with cough  - CXR is stable, no acute infiltrates  - RVP and COVID-19 PCR 1/10 neg  - RVP 1/12 pending    #CAD s/p CABG  #Paroxysmal Atrial Fibrillation  - Continue metoprolol 100mg, Ranexa, rosuvastatin     #DM type 2  - On home glipizide  - FS is elevated, Resume Lantus and Lispro    #Chronic anemia   #Iron deficiency anemia:  - transfused one unit 1/6  - Completed Venofer 200mg IV daily for 5 doses  - Hgb stable    # Misc  - DVT Prophylaxis: Lovenox  - GI Prophylaxis: PO Protonix  - Diet: DASH  - Activity: IAT  - Code Status: Full  - Dispo: SDU    PENDINGS: RVP, on BIPAP    ICU Vital Signs Last 24 Hrs  T(C): 36.6 (12 Jan 2024 13:01), Max: 36.8 (11 Jan 2024 20:20)  T(F): 97.9 (12 Jan 2024 13:01), Max: 98.3 (11 Jan 2024 20:20)  HR: 77 (12 Jan 2024 13:01) (77 - 89)  BP: 136/60 (12 Jan 2024 13:01) (118/56 - 136/60)  BP(mean): --  ABP: --  ABP(mean): --  RR: 18 (12 Jan 2024 13:01) (18 - 18)  SpO2: 95% (11 Jan 2024 20:00) (95% - 95%)    O2 Parameters below as of 11 Jan 2024 20:00  Patient On (Oxygen Delivery Method): nasal cannula  O2 Flow (L/min): 2        I&O's Summary    11 Jan 2024 07:01  -  12 Jan 2024 07:00  --------------------------------------------------------  IN: 1488 mL / OUT: 2500 mL / NET: -1012 mL    12 Jan 2024 07:01  -  12 Jan 2024 18:16  --------------------------------------------------------  IN: 0 mL / OUT: 801 mL / NET: -801 mL          LABS:                        10.0   11.17 )-----------( 105      ( 12 Jan 2024 07:22 )             33.8     01-12    141  |  95<L>  |  28<H>  ----------------------------<  227<H>  3.2<L>   |  32  |  1.3    Ca    9.5      12 Jan 2024 07:22  Mg     2.4     01-12    TPro  6.0  /  Alb  3.7  /  TBili  1.4<H>  /  DBili  x   /  AST  21  /  ALT  12  /  AlkPhos  70  01-11      Urinalysis Basic - ( 12 Jan 2024 07:22 )    Color: x / Appearance: x / SG: x / pH: x  Gluc: 227 mg/dL / Ketone: x  / Bili: x / Urobili: x   Blood: x / Protein: x / Nitrite: x   Leuk Esterase: x / RBC: x / WBC x   Sq Epi: x / Non Sq Epi: x / Bacteria: x      CAPILLARY BLOOD GLUCOSE      POCT Blood Glucose.: 218 mg/dL (12 Jan 2024 16:46)  POCT Blood Glucose.: 189 mg/dL (12 Jan 2024 16:28)  POCT Blood Glucose.: 216 mg/dL (12 Jan 2024 13:52)  POCT Blood Glucose.: 238 mg/dL (12 Jan 2024 12:38)  POCT Blood Glucose.: 352 mg/dL (12 Jan 2024 11:42)  POCT Blood Glucose.: 417 mg/dL (12 Jan 2024 11:38)  POCT Blood Glucose.: 226 mg/dL (12 Jan 2024 07:59)    ABG - ( 12 Jan 2024 13:30 )  pH, Arterial: 7.43  pH, Blood: x     /  pCO2: 56    /  pO2: 94    / HCO3: 37    / Base Excess: 10.7  /  SaO2: 98.1                RADIOLOGY & ADDITIONAL TESTS:    MEDICATIONS  (STANDING):  acetaZOLAMIDE  IVPB 500 milliGRAM(s) IV Intermittent once  albuterol/ipratropium for Nebulization 3 milliLiter(s) Nebulizer every 4 hours  atorvastatin 20 milliGRAM(s) Oral at bedtime  bacitracin   Ointment 1 Application(s) Topical daily  benzonatate 100 milliGRAM(s) Oral three times a day  chlorhexidine 2% Cloths 1 Application(s) Topical daily  desvenlafaxine ER 25 milliGRAM(s) Oral at bedtime  enoxaparin Injectable 40 milliGRAM(s) SubCutaneous every 24 hours  furosemide   Injectable 60 milliGRAM(s) IV Push daily  insulin glargine Injectable (LANTUS) 18 Unit(s) SubCutaneous at bedtime  insulin lispro (ADMELOG) corrective regimen sliding scale   SubCutaneous three times a day before meals  insulin lispro Injectable (ADMELOG) 6 Unit(s) SubCutaneous three times a day before meals  methylPREDNISolone sodium succinate Injectable 40 milliGRAM(s) IV Push two times a day  metolazone 5 milliGRAM(s) Oral daily  metoprolol succinate  milliGRAM(s) Oral daily  montelukast 10 milliGRAM(s) Oral daily  nystatin Powder 1 Application(s) Topical three times a day  ondansetron Injectable 4 milliGRAM(s) IV Push once  pantoprazole    Tablet 40 milliGRAM(s) Oral before breakfast  polyethylene glycol 3350 17 Gram(s) Oral at bedtime  pregabalin 75 milliGRAM(s) Oral daily  ranolazine 500 milliGRAM(s) Oral two times a day  senna 2 Tablet(s) Oral at bedtime    MEDICATIONS  (PRN):  acetaminophen     Tablet .. 650 milliGRAM(s) Oral every 6 hours PRN Temp greater or equal to 38C (100.4F), Moderate Pain (4 - 6)  aluminum hydroxide/magnesium hydroxide/simethicone Suspension 30 milliLiter(s) Oral every 4 hours PRN Dyspepsia      PHYSICAL EXAM:  GENERAL: labored breathing  NERVOUS SYSTEM:  Alert & Oriented X3  CHEST/LUNG: wheezing  HEART: S1S2 normal, no S3, Regular rate and rhythm; No murmurs, rubs, or gallops  ABDOMEN: Soft, Nontender, Nondistended; Bowel sounds present  EXTREMITIES:  2+ Peripheral Pulses, No clubbing, cyanosis, or edema  SKIN: No rashes or lesions        Staci Carney PGY1

## 2024-01-12 NOTE — CONSULT NOTE ADULT - ASSESSMENT
Impression    Asthma exacerbation  Recent COVID exposure  HFpEF, G3DD, moderate PH  CAD s/p CABG  AS s/p SAVR (2016), atrial fibrillation s/p watchman (2021),   Bleeding disorder  CKD 2-3    Plan    CXR reviewed  BNP, ECHO noted  Repeat RVP panel  Procal, ddimer, LE duplex  Start on solumedrol 60 mg BID  Duonebs PRN q4hrs  ABG and peak flow now  Pt may need NIV if work of breathing does not improve  Diuresis as tolerated  Wean off ow goal 92-96% Impression    LENY/OHS?  HO asthma  Recent COVID exposure  HFpEF, G3DD, moderate PH  CAD s/p CABG  AS s/p SAVR (2016), atrial fibrillation s/p watchman (2021),   Bleeding disorder  CKD 2-3    Plan    CXR reviewed  BNP, ECHO noted  Repeat RVP panel  Procal, ddimer, LE duplex  Duonebs PRN q4hrs  ABG and peak flow now  NIV, AVAPS 8-10 cc/kg, Max/min p 18/10, PEEP 8, RR 14  Diuresis as tolerated  Give Diamox in conjunction with other diuretics   Wean off o2 goal 92-96%  Upgrade to SDU given lethargy

## 2024-01-12 NOTE — CHART NOTE - NSCHARTNOTEFT_GEN_A_CORE
Patient noted to have more labored breathing around 1230PM. Seen by pulm fellow. Started on duonebs. RT called for ABG stat and peak flow. Will monitor Patient noted to have more labored breathing around 1230PM. Seen by pulm fellow. Started on duonebs. RT called for ABG stat and peak flow. Will monitor    2PM  ABG collected. Still labored breathing s/p duonebs. Will start BIPAP. Also complaining of nausea. Zofran 4mg IV given.

## 2024-01-12 NOTE — CONSULT NOTE ADULT - ATTENDING COMMENTS
Ms. Pickering was seen and examined at bedside today, patient has a history of asthma, aortic stenosis s/p surgical aortic valve replacement, and HFpEF.  Pulmonary was engaged for acute worsening of shortness of breath as the patient completes her course of diuretics and is approaching euvolemia.  At the time of my exam, the patient is being started on BiPAP, and while asleep she is unable to take tidal volumes greater than 200 cc, which immediately reverses with awakenings.  This is very consistent with LENY/OHS, potentially worsened by acute hypercapnia in the setting of contraction metabolic alkalosis.  Recommend diuresis to euvolemia as per primary team, though would also add on Diamox for correction of her worsening contraction alkalemia.  Continue with AVAPS while asleep and with naps at the settings provided above.  Continue DuoNebs standing and as needed.  Follow-up procalcitonin, and COVID testing for recent exposure in the hospital.  If patient fails to improve with these measures, recommend obtaining CT chest without contrast.  I agree with the fellow note, with the exceptions listed in my attestation above.  The remainder of impression and plan per fellow note. Ms. Pickernig was seen and examined at bedside today, patient has a history of asthma, aortic stenosis s/p surgical aortic valve replacement, and HFpEF.  Pulmonary was engaged for acute worsening of shortness of breath as the patient completes her course of diuretics and is approaching euvolemia.  At the time of my exam, the patient is being started on BiPAP, and while asleep she is unable to take tidal volumes greater than 200 cc, which immediately reverses with awakenings.  This is very consistent with LENY/OHS, potentially worsened by acute hypercapnia in the setting of contraction metabolic alkalosis.  Recommend diuresis to euvolemia as per primary team, though would also add on Diamox for correction of her worsening contraction alkalemia.  Continue with AVAPS while asleep and with naps at the settings provided above.  Continue DuoNebs standing and as needed.  Follow-up procalcitonin, and COVID testing for recent exposure in the hospital.  If patient fails to improve with these measures, recommend obtaining CT chest without contrast.  I agree with the fellow note, with the exceptions listed in my attestation above.  The remainder of impression and plan per fellow note.

## 2024-01-12 NOTE — PROGRESS NOTE ADULT - ASSESSMENT
77yo F hx of diverticulitis, HFpEF, CAD s/p CABG, AS s/p SAVR (2016), atrial fibrillation s/p watchman (2021), bleeding disorder, asthma, COPD, DM2, HTN, CKD 2-3    A/P:   Acute HFpEF:   Severe pulmonary hypertension  AS s/p SAVR (2016)  Patient with severe leg edema, SOB, Pro-BNP  CXR showed improved vascular congestion.   Continue Lasix 60mg IV daily and Metolazone. Acetazolamide 500mg for two days.   Low sodium diet and fluid restriction.   Monitor daily weight.     Asthma  LENY  on montelukast   Possible asthma exacerbation, worsening SOB today, with wheezing.   CXR showed no acute infiltrates, ABG reviewed.   Start on Solu-Medrol   Pulmonary consult  BiPAP     AKASH on CKD stage 2-3, possible cardiorenal    Cr improved 1.3 from 1.8  Monitor closely whole on diuresis.       COVID-19 exposure  Patient with cough  CXR is stable, no acute infiltrates  COVID-19 pcr neg  Send RVP.     CAD s/p CABG  Continue metoprolol 100mg, Ranexa, rosuvastatin   Paroxysmal Atrial Fibrillation    DM type 2:   - On home glipizide  FS is elevated, Resume Lantus and Lispro    Chronic anemia   Iron deficiency anemia:   transfused one unit  1/6  Completed Venofer 200mg IV daily for 5 doses.     DVT Prophylaxis: Lovenox SC.      #Progress Note Handoff:  Pending (specify):  improving leg edema. SOB.   Family discussion:  Disposition: STR 79yo F hx of diverticulitis, HFpEF, CAD s/p CABG, AS s/p SAVR (2016), atrial fibrillation s/p watchman (2021), bleeding disorder, asthma, COPD, DM2, HTN, CKD 2-3    A/P:   Acute HFpEF:   Severe pulmonary hypertension  AS s/p SAVR (2016)  Patient with severe leg edema, SOB, Pro-BNP  CXR showed improved vascular congestion.   Continue Lasix 60mg IV daily and Metolazone. Acetazolamide 500mg for two days.   Low sodium diet and fluid restriction.   Monitor daily weight.     Asthma  LENY  on montelukast   Possible asthma exacerbation, worsening SOB today, with wheezing.   CXR showed no acute infiltrates, ABG reviewed.   Start on Solu-Medrol   Pulmonary consult  BiPAP     AKASH on CKD stage 2-3, possible cardiorenal    Cr improved 1.3 from 1.8  Monitor closely whole on diuresis.       COVID-19 exposure  Patient with cough  CXR is stable, no acute infiltrates  COVID-19 pcr neg  Send RVP.     CAD s/p CABG  Continue metoprolol 100mg, Ranexa, rosuvastatin   Paroxysmal Atrial Fibrillation    DM type 2:   - On home glipizide  FS is elevated, Resume Lantus and Lispro    Chronic anemia   Iron deficiency anemia:   transfused one unit  1/6  Completed Venofer 200mg IV daily for 5 doses.     DVT Prophylaxis: Lovenox SC.      #Progress Note Handoff:  Pending (specify):  improving leg edema. SOB.   Family discussion:  Disposition: STR

## 2024-01-13 NOTE — PROGRESS NOTE ADULT - ASSESSMENT
77yo F hx of diverticulitis, HFpEF, CAD s/p CABG, AS s/p SAVR (2016), atrial fibrillation s/p watchman (2021), bleeding disorder, asthma, COPD, DM2, HTN, CKD 2-3    A/P:   Acute HFpEF:   Severe pulmonary hypertension  AS s/p SAVR (2016)  Patient with severe leg edema, SOB, Pro-BNP  CXR showed improved vascular congestion.   Continue Lasix 60mg IV daily and Metolazone.    Low sodium diet   Monitor daily weight.     Asthma  LENY  on montelukast   Possible asthma exacerbation, worsening SOB today, with wheezing.   CXR showed no acute infiltrates, ABG reviewed.   Start on Solu-Medrol   Pulmonary consult upgrade to ceu   BiPAP     AKASH on CKD stage 2-3, possible cardiorenal    hypokalemia   Cr improved 1.3 from 1.8  Monitor closely whole on diuresis  aggressively replete potassium .       Flu +  tamiflu     CAD s/p CABG  Continue metoprolol 100mg, Ranexa, rosuvastatin     Paroxysmal Atrial Fibrillation  no events on tele     DM type 2:   - On home glipizide  FS is elevated, Resume Lantus and Lispro    Chronic anemia   Iron deficiency anemia:   transfused one unit  1/6  Completed Venofer 200mg IV daily for 5 doses.     DVT Prophylaxis: Lovenox SC.      #Progress Note Handoff:  Pending (specify): upgrade to step down   Family discussion:  Disposition: STR

## 2024-01-13 NOTE — PROGRESS NOTE ADULT - NS ATTEST RISK PROBLEM GEN_ALL_CORE FT
the following      The Patients complexity of data reviewed  is Low [ ] Moderate [x ] High [ ] due to the following:   Reviewed prior external records [ ]  Considering/ Ordered a unique test:  Labs [x ] Imaging [ ] Stress Test  [ ] Other: Specify [ ]  Reviewed each unique test result [ x]   Assessment requiring an independent historian [ ]  Independent interpretation of:   X-Ray [ ] EKG [ ]  Other: Specify  [ tele no events]  Discussion of management of tests with clinician outside of my group [ ]

## 2024-01-13 NOTE — PROGRESS NOTE ADULT - SUBJECTIVE AND OBJECTIVE BOX
SUBJECTIVE:    Patient is a 78y old Female who presents with a chief complaint of CHF exacerbation (12 Jan 2024 17:47)    Currently admitted to medicine with the primary diagnosis of:    Today is hospital day 8d.     Overnight Events:     patient short of breath off bipap. belly breathing and unable to speak full sentences.     PAST MEDICAL & SURGICAL HISTORY  Aortic stenosis    Diabetes    Asthma with COPD  many years since last attack    CAD (coronary artery disease), native coronary artery    Anemia    History of bleeding disorder  having work up 5/2/21- HAD WORKUP BUT NO DIAGNOSIS "NOTHING WAS FOUND"    History of transfusion reaction    Hiatal hernia    H/O ascending aortic replacement  Bovine Replacement    S/P CABG x 1  complication of bleeding 2016    H/O total knee replacement, right  complicated with fx femur bars screws in femur- b/l knee replacement    S/P hysterectomy    Presence of Watchman left atrial appendage closure device        SOCIAL HISTORY:  Smoking history:  Alcohol Use;  Illicit Drug Use:    ALLERGIES:  Augmentin (Other)  penicillins (Hives; Rash)    MEDICATIONS:  STANDING MEDICATIONS  albuterol/ipratropium for Nebulization 3 milliLiter(s) Nebulizer every 4 hours  atorvastatin 20 milliGRAM(s) Oral at bedtime  bacitracin   Ointment 1 Application(s) Topical daily  benzonatate 100 milliGRAM(s) Oral three times a day  chlorhexidine 2% Cloths 1 Application(s) Topical daily  desvenlafaxine ER 25 milliGRAM(s) Oral at bedtime  enoxaparin Injectable 40 milliGRAM(s) SubCutaneous every 24 hours  furosemide   Injectable 60 milliGRAM(s) IV Push daily  insulin glargine Injectable (LANTUS) 18 Unit(s) SubCutaneous at bedtime  insulin lispro (ADMELOG) corrective regimen sliding scale   SubCutaneous three times a day before meals  insulin lispro Injectable (ADMELOG) 6 Unit(s) SubCutaneous three times a day before meals  methylPREDNISolone sodium succinate Injectable 40 milliGRAM(s) IV Push two times a day  metolazone 5 milliGRAM(s) Oral daily  metoprolol succinate  milliGRAM(s) Oral daily  montelukast 10 milliGRAM(s) Oral daily  nystatin Powder 1 Application(s) Topical three times a day  ondansetron Injectable 4 milliGRAM(s) IV Push once  oseltamivir 30 milliGRAM(s) Oral every 12 hours  oseltamivir      pantoprazole    Tablet 40 milliGRAM(s) Oral before breakfast  polyethylene glycol 3350 17 Gram(s) Oral at bedtime  ranolazine 500 milliGRAM(s) Oral two times a day  senna 2 Tablet(s) Oral at bedtime    PRN MEDICATIONS  acetaminophen     Tablet .. 650 milliGRAM(s) Oral every 6 hours PRN  aluminum hydroxide/magnesium hydroxide/simethicone Suspension 30 milliLiter(s) Oral every 4 hours PRN    VITALS:   ICU Vital Signs Last 24 Hrs  T(C): 37.1 (13 Jan 2024 13:47), Max: 37.1 (13 Jan 2024 13:47)  T(F): 98.8 (13 Jan 2024 13:47), Max: 98.8 (13 Jan 2024 13:47)  HR: 85 (13 Jan 2024 13:47) (65 - 100)  BP: 114/58 (13 Jan 2024 13:47) (114/58 - 179/71)  BP(mean): 95 (13 Jan 2024 05:35) (95 - 95)  ABP: --  ABP(mean): --  RR: 19 (13 Jan 2024 13:47) (18 - 19)  SpO2: 100% (13 Jan 2024 11:58) (96% - 100%)    O2 Parameters below as of 13 Jan 2024 06:44  Patient On (Oxygen Delivery Method): nasal cannula            LABS:                        10.3   9.15  )-----------( 102      ( 13 Jan 2024 05:52 )             35.8     01-13    142  |  95<L>  |  33<H>  ----------------------------<  254<H>  2.6<LL>   |  30  |  1.4    Ca    9.3      13 Jan 2024 05:52  Mg     2.3     01-13        Urinalysis Basic - ( 13 Jan 2024 05:52 )    Color: x / Appearance: x / SG: x / pH: x  Gluc: 254 mg/dL / Ketone: x  / Bili: x / Urobili: x   Blood: x / Protein: x / Nitrite: x   Leuk Esterase: x / RBC: x / WBC x   Sq Epi: x / Non Sq Epi: x / Bacteria: x      ABG - ( 12 Jan 2024 13:30 )  pH, Arterial: 7.43  pH, Blood: x     /  pCO2: 56    /  pO2: 94    / HCO3: 37    / Base Excess: 10.7  /  SaO2: 98.1                        RADIOLOGY:    PHYSICAL EXAM:  GEN:   LUNGS:   HEART:   ABD:   EXT:   NEURO:     Mobility (6 Click Score):      /24    Lines:  Central line:              Date placed:             Indication:   Intravenous Access:   NG tube:   Wang Catheter:          SUBJECTIVE:    Patient is a 78y old Female who presents with a chief complaint of CHF exacerbation (12 Jan 2024 17:47)    Currently admitted to medicine with the primary diagnosis of:    Today is hospital day 8d.     Overnight Events:     patient short of breath off bipap. belly breathing and unable to speak full sentences.     PAST MEDICAL & SURGICAL HISTORY  Aortic stenosis    Diabetes    Asthma with COPD  many years since last attack    CAD (coronary artery disease), native coronary artery    Anemia    History of bleeding disorder  having work up 5/2/21- HAD WORKUP BUT NO DIAGNOSIS "NOTHING WAS FOUND"    History of transfusion reaction    Hiatal hernia    H/O ascending aortic replacement  Bovine Replacement    S/P CABG x 1  complication of bleeding 2016    H/O total knee replacement, right  complicated with fx femur bars screws in femur- b/l knee replacement    S/P hysterectomy    Presence of Watchman left atrial appendage closure device         ALLERGIES:  Augmentin (Other)  penicillins (Hives; Rash)    MEDICATIONS:  STANDING MEDICATIONS  albuterol/ipratropium for Nebulization 3 milliLiter(s) Nebulizer every 4 hours  atorvastatin 20 milliGRAM(s) Oral at bedtime  bacitracin   Ointment 1 Application(s) Topical daily  benzonatate 100 milliGRAM(s) Oral three times a day  chlorhexidine 2% Cloths 1 Application(s) Topical daily  desvenlafaxine ER 25 milliGRAM(s) Oral at bedtime  enoxaparin Injectable 40 milliGRAM(s) SubCutaneous every 24 hours  furosemide   Injectable 60 milliGRAM(s) IV Push daily  insulin glargine Injectable (LANTUS) 18 Unit(s) SubCutaneous at bedtime  insulin lispro (ADMELOG) corrective regimen sliding scale   SubCutaneous three times a day before meals  insulin lispro Injectable (ADMELOG) 6 Unit(s) SubCutaneous three times a day before meals  methylPREDNISolone sodium succinate Injectable 40 milliGRAM(s) IV Push two times a day  metolazone 5 milliGRAM(s) Oral daily  metoprolol succinate  milliGRAM(s) Oral daily  montelukast 10 milliGRAM(s) Oral daily  nystatin Powder 1 Application(s) Topical three times a day  ondansetron Injectable 4 milliGRAM(s) IV Push once  oseltamivir 30 milliGRAM(s) Oral every 12 hours  oseltamivir      pantoprazole    Tablet 40 milliGRAM(s) Oral before breakfast  polyethylene glycol 3350 17 Gram(s) Oral at bedtime  ranolazine 500 milliGRAM(s) Oral two times a day  senna 2 Tablet(s) Oral at bedtime    PRN MEDICATIONS  acetaminophen     Tablet .. 650 milliGRAM(s) Oral every 6 hours PRN  aluminum hydroxide/magnesium hydroxide/simethicone Suspension 30 milliLiter(s) Oral every 4 hours PRN    VITALS:   ICU Vital Signs Last 24 Hrs  T(C): 37.1 (13 Jan 2024 13:47), Max: 37.1 (13 Jan 2024 13:47)  T(F): 98.8 (13 Jan 2024 13:47), Max: 98.8 (13 Jan 2024 13:47)  HR: 85 (13 Jan 2024 13:47) (65 - 100)  BP: 114/58 (13 Jan 2024 13:47) (114/58 - 179/71)  BP(mean): 95 (13 Jan 2024 05:35) (95 - 95)  ABP: --  ABP(mean): --  RR: 19 (13 Jan 2024 13:47) (18 - 19)  SpO2: 100% (13 Jan 2024 11:58) (96% - 100%)    O2 Parameters below as of 13 Jan 2024 06:44  Patient On (Oxygen Delivery Method): nasal cannula            LABS:                        10.3   9.15  )-----------( 102      ( 13 Jan 2024 05:52 )             35.8     01-13    142  |  95<L>  |  33<H>  ----------------------------<  254<H>  2.6<LL>   |  30  |  1.4    Ca    9.3      13 Jan 2024 05:52  Mg     2.3     01-13        Urinalysis Basic - ( 13 Jan 2024 05:52 )    Color: x / Appearance: x / SG: x / pH: x  Gluc: 254 mg/dL / Ketone: x  / Bili: x / Urobili: x   Blood: x / Protein: x / Nitrite: x   Leuk Esterase: x / RBC: x / WBC x   Sq Epi: x / Non Sq Epi: x / Bacteria: x      ABG - ( 12 Jan 2024 13:30 )  pH, Arterial: 7.43  pH, Blood: x     /  pCO2: 56    /  pO2: 94    / HCO3: 37    / Base Excess: 10.7  /  SaO2: 98.1                        RADIOLOGY:    PHYSICAL EXAM:  \  GENERAL: NAD, lying in bed comfortably  CHEST/LUNG: wheezing+ poor air entry  labored breathing   HEART: Regular rate and rhythm; No murmurs, rubs, or gallops  ABDOMEN: Bowel sounds present; Soft, Nontender, Nondistended.    EXTREMITIES:  no edema  NERVOUS SYSTEM:  Alert & Oriented X3, speech clear. No deficits   MSK: FROM all 4 extremities, full and equal strength

## 2024-01-14 NOTE — PROGRESS NOTE ADULT - SUBJECTIVE AND OBJECTIVE BOX
78y old  Female who presents with a chief complaint of CHF exacerbation (01-14-24)      Pt seen and examined at bedside. No  SOB.  +chest burning cardiac telemonitoring afib with RVR      ABG - ( 13 Jan 2024 20:03 )  pH: 7.42  /  pCO2: 49    /  pO2: 126   / HCO3: 32  / Base Excess: 6.4   /  SaO2: 98.6        PAST MEDICAL & SURGICAL HISTORY:  Aortic stenosis    Diabetes    Asthma with COPD  many years since last attack    CAD (coronary artery disease), native coronary artery    Anemia    History of bleeding disorder  having work up 5/2/21- HAD WORKUP BUT NO DIAGNOSIS "NOTHING WAS FOUND"    History of transfusion reaction    Hiatal hernia    H/O ascending aortic replacement  Bovine Replacement    S/P CABG x 1  complication of bleeding 2016    H/O total knee replacement, right  complicated with fx femur bars screws in femur- b/l knee replacement    S/P hysterectomy    Presence of Watchman left atrial appendage closure device        VITAL SIGNS (Last 24 hrs):  T(C): 36.1 (01-14-24 @ 07:26), Max: 37.1 (01-13-24 @ 13:47)  HR: 84 (01-14-24 @ 08:00) (65 - 85)  BP: 127/62 (01-14-24 @ 08:00) (114/58 - 156/66)  RR: 20 (01-14-24 @ 08:00) (18 - 20)  SpO2: 100% (01-14-24 @ 08:00) (96% - 100%)  Wt(kg): --  Daily     Daily     I&O's Summary    13 Jan 2024 07:01  -  14 Jan 2024 07:00  --------------------------------------------------------  IN: 1330 mL / OUT: 1900 mL / NET: -570 mL        PHYSICAL EXAM:  GENERAL: NAD,elderly   HEAD:  Atraumatic, Normocephalic  EYES: EOMI, PERRLA, conjunctiva and sclera clear  NECK: Supple, No JVD  CHEST/LUNG: Clear to auscultation bilaterally; No wheeze  HEART: IRRegular rate and rhythm; No murmurs, rubs, or gallops  ABDOMEN: Soft, Nontender, Nondistended; Bowel sounds present  EXTREMITIES:  2+ Peripheral Pulses, No clubbing, cyanosis, or edema  PSYCH: AAOx3  NEUROLOGY: non-focal  SKIN: No rashes or lesions    Labs Reviewed  Spoke to patient in regards to abnormal labs.    CBC Full  -  ( 14 Jan 2024 05:33 )  WBC Count : 13.04 K/uL  Hemoglobin : 10.1 g/dL  Hematocrit : 34.6 %  Platelet Count - Automated : 122 K/uL  Mean Cell Volume : 90.3 fL  Mean Cell Hemoglobin : 26.4 pg  Mean Cell Hemoglobin Concentration : 29.2 g/dL  Auto Neutrophil # : 11.74 K/uL  Auto Lymphocyte # : 0.56 K/uL  Auto Monocyte # : 0.67 K/uL  Auto Eosinophil # : 0.00 K/uL  Auto Basophil # : 0.01 K/uL  Auto Neutrophil % : 90.0 %  Auto Lymphocyte % : 4.3 %  Auto Monocyte % : 5.1 %  Auto Eosinophil % : 0.0 %  Auto Basophil % : 0.1 %    BMP:    01-14 @ 05:33    Blood Urea Nitrogen - 48  Calcium - 9.4  Carbond Dioxide - 33  Chloride - 99  Creatinine - 1.4  Glucose - 262  Potassium - 3.7  Sodium - 142      Hemoglobin A1c -     Urine Culture:        COVID Labs  CRP:      D-Dimer:  308 ng/mL DDU (01-12-24 @ 16:00)    Ferritin: 83 ng/mL (01-06-24 @ 06:46)          Imaging reviewed independently and reviewed read    < from: Xray Chest 1 View- PORTABLE-Routine (Xray Chest 1 View- PORTABLE-Routine in AM.) (01.13.24 @ 08:49) >    No radiographic evidence of acute cardiopulmonary disease.      < from: [facility] Duplex Lower Ext Vein Scan, Bilat (01.12.24 @ 18:58) >  IMPRESSION:  No evidence of deep venous thrombosis in either lower extremity.    < from: TTE Echo Complete w/ Contrast w/o Doppler (01.09.24 @ 16:11) >  Summary:   1. Technically difficult study.   2. Hyperdynamic global left ventricular systolic function. Left   ventricular ejectionfraction, by visual estimation, is 70 to 75%. Severe   (grade III) diastolic dysfunction.   3. The right ventricle is not well visualized. On obtained images   appears grossly normal in size and function.   4. Moderately enlarged left atrium.   5. S/p SAVR with normal function (Vmax 2.3, Mean PG 13mmHg, DI 0.46, ESTUARDO   1.33cm2). Gradients may be increased secondary to hyperdynamic systolic   function.   6. Mitral annular calcification. Increased mitral gradient of 5mmHg at   85bpm likely secondaryto hyperdynamic LV.   7. Moderate-severe tricuspid regurgitation.   8. Moderate pulmonary hypertension (PASP = 55mmHg).    < end of copied text >          MEDICATIONS  (STANDING):  albuterol/ipratropium for Nebulization 3 milliLiter(s) Nebulizer every 4 hours  atorvastatin 20 milliGRAM(s) Oral at bedtime  bacitracin   Ointment 1 Application(s) Topical daily  benzonatate 100 milliGRAM(s) Oral three times a day  chlorhexidine 2% Cloths 1 Application(s) Topical daily  enoxaparin Injectable 40 milliGRAM(s) SubCutaneous every 24 hours  furosemide   Injectable 60 milliGRAM(s) IV Push daily  insulin glargine Injectable (LANTUS) 25 Unit(s) SubCutaneous at bedtime  insulin lispro (ADMELOG) corrective regimen sliding scale   SubCutaneous three times a day before meals  insulin lispro Injectable (ADMELOG) 10 Unit(s) SubCutaneous three times a day before meals  metolazone 5 milliGRAM(s) Oral daily  metoprolol tartrate 50 milliGRAM(s) Oral once  montelukast 10 milliGRAM(s) Oral daily  nystatin Powder 1 Application(s) Topical three times a day  ondansetron Injectable 4 milliGRAM(s) IV Push once  oseltamivir      oseltamivir 30 milliGRAM(s) Oral every 12 hours  pantoprazole    Tablet 40 milliGRAM(s) Oral before breakfast  polyethylene glycol 3350 17 Gram(s) Oral at bedtime  potassium chloride    Tablet ER 40 milliEquivalent(s) Oral once  ranolazine 500 milliGRAM(s) Oral two times a day  senna 2 Tablet(s) Oral at bedtime    MEDICATIONS  (PRN):  acetaminophen     Tablet .. 650 milliGRAM(s) Oral every 6 hours PRN Temp greater or equal to 38C (100.4F), Moderate Pain (4 - 6)  aluminum hydroxide/magnesium hydroxide/simethicone Suspension 30 milliLiter(s) Oral every 4 hours PRN Dyspepsia  guaiFENesin Oral Liquid (Sugar-Free) 100 milliGRAM(s) Oral every 6 hours PRN Cough  zolpidem 5 milliGRAM(s) Oral at bedtime PRN Insomnia

## 2024-01-14 NOTE — PROGRESS NOTE ADULT - ASSESSMENT
79yo F hx of diverticulitis, HFpEF, CAD s/p CABG, AS s/p SAVR (2016), atrial fibrillation s/p watchman (2021), bleeding disorder, asthma, COPD, DM2, HTN, CKD 2-3    #Acute HFpEF   #Mod to Severe pulmonary hypertension  #AS s/p SAVR (2016)  - Patient with severe leg edema, SOB, Pro-BNP  - CXR showed improved vascular congestion.   - increase  Lasix 60mg IV samm-->40 mg BID, keep neg 1-2L  and Metolazone. Acetazolamide 500mg for two days.   - Low sodium diet and fluid restriction.   - Monitor daily weight  - CXR today possible worsening of CHF     #chest pain (burning)   #afib with RVR s/p watchman   - given lopressor 5 mg intravenous push  - increased lopressor to 50 mg TID, received 100 mg XL and changed to lopressor   - hold off full anticoagulation until evaluated by cardiology  - pt has hx of bleeding disorder s/p watchman   - EKG done- afib   - trop 39--> will trend   - DW CC SDU     #Asthma  #LENY  - on montelukast   - Possible asthma exacerbation, worsening SOB today 1/12, with wheezing.   - CXR showed no acute infiltrates, ABG reviewed.   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    - Cr improved 1.3 from 2.1 on admission  - Monitor closely whole on diuresis    #COVID-19 exposure  - Patient with cough  - CXR is stable, no acute infiltrates  - RVP and COVID-19 PCR 1/10 neg  - RVP 1/12 pending    #CAD s/p CABG  #Paroxysmal Atrial Fibrillation  - Continue metoprolol 100mg, Ranexa, rosuvastatin     #DM type 2  - On home glipizide  - FS is elevated, Resume Lantus and Lispro    #Chronic anemia   #Iron deficiency anemia:  - transfused one unit 1/6  - Completed Venofer 200mg IV daily for 5 doses  - Hgb stable    # Misc  - DVT Prophylaxis: Lovenox  - GI Prophylaxis: PO Protonix  - Diet: DASH  - Activity: IAT  - Code Status: Full  - Dispo: SDU    #Progress Note Handoff  Pending (specify):  as above  Family discussion: house staff updated pt family  Disposition:  dw CC today SDU   Decision to admit the pt is based on acuity as above      79yo F hx of diverticulitis, HFpEF, CAD s/p CABG, AS s/p SAVR (2016), atrial fibrillation s/p watchman (2021), bleeding disorder, asthma, COPD, DM2, HTN, CKD 2-3    #Acute HFpEF   #Mod to Severe pulmonary hypertension  #AS s/p SAVR (2016)  - Patient with severe leg edema, SOB, Pro-BNP  - CXR showed improved vascular congestion.   - increase  Lasix 60mg IV samm-->40 mg BID, keep neg 1-2L  and Metolazone. Acetazolamide 500mg for two days.   - Low sodium diet and fluid restriction.   - Monitor daily weight  - CXR today possible worsening of CHF     #chest pain (burning)   #afib with RVR s/p watchman   - given lopressor 5 mg intravenous push  - increased lopressor to 50 mg TID, received 100 mg XL and changed to lopressor   - hold off full anticoagulation until evaluated by cardiology  - pt has hx of bleeding disorder s/p watchman   - EKG done- afib   - trop 39--> will trend   - DW CC SDU     #Asthma  #LENY  - on montelukast   - Possible asthma exacerbation, worsening SOB today 1/12, with wheezing.   - CXR showed no acute infiltrates, ABG reviewed.   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    - Cr improved 1.3 from 2.1 on admission  - Monitor closely whole on diuresis    #COVID-19 exposure  - Patient with cough  - CXR is stable, no acute infiltrates  - RVP and COVID-19 PCR 1/10 neg  - RVP 1/12 pending    #CAD s/p CABG  #Paroxysmal Atrial Fibrillation  - Continue metoprolol 100mg, Ranexa, rosuvastatin     #DM type 2  - On home glipizide  - FS is elevated, insulin adjusted    #Chronic anemia   #Iron deficiency anemia:  - transfused one unit 1/6  - Completed Venofer 200mg IV daily for 5 doses  - Hgb stable    # Misc  - DVT Prophylaxis: Lovenox  - GI Prophylaxis: PO Protonix  - Diet: DASH  - Activity: IAT  - Code Status: Full  - Dispo: SDU    #Progress Note Handoff  Pending (specify):  as above  Family discussion: house staff updated pt family  Disposition:  dw CC today SDU   Decision to admit the pt is based on acuity as above

## 2024-01-14 NOTE — PROGRESS NOTE ADULT - ASSESSMENT
Physical Therapy  PT Acute Evaluation     Therapy Triage Evaluation: OT evaluation needed due to a decline in one or more of the following: ADL independence, safety, functional ambulation, balance, strength      Pt seen on 3EF unit.                                                Frequency Comments: SAT (Charmaine FERNANDO to see, 1 session); MAP1 daily (10/29). OT Triage IN      Visit Type: initial evaluation  Precautions:  Medical precautions:  abdominal precautions; standard precautions.  S/p low anterior resection, mobilization of splenic flexure, small bowel resection, flex sig  Lines:     Basic: IV and indwelling urinary catheter      Lines in chart and on patient reviewed, precautions maintained throughout session.  Spine:     Lumbar: abdominal binder on for comfort  Safety Measures: chair alarm, bed alarm and bed rails    SUBJECTIVE  Patient agreed to participate in therapy this date.  \"Oh I've been waiting for you, girl. I want to get up and walk.\"    RN Louisa ROBBINSs session, updated after session.  Patient / Family Goal: return home and maximize function    Pain   RN informed on pain level     OBJECTIVE     Oriented to person, place, time and situation     Arousal alertness: appropriate responses to stimuli    Affect/Behavior: alert, cooperative and pleasant  Patient activity tolerance: 2 to 1 activity to rest  Functional Communication/Cognition    Overall status:  Within functional limits  Range of Motion (measured in degrees unless otherwise noted, active unless indicated)  WFL: RLE, LLE  Strength (out of 5 unless otherwise indicated)   WFL: LLE, RLE  Balance    Sitting: Static: independent, Dynamic: independent    Standing - Firm Surface - Eyes Open: Static: supervision Dynamic: contact guard/touching/steadying assist  Balance Details: Supv-light CG for ambulation and dynamic reaching tasks. Mild unsteadiness throughout due to pain. No device used but support of IV Pole throughout.    Bed Mobility:        Supine to  sit: supervision  Training completed:    Tasks: supine to sit    Education details: body mechanics, patient safety and patient requires additional training    Reviewed log roll technique, completes with supervision for safety. Limited by pain.  Transfers:    Assistive devices: gait belt and 1 person    Sit to stand: supervision    Stand to sit: supervision  Training completed:    Tasks: sit to stand and stand to sit    Education details: body mechanics, patient safety and patient requires additional training    Supervision for sit<>stand transfers from EOB, recliner chair. No gross LOB, mild unsteadiness due to pain.  Gait/Ambulation:     Assistance: supervision and contact guard/touching/steadying assist   Assistive device: gait belt and 1 person (IV Pole)    Distance (ft): 500    Pattern: within functional limits  Training Completed:    Tasks: gait training on level surfaces    Education details: body mechanics, patient safety and patient requires additional training    Grossly supervision with ambulation 500' with PRO UE support on IV Pole for balance. Mild unsteadiness, a few instances of light CG steadying when down to single UE support on IV. No gross LOB throughout.      Interventions     Training provided: activity tolerance, balance retraining, bed mobility training, compensatory techniques, breathing/relaxation, gait training, transfer training, safety training, body mechanics and functional ambulation    Skilled input: Verbal instruction/cues and tactile instruction/cues  Verbal Consent: Writer verbally educated and received verbal consent for hand placement, positioning of patient, and techniques to be performed today from patient        ASSESSMENT    Impairments: strength, balance deficits, safety awareness, pain and activity tolerance  Functional Limitations: all functional mobility  Patient seen for PT evaluation s/p low anterior resection, mobilization of splenic flexure, small bowel resection, flex  sig. Patient is from home with her spouse, one level house with 1 stoop step to enter, and 12 stairs/1 railing inside home to basement laundry but reports spouse can complete this. Patient at baseline is independent with basic mobility, ADLs, and homemaking tasks. Patient uses no device at baseline.  Patient today presents below baseline-- needs supervision for bed mobility, supervision for transfers, and supervision-light CG steadying assist for ambulation 500' with PRO UE support of IV pole. Mild unsteadiness related to pain. Patient to benefit from 1 additional session skilled therapy to address minor balance deficits and trial step for home entry. Set patient up for mobility aide program to maintain current functional status until DC. Anticipate return home when medically stable.       Discharge Recommendations  Recommendation for Discharge: PT WI: Home         PT/OT Mobility Equipment for Discharge: No needs anticipated   PT/OT ADL Equipment for Discharge: No needs anticipated  PT Identified Barriers to Discharge: Acute medical needs, pain   Skilled therapy is required to address these limitations in attempt to maximize the patient's independence.  Predicted patient presentation: Low (stable) - Patient comorbidities and complexities, as defined above, will have little effect on progress for prescribed plan of care.    End of Session:   Location: in chair  Safety measures: alarm system in place/re-engaged, call light within reach and lines intact  Handoff to: nurse and nurse assistant  Education Provided:   Learning assessment:  - Primary learner: patient  - Are they ready to learn: yes  - Preferred learning style: verbal and demonstration  - Barriers to learning: no barriers apparent at this time  Education provided during session:  - Receiving education: patient  - Results of above outlined education: Verbalizes understanding and Needs reinforcement    PLAN    Suggestions for next session as indicated:  Progress independence with bed mobility-- flat bed with log roll, transfers, ambulation without IV Pole or UE support, 1 stoop step, balance, safety      Frequency Comments: Frequency Comments: SAT (Charmaine FERNANDO to see, 1 session); MAP1 daily (10/29). OT Triage IN    Interventions: balance, bed mobility, compensatory technique education, endurance training, functional transfer training, equipment eval/education, gait training, patient/family training, strengthening, safety education, stairs retraining and body mechanics  Agreement to plan and goals: patient agrees with goals and treatment plan        GOALS  Review Date: 11/5/2021  Long Term Goals: (to be met by time of discharge from hospital)  Sit to supine: Patient will complete sit to supine modified independent.  Supine to sit: Patient will complete supine to sit modified independent.  Sit to stand: Patient will complete sit to stand transfer with no device, independent.   Ambulation (even): Patient will ambulate on even surface for 150 feet with no device, independent.   Curb step: Patient will ambulate curb step with independent.   Flight of stairs: Patient will ambulate a flight of stairs with using 1 rail, modified independent.     Documented in the chart in the following areas: Prior Level of Function. Pain. Assessment.      Therapy procedure time and total treatment time can be found documented on the Time Entry flowsheet   Impression  influenza   afib   LENY/OHS?  HO asthma  Recent COVID exposure  HFpEF, G3DD, moderate PH  CAD s/p CABG  AS s/p SAVR (2016), atrial fibrillation s/p watchman (2021),   Bleeding disorder  CKD 2-3    Plan    CXR reviewed  BNP, ECHO noted  on lasix keep IS < os   Duonebs PRN q4hrs  tamiflu   follow proical   NIV,  at night and as needed   Wean off o2 goal 92-96%  can be downgrade to tele AOx3   cardiology eval

## 2024-01-14 NOTE — CHART NOTE - NSCHARTNOTEFT_GEN_A_CORE
Palliative Care chart note    Palliative care consult received electronically. Chart reviewed.  No significant symptom burden noted in chart. Will plan to see patient for full consult on Monday.    Please call [phone] PRN for any questions, needs, or concerns prior to that time. Palliative Care chart note    Palliative care consult received electronically. Chart reviewed.  No significant symptom burden noted in chart. Will plan to see patient for full consult on Monday.    Please call [phone] PRN for any questions, needs, or concerns prior to that time.

## 2024-01-14 NOTE — PROGRESS NOTE ADULT - SUBJECTIVE AND OBJECTIVE BOX
Patient is a 78y old  Female who presents with a chief complaint of CHF exacerbation (13 Jan 2024 16:35)      Over Night Events:  Patient seen and examined.   on NC    Afib     ROS:  See HPI    PHYSICAL EXAM    ICU Vital Signs Last 24 Hrs  T(C): 36.1 (14 Jan 2024 07:26), Max: 37.1 (13 Jan 2024 13:47)  T(F): 97 (14 Jan 2024 07:26), Max: 98.8 (13 Jan 2024 13:47)  HR: 84 (14 Jan 2024 08:00) (65 - 85)  BP: 127/62 (14 Jan 2024 08:00) (114/58 - 156/66)  BP(mean): 89 (14 Jan 2024 08:00) (80 - 95)  ABP: --  ABP(mean): --  RR: 20 (14 Jan 2024 08:00) (18 - 20)  SpO2: 100% (14 Jan 2024 08:00) (96% - 100%)    O2 Parameters below as of 14 Jan 2024 08:00  Patient On (Oxygen Delivery Method): nasal cannula  O2 Flow (L/min): 3          General:awake   HEENT:                Lymph Nodes: NO cervical LN   Lungs: Bilateral BS  Cardiovascular: IRRegular   Abdomen: Soft, Positive BS  Extremities: No clubbing   Skin: warm   Neurological:   Musculoskeletal: move all ext     I&O's Detail    13 Jan 2024 07:01  -  14 Jan 2024 07:00  --------------------------------------------------------  IN:    Oral Fluid: 1330 mL  Total IN: 1330 mL    OUT:    Voided (mL): 1900 mL  Total OUT: 1900 mL    Total NET: -570 mL          LABS:                          10.1   13.04 )-----------( 122      ( 14 Jan 2024 05:33 )             34.6         14 Jan 2024 05:33    142    |  99     |  48     ----------------------------<  262    3.7     |  33     |  1.4      Ca    9.4        14 Jan 2024 05:33  Mg     2.4       14 Jan 2024 05:33                                                                                      Urinalysis Basic - ( 14 Jan 2024 05:33 )    Color: x / Appearance: x / SG: x / pH: x  Gluc: 262 mg/dL / Ketone: x  / Bili: x / Urobili: x   Blood: x / Protein: x / Nitrite: x   Leuk Esterase: x / RBC: x / WBC x   Sq Epi: x / Non Sq Epi: x / Bacteria: x        Lactate, Blood: 1.6 mmol/L (01-13-24 @ 22:07)                                                                                                                                           ABG - ( 13 Jan 2024 20:03 )  pH, Arterial: 7.42  pH, Blood: x     /  pCO2: 49    /  pO2: 126   / HCO3: 32    / Base Excess: 6.4   /  SaO2: 98.6                MEDICATIONS  (STANDING):  albuterol/ipratropium for Nebulization 3 milliLiter(s) Nebulizer every 4 hours  atorvastatin 20 milliGRAM(s) Oral at bedtime  bacitracin   Ointment 1 Application(s) Topical daily  benzonatate 100 milliGRAM(s) Oral three times a day  chlorhexidine 2% Cloths 1 Application(s) Topical daily  enoxaparin Injectable 40 milliGRAM(s) SubCutaneous every 24 hours  furosemide   Injectable 60 milliGRAM(s) IV Push daily  insulin glargine Injectable (LANTUS) 25 Unit(s) SubCutaneous at bedtime  insulin lispro (ADMELOG) corrective regimen sliding scale   SubCutaneous three times a day before meals  insulin lispro Injectable (ADMELOG) 10 Unit(s) SubCutaneous three times a day before meals  metolazone 5 milliGRAM(s) Oral daily  metoprolol tartrate 50 milliGRAM(s) Oral once  metoprolol tartrate Injectable 5 milliGRAM(s) IV Push once  montelukast 10 milliGRAM(s) Oral daily  nystatin Powder 1 Application(s) Topical three times a day  ondansetron Injectable 4 milliGRAM(s) IV Push once  oseltamivir      oseltamivir 30 milliGRAM(s) Oral every 12 hours  pantoprazole    Tablet 40 milliGRAM(s) Oral before breakfast  polyethylene glycol 3350 17 Gram(s) Oral at bedtime  potassium chloride    Tablet ER 40 milliEquivalent(s) Oral once  ranolazine 500 milliGRAM(s) Oral two times a day  senna 2 Tablet(s) Oral at bedtime    MEDICATIONS  (PRN):  acetaminophen     Tablet .. 650 milliGRAM(s) Oral every 6 hours PRN Temp greater or equal to 38C (100.4F), Moderate Pain (4 - 6)  aluminum hydroxide/magnesium hydroxide/simethicone Suspension 30 milliLiter(s) Oral every 4 hours PRN Dyspepsia  guaiFENesin Oral Liquid (Sugar-Free) 100 milliGRAM(s) Oral every 6 hours PRN Cough  zolpidem 5 milliGRAM(s) Oral at bedtime PRN Insomnia          Xrays:  TLC:  OG:  ET tube:                                                                                    mild congestion    ECHO:  [facility] ICU:

## 2024-01-15 NOTE — PROGRESS NOTE ADULT - ASSESSMENT
77 yo F w HFpEF, severe pulmHTN, CAD s/p CABG and SAVR in 2016 , AF s/o Watchman device in 2021, COPD,HTN, CKD presented with c/o SCHAFFER, leg swelling for 2 weeks.       Impression:     Decompensated HFpEF -euvolumic now   AF w RVR , s/p watchman in 2021  AKASH on CKD , likely CRS  moderate pulm HTN , Gr 2 or 3   CAD s/p CABG   AS s/p SAVR  Anemia s/p iron sucrose and 1 unit of pRBC     Recs:   -continue metoprolol 75 mg TID for goal HR < 110,  -keep NPO past midnight for potential NERY DCCV tomorrow if HR is persistently > 110   -will discuss with EP if patient needs to be on short term AC post DCCV if she eventually needs it.   -consider switching to PO diuretics tomorrow   -cont ranolazine , statin  -pt should be on aspirin, unclear why its on hold    79 yo F w HFpEF, severe pulmHTN, CAD s/p CABG and SAVR in 2016 , AF s/o Watchman device in 2021, COPD,HTN, CKD presented with c/o SCHAFFER, leg swelling for 2 weeks.       Impression:     Decompensated HFpEF -euvolumic now   AF w RVR , s/p watchman in 2021  AKASH on CKD , likely CRS  moderate pulm HTN , Gr 2 or 3   CAD s/p CABG   AS s/p SAVR  Anemia s/p iron sucrose and 1 unit of pRBC     Recs:   -continue metoprolol 75 mg TID for goal HR < 110,  -keep NPO past midnight for potential NERY DCCV tomorrow if HR is persistently > 110   -will discuss with EP if patient needs to be on short term AC post DCCV if she eventually needs it.   -consider switching to PO diuretics tomorrow   -cont ranolazine , statin  -pt should be on aspirin, unclear why its on hold     Pending discuss with attending tomorrow morning.  79 yo F w HFpEF, severe pulm HTN, CAD s/p CABG and SAVR in 2016 , AF s/o Watchman device in 2021, COPD, HTN, CKD presented with c/o SCHAFFER, leg swelling for 2 weeks.       Acute Decompensated HFpEF - euvolemic now   AF w RVR , s/p watchman in 2021  AKASH on CKD , likely CRS  Pulmonary HTN, Gr 2 or 3   CAD s/p CABG   AS s/p SAVR  Anemia s/p iron sucrose and 1 unit of PRBC       Recs:   -continue metoprolol 75 mg TID for goal HR < 110  -keep NPO past midnight for potential NERY DCCV tomorrow if HR is persistently > 110   -will discuss with EP if patient needs to be on short term AC post DCCV if she eventually needs it.   -consider switching to PO diuretics tomorrow   -cont ranolazine  -pt should be on aspirin, unclear why its on hold    77 yo F w HFpEF, severe pulm HTN, CAD s/p CABG and SAVR in 2016 , AF s/o Watchman device in 2021, COPD, HTN, CKD presented with c/o SCHAFFER, leg swelling for 2 weeks.       Acute Decompensated HFpEF - euvolemic now   AF w RVR , s/p watchman in 2021  AKASH on CKD , likely CRS  Pulmonary HTN, Gr 2 or 3   CAD s/p CABG   AS s/p SAVR  Anemia s/p iron sucrose and 1 unit of PRBC       Recs:   -continue metoprolol 75 mg TID for goal HR < 110  -keep NPO past midnight for potential NERY DCCV tomorrow if HR is persistently > 110   -will discuss with EP if patient needs to be on short term AC post DCCV if she eventually needs it.   -consider switching to PO diuretics tomorrow   -cont ranolazine  -pt should be on aspirin, unclear why its on hold

## 2024-01-15 NOTE — PROGRESS NOTE ADULT - ASSESSMENT
Impression  Influenza A infection   LENY  HO asthma  HFpEF, G3DD, moderate PH  CAD s/p CABG  AS s/p SAVR (2016),   HO Atrial fibrillation s/p watchman (2021),   CKD 2-3    PLAN:    CNS:  No depressants     HEENT: Oral care    PULMONARY:  HOB @ 45 degrees.  Aspiration precautions.  NIV during sleep.  Wean o2 as tolerated.  OP pulm follow up.  DC Steroids.     CARDIOVASCULAR:  Rate control.  Avoid overload.  ECHO noted.     GI: GI prophylaxis.  Feeding.  Bowel regimen     RENAL:  Follow up lytes.  Correct as needed    INFECTIOUS DISEASE: Follow up cultures.  Finish Tamiflu course.      HEMATOLOGICAL:  DVT prophylaxis.  Dimer noted.  Monitor CBC     ENDOCRINE:  Follow up FS.  Insulin protocol if needed.    MUSCULOSKELETAL:  PT OT .  Off loading     DGTF

## 2024-01-15 NOTE — PROGRESS NOTE ADULT - ATTENDING COMMENTS
No plan for NERY/CV at this time due to anemia requiring transfusion and Influenza A+    Rate control with Metoprolol  Add Cardizem if needed  Elevated risk of bleeding on anticoagulation post-cardioversion likely outweighs any benefit

## 2024-01-15 NOTE — PROGRESS NOTE ADULT - NS ATTEST RISK PROBLEM GEN_ALL_CORE FT
CHF, cardiac telemonitoring, dw CC, CXR reviewed, SDU CHF, cardiac telemonitoring, dw CC, CXR reviewed, downgrade to cardiac telemonitoring

## 2024-01-15 NOTE — CONSULT NOTE ADULT - SUBJECTIVE AND OBJECTIVE BOX
HPI:  79yo F hx of diverticulitis, HFpEF, CAD s/p CABG, AS s/p SAVR (2016), atrial fibrillation s/p watchman (2021), bleeding disorder, asthma, COPD, DM2, HTN, CKD 2-3. She presented to the ED complaining of progressively worsening shortness of breath of 2 weeks duration, limiting her ambulation, associated with worsening lower limb edema up to the thigh, not responding to Lasix and metolazone. She denied chest pain, cough, fever, chills or rigors, no recent illness. She is not compliant with her diet, not restricting fluid intake.       Vital Signs T(F): 97.1 HR: 73 BP: 130/53 RR: 18 SpO2: 100% nasal cannula     EKG repeated twice showed no acute ischemic changes  troponin is stable twice     admitted to telemetry for further evaluation    (06 Jan 2024 00:11)    Patient reports feeling well. Leg swelling has improved. No pain or SOB.     PERTINENT PM/SXH:   Aortic stenosis    Hypertension    Diabetes    Asthma with COPD    CAD (coronary artery disease), native coronary artery    Anemia    History of bleeding disorder    History of transfusion reaction    Hiatal hernia      H/O ascending aortic replacement    S/P CABG x 1    H/O total knee replacement, right    S/P hysterectomy    Presence of Watchman left atrial appendage closure device      FAMILY HISTORY:  Family history of pseudocholinesterase deficiency (Child)      ITEMS NOT CHECKED ARE NOT PRESENT    SOCIAL HISTORY:   Significant other/partner[ ]  Children[x ]  Holiness/Spirituality:  Substance hx:  [ ]   Tobacco hx:  [ ]   Alcohol hx: [ ]   Living Situation: [ ]Home  [ ]Long term care  [ ]Rehab [ ]Other  Home Services: [ ] HHA [ ] Osmel RN [ ] Hospice  Occupation:  Home Opioid hx:  [ ] Y [ ] N [ ] I-Stop Reference No:     ADVANCE DIRECTIVES:    [ ] Full Code [ ] DNR  MOLST  [ ]  Living Will  [ ]   DECISION MAKER(s):  [x ] Health Care Proxy(s)  [ ] Surrogate(s)  [ ] Guardian           Name(s):   Lina (daughter)  Mehdi (DORI)    BASELINE (I)ADL(s) (prior to admission):  [place]: [ ]Total  [ ] Moderate [ ]Dependent  Palliative Performance Status Version 2:         %    http://npcrc.org/files/news/palliative_performance_scale_ppsv2.pdf    Allergies    Augmentin (Other)  penicillins (Hives; Rash)    Intolerances    MEDICATIONS  (STANDING):  albuterol/ipratropium for Nebulization 3 milliLiter(s) Nebulizer every 4 hours  atorvastatin 20 milliGRAM(s) Oral at bedtime  bacitracin   Ointment 1 Application(s) Topical daily  benzonatate 100 milliGRAM(s) Oral three times a day  budesonide  80 MICROgram(s)/formoterol 4.5 MICROgram(s) Inhaler 2 Puff(s) Inhalation two times a day  chlorhexidine 2% Cloths 1 Application(s) Topical daily  desvenlafaxine ER 25 milliGRAM(s) Oral daily  enoxaparin Injectable 40 milliGRAM(s) SubCutaneous every 24 hours  furosemide   Injectable 40 milliGRAM(s) IV Push two times a day  insulin glargine Injectable (LANTUS) 27 Unit(s) SubCutaneous at bedtime  insulin lispro (ADMELOG) corrective regimen sliding scale   SubCutaneous three times a day before meals  insulin lispro Injectable (ADMELOG) 14 Unit(s) SubCutaneous three times a day before meals  metolazone 5 milliGRAM(s) Oral daily  metoprolol tartrate 75 milliGRAM(s) Oral three times a day  montelukast 10 milliGRAM(s) Oral daily  nystatin Powder 1 Application(s) Topical three times a day  ondansetron Injectable 4 milliGRAM(s) IV Push once  oseltamivir      oseltamivir 30 milliGRAM(s) Oral every 12 hours  pantoprazole    Tablet 40 milliGRAM(s) Oral before breakfast  polyethylene glycol 3350 17 Gram(s) Oral at bedtime  predniSONE   Tablet 40 milliGRAM(s) Oral daily  ranolazine 500 milliGRAM(s) Oral two times a day  senna 2 Tablet(s) Oral at bedtime    MEDICATIONS  (PRN):  acetaminophen     Tablet .. 650 milliGRAM(s) Oral every 6 hours PRN Temp greater or equal to 38C (100.4F), Moderate Pain (4 - 6)  aluminum hydroxide/magnesium hydroxide/simethicone Suspension 30 milliLiter(s) Oral every 4 hours PRN Dyspepsia  guaiFENesin Oral Liquid (Sugar-Free) 100 milliGRAM(s) Oral every 6 hours PRN Cough  zolpidem 5 milliGRAM(s) Oral at bedtime PRN Insomnia      PRESENT SYMPTOMS: [ ]Unable to obtain due to poor mentation   Source if other than patient:  [ ]Family   [ ]Team     Pain: [ ]yes [x ]no  QOL impact -   Location -                    Aggravating factors -  Alleviating factors -   Quality -  Radiation -  Timing -   Severity (0-10 scale):  Minimal acceptable level (0-10 scale):     CPOT:    https://www.[facility].org/getattachment/[email or web address]/Critical-Care-Pain-Observation-Tool-(CPOT)    PAIN AD Score:   http://geriatrictoolkit.[facility]/cog/painad.pdf     Dyspnea:                           [x ]None[ ]Mild [ ]Moderate [ ]Severe     Respiratory Distress Observation Scale (RDOS):   A score of 0 to 2 signifies little or no respiratory distress, 3 signifies mild distress, scores 4 to 6 indicate moderate distress, and scores greater than 7 signify severe distress  https://www.[facility].ca/sites/default/files/PDFS/888221-rumsrytqlkl-lkbyyxij-guytoiloigr-tixtb.pdf    Anxiety:                             [ ]None[ ]Mild [ ]Moderate [ ]Severe   Fatigue:                             [ ]None[ ]Mild [ ]Moderate [ ]Severe   Nausea:                             [ ]None[ ]Mild [ ]Moderate [ ]Severe   Loss of appetite:              [ ]None[ ]Mild [ ]Moderate [ ]Severe   Constipation:                    [ ]None[ ]Mild [ ]Moderate [ ]Severe    Other Symptoms:  [x ]All other review of systems negative     Palliative Performance Status Version 2:         %    http://[facility].org/files/news/palliative_performance_scale_ppsv2.pdf  PHYSICAL EXAM:  Vital Signs Last 24 Hrs  T(C): 36.3 (15 Bhaskar 2024 11:00), Max: 36.4 (15 Bhaskar 2024 07:00)  T(F): 97.4 (15 Bhaskar 2024 11:00), Max: 97.6 (15 Bhaskar 2024 07:00)  HR: 120 (15 Bhaskar 2024 11:30) (105 - 120)  BP: 106/61 (15 Bhaskar 2024 11:00) (98/49 - 151/67)  BP(mean): 77 (15 Bhaskar 2024 11:00) (71 - 97)  RR: 20 (15 Bhaskar 2024 07:00) (18 - 20)  SpO2: 95% (15 Bhaskar 2024 11:30) (95% - 100%)    Parameters below as of 15 Bhaskar 2024 11:30  Patient On (Oxygen Delivery Method): room air     I&O's Summary    14 Jan 2024 07:01  -  15 Bhaskar 2024 07:00  --------------------------------------------------------  IN: 120 mL / OUT: 1850 mL / NET: -1730 mL    15 Bhaskar 2024 07:01  -  15 Bhaskar 2024 13:51  --------------------------------------------------------  IN: 0 mL / OUT: 1000 mL / NET: -1000 mL        GENERAL:  NAD   PULMONARY:  Work of breathing mildly increased  NEUROLOGIC: Grossly intact  BEHAVIORAL/PSYCH:  Calm.     CRITICAL CARE:  [ ] Shock Present  [ ]Septic [ ]Cardiogenic [ ]Neurologic [ ]Hypovolemic  [ ]  Vasopressors [ ]  Inotropes   [ ]Respiratory failure present [ ]Mechanical ventilation [ ]Non-invasive ventilatory support [ ]High flow  [ ]Acute  [ ]Chronic [ ]Hypoxic  [ ]Hypercarbic [ ]Other  [ ]Other organ failure     LABS: I have reviewed daily labs                        11.1   12.45 )-----------( 129      ( 15 Bhaskar 2024 04:37 )             38.4   01-15    143  |  100  |  54<H>  ----------------------------<  146<H>  3.4<L>   |  34<H>  |  1.4    Ca    9.5      15 Bhaskar 2024 04:37  Mg     2.4     01-15    TPro  6.1  /  Alb  3.7  /  TBili  1.0  /  DBili  x   /  AST  18  /  ALT  14  /  AlkPhos  70  01-15      RADIOLOGY & ADDITIONAL STUDIES: I have reviewed new imaging    PROTEIN CALORIE MALNUTRITION PRESENT: [ ]mild [ ]moderate [ ]severe [ ]underweight [ ]morbid obesity  https://www.andeal.org/vault/2440/web/files/ONC/Table_Clinical%20Characteristics%20to%20Document%20Malnutrition-White%20JV%20et%20al%202012.pdf    Height (cm): 167.6 (05-01-23 @ 13:18)  Weight (kg): 106.6 (01-05-24 @ 11:49), 133 (05-01-23 @ 13:18)  BMI (kg/m2): 37.9 (01-05-24 @ 11:49), 47.3 (05-01-23 @ 13:18)    [ ]PPSV2 < or = to 30% [ ]significant weight loss  [ ]poor nutritional intake  [ ]anasarca      [ ]Artificial Nutrition      Palliative Care Spiritual/Emotional Screening Tool Question  Severity (0-4):                    OR                    [ x] Unable to determine. Will assess at later time if appropriate.  Score of 2 or greater indicates recommendation of Chaplaincy and/or SW referral  Chaplaincy Referral: [ ] Yes [ ] Refused [ ] Following     Caregiver [place]:  [ ] Yes [ ] No    OR    [x ] Unable to determine. Will assess at later time if appropriate.  Social Work Referral [ ]  Patient and Family Centered Care Referral [ ]    Anticipatory Grief Present: [ ] Yes [ ] No    OR     [ x] Unable to determine. Will assess at later time if appropriate.  Social Work Referral [ ]  Patient and Family Centered Care Referral [ ]    REFERRALS:   [ ]Chaplaincy  [ ]Hospice  [ ]Child Life  [ ]Social Work  [ ]Case management [ ]Holistic Therapy     Palliative care education provided to patient and/or family HPI:  79yo F hx of diverticulitis, HFpEF, CAD s/p CABG, AS s/p SAVR (2016), atrial fibrillation s/p watchman (2021), bleeding disorder, asthma, COPD, DM2, HTN, CKD 2-3. She presented to the ED complaining of progressively worsening shortness of breath of 2 weeks duration, limiting her ambulation, associated with worsening lower limb edema up to the thigh, not responding to Lasix and metolazone. She denied chest pain, cough, fever, chills or rigors, no recent illness. She is not compliant with her diet, not restricting fluid intake.       Vital Signs T(F): 97.1 HR: 73 BP: 130/53 RR: 18 SpO2: 100% nasal cannula     EKG repeated twice showed no acute ischemic changes  troponin is stable twice     admitted to telemetry for further evaluation    (06 Jan 2024 00:11)    Patient reports feeling well. Leg swelling has improved. No pain or SOB.     PERTINENT PM/SXH:   Aortic stenosis    Hypertension    Diabetes    Asthma with COPD    CAD (coronary artery disease), native coronary artery    Anemia    History of bleeding disorder    History of transfusion reaction    Hiatal hernia      H/O ascending aortic replacement    S/P CABG x 1    H/O total knee replacement, right    S/P hysterectomy    Presence of Watchman left atrial appendage closure device      FAMILY HISTORY:  Family history of pseudocholinesterase deficiency (Child)      ITEMS NOT CHECKED ARE NOT PRESENT    SOCIAL HISTORY:   Significant other/partner[ ]  Children[x ]  Yazidi/Spirituality:  Substance hx:  [ ]   Tobacco hx:  [ ]   Alcohol hx: [ ]   Living Situation: [ ]Home  [ ]Long term care  [ ]Rehab [ ]Other  Home Services: [ ] HHA [ ] Osmel RN [ ] Hospice  Occupation:  Home Opioid hx:  [ ] Y [ ] N [ ] I-Stop Reference No:     ADVANCE DIRECTIVES:    [ ] Full Code [ ] DNR  MOLST  [ ]  Living Will  [ ]   DECISION MAKER(s):  [x ] Health Care Proxy(s)  [ ] Surrogate(s)  [ ] Guardian           Name(s):   Lina (daughter)  Mehdi (DORI)    BASELINE (I)ADL(s) (prior to admission):  [place]: [ ]Total  [ ] Moderate [ ]Dependent  Palliative Performance Status Version 2:         %    http://npcrc.org/files/news/palliative_performance_scale_ppsv2.pdf    Allergies    Augmentin (Other)  penicillins (Hives; Rash)    Intolerances    MEDICATIONS  (STANDING):  albuterol/ipratropium for Nebulization 3 milliLiter(s) Nebulizer every 4 hours  atorvastatin 20 milliGRAM(s) Oral at bedtime  bacitracin   Ointment 1 Application(s) Topical daily  benzonatate 100 milliGRAM(s) Oral three times a day  budesonide  80 MICROgram(s)/formoterol 4.5 MICROgram(s) Inhaler 2 Puff(s) Inhalation two times a day  chlorhexidine 2% Cloths 1 Application(s) Topical daily  desvenlafaxine ER 25 milliGRAM(s) Oral daily  enoxaparin Injectable 40 milliGRAM(s) SubCutaneous every 24 hours  furosemide   Injectable 40 milliGRAM(s) IV Push two times a day  insulin glargine Injectable (LANTUS) 27 Unit(s) SubCutaneous at bedtime  insulin lispro (ADMELOG) corrective regimen sliding scale   SubCutaneous three times a day before meals  insulin lispro Injectable (ADMELOG) 14 Unit(s) SubCutaneous three times a day before meals  metolazone 5 milliGRAM(s) Oral daily  metoprolol tartrate 75 milliGRAM(s) Oral three times a day  montelukast 10 milliGRAM(s) Oral daily  nystatin Powder 1 Application(s) Topical three times a day  ondansetron Injectable 4 milliGRAM(s) IV Push once  oseltamivir      oseltamivir 30 milliGRAM(s) Oral every 12 hours  pantoprazole    Tablet 40 milliGRAM(s) Oral before breakfast  polyethylene glycol 3350 17 Gram(s) Oral at bedtime  predniSONE   Tablet 40 milliGRAM(s) Oral daily  ranolazine 500 milliGRAM(s) Oral two times a day  senna 2 Tablet(s) Oral at bedtime    MEDICATIONS  (PRN):  acetaminophen     Tablet .. 650 milliGRAM(s) Oral every 6 hours PRN Temp greater or equal to 38C (100.4F), Moderate Pain (4 - 6)  aluminum hydroxide/magnesium hydroxide/simethicone Suspension 30 milliLiter(s) Oral every 4 hours PRN Dyspepsia  guaiFENesin Oral Liquid (Sugar-Free) 100 milliGRAM(s) Oral every 6 hours PRN Cough  zolpidem 5 milliGRAM(s) Oral at bedtime PRN Insomnia      PRESENT SYMPTOMS: [ ]Unable to obtain due to poor mentation   Source if other than patient:  [ ]Family   [ ]Team     Pain: [ ]yes [x ]no  QOL impact -   Location -                    Aggravating factors -  Alleviating factors -   Quality -  Radiation -  Timing -   Severity (0-10 scale):  Minimal acceptable level (0-10 scale):     CPOT:    https://www.[facility].org/getattachment/[email or web address]/Critical-Care-Pain-Observation-Tool-(CPOT)    PAIN AD Score:   http://geriatrictoolkit.[facility]/cog/painad.pdf     Dyspnea:                           [x ]None[ ]Mild [ ]Moderate [ ]Severe     Respiratory Distress Observation Scale (RDOS):   A score of 0 to 2 signifies little or no respiratory distress, 3 signifies mild distress, scores 4 to 6 indicate moderate distress, and scores greater than 7 signify severe distress  https://www.[facility].ca/sites/default/files/PDFS/677600-grbklmybopv-srjaorsf-vzofcqqgvkr-pztrt.pdf    Anxiety:                             [ ]None[ ]Mild [ ]Moderate [ ]Severe   Fatigue:                             [ ]None[ ]Mild [ ]Moderate [ ]Severe   Nausea:                             [ ]None[ ]Mild [ ]Moderate [ ]Severe   Loss of appetite:              [ ]None[ ]Mild [ ]Moderate [ ]Severe   Constipation:                    [ ]None[ ]Mild [ ]Moderate [ ]Severe    Other Symptoms:  [x ]All other review of systems negative     Palliative Performance Status Version 2:         %    http://[facility].org/files/news/palliative_performance_scale_ppsv2.pdf  PHYSICAL EXAM:  Vital Signs Last 24 Hrs  T(C): 36.3 (15 Bhaskar 2024 11:00), Max: 36.4 (15 Bhaskar 2024 07:00)  T(F): 97.4 (15 Bhaskar 2024 11:00), Max: 97.6 (15 Bhaskar 2024 07:00)  HR: 120 (15 Bhaskar 2024 11:30) (105 - 120)  BP: 106/61 (15 Bhaskar 2024 11:00) (98/49 - 151/67)  BP(mean): 77 (15 Bhaskar 2024 11:00) (71 - 97)  RR: 20 (15 Bhaskar 2024 07:00) (18 - 20)  SpO2: 95% (15 Bhaskar 2024 11:30) (95% - 100%)    Parameters below as of 15 Bhaskar 2024 11:30  Patient On (Oxygen Delivery Method): room air     I&O's Summary    14 Jan 2024 07:01  -  15 Bhaskar 2024 07:00  --------------------------------------------------------  IN: 120 mL / OUT: 1850 mL / NET: -1730 mL    15 Bhaskar 2024 07:01  -  15 Bhaskar 2024 13:51  --------------------------------------------------------  IN: 0 mL / OUT: 1000 mL / NET: -1000 mL        GENERAL:  NAD   PULMONARY:  Work of breathing mildly increased  NEUROLOGIC: Grossly intact  BEHAVIORAL/PSYCH:  Calm.     CRITICAL CARE:  [ ] Shock Present  [ ]Septic [ ]Cardiogenic [ ]Neurologic [ ]Hypovolemic  [ ]  Vasopressors [ ]  Inotropes   [ ]Respiratory failure present [ ]Mechanical ventilation [ ]Non-invasive ventilatory support [ ]High flow  [ ]Acute  [ ]Chronic [ ]Hypoxic  [ ]Hypercarbic [ ]Other  [ ]Other organ failure     LABS: I have reviewed daily labs                        11.1   12.45 )-----------( 129      ( 15 Bhaskar 2024 04:37 )             38.4   01-15    143  |  100  |  54<H>  ----------------------------<  146<H>  3.4<L>   |  34<H>  |  1.4    Ca    9.5      15 Bhaskar 2024 04:37  Mg     2.4     01-15    TPro  6.1  /  Alb  3.7  /  TBili  1.0  /  DBili  x   /  AST  18  /  ALT  14  /  AlkPhos  70  01-15      RADIOLOGY & ADDITIONAL STUDIES: I have reviewed new imaging    PROTEIN CALORIE MALNUTRITION PRESENT: [ ]mild [ ]moderate [ ]severe [ ]underweight [ ]morbid obesity  https://www.andeal.org/vault/2440/web/files/ONC/Table_Clinical%20Characteristics%20to%20Document%20Malnutrition-White%20JV%20et%20al%202012.pdf    Height (cm): 167.6 (05-01-23 @ 13:18)  Weight (kg): 106.6 (01-05-24 @ 11:49), 133 (05-01-23 @ 13:18)  BMI (kg/m2): 37.9 (01-05-24 @ 11:49), 47.3 (05-01-23 @ 13:18)    [ ]PPSV2 < or = to 30% [ ]significant weight loss  [ ]poor nutritional intake  [ ]anasarca      [ ]Artificial Nutrition      Palliative Care Spiritual/Emotional Screening Tool Question  Severity (0-4):                    OR                    [ x] Unable to determine. Will assess at later time if appropriate.  Score of 2 or greater indicates recommendation of Chaplaincy and/or SW referral  Chaplaincy Referral: [ ] Yes [ ] Refused [ ] Following     Caregiver [place]:  [ ] Yes [ ] No    OR    [x ] Unable to determine. Will assess at later time if appropriate.  Social Work Referral [ ]  Patient and Family Centered Care Referral [ ]    Anticipatory Grief Present: [ ] Yes [ ] No    OR     [ x] Unable to determine. Will assess at later time if appropriate.  Social Work Referral [ ]  Patient and Family Centered Care Referral [ ]    REFERRALS:   [ ]Chaplaincy  [ ]Hospice  [ ]Child Life  [ ]Social Work  [ ]Case management [ ]Holistic Therapy     Palliative care education provided to patient and/or family HPI:  79yo F hx of diverticulitis, HFpEF, CAD s/p CABG, AS s/p SAVR (2016), atrial fibrillation s/p watchman (2021), bleeding disorder, asthma, COPD, DM2, HTN, CKD 2-3. She presented to the ED complaining of progressively worsening shortness of breath of 2 weeks duration, limiting her ambulation, associated with worsening lower limb edema up to the thigh, not responding to Lasix and metolazone. She denied chest pain, cough, fever, chills or rigors, no recent illness. She is not compliant with her diet, not restricting fluid intake.       Vital Signs T(F): 97.1 HR: 73 BP: 130/53 RR: 18 SpO2: 100% nasal cannula     EKG repeated twice showed no acute ischemic changes  troponin is stable twice     admitted to telemetry for further evaluation    (06 Jan 2024 00:11)    Patient reports feeling well. Leg swelling has improved. No pain or SOB.     PERTINENT PM/SXH:   Aortic stenosis    Hypertension    Diabetes    Asthma with COPD    CAD (coronary artery disease), native coronary artery    Anemia    History of bleeding disorder    History of transfusion reaction    Hiatal hernia      H/O ascending aortic replacement    S/P CABG x 1    H/O total knee replacement, right    S/P hysterectomy    Presence of Watchman left atrial appendage closure device      FAMILY HISTORY:  Family history of pseudocholinesterase deficiency (Child)      ITEMS NOT CHECKED ARE NOT PRESENT    SOCIAL HISTORY:   Significant other/partner[ ]  Children[x ]  Temple/Spirituality:  Substance hx:  [ ]   Tobacco hx:  [ ]   Alcohol hx: [ ]   Living Situation: [ ]Home  [ ]Long term care  [ ]Rehab [ ]Other  Home Services: [ ] HHA [ ] Osmel RN [ ] Hospice  Occupation:  Home Opioid hx:  [ ] Y [ ] N [ ] I-Stop Reference No:     ADVANCE DIRECTIVES:    [ ] Full Code [ ] DNR  MOLST  [ ]  Living Will  [ ]   DECISION MAKER(s):  [x ] Health Care Proxy(s)  [ ] Surrogate(s)  [ ] Guardian           Name(s):   Lina (daughter)  Mehdi (DORI)    BASELINE (I)ADL(s) (prior to admission):  [place]: [ ]Total  [ ] Moderate [ ]Dependent  Palliative Performance Status Version 2:         %    http://npcrc.org/files/news/palliative_performance_scale_ppsv2.pdf    Allergies    Augmentin (Other)  penicillins (Hives; Rash)    Intolerances    MEDICATIONS  (STANDING):  albuterol/ipratropium for Nebulization 3 milliLiter(s) Nebulizer every 4 hours  atorvastatin 20 milliGRAM(s) Oral at bedtime  bacitracin   Ointment 1 Application(s) Topical daily  benzonatate 100 milliGRAM(s) Oral three times a day  budesonide  80 MICROgram(s)/formoterol 4.5 MICROgram(s) Inhaler 2 Puff(s) Inhalation two times a day  chlorhexidine 2% Cloths 1 Application(s) Topical daily  desvenlafaxine ER 25 milliGRAM(s) Oral daily  enoxaparin Injectable 40 milliGRAM(s) SubCutaneous every 24 hours  furosemide   Injectable 40 milliGRAM(s) IV Push two times a day  insulin glargine Injectable (LANTUS) 27 Unit(s) SubCutaneous at bedtime  insulin lispro (ADMELOG) corrective regimen sliding scale   SubCutaneous three times a day before meals  insulin lispro Injectable (ADMELOG) 14 Unit(s) SubCutaneous three times a day before meals  metolazone 5 milliGRAM(s) Oral daily  metoprolol tartrate 75 milliGRAM(s) Oral three times a day  montelukast 10 milliGRAM(s) Oral daily  nystatin Powder 1 Application(s) Topical three times a day  ondansetron Injectable 4 milliGRAM(s) IV Push once  oseltamivir      oseltamivir 30 milliGRAM(s) Oral every 12 hours  pantoprazole    Tablet 40 milliGRAM(s) Oral before breakfast  polyethylene glycol 3350 17 Gram(s) Oral at bedtime  predniSONE   Tablet 40 milliGRAM(s) Oral daily  ranolazine 500 milliGRAM(s) Oral two times a day  senna 2 Tablet(s) Oral at bedtime    MEDICATIONS  (PRN):  acetaminophen     Tablet .. 650 milliGRAM(s) Oral every 6 hours PRN Temp greater or equal to 38C (100.4F), Moderate Pain (4 - 6)  aluminum hydroxide/magnesium hydroxide/simethicone Suspension 30 milliLiter(s) Oral every 4 hours PRN Dyspepsia  guaiFENesin Oral Liquid (Sugar-Free) 100 milliGRAM(s) Oral every 6 hours PRN Cough  zolpidem 5 milliGRAM(s) Oral at bedtime PRN Insomnia      PRESENT SYMPTOMS: [ ]Unable to obtain due to poor mentation   Source if other than patient:  [ ]Family   [ ]Team     Pain: [ ]yes [x ]no  QOL impact -   Location -                    Aggravating factors -  Alleviating factors -   Quality -  Radiation -  Timing -   Severity (0-10 scale):  Minimal acceptable level (0-10 scale):     CPOT:    https://www.[facility].org/getattachment/[email or web address]/Critical-Care-Pain-Observation-Tool-(CPOT)    PAIN AD Score:   http://geriatrictoolkit.[facility]/cog/painad.pdf     Dyspnea:                           [x ]None[ ]Mild [ ]Moderate [ ]Severe     Respiratory Distress Observation Scale (RDOS):   A score of 0 to 2 signifies little or no respiratory distress, 3 signifies mild distress, scores 4 to 6 indicate moderate distress, and scores greater than 7 signify severe distress  https://www.[facility].ca/sites/default/files/PDFS/759373-ljzhgpwrmqt-ennhabwy-szufuhcsjun-qvadp.pdf    Anxiety:                             [ ]None[ ]Mild [ ]Moderate [ ]Severe   Fatigue:                             [ ]None[ ]Mild [ ]Moderate [ ]Severe   Nausea:                             [ ]None[ ]Mild [ ]Moderate [ ]Severe   Loss of appetite:              [ ]None[ ]Mild [ ]Moderate [ ]Severe   Constipation:                    [ ]None[ ]Mild [ ]Moderate [ ]Severe    Other Symptoms:  [x ]All other review of systems negative     Palliative Performance Status Version 2:         %    http://[facility].org/files/news/palliative_performance_scale_ppsv2.pdf  PHYSICAL EXAM:  Vital Signs Last 24 Hrs  T(C): 36.3 (15 Bhaskar 2024 11:00), Max: 36.4 (15 Bhaskar 2024 07:00)  T(F): 97.4 (15 Bhaskar 2024 11:00), Max: 97.6 (15 Bhaskar 2024 07:00)  HR: 120 (15 Bhaskar 2024 11:30) (105 - 120)  BP: 106/61 (15 Bhaskar 2024 11:00) (98/49 - 151/67)  BP(mean): 77 (15 Bhaskar 2024 11:00) (71 - 97)  RR: 20 (15 Bhaskar 2024 07:00) (18 - 20)  SpO2: 95% (15 Bhaskar 2024 11:30) (95% - 100%)    Parameters below as of 15 Bhaskar 2024 11:30  Patient On (Oxygen Delivery Method): room air     I&O's Summary    14 Jan 2024 07:01  -  15 Bhaskar 2024 07:00  --------------------------------------------------------  IN: 120 mL / OUT: 1850 mL / NET: -1730 mL    15 Bhaskar 2024 07:01  -  15 Bhaskar 2024 13:51  --------------------------------------------------------  IN: 0 mL / OUT: 1000 mL / NET: -1000 mL        GENERAL:  NAD   PULMONARY:  Work of breathing mildly increased  NEUROLOGIC: Grossly intact  BEHAVIORAL/PSYCH:  Calm.     CRITICAL CARE:  [ ] Shock Present  [ ]Septic [ ]Cardiogenic [ ]Neurologic [ ]Hypovolemic  [ ]  Vasopressors [ ]  Inotropes   [ ]Respiratory failure present [ ]Mechanical ventilation [ ]Non-invasive ventilatory support [ ]High flow  [ ]Acute  [ ]Chronic [ ]Hypoxic  [ ]Hypercarbic [ ]Other  [ ]Other organ failure     LABS: I have reviewed daily labs                        11.1   12.45 )-----------( 129      ( 15 Bhaskar 2024 04:37 )             38.4   01-15    143  |  100  |  54<H>  ----------------------------<  146<H>  3.4<L>   |  34<H>  |  1.4    Ca    9.5      15 Bhaskar 2024 04:37  Mg     2.4     01-15    TPro  6.1  /  Alb  3.7  /  TBili  1.0  /  DBili  x   /  AST  18  /  ALT  14  /  AlkPhos  70  01-15      RADIOLOGY & ADDITIONAL STUDIES: I have reviewed new imaging    PROTEIN CALORIE MALNUTRITION PRESENT: [ ]mild [ ]moderate [ ]severe [ ]underweight [ ]morbid obesity  https://www.andeal.org/vault/2440/web/files/ONC/Table_Clinical%20Characteristics%20to%20Document%20Malnutrition-White%20JV%20et%20al%202012.pdf    Height (cm): 167.6 (05-01-23 @ 13:18)  Weight (kg): 106.6 (01-05-24 @ 11:49), 133 (05-01-23 @ 13:18)  BMI (kg/m2): 37.9 (01-05-24 @ 11:49), 47.3 (05-01-23 @ 13:18)    [ ]PPSV2 < or = to 30% [ ]significant weight loss  [ ]poor nutritional intake  [ ]anasarca      [ ]Artificial Nutrition      Palliative Care Spiritual/Emotional Screening Tool Question  Severity (0-4):                    OR                    [ x] Unable to determine. Will assess at later time if appropriate.  Score of 2 or greater indicates recommendation of Chaplaincy and/or SW referral  Chaplaincy Referral: [ ] Yes [ ] Refused [ ] Following     Caregiver [place]:  [ ] Yes [ ] No    OR    [x ] Unable to determine. Will assess at later time if appropriate.  Social Work Referral [ ]  Patient and Family Centered Care Referral [ ]    Anticipatory Grief Present: [ ] Yes [ ] No    OR     [ x] Unable to determine. Will assess at later time if appropriate.  Social Work Referral [ ]  Patient and Family Centered Care Referral [ ]    REFERRALS:   [ ]Chaplaincy  [ ]Hospice  [ ]Child Life  [ ]Social Work  [ ]Case management [ ]Holistic Therapy     Palliative care education provided to patient and/or family

## 2024-01-15 NOTE — PROGRESS NOTE ADULT - ASSESSMENT
77yo F hx of diverticulitis, HFpEF, CAD s/p CABG, AS s/p SAVR (2016), atrial fibrillation s/p watchman (2021), bleeding disorder, asthma, COPD, DM2, HTN, CKD 2-3    #Acute HFpEF   #Mod to Severe pulmonary hypertension  #AS s/p SAVR (2016)  - Patient with severe leg edema, SOB, Pro-BNP  - CXR showed improved vascular congestion.   - continue  Lasix 40 mg BID, keep neg 1-2L  and Metolazone. Acetazolamide 500mg for two days.   - Low sodium diet and fluid restriction.   - Monitor daily weight  - CXR today showing improvement, pt now on RA      #chest pain (burning)   #afib with RVR s/p watchman   - given lopressor 5 mg intravenous push  - increased lopressor to 50 mg TID-->75 mg TID, received 100 mg XL and changed to lopressor   - hold off full anticoagulation until evaluated by cardiology  - pt has hx of bleeding disorder s/p watchman   - EKG done- afib   - trop 39--> will trend   - DW CC SDU   - DW daughter and pt at bedside regarding anticoagulation, dw Cardio Dr. Ramirez not recc to start anticoagulantion given hx of severe anemia, cardiology consult today possible EP consult     #Asthma  #LENY  - on montelukast   - Possible asthma exacerbation, worsening SOB today 1/12, with wheezing.   - CXR showed no acute infiltrates, ABG reviewed.   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    - Cr improved 1.3 from 2.1 on admission  - Monitor closely whole on diuresis    #COVID-19 exposure  - Patient with cough  - CXR is stable, no acute infiltrates  - RVP and COVID-19 PCR 1/10 neg  - RVP 1/12 pending    #CAD s/p CABG  #Paroxysmal Atrial Fibrillation  - Continue metoprolol 100mg, Ranexa, rosuvastatin     #hypokalemia- repleted    #DM type 2  - On home glipizide  - FS is elevated, insulin adjusted    #Chronic anemia   #Iron deficiency anemia:  - transfused one unit 1/6  - Completed Venofer 200mg IV daily for 5 doses  - Hgb stable    # Misc  - DVT Prophylaxis: Lovenox  - GI Prophylaxis: PO Protonix  - Diet: DASH  - Activity: IAT  - Code Status: Full  - Dispo: SDU    #Progress Note Handoff  Pending (specify):  as above  Family discussion: house staff updated pt family  Disposition:  dw CC today SDU   Decision to admit the pt is based on acuity as above      77yo F hx of diverticulitis, HFpEF, CAD s/p CABG, AS s/p SAVR (2016), atrial fibrillation s/p watchman (2021), bleeding disorder, asthma, COPD, DM2, HTN, CKD 2-3    #Acute HFpEF   #Mod to Severe pulmonary hypertension  #AS s/p SAVR (2016)  - Patient with severe leg edema, SOB, Pro-BNP  - CXR showed improved vascular congestion.   - continue  Lasix 40 mg BID, keep neg 1-2L  and Metolazone. Acetazolamide 500mg for two days.   - Low sodium diet and fluid restriction.   - Monitor daily weight  - CXR today showing improvement, pt now on RA      #chest pain (burning)   #afib with RVR s/p watchman   - given lopressor 5 mg intravenous push  - increased lopressor to 50 mg TID-->75 mg TID, received 100 mg XL and changed to lopressor   - hold off full anticoagulation until evaluated by cardiology  - pt has hx of bleeding disorder s/p watchman   - EKG done- afib   - trop 39--> will trend   - DW CC SDU   - DW daughter and pt at bedside regarding anticoagulation, dw Cardio Dr. Ramirez not recc to start anticoagulantion given hx of severe anemia, cardiology consult today possible EP consult     #Asthma  #LENY  - on montelukast   - Possible asthma exacerbation, worsening SOB today 1/12, with wheezing.   - CXR showed no acute infiltrates, ABG reviewed.   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    - Cr improved 1.3 from 2.1 on admission  - Monitor closely whole on diuresis    #COVID-19 exposure  - Patient with cough  - CXR is stable, no acute infiltrates  - RVP and COVID-19 PCR 1/10 neg  - RVP 1/12 pending    #CAD s/p CABG  #Paroxysmal Atrial Fibrillation  - Continue metoprolol 100mg, Ranexa, rosuvastatin     #hypokalemia- repleted    #DM type 2  - On home glipizide  - FS is elevated, insulin adjusted    #Chronic anemia   #Iron deficiency anemia:  - transfused one unit 1/6  - Completed Venofer 200mg IV daily for 5 doses  - Hgb stable    # Misc  - DVT Prophylaxis: Lovenox  - GI Prophylaxis: PO Protonix  - Diet: DASH  - Activity: IAT  - Code Status: Full  - Dispo: SDU    #Progress Note Handoff  Pending (specify):  as above  Family discussion: house staff updated pt family  Disposition:  dw CC downgrade cardiac telemonitoring   Decision to admit the pt is based on acuity as above      79yo F hx of diverticulitis, HFpEF, CAD s/p CABG, AS s/p SAVR (2016), atrial fibrillation s/p watchman (2021), bleeding disorder, asthma, COPD, DM2, HTN, CKD 2-3    #Acute HFpEF   #Mod to Severe pulmonary hypertension  #AS s/p SAVR (2016)  - Patient with severe leg edema, SOB, Pro-BNP  - CXR showed improved vascular congestion.   - continue  Lasix 40 mg BID, keep neg 1-2L  and Metolazone. Acetazolamide 500mg for two days.   - Low sodium diet and fluid restriction.   - Monitor daily weight  - CXR today showing improvement, pt now on RA      #chest pain (burning)   #afib with RVR s/p watchman   - given lopressor 5 mg intravenous push  - increased lopressor to 50 mg TID-->75 mg TID, received 100 mg XL and changed to lopressor   - hold off full anticoagulation until evaluated by cardiology  - pt has hx of bleeding disorder s/p watchman   - EKG done- afib   - trop 39--> will trend   - DW CC SDU   - DW daughter and pt at bedside regarding anticoagulation, dw Cardio Dr. Ramirez not recc to start anticoagulantion given hx of severe anemia, cardiology consult today possible EP consult     #Asthma  #LENY  - on montelukast   - Possible asthma exacerbation, worsening SOB today 1/12, with wheezing.   - CXR showed no acute infiltrates, ABG reviewed.   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    - Cr improved 1.3 from 2.1 on admission  - Monitor closely whole on diuresis    #COVID-19 exposure  - Patient with cough  - CXR is stable, no acute infiltrates  - RVP and COVID-19 PCR 1/10 neg  - RVP 1/12 pending    #CAD s/p CABG  #Paroxysmal Atrial Fibrillation  - Continue metoprolol 100mg, Ranexa, rosuvastatin     #hypokalemia- repleted    #DM type 2  - On home glipizide  - FS is elevated, insulin adjusted    #Chronic anemia   #Iron deficiency anemia:  - transfused one unit 1/6  - Completed Venofer 200mg IV daily for 5 doses  - Hgb stable    # Misc  - DVT Prophylaxis: Lovenox  - GI Prophylaxis: PO Protonix  - Diet: DASH  - Activity: IAT  - Code Status: Full  - Dispo: SDU    #Progress Note Handoff  Pending (specify):  as above  Family discussion: house staff updated pt family  Disposition:  dw CC downgrade cardiac telemonitoring   Decision to admit the pt is based on acuity as above

## 2024-01-15 NOTE — CONSULT NOTE ADULT - ASSESSMENT
79yo F hx of diverticulitis, HFpEF, CAD s/p CABG, AS s/p SAVR (2016), atrial fibrillation s/p watchman (2021), bleeding disorder, asthma, COPD, DM2, HTN, CKD 2-3 presenting with CHF exacerbation, improving. Hospital course complicated by RVR.     Introduced palliative care. Patient is hoping to continue to improve so that she can discharge from the hospital. The ability to spend meaningful time with her family is most important. If she ever has severe respiratory distress, she would prefer to be kept comfortable and have a natural/peaceful death instead of being intubated/resuscitated.     Plan:  - patient denies symptoms  - agreeable to continue current medical interventions  - aware of hospice philosophy, and willing to consider it in the future  - DNR/DNI    - MOLST complete 1/15    Discussed with primary team.      Palliative care will sign off.   Please call [phone] with questions or concerns 24/7.   _____________  Leonardo Guerrero MD  Palliative Medicine  [facility]   of Geriatric and Palliative Medicine  [phone]   79yo F hx of diverticulitis, HFpEF, CAD s/p CABG, AS s/p SAVR (2016), atrial fibrillation s/p watchman (2021), bleeding disorder, asthma, COPD, DM2, HTN, CKD 2-3 presenting with CHF exacerbation, improving. Hospital course complicated by RVR.     Introduced palliative care. Patient is hoping to continue to improve so that she can discharge from the hospital. The ability to spend meaningful time with her family is most important. If she ever has severe respiratory distress, she would prefer to be kept comfortable and have a natural/peaceful death instead of being intubated/resuscitated.     Plan:  - patient denies symptoms  - agreeable to continue current medical interventions  - aware of hospice philosophy, and willing to consider it in the future  - DNR/DNI    - MOLST complete 1/15    Discussed with primary team.      Palliative care will sign off.   Please call [phone] with questions or concerns 24/7.   _____________  Leonardo Guerrero MD  Palliative Medicine  [facility]   of Geriatric and Palliative Medicine  [phone]   79yo F hx of diverticulitis, HFpEF, CAD s/p CABG, AS s/p SAVR (2016), atrial fibrillation s/p watchman (2021), bleeding disorder, asthma, COPD, DM2, HTN, CKD 2-3 presenting with CHF exacerbation, improving. Hospital course complicated by RVR.     Introduced palliative care. Patient is hoping to continue to improve so that she can discharge from the hospital. The ability to spend meaningful time with her family is most important. If she ever has severe respiratory distress, she would prefer to be kept comfortable and have a natural/peaceful death instead of being intubated/resuscitated.     Plan:  - patient denies symptoms  - agreeable to continue current medical interventions  - aware of hospice philosophy, and willing to consider it in the future  - DNR/DNI    - MOLST complete 1/15    Discussed with primary team.      Palliative care will sign off.   Please call [phone] with questions or concerns 24/7.   _____________  Leonardo Guerrero MD  Palliative Medicine  [facility]   of Geriatric and Palliative Medicine  [phone]

## 2024-01-15 NOTE — PROGRESS NOTE ADULT - SUBJECTIVE AND OBJECTIVE BOX
Patient is a 78y old  Female who presents with a chief complaint of CHF exacerbation (01-15-24)      Pt seen and examined at bedside. No CP or SOB. Tele HR 120s      ABG - ( 13 Jan 2024 20:03 )  pH: 7.42  /  pCO2: 49    /  pO2: 126   / HCO3: 32    / Base Excess: 6.4   /  SaO2: 98.6          PAST MEDICAL & SURGICAL HISTORY:  Aortic stenosis    Diabetes    Asthma with COPD  many years since last attack    CAD (coronary artery disease), native coronary artery    Anemia    History of bleeding disorder  having work up 5/2/21- HAD WORKUP BUT NO DIAGNOSIS "NOTHING WAS FOUND"    History of transfusion reaction    Hiatal hernia    H/O ascending aortic replacement  Bovine Replacement    S/P CABG x 1  complication of bleeding 2016    H/O total knee replacement, right  complicated with fx femur bars screws in femur- b/l knee replacement    S/P hysterectomy    Presence of Watchman left atrial appendage closure device        VITAL SIGNS (Last 24 hrs):  T(C): 36.3 (01-15-24 @ 11:00), Max: 36.4 (01-15-24 @ 07:00)  HR: 120 (01-15-24 @ 11:00) (105 - 120)  BP: 106/61 (01-15-24 @ 11:00) (98/49 - 151/67)  RR: 20 (01-15-24 @ 07:00) (18 - 20)  SpO2: 96% (01-15-24 @ 11:00) (96% - 100%)  Wt(kg): --  Daily     Daily     I&O's Summary    14 Jan 2024 07:01  -  15 Bhaskar 2024 07:00  --------------------------------------------------------  IN: 120 mL / OUT: 1850 mL / NET: -1730 mL    15 Bhaskar 2024 07:01  -  15 Bhaskar 2024 11:40  --------------------------------------------------------  IN: 0 mL / OUT: 1000 mL / NET: -1000 mL        PHYSICAL EXAM:  GENERAL: NAD  HEAD:  Atraumatic, Normocephalic  EYES: EOMI, PERRLA, conjunctiva and sclera clear  NECK: Supple, No JVD  CHEST/LUNG: Clear to auscultation bilaterally; No wheeze  HEART: Iregular rate and rhythm; No murmurs, rubs, or gallops  ABDOMEN: Soft, Nontender, Nondistended; Bowel sounds present  EXTREMITIES:  2+ Peripheral Pulses, No clubbing, cyanosis, or edema  PSYCH: AAOx3  NEUROLOGY: non-focal  SKIN: No rashes or lesions    Labs Reviewed  Spoke to patient in regards to abnormal labs.    CBC Full  -  ( 15 Bhaskar 2024 04:37 )  WBC Count : 12.45 K/uL  Hemoglobin : 11.1 g/dL  Hematocrit : 38.4 %  Platelet Count - Automated : 129 K/uL  Mean Cell Volume : 91.2 fL  Mean Cell Hemoglobin : 26.4 pg  Mean Cell Hemoglobin Concentration : 28.9 g/dL  Auto Neutrophil # : 10.58 K/uL  Auto Lymphocyte # : 0.67 K/uL  Auto Monocyte # : 1.11 K/uL  Auto Eosinophil # : 0.02 K/uL  Auto Basophil # : 0.01 K/uL  Auto Neutrophil % : 84.9 %  Auto Lymphocyte % : 5.4 %  Auto Monocyte % : 8.9 %  Auto Eosinophil % : 0.2 %  Auto Basophil % : 0.1 %    BMP:    01-15 @ 04:37    Blood Urea Nitrogen - 54  Calcium - 9.5  Carbond Dioxide - 34  Chloride - 100  Creatinine - 1.4  Glucose - 146  Potassium - 3.4  Sodium - 143      Hemoglobin A1c -     Urine Culture:        COVID Labs  CRP:      D-Dimer:  308 ng/mL DDU (01-12-24 @ 16:00)    Ferritin: 83 ng/mL (01-06-24 @ 06:46)          Imaging reviewed independently and reviewed read  < from: Xray Chest 1 View- PORTABLE-Routine (Xray Chest 1 View- PORTABLE-Routine in AM.) (01.14.24 @ 06:29) >  Impression:  Bilateral opacities.    < end of copied text >        MEDICATIONS  (STANDING):  albuterol/ipratropium for Nebulization 3 milliLiter(s) Nebulizer every 4 hours  atorvastatin 20 milliGRAM(s) Oral at bedtime  bacitracin   Ointment 1 Application(s) Topical daily  benzonatate 100 milliGRAM(s) Oral three times a day  budesonide  80 MICROgram(s)/formoterol 4.5 MICROgram(s) Inhaler 2 Puff(s) Inhalation two times a day  chlorhexidine 2% Cloths 1 Application(s) Topical daily  desvenlafaxine ER 25 milliGRAM(s) Oral daily  enoxaparin Injectable 40 milliGRAM(s) SubCutaneous every 24 hours  furosemide   Injectable 40 milliGRAM(s) IV Push two times a day  insulin glargine Injectable (LANTUS) 27 Unit(s) SubCutaneous at bedtime  insulin lispro (ADMELOG) corrective regimen sliding scale   SubCutaneous three times a day before meals  insulin lispro Injectable (ADMELOG) 14 Unit(s) SubCutaneous three times a day before meals  metolazone 5 milliGRAM(s) Oral daily  metoprolol tartrate 25 milliGRAM(s) Oral once  metoprolol tartrate 75 milliGRAM(s) Oral three times a day  montelukast 10 milliGRAM(s) Oral daily  nystatin Powder 1 Application(s) Topical three times a day  ondansetron Injectable 4 milliGRAM(s) IV Push once  oseltamivir      oseltamivir 30 milliGRAM(s) Oral every 12 hours  pantoprazole    Tablet 40 milliGRAM(s) Oral before breakfast  polyethylene glycol 3350 17 Gram(s) Oral at bedtime  predniSONE   Tablet 40 milliGRAM(s) Oral daily  ranolazine 500 milliGRAM(s) Oral two times a day  senna 2 Tablet(s) Oral at bedtime    MEDICATIONS  (PRN):  acetaminophen     Tablet .. 650 milliGRAM(s) Oral every 6 hours PRN Temp greater or equal to 38C (100.4F), Moderate Pain (4 - 6)  aluminum hydroxide/magnesium hydroxide/simethicone Suspension 30 milliLiter(s) Oral every 4 hours PRN Dyspepsia  guaiFENesin Oral Liquid (Sugar-Free) 100 milliGRAM(s) Oral every 6 hours PRN Cough  zolpidem 5 milliGRAM(s) Oral at bedtime PRN Insomnia

## 2024-01-15 NOTE — PROGRESS NOTE ADULT - SUBJECTIVE AND OBJECTIVE BOX
Patient is a 78y old  Female who presents with a chief complaint of CHF exacerbation (14 Jan 2024 11:10)        Over Night Events:  on NC.  off pressors          ROS:     All ROS are negative except HPI         PHYSICAL EXAM    ICU Vital Signs Last 24 Hrs  T(C): 36.4 (15 Bhaskar 2024 07:00), Max: 36.4 (15 Bhaskar 2024 07:00)  T(F): 97.6 (15 Bhaskar 2024 07:00), Max: 97.6 (15 Bhaskar 2024 07:00)  HR: 107 (15 Bhaskar 2024 07:00) (84 - 119)  BP: 125/67 (15 Bhaskar 2024 07:00) (98/49 - 151/67)  BP(mean): 90 (15 Bhaskar 2024 07:00) (71 - 97)  ABP: --  ABP(mean): --  RR: 19 (15 Bhaskar 2024 04:00) (18 - 20)  SpO2: 99% (15 Bhaskar 2024 07:00) (95% - 100%)    O2 Parameters below as of 15 Bhaskar 2024 07:00  Patient On (Oxygen Delivery Method): nasal cannula  O2 Flow (L/min): 2          CONSTITUTIONAL:  Well nourished.  NAD    ENT:   Airway patent,   Mouth with normal mucosa.   No thrush    EYES:   Pupils equal,   Round and reactive to light.    CARDIAC:   Tachycardic    Irregular rhythm.    No edema      RESPIRATORY:   No wheezing  Bilateral BS  Normal chest expansion  Not tachypneic,  No use of accessory muscles    GASTROINTESTINAL:  Abdomen soft,   Non-tender,   No guarding,   + BS    MUSCULOSKELETAL:   Range of motion is not limited,  No clubbing, cyanosis    NEUROLOGICAL:   Alert and oriented   No motor  deficits.    SKIN:   Skin normal color for race,    No evidence of rash.      01-14-24 @ 07:01  -  01-15-24 @ 07:00  --------------------------------------------------------  IN:    Oral Fluid: 120 mL  Total IN: 120 mL    OUT:    Voided (mL): 1850 mL  Total OUT: 1850 mL    Total NET: -1730 mL          LABS:                            11.1   12.45 )-----------( 129      ( 15 Bhaskar 2024 04:37 )             38.4                                               01-15    143  |  100  |  54<H>  ----------------------------<  146<H>  3.4<L>   |  34<H>  |  1.4    Ca    9.5      15 Bhaskar 2024 04:37  Mg     2.4     01-15    TPro  6.1  /  Alb  3.7  /  TBili  1.0  /  DBili  x   /  AST  18  /  ALT  14  /  AlkPhos  70  01-15                                             Urinalysis Basic - ( 15 Bhaskar 2024 04:37 )    Color: x / Appearance: x / SG: x / pH: x  Gluc: 146 mg/dL / Ketone: x  / Bili: x / Urobili: x   Blood: x / Protein: x / Nitrite: x   Leuk Esterase: x / RBC: x / WBC x   Sq Epi: x / Non Sq Epi: x / Bacteria: x                                                  LIVER FUNCTIONS - ( 15 Bhaskar 2024 04:37 )  Alb: 3.7 g/dL / Pro: 6.1 g/dL / ALK PHOS: 70 U/L / ALT: 14 U/L / AST: 18 U/L / GGT: x                                                                                                                                   ABG - ( 13 Jan 2024 20:03 )  pH, Arterial: 7.42  pH, Blood: x     /  pCO2: 49    /  pO2: 126   / HCO3: 32    / Base Excess: 6.4   /  SaO2: 98.6                MEDICATIONS  (STANDING):  albuterol/ipratropium for Nebulization 3 milliLiter(s) Nebulizer every 4 hours  atorvastatin 20 milliGRAM(s) Oral at bedtime  bacitracin   Ointment 1 Application(s) Topical daily  benzonatate 100 milliGRAM(s) Oral three times a day  budesonide  80 MICROgram(s)/formoterol 4.5 MICROgram(s) Inhaler 2 Puff(s) Inhalation two times a day  chlorhexidine 2% Cloths 1 Application(s) Topical daily  desvenlafaxine ER 25 milliGRAM(s) Oral daily  enoxaparin Injectable 40 milliGRAM(s) SubCutaneous every 24 hours  furosemide   Injectable 40 milliGRAM(s) IV Push two times a day  insulin glargine Injectable (LANTUS) 27 Unit(s) SubCutaneous at bedtime  insulin lispro (ADMELOG) corrective regimen sliding scale   SubCutaneous three times a day before meals  insulin lispro Injectable (ADMELOG) 14 Unit(s) SubCutaneous three times a day before meals  metolazone 5 milliGRAM(s) Oral daily  metoprolol tartrate 50 milliGRAM(s) Oral three times a day  montelukast 10 milliGRAM(s) Oral daily  nystatin Powder 1 Application(s) Topical three times a day  ondansetron Injectable 4 milliGRAM(s) IV Push once  oseltamivir      oseltamivir 30 milliGRAM(s) Oral every 12 hours  pantoprazole    Tablet 40 milliGRAM(s) Oral before breakfast  polyethylene glycol 3350 17 Gram(s) Oral at bedtime  potassium chloride    Tablet ER 40 milliEquivalent(s) Oral once  predniSONE   Tablet 40 milliGRAM(s) Oral daily  ranolazine 500 milliGRAM(s) Oral two times a day  senna 2 Tablet(s) Oral at bedtime    MEDICATIONS  (PRN):  acetaminophen     Tablet .. 650 milliGRAM(s) Oral every 6 hours PRN Temp greater or equal to 38C (100.4F), Moderate Pain (4 - 6)  aluminum hydroxide/magnesium hydroxide/simethicone Suspension 30 milliLiter(s) Oral every 4 hours PRN Dyspepsia  guaiFENesin Oral Liquid (Sugar-Free) 100 milliGRAM(s) Oral every 6 hours PRN Cough  zolpidem 5 milliGRAM(s) Oral at bedtime PRN Insomnia      New X-rays reviewed:                                                                                  ECHO

## 2024-01-15 NOTE — CONSULT NOTE ADULT - CONVERSATION DETAILS
Introduced palliative care. Patient is hoping to continue to improve so that she can discharge from the hospital. The ability to spend meaningful time with her family is most important. If she ever has severe respiratory distress, she would prefer to be kept comfortable and have a natural/peaceful death instead of being intubated/resuscitated.

## 2024-01-15 NOTE — PROGRESS NOTE ADULT - SUBJECTIVE AND OBJECTIVE BOX
Cardiologist:    HPI:  79yo F hx of diverticulitis, HFpEF, CAD s/p CABG, AS s/p SAVR (2016), atrial fibrillation s/p watchman (2021), bleeding disorder, asthma, COPD, DM2, HTN, CKD 2-3. She presented to the ED complaining of progressively worsening shortness of breath of 2 weeks duration, limiting her ambulation, associated with worsening lower limb edema up to the thigh, not responding to Lasix and metolazone. She denied chest pain, cough, fever, chills or rigors, no recent illness. She is not compliant with her diet, not restricting fluid intake.       Vital Signs T(F): 97.1 HR: 73 BP: 130/53 RR: 18 SpO2: 100% nasal cannula     EKG repeated twice showed no acute ischemic changes  troponin is stable twice     admitted to telemetry for further evaluation    (06 Jan 2024 00:11)      Cardiology Consult HPI:  Pt reports improvement in breathing, able to lay flat, leg swelling has resolved. Cardiology follow up requested on AF w RVR.   PAST MEDICAL & SURGICAL HISTORY  Aortic stenosis    Diabetes    Asthma with COPD  many years since last attack    CAD (coronary artery disease), native coronary artery    Anemia    History of bleeding disorder  having work up 5/2/21- HAD WORKUP BUT NO DIAGNOSIS "NOTHING WAS FOUND"    History of transfusion reaction    Hiatal hernia    H/O ascending aortic replacement  Bovine Replacement    S/P CABG x 1  complication of bleeding 2016    H/O total knee replacement, right  complicated with fx femur bars screws in femur- b/l knee replacement    S/P hysterectomy    Presence of Watchman left atrial appendage closure device        FAMILY HISTORY:  FAMILY HISTORY:  Family history of pseudocholinesterase deficiency (Child)      [ ] no pertinent family history of premature cardiovascular disease in first degree relatives.  Mother:   Father:   Siblings:     SOCIAL HISTORY:  []smoker  []Alcohol  []Drug    ALLERGIES:  Augmentin (Other)  penicillins (Hives; Rash)      MEDICATIONS:  MEDICATIONS  (STANDING):  albuterol/ipratropium for Nebulization 3 milliLiter(s) Nebulizer every 4 hours  atorvastatin 20 milliGRAM(s) Oral at bedtime  bacitracin   Ointment 1 Application(s) Topical daily  benzonatate 100 milliGRAM(s) Oral three times a day  budesonide  80 MICROgram(s)/formoterol 4.5 MICROgram(s) Inhaler 2 Puff(s) Inhalation two times a day  chlorhexidine 2% Cloths 1 Application(s) Topical daily  desvenlafaxine ER 25 milliGRAM(s) Oral daily  enoxaparin Injectable 40 milliGRAM(s) SubCutaneous every 24 hours  furosemide   Injectable 40 milliGRAM(s) IV Push two times a day  insulin glargine Injectable (LANTUS) 27 Unit(s) SubCutaneous at bedtime  insulin lispro (ADMELOG) corrective regimen sliding scale   SubCutaneous three times a day before meals  insulin lispro Injectable (ADMELOG) 14 Unit(s) SubCutaneous three times a day before meals  metolazone 5 milliGRAM(s) Oral daily  metoprolol tartrate 75 milliGRAM(s) Oral three times a day  montelukast 10 milliGRAM(s) Oral daily  nystatin Powder 1 Application(s) Topical three times a day  ondansetron Injectable 4 milliGRAM(s) IV Push once  oseltamivir      oseltamivir 30 milliGRAM(s) Oral every 12 hours  pantoprazole    Tablet 40 milliGRAM(s) Oral before breakfast  polyethylene glycol 3350 17 Gram(s) Oral at bedtime  predniSONE   Tablet 40 milliGRAM(s) Oral daily  ranolazine 500 milliGRAM(s) Oral two times a day  senna 2 Tablet(s) Oral at bedtime    MEDICATIONS  (PRN):  acetaminophen     Tablet .. 650 milliGRAM(s) Oral every 6 hours PRN Temp greater or equal to 38C (100.4F), Moderate Pain (4 - 6)  aluminum hydroxide/magnesium hydroxide/simethicone Suspension 30 milliLiter(s) Oral every 4 hours PRN Dyspepsia  guaiFENesin Oral Liquid (Sugar-Free) 100 milliGRAM(s) Oral every 6 hours PRN Cough  zolpidem 5 milliGRAM(s) Oral at bedtime PRN Insomnia      HOME MEDICATIONS:  Home Medications:  desvenlafaxine (as succinate) 25 mg oral tablet, extended release: 1 tab(s) orally once a day (06 Jan 2024 00:21)  furosemide 40 mg oral tablet: 1 tab(s) orally 2 times a day (06 Jan 2024 00:21)  glipiZIDE 5 mg oral tablet: 1 tab(s) orally once a day (06 Jan 2024 00:21)  Lyrica 75 mg oral capsule: 1 cap(s) orally once a day (06 Jan 2024 00:21)  montelukast 10 mg oral tablet: 1 tab(s) orally once a day (06 Jan 2024 00:21)  potassium chloride 15 mEq oral tablet, extended release: 1 tab(s) orally 2 times a day (06 Jan 2024 00:21)  Ranexa 500 mg oral tablet, extended release: 1 tab(s) orally 2 times a day (06 Jan 2024 00:21)  rosuvastatin 10 mg oral tablet: 1 tab(s) orally once a day (06 Jan 2024 00:21)      VITALS:   T(F): 97.7 (01-15 @ 16:00), Max: 98.8 (01-13 @ 13:47)  HR: 97 (01-15 @ 16:00) (65 - 122)  BP: 128/74 (01-15 @ 16:00) (98/49 - 179/71)  BP(mean): 96 (01-15 @ 16:00) (71 - 97)  RR: 20 (01-15 @ 16:00) (18 - 20)  SpO2: 100% (01-15 @ 16:00) (95% - 100%)    I&O's Summary    14 Jan 2024 07:01  -  15 Bhaskar 2024 07:00  --------------------------------------------------------  IN: 120 mL / OUT: 1850 mL / NET: -1730 mL    15 Bhaskar 2024 07:01  -  15 Bhaskar 2024 17:04  --------------------------------------------------------  IN: 0 mL / OUT: 1200 mL / NET: -1200 mL        REVIEW OF SYSTEMS:    Negative except as mentioned in HPI    CONSTITUTIONAL: No weakness, fevers or chills  EYES: No visual changes  ENT: No vertigo or throat pain   NECK: No pain or stiffness  RESPIRATORY: No cough, wheezing, hemoptysis; No shortness of breath  CARDIOVASCULAR: No chest pain or palpitations  GASTROINTESTINAL: No abdominal or epigastric pain. No nausea, vomiting, or hematemesis; No diarrhea or constipation. No melena or hematochezia.  GENITOURINARY: No dysuria, frequency or hematuria  NEUROLOGICAL: No numbness or weakness  SKIN: No itching, no rashes  MSK: FROM x4    PHYSICAL EXAM:  NEURO: Awake , alert and oriented  GEN: Not in acute distress  NECK: No JVD  LUNGS: Clear to auscultation bilaterally   CARDIOVASCULAR: S1/S2 present, RRR , no murmurs or rubs, no carotid bruits,  + PP bilaterally  ABD: Soft, non-tender, non-distended, +BS  EXT: No YAYA  SKIN: Intact    LABS:                        11.1   12.45 )-----------( 129      ( 15 Bhaskar 2024 04:37 )             38.4     01-15    143  |  100  |  54<H>  ----------------------------<  146<H>  3.4<L>   |  34<H>  |  1.4    Ca    9.5      15 Bhaskar 2024 04:37  Mg     2.4     01-15    TPro  6.1  /  Alb  3.7  /  TBili  1.0  /  DBili  x   /  AST  18  /  ALT  14  /  AlkPhos  70  01-15              Troponin trend:      01-06 Chol 132 LDL -- HDL 72 Trig 81    RADIOLOGY:  -CXR:  -TTE:   Hyperdynamic global left ventricular systolic function. Left   ventricular ejectionfraction, by visual estimation, is 70 to 75%. Severe   (grade III) diastolic dysfunction.   3. The right ventricle is not well visualized. On obtained images   appears grossly normal in size and function.   4. Moderately enlarged left atrium.   5. S/p SAVR with normal function (Vmax 2.3, Mean PG 13mmHg, DI 0.46, ESTUARDO   1.33cm2). Gradients may be increased secondary to hyperdynamic systolic   function.   6. Mitral annular calcification. Increased mitral gradient of 5mmHg at   85bpm likely secondaryto hyperdynamic LV.   7. Moderate-severe tricuspid regurgitation.   8. Moderate pulmonary hypertension (PASP = 55mmHg).    ECG:    TELEMETRY EVENTS:  AF w VR 90-110s  Cardiologist:    HPI:  77yo F hx of diverticulitis, HFpEF, CAD s/p CABG, AS s/p SAVR (2016), atrial fibrillation s/p watchman (2021), bleeding disorder, asthma, COPD, DM2, HTN, CKD 2-3. She presented to the ED complaining of progressively worsening shortness of breath of 2 weeks duration, limiting her ambulation, associated with worsening lower limb edema up to the thigh, not responding to Lasix and metolazone. She denied chest pain, cough, fever, chills or rigors, no recent illness. She is not compliant with her diet, not restricting fluid intake.       Vital Signs T(F): 97.1 HR: 73 BP: 130/53 RR: 18 SpO2: 100% nasal cannula     EKG repeated twice showed no acute ischemic changes  troponin is stable twice     admitted to telemetry for further evaluation    (06 Jan 2024 00:11)      Cardiology Consult HPI:  Pt reports improvement in breathing, able to lay flat, leg swelling has resolved. Cardiology follow up requested on AF w RVR.   PAST MEDICAL & SURGICAL HISTORY  Aortic stenosis    Diabetes    Asthma with COPD  many years since last attack    CAD (coronary artery disease), native coronary artery    Anemia    History of bleeding disorder  having work up 5/2/21- HAD WORKUP BUT NO DIAGNOSIS "NOTHING WAS FOUND"    History of transfusion reaction    Hiatal hernia    H/O ascending aortic replacement  Bovine Replacement    S/P CABG x 1  complication of bleeding 2016    H/O total knee replacement, right  complicated with fx femur bars screws in femur- b/l knee replacement    S/P hysterectomy    Presence of Watchman left atrial appendage closure device        FAMILY HISTORY:  FAMILY HISTORY:  Family history of pseudocholinesterase deficiency (Child)      [ ] no pertinent family history of premature cardiovascular disease in first degree relatives.  Mother:   Father:   Siblings:     SOCIAL HISTORY:  []smoker  []Alcohol  []Drug    ALLERGIES:  Augmentin (Other)  penicillins (Hives; Rash)      MEDICATIONS:  MEDICATIONS  (STANDING):  albuterol/ipratropium for Nebulization 3 milliLiter(s) Nebulizer every 4 hours  atorvastatin 20 milliGRAM(s) Oral at bedtime  bacitracin   Ointment 1 Application(s) Topical daily  benzonatate 100 milliGRAM(s) Oral three times a day  budesonide  80 MICROgram(s)/formoterol 4.5 MICROgram(s) Inhaler 2 Puff(s) Inhalation two times a day  chlorhexidine 2% Cloths 1 Application(s) Topical daily  desvenlafaxine ER 25 milliGRAM(s) Oral daily  enoxaparin Injectable 40 milliGRAM(s) SubCutaneous every 24 hours  furosemide   Injectable 40 milliGRAM(s) IV Push two times a day  insulin glargine Injectable (LANTUS) 27 Unit(s) SubCutaneous at bedtime  insulin lispro (ADMELOG) corrective regimen sliding scale   SubCutaneous three times a day before meals  insulin lispro Injectable (ADMELOG) 14 Unit(s) SubCutaneous three times a day before meals  metolazone 5 milliGRAM(s) Oral daily  metoprolol tartrate 75 milliGRAM(s) Oral three times a day  montelukast 10 milliGRAM(s) Oral daily  nystatin Powder 1 Application(s) Topical three times a day  ondansetron Injectable 4 milliGRAM(s) IV Push once  oseltamivir      oseltamivir 30 milliGRAM(s) Oral every 12 hours  pantoprazole    Tablet 40 milliGRAM(s) Oral before breakfast  polyethylene glycol 3350 17 Gram(s) Oral at bedtime  predniSONE   Tablet 40 milliGRAM(s) Oral daily  ranolazine 500 milliGRAM(s) Oral two times a day  senna 2 Tablet(s) Oral at bedtime    MEDICATIONS  (PRN):  acetaminophen     Tablet .. 650 milliGRAM(s) Oral every 6 hours PRN Temp greater or equal to 38C (100.4F), Moderate Pain (4 - 6)  aluminum hydroxide/magnesium hydroxide/simethicone Suspension 30 milliLiter(s) Oral every 4 hours PRN Dyspepsia  guaiFENesin Oral Liquid (Sugar-Free) 100 milliGRAM(s) Oral every 6 hours PRN Cough  zolpidem 5 milliGRAM(s) Oral at bedtime PRN Insomnia      HOME MEDICATIONS:  Home Medications:  desvenlafaxine (as succinate) 25 mg oral tablet, extended release: 1 tab(s) orally once a day (06 Jan 2024 00:21)  furosemide 40 mg oral tablet: 1 tab(s) orally 2 times a day (06 Jan 2024 00:21)  glipiZIDE 5 mg oral tablet: 1 tab(s) orally once a day (06 Jan 2024 00:21)  Lyrica 75 mg oral capsule: 1 cap(s) orally once a day (06 Jan 2024 00:21)  montelukast 10 mg oral tablet: 1 tab(s) orally once a day (06 Jan 2024 00:21)  potassium chloride 15 mEq oral tablet, extended release: 1 tab(s) orally 2 times a day (06 Jan 2024 00:21)  Ranexa 500 mg oral tablet, extended release: 1 tab(s) orally 2 times a day (06 Jan 2024 00:21)  rosuvastatin 10 mg oral tablet: 1 tab(s) orally once a day (06 Jan 2024 00:21)      VITALS:   T(F): 97.7 (01-15 @ 16:00), Max: 98.8 (01-13 @ 13:47)  HR: 97 (01-15 @ 16:00) (65 - 122)  BP: 128/74 (01-15 @ 16:00) (98/49 - 179/71)  BP(mean): 96 (01-15 @ 16:00) (71 - 97)  RR: 20 (01-15 @ 16:00) (18 - 20)  SpO2: 100% (01-15 @ 16:00) (95% - 100%)    I&O's Summary    14 Jan 2024 07:01  -  15 Bhaskar 2024 07:00  --------------------------------------------------------  IN: 120 mL / OUT: 1850 mL / NET: -1730 mL    15 Bhaskar 2024 07:01  -  15 Bhaskar 2024 17:04  --------------------------------------------------------  IN: 0 mL / OUT: 1200 mL / NET: -1200 mL        REVIEW OF SYSTEMS:    Negative except as mentioned in HPI    CONSTITUTIONAL: No weakness, fevers or chills  EYES: No visual changes  ENT: No vertigo or throat pain   NECK: No pain or stiffness  RESPIRATORY: No cough, wheezing, hemoptysis; No shortness of breath  CARDIOVASCULAR: No chest pain or palpitations  GASTROINTESTINAL: No abdominal or epigastric pain. No nausea, vomiting, or hematemesis; No diarrhea or constipation. No melena or hematochezia.  GENITOURINARY: No dysuria, frequency or hematuria  NEUROLOGICAL: No numbness or weakness  SKIN: No itching, no rashes  MSK: FROM x4    PHYSICAL EXAM:  NEURO: Awake , alert and oriented  GEN: Not in acute distress  NECK: No JVD  LUNGS: Clear to auscultation bilaterally   CARDIOVASCULAR: S1/S2 present, RRR , no murmurs or rubs, no carotid bruits,  + PP bilaterally  ABD: Soft, non-tender, non-distended, +BS  EXT: No YAYA  SKIN: Intact    LABS:                        11.1   12.45 )-----------( 129      ( 15 Bhaskar 2024 04:37 )             38.4     01-15    143  |  100  |  54<H>  ----------------------------<  146<H>  3.4<L>   |  34<H>  |  1.4    Ca    9.5      15 Bhaskar 2024 04:37  Mg     2.4     01-15    TPro  6.1  /  Alb  3.7  /  TBili  1.0  /  DBili  x   /  AST  18  /  ALT  14  /  AlkPhos  70  01-15              Troponin trend:      01-06 Chol 132 LDL -- HDL 72 Trig 81    RADIOLOGY:  -CXR:  -TTE:   Hyperdynamic global left ventricular systolic function. Left   ventricular ejectionfraction, by visual estimation, is 70 to 75%. Severe   (grade III) diastolic dysfunction.   3. The right ventricle is not well visualized. On obtained images   appears grossly normal in size and function.   4. Moderately enlarged left atrium.   5. S/p SAVR with normal function (Vmax 2.3, Mean PG 13mmHg, DI 0.46, ESTUARDO   1.33cm2). Gradients may be increased secondary to hyperdynamic systolic   function.   6. Mitral annular calcification. Increased mitral gradient of 5mmHg at   85bpm likely secondaryto hyperdynamic LV.   7. Moderate-severe tricuspid regurgitation.   8. Moderate pulmonary hypertension (PASP = 55mmHg).    ECG:    TELEMETRY EVENTS:  AF w VR 90-110s    HPI:  77yo F hx of diverticulitis, HFpEF, CAD s/p CABG, AS s/p SAVR (2016), atrial fibrillation s/p watchman (2021), bleeding disorder, asthma, COPD, DM2, HTN, CKD 2-3. She presented to the ED complaining of progressively worsening shortness of breath of 2 weeks duration, limiting her ambulation, associated with worsening lower limb edema up to the thigh, not responding to Lasix and metolazone. She denied chest pain, cough, fever, chills or rigors, no recent illness. She is not compliant with her diet, not restricting fluid intake.     Vital Signs T(F): 97.1 HR: 73 BP: 130/53 RR: 18 SpO2: 100% nasal cannula     EKG repeated twice showed no acute ischemic changes  troponin is stable twice     admitted to telemetry for further evaluation    (06 Jan 2024 00:11)      Cardiology Consult HPI:  Pt reports improvement in breathing, able to lay flat, leg swelling has resolved. Cardiology follow up requested on AF w RVR.       PAST MEDICAL & SURGICAL HISTORY  Aortic stenosis    Diabetes    Asthma with COPD  many years since last attack    CAD (coronary artery disease), native coronary artery    Anemia    History of bleeding disorder  having work up 5/2/21- HAD WORKUP BUT NO DIAGNOSIS "NOTHING WAS FOUND"    History of transfusion reaction    Hiatal hernia    H/O ascending aortic replacement  Bovine Replacement    S/P CABG x 1  complication of bleeding 2016    H/O total knee replacement, right  complicated with fx femur bars screws in femur- b/l knee replacement    S/P hysterectomy    Presence of Watchman left atrial appendage closure device      MEDICATIONS:  MEDICATIONS  (STANDING):  albuterol/ipratropium for Nebulization 3 milliLiter(s) Nebulizer every 4 hours  atorvastatin 20 milliGRAM(s) Oral at bedtime  bacitracin   Ointment 1 Application(s) Topical daily  benzonatate 100 milliGRAM(s) Oral three times a day  budesonide  80 MICROgram(s)/formoterol 4.5 MICROgram(s) Inhaler 2 Puff(s) Inhalation two times a day  chlorhexidine 2% Cloths 1 Application(s) Topical daily  desvenlafaxine ER 25 milliGRAM(s) Oral daily  enoxaparin Injectable 40 milliGRAM(s) SubCutaneous every 24 hours  furosemide   Injectable 40 milliGRAM(s) IV Push two times a day  insulin glargine Injectable (LANTUS) 27 Unit(s) SubCutaneous at bedtime  insulin lispro (ADMELOG) corrective regimen sliding scale   SubCutaneous three times a day before meals  insulin lispro Injectable (ADMELOG) 14 Unit(s) SubCutaneous three times a day before meals  metolazone 5 milliGRAM(s) Oral daily  metoprolol tartrate 75 milliGRAM(s) Oral three times a day  montelukast 10 milliGRAM(s) Oral daily  nystatin Powder 1 Application(s) Topical three times a day  ondansetron Injectable 4 milliGRAM(s) IV Push once  oseltamivir      oseltamivir 30 milliGRAM(s) Oral every 12 hours  pantoprazole    Tablet 40 milliGRAM(s) Oral before breakfast  polyethylene glycol 3350 17 Gram(s) Oral at bedtime  predniSONE   Tablet 40 milliGRAM(s) Oral daily  ranolazine 500 milliGRAM(s) Oral two times a day  senna 2 Tablet(s) Oral at bedtime    MEDICATIONS  (PRN):  acetaminophen     Tablet .. 650 milliGRAM(s) Oral every 6 hours PRN Temp greater or equal to 38C (100.4F), Moderate Pain (4 - 6)  aluminum hydroxide/magnesium hydroxide/simethicone Suspension 30 milliLiter(s) Oral every 4 hours PRN Dyspepsia  guaiFENesin Oral Liquid (Sugar-Free) 100 milliGRAM(s) Oral every 6 hours PRN Cough  zolpidem 5 milliGRAM(s) Oral at bedtime PRN Insomnia      HOME MEDICATIONS:  Home Medications:  desvenlafaxine (as succinate) 25 mg oral tablet, extended release: 1 tab(s) orally once a day (06 Jan 2024 00:21)  furosemide 40 mg oral tablet: 1 tab(s) orally 2 times a day (06 Jan 2024 00:21)  glipiZIDE 5 mg oral tablet: 1 tab(s) orally once a day (06 Jan 2024 00:21)  Lyrica 75 mg oral capsule: 1 cap(s) orally once a day (06 Jan 2024 00:21)  montelukast 10 mg oral tablet: 1 tab(s) orally once a day (06 Jan 2024 00:21)  potassium chloride 15 mEq oral tablet, extended release: 1 tab(s) orally 2 times a day (06 Jan 2024 00:21)  Ranexa 500 mg oral tablet, extended release: 1 tab(s) orally 2 times a day (06 Jan 2024 00:21)  rosuvastatin 10 mg oral tablet: 1 tab(s) orally once a day (06 Jan 2024 00:21)      VITALS:   T(F): 97.7 (01-15 @ 16:00), Max: 98.8 (01-13 @ 13:47)  HR: 97 (01-15 @ 16:00) (65 - 122)  BP: 128/74 (01-15 @ 16:00) (98/49 - 179/71)  BP(mean): 96 (01-15 @ 16:00) (71 - 97)  RR: 20 (01-15 @ 16:00) (18 - 20)  SpO2: 100% (01-15 @ 16:00) (95% - 100%)    I&O's Summary    14 Jan 2024 07:01  -  15 Bhaskar 2024 07:00  --------------------------------------------------------  IN: 120 mL / OUT: 1850 mL / NET: -1730 mL    15 Bhaskar 2024 07:01  -  15 Bhaskar 2024 17:04  --------------------------------------------------------  IN: 0 mL / OUT: 1200 mL / NET: -1200 mL        PHYSICAL EXAM:  General: NAD  Neck: supple, no JVD  CV: irregular  Respiratory: CTA bilaterally, no wheeze  Abdomen: soft, NT/ND, +BS  Extremities: warm, ROM nl  Neuro: Nonfocal      LABS:                        11.1   12.45 )-----------( 129      ( 15 Bhaskar 2024 04:37 )             38.4     01-15    143  |  100  |  54<H>  ----------------------------<  146<H>  3.4<L>   |  34<H>  |  1.4    Ca    9.5      15 Bhaskar 2024 04:37  Mg     2.4     01-15    TPro  6.1  /  Alb  3.7  /  TBili  1.0  /  DBili  x   /  AST  18  /  ALT  14  /  AlkPhos  70  01-15        < from: TTE Echo Complete w/ Contrast w/o Doppler (01.09.24 @ 16:11) >  Summary:   1. Technically difficult study.   2. Hyperdynamic global left ventricular systolic function. Left   ventricular ejectionfraction, by visual estimation, is 70 to 75%. Severe   (grade III) diastolic dysfunction.   3. The right ventricle is not well visualized. On obtained images   appears grossly normal in size and function.   4. Moderately enlarged left atrium.   5. S/p SAVR with normal function (Vmax 2.3, Mean PG 13mmHg, DI 0.46, ESTUARDO   1.33cm2). Gradients may be increased secondary to hyperdynamic systolic   function.   6. Mitral annular calcification. Increased mitral gradient of 5mmHg at   85bpm likely secondaryto hyperdynamic LV.   7. Moderate-severe tricuspid regurgitation.   8. Moderate pulmonary hypertension (PASP = 55mmHg).        TELEMETRY EVENTS:  AF w VR 90-110s

## 2024-01-16 NOTE — PROGRESS NOTE ADULT - SUBJECTIVE AND OBJECTIVE BOX
Over Night Events: events noted, on RA, feels better, cardio noted    PHYSICAL EXAM    ICU Vital Signs Last 24 Hrs  T(C): 36.3 (16 Jan 2024 04:00), Max: 36.5 (15 Bhaskar 2024 16:00)  T(F): 97.4 (16 Jan 2024 04:00), Max: 97.7 (15 Bhaskar 2024 16:00)  HR: 94 (16 Jan 2024 04:00) (92 - 127)  BP: 128/58 (16 Jan 2024 04:00) (97/57 - 128/74)  BP(mean): 84 (16 Jan 2024 04:00) (71 - 96)  RR: 20 (16 Jan 2024 04:00) (19 - 20)  SpO2: 100% (16 Jan 2024 04:00) (94% - 100%)    O2 Parameters below as of 16 Jan 2024 04:00  Patient On (Oxygen Delivery Method): BiPAP/CPAP    O2 Concentration (%): 40        General: ill looking  Lungs: dec bs both bases, BL wheezing  Cardiovascular: ROBERT 2.6, irregular  Abdomen: Soft, Positive BS  Extremities: No clubbing   Neurological: Non focal       01-15-24 @ 07:01  -  01-16-24 @ 07:00  --------------------------------------------------------  IN:  Total IN: 0 mL    OUT:    Voided (mL): 2450 mL  Total OUT: 2450 mL    Total NET: -2450 mL          LABS:                          12.0   9.55  )-----------( 134      ( 16 Jan 2024 05:35 )             40.2                                               01-15    143  |  100  |  54<H>  ----------------------------<  146<H>  3.4<L>   |  34<H>  |  1.4    Ca    9.5      15 Bhaskar 2024 04:37  Mg     2.4     01-15    TPro  6.1  /  Alb  3.7  /  TBili  1.0  /  DBili  x   /  AST  18  /  ALT  14  /  AlkPhos  70  01-15                                             Urinalysis Basic - ( 15 Bhaskar 2024 04:37 )    Color: x / Appearance: x / SG: x / pH: x  Gluc: 146 mg/dL / Ketone: x  / Bili: x / Urobili: x   Blood: x / Protein: x / Nitrite: x   Leuk Esterase: x / RBC: x / WBC x   Sq Epi: x / Non Sq Epi: x / Bacteria: x                                                  LIVER FUNCTIONS - ( 15 Bhaskar 2024 04:37 )  Alb: 3.7 g/dL / Pro: 6.1 g/dL / ALK PHOS: 70 U/L / ALT: 14 U/L / AST: 18 U/L / GGT: x                                                                                                                                       MEDICATIONS  (STANDING):  albuterol/ipratropium for Nebulization 3 milliLiter(s) Nebulizer every 4 hours  atorvastatin 20 milliGRAM(s) Oral at bedtime  bacitracin   Ointment 1 Application(s) Topical daily  benzonatate 100 milliGRAM(s) Oral three times a day  budesonide  80 MICROgram(s)/formoterol 4.5 MICROgram(s) Inhaler 2 Puff(s) Inhalation two times a day  chlorhexidine 2% Cloths 1 Application(s) Topical daily  desvenlafaxine ER 25 milliGRAM(s) Oral daily  enoxaparin Injectable 40 milliGRAM(s) SubCutaneous every 24 hours  furosemide   Injectable 40 milliGRAM(s) IV Push two times a day  insulin glargine Injectable (LANTUS) 27 Unit(s) SubCutaneous at bedtime  insulin lispro (ADMELOG) corrective regimen sliding scale   SubCutaneous three times a day before meals  insulin lispro Injectable (ADMELOG) 14 Unit(s) SubCutaneous three times a day before meals  metolazone 5 milliGRAM(s) Oral daily  metoprolol tartrate 75 milliGRAM(s) Oral three times a day  montelukast 10 milliGRAM(s) Oral daily  nystatin Powder 1 Application(s) Topical three times a day  ondansetron Injectable 4 milliGRAM(s) IV Push once  oseltamivir      oseltamivir 30 milliGRAM(s) Oral every 12 hours  pantoprazole    Tablet 40 milliGRAM(s) Oral before breakfast  polyethylene glycol 3350 17 Gram(s) Oral at bedtime  predniSONE   Tablet 40 milliGRAM(s) Oral daily  ranolazine 500 milliGRAM(s) Oral two times a day  senna 2 Tablet(s) Oral at bedtime    MEDICATIONS  (PRN):  acetaminophen     Tablet .. 650 milliGRAM(s) Oral every 6 hours PRN Temp greater or equal to 38C (100.4F), Moderate Pain (4 - 6)  aluminum hydroxide/magnesium hydroxide/simethicone Suspension 30 milliLiter(s) Oral every 4 hours PRN Dyspepsia  guaiFENesin Oral Liquid (Sugar-Free) 100 milliGRAM(s) Oral every 6 hours PRN Cough  zolpidem 5 milliGRAM(s) Oral at bedtime PRN Insomnia

## 2024-01-16 NOTE — PROGRESS NOTE ADULT - ASSESSMENT
77yo F hx of diverticulitis, HFpEF, CAD s/p CABG, AS s/p SAVR (2016), atrial fibrillation s/p watchman (2021), bleeding disorder, asthma, COPD, DM2, HTN, CKD 2-3    #Acute HFpEF   #Mod to Severe pulmonary hypertension  #AS s/p SAVR (2016)  - Patient with severe leg edema, SOB, Pro-BNP  - CXR showed improved vascular congestion.   - continue  Lasix 40 mg BID, keep neg 1-2L  and Metolazone. Acetazolamide 500mg for two days.   - need accurate I/o, if in 24 hrs still neg , will decreease lasix to 40 mg intravenous daily today and start po in am   - Low sodium diet and fluid restriction.   - Monitor daily weight  - CXR today showing improvement, pt now on RA  and lying flat     #chest pain (burning)   #afib with RVR s/p watchman   - given lopressor 5 mg intravenous push  - increased lopressor to 50 mg TID-->75 mg TID, received 100 mg XL and changed to lopressor   - hold off full anticoagulation until evaluated by cardiology  - pt has hx of bleeding disorder s/p watchman   - EKG done- afib   - trop 39--> will trend   - DW CC SDU   - DW daughter and pt at bedside regarding anticoagulation, [facility] Cardio Dr. Ramirez not recc to start anticoagulation given hx of severe anemia, cardiology consult today possible EP consult   - spoke to Cardio Dr. Jaquez, the pt needs to be on asa 81 mg daily, ordered   - plan was to cardiovert, pt Rate improved, no cardiology version as per cardiology     #Asthma  #LENY  - on montelukast   - Possible asthma exacerbation, worsening SOB today 1/12, with wheezing.   - CXR showed no acute infiltrates, ABG reviewed.   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    - Cr improved 1.3 from 2.1 on admission  - Monitor closely whole on diuresis    #COVID-19 exposure  - Patient with cough  - CXR is stable, no acute infiltrates  - RVP and COVID-19 PCR 1/10 neg  - RVP 1/12 pending    #CAD s/p CABG  #Paroxysmal Atrial Fibrillation  - Continue metoprolol 100mg, Ranexa, rosuvastatin     #hypokalemia- repleted    #DM type 2  - On home glipizide  - FS is elevated, insulin adjusted    #Chronic anemia   #Iron deficiency anemia:  - transfused one unit 1/6  - Completed Venofer 200mg IV daily for 5 doses  - Hgb stable    # Misc  - DVT Prophylaxis: Lovenox  - GI Prophylaxis: PO Protonix  - Diet: DASH  - Activity: IAT  - Code Status: Full  - Dispo: SDU    #Progress Note Handoff  Pending (specify):  as above  Family discussion: house staff updated pt family  Disposition:  dw CC downgrade cardiac telemonitoring   Decision to admit the pt is based on acuity as above      79yo F hx of diverticulitis, HFpEF, CAD s/p CABG, AS s/p SAVR (2016), atrial fibrillation s/p watchman (2021), bleeding disorder, asthma, COPD, DM2, HTN, CKD 2-3    #Acute HFpEF   #Mod to Severe pulmonary hypertension  #AS s/p SAVR (2016)  - Patient with severe leg edema, SOB, Pro-BNP  - CXR showed improved vascular congestion.   - continue  Lasix 40 mg BID, keep neg 1-2L  and Metolazone. Acetazolamide 500mg for two days.   - need accurate I/o, if in 24 hrs still neg , will decreease lasix to 40 mg intravenous daily today and start po in am   - Low sodium diet and fluid restriction.   - Monitor daily weight  - CXR today showing improvement, pt now on RA  and lying flat     #chest pain (burning)   #afib with RVR s/p watchman   - given lopressor 5 mg intravenous push  - increased lopressor to 50 mg TID-->75 mg TID, received 100 mg XL and changed to lopressor   - hold off full anticoagulation until evaluated by cardiology  - pt has hx of bleeding disorder s/p watchman   - EKG done- afib   - trop 39--> will trend   - DW CC SDU   - DW daughter and pt at bedside regarding anticoagulation, [facility] Cardio Dr. Ramirez not recc to start anticoagulation given hx of severe anemia, cardiology consult today possible EP consult   - spoke to Cardio Dr. Jaquez, the pt needs to be on asa 81 mg daily, ordered   - plan was to cardiovert, pt Rate improved, no cardiology version as per cardiology     #Asthma  #LENY  - on montelukast   - Possible asthma exacerbation, worsening SOB today 1/12, with wheezing.   - CXR showed no acute infiltrates, ABG reviewed.   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    - Cr improved 1.3 from 2.1 on admission  - Monitor closely whole on diuresis    #COVID-19 exposure  - Patient with cough  - CXR is stable, no acute infiltrates  - RVP and COVID-19 PCR 1/10 neg  - RVP 1/12 pending    #CAD s/p CABG  #Paroxysmal Atrial Fibrillation  - Continue metoprolol 100mg, Ranexa, rosuvastatin     #hypokalemia- repleted    #DM type 2  - On home glipizide  - FS is elevated, insulin adjusted    #Chronic anemia   #Iron deficiency anemia:  - transfused one unit 1/6  - Completed Venofer 200mg IV daily for 5 doses  - Hgb stable    # Misc  - DVT Prophylaxis: Lovenox  - GI Prophylaxis: PO Protonix  - Diet: DASH  - Activity: IAT  - Code Status: Full  - Dispo: SDU    #Progress Note Handoff  Pending (specify):  as above  Family discussion: house staff updated pt family  Disposition:  dw CC downgrade cardiac telemonitoring   Decision to admit the pt is based on acuity as above

## 2024-01-16 NOTE — PROGRESS NOTE ADULT - SUBJECTIVE AND OBJECTIVE BOX
Patient is a 78y old  Female who presents with a chief complaint of CHF exacerbation (01-16-24)      Pt seen and examined at bedside. No CP or SOB. feeling better. Tele reviewed afib rate controlled          PAST MEDICAL & SURGICAL HISTORY:  Aortic stenosis    Diabetes    Asthma with COPD  many years since last attack    CAD (coronary artery disease), native coronary artery    Anemia    History of bleeding disorder  having work up 5/2/21- HAD WORKUP BUT NO DIAGNOSIS "NOTHING WAS FOUND"    History of transfusion reaction    Hiatal hernia    H/O ascending aortic replacement  Bovine Replacement    S/P CABG x 1  complication of bleeding 2016    H/O total knee replacement, right  complicated with fx femur bars screws in femur- b/l knee replacement    S/P hysterectomy    Presence of Watchman left atrial appendage closure device        VITAL SIGNS (Last 24 hrs):  T(C): 36.5 (01-16-24 @ 08:00), Max: 36.5 (01-15-24 @ 16:00)  HR: 89 (01-16-24 @ 08:00) (89 - 127)  BP: 121/58 (01-16-24 @ 08:00) (97/57 - 128/74)  RR: 19 (01-16-24 @ 08:00) (19 - 20)  SpO2: 95% (01-16-24 @ 08:00) (94% - 100%)  Wt(kg): --  Daily     Daily     I&O's Summary    15 Bhaskar 2024 07:01  -  16 Jan 2024 07:00  --------------------------------------------------------  IN: 0 mL / OUT: 2600 mL / NET: -2600 mL    16 Jan 2024 07:01  -  16 Jan 2024 11:17  --------------------------------------------------------  IN: 0 mL / OUT: 300 mL / NET: -300 mL        PHYSICAL EXAM:  GENERAL: NAD, well-developed  HEAD:  Atraumatic, Normocephalic  EYES: EOMI, PERRLA, conjunctiva and sclera clear  NECK: Supple, No JVD  CHEST/LUNG: Clear to auscultation bilaterally; No wheeze  HEART: Regular rate and rhythm; No murmurs, rubs, or gallops  ABDOMEN: Soft, Nontender, Nondistended; Bowel sounds present  EXTREMITIES:  2+ Peripheral Pulses, No clubbing, cyanosis, or edema  PSYCH: AAOx3  NEUROLOGY: non-focal  SKIN: No rashes or lesions    Labs Reviewed  Spoke to patient in regards to abnormal labs.    CBC Full  -  ( 16 Jan 2024 05:35 )  WBC Count : 9.55 K/uL  Hemoglobin : 12.0 g/dL  Hematocrit : 40.2 %  Platelet Count - Automated : 134 K/uL  Mean Cell Volume : 88.4 fL  Mean Cell Hemoglobin : 26.4 pg  Mean Cell Hemoglobin Concentration : 29.9 g/dL  Auto Neutrophil # : 6.51 K/uL  Auto Lymphocyte # : 1.46 K/uL  Auto Monocyte # : 1.54 K/uL  Auto Eosinophil # : 0.00 K/uL  Auto Basophil # : 0.01 K/uL  Auto Neutrophil % : 68.2 %  Auto Lymphocyte % : 15.3 %  Auto Monocyte % : 16.1 %  Auto Eosinophil % : 0.0 %  Auto Basophil % : 0.1 %    BMP:    01-16 @ 05:35    Blood Urea Nitrogen - 56  Calcium - 9.8  Carbond Dioxide - 32  Chloride - 98  Creatinine - 1.4  Glucose - 66  Potassium - 3.1  Sodium - 143    D-Dimer:  308 ng/mL DDU (01-12-24 @ 16:00)  Ferritin: 83 ng/mL (01-06-24 @ 06:46)    Imaging reviewed independently and reviewed read        MEDICATIONS  (STANDING):  albuterol/ipratropium for Nebulization 3 milliLiter(s) Nebulizer every 4 hours  atorvastatin 20 milliGRAM(s) Oral at bedtime  bacitracin   Ointment 1 Application(s) Topical daily  benzonatate 100 milliGRAM(s) Oral three times a day  budesonide  80 MICROgram(s)/formoterol 4.5 MICROgram(s) Inhaler 2 Puff(s) Inhalation two times a day  chlorhexidine 2% Cloths 1 Application(s) Topical daily  desvenlafaxine ER 25 milliGRAM(s) Oral daily  enoxaparin Injectable 40 milliGRAM(s) SubCutaneous every 24 hours  furosemide   Injectable 40 milliGRAM(s) IV Push two times a day  insulin glargine Injectable (LANTUS) 27 Unit(s) SubCutaneous at bedtime  insulin lispro (ADMELOG) corrective regimen sliding scale   SubCutaneous three times a day before meals  insulin lispro Injectable (ADMELOG) 14 Unit(s) SubCutaneous three times a day before meals  metolazone 5 milliGRAM(s) Oral daily  metoprolol tartrate 75 milliGRAM(s) Oral three times a day  montelukast 10 milliGRAM(s) Oral daily  nystatin Powder 1 Application(s) Topical three times a day  ondansetron Injectable 4 milliGRAM(s) IV Push once  oseltamivir      oseltamivir 30 milliGRAM(s) Oral every 12 hours  pantoprazole    Tablet 40 milliGRAM(s) Oral before breakfast  polyethylene glycol 3350 17 Gram(s) Oral at bedtime  predniSONE   Tablet 40 milliGRAM(s) Oral daily  ranolazine 500 milliGRAM(s) Oral two times a day  senna 2 Tablet(s) Oral at bedtime    MEDICATIONS  (PRN):  acetaminophen     Tablet .. 650 milliGRAM(s) Oral every 6 hours PRN Temp greater or equal to 38C (100.4F), Moderate Pain (4 - 6)  aluminum hydroxide/magnesium hydroxide/simethicone Suspension 30 milliLiter(s) Oral every 4 hours PRN Dyspepsia  guaiFENesin Oral Liquid (Sugar-Free) 100 milliGRAM(s) Oral every 6 hours PRN Cough  zolpidem 5 milliGRAM(s) Oral at bedtime PRN Insomnia

## 2024-01-16 NOTE — PROGRESS NOTE ADULT - ASSESSMENT
Impression    Influenza A infection   Asthma xacerbation  HFpEF, G3DD, moderate PH  LENY  HO asthma  CAD s/p CABG  AS s/p SAVR (2016),   HO Atrial fibrillation s/p watchman (2021),   CKD 2-3    PLAN:    CNS:  No depressants     HEENT: Oral care    PULMONARY:  HOB @ 45 degrees.  Aspiration precautions.  NIV during sleep.  Wean o2 as tolerated.  OP pulm follow up.  prednisone 40 for 5 days, 20 for 5 days    CARDIOVASCULAR:  Rate control.  lasix po/ metolazone, cardio fup    GI: GI prophylaxis.  Feeding.  Bowel regimen     RENAL:  Follow up lytes.  Correct as needed    INFECTIOUS DISEASE: Follow up cultures.  Finish Tamiflu course.      HEMATOLOGICAL:  DVT prophylaxis. Monitor CBC     ENDOCRINE:  Follow up FS.  Insulin protocol if needed.    MUSCULOSKELETAL:  PT OT .  Off loading     TELE

## 2024-01-17 NOTE — PROGRESS NOTE ADULT - NSPROGADDITIONALINFOA_GEN_ALL_CORE
#Progress Note Handoff:  Pending (specify):  Consults_________, Tests________, Test Results_______, Other____PT, monitor hr_____  Family discussion: d/w pt at bedside  Disposition: Home___/SNF___/Other________/Unknown at this time____x____

## 2024-01-17 NOTE — PROGRESS NOTE ADULT - ASSESSMENT
Impression    Influenza A infection   Asthma Exacerbation  HFpEF, G3DD, moderate PH  LENY  HO asthma  CAD s/p CABG  AS s/p SAVR (2016),   HO Atrial fibrillation s/p watchman (2021),   CKD 2-3    PLAN:    CNS:  No depressants     HEENT: Oral care    PULMONARY:  HOB @ 45 degrees.  Aspiration precautions.  NIV during sleep.  Wean o2 as tolerated.  OP pulm follow up.  prednisone 40 for 5 days, 20 for 5 days    CARDIOVASCULAR:  Rate control.  lasix po/ metolazone, cardio fup    GI: GI prophylaxis.  Feeding.  Bowel regimen     RENAL:  Follow up lytes.  Correct as needed    INFECTIOUS DISEASE: Follow up cultures.  Finish Tamiflu course.      HEMATOLOGICAL:  DVT prophylaxis. Monitor CBC     ENDOCRINE:  Follow up FS.  Insulin protocol if needed.    MUSCULOSKELETAL:  PT OT .  Off loading     TELE

## 2024-01-17 NOTE — PROGRESS NOTE ADULT - SUBJECTIVE AND OBJECTIVE BOX
INTERVAL HPI/OVERNIGHT EVENTS:    SUBJECTIVE: Patient seen and examined at bedside.     no cp, sob, abd pain, fever  no sob, orthopnea, pnd, cough    OBJECTIVE:    VITAL SIGNS:  Vital Signs Last 24 Hrs  T(C): 36.1 (17 Jan 2024 11:00), Max: 36.6 (16 Jan 2024 16:18)  T(F): 97 (17 Jan 2024 11:00), Max: 97.9 (16 Jan 2024 20:00)  HR: 101 (17 Jan 2024 11:00) (81 - 115)  BP: 132/68 (17 Jan 2024 11:00) (100/67 - 134/78)  BP(mean): 90 (17 Jan 2024 11:00) (76 - 101)  RR: 20 (17 Jan 2024 11:00) (18 - 20)  SpO2: 92% (17 Jan 2024 11:00) (92% - 100%)    Parameters below as of 17 Jan 2024 11:00  Patient On (Oxygen Delivery Method): room air          PHYSICAL EXAM:    General: NAD  HEENT: NC/AT; PERRL, clear conjunctiva  Neck: supple  Respiratory: CTA b/l  Cardiovascular: +S1/S2; RRR  Abdomen: soft, NT/ND; +BS x4  Extremities: WWP, 2+ peripheral pulses b/l; no LE edema  Skin: normal color and turgor; no rash  Neurological:    MEDICATIONS:  MEDICATIONS  (STANDING):  albuterol/ipratropium for Nebulization 3 milliLiter(s) Nebulizer every 4 hours  aspirin  chewable 81 milliGRAM(s) Oral daily  atorvastatin 20 milliGRAM(s) Oral at bedtime  bacitracin   Ointment 1 Application(s) Topical daily  benzonatate 100 milliGRAM(s) Oral three times a day  budesonide  80 MICROgram(s)/formoterol 4.5 MICROgram(s) Inhaler 2 Puff(s) Inhalation two times a day  chlorhexidine 2% Cloths 1 Application(s) Topical daily  desvenlafaxine ER 25 milliGRAM(s) Oral daily  enoxaparin Injectable 40 milliGRAM(s) SubCutaneous every 24 hours  furosemide    Tablet 40 milliGRAM(s) Oral daily  insulin glargine Injectable (LANTUS) 27 Unit(s) SubCutaneous at bedtime  insulin lispro (ADMELOG) corrective regimen sliding scale   SubCutaneous three times a day before meals  insulin lispro Injectable (ADMELOG) 14 Unit(s) SubCutaneous three times a day before meals  metolazone 5 milliGRAM(s) Oral daily  metoprolol tartrate 75 milliGRAM(s) Oral three times a day  montelukast 10 milliGRAM(s) Oral daily  nystatin Powder 1 Application(s) Topical three times a day  ondansetron Injectable 4 milliGRAM(s) IV Push once  oseltamivir      oseltamivir 30 milliGRAM(s) Oral every 12 hours  pantoprazole    Tablet 40 milliGRAM(s) Oral before breakfast  polyethylene glycol 3350 17 Gram(s) Oral at bedtime  predniSONE   Tablet 40 milliGRAM(s) Oral daily  ranolazine 500 milliGRAM(s) Oral two times a day  senna 2 Tablet(s) Oral at bedtime    MEDICATIONS  (PRN):  acetaminophen     Tablet .. 650 milliGRAM(s) Oral every 6 hours PRN Temp greater or equal to 38C (100.4F), Moderate Pain (4 - 6)  aluminum hydroxide/magnesium hydroxide/simethicone Suspension 30 milliLiter(s) Oral every 4 hours PRN Dyspepsia  guaiFENesin Oral Liquid (Sugar-Free) 100 milliGRAM(s) Oral every 6 hours PRN Cough  zolpidem 5 milliGRAM(s) Oral at bedtime PRN Insomnia      ALLERGIES:  Allergies    Augmentin (Other)  penicillins (Hives; Rash)    Intolerances        LABS:                        11.4   9.07  )-----------( 141      ( 17 Jan 2024 05:20 )             37.7     Hemoglobin: 11.4 g/dL (01-17 @ 05:20)  Hemoglobin: 12.0 g/dL (01-16 @ 05:35)  Hemoglobin: 11.1 g/dL (01-15 @ 04:37)  Hemoglobin: 10.1 g/dL (01-14 @ 05:33)  Hemoglobin: 10.3 g/dL (01-13 @ 05:52)    CBC Full  -  ( 17 Jan 2024 05:20 )  WBC Count : 9.07 K/uL  RBC Count : 4.34 M/uL  Hemoglobin : 11.4 g/dL  Hematocrit : 37.7 %  Platelet Count - Automated : 141 K/uL  Mean Cell Volume : 86.9 fL  Mean Cell Hemoglobin : 26.3 pg  Mean Cell Hemoglobin Concentration : 30.2 g/dL  Auto Neutrophil # : 5.42 K/uL  Auto Lymphocyte # : 2.20 K/uL  Auto Monocyte # : 1.39 K/uL  Auto Eosinophil # : 0.00 K/uL  Auto Basophil # : 0.01 K/uL  Auto Neutrophil % : 59.7 %  Auto Lymphocyte % : 24.3 %  Auto Monocyte % : 15.3 %  Auto Eosinophil % : 0.0 %  Auto Basophil % : 0.1 %    01-17    142  |  97<L>  |  62<HH>  ----------------------------<  62<L>  3.2<L>   |  34<H>  |  1.4    Ca    9.2      17 Jan 2024 05:20  Phos  3.1     01-17  Mg     2.3     01-17    TPro  5.8<L>  /  Alb  3.3<L>  /  TBili  1.1  /  DBili  x   /  AST  21  /  ALT  14  /  AlkPhos  60  01-17    Creatinine Trend: 1.4<--, 1.4<--, 1.4<--, 1.4<--, 1.4<--, 1.4<--  LIVER FUNCTIONS - ( 17 Jan 2024 05:20 )  Alb: 3.3 g/dL / Pro: 5.8 g/dL / ALK PHOS: 60 U/L / ALT: 14 U/L / AST: 21 U/L / GGT: x               hs Troponin:            Urinalysis Basic - ( 17 Jan 2024 05:20 )    Color: x / Appearance: x / SG: x / pH: x  Gluc: 62 mg/dL / Ketone: x  / Bili: x / Urobili: x   Blood: x / Protein: x / Nitrite: x   Leuk Esterase: x / RBC: x / WBC x   Sq Epi: x / Non Sq Epi: x / Bacteria: x      CSF:                      EKG:   MICROBIOLOGY:    IMAGING:      Labs, imaging, EKG personally reviewed    RADIOLOGY & ADDITIONAL TESTS: Reviewed.

## 2024-01-17 NOTE — PROGRESS NOTE ADULT - ASSESSMENT
78F PMHx HFpEF, CAD s/p CABG, severe AS s/p SAVR 2016, AFib s/p watchman 2021, COPD, DM2, HTN here with acute hypoxic resp failure. Influenza ah1 2009.

## 2024-01-17 NOTE — PROGRESS NOTE ADULT - SUBJECTIVE AND OBJECTIVE BOX
Over Night Events: events noted, on NC, afebrile    PHYSICAL EXAM    ICU Vital Signs Last 24 Hrs  T(C): 36.6 (17 Jan 2024 04:00), Max: 36.6 (16 Jan 2024 16:18)  T(F): 97.8 (17 Jan 2024 04:00), Max: 97.9 (16 Jan 2024 20:00)  HR: 87 (17 Jan 2024 04:00) (87 - 115)  BP: 114/78 (17 Jan 2024 04:00) (89/63 - 134/78)  BP(mean): 91 (17 Jan 2024 04:00) (70 - 101)  RR: 18 (17 Jan 2024 04:00) (18 - 20)  SpO2: 100% (17 Jan 2024 04:00) (92% - 100%)    O2 Parameters below as of 17 Jan 2024 04:00  Patient On (Oxygen Delivery Method): BiPAP/CPAP            General: obese  Lungs: dec bs both bases  Cardiovascular: ROBERT 2.6  Abdomen: Soft, Positive BS  Extremities: No clubbing   Neurological: Non focal       01-16-24 @ 07:01  -  01-17-24 @ 07:00  --------------------------------------------------------  IN:    Oral Fluid: 1769 mL  Total IN: 1769 mL    OUT:    Stool (mL): 0 mL    Voided (mL): 1810 mL  Total OUT: 1810 mL    Total NET: -41 mL          LABS:                          11.4   9.07  )-----------( 141      ( 17 Jan 2024 05:20 )             37.7                                               01-17    142  |  97<L>  |  x   ----------------------------<  62<L>  3.2<L>   |  34<H>  |  1.4    Ca    9.2      17 Jan 2024 05:20  Phos  3.1     01-17  Mg     2.3     01-17    TPro  5.8<L>  /  Alb  3.3<L>  /  TBili  1.1  /  DBili  x   /  AST  21  /  ALT  14  /  AlkPhos  60  01-17                                             Urinalysis Basic - ( 17 Jan 2024 05:20 )    Color: x / Appearance: x / SG: x / pH: x  Gluc: 62 mg/dL / Ketone: x  / Bili: x / Urobili: x   Blood: x / Protein: x / Nitrite: x   Leuk Esterase: x / RBC: x / WBC x   Sq Epi: x / Non Sq Epi: x / Bacteria: x                                                  LIVER FUNCTIONS - ( 17 Jan 2024 05:20 )  Alb: 3.3 g/dL / Pro: 5.8 g/dL / ALK PHOS: 60 U/L / ALT: 14 U/L / AST: 21 U/L / GGT: x                                                                                                                                       MEDICATIONS  (STANDING):  albuterol/ipratropium for Nebulization 3 milliLiter(s) Nebulizer every 4 hours  aspirin  chewable 81 milliGRAM(s) Oral daily  atorvastatin 20 milliGRAM(s) Oral at bedtime  bacitracin   Ointment 1 Application(s) Topical daily  benzonatate 100 milliGRAM(s) Oral three times a day  budesonide  80 MICROgram(s)/formoterol 4.5 MICROgram(s) Inhaler 2 Puff(s) Inhalation two times a day  chlorhexidine 2% Cloths 1 Application(s) Topical daily  desvenlafaxine ER 25 milliGRAM(s) Oral daily  enoxaparin Injectable 40 milliGRAM(s) SubCutaneous every 24 hours  furosemide    Tablet 40 milliGRAM(s) Oral daily  insulin glargine Injectable (LANTUS) 27 Unit(s) SubCutaneous at bedtime  insulin lispro (ADMELOG) corrective regimen sliding scale   SubCutaneous three times a day before meals  insulin lispro Injectable (ADMELOG) 14 Unit(s) SubCutaneous three times a day before meals  metolazone 5 milliGRAM(s) Oral daily  metoprolol tartrate 75 milliGRAM(s) Oral three times a day  montelukast 10 milliGRAM(s) Oral daily  nystatin Powder 1 Application(s) Topical three times a day  ondansetron Injectable 4 milliGRAM(s) IV Push once  oseltamivir      oseltamivir 30 milliGRAM(s) Oral every 12 hours  pantoprazole    Tablet 40 milliGRAM(s) Oral before breakfast  polyethylene glycol 3350 17 Gram(s) Oral at bedtime  predniSONE   Tablet 40 milliGRAM(s) Oral daily  ranolazine 500 milliGRAM(s) Oral two times a day  senna 2 Tablet(s) Oral at bedtime    MEDICATIONS  (PRN):  acetaminophen     Tablet .. 650 milliGRAM(s) Oral every 6 hours PRN Temp greater or equal to 38C (100.4F), Moderate Pain (4 - 6)  aluminum hydroxide/magnesium hydroxide/simethicone Suspension 30 milliLiter(s) Oral every 4 hours PRN Dyspepsia  guaiFENesin Oral Liquid (Sugar-Free) 100 milliGRAM(s) Oral every 6 hours PRN Cough  zolpidem 5 milliGRAM(s) Oral at bedtime PRN Insomnia      Xrays:                                                                                     ECHO

## 2024-01-18 NOTE — DISCHARGE NOTE PROVIDER - NSDCMRMEDTOKEN_GEN_ALL_CORE_FT
budesonide-formoterol 80 mcg-4.5 mcg/inh inhalation aerosol: 2 puff(s) inhaled 2 times a day  desvenlafaxine (as succinate) 25 mg oral tablet, extended release: 1 tab(s) orally once a day  furosemide 40 mg oral tablet: 1 tab(s) orally once a day  glipiZIDE 5 mg oral tablet: 1 tab(s) orally once a day  ipratropium-albuterol 0.5 mg-2.5 mg/3 mL inhalation solution: 3 milliliter(s) inhaled every 4 hours  Lyrica 75 mg oral capsule: 1 cap(s) orally once a day  metOLazone 5 mg oral tablet: 1 tab(s) orally once a day as needed for fluid overload  metoprolol succinate 200 mg oral tablet, extended release: 1 tab(s) orally once a day  montelukast 10 mg oral tablet: 1 tab(s) orally once a day  potassium chloride 15 mEq oral tablet, extended release: 1 tab(s) orally 2 times a day  predniSONE 20 mg oral tablet: 1 tab(s) orally once a day  Protonix 40 mg oral delayed release tablet: 1 tab(s) orally 2 times a day  Ranexa 500 mg oral tablet, extended release: 1 tab(s) orally 2 times a day  rosuvastatin 10 mg oral tablet: 1 tab(s) orally once a day

## 2024-01-18 NOTE — DIETITIAN INITIAL EVALUATION ADULT - OTHER INFO
Pertinent Medical Information: Pt p/w acute hypoxic respiratory failure. Noted respiratory failure presumed due to acute on chronic HFpEF. Influenza ah1 2009 noted. CKD2-3 noted.    PMH includes HFpEF, CAD s/p CABG, severe AS s/p SAVR 2016, AFib s/p watchman 2021, COPD, DM2, HTN.

## 2024-01-18 NOTE — DIETITIAN INITIAL EVALUATION ADULT - PERTINENT MEDS FT
MEDICATIONS  (STANDING):  albuterol/ipratropium for Nebulization 3 milliLiter(s) Nebulizer every 4 hours  aspirin  chewable 81 milliGRAM(s) Oral daily  atorvastatin 20 milliGRAM(s) Oral at bedtime  bacitracin   Ointment 1 Application(s) Topical daily  benzonatate 100 milliGRAM(s) Oral three times a day  budesonide  80 MICROgram(s)/formoterol 4.5 MICROgram(s) Inhaler 2 Puff(s) Inhalation two times a day  chlorhexidine 2% Cloths 1 Application(s) Topical daily  desvenlafaxine ER 25 milliGRAM(s) Oral daily  enoxaparin Injectable 40 milliGRAM(s) SubCutaneous every 24 hours  furosemide    Tablet 40 milliGRAM(s) Oral daily  insulin lispro (ADMELOG) corrective regimen sliding scale   SubCutaneous three times a day before meals  metolazone 5 milliGRAM(s) Oral daily  metoprolol tartrate 75 milliGRAM(s) Oral three times a day  montelukast 10 milliGRAM(s) Oral daily  nystatin Powder 1 Application(s) Topical three times a day  ondansetron Injectable 4 milliGRAM(s) IV Push once  oseltamivir      oseltamivir 30 milliGRAM(s) Oral every 12 hours  pantoprazole    Tablet 40 milliGRAM(s) Oral before breakfast  polyethylene glycol 3350 17 Gram(s) Oral at bedtime  potassium chloride    Tablet ER 40 milliEquivalent(s) Oral every 4 hours  potassium chloride    Tablet ER 40 milliEquivalent(s) Oral once  predniSONE   Tablet 20 milliGRAM(s) Oral daily  ranolazine 500 milliGRAM(s) Oral two times a day  senna 2 Tablet(s) Oral at bedtime    MEDICATIONS  (PRN):  acetaminophen     Tablet .. 650 milliGRAM(s) Oral every 6 hours PRN Temp greater or equal to 38C (100.4F), Moderate Pain (4 - 6)  aluminum hydroxide/magnesium hydroxide/simethicone Suspension 30 milliLiter(s) Oral every 4 hours PRN Dyspepsia  guaiFENesin Oral Liquid (Sugar-Free) 100 milliGRAM(s) Oral every 6 hours PRN Cough  zolpidem 5 milliGRAM(s) Oral at bedtime PRN Insomnia

## 2024-01-18 NOTE — DIETITIAN INITIAL EVALUATION ADULT - LAB (SPECIFY)
Nutrition goals: Pt to maintain tolerance to diet, with at least 75% po intake maintained over next 7-10 days. Pt at low nutrition risk; RD to follow-up in 7-10 days.    Monitor: Skin, labs, po intake, BM, GI, diet order, fluid status.

## 2024-01-18 NOTE — PROGRESS NOTE ADULT - SUBJECTIVE AND OBJECTIVE BOX
INTERVAL HPI/OVERNIGHT EVENTS:    SUBJECTIVE: Patient seen and examined at bedside.     no cp, sob, abd pain, fever  no sob, orthopnea, pnd, cough    OBJECTIVE:    VITAL SIGNS:  Vital Signs Last 24 Hrs  T(C): 36.4 (18 Jan 2024 07:29), Max: 36.8 (17 Jan 2024 16:00)  T(F): 97.6 (18 Jan 2024 07:29), Max: 98.2 (17 Jan 2024 16:00)  HR: 87 (18 Jan 2024 07:29) (86 - 111)  BP: 88/53 (18 Jan 2024 07:29) (88/53 - 134/61)  BP(mean): 66 (18 Jan 2024 07:29) (66 - 90)  RR: 20 (17 Jan 2024 23:41) (18 - 20)  SpO2: 93% (18 Jan 2024 07:29) (92% - 100%)    Parameters below as of 18 Jan 2024 04:00  Patient On (Oxygen Delivery Method): BiPAP/CPAP          PHYSICAL EXAM:    General: NAD  HEENT: NC/AT; PERRL, clear conjunctiva  Neck: supple  Respiratory: CTA b/l  Cardiovascular: +S1/S2; RRR  Abdomen: soft, NT/ND; +BS x4  Extremities: WWP, 2+ peripheral pulses b/l; no LE edema  Skin: normal color and turgor; no rash  Neurological:    MEDICATIONS:  MEDICATIONS  (STANDING):  albuterol/ipratropium for Nebulization 3 milliLiter(s) Nebulizer every 4 hours  aspirin  chewable 81 milliGRAM(s) Oral daily  atorvastatin 20 milliGRAM(s) Oral at bedtime  bacitracin   Ointment 1 Application(s) Topical daily  benzonatate 100 milliGRAM(s) Oral three times a day  budesonide  80 MICROgram(s)/formoterol 4.5 MICROgram(s) Inhaler 2 Puff(s) Inhalation two times a day  chlorhexidine 2% Cloths 1 Application(s) Topical daily  desvenlafaxine ER 25 milliGRAM(s) Oral daily  enoxaparin Injectable 40 milliGRAM(s) SubCutaneous every 24 hours  furosemide    Tablet 40 milliGRAM(s) Oral daily  insulin lispro (ADMELOG) corrective regimen sliding scale   SubCutaneous three times a day before meals  metolazone 5 milliGRAM(s) Oral daily  metoprolol tartrate 75 milliGRAM(s) Oral three times a day  montelukast 10 milliGRAM(s) Oral daily  nystatin Powder 1 Application(s) Topical three times a day  ondansetron Injectable 4 milliGRAM(s) IV Push once  oseltamivir 30 milliGRAM(s) Oral every 12 hours  oseltamivir      pantoprazole    Tablet 40 milliGRAM(s) Oral before breakfast  polyethylene glycol 3350 17 Gram(s) Oral at bedtime  potassium chloride    Tablet ER 40 milliEquivalent(s) Oral once  ranolazine 500 milliGRAM(s) Oral two times a day  senna 2 Tablet(s) Oral at bedtime    MEDICATIONS  (PRN):  acetaminophen     Tablet .. 650 milliGRAM(s) Oral every 6 hours PRN Temp greater or equal to 38C (100.4F), Moderate Pain (4 - 6)  aluminum hydroxide/magnesium hydroxide/simethicone Suspension 30 milliLiter(s) Oral every 4 hours PRN Dyspepsia  guaiFENesin Oral Liquid (Sugar-Free) 100 milliGRAM(s) Oral every 6 hours PRN Cough  zolpidem 5 milliGRAM(s) Oral at bedtime PRN Insomnia      ALLERGIES:  Allergies    Augmentin (Other)  penicillins (Hives; Rash)    Intolerances        LABS:                        12.1   9.47  )-----------( 169      ( 18 Jan 2024 04:16 )             39.7     Hemoglobin: 12.1 g/dL (01-18 @ 04:16)  Hemoglobin: 11.4 g/dL (01-17 @ 05:20)  Hemoglobin: 12.0 g/dL (01-16 @ 05:35)  Hemoglobin: 11.1 g/dL (01-15 @ 04:37)  Hemoglobin: 10.1 g/dL (01-14 @ 05:33)    CBC Full  -  ( 18 Jan 2024 04:16 )  WBC Count : 9.47 K/uL  RBC Count : 4.57 M/uL  Hemoglobin : 12.1 g/dL  Hematocrit : 39.7 %  Platelet Count - Automated : 169 K/uL  Mean Cell Volume : 86.9 fL  Mean Cell Hemoglobin : 26.5 pg  Mean Cell Hemoglobin Concentration : 30.5 g/dL  Auto Neutrophil # : 6.14 K/uL  Auto Lymphocyte # : 2.08 K/uL  Auto Monocyte # : 1.20 K/uL  Auto Eosinophil # : 0.00 K/uL  Auto Basophil # : 0.02 K/uL  Auto Neutrophil % : 64.8 %  Auto Lymphocyte % : 22.0 %  Auto Monocyte % : 12.7 %  Auto Eosinophil % : 0.0 %  Auto Basophil % : 0.2 %    01-18    139  |  95<L>  |  56<H>  ----------------------------<  55<L>  3.1<L>   |  30  |  1.4    Ca    9.0      18 Jan 2024 04:16  Phos  3.6     01-18  Mg     2.2     01-18    TPro  6.1  /  Alb  3.3<L>  /  TBili  1.1  /  DBili  x   /  AST  23  /  ALT  14  /  AlkPhos  62  01-18    Creatinine Trend: 1.4<--, 1.4<--, 1.4<--, 1.4<--, 1.4<--, 1.4<--  LIVER FUNCTIONS - ( 18 Jan 2024 04:16 )  Alb: 3.3 g/dL / Pro: 6.1 g/dL / ALK PHOS: 62 U/L / ALT: 14 U/L / AST: 23 U/L / GGT: x               hs Troponin:            Urinalysis Basic - ( 18 Jan 2024 04:16 )    Color: x / Appearance: x / SG: x / pH: x  Gluc: 55 mg/dL / Ketone: x  / Bili: x / Urobili: x   Blood: x / Protein: x / Nitrite: x   Leuk Esterase: x / RBC: x / WBC x   Sq Epi: x / Non Sq Epi: x / Bacteria: x      CSF:                      EKG:   MICROBIOLOGY:    IMAGING:      Labs, imaging, EKG personally reviewed    RADIOLOGY & ADDITIONAL TESTS: Reviewed.

## 2024-01-18 NOTE — PROGRESS NOTE ADULT - REASON FOR ADMISSION
CHF exacerbation
Medications

## 2024-01-18 NOTE — PROGRESS NOTE ADULT - PROBLEM SELECTOR PROBLEM 3
Dx:  incomplete tear of left rotator cuff (M75.112.)        Insurance (Authorized # of Visits):  Jaden Schumacher PPO           Authorizing Physician: Dr. Massiel Hernandez MD visit: November 14, 2019  Fall Risk: standard         Precautions: n/a             Subjective: P
TX#: 3 Date: 10/22/19             TX#: 4 Date: 10/24/19              TX#: 5 Date:    Tx#: 6   There ex  Passive L shoulder ROM ( all directions) x 10 min  Supine chest press cane 20x  Supine BUE flex 20x  Sdly ER (towel) 20x  Sdly shld abd 20x   L shld
CAD (coronary artery disease)
CAD (coronary artery disease)

## 2024-01-18 NOTE — DIETITIAN INITIAL EVALUATION ADULT - PERTINENT LABORATORY DATA
01-18    139  |  95<L>  |  56<H>  ----------------------------<  55<L>  3.1<L>   |  30  |  1.4    Ca    9.0      18 Jan 2024 04:16  Phos  3.6     01-18  Mg     2.2     01-18    TPro  6.1  /  Alb  3.3<L>  /  TBili  1.1  /  DBili  x   /  AST  23  /  ALT  14  /  AlkPhos  62  01-18  POCT Blood Glucose.: 185 mg/dL (01-18-24 @ 11:13)  A1C with Estimated Average Glucose Result: 5.0 % (01-06-24 @ 06:46)  A1C with Estimated Average Glucose Result: 6.5 % (04-28-23 @ 07:26)

## 2024-01-18 NOTE — PROGRESS NOTE ADULT - PROBLEM SELECTOR PLAN 2
resolving  lopressor 75 tid  no plan for cardioversion  s/p watchman  f/u cards
resolving  lopressor 75 tid  no plan for cardioversion  s/p watchman  f/u cards

## 2024-01-18 NOTE — PROGRESS NOTE ADULT - PROBLEM SELECTOR PROBLEM 2
Chronic atrial fibrillation with rapid ventricular response
Chronic atrial fibrillation with rapid ventricular response

## 2024-01-18 NOTE — PROGRESS NOTE ADULT - NSPROGADDITIONALINFOA_GEN_ALL_CORE
#Progress Note Handoff:  Pending (specify):  Consults_________, Tests________, Test Results_______, Other____PT, d/c planning_____  Family discussion: d/w pt at bedside  Disposition: Home___/SNF___/Other________/Unknown at this time____x____

## 2024-01-18 NOTE — DISCHARGE NOTE PROVIDER - HOSPITAL COURSE
HPI:  79yo F hx of diverticulitis, HFpEF, CAD s/p CABG, AS s/p SAVR (2016), atrial fibrillation s/p watchman (2021), bleeding disorder, asthma, COPD, DM2, HTN, CKD 2-3. She presented to the ED complaining of progressively worsening shortness of breath of 2 weeks duration, limiting her ambulation, associated with worsening lower limb edema up to the thigh, not responding to Lasix and metolazone. She denied chest pain, cough, fever, chills or rigors, no recent illness. She is not compliant with her diet, not restricting fluid intake.   Vital Signs T(F): 97.1 HR: 73 BP: 130/53 RR: 18 SpO2: 100% nasal cannula   EKG repeated twice showed no acute ischemic changes troponin is stable twice admitted to telemetry for further evaluation    (06 Jan 2024 00:11)     Patient is admitted for Acute respiratory failure with hypoxia. most likely 2//2 presumed due to acute on chronic HFpEF. pt was found to have influenza resolved, satting well on ra  cxr bl opacities va duplex neg. tamiflu given  for 5 days, prednsione given, will be Dcd on prednisone 20 for 5 days. Patient was given lasix and metolazone. tte showed hyperdynamic ef, grade iii diastolic dysfunction. Pt was given symbicort, duoneb q4, singulair 10    Chronic atrial fibrillation with rapid ventricular response s/p watchman. HR is controlled on lopressor 75 TID. cardio evauated the pt and recommended no cardioversion. Patient had a beeding disorder on AC. Will be sent off AC     CAD (coronary artery disease). given asa, lipitor 20.   Severe aortic stenosis.  s/p savr.     DM2 (diabetes mellitus, type 2). lantus 27, humalog 14 tid, ISS     HTN (hypertension) controlled     Patient was seen and examined this morning at bedside and is stable for discharge.  He is to be discharged on the prescribed medications  He is to follow-up with his PCP

## 2024-01-18 NOTE — PROGRESS NOTE ADULT - PROBLEM SELECTOR PLAN 1
presumed due to acute on chronic HFpEF  rrt on 1/12 for worsening resp distress; found to have influenza ah1 2009  resolved, satting well on ra  cxr bl opacities  va duplex neg  tamiflu for 5 days  prednsione taper to off  lasix 40, metolazone 5  tte with hyperdynamic ef, grade iii diastolic dysfunction  symbicort, duoneb q4, singulair 10  PT
presumed due to acute on chronic HFpEF  rrt on 1/12 for worsening resp distress; found to have influenza ah1 2009  resolved, satting well on ra  cxr bl opacities  va duplex neg  tamiflu for 5 days  prednsione 40  lasix 40, metolazone 5  tte with hyperdynamic ef, grade iii diastolic dysfunction  symbicort, duoneb q4, singulair 10

## 2024-01-18 NOTE — DISCHARGE NOTE NURSING/CASE MANAGEMENT/SOCIAL WORK - PATIENT PORTAL LINK FT
You can access the FollowMyHealth Patient Portal offered by [facility] by registering at the following website: [email or web address]. By joining [employer]’s FollowMyHealth portal, you will also be able to view your health information using other applications (apps) compatible with our system.

## 2024-01-18 NOTE — DIETITIAN INITIAL EVALUATION ADULT - ORAL INTAKE PTA/DIET HISTORY
Reportedly follows a regular diet PTP. Fair appetite & po intake PTP. NKFA. No supplements reported. No dietary restrictions related to culture/Cheondoism. No food preferences reported. UBW reported to be 100 kg with no known h/o unintentional wt loss. No signs of significant muscle wasting/subcutaneous fat loss observed.

## 2024-01-18 NOTE — DISCHARGE NOTE PROVIDER - NSDCCPCAREPLAN_GEN_ALL_CORE_FT
PRINCIPAL DISCHARGE DIAGNOSIS  Diagnosis: AKASH (acute kidney injury)  Assessment and Plan of Treatment: You were noted to have a temporary insult to your kidney function either at the time that you arrived at the hospital or during your stay here. We have monitored your kidney function with blood work during your time here and you are at a level that no longer requires continued hospital level care. We do recommend that you follow up to continually have your kidney function checked. You can follow up with your primary care doctor, or, if recommended in the discharge paperwork, you should follow up with a kidney specialist called a nephrologist.        SECONDARY DISCHARGE DIAGNOSES  Diagnosis: HF (heart failure)  Assessment and Plan of Treatment: Heart failure is a condition in which the heart does not pump or fill with blood well. As a result, the heart lags behind in its job of moving blood throughout the body, causing extra fluid to leak into your lungs and tissues. This can lead to symptoms such as swelling, trouble breathing, and feeling tired. You were treated with a diuretic (water pill) to help get the excess fluid out of your body.  To prevent another hospitalization, it is important to  take your medicines every day, even if you feel well. Call your doctor or nurse if you are dizzy or weak, or if you lose or gain weight suddenly. Weigh yourself every morning after you urinate but before breakfast and try to wear about the same amount of clothing every time. To further reduce your risk, stop smoking, limit the amount of alcohol you drink, and lose weight if you are overweight or obese.  Call an ambulance (dial 911) if you have chest pain or other signs of a heart attack.      Diagnosis: Acute asthma exacerbation  Assessment and Plan of Treatment: Asthma is a condition in which the airways tighten and narrow, making it difficult to breath. Asthma episodes, also called asthma attacks, range from minor to life-threatening. Symptoms include wheezing, coughing, chest tightness, or shortness of breath. The diagnosis of asthma is made by a review of your medical history and a physical exam, but may involve additional testing. Asthma cannot be cured, but medicines and lifestyle changes can help control it. Avoid triggers of asthma which may include animal dander, pollen, mold, smoke, air pollutants, etc.   SEEK IMMEDIATE MEDICAL CARE IF YOU HAVE ANY OF THE FOLLOWING SYMPTOMS: worsening of symptoms, shortness of breath at rest, chest pain, bluish discoloration to lips or fingertips, lightheadedness/dizziness, or fever.

## 2024-01-18 NOTE — DISCHARGE NOTE PROVIDER - CARE PROVIDER_API CALL
Mica Rhodes  Cardiovascular Disease  [address], [address]  [place] [address]  Phone: [phone]  Fax: [phone]  Follow Up Time: 1-3 days    Anthony Arango  Nephrology  [address] [place] [address]  Phone: [phone]  Fax: [phone]  Follow Up Time: 1 week    Froylan Isaacs  Pulmonary Disease  [address] [place] [address]  Phone: [phone]  Fax: [phone]  Follow Up Time: 1 week    Ayad Phipps  Internal Medicine  [address] [place] [id number]  Phone: [phone]  Fax: [phone]  Follow Up Time: 1 week

## 2024-01-18 NOTE — DISCHARGE NOTE PROVIDER - NSDCFUSCHEDAPPT_GEN_ALL_CORE_FT
Howard Ramirez Physician [facility]  CARDIOLOGY [address] [facility]  Scheduled Appointment: 01/30/2024

## 2024-01-18 NOTE — DIETITIAN INITIAL EVALUATION ADULT - NS FNS DIET ORDER
DASH/TLC diet with 2000 mL fluid restriction. Fair appetite & po intake reported, with no chewing/swallowing complains reported at this time. Consuming % of meals at this time per report.

## 2024-01-18 NOTE — DISCHARGE NOTE PROVIDER - NSDCFUADDAPPT_GEN_ALL_CORE_FT
APPTS ARE READY TO BE MADE: [ ] YES    Best Family or Patient Contact (if needed):    Additional Information about above appointments (if needed):    1: Carmelo  2: James  3: Conchita   4: Moise  5: James     Other comments or requests:

## 2024-01-18 NOTE — PROGRESS NOTE ADULT - ASSESSMENT
Impression    Influenza A infection   Asthma Exacerbation  HFpEF, G3DD, moderate PH  LENY  HO asthma  CAD s/p CABG  AS s/p SAVR (2016),   HO Atrial fibrillation s/p watchman (2021),   CKD 2-3    PLAN:    CNS:  No depressants     HEENT: Oral care    PULMONARY:  HOB @ 45 degrees.  Aspiration precautions.  NIV during sleep.  Wean o2 as tolerated.  OP pulm follow up.  prednisone 40 for 5 days, 20 for 5 days    CARDIOVASCULAR:  Rate control.  lasix po/ metolazone, cardio fup    GI: GI prophylaxis.  Feeding.  Bowel regimen     RENAL:  Follow up lytes.  Correct as needed    INFECTIOUS DISEASE: Follow up cultures.  Finish Tamiflu course.      HEMATOLOGICAL:  DVT prophylaxis. Monitor CBC     ENDOCRINE:  Follow up FS.  Insulin protocol if needed.    MUSCULOSKELETAL:  PT OT .  Off loading     floor

## 2024-01-18 NOTE — PROGRESS NOTE ADULT - SUBJECTIVE AND OBJECTIVE BOX
Over Night Events: events noted, on RA, afebrile    PHYSICAL EXAM    ICU Vital Signs Last 24 Hrs  T(C): 36.4 (18 Jan 2024 07:29), Max: 36.8 (17 Jan 2024 16:00)  T(F): 97.6 (18 Jan 2024 07:29), Max: 98.2 (17 Jan 2024 16:00)  HR: 87 (18 Jan 2024 07:29) (86 - 111)  BP: 88/53 (18 Jan 2024 07:29) (88/53 - 134/61)  BP(mean): 66 (18 Jan 2024 07:29) (66 - 90)  RR: 20 (17 Jan 2024 23:41) (18 - 20)  SpO2: 93% (18 Jan 2024 07:29) (92% - 100%)    O2 Parameters below as of 18 Jan 2024 04:00  Patient On (Oxygen Delivery Method): BiPAP/CPAP            General: comfortable  Lungs: Bilateral wheezing  Cardiovascular: irregular  Abdomen: Soft, Positive BS  Extremities: No clubbing   Skin: Warm  Neurological: Non focal       01-17-24 @ 07:01  -  01-18-24 @ 07:00  --------------------------------------------------------  IN:    Oral Fluid: 946 mL  Total IN: 946 mL    OUT:    Stool (mL): 0 mL    Voided (mL): 1680 mL  Total OUT: 1680 mL    Total NET: -734 mL          LABS:                          12.1   9.47  )-----------( 169      ( 18 Jan 2024 04:16 )             39.7                                               01-18    139  |  95<L>  |  56<H>  ----------------------------<  55<L>  3.1<L>   |  30  |  1.4    Ca    9.0      18 Jan 2024 04:16  Phos  3.6     01-18  Mg     2.2     01-18    TPro  6.1  /  Alb  3.3<L>  /  TBili  1.1  /  DBili  x   /  AST  23  /  ALT  14  /  AlkPhos  62  01-18                                             Urinalysis Basic - ( 18 Jan 2024 04:16 )    Color: x / Appearance: x / SG: x / pH: x  Gluc: 55 mg/dL / Ketone: x  / Bili: x / Urobili: x   Blood: x / Protein: x / Nitrite: x   Leuk Esterase: x / RBC: x / WBC x   Sq Epi: x / Non Sq Epi: x / Bacteria: x                                                  LIVER FUNCTIONS - ( 18 Jan 2024 04:16 )  Alb: 3.3 g/dL / Pro: 6.1 g/dL / ALK PHOS: 62 U/L / ALT: 14 U/L / AST: 23 U/L / GGT: x                                                                                                                                       MEDICATIONS  (STANDING):  albuterol/ipratropium for Nebulization 3 milliLiter(s) Nebulizer every 4 hours  aspirin  chewable 81 milliGRAM(s) Oral daily  atorvastatin 20 milliGRAM(s) Oral at bedtime  bacitracin   Ointment 1 Application(s) Topical daily  benzonatate 100 milliGRAM(s) Oral three times a day  budesonide  80 MICROgram(s)/formoterol 4.5 MICROgram(s) Inhaler 2 Puff(s) Inhalation two times a day  chlorhexidine 2% Cloths 1 Application(s) Topical daily  desvenlafaxine ER 25 milliGRAM(s) Oral daily  enoxaparin Injectable 40 milliGRAM(s) SubCutaneous every 24 hours  furosemide    Tablet 40 milliGRAM(s) Oral daily  insulin glargine Injectable (LANTUS) 27 Unit(s) SubCutaneous at bedtime  insulin lispro (ADMELOG) corrective regimen sliding scale   SubCutaneous three times a day before meals  insulin lispro Injectable (ADMELOG) 14 Unit(s) SubCutaneous three times a day before meals  metolazone 5 milliGRAM(s) Oral daily  metoprolol tartrate 75 milliGRAM(s) Oral three times a day  montelukast 10 milliGRAM(s) Oral daily  nystatin Powder 1 Application(s) Topical three times a day  ondansetron Injectable 4 milliGRAM(s) IV Push once  oseltamivir      oseltamivir 30 milliGRAM(s) Oral every 12 hours  pantoprazole    Tablet 40 milliGRAM(s) Oral before breakfast  polyethylene glycol 3350 17 Gram(s) Oral at bedtime  ranolazine 500 milliGRAM(s) Oral two times a day  senna 2 Tablet(s) Oral at bedtime    MEDICATIONS  (PRN):  acetaminophen     Tablet .. 650 milliGRAM(s) Oral every 6 hours PRN Temp greater or equal to 38C (100.4F), Moderate Pain (4 - 6)  aluminum hydroxide/magnesium hydroxide/simethicone Suspension 30 milliLiter(s) Oral every 4 hours PRN Dyspepsia  guaiFENesin Oral Liquid (Sugar-Free) 100 milliGRAM(s) Oral every 6 hours PRN Cough  zolpidem 5 milliGRAM(s) Oral at bedtime PRN Insomnia

## 2024-01-18 NOTE — DIETITIAN INITIAL EVALUATION ADULT - ADD RECOMMEND
Recommendation: Continue current diet order as tolerated. Avoid carbohydrate restrictions. Monitor glucose levels in setting of recent hypoglycemia and provide additional snacks as needed if low glucose persists.

## 2024-01-18 NOTE — DIETITIAN INITIAL EVALUATION ADULT - NSFNSGIIOFT_GEN_A_CORE
124 kg (1/12), 123 kg (1/11), 125 kg (1/9), 116 kg (1/6), 106.6 kg (1/5)    Significant wt gain observed this admit; may be related to fluid shifts; edema noted. BMI calculated using lowest wt this admit 106.6 kg.    IBW: 59.1 kg.    01-17-24 @ 07:01  -  01-18-24 @ 07:00  --------------------------------------------------------  OUT:    Stool (mL): 0 mL  Total OUT: 0 mL    Total NET: 0 mL

## 2024-01-18 NOTE — DISCHARGE NOTE PROVIDER - CARE PROVIDERS DIRECT ADDRESSES
[email or web address]

## 2024-01-18 NOTE — DISCHARGE NOTE PROVIDER - PROVIDER TOKENS
PROVIDER:[TOKEN:[[id number]],FOLLOWUP:[1-3 days]],PROVIDER:[TOKEN:[[id number]],FOLLOWUP:[1 week]],PROVIDER:[TOKEN:[[id number]],FOLLOWUP:[1 week]],PROVIDER:[TOKEN:[[id number]],FOLLOWUP:[1 week]]

## 2024-01-18 NOTE — PROGRESS NOTE ADULT - PROVIDER SPECIALTY LIST ADULT
Cardiology
Hospitalist
Internal Medicine
Hospitalist
Internal Medicine
Internal Medicine
Pulmonology
Critical Care
Pulmonology
Pulmonology
Hospitalist
Pulmonology
Hospitalist
Internal Medicine
Hospitalist
Internal Medicine
Internal Medicine

## 2024-01-19 NOTE — ED PROVIDER NOTE - CLINICAL SUMMARY MEDICAL DECISION MAKING FREE TEXT BOX
Patient evaluated for inability to ambulate.  Discharged earlier from hospital, as per patient the VNS services fell through and patient was home alone unable to ambulate or lift herself off the toilet.  Patient unsafe discharge, no acute complaints.  Labs reviewed, patient readmitted due to unsafe discharge.

## 2024-01-19 NOTE — ED ADULT NURSE NOTE - NSFALLRISKINTERV_ED_ALL_ED

## 2024-01-19 NOTE — H&P ADULT - ASSESSMENT
77yo F hx of diverticulitis, HFpEF, CAD s/p CABG, AS s/p SAVR (2016), atrial fibrillation s/p watchman (2021), bleeding disorder, asthma, COPD, DM2, HTN, CKD 2-3. Discharged 1/18 after being admitted for Acute respiratory failure with hypoxia 2/2 CHF/COPD exacerbation. Today presenting with a complaint of inability to care for herself, citing that she was unable to transition from her toilet on her own, having to call EMS. Pt denies any somatic complaints. Previoulsy declined STR, but now amenable.    # Inability to care for herself   - she was unable to transition from her toilet on her own   - Previoulsy declined STR, but now amenable   - F/u /      # Leukocytosis most likely 2/2 prednisone   - No focal source of infection   - Tachy 2/2 to A-fib ( seen on EKG)   - Other vital signs unremarkable   - Monitor CBC     # CKD 2-3  - Creatinine 1.6 ( baseline 1,4)   - BUN 65  - Avoid nephrotoxic agents    # Recent admission for influenza infection and COPD exacerbation   - Discharged on Prednisone 20 mg for 5 days   - C/w prednisone 4 more days  - C/w home inhalers   - C/w Montelukast     # HFpEF   # CAD s/p CABG  # AS s/p SAVR (2016)  # HTN   - EF: 70 - 75%,hyperdynamic ef, grade iii diastolic dysfunction, (1/9/2024)  - lasix 40  - Holding metolazone 5 for borderline hyponatremia   - C/w Aspirin and Statin     # Chronic atrial fibrillation with rapid ventricular response  - EKG showing A-fib with RVR   - C/w metoprolol 200 succinate daily   - no plan for cardioversion  - s/p watchman  - Outpatient cardio follow up   - Monitor vital signs     # DM2  -  hold oral meds;  check fs;  start and adjust insulin s/s prn    #Misc  - DVT ppx: Heparin   - GI PPX: Protonix   - Diet: DASH   - Activity/PT eval

## 2024-01-19 NOTE — ED PROVIDER NOTE - PHYSICAL EXAMINATION
VITAL SIGNS: I have reviewed nursing notes and confirm.  CONSTITUTIONAL: Well-appearing, non-toxic, in NAD  SKIN: Warm dry, normal skin turgor  HEAD: NCAT  EYES: No conjunctival injection, scleral anicteric  ENT: MMM  NECK: Supple; FROM.   CARD: RRR  RESP: CTAB  ABD: Soft, NDNT  EXT: +wrapped ulcers to right shin.   NEURO: Grossly normal examination, CN grossly intact  PSYCH: Cooperative, appropriate

## 2024-01-19 NOTE — H&P ADULT - NSHPPHYSICALEXAM_GEN_ALL_CORE
CONSTITUTIONAL: Well-appearing, non-toxic, in NAD  SKIN: Warm dry, normal skin turgor  HEAD: NCAT  EYES: No conjunctival injection, scleral anicteric  ENT: MMM  NECK: Supple; FROM.   CARD: RRR  RESP: CTAB  ABD: Soft, NDNT  EXT: +wrapped ulcers to right shin.   NEURO: Grossly normal examination, CN grossly intact  PSYCH: Cooperative, appropriate CONSTITUTIONAL: Well-appearing, non-toxic, in NAD  SKIN: Warm dry, normal skin turgor  HEAD: NCAT  EYES: No conjunctival injection, scleral anicteric  ENT: MMM  NECK: Supple; FROM.   CARD: RRR  RESP: CTAB  ABD: Soft, NDNT  EXT: +wrapped ulcers to right shin. 1+ left leg edema  NEURO: Grossly normal examination, CN grossly intact  PSYCH: Cooperative, appropriate

## 2024-01-19 NOTE — H&P ADULT - NSHPLABSRESULTS_GEN_ALL_CORE
LABS:                          12.9   13.17 )-----------( 203      ( 19 Jan 2024 01:31 )             41.2     01-19    134<L>  |  92<L>  |  65<HH>  ----------------------------<  279<H>  3.6   |  27  |  1.6<H>    Ca    9.0      19 Jan 2024 01:31  Phos  3.6     01-18  Mg     2.2     01-18    TPro  6.3  /  Alb  3.4<L>  /  TBili  1.3<H>  /  DBili  x   /  AST  21  /  ALT  15  /  AlkPhos  65  01-19    LIVER FUNCTIONS - ( 19 Jan 2024 01:31 )  Alb: 3.4 g/dL / Pro: 6.3 g/dL / ALK PHOS: 65 U/L / ALT: 15 U/L / AST: 21 U/L / GGT: x             Urinalysis Basic - ( 19 Jan 2024 01:31 )    Color: x / Appearance: x / SG: x / pH: x  Gluc: 279 mg/dL / Ketone: x  / Bili: x / Urobili: x   Blood: x / Protein: x / Nitrite: x   Leuk Esterase: x / RBC: x / WBC x   Sq Epi: x / Non Sq Epi: x / Bacteria: x

## 2024-01-19 NOTE — ED PROVIDER NOTE - OBJECTIVE STATEMENT
79yo female PMHx as lsited presenting with a complaint of inability to care for herself, citing that she was unable to trnsition from her toilet on her own, having to call EMS. Pt denies any somatic complaints. She was discharged from hospital in the morning for CHF/COPD exacerbation; pt at that time declined STR, but now amenable.

## 2024-01-19 NOTE — H&P ADULT - ATTENDING COMMENTS
Patient seen and examined.    77yo F hx of diverticulitis, HFpEF, CAD s/p CABG, AS s/p SAVR (2016), atrial fibrillation s/p watchman (2021), bleeding disorder, asthma, COPD, DM2, HTN, CKD 2-3. Discharged 1/18 after being admitted for Acute respiratory failure with hypoxia 2/2 CHF/COPD exacerbation. Today presenting with a complaint of inability to care for herself, citing that she was unable to transition from her toilet on her own, having to call EMS.      # Inability to care for herself   - she was unable to transition from her toilet on her own   - Previously declined STR, but now amenable   - F/u /   - PT eval    # Leukocytosis most likely 2/2 prednisone  # Recent admission for influenza infection and COPD exacerbation   - Discharged on Prednisone 20 mg for 5 days   - C/w prednisone 4 more days  - C/w home inhalers   - C/w Montelukast   - No focal source of infection   - Tachy 2/2 to A-fib ( seen on EKG)   - Other vital signs unremarkable   - Monitor CBC     # CKD 2-3  - Creatinine 1.6 ( baseline 1,4)   - BUN 65  - Avoid nephrotoxic agents      # HFpEF   # CAD s/p CABG  # AS s/p SAVR (2016)  # HTN   - EF: 70 - 75%,hyperdynamic ef, grade iii diastolic dysfunction, (1/9/2024)  - lasix 40  - c/w metolazone. corrected Na 138, wnl   - C/w Aspirin and Statin     # Chronic atrial fibrillation with rapid ventricular response  - EKG showing A-fib with RVR   - C/w metoprolol 200 succinate daily   - no plan for cardioversion  - s/p watchman  - Outpatient cardio follow up   - Monitor vital signs     # DM2  -  hold oral meds;  check fs;  start and adjust insulin s/s prn    #Misc  - DVT ppx: Heparin   - GI PPX: Protonix   - Diet: DASH   - Activity/PT eval

## 2024-01-19 NOTE — H&P ADULT - HISTORY OF PRESENT ILLNESS
79yo F hx of diverticulitis, HFpEF, CAD s/p CABG, AS s/p SAVR (2016), atrial fibrillation s/p watchman (2021), bleeding disorder, asthma, COPD, DM2, HTN, CKD 2-3. Discharged 1/18 after being admitted for Acute respiratory failure with hypoxia 2/2 CHF/COPD exacerbation. Today presenting with a complaint of inability to care for herself, citing that she was unable to transition from her toilet on her own, having to call EMS. Pt denies any somatic complaints.Previoulsy declined STR, but now amenable.    In ED:  - Vital signs: HR: 104   - Labs WBC 13.17, Na: 134, Creatinine 1.6 (baseline 1.4), BUN: 65, Renaldo T: 1.3 77yo F hx of diverticulitis, HFpEF, CAD s/p CABG, AS s/p SAVR (2016), atrial fibrillation s/p watchman (2021), bleeding disorder, asthma, COPD, DM2, HTN, CKD 2-3. Discharged 1/18 after being admitted for Acute respiratory failure with hypoxia 2/2 CHF/COPD exacerbation. Today presenting with a complaint of inability to care for herself, citing that she was unable to transition from her toilet on her own, having to call EMS. Pt denies any somatic complaints. Previously declined STR, but now amenable.    In ED:  - Vital signs: HR: 104   - Labs WBC 13.17, Na: 134, Creatinine 1.6 (baseline 1.4), BUN: 65, Renaldo T: 1.3

## 2024-01-19 NOTE — ED PROVIDER NOTE - ATTENDING CONTRIBUTION TO CARE
78-year-old female with PMH HTN PEF, CAD s/p CABG, severe AS, A-fib status post watchman, COPD, DM, HTN presents for weakness and inability to care for self.  Patient admitted for acute hypoxic respiratory failure and influenza with improvement and discharged earlier this morning.  Patient states she was supposed to be home care set up however due to some miscommunication the nurse who initially was scheduled to come to her home took another job and patient did not have any home care.  States she was able to walk into her house but was feeling weaker and weaker.  Went to the toilet and was unable to transfer or ambulate at all or lift herself off and did not have any help or assistance at home.  EMS was called.  Patient without any acute complaints, reports some mild nausea.  No chest pain or shortness of breath, fevers, chills.    VITAL SIGNS: noted  CONSTITUTIONAL: Well-developed; chronically ill appearing; in no acute distress  HEAD: Normocephalic; atraumatic  EYES: PERRL, EOM intact; conjunctiva and sclera clear  ENT: No nasal discharge; airway clear. MMM  NECK: Supple; non tender.   CARD: S1, S2 normal; no murmurs, gallops, or rubs. Regular rate and rhythm  RESP: CTAB/L, no wheezes, rales or rhonchi  ABD: Normal bowel sounds; soft; non-distended; non-tender; no CVA tenderness  EXT: Normal ROM. No calf tenderness or edema. Distal pulses intact  NEURO: Alert, oriented. Grossly unremarkable. No focal deficits  SKIN: Skin exam is warm and dry

## 2024-01-21 NOTE — PHYSICAL THERAPY INITIAL EVALUATION ADULT - TRANSFER TRAINING, PT EVAL
Improve sit <->stand with supervision using RW by d/c. Pt to negotiate 4 steps using step to pattern using 2 hands on HR with supervision by d/c.

## 2024-01-21 NOTE — PHYSICAL THERAPY INITIAL EVALUATION ADULT - GENERAL OBSERVATIONS, REHAB EVAL
Pt encountered semifowler in bed in NAD, no complaints, + chair alarm, + gauze dressing R LE, agreeable to PT.

## 2024-01-21 NOTE — PHYSICAL THERAPY INITIAL EVALUATION ADULT - PERTINENT HX OF CURRENT PROBLEM, REHAB EVAL
79yo F hx of diverticulitis, HFpEF, CAD s/p CABG, AS s/p SAVR (2016), atrial fibrillation s/p watchman (2021), bleeding disorder, asthma, COPD, DM2, HTN, CKD 2-3. Discharged 1/18 after being admitted for Acute respiratory failure with hypoxia 2/2 CHF/COPD exacerbation. Today presenting with a complaint of inability to care for herself, citing that she was unable to transition from her toilet on her own, having to call EMS. Pt denies any somatic complaints. Previously declined STR, but now amenable.

## 2024-01-21 NOTE — PHYSICAL THERAPY INITIAL EVALUATION ADULT - ADDITIONAL COMMENTS
Pt lives in , 4 steps to enter, daughter lives 5 ft away on same property. Pt reports she used to use SC then progressed using RW, was recently in hospital.

## 2024-01-21 NOTE — PHYSICAL THERAPY INITIAL EVALUATION ADULT - GAIT DISTANCE, PT EVAL
30'x 2 with 2 standing rest period 2* to SOB. SPO2 after amb was 89% recuperated  to 96% with rest post amb.

## 2024-01-22 NOTE — PROGRESS NOTE ADULT - SUBJECTIVE AND OBJECTIVE BOX
EVAN QUINN  78y, Female  Allergy: penicillins (Hives; Rash)  Augmentin (Other)      CHIEF COMPLAINT: difficulty walking needs STR Placement     HPI:  79yo F hx of diverticulitis, HFpEF, CAD s/p CABG, AS s/p SAVR (2016), atrial fibrillation s/p watchman (2021), bleeding disorder, asthma, COPD, DM2, HTN, CKD 2-3. Discharged 1/18 after being admitted for Acute respiratory failure with hypoxia 2/2 CHF/COPD exacerbation. Today presenting with a complaint of inability to care for herself, citing that she was unable to transition from her toilet on her own, having to call EMS. Pt denies any somatic complaints. Previously declined STR, but now amenable.    In ED:  - Vital signs: HR: 104   - Labs WBC 13.17, Na: 134, Creatinine 1.6 (baseline 1.4), BUN: 65, Renaldo T: 1.3 (19 Jan 2024 15:13)    HPI:    FAMILY HISTORY:  Family history of pseudocholinesterase deficiency (Child)      PAST MEDICAL & SURGICAL HISTORY:  Aortic stenosis  Diabetes  Asthma with COPD  many years since last attack  CAD (coronary artery disease), native coronary artery  Anemia  History of bleeding disorder  having work up 5/2/21- HAD WORKUP BUT NO DIAGNOSIS "NOTHING WAS FOUND"  History of transfusion reaction  Hiatal hernia  H/O ascending aortic replacement  Bovine Replacement  S/P CABG x 1  complication of bleeding 2016  H/O total knee replacement, right  complicated with fx femur bars screws in femur- b/l knee replacement  S/P hysterectomy  Presence of Watchman left atrial appendage closure device      Home Medications:  desvenlafaxine (as succinate) 25 mg oral tablet, extended release: 1 tab(s) orally once a day (19 Jan 2024 15:25)  furosemide 40 mg oral tablet: 1 tab(s) orally once a day (19 Jan 2024 15:25)  glipiZIDE 5 mg oral tablet: 1 tab(s) orally once a day (19 Jan 2024 15:25)  Lyrica 75 mg oral capsule: 1 cap(s) orally once a day (19 Jan 2024 15:25)  montelukast 10 mg oral tablet: 1 tab(s) orally once a day (19 Jan 2024 15:25)  potassium chloride 15 mEq oral tablet, extended release: 1 tab(s) orally 2 times a day (19 Jan 2024 15:25)  Ranexa 500 mg oral tablet, extended release: 1 tab(s) orally 2 times a day (19 Jan 2024 15:25)  rosuvastatin 10 mg oral tablet: 1 tab(s) orally once a day (19 Jan 2024 15:25)      ROS  General: weakness can not walk or care for self at home - needs STR     VITALS:    T(C): 36.3 (21 Jan 2024 05:00), Max: 36.3 (21 Jan 2024 05:00)  T(F): 97.3 (21 Jan 2024 05:00), Max: 97.3 (21 Jan 2024 05:00)  HR: 81 (21 Jan 2024 05:00) (81 - 97)  BP: 108/52 (21 Jan 2024 05:00) (85/50 - 119/56)  RR: 18 (21 Jan 2024 05:00) (18 - 18)  SpO2: 97% (21 Jan 2024 05:00) (97% - 100%)    Parameters below as of 20 Jan 2024 21:00  Patient On (Oxygen Delivery Method): room air      PHYSICAL EXAM:  Gen: NAD, resting in bed  HEENT: Normocephalic, atraumatic  Neck: supple, no lymphadenopathy  CV: Regular rate & regular rhythm  Lungs: CTABL no wheeze  Abdomen: Soft, NTND+ BS present  Ext: Warm, well perfused no CCE  Neuro: non focal, awake, CN II-XII intact   Skin: no rash, no erythema  Psych: no SI, HI, Hallucination     TESTS & MEASUREMENTS:                          11.9   6.32  )-----------( 180      ( 20 Jan 2024 06:49 )             39.1     01-20    135  |  93<L>  |  62<HH>  ----------------------------<  272<H>  4.0   |  25  |  1.5    Ca    8.7      20 Jan 2024 06:49  Mg     2.2     01-20    TPro  6.1  /  Alb  3.3<L>  /  TBili  1.1  /  DBili  x   /  AST  32  /  ALT  18  /  AlkPhos  61  01-20      LIVER FUNCTIONS - ( 20 Jan 2024 06:49 )  Alb: 3.3 g/dL / Pro: 6.1 g/dL / ALK PHOS: 61 U/L / ALT: 18 U/L / AST: 32 U/L / GGT: x           Urinalysis Basic - ( 20 Jan 2024 06:49 )    Color: x / Appearance: x / SG: x / pH: x  Gluc: 272 mg/dL / Ketone: x  / Bili: x / Urobili: x   Blood: x / Protein: x / Nitrite: x   Leuk Esterase: x / RBC: x / WBC x   Sq Epi: x / Non Sq Epi: x / Bacteria: x      SARS-CoV-2: NotDetec (12 Jan 2024 09:34)  SARS-CoV-2: NotDetec (10 Bhaskar 2024 17:11)  COVID-19 PCR: NotDetec (08 Jan 2024 21:30)      QRS axis to [] ° and NSR at a rate of [] BPM. There was no atrial enlargement. There was no ventricular hypertrophy. There were no ST-T changes and all intervals were normal.      INFECTIOUS DISEASES TESTING      RADIOLOGY & ADDITIONAL TESTS:  I have personally reviewed the last Chest xray    CARDIOLOGY TESTING  12 Lead ECG:   Ventricular Rate 102 BPM    Atrial Rate 108 BPM    QRS Duration 100 ms    Q-T Interval 412 ms    QTC Calculation(Bazett) 536 ms    R Axis -10 degrees    T Axis 151 degrees    Diagnosis Line Atrial fibrillation with rapid ventricular response  Septal infarct , age undetermined  Marked ST abnormality, possible lateral subendocardial injury  Prolonged QT  Abnormal ECG    Confirmed by elaina ramirez ([id number]) on 1/19/2024 6:21:57 AM (01-19-24 @ 00:56)  12 Lead ECG:   Ventricular Rate 121 BPM    QRS Duration 96 ms    Q-T Interval 346 ms    QTC Calculation(Bazett) 491 ms    R Axis -5 degrees    T Axis 159 degrees    Diagnosis Line Atrial fibrillation with rapid ventricular response  Marked ST abnormality, possible lateral subendocardial injury  Abnormal ECG    Confirmed by Howard Ramirez ([id number]) on 1/14/2024 6:33:49 PM (01-14-24 @ 09:04)      MEDICATIONS  (STANDING):  albuterol    90 MICROgram(s) HFA Inhaler 2 Puff(s) Inhalation every 6 hours  atorvastatin 40 milliGRAM(s) Oral at bedtime  budesonide  80 MICROgram(s)/formoterol 4.5 MICROgram(s) Inhaler 2 Puff(s) Inhalation two times a day  dextrose 5%. 1000 milliLiter(s) (50 mL/Hr) IV Continuous <Continuous>  dextrose 5%. 1000 milliLiter(s) (100 mL/Hr) IV Continuous <Continuous>  dextrose 50% Injectable 12.5 Gram(s) IV Push once  dextrose 50% Injectable 25 Gram(s) IV Push once  dextrose 50% Injectable 25 Gram(s) IV Push once  furosemide    Tablet 40 milliGRAM(s) Oral daily  glucagon  Injectable 1 milliGRAM(s) IntraMuscular once  heparin   Injectable 5000 Unit(s) SubCutaneous every 12 hours  insulin lispro (ADMELOG) corrective regimen sliding scale   SubCutaneous three times a day before meals  metolazone 5 milliGRAM(s) Oral daily  metoprolol succinate  milliGRAM(s) Oral daily  montelukast 10 milliGRAM(s) Oral daily  pantoprazole    Tablet 40 milliGRAM(s) Oral before breakfast  predniSONE   Tablet 20 milliGRAM(s) Oral daily  pregabalin 75 milliGRAM(s) Oral daily  ranolazine 500 milliGRAM(s) Oral two times a day  tiotropium 2.5 MICROgram(s) Inhaler 2 Puff(s) Inhalation daily    MEDICATIONS  (PRN):  dextrose Oral Gel 15 Gram(s) Oral once PRN Blood Glucose LESS THAN 70 milliGRAM(s)/deciliter      ANTIBIOTICS:  All available historical data has been reviewed    ASSESSMENT  78y F admitted with Difficulty in walking, not elsewhere classified        
EVAN QUINN  78y, Female  Allergy: penicillins (Hives; Rash)  Augmentin (Other)    CHIEF COMPLAINT: difficulty walking needs STR Placement     HPI:  79yo F hx of diverticulitis, HFpEF, CAD s/p CABG, AS s/p SAVR (2016), atrial fibrillation s/p watchman (2021), bleeding disorder, asthma, COPD, DM2, HTN, CKD 2-3. Discharged 1/18 after being admitted for Acute respiratory failure with hypoxia 2/2 CHF/COPD exacerbation. Today presenting with a complaint of inability to care for herself, citing that she was unable to transition from her toilet on her own, having to call EMS. Pt denies any somatic complaints. Previously declined STR, but now amenable.    In ED:  - Vital signs: HR: 104   - Labs WBC 13.17, Na: 134, Creatinine 1.6 (baseline 1.4), BUN: 65, Renaldo T: 1.3 (19 Jan 2024 15:13)    HPI:    FAMILY HISTORY:  Family history of pseudocholinesterase deficiency (Child)      PAST MEDICAL & SURGICAL HISTORY:  Aortic stenosis  Diabetes  Asthma with COPD  many years since last attack  CAD (coronary artery disease), native coronary artery  Anemia  History of bleeding disorder  having work up 5/2/21- HAD WORKUP BUT NO DIAGNOSIS "NOTHING WAS FOUND"  History of transfusion reaction  Hiatal hernia  H/O ascending aortic replacement  Bovine Replacement  S/P CABG x 1  complication of bleeding 2016  H/O total knee replacement, right  complicated with fx femur bars screws in femur- b/l knee replacement  S/P hysterectomy  Presence of Watchman left atrial appendage closure device      Home Medications:  desvenlafaxine (as succinate) 25 mg oral tablet, extended release: 1 tab(s) orally once a day (19 Jan 2024 15:25)  furosemide 40 mg oral tablet: 1 tab(s) orally once a day (19 Jan 2024 15:25)  glipiZIDE 5 mg oral tablet: 1 tab(s) orally once a day (19 Jan 2024 15:25)  Lyrica 75 mg oral capsule: 1 cap(s) orally once a day (19 Jan 2024 15:25)  montelukast 10 mg oral tablet: 1 tab(s) orally once a day (19 Jan 2024 15:25)  potassium chloride 15 mEq oral tablet, extended release: 1 tab(s) orally 2 times a day (19 Jan 2024 15:25)  Ranexa 500 mg oral tablet, extended release: 1 tab(s) orally 2 times a day (19 Jan 2024 15:25)  rosuvastatin 10 mg oral tablet: 1 tab(s) orally once a day (19 Jan 2024 15:25)      ROS  General: weakness can not walk or care for self at home - needs STR     VITALS:    T(C): 36.1 (22 Jan 2024 12:52), Max: 37 (21 Jan 2024 21:00)  T(F): 97 (22 Jan 2024 12:52), Max: 98.6 (21 Jan 2024 21:00)  HR: 76 (22 Jan 2024 12:52) (64 - 99)  BP: 100/56 (22 Jan 2024 12:52) (93/55 - 106/66)  BP(mean): 74 (22 Jan 2024 05:50) (74 - 74)  RR: 18 (22 Jan 2024 12:52) (18 - 18)  SpO2: 98% (22 Jan 2024 12:52) (97% - 98%)    Parameters below as of 22 Jan 2024 12:52  Patient On (Oxygen Delivery Method): room air      PHYSICAL EXAM:  Gen: NAD, resting in bed  HEENT: Normocephalic, atraumatic  Neck: supple, no lymphadenopathy  CV: Regular rate & regular rhythm  Lungs: CTABL no wheeze  Abdomen: Soft, NTND+ BS present  Ext: Warm, well perfused no CCE  Neuro: non focal, awake, CN II-XII intact   Skin: no rash, no erythema  Psych: no SI, HI, Hallucination     TESTS & MEASUREMENTS:                        11.9   6.32  )-----------( 180      ( 20 Jan 2024 06:49 )             39.1     01-20    135  |  93<L>  |  62<HH>  ----------------------------<  272<H>  4.0   |  25  |  1.5    Ca    8.7      20 Jan 2024 06:49  Mg     2.2     01-20    TPro  6.1  /  Alb  3.3<L>  /  TBili  1.1  /  DBili  x   /  AST  32  /  ALT  18  /  AlkPhos  61  01-20    LIVER FUNCTIONS - ( 20 Jan 2024 06:49 )  Alb: 3.3 g/dL / Pro: 6.1 g/dL / ALK PHOS: 61 U/L / ALT: 18 U/L / AST: 32 U/L / GGT: x           Urinalysis Basic - ( 20 Jan 2024 06:49 )    Color: x / Appearance: x / SG: x / pH: x  Gluc: 272 mg/dL / Ketone: x  / Bili: x / Urobili: x   Blood: x / Protein: x / Nitrite: x   Leuk Esterase: x / RBC: x / WBC x   Sq Epi: x / Non Sq Epi: x / Bacteria: x      SARS-CoV-2: NotDetec (12 Jan 2024 09:34)  SARS-CoV-2: NotDetec (10 Bhaskar 2024 17:11)  COVID-19 PCR: NotDetec (08 Jan 2024 21:30)    QRS axis to [] ° and NSR at a rate of [] BPM. There was no atrial enlargement. There was no ventricular hypertrophy. There were no ST-T changes and all intervals were normal.      RADIOLOGY & ADDITIONAL TESTS:  I have personally reviewed the last Chest xray    CARDIOLOGY TESTING  12 Lead ECG:   Ventricular Rate 102 BPM    Atrial Rate 108 BPM    QRS Duration 100 ms    Q-T Interval 412 ms    QTC Calculation(Bazett) 536 ms    R Axis -10 degrees    T Axis 151 degrees    Diagnosis Line Atrial fibrillation with rapid ventricular response  Septal infarct , age undetermined  Marked ST abnormality, possible lateral subendocardial injury  Prolonged QT  Abnormal ECG    Confirmed by elaina ramirez ([id number]) on 1/19/2024 6:21:57 AM (01-19-24 @ 00:56)  12 Lead ECG:   Ventricular Rate 121 BPM    QRS Duration 96 ms    Q-T Interval 346 ms    QTC Calculation(Bazett) 491 ms    R Axis -5 degrees    T Axis 159 degrees    Diagnosis Line Atrial fibrillation with rapid ventricular response  Marked ST abnormality, possible lateral subendocardial injury  Abnormal ECG    Confirmed by Howard Ramirez ([id number]) on 1/14/2024 6:33:49 PM (01-14-24 @ 09:04)    MEDICATIONS  (STANDING):  albuterol    90 MICROgram(s) HFA Inhaler 2 Puff(s) Inhalation every 6 hours  atorvastatin 40 milliGRAM(s) Oral at bedtime  budesonide  80 MICROgram(s)/formoterol 4.5 MICROgram(s) Inhaler 2 Puff(s) Inhalation two times a day  dextrose 5%. 1000 milliLiter(s) (50 mL/Hr) IV Continuous <Continuous>  furosemide    Tablet 40 milliGRAM(s) Oral daily  glucagon  Injectable 1 milliGRAM(s) IntraMuscular once  heparin   Injectable 5000 Unit(s) SubCutaneous every 12 hours  insulin lispro (ADMELOG) corrective regimen sliding scale   SubCutaneous three times a day before meals  metolazone 5 milliGRAM(s) Oral daily  metoprolol succinate  milliGRAM(s) Oral daily  montelukast 10 milliGRAM(s) Oral daily  pantoprazole    Tablet 40 milliGRAM(s) Oral before breakfast  polyethylene glycol 3350 17 Gram(s) Oral two times a day  pregabalin 75 milliGRAM(s) Oral daily  ranolazine 500 milliGRAM(s) Oral two times a day  senna 2 Tablet(s) Oral at bedtime  tiotropium 2.5 MICROgram(s) Inhaler 2 Puff(s) Inhalation daily    MEDICATIONS  (PRN):  dextrose Oral Gel 15 Gram(s) Oral once PRN Blood Glucose LESS THAN 70 milliGRAM(s)/deciliter    ANTIBIOTICS:  All available historical data has been reviewed    ASSESSMENT  78y F admitted with Difficulty in walking, not elsewhere classified        
EVAN QUINN  78y, Female  Allergy: penicillins (Hives; Rash)  Augmentin (Other)      CHIEF COMPLAINT: difficulty walking needs STR Placement     HPI:  79yo F hx of diverticulitis, HFpEF, CAD s/p CABG, AS s/p SAVR (2016), atrial fibrillation s/p watchman (2021), bleeding disorder, asthma, COPD, DM2, HTN, CKD 2-3. Discharged 1/18 after being admitted for Acute respiratory failure with hypoxia 2/2 CHF/COPD exacerbation. Today presenting with a complaint of inability to care for herself, citing that she was unable to transition from her toilet on her own, having to call EMS. Pt denies any somatic complaints. Previously declined STR, but now amenable.    In ED:  - Vital signs: HR: 104   - Labs WBC 13.17, Na: 134, Creatinine 1.6 (baseline 1.4), BUN: 65, Renaldo T: 1.3 (19 Jan 2024 15:13)    HPI:    FAMILY HISTORY:  Family history of pseudocholinesterase deficiency (Child)      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      CAD (coronary artery disease), native coronary artery      Anemia      History of bleeding disorder  having work up 5/2/21- HAD WORKUP BUT NO DIAGNOSIS "NOTHING WAS FOUND"      History of transfusion reaction      Hiatal hernia      H/O ascending aortic replacement  Bovine Replacement      S/P CABG x 1  complication of bleeding 2016      H/O total knee replacement, right  complicated with fx femur bars screws in femur- b/l knee replacement      S/P hysterectomy      Presence of Watchman left atrial appendage closure device          SOCIAL HISTORY  Social History:      Home Medications:  desvenlafaxine (as succinate) 25 mg oral tablet, extended release: 1 tab(s) orally once a day (19 Jan 2024 15:25)  furosemide 40 mg oral tablet: 1 tab(s) orally once a day (19 Jan 2024 15:25)  glipiZIDE 5 mg oral tablet: 1 tab(s) orally once a day (19 Jan 2024 15:25)  Lyrica 75 mg oral capsule: 1 cap(s) orally once a day (19 Jan 2024 15:25)  montelukast 10 mg oral tablet: 1 tab(s) orally once a day (19 Jan 2024 15:25)  potassium chloride 15 mEq oral tablet, extended release: 1 tab(s) orally 2 times a day (19 Jan 2024 15:25)  Ranexa 500 mg oral tablet, extended release: 1 tab(s) orally 2 times a day (19 Jan 2024 15:25)  rosuvastatin 10 mg oral tablet: 1 tab(s) orally once a day (19 Jan 2024 15:25)      ROS  General: weakness can not walk or care for self at home - needs STR     VITALS:  T(F): 96, Max: 98.4 (01-20-24 @ 04:16)  HR: 81  BP: 119/56  RR: 18Vital Signs Last 24 Hrs  T(C): 35.6 (20 Jan 2024 13:28), Max: 36.9 (20 Jan 2024 04:16)  T(F): 96 (20 Jan 2024 13:28), Max: 98.4 (20 Jan 2024 04:16)  HR: 81 (20 Jan 2024 14:06) (73 - 98)  BP: 119/56 (20 Jan 2024 14:06) (85/50 - 132/70)  BP(mean): --  RR: 18 (20 Jan 2024 13:28) (18 - 18)  SpO2: 100% (20 Jan 2024 13:28) (97% - 100%)    Parameters below as of 20 Jan 2024 04:16  Patient On (Oxygen Delivery Method): room air        PHYSICAL EXAM:  Gen: NAD, resting in bed  HEENT: Normocephalic, atraumatic  Neck: supple, no lymphadenopathy  CV: Regular rate & regular rhythm  Lungs: CTABL no wheeze  Abdomen: Soft, NTND+ BS present  Ext: Warm, well perfused no CCE  Neuro: non focal, awake, CN II-XII intact   Skin: no rash, no erythema  Psych: no SI, HI, Hallucination     TESTS & MEASUREMENTS:                        11.9   6.32  )-----------( 180      ( 20 Jan 2024 06:49 )             39.1     01-20    135  |  93<L>  |  62<HH>  ----------------------------<  272<H>  4.0   |  25  |  1.5    Ca    8.7      20 Jan 2024 06:49  Mg     2.2     01-20    TPro  6.1  /  Alb  3.3<L>  /  TBili  1.1  /  DBili  x   /  AST  32  /  ALT  18  /  AlkPhos  61  01-20      LIVER FUNCTIONS - ( 20 Jan 2024 06:49 )  Alb: 3.3 g/dL / Pro: 6.1 g/dL / ALK PHOS: 61 U/L / ALT: 18 U/L / AST: 32 U/L / GGT: x           Urinalysis Basic - ( 20 Jan 2024 06:49 )    Color: x / Appearance: x / SG: x / pH: x  Gluc: 272 mg/dL / Ketone: x  / Bili: x / Urobili: x   Blood: x / Protein: x / Nitrite: x   Leuk Esterase: x / RBC: x / WBC x   Sq Epi: x / Non Sq Epi: x / Bacteria: x        SARS-CoV-2: NotDetec (12 Jan 2024 09:34)  SARS-CoV-2: NotDetec (10 Bhaskar 2024 17:11)  COVID-19 PCR: NotDetec (08 Jan 2024 21:30)      QRS axis to [] ° and NSR at a rate of [] BPM. There was no atrial enlargement. There was no ventricular hypertrophy. There were no ST-T changes and all intervals were normal.      INFECTIOUS DISEASES TESTING      RADIOLOGY & ADDITIONAL TESTS:  I have personally reviewed the last Chest xray  CXR      CT      CARDIOLOGY TESTING  12 Lead ECG:   Ventricular Rate 102 BPM    Atrial Rate 108 BPM    QRS Duration 100 ms    Q-T Interval 412 ms    QTC Calculation(Bazett) 536 ms    R Axis -10 degrees    T Axis 151 degrees    Diagnosis Line Atrial fibrillation with rapid ventricular response  Septal infarct , age undetermined  Marked ST abnormality, possible lateral subendocardial injury  Prolonged QT  Abnormal ECG    Confirmed by elaina ramirez ([id number]) on 1/19/2024 6:21:57 AM (01-19-24 @ 00:56)  12 Lead ECG:   Ventricular Rate 121 BPM    QRS Duration 96 ms    Q-T Interval 346 ms    QTC Calculation(Bazett) 491 ms    R Axis -5 degrees    T Axis 159 degrees    Diagnosis Line Atrial fibrillation with rapid ventricular response  Marked ST abnormality, possible lateral subendocardial injury  Abnormal ECG    Confirmed by Howard Ramirez ([id number]) on 1/14/2024 6:33:49 PM (01-14-24 @ 09:04)      MEDICATIONS  (STANDING):  albuterol    90 MICROgram(s) HFA Inhaler 2 Puff(s) Inhalation every 6 hours  atorvastatin 40 milliGRAM(s) Oral at bedtime  budesonide  80 MICROgram(s)/formoterol 4.5 MICROgram(s) Inhaler 2 Puff(s) Inhalation two times a day  dextrose 5%. 1000 milliLiter(s) (50 mL/Hr) IV Continuous <Continuous>  dextrose 5%. 1000 milliLiter(s) (100 mL/Hr) IV Continuous <Continuous>  dextrose 50% Injectable 12.5 Gram(s) IV Push once  dextrose 50% Injectable 25 Gram(s) IV Push once  dextrose 50% Injectable 25 Gram(s) IV Push once  furosemide    Tablet 40 milliGRAM(s) Oral daily  glucagon  Injectable 1 milliGRAM(s) IntraMuscular once  heparin   Injectable 5000 Unit(s) SubCutaneous every 12 hours  insulin lispro (ADMELOG) corrective regimen sliding scale   SubCutaneous three times a day before meals  metolazone 5 milliGRAM(s) Oral daily  metoprolol succinate  milliGRAM(s) Oral daily  montelukast 10 milliGRAM(s) Oral daily  pantoprazole    Tablet 40 milliGRAM(s) Oral before breakfast  predniSONE   Tablet 20 milliGRAM(s) Oral daily  pregabalin 75 milliGRAM(s) Oral daily  ranolazine 500 milliGRAM(s) Oral two times a day  tiotropium 2.5 MICROgram(s) Inhaler 2 Puff(s) Inhalation daily    MEDICATIONS  (PRN):  dextrose Oral Gel 15 Gram(s) Oral once PRN Blood Glucose LESS THAN 70 milliGRAM(s)/deciliter      ANTIBIOTICS:      All available historical data has been reviewed    ASSESSMENT  78y F admitted with Difficulty in walking, not elsewhere classified

## 2024-01-22 NOTE — PROGRESS NOTE ADULT - ASSESSMENT
77yo F hx of diverticulitis, HFpEF, CAD s/p CABG, AS s/p SAVR (2016), atrial fibrillation s/p watchman (2021), bleeding disorder, asthma, COPD, DM2, HTN, CKD 2-3. Discharged 1/18 after being admitted for Acute respiratory failure with hypoxia 2/2 CHF/COPD exacerbation. Today presenting with a complaint of inability to care for herself, citing that she was unable to transition from her toilet on her own, having to call EMS. Pt denies any somatic complaints. Previoulsy declined STR, but now amenable.    # Inability to care for herself   - she was unable to transition from her toilet on her own   - Previoulsy declined STR, but now amenable   - F/u /      # Leukocytosis most likely 2/2 prednisone   - No focal source of infection   - Tachy 2/2 to A-fib ( seen on EKG)   - Other vital signs unremarkable   - Monitor CBC     # CKD 2-3  - Creatinine 1.6 ( baseline 1,4)   - BUN 65  - Avoid nephrotoxic agents    # Recent admission for influenza infection and COPD exacerbation   - Discharged on Prednisone 20 mg for 5 days   - C/w prednisone 4 more days  - C/w home inhalers   - C/w Montelukast     # HFpEF stable   # CAD s/p CABG  # AS s/p SAVR (2016)  # HTN   - EF: 70 - 75%,hyperdynamic ef, grade iii diastolic dysfunction, (1/9/2024)  - lasix 40  - Holding metolazone 5 for borderline hyponatremia   - C/w Aspirin and Statin     # Chronic atrial fibrillation with rapid ventricular response  - EKG showing A-fib with RVR   - C/w metoprolol 200 succinate daily   - no plan for cardioversion  - s/p watchman  - Outpatient cardio follow up   - Monitor vital signs     # DM2  -  hold oral meds;  check fs;  start and adjust insulin s/s prn    #Misc  - DVT ppx: Heparin   - GI PPX: Protonix   - Diet: DASH   - Activity/PT eval    Dispo: STR  WHEN AVAILABLE F/U PT EVAL / F/U FS AND RESUME HOME GLIPIZIDE 25MG PO DAILY     
79yo F hx of diverticulitis, HFpEF, CAD s/p CABG, AS s/p SAVR (2016), atrial fibrillation s/p watchman (2021), bleeding disorder, asthma, COPD, DM2, HTN, CKD 2-3. Discharged 1/18 after being admitted for Acute respiratory failure with hypoxia 2/2 CHF/COPD exacerbation. Today presenting with a complaint of inability to care for herself, citing that she was unable to transition from her toilet on her own, having to call EMS. Pt denies any somatic complaints. Previoulsy declined STR, but now amenable.    # Inability to care for herself   - she was unable to transition from her toilet on her own   - Previously declined STR, but now amenable   - [facility] Sent Out and awaiting response and will need insurance auth per CM     # Leukocytosis most likely 2/2 prednisone   - No focal source of infection   - Tachy 2/2 to A-fib ( seen on EKG)   - Other vital signs unremarkable   - Monitor CBC     # CKD 2-3  - Creatinine 1.6 ( baseline 1,4)   - BUN 65  - Avoid nephrotoxic agents    # Recent admission for influenza infection and COPD exacerbation   - Discharged on Prednisone 20 mg for 5 days   - C/w prednisone 4 more days  - C/w home inhalers   - C/w Montelukast     # HFpEF stable   # CAD s/p CABG  # AS s/p SAVR (2016)  # HTN   - EF: 70 - 75%,hyperdynamic ef, grade iii diastolic dysfunction, (1/9/2024)  - lasix 40  - Holding metolazone 5 for borderline hyponatremia   - C/w Aspirin and Statin     # Chronic atrial fibrillation with rapid ventricular response  - EKG showing A-fib with RVR   - C/w metoprolol 200 succinate daily   - no plan for cardioversion  - s/p watchman  - Outpatient cardio follow up   - Monitor vital signs     # DM2  -  hold oral meds;  check fs;  start and adjust insulin s/s prn    #Misc  - DVT ppx: Heparin   - GI PPX: Protonix   - Diet: DASH   - Activity/PT eval    Dispo: STR when place and authorization available / stable for d/c / social admission    
77yo F hx of diverticulitis, HFpEF, CAD s/p CABG, AS s/p SAVR (2016), atrial fibrillation s/p watchman (2021), bleeding disorder, asthma, COPD, DM2, HTN, CKD 2-3. Discharged 1/18 after being admitted for Acute respiratory failure with hypoxia 2/2 CHF/COPD exacerbation. Today presenting with a complaint of inability to care for herself, citing that she was unable to transition from her toilet on her own, having to call EMS. Pt denies any somatic complaints. Previously declined STR, but now amenable.    # Inability to care for herself   - she was unable to transition from her toilet on her own   - Previously declined STR, but now amenable   - [facility] Sent Out and awaiting response and will need insurance auth per CM     # Leukocytosis most likely 2/2 prednisone   - No focal source of infection   - Tachy 2/2 to A-fib ( seen on EKG)   - Other vital signs unremarkable   - Monitor CBC     # CKD 2-3  - Creatinine 1.6 ( baseline 1,4)   - BUN 65  - Avoid nephrotoxic agents    # Recent admission for influenza infection and COPD exacerbation   - Discharged on Prednisone 20 mg for 5 days   - C/w prednisone 4 more days  - C/w home inhalers   - C/w Montelukast     # HFpEF stable   # CAD s/p CABG  # AS s/p SAVR (2016)  # HTN   - EF: 70 - 75%,hyperdynamic ef, grade iii diastolic dysfunction, (1/9/2024)  - lasix 40  - Holding metolazone 5 for borderline hyponatremia   - C/w Aspirin and Statin     # Chronic atrial fibrillation with rapid ventricular response  - EKG showing A-fib with RVR   - C/w metoprolol 200 succinate daily   - no plan for cardioversion  - s/p watchman  - Outpatient cardio follow up   - Monitor vital signs     # DM2 w/ Hyperglycemia  - Lantus 10un qam and RI SS at meals  - check FS qAC/HS  - Hold oral glipizide 25mg po daily (d/c to snf on this when bed available and stop insulin/lantus)    #Misc  - DVT ppx: Heparin   - GI PPX: Protonix   - Diet: DASH   - Activity/PT eval    Dispo: STR when place and authorization available / Stable for d/c

## 2024-01-23 NOTE — DISCHARGE NOTE PROVIDER - NSDCFUSCHEDAPPT_GEN_ALL_CORE_FT
Howard Ramirez Physician [facility]  CARDIOLOGY [address] [facility]  Scheduled Appointment: 01/30/2024

## 2024-01-23 NOTE — DISCHARGE NOTE PROVIDER - HOSPITAL COURSE
ED COURSE:    77yo F hx of diverticulitis, HFpEF, CAD s/p CABG, AS s/p SAVR (2016), atrial fibrillation s/p watchman (2021), bleeding disorder, asthma, COPD, DM2, HTN, CKD 2-3. Discharged 1/18 after being admitted for Acute respiratory failure with hypoxia 2/2 CHF/COPD exacerbation. Today presenting with a complaint of inability to care for herself, citing that she was unable to transition from her toilet on her own, having to call EMS. Pt denies any somatic complaints. Previously declined STR, but now amenable.    In ED:  - Vital signs: HR: 104   - Labs WBC 13.17, Na: 134, Creatinine 1.6 (baseline 1.4), BUN: 65, Renaldo T: 1.3      # Inability to care for herself   - she was unable to transition from her toilet on her own   - Previously declined STR, but now amenable   - Pending DC to STR    # Leukocytosis most likely 2/2 prednisone   - No focal source of infection   - Tachy 2/2 to A-fib ( seen on EKG)   - Other vital signs unremarkable   - stable    # CKD 2-3 - stable  - Creatinine 1.6 ( baseline 1,4)   - BUN 65  - Avoid nephrotoxic agents    # Recent admission for influenza infection and COPD exacerbation   - Discharged on Prednisone 20 mg for 5 days - completed  - C/w home inhalers   - C/w Montelukast     # HFpEF stable   # CAD s/p CABG  # AS s/p SAVR (2016)  # HTN   - EF: 70 - 75%,hyperdynamic ef, grade iii diastolic dysfunction, (1/9/2024)  - lasix 40  - Holding metolazone 5 for borderline hyponatremia - resume on DC  - C/w Aspirin and Statin     # Chronic atrial fibrillation with rapid ventricular response  - EKG showing A-fib with RVR   - C/w metoprolol 200 succinate daily   - no plan for cardioversion  - s/p watchman  - Outpatient cardio follow up   - Monitor vital signs     # DM2 w/ Hyperglycemia  - Lantus 10un qam and RI SS at meals  - check FS qAC/HS  - Held oral glipizide 25mg po daily (resume when d/c to snf)    Medically stable for DC to SNF

## 2024-01-23 NOTE — DISCHARGE NOTE PROVIDER - NSDCMRMEDTOKEN_GEN_ALL_CORE_FT
budesonide-formoterol 80 mcg-4.5 mcg/inh inhalation aerosol: 2 puff(s) inhaled 2 times a day  desvenlafaxine (as succinate) 25 mg oral tablet, extended release: 1 tab(s) orally once a day  furosemide 40 mg oral tablet: 1 tab(s) orally once a day  glipiZIDE 5 mg oral tablet: 1 tab(s) orally once a day  ipratropium-albuterol 0.5 mg-2.5 mg/3 mL inhalation solution: 3 milliliter(s) inhaled every 4 hours  Lyrica 75 mg oral capsule: 1 cap(s) orally once a day  metOLazone 5 mg oral tablet: 1 tab(s) orally once a day as needed for fluid overload  metoprolol succinate 200 mg oral tablet, extended release: 1 tab(s) orally once a day  montelukast 10 mg oral tablet: 1 tab(s) orally once a day  polyethylene glycol 3350 oral powder for reconstitution: 17 gram(s) orally 2 times a day  Protonix 40 mg oral delayed release tablet: 1 tab(s) orally 2 times a day  Ranexa 500 mg oral tablet, extended release: 1 tab(s) orally 2 times a day  rosuvastatin 10 mg oral tablet: 1 tab(s) orally once a day  senna leaf extract oral tablet: 2 tab(s) orally once a day (at bedtime)

## 2024-01-23 NOTE — DISCHARGE NOTE NURSING/CASE MANAGEMENT/SOCIAL WORK - PATIENT PORTAL LINK FT
You can access the FollowMyHealth Patient Portal offered by [facility] by registering at the following website: [email or web address]. By joining [employer]’s FollowMyHealth portal, you will also be able to view your health information using other applications (apps) compatible with our system.

## 2024-01-23 NOTE — DISCHARGE NOTE PROVIDER - NSDCCPCAREPLAN_GEN_ALL_CORE_FT
PRINCIPAL DISCHARGE DIAGNOSIS  Diagnosis: Impaired ambulation  Assessment and Plan of Treatment: You came in for inability to ambulate. You were evaluated by PT and are being discharged to short term rehab.     PRINCIPAL DISCHARGE DIAGNOSIS  Diagnosis: Impaired ambulation  Assessment and Plan of Treatment: INABILITY TO CARE FOR SELF RELATED TO AGE-RELATED PHYSICAL DECONDITIONG   - RECENT HOSPITALIZATION FOR WHICH DECLINED STR AT THAT TIME.   - RE-ADMITTED FOR PLACEMENT TO STR   CONSTIPATION   - C/W MEDS PER ORDERS   - TRY FLEETS ENEMA   - PT DECLINED ENEMA IN HOSPITAL HOWEVER CONSTIPATION NOT SEVERE AND CAN BE TREATED AT REHAB. MEDS PRESCRIBED.   You came in for inability to ambulate. You were evaluated by PT and are being discharged to short term rehab.

## 2024-01-23 NOTE — DISCHARGE NOTE NURSING/CASE MANAGEMENT/SOCIAL WORK - NSDCPEFALRISK_GEN_ALL_CORE
For information on Fall & Injury Prevention, visit: [email or web address] OR  [email or web address] OR  https://www.cdc.gov/steadi/patient.html

## 2024-01-23 NOTE — DISCHARGE NOTE PROVIDER - CARE PROVIDER_API CALL
Ayad Phipps  Internal Medicine  [address] [place] [address]  Phone: [phone]  Fax: [phone]  Follow Up Time:

## 2024-01-23 NOTE — DISCHARGE NOTE NURSING/CASE MANAGEMENT/SOCIAL WORK - NSPROMEDSBROUGHTTOHOSP_GEN_A_NUR
Chief Complaint   Patient presents with    Follow-up    Cough     no improvement, post nasal drip    Nasal Congestion    Hoarse   .    HPI:     Joyce Peterson is a 59 y.o. female who is referred by Dr. Becca Peters MD for evaluation of 2 week history of increased  nasal congestion and postnasal drip. She states that she most recently was treated with doxycycline 100mg PO BID for 7 days without relief. She states that she has had a lifelong history of nasal congestion and allergies.  She states that she had positive allergy testing in the past. She has had immunotherapy in the past.  She notes that she will have 3-4 exacerbations of her nasal symptoms per year that takes 2-3 months to resolve. She also has a history of asthma and finds that her asthma is worse when she is having sinus issues.   She describes difficulty breathing at night. There is bilateral nasal obstruction. She does use sinus rinses or nasal sprays. She has been on  singulair, alavert, albuterol, symbicort, astelin, flonase.  She admits to midface pain and pressure.  She admits to rhinorrhea and postnasal drip. There is not maxillary tooth pain. She  admits to headaches.  She has not had sinus or nasal surgery. There is no history of sinonasal trauma.      Interval HPI 2/17/2020:  Mrs. Peterson follows up today for chronic cough, chronic rhinitis, and chronic sinus. She reports since her last visit here she was additionally diagnosed with bronchitis. She was seen by Dr. Cline and ws treated with doxycycline.  She reports that this helped somewhat.  She reports that she is still having cough. She notes nasal congestion, post-nasal drip, productive cough, headaches int he frontal region. She is on immunosuppressive medications for erosive RA. With recent low IgG and IgM levels. She does feel that her Flonase is helpful for her nasal symptoms.  She has avoided antihistamines as they severely dry her eyes(Sjogren's/SICCA). She  does feel they are helpful when she does take them on occasion. She also has a significant history of GERD and gastroparesis. She is currently on cisapride and zegarid for this.     Interval HPI 6/17/2020:  Mrs. Peterson follows up today for chronic cough, chronic rhinitis. She notes that she did have increased cough over the last week. Noted associated wheezing.  She states that she has been using her symbicort and had used the albuterol for one week. She thinks that that helped.  She relays that now she seems more hoarse but thinks this is due to the increased cough.  She states that she has not been able to tolerate medications for rhinitis secondary to feeling that it dries her eyes out too much secondary to Sjogren's.     Past Medical History:   Diagnosis Date    Acid reflux     Allergy     Anemia     Asthma     Coronary artery disease     Degenerative disc disease     Dry eyes     Dry mouth     Gastroparesis     Hyperlipidemia     Lateral meniscus derangement 4/6/2016    Lobular carcinoma in situ     Osteoarthritis     Osteoporosis     Rheumatoid arthritis(714.0)     Umbilical hernia 8/13/2015     Social History     Socioeconomic History    Marital status:      Spouse name: Not on file    Number of children: Not on file    Years of education: Not on file    Highest education level: Not on file   Occupational History    Not on file   Social Needs    Financial resource strain: Not on file    Food insecurity     Worry: Not on file     Inability: Not on file    Transportation needs     Medical: Not on file     Non-medical: Not on file   Tobacco Use    Smoking status: Never Smoker    Smokeless tobacco: Never Used   Substance and Sexual Activity    Alcohol use: Yes     Comment: occasionally    Drug use: No    Sexual activity: Yes     Partners: Male     Birth control/protection: Surgical   Lifestyle    Physical activity     Days per week: Not on file     Minutes per session: Not  no on file    Stress: Not on file   Relationships    Social connections     Talks on phone: Not on file     Gets together: Not on file     Attends Christian service: Not on file     Active member of club or organization: Not on file     Attends meetings of clubs or organizations: Not on file     Relationship status: Not on file   Other Topics Concern    Are you pregnant or think you may be? Not Asked    Breast-feeding Not Asked   Social History Narrative    Not on file     Past Surgical History:   Procedure Laterality Date    BREAST BIOPSY Left 01/29/2002    core bx    CARPAL TUNNEL RELEASE Right 05/2017    CHOLECYSTECTOMY  2004    COLONOSCOPY      10/11    COLONOSCOPY N/A 6/29/2017    Procedure: COLONOSCOPY;  Surgeon: López Moore MD;  Location: [facility] ENDO (4TH FLR);  Service: Endoscopy;  Laterality: N/A;    INJECTION OF FACET JOINT Bilateral 3/12/2020    Procedure: FACET JOINT INJECTION (LUMBAR BLOCK) BILATERAL L4-5 AND L5-S1 DIRECT REFERRAL;  Surgeon: Tj Mayfield MD;  Location: [facility] PAIN MGT;  Service: Pain Management;  Laterality: Bilateral;  NEEDS CONSENT    INTRAMEDULLARY RODDING OF FEMUR Left 5/21/2019    Procedure: INSERTION, INTRAMEDULLARY ARIEL, FEMUR;  Surgeon: López Duff MD;  Location: [facility] OR 2ND FLR;  Service: Orthopedics;  Laterality: Left;    REPAIR OF TRICEPS TENDON Left 10/17/2018    Procedure: REPAIR, TENDON, TRICEPS left elbow;  Surgeon: Staci Yarbrough MD;  Location: [facility] OR 1ST FLR;  Service: Orthopedics;  Laterality: Left;  Anesthesia: General and regional. PRONE, k-wire , hand pan 1 and pan 2, CALL ARTHREX/Tamera notified 10-12     TONSILLECTOMY      TUBAL LIGATION  2003    UPPER GASTROINTESTINAL ENDOSCOPY      10/11    uterine ablation  2003     Family History   Problem Relation Age of Onset    Cancer Mother         Lung Cancer    Emphysema Mother     Heart attack Mother     Cancer Father         Lung Cancer    Osteoarthritis Father     Lung cancer  Father     Skin cancer Father     Heart disease Brother     Heart attack Brother     Osteoarthritis Paternal Aunt     Retinal detachment Maternal Aunt     No Known Problems Sister     No Known Problems Maternal Uncle     No Known Problems Paternal Uncle     No Known Problems Maternal Grandmother     No Known Problems Maternal Grandfather     No Known Problems Paternal Grandmother     No Known Problems Paternal Grandfather     Colon cancer Neg Hx     Esophageal cancer Neg Hx     Stomach cancer Neg Hx     Celiac disease Neg Hx     Diabetes Neg Hx     Thyroid disease Neg Hx     Amblyopia Neg Hx     Blindness Neg Hx     Cataracts Neg Hx     Glaucoma Neg Hx     Hypertension Neg Hx     Macular degeneration Neg Hx     Strabismus Neg Hx     Stroke Neg Hx            Review of Systems  General: negative for chills, fever or weight loss  Psychological: negative for mood changes or depression  Ophthalmic: negative for blurry vision, photophobia or eye pain  ENT: see HPI  Respiratory: no cough, shortness of breath, or wheezing  Cardiovascular: no chest pain or dyspnea on exertion  Gastrointestinal: no abdominal pain, change in bowel habits, or black/ bloody stools  Musculoskeletal: negative for gait disturbance or muscular weakness  Neurological: no syncope or seizures; no ataxia  Dermatological: negative for puritis,  rash and jaundice  Hematologic/lymphatic: no easy bruising, no new lumps or bumps      Physical Exam:    Vitals:    06/17/20 1435   BP: 108/74   Pulse: 105       Constitutional: Well appearing / communicating without difficutly.  NAD.  Eyes: EOM I Bilaterally  Head/Face: Normocephalic.  Negative paranasal sinus pressure/tenderness.  Salivary glands WNL.  House Brackmann I Bilaterally.    Right Ear: Auricle normal appearance. External Auditory Canal within normal limits,TM w/o masses/lesions/perforations. TM mobility noted.   Left Ear: Auricle normal appearance. External Auditory Canal  WNL,TM w/o masses/lesions/perforations. TM mobility noted.  Nose: Nasal septal deviation to the left. Inferior Turbinates 3+ bilaterally. No septal perforation. No masses/lesions. External nasal skin appears normal without masses/lesions.  Oral Cavity: Gingiva/lips within normal limits.  Dentition/gingiva healthy appearing. Mucus membranes moist. Floor of mouth soft, no masses palpated. Oral Tongue mobile. Hard Palate appears normal.    Oropharynx: Base of tongue appears normal. No masses/lesions noted. Tonsillar fossa/pharyngeal wall without lesions. Posterior oropharynx WNL.  Soft palate without masses. Midline uvula.   Neck/Lymphatic: No LAD I-VI bilaterally.  No thyromegaly.  No masses noted on exam.    Mirror laryngoscopy/nasopharyngoscopy: Active gag reflex.  Unable to perform.    Neuro/Psychiatric: AOx3.  Normal mood and affect.   Cardiovascular: Normal carotid pulses bilaterally, no increasing jugular venous distention noted at cervical region bilaterally.    Respiratory: Normal respiratory effort, no stridor, no retractions noted.      Diagnostic testing reviewed:  ImmunoCAP testing reviewed- negative    Results for TONNY GOMEZ (MRN [id number]) as of 2/17/2020 17:57   Ref. Range 7/20/2018 13:58 11/13/2018 09:47   IgG - Serum Latest Ref Range: 650 - 1600 mg/dL  666   IgM Latest Ref Range: 50 - 300 mg/dL  30 (L)   IgA Latest Ref Range: 40 - 350 mg/dL  121   IgG 1 Latest Ref Range: 382 - 929 mg/dL  367 (L)   IgG 2 Latest Ref Range: 242 - 700 mg/dL  266   IgG 3 Latest Ref Range: 22 - 176 mg/dL  37   IgG 4 Latest Ref Range: 4 - 86 mg/dL  5       Ct sinus 1/24/2020: Images reviewed and interpreted by myself and reviewed in detail with patient.  Findings of mild patchy ethmoid mucosal thickening. The frontal, sphenoid, and maxillary sinuses are clear. NO bony changes to suggest chronic sinus inflammation.      Assessment:    ICD-10-CM ICD-9-CM    1. Chronic rhinitis  J31.0 472.0    2. Sjogren's syndrome with  keratoconjunctivitis sicca  M35.01 710.2    3. Immunosuppressed status  D89.9 279.9    4. Mild intermittent asthma with exacerbation  J45.21 493.92    5. Vocal cord nodule  J38.2 478.5 Ambulatory referral/consult to Speech Therapy   6. Dysphonia  R49.0 784.42      The primary encounter diagnosis was Chronic rhinitis. Diagnoses of Sjogren's syndrome with keratoconjunctivitis sicca, Immunosuppressed status, Mild intermittent asthma with exacerbation, Vocal cord nodule, and Dysphonia were also pertinent to this visit.      Plan:  Orders Placed This Encounter   Procedures    Ambulatory referral/consult to Speech Therapy   Continue nasal saline rinses BID  Continue Flonase 2 sprays per nostril BID  Use xyzal 5mg PO q day as tolerated due to occular symptoms.  Recommend follow up with pulmonologist Dr. Lani Laguerre(last visit was in 2018)  Will give PO medrol dose riya for asthma exacerbation.   Reassurance that I do not see evidence of sinusitis today.     Discussed with the patient that their exam today shows a vocal cord nodule on the bilateral vocal cords.  Vocal cord nodules are benign (noncancerous) growths on one or both vocal cords that are caused by vocal abuse. Over time, repeated abuse of the vocal cords results in soft, swollen spots on each vocal cord. These spots develop into harder, callous-like growths called nodules. The nodules will become larger and stiffer the longer the vocal abuse continues.  Nodules and polyps may be treated medically, surgically, and/or behaviorally. Surgical intervention involves removing the nodule or polyp from the vocal cord. This approach only occurs when the nodules or polyps are very large or have existed for a long time.  I would not recommend surgery as first course of treatment, as any surgery on the vocal cords can induce scarring which itself will cause hoarseness.  I would recommend voice therapy, from an SLP. Voice therapy involves teaching good vocal hygiene,  reducing/stopping vocal abusive behaviors, and direct voice treatment to alter pitch, loudness, or breath support for good voicing. Stress reduction techniques and relaxation exercises are often taught as well.    Rachelle Mcguire MD

## 2024-01-23 NOTE — DISCHARGE NOTE PROVIDER - ATTENDING DISCHARGE PHYSICAL EXAMINATION:
Vital Signs Last 24 Hrs  T(C): 36.1 (23 Jan 2024 12:50), Max: 36.1 (23 Jan 2024 12:50)  T(F): 97 (23 Jan 2024 12:50), Max: 97 (23 Jan 2024 12:50)  HR: 75 (23 Jan 2024 12:50) (75 - 93)  BP: 100/49 (23 Jan 2024 12:50) (100/49 - 110/56)  BP(mean): --  RR: 18 (23 Jan 2024 12:50) (18 - 18)  SpO2: 100% (23 Jan 2024 12:50) (97% - 100%)    Parameters below as of 23 Jan 2024 04:45  Patient On (Oxygen Delivery Method): room air    GEN: NAD  RESP: CTA B/L  CV: NL S/1 AND S2  RESP: CTA B/L   GI: +BS SOFT NT ND  EXT: NO E/C/C   MS: AOX3    LABS REVIEWED STABLE FOR D/C     CONSTIPATION - CAN CONTINUE MEDS AT STR - DECLINED ENEMA HERE. DISCUSSED TO C/W RX AT STR.

## 2024-01-23 NOTE — CDI QUERY NOTE - NSCDIOTHERTXTBX_GEN_ALL_CORE_HH
Based on your professional judgment and the clinical indicators below, please clarify if the patient’s inability to care for herself can be further specified as:  • Inability to care for herself associated with age-related physical debility  • Inability to care for herself not associated with age-related physical debility  • Other (please specify):  • Clinically unable to determine      CLINICAL INDICATORS    1/21 Physical Therapy Initial Evaluation: Impairments Found - aerobic capacity/endurance; muscle strength; gross motor; gait, locomotion, and balance…    1/22 Hospitalist Progress Note: …General: weakness can not walk or care for self at home - needs STR… 79yo F hx of diverticulitis, HFpEF, CAD s/p CABG, AS s/p SAVR (2016), atrial fibrillation s/p watchman (2021), bleeding disorder, asthma, COPD, DM2, HTN, CKD 2-3… Today presenting with a complaint of inability to care for herself, citing that she was unable to transition from her toilet on her own, having to call EMS… Activity/PT eval… Dispo: STR when place and authorization available…    Nursing Flowsheet Assessment:  • Activity: (2) chairfast (1/23)  • Mobility: (3) slightly limited (1/23)  • Activity Assistance Provided: A little assistance (1/23)

## 2024-02-05 NOTE — ED ADULT TRIAGE NOTE - HEART RATE (BEATS/MIN)
Keep dressing dry and in tact. Do not change dressing until visit. You may shower. Keep heidi hose on throughout day and off at night. Use incentive spirometer 10 times an hour. Any questions regarding surgery please contact them directly. Maintain current diet. Home health to follow up with you after discharge.     An advocate home health nurse will call you within 1-2 business days to schedule your home follow up visit. Please call [phone]  with any questions.    119

## 2024-02-05 NOTE — ED ADULT NURSE NOTE - NSFALLHARMRISKINTERV_ED_ALL_ED

## 2024-02-05 NOTE — H&P ADULT - NSHPPHYSICALEXAM_GEN_ALL_CORE
T(C): 37.1 (02-05-24 @ 18:06), Max: 37.1 (02-05-24 @ 18:06)  HR: 114 (02-05-24 @ 18:06) (97 - 119)  BP: 129/65 (02-05-24 @ 18:06) (113/69 - 129/65)  RR: 18 (02-05-24 @ 18:06) (18 - 22)  SpO2: 98% (02-05-24 @ 18:06) (98% - 100%)    PHYSICAL EXAM:    General:  WD, WN in NAD.    Skin:  Warm, dry, good color and turgor, no rash, no pressure ulcers.    Eyes:  PERRLA, EOM intact.    Neck:  No tenderness.    Back:  No spinal tenderness.    Respiratory:  scattered b/l wheezing and rhonchi that clears with cough     Cardiovascular:  S1 & S2, RRR, no murmurs, rubs or gallops.     Gastrointestinal:   Soft, No distention, Non-tender, No rebound, guarding or Peritoneal signs.    Genitourinary:  No suprapubic tenderness,  No CVA tenderness.    Extremities:  Normal rom, no calf tenderness. left foot 2+ and right foot 3+ pitting edema. one abrasion on right knee and 2 on right shin    Musculoskeletal:  No joint swelling, Free ROM.    Neurological:   A&Ox4, No focal deficits. T(C): 37.1 (02-05-24 @ 18:06), Max: 37.1 (02-05-24 @ 18:06)  HR: 114 (02-05-24 @ 18:06) (97 - 119)  BP: 129/65 (02-05-24 @ 18:06) (113/69 - 129/65)  RR: 18 (02-05-24 @ 18:06) (18 - 22)  SpO2: 98% (02-05-24 @ 18:06) (98% - 100%)    PHYSICAL EXAM:  GENERAL: laying in bed, appearing comfortable and in NAD  HEENT: Dry mucous membranes  NECK: Supple  ABDOMEN: Soft  Neuro: A&Ox4   Skin:  Warm, dry  Respiratory: scattered b/l wheezing and rhonchi that clears with cough   Extremities: no calf tenderness b/l. left foot 2+ and right foot 3+ pitting edema; (+) small superfical abrasions b/l LEs consistent w/ pt's recent fall one abrasion on right knee and 2 on right shin

## 2024-02-05 NOTE — ED PROVIDER NOTE - CLINICAL SUMMARY MEDICAL DECISION MAKING FREE TEXT BOX
77 yo female, PMHx of diverticulitis, HFpEF, CAD s/p CABG, AS s/p SAVR (2016), atrial fibrillation s/p watchman (2021), bleeding disorder, asthma, COPD, DM2, HTN, CKD 2-3, presenting s/p fall. Patient was just discharged from [facility] when she was walking up the steps to her home and at the top of the steps she felt weak and fell onto her knees. Endorses right knee pain, worse with touch, alleviated by rest, non-radiating, no associated symptoms. Patient states she has been falling frequently. Discussed with patient's child who states patient cannot take care of herself and family cannot take care of her. Labs were ordered and reviewed.  Imaging was ordered and reviewed by me.  Patient's records (prior hospital, ED visit, and/or nursing home notes if available) were reviewed.  Additional history was obtained from family.  Escalation to admission/observation was considered. Patient requires inpatient hospitalization.

## 2024-02-05 NOTE — PATIENT PROFILE ADULT - FALL HARM RISK - HARM RISK INTERVENTIONS

## 2024-02-05 NOTE — H&P ADULT - ASSESSMENT
78yoF PMHx Asthma, Afib (s/p watchman's no AC) DM, CAD, Anemia, s/p b/l knee replacements (about 11 yrs ago) presenting from [facility] for fall.    Plan:  #fall-   - f/u CT head: concerning for slurred speech per daughter at time of fall  - xray right tib/fib: Status post surgery of both knees. Anterior soft tissues at the level of   the distal right femur. No acute fracture.  - xray b/l knee: Status post surgery of both knees. Anterior soft tissues at the level of   the distal right femur. No acute fracture.  - PT consult  - OT consult   - bedside wound care for abrasions 2/2 fall    #hypokalemia (k=3.3)  - hx of hypokalemia   - in ED patient received 20 mEq of K  - c/w PO potassium 15 mEq twice daily   - f/u K in AM    # CKD 2-3  - Creatinine 1.3 ( baseline 1,4)   - trend Cr  - Avoid nephrotoxic agents    #Asthma  #COPD  - C/w home inhalers   - C/w Montelukast     # HFpEF   # CAD s/p CABG  # AS s/p SAVR (2016)  # HTN   - EF: 70 - 75%,hyperdynamic ef, grade iii diastolic dysfunction, (1/9/2024)  - lasix 40  - C/w aspirin and Statin     # Chronic atrial fibrillation with rapid ventricular response  - EKG showing A-fib with RVR   - C/w metoprolol 200 succinate daily   - no plan for cardioversion  - s/p watchman  - Outpatient cardio follow up   - Monitor vital signs     # DM2  -  hold oral meds;  check fs;  start and adjust insulin s/s prn    #Misc  - DVT ppx: Heparin   - GI PPX: Protonix   - Diet: DASH   - Activity/PT eval   78yoF PMHx Asthma, Afib (s/p watchman's no AC) DM, CAD, Anemia, s/p b/l knee replacements (about 11 yrs ago) presenting from [facility] for fall.    Plan:  #fall-   - f/u CT head; pt's daughter reported concerned for slurred speech at time of fall  - xray right tib/fib: No acute fracture.  - xray b/l knee: No acute fracture.  - PT consult  - OT consult   - bedside wound care for abrasions 2/2 fall    #hypokalemia (k=3.3)  - hx of hypokalemia   - in ED patient received 20 mEq of K  - c/w pt's home dose PO potassium 15 mEq twice daily   - f/u K in AM    # CKD 2-3  - Creatinine 1.3 ( baseline 1,4)   - trend Cr  - Avoid nephrotoxic agents    #Asthma  #COPD  - C/w home inhalers   - C/w Montelukast   - duonebs PRN     # HFpEF   # CAD s/p CABG  # AS s/p SAVR (2016)  # HTN   - EF: 70 - 75%,hyperdynamic ef, grade iii diastolic dysfunction, (1/9/2024)  - lasix 40  - C/w aspirin and Statin     # Chronic atrial fibrillation   s/p watchman  - C/w metoprolol   - op  cardio follow up     # DM2  -  hold oral meds;  check fs and start ISS  - f/u A1c    #Misc  - DVT ppx: Heparin   - GI PPX: Protonix   - Diet: DASH/ CC    - fall risk    78yoF PMHx Asthma, Afib (s/p watchman's no AC) DM, CAD, Anemia, s/p b/l knee replacements (about 11 yrs ago) presenting from [facility] for fall.    Plan:  #fall-   - f/u CT head; pt's daughter reported concerned for slurred speech at time of fall  - xray right tib/fib: No acute fracture.  - xray b/l knee: No acute fracture.  - PT consult  - OT consult   - bedside wound care for abrasions 2/2 fall    #hypokalemia (k=3.3)  - hx of hypokalemia   - in ED patient received 20 mEq of K  - c/w pt's home dose PO potassium 15 mEq twice daily   - f/u K in AM    # CKD 2-3  - Creatinine 1.3 ( baseline 1,4)   - trend Cr    #Asthma  #COPD  - C/w home inhalers   - C/w Montelukast   - duonebs PRN     # HFpEF   # CAD s/p CABG  # AS s/p SAVR (2016)  # HTN   - EF: 70 - 75%,hyperdynamic ef, grade iii diastolic dysfunction, (1/9/2024)  - c/w furosemide and PRN metolazone    - C/w aspirin 81mg and Statin     # Chronic atrial fibrillation   s/p watchman  - C/w metoprolol   - op  cardio follow up     # DM2  -  hold oral meds;  check fs and start ISS  - f/u A1c    # GERD  - c/w protonix     #Depression  - desvenlafaxine non-formulary( pt to  ask family to bring supply from home    #Misc  - DVT ppx: Heparin   - GI PPX: Protonix   - Diet: DASH/ CC    - fall risk

## 2024-02-05 NOTE — H&P ADULT - NS ATTEND AMEND GEN_ALL_CORE FT
pt seen and examined on day of admission 2/5  spoke to Erna daughter over the phone   plan of care discussed with daughter and patient and support staff  CT , pt rehab

## 2024-02-05 NOTE — ED PROVIDER NOTE - OBJECTIVE STATEMENT
78yoF PMHx presenting from [facility] for fall about 1 hour pta. About 5 stairs up her legs gave out, so she fell, her family couldn't get her up, 78yoF PMHx Asthma, Afib (s/p watchman's no AC) DM, CAD, Anemia, presenting from [facility] for fall about 1 hour pta. Patient was recently discharged from hospital stay for inability to take care of herself, went to [facility] and was discharged today.     About 5 stairs up her legs gave out, so she fell, her family couldn't get her up, 78yoF PMHx Asthma, Afib (s/p watchman's no AC) DM, CAD, Anemia, presenting from [facility] for fall about 1 hour pta. Patient was recently discharged from hospital stay for inability to take care of herself, went to [facility] and was discharged today. As she was walking up the stairs, her legs gave out and she fell onto her knees (did not hit the ground with her body or her head, denies LOC). Her son and daughter were unable to help her back up to standing position, so they called the ambulance. Son and daughter report noticing patient has had episodes of weakness in her legs like that even while [facility] [place] occasionally associated with slurring. Denies chest pain, sob, syncope, nausea, vomiting, abdominal pain, recent fevers, numbness, tingling

## 2024-02-05 NOTE — ED ADULT NURSE NOTE - CHIEF COMPLAINT QUOTE
discharged from [facility] this morning; patient fell walking into back of house as per EMS patient could not bear weight after getting help off the ground; denies LOC

## 2024-02-05 NOTE — H&P ADULT - HISTORY OF PRESENT ILLNESS
78yoF PMHx Asthma, Afib (s/p watchman's no AC) DM, CAD, Anemia, s/p b/l knee replacements (about 11 yrs ago) presenting from [facility] for fall. Patient was recently discharged from hospital stay for inability to take care of herself, went to [facility] and was discharged today. As she was walking up the stairs, her legs gave out and she fell forward onto her knees (did not hit the ground with her body or her head, denies LOC). Her son and daughter were unable to help her back up to standing position, so EMS was called. Patient states she has periodic numbness in her feet b/l and has a history of prior falls. Patient states that she also has a cough with associated wheezing and rhonchi that was noticed by staff. Denies fever, headache, chills, n/v/d/c, chest pain 78yoF PMHx Asthma, Afib (s/p watchman; not on AC) DM, CAD, Anemia, s/p b/l knee replacements (about 11 yrs ago) fall after pt was DCed from [facility] today. Patient was recently discharged from hospital stay for inability to take care of herself, to [facility] and was discharged today. As she was walking up the stairs, pt reports her legs gave out and she fell forward onto her knees (did not hit head upon fall.. Her son and daughter were unable to help her back up to standing position, and EMS was called. Patient reports history of prior falls. Patient reports intermittent dry cough for past 2 weeks.     Denies fever, headache, chills, n/v/d/c, chest pain,LOC,dizziness

## 2024-02-05 NOTE — H&P ADULT - NSHPLABSRESULTS_GEN_ALL_CORE
12.7   10.71 )-----------( 124      ( 05 Feb 2024 15:37 )             39.9       02-05    136  |  91<L>  |  36<H>  ----------------------------<  181<H>  3.3<L>   |  33<H>  |  1.3    Ca    9.5      05 Feb 2024 15:37    TPro  6.5  /  Alb  3.6  /  TBili  1.2  /  DBili  x   /  AST  24  /  ALT  34  /  AlkPhos  80  02-05        Urinalysis Basic - ( 05 Feb 2024 15:37 )    Color: x / Appearance: x / SG: x / pH: x  Gluc: 181 mg/dL / Ketone: x  / Bili: x / Urobili: x   Blood: x / Protein: x / Nitrite: x   Leuk Esterase: x / RBC: x / WBC x   Sq Epi: x / Non Sq Epi: x / Bacteria: x    < from: Xray Tibia + Fibula 2 Views, Right (02.05.24 @ 14:34) >    IMPRESSION:    Status post surgery of both knees. Anterior soft tissues at the level of   the distal right femur. No acute fracture.    --- End of Report ---  < from: Xray Knee 3 Views, Bilateral (02.05.24 @ 14:35) >      IMPRESSION:    Status post surgery of both knees. Anterior soft tissues at the level of   the distal right femur. No acute fracture.      < end of copied text >

## 2024-02-05 NOTE — ED ADULT TRIAGE NOTE - CHIEF COMPLAINT QUOTE
discharged from [facility] this morning; patient fell walking into back of house as per EMS patient could not bear weight after getting help off the ground; denies LOC

## 2024-02-06 NOTE — PHYSICAL THERAPY INITIAL EVALUATION ADULT - PERTINENT HX OF CURRENT PROBLEM, REHAB EVAL
79 y/o female admitted with diagnosis of weakness, just discharged from [facility] on 2/5/24, when legs gave out as she reached top step of stairs to enter home and she fell onto her knees and was not able to  get up so ambulance was called, no acute pathology on Head CT and no acute fracture on (B) knee and (R) tibia/fibula x-ray

## 2024-02-06 NOTE — OCCUPATIONAL THERAPY INITIAL EVALUATION ADULT - ASSISTIVE DEVICE:SUPINE/SIT, REHAB EVAL
74yo F p.w facial rash. on eval, well appearing with malar rash with well demarcated borders, remainder of PE WNL. VSS, afebrile. presentation consistent with erysipelas. ordered unsyn, labs, CT. will rx prednisone. marked borders of cellulitis- will assess after IV abx. HOB elevated/bed rails

## 2024-02-06 NOTE — PHYSICAL THERAPY INITIAL EVALUATION ADULT - ADDITIONAL COMMENTS
Per patient, they live in private home with 3 steps outside with (R) rail going up to enter home, no further steps inside, was using a rolling walker, just finished 2 weeks of STR at [facility]

## 2024-02-06 NOTE — OCCUPATIONAL THERAPY INITIAL EVALUATION ADULT - LEVEL OF INDEPENDENCE: TOILET, REHAB EVAL
TBA when appropriate. Low toilet within room. Recommended 3:1 commode over toilet to increase safety and independence; RN made aware.

## 2024-02-06 NOTE — OCCUPATIONAL THERAPY INITIAL EVALUATION ADULT - ADDITIONAL COMMENTS
PLOF obtained from pt. Pt owns RW, sc, transport w/c, commode, reacher, sock aide, grab bar and cemented bench in shower.

## 2024-02-06 NOTE — OCCUPATIONAL THERAPY INITIAL EVALUATION ADULT - RANGE OF MOTION EXAMINATION
Right AROM shoulder flexion 3/4 range, elbow, wrist, hand WFL; Left AROM shoulder flexion 3/4 range, elbow, wrist, hand WFL/deficits as listed below

## 2024-02-06 NOTE — OCCUPATIONAL THERAPY INITIAL EVALUATION ADULT - LIVES WITH, PROFILE
daughter in a private home +3 steps to enter with right handrail then level inside +walk-in-shower with grab bars and cemented bench +standard toilet/children

## 2024-02-06 NOTE — PHYSICAL THERAPY INITIAL EVALUATION ADULT - GENERAL OBSERVATIONS, REHAB EVAL
09:05-09:30 Chart reviewed. Pt encountered semireclined in bed, may be seen by Physical Therapist as confirmed with Nurse. Patient denied pain at rest and ready to try to walk; +heplock RUE/ dressing (R) shiin/ skin tear on RLE.

## 2024-02-06 NOTE — OCCUPATIONAL THERAPY INITIAL EVALUATION ADULT - PERTINENT HX OF CURRENT PROBLEM, REHAB EVAL
78yoF PMHx Asthma, Afib (s/p watchman; not on AC) DM, CAD, Anemia, s/p b/l knee replacements (about 11 yrs ago) fall after pt was DCed from [facility] today. Patient was recently discharged from hospital stay for inability to take care of herself, to [facility] and was discharged today. As she was walking up the stairs, pt reports her legs gave out and she fell forward onto her knees (did not hit head upon fall.. Her son and daughter were unable to help her back up to standing position, and EMS was called. Patient reports history of prior falls. Patient reports intermittent dry cough for past 2 weeks.     X-ray bilateral knee 2/5: Status post surgery of both knees. Anterior soft tissues at the level of the distal right femur. No acute fracture.  X-ray right tibia and fibula 2/5: Status post surgery of both knees. Anterior soft tissues at the level of the distal right femur. No acute fracture.

## 2024-02-06 NOTE — PROGRESS NOTE ADULT - SUBJECTIVE AND OBJECTIVE BOX
SUBJECTIVE:    Patient is a 78y old Female who presents with a chief complaint of fall (05 Feb 2024 17:42)    Currently admitted to medicine with the primary diagnosis of Weakness       Today is hospital day 1d. This morning she is resting comfortably in bed and reports no new issues or overnight events.     ROS:   CONSTITUTIONAL: No weakness, fevers or chills   EYES/ENT: No visual changes; No vertigo or throat pain   NECK: No pain or stiffness   RESPIRATORY: No cough, wheezing, hemoptysis; No shortness of breath   CARDIOVASCULAR: No chest pain or palpitations   GASTROINTESTINAL: No abdominal or epigastric pain. No nausea, vomiting, or hematemesis; No diarrhea or constipation. No melena or hematochezia.  GENITOURINARY: No dysuria, frequency or hematuria  NEUROLOGICAL: No numbness or weakness  SKIN: No itching, rashes      PAST MEDICAL & SURGICAL HISTORY  Aortic stenosis    Diabetes    Asthma with COPD  many years since last attack    CAD (coronary artery disease), native coronary artery    Anemia    History of bleeding disorder  having work up 5/2/21- HAD WORKUP BUT NO DIAGNOSIS "NOTHING WAS FOUND"    History of transfusion reaction    Hiatal hernia    H/O ascending aortic replacement  Bovine Replacement    S/P CABG x 1  complication of bleeding 2016    H/O total knee replacement, right  complicated with fx femur bars screws in femur- b/l knee replacement    S/P hysterectomy    Presence of Watchman left atrial appendage closure device      SOCIAL HISTORY:    ALLERGIES:  penicillins (Hives; Rash)  Augmentin (Other)    MEDICATIONS:  STANDING MEDICATIONS  albuterol    90 MICROgram(s) HFA Inhaler 2 Puff(s) Inhalation every 6 hours  aspirin enteric coated 81 milliGRAM(s) Oral daily  atorvastatin 40 milliGRAM(s) Oral at bedtime  budesonide  80 MICROgram(s)/formoterol 4.5 MICROgram(s) Inhaler 2 Puff(s) Inhalation two times a day  chlorhexidine 2% Cloths 1 Application(s) Topical <User Schedule>  dextrose 5%. 1000 milliLiter(s) IV Continuous <Continuous>  dextrose 5%. 1000 milliLiter(s) IV Continuous <Continuous>  dextrose 50% Injectable 25 Gram(s) IV Push once  dextrose 50% Injectable 12.5 Gram(s) IV Push once  furosemide    Tablet 40 milliGRAM(s) Oral daily  glucagon  Injectable 1 milliGRAM(s) IntraMuscular once  heparin   Injectable 5000 Unit(s) SubCutaneous every 8 hours  insulin lispro (ADMELOG) corrective regimen sliding scale   SubCutaneous three times a day before meals  metoprolol succinate  milliGRAM(s) Oral daily  montelukast 10 milliGRAM(s) Oral daily  pantoprazole    Tablet 40 milliGRAM(s) Oral before breakfast  pregabalin 75 milliGRAM(s) Oral daily  ranolazine 500 milliGRAM(s) Oral two times a day  senna 2 Tablet(s) Oral at bedtime    PRN MEDICATIONS  acetaminophen     Tablet .. 650 milliGRAM(s) Oral every 6 hours PRN  aluminum hydroxide/magnesium hydroxide/simethicone Suspension 30 milliLiter(s) Oral every 4 hours PRN  dextrose Oral Gel 15 Gram(s) Oral once PRN  melatonin 3 milliGRAM(s) Oral at bedtime PRN  metolazone 5 milliGRAM(s) Oral daily PRN  ondansetron Injectable 4 milliGRAM(s) IV Push every 8 hours PRN  polyethylene glycol 3350 17 Gram(s) Oral two times a day PRN    VITALS:   T(F): 97.7  HR: 90  BP: 109/61  RR: 19  SpO2: 95%    LABS:  Negative for smoking/alcohol/drug use.                         12.0   6.05  )-----------( 111      ( 06 Feb 2024 08:18 )             38.4     02-06    138  |  95<L>  |  28<H>  ----------------------------<  130<H>  3.0<L>   |  32  |  1.2    Ca    8.9      06 Feb 2024 08:18  Mg     1.8     02-06    TPro  6.5  /  Alb  3.6  /  TBili  1.2  /  DBili  x   /  AST  24  /  ALT  34  /  AlkPhos  80  02-05      Urinalysis Basic - ( 06 Feb 2024 08:18 )    Color: x / Appearance: x / SG: x / pH: x  Gluc: 130 mg/dL / Ketone: x  / Bili: x / Urobili: x   Blood: x / Protein: x / Nitrite: x   Leuk Esterase: x / RBC: x / WBC x   Sq Epi: x / Non Sq Epi: x / Bacteria: x                RADIOLOGY:    PHYSICAL EXAM:  GEN: No acute distress  HEENT: normocephalic, atraumatic, aniceteric  LUNGS: Clear to auscultation bilaterally, no rales/wheezing/ rhonchi  HEART: S1/S2 present. RRR, no murmurs  ABD: Soft, non-tender, non-distended. Bowel sounds present  EXT: warm , abrasions to bl knee   NEURO: AAOX3, normal affect      ASSESSMENT AND PLAN:    78yoF PMHx Asthma, Afib (s/p watchman's no AC) DM, CAD, Anemia, s/p b/l knee replacements (about 11 yrs ago) presenting from [facility] for fall.    # Mechanical fall , recurrent   - was discharged from [facility] rehab , never made it home and had recurrent fall   -CT head- no acute events - pt's daughter reported concerned for slurred speech at time of falls in the past (few months)  - xray right tib/fib: No acute fracture/ xray b/l knee: No acute fracture.  - PT consult - rehab placement     # Hypokalemia - replaced, fu repeat level   # H/O Hypokalemia   - c/w pt's home dose PO potassium 15 mEq twice daily     # H/O CKD 2-3-  Creatinine 1.3 ( baseline 1,4) , monitor bmp, avoid nephrotoxic medications     # H/O Reactive airway disease, not in exacerbation, on room air- c/w home inhalers/ Montelukast , duonebs PRN     # H/O HFpEF  # H/O CAD s/p CABG  # H/O AS s/p SAVR (2016)  # H/O HTN   - EF: 70 - 75%,hyperdynamic ef, grade iii diastolic dysfunction, (1/9/2024)  - c/w furosemide and PRN metolazone    - C/w aspirin 81mg and Statin     # H/O Chronic atrial fibrillation, rate controlled - s/p watchman  - C/w metoprolol   - op cardio follow up     # H/O DM2-  hold oral meds;  check fs and start ISS ,  f/u A1c 7.2     # H/O GERD - c/w protonix     #H/O Depression- desvenlafaxine non-formulary( pt to ask family to bring supply from home)    # dvt/ gi ppx/diet  # dispo: dc ready   # handoff: placement to rehab - pending auth ; monitor/ replace potassium      (pt recently discharged from [facility] -> to [facility]-> was discharged from [facility] --> did not make it home and had another mechanical fall --> now needs placement back to rehab )

## 2024-02-06 NOTE — OCCUPATIONAL THERAPY INITIAL EVALUATION ADULT - NSOTDMEREC_GEN_A_CORE
Pt owns RW, sc, transport w/c, commode, reacher, sock aide, grab bar and cemented bench in shower/rolling walker/toileting

## 2024-02-06 NOTE — PHYSICAL THERAPY INITIAL EVALUATION ADULT - IMPAIRMENTS CONTRIBUTING IMPAIRED BED MOBILITY, REHAB EVAL
Statement Selected decreased endurance/impaired balance/impaired postural control/decreased strength

## 2024-02-06 NOTE — OCCUPATIONAL THERAPY INITIAL EVALUATION ADULT - ASR WT BEARING STATUS EVAL
Patient called grandson with writer assistance - patient left voicemail.    no weight-bearing restrictions

## 2024-02-06 NOTE — PHYSICAL THERAPY INITIAL EVALUATION ADULT - GAIT DEVIATIONS NOTED, PT EVAL
stooped posture, dec heel strike/pushoff/decreased tommy/decreased step length/decreased weight-shifting ability

## 2024-02-07 NOTE — DISCHARGE NOTE PROVIDER - CARE PROVIDER_API CALL
Ayad Phipps  Internal Medicine  [address] [place] [address]  Phone: [phone]  Fax: [phone]  Established Patient  Follow Up Time: 1 week

## 2024-02-07 NOTE — DISCHARGE NOTE PROVIDER - NSDCMRMEDTOKEN_GEN_ALL_CORE_FT
aspirin 81 mg oral capsule: 1 cap(s) orally once a day  budesonide-formoterol 80 mcg-4.5 mcg/inh inhalation aerosol: 2 puff(s) inhaled 2 times a day  desvenlafaxine (as succinate) 25 mg oral tablet, extended release: 1 tab(s) orally once a day  furosemide 40 mg oral tablet: 1 tab(s) orally once a day  glipiZIDE 5 mg oral tablet: 1 tab(s) orally once a day  ipratropium-albuterol 0.5 mg-2.5 mg/3 mL inhalation solution: 3 milliliter(s) inhaled every 4 hours  Lyrica 75 mg oral capsule: 1 cap(s) orally once a day  metOLazone 5 mg oral tablet: 1 tab(s) orally once a day as needed for fluid overload  metoprolol succinate 200 mg oral tablet, extended release: 1 tab(s) orally once a day  montelukast 10 mg oral tablet: 1 tab(s) orally once a day  polyethylene glycol 3350 oral powder for reconstitution: 17 gram(s) orally 2 times a day  Potassium Chloride (Eqv-K-Tab) 10 mEq oral tablet, extended release: 1 tab(s) orally once a day  Protonix 40 mg oral delayed release tablet: 1 tab(s) orally once a day  Ranexa 500 mg oral tablet, extended release: 1 tab(s) orally 2 times a day  rosuvastatin 10 mg oral tablet: 1 tab(s) orally once a day  senna leaf extract oral tablet: 2 tab(s) orally once a day (at bedtime)  spironolactone 25 mg oral tablet: 0.5 tab(s) orally once a day   aspirin 81 mg oral capsule: 1 cap(s) orally once a day  budesonide-formoterol 80 mcg-4.5 mcg/inh inhalation aerosol: 2 puff(s) inhaled 2 times a day  desvenlafaxine (as succinate) 25 mg oral tablet, extended release: 1 tab(s) orally once a day  furosemide 40 mg oral tablet: 1 tab(s) orally once a day  glipiZIDE 5 mg oral tablet: 1 tab(s) orally once a day  ipratropium-albuterol 0.5 mg-2.5 mg/3 mL inhalation solution: 3 milliliter(s) inhaled every 4 hours  Lyrica 75 mg oral capsule: 1 cap(s) orally once a day  metOLazone 5 mg oral tablet: 1 tab(s) orally once a day as needed for fluid overload  metoprolol succinate 200 mg oral tablet, extended release: 1 tab(s) orally once a day  montelukast 10 mg oral tablet: 1 tab(s) orally once a day  polyethylene glycol 3350 oral powder for reconstitution: 17 gram(s) orally 2 times a day  Potassium Chloride (Eqv-K-Tab) 10 mEq oral tablet, extended release: 2 tab(s) orally once a day  Protonix 40 mg oral delayed release tablet: 1 tab(s) orally once a day  Ranexa 500 mg oral tablet, extended release: 1 tab(s) orally 2 times a day  rosuvastatin 10 mg oral tablet: 1 tab(s) orally once a day  senna leaf extract oral tablet: 2 tab(s) orally once a day (at bedtime)  spironolactone 25 mg oral tablet: 0.5 tab(s) orally once a day

## 2024-02-07 NOTE — DISCHARGE NOTE PROVIDER - ATTENDING DISCHARGE PHYSICAL EXAMINATION:
VITALS:   T(C): 36.3 (02-07-24 @ 05:39), Max: 36.6 (02-06-24 @ 20:48)  HR: 73 (02-07-24 @ 05:39) (73 - 90)  BP: 117/66 (02-07-24 @ 05:39) (88/58 - 117/66)  RR: 18 (02-07-24 @ 05:39) (18 - 19)  SpO2: 99% (02-07-24 @ 05:39) (95% - 99%)    GENERAL: NAD, lying in bed comfortably  HEART: Regular rate and rhythm,  LUNGS: Unlabored respirations.  Clear to auscultation bilaterally,   ABDOMEN: Soft, nontender, nondistended, +BS  EXTREMITIES: 2+ peripheral pulses bilaterally.  NERVOUS SYSTEM:  A&Ox3,

## 2024-02-07 NOTE — PROGRESS NOTE ADULT - SUBJECTIVE AND OBJECTIVE BOX
EVAN QUINN  78y  [facility] ([facility]) [address]      Patient is a 78y old  Female who presents with a chief complaint of fall (06 Feb 2024 17:26)      INTERVAL HPI/OVERNIGHT EVENTS:        REVIEW OF SYSTEMS:        FAMILY HISTORY:  Family history of pseudocholinesterase deficiency (Child)      T(C): 36.3 (02-07-24 @ 05:39), Max: 36.6 (02-06-24 @ 20:48)  HR: 73 (02-07-24 @ 05:39) (73 - 90)  BP: 117/66 (02-07-24 @ 05:39) (88/58 - 117/66)  RR: 18 (02-07-24 @ 05:39) (18 - 19)  SpO2: 99% (02-07-24 @ 05:39) (95% - 99%)  Wt(kg): --Vital Signs Last 24 Hrs  T(C): 36.3 (07 Feb 2024 05:39), Max: 36.6 (06 Feb 2024 20:48)  T(F): 97.4 (07 Feb 2024 05:39), Max: 97.9 (06 Feb 2024 20:48)  HR: 73 (07 Feb 2024 05:39) (73 - 90)  BP: 117/66 (07 Feb 2024 05:39) (88/58 - 117/66)  BP(mean): 77 (06 Feb 2024 13:30) (77 - 77)  RR: 18 (07 Feb 2024 05:39) (18 - 19)  SpO2: 99% (07 Feb 2024 05:39) (95% - 99%)    Parameters below as of 07 Feb 2024 05:39  Patient On (Oxygen Delivery Method): room air        PHYSICAL EXAM:  GENERAL: NAD, well-groomed, well-developed  HEAD:  Atraumatic, Normocephalic  EYES: EOMI, PERRLA, conjunctiva and sclera clear  ENMT: No tonsillar erythema, exudates, or enlargement; Moist mucous membranes, Good dentition, No lesions  NECK: Supple, No JVD, Normal thyroid  NERVOUS SYSTEM:  Alert & Oriented X3, Good concentration; Motor Strength 5/5 B/L upper and lower extremities; DTRs 2+ intact and symmetric  PULM: Clear to auscultation bilaterally  CARDIAC: Regular rate and rhythm; No murmurs, rubs, or gallops  GI: Soft, Nontender, Nondistended; Bowel sounds present  EXTREMITIES:  2+ Peripheral Pulses, No clubbing, cyanosis, or edema  LYMPH: No lymphadenopathy noted  SKIN: No rashes or lesions    Consultant(s) Notes Reviewed:  [x ] YES  [ ] NO  Care Discussed with Consultants/Other Providers [ x] YES  [ ] NO    LABS:                            12.0   6.05  )-----------( 111      ( 06 Feb 2024 08:18 )             38.4   02-07    140  |  98  |  30<H>  ----------------------------<  87  3.1<L>   |  32  |  1.3    Ca    9.0      07 Feb 2024 07:37  Mg     1.8     02-07    TPro  6.5  /  Alb  3.6  /  TBili  1.2  /  DBili  x   /  AST  24  /  ALT  34  /  AlkPhos  80  02-05            acetaminophen     Tablet .. 650 milliGRAM(s) Oral every 6 hours PRN  albuterol    90 MICROgram(s) HFA Inhaler 2 Puff(s) Inhalation every 6 hours  aluminum hydroxide/magnesium hydroxide/simethicone Suspension 30 milliLiter(s) Oral every 4 hours PRN  aspirin enteric coated 81 milliGRAM(s) Oral daily  atorvastatin 40 milliGRAM(s) Oral at bedtime  budesonide  80 MICROgram(s)/formoterol 4.5 MICROgram(s) Inhaler 2 Puff(s) Inhalation two times a day  chlorhexidine 2% Cloths 1 Application(s) Topical <User Schedule>  dextrose 5%. 1000 milliLiter(s) IV Continuous <Continuous>  dextrose 5%. 1000 milliLiter(s) IV Continuous <Continuous>  dextrose 50% Injectable 25 Gram(s) IV Push once  dextrose 50% Injectable 12.5 Gram(s) IV Push once  dextrose Oral Gel 15 Gram(s) Oral once PRN  furosemide    Tablet 40 milliGRAM(s) Oral daily  glucagon  Injectable 1 milliGRAM(s) IntraMuscular once  heparin   Injectable 5000 Unit(s) SubCutaneous every 8 hours  insulin lispro (ADMELOG) corrective regimen sliding scale   SubCutaneous three times a day before meals  melatonin 3 milliGRAM(s) Oral at bedtime PRN  metolazone 5 milliGRAM(s) Oral daily PRN  metoprolol succinate  milliGRAM(s) Oral daily  montelukast 10 milliGRAM(s) Oral daily  ondansetron Injectable 4 milliGRAM(s) IV Push every 8 hours PRN  pantoprazole    Tablet 40 milliGRAM(s) Oral before breakfast  polyethylene glycol 3350 17 Gram(s) Oral two times a day PRN  pregabalin 75 milliGRAM(s) Oral daily  ranolazine 500 milliGRAM(s) Oral two times a day  senna 2 Tablet(s) Oral at bedtime    78yoF PMHx Asthma, Afib (s/p watchman's no AC) DM, CAD, Anemia, s/p b/l knee replacements (about 11 yrs ago) presenting from [facility] for fall.    1.  Mechanical fall , recurrent   - was discharged from [facility] rehab , never made it home and had recurrent fall   -CT head- no acute events - pt's daughter reported concerned for slurred speech at time of falls in the past (few months)  - xray right tib/fib: No acute fracture/ xray b/l knee: No acute fracture.  - PT consult - rehab placement     2. Hypokalemia - replaced, fu repeat level / H/O Hypokalemia   - c/w pt's home dose PO potassium 15 mEq twice daily     3. H/O CKD 2-3-  Creatinine 1.3 ( baseline 1,4) , monitor bmp, avoid nephrotoxic medications     4. H/O Reactive airway disease, not in exacerbation, on room air- c/w home inhalers/ Montelukast , duonebs PRN     5. H/O HFpEF/H/O CAD s/p CABG/H/O AS s/p SAVR (2016)/ H/O HTN   - EF: 70 - 75%,hyperdynamic ef, grade iii diastolic dysfunction, (1/9/2024)  - c/w furosemide and PRN metolazone    - C/w aspirin 81mg and Statin     6. H/O Chronic atrial fibrillation, rate controlled - s/p watchman  - C/w metoprolol   - op cardio follow up     7. H/O DM2-  hold oral meds;  check fs and start ISS ,  f/u A1c 7.2     8.  H/O GERD - c/w protonix     9. H/O Depression- desvenlafaxine non-formulary( pt to ask family to bring supply from home)    # dvt/ gi ppx/diet  # dispo: dc ready   # handoff: placement to rehab - pending auth ; monitor/ replace potassium      (pt recently discharged from [facility] -> to [facility]-> was discharged from [facility] --> did not make it home and had another mechanical fall --> now needs placement back to rehab )          EVAN QUINN  78y  [facility] ([facility]) [address]      Patient is a 78y old  Female who presents with a chief complaint of fall (06 Feb 2024 17:26)      INTERVAL HPI/OVERNIGHT EVENTS:      Patient feels well. She has no complains. no overnight events.   updated about adding spironolactone and left a msg to her daughter         FAMILY HISTORY:  Family history of pseudocholinesterase deficiency (Child)      T(C): 36.3 (02-07-24 @ 05:39), Max: 36.6 (02-06-24 @ 20:48)  HR: 73 (02-07-24 @ 05:39) (73 - 90)  BP: 117/66 (02-07-24 @ 05:39) (88/58 - 117/66)  RR: 18 (02-07-24 @ 05:39) (18 - 19)  SpO2: 99% (02-07-24 @ 05:39) (95% - 99%)  Wt(kg): --Vital Signs Last 24 Hrs  T(C): 36.3 (07 Feb 2024 05:39), Max: 36.6 (06 Feb 2024 20:48)  T(F): 97.4 (07 Feb 2024 05:39), Max: 97.9 (06 Feb 2024 20:48)  HR: 73 (07 Feb 2024 05:39) (73 - 90)  BP: 117/66 (07 Feb 2024 05:39) (88/58 - 117/66)  BP(mean): 77 (06 Feb 2024 13:30) (77 - 77)  RR: 18 (07 Feb 2024 05:39) (18 - 19)  SpO2: 99% (07 Feb 2024 05:39) (95% - 99%)    Parameters below as of 07 Feb 2024 05:39  Patient On (Oxygen Delivery Method): room air        PHYSICAL EXAM:  GENERAL: NAD, well-groomed, well-developed  PULM: Clear to auscultation bilaterally  CARDIAC: Regular rate and rhythm;  GI: Soft, Nontender, Nondistended; Bowel sounds present  EXTREMITIES:  2+ Peripheral Pulses,    Consultant(s) Notes Reviewed:  [x ] YES  [ ] NO  Care Discussed with Consultants/Other Providers [ x] YES  [ ] NO    LABS:                            12.0   6.05  )-----------( 111      ( 06 Feb 2024 08:18 )             38.4   02-07    140  |  98  |  30<H>  ----------------------------<  87  3.1<L>   |  32  |  1.3    Ca    9.0      07 Feb 2024 07:37  Mg     1.8     02-07    TPro  6.5  /  Alb  3.6  /  TBili  1.2  /  DBili  x   /  AST  24  /  ALT  34  /  AlkPhos  80  02-05            acetaminophen     Tablet .. 650 milliGRAM(s) Oral every 6 hours PRN  albuterol    90 MICROgram(s) HFA Inhaler 2 Puff(s) Inhalation every 6 hours  aluminum hydroxide/magnesium hydroxide/simethicone Suspension 30 milliLiter(s) Oral every 4 hours PRN  aspirin enteric coated 81 milliGRAM(s) Oral daily  atorvastatin 40 milliGRAM(s) Oral at bedtime  budesonide  80 MICROgram(s)/formoterol 4.5 MICROgram(s) Inhaler 2 Puff(s) Inhalation two times a day  chlorhexidine 2% Cloths 1 Application(s) Topical <User Schedule>  dextrose 5%. 1000 milliLiter(s) IV Continuous <Continuous>  dextrose 5%. 1000 milliLiter(s) IV Continuous <Continuous>  dextrose 50% Injectable 25 Gram(s) IV Push once  dextrose 50% Injectable 12.5 Gram(s) IV Push once  dextrose Oral Gel 15 Gram(s) Oral once PRN  furosemide    Tablet 40 milliGRAM(s) Oral daily  glucagon  Injectable 1 milliGRAM(s) IntraMuscular once  heparin   Injectable 5000 Unit(s) SubCutaneous every 8 hours  insulin lispro (ADMELOG) corrective regimen sliding scale   SubCutaneous three times a day before meals  melatonin 3 milliGRAM(s) Oral at bedtime PRN  metolazone 5 milliGRAM(s) Oral daily PRN  metoprolol succinate  milliGRAM(s) Oral daily  montelukast 10 milliGRAM(s) Oral daily  ondansetron Injectable 4 milliGRAM(s) IV Push every 8 hours PRN  pantoprazole    Tablet 40 milliGRAM(s) Oral before breakfast  polyethylene glycol 3350 17 Gram(s) Oral two times a day PRN  pregabalin 75 milliGRAM(s) Oral daily  ranolazine 500 milliGRAM(s) Oral two times a day  senna 2 Tablet(s) Oral at bedtime    78yoF PMHx Asthma, Afib (s/p watchman's no AC) DM, CAD, Anemia, s/p b/l knee replacements (about 11 yrs ago) presenting from [facility] for fall.    1.  Mechanical fall , recurrent   - was discharged from [facility] rehab , never made it home and had recurrent fall   -CT head- no acute events - pt's daughter reported concerned for slurred speech at time of falls in the past (few months)  - xray right tib/fib: No acute fracture/ xray b/l knee: No acute fracture.  - PT consult - rehab placement     2. Hypokalemia - replaced, fu repeat level / H/O Hypokalemia   - c/w pt's home dose PO potassium 15 mEq twice daily   - Add Spironolactone 25mg po daily     3. H/O CKD 2-3-  Creatinine 1.3 ( baseline 1,4) , monitor bmp, avoid nephrotoxic medications     4. H/O Reactive airway disease, not in exacerbation, on room air- c/w home inhalers/ Montelukast , duonebs PRN     5. H/O HFpEF/H/O CAD s/p CABG/H/O AS s/p SAVR (2016)/ H/O HTN   - EF: 70 - 75%,hyperdynamic ef, grade iii diastolic dysfunction, (1/9/2024)  - c/w furosemide and PRN metolazone    - C/w aspirin 81mg and Statin     6. H/O Chronic atrial fibrillation, rate controlled - s/p watchman  - C/w metoprolol   - op cardio follow up     7. H/O DM2-  hold oral meds;  check fs and start ISS ,  f/u A1c 7.2     8.  H/O GERD - c/w protonix     9. H/O Depression- desvenlafaxine non-formulary( pt to ask family to bring supply from home)    # dvt/ gi ppx/diet  # dispo: dc ready

## 2024-02-07 NOTE — DISCHARGE NOTE PROVIDER - HOSPITAL COURSE
78yoF PMHx Asthma, Afib (s/p watchman's no AC) DM, CAD, Anemia, s/p b/l knee replacements (about 11 yrs ago) presenting from [facility] for fall.    1.  Mechanical fall , recurrent   - was discharged from [facility] rehab , never made it home and had recurrent fall   -CT head- no acute events - pt's daughter reported concerned for slurred speech at time of falls in the past (few months)  - xray right tib/fib: No acute fracture/ xray b/l knee: No acute fracture.  - PT consult - rehab placement     2. Hypokalemia - replaced, fu repeat level / H/O Hypokalemia   - c/w pt's home dose PO potassium 15 mEq twice daily   - Add Spironolactone 12.5 mg po daily that works for dCHF and to prevent hypokalemia might need higher dose. Please repeat K in 3 days     3. H/O CKD 2-3-  Creatinine 1.3 ( baseline 1,4) , monitor bmp, avoid nephrotoxic medications     4. H/O Reactive airway disease, not in exacerbation, on room air- c/w home inhalers/ Montelukast , duonebs PRN     5. H/O HFpEF/H/O CAD s/p CABG/H/O AS s/p SAVR (2016)/ H/O HTN   - EF: 70 - 75%,hyperdynamic ef, grade iii diastolic dysfunction, (1/9/2024)  - c/w furosemide and PRN metolazone    - C/w aspirin 81mg and Statin     6. H/O Chronic atrial fibrillation, rate controlled - s/p watchman  - C/w metoprolol   - op cardio follow up     7. H/O DM2  - Cont home meds     8.  H/O GERD - c/w protonix     9. H/O Depression- desvenlafaxine non-formulary( pt to ask family to bring supply from home)    Patient feels well. She has no complains . no overnight events. 78yoF PMHx Asthma, Afib (s/p watchman's no AC) DM, CAD, Anemia, s/p b/l knee replacements (about 11 yrs ago) presenting from [facility] for fall.    1.  Mechanical fall , recurrent   - was discharged from [facility] rehab , never made it home and had recurrent fall   -CT head- no acute events - pt's daughter reported concerned for slurred speech at time of falls in the past (few months)  - xray right tib/fib: No acute fracture/ xray b/l knee: No acute fracture.  - PT consult - rehab placement     2. Hypokalemia - replaced, fu repeat level / H/O Hypokalemia   - c/w pt's home dose PO potassium 20 mEq daily   - Add Spironolactone 12.5 mg po daily that works for dCHF and to prevent hypokalemia might need higher dose. Please repeat K in 3 days     3. H/O CKD 2-3-  Creatinine 1.3 ( baseline 1,4) , monitor bmp, avoid nephrotoxic medications     4. H/O Reactive airway disease, not in exacerbation, on room air- c/w home inhalers/ Montelukast , duonebs PRN     5. H/O HFpEF/H/O CAD s/p CABG/H/O AS s/p SAVR (2016)/ H/O HTN   - EF: 70 - 75%,hyperdynamic ef, grade iii diastolic dysfunction, (1/9/2024)  - c/w furosemide and PRN metolazone    - C/w aspirin 81mg and Statin     6. H/O Chronic atrial fibrillation, rate controlled - s/p watchman  - C/w metoprolol   - op cardio follow up     7. H/O DM2  - Cont home meds     8.  H/O GERD - c/w protonix     9. H/O Depression- desvenlafaxine non-formulary( pt to ask family to bring supply from home)    Patient feels well. She has no complains . no overnight events.

## 2024-02-07 NOTE — DISCHARGE NOTE PROVIDER - NSDCCPCAREPLAN_GEN_ALL_CORE_FT
PRINCIPAL DISCHARGE DIAGNOSIS  Diagnosis: Weakness  Assessment and Plan of Treatment: might be due to hypokalemia. STart spironolactone 12.5mg po daily and KCL 10meq daily. Repeat BMP in 3 days      SECONDARY DISCHARGE DIAGNOSES  Diagnosis: Inability to walk  Assessment and Plan of Treatment:

## 2024-02-08 NOTE — PROGRESS NOTE ADULT - SUBJECTIVE AND OBJECTIVE BOX
EVAN QUINN  78y  [facility] ([facility]) [address]      Patient is a 78y old  Female who presents with a chief complaint of fall (06 Feb 2024 17:26)      INTERVAL HPI/OVERNIGHT EVENTS:      Patient feels well. She has no complains. no overnight events.   She is agreeable with discharge planning         FAMILY HISTORY:  Family history of pseudocholinesterase deficiency (Child)      T(C): 36.3 (02-07-24 @ 05:39), Max: 36.6 (02-06-24 @ 20:48)  HR: 73 (02-07-24 @ 05:39) (73 - 90)  BP: 117/66 (02-07-24 @ 05:39) (88/58 - 117/66)  RR: 18 (02-07-24 @ 05:39) (18 - 19)  SpO2: 99% (02-07-24 @ 05:39) (95% - 99%)  Wt(kg): --Vital Signs Last 24 Hrs  T(C): 36.3 (07 Feb 2024 05:39), Max: 36.6 (06 Feb 2024 20:48)  T(F): 97.4 (07 Feb 2024 05:39), Max: 97.9 (06 Feb 2024 20:48)  HR: 73 (07 Feb 2024 05:39) (73 - 90)  BP: 117/66 (07 Feb 2024 05:39) (88/58 - 117/66)  BP(mean): 77 (06 Feb 2024 13:30) (77 - 77)  RR: 18 (07 Feb 2024 05:39) (18 - 19)  SpO2: 99% (07 Feb 2024 05:39) (95% - 99%)    Parameters below as of 07 Feb 2024 05:39  Patient On (Oxygen Delivery Method): room air        PHYSICAL EXAM:  GENERAL: NAD, well-groomed, well-developed  PULM: Clear to auscultation bilaterally  CARDIAC: Regular rate and rhythm;  GI: Soft, Nontender, Nondistended; Bowel sounds present  EXTREMITIES:  2+ Peripheral Pulses,    Consultant(s) Notes Reviewed:  [x ] YES  [ ] NO  Care Discussed with Consultants/Other Providers [ x] YES  [ ] NO    LABS:                            12.0   6.05  )-----------( 111      ( 06 Feb 2024 08:18 )             38.4   02-07    140  |  98  |  30<H>  ----------------------------<  87  3.1<L>   |  32  |  1.3    Ca    9.0      07 Feb 2024 07:37  Mg     1.8     02-07    TPro  6.5  /  Alb  3.6  /  TBili  1.2  /  DBili  x   /  AST  24  /  ALT  34  /  AlkPhos  80  02-05            acetaminophen     Tablet .. 650 milliGRAM(s) Oral every 6 hours PRN  albuterol    90 MICROgram(s) HFA Inhaler 2 Puff(s) Inhalation every 6 hours  aluminum hydroxide/magnesium hydroxide/simethicone Suspension 30 milliLiter(s) Oral every 4 hours PRN  aspirin enteric coated 81 milliGRAM(s) Oral daily  atorvastatin 40 milliGRAM(s) Oral at bedtime  budesonide  80 MICROgram(s)/formoterol 4.5 MICROgram(s) Inhaler 2 Puff(s) Inhalation two times a day  chlorhexidine 2% Cloths 1 Application(s) Topical <User Schedule>  dextrose 5%. 1000 milliLiter(s) IV Continuous <Continuous>  dextrose 5%. 1000 milliLiter(s) IV Continuous <Continuous>  dextrose 50% Injectable 25 Gram(s) IV Push once  dextrose 50% Injectable 12.5 Gram(s) IV Push once  dextrose Oral Gel 15 Gram(s) Oral once PRN  furosemide    Tablet 40 milliGRAM(s) Oral daily  glucagon  Injectable 1 milliGRAM(s) IntraMuscular once  heparin   Injectable 5000 Unit(s) SubCutaneous every 8 hours  insulin lispro (ADMELOG) corrective regimen sliding scale   SubCutaneous three times a day before meals  melatonin 3 milliGRAM(s) Oral at bedtime PRN  metolazone 5 milliGRAM(s) Oral daily PRN  metoprolol succinate  milliGRAM(s) Oral daily  montelukast 10 milliGRAM(s) Oral daily  ondansetron Injectable 4 milliGRAM(s) IV Push every 8 hours PRN  pantoprazole    Tablet 40 milliGRAM(s) Oral before breakfast  polyethylene glycol 3350 17 Gram(s) Oral two times a day PRN  pregabalin 75 milliGRAM(s) Oral daily  ranolazine 500 milliGRAM(s) Oral two times a day  senna 2 Tablet(s) Oral at bedtime    78yoF PMHx Asthma, Afib (s/p watchman's no AC) DM, CAD, Anemia, s/p b/l knee replacements (about 11 yrs ago) presenting from [facility] for fall.    1.  Mechanical fall , recurrent   - was discharged from [facility] rehab , never made it home and had recurrent fall   -CT head- no acute events - pt's daughter reported concerned for slurred speech at time of falls in the past (few months)  - xray right tib/fib: No acute fracture/ xray b/l knee: No acute fracture.  - PT consult - rehab placement     2. Hypokalemia - replaced, fu repeat level / H/O Hypokalemia   - c/w pt's home dose PO potassium 15 mEq twice daily   - Add Spironolactone 12.5 mg po daily     3. H/O CKD 2-3-  Creatinine 1.3 ( baseline 1,4) , monitor bmp, avoid nephrotoxic medications     4. H/O Reactive airway disease, not in exacerbation, on room air- c/w home inhalers/ Montelukast , duonebs PRN     5. H/O HFpEF/H/O CAD s/p CABG/H/O AS s/p SAVR (2016)/ H/O HTN   - EF: 70 - 75%,hyperdynamic ef, grade iii diastolic dysfunction, (1/9/2024)  - c/w furosemide and PRN metolazone    - C/w aspirin 81mg and Statin     6. H/O Chronic atrial fibrillation, rate controlled - s/p watchman  - C/w metoprolol   - op cardio follow up     7. H/O DM2-  hold oral meds;  check fs and start ISS ,  f/u A1c 7.2     8.  H/O GERD - c/w protonix     9. H/O Depression- desvenlafaxine non-formulary( pt to ask family to bring supply from home)    # dvt/ gi ppx/diet      pt is medically clear for discharge

## 2024-02-08 NOTE — CONSULT NOTE ADULT - ASSESSMENT
Impression:    intermittent cough       Plan:  patient denies sob has intermittent dry cough improving   s/p flu illness   no wheezing   please do cxr last was jan 2024   can start symbicort 160 mg Q12 hrs for cough post viral   need outpatient PFT and sleep study   check pox on RA resy and exertion   avoid fluid overload

## 2024-02-08 NOTE — CONSULT NOTE ADULT - SUBJECTIVE AND OBJECTIVE BOX
Patient is a 78y old  Female who presents with a chief complaint of fall (07 Feb 2024 11:56)      HPI:  78yoF PMHx Asthma, Afib (s/p watchman; not on AC) DM, CAD, Anemia, s/p b/l knee replacements (about 11 yrs ago) fall after pt was DCed from [facility] today. Patient was recently discharged from hospital stay for inability to take care of herself, to [facility] and was discharged today. As she was walking up the stairs, pt reports her legs gave out and she fell forward onto her knees (did not hit head upon fall.. Her son and daughter were unable to help her back up to standing position, and EMS was called. Patient reports history of prior falls. Patient reports intermittent dry cough for past 2 weeks.     Denies fever, headache, chills, n/v/d/c, chest pain,LOC,dizziness (05 Feb 2024 17:42)      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      CAD (coronary artery disease), native coronary artery      Anemia      History of bleeding disorder  having work up 5/2/21- HAD WORKUP BUT NO DIAGNOSIS "NOTHING WAS FOUND"      History of transfusion reaction      Hiatal hernia      H/O ascending aortic replacement  Bovine Replacement      S/P CABG x 1  complication of bleeding 2016      H/O total knee replacement, right  complicated with fx femur bars screws in femur- b/l knee replacement      S/P hysterectomy      Presence of Watchman left atrial appendage closure device          FAMILY HISTORY:  Family history of pseudocholinesterase deficiency (Child)      Family history: No family cardiovascular system   Occupation:  Alochol: Denied  Smoking: Denied  Drug Use: Denied  Marital Status:           Allergies    penicillins (Hives; Rash)  Augmentin (Other)    Intolerances        Home Medications:  aspirin 81 mg oral capsule: 1 cap(s) orally once a day (05 Feb 2024 18:53)  desvenlafaxine (as succinate) 25 mg oral tablet, extended release: 1 tab(s) orally once a day (05 Feb 2024 18:54)  furosemide 40 mg oral tablet: 1 tab(s) orally once a day (05 Feb 2024 18:54)  glipiZIDE 5 mg oral tablet: 1 tab(s) orally once a day (05 Feb 2024 18:54)  Lyrica 75 mg oral capsule: 1 cap(s) orally once a day (05 Feb 2024 18:54)  montelukast 10 mg oral tablet: 1 tab(s) orally once a day (05 Feb 2024 18:54)  polyethylene glycol 3350 oral powder for reconstitution: 17 gram(s) orally 2 times a day (05 Feb 2024 18:54)  Protonix 40 mg oral delayed release tablet: 1 tab(s) orally once a day (05 Feb 2024 18:18)  Ranexa 500 mg oral tablet, extended release: 1 tab(s) orally 2 times a day (05 Feb 2024 18:54)  rosuvastatin 10 mg oral tablet: 1 tab(s) orally once a day (05 Feb 2024 18:54)  senna leaf extract oral tablet: 2 tab(s) orally once a day (at bedtime) (05 Feb 2024 18:54)      ROS: as in HPI; All other systems reviewed are negative        PHYSICAL EXAM:  Vital Signs Last 24 Hrs  T(C): 36.4 (08 Feb 2024 04:40), Max: 36.6 (07 Feb 2024 14:00)  T(F): 97.5 (08 Feb 2024 04:40), Max: 97.9 (07 Feb 2024 14:00)  HR: 89 (08 Feb 2024 04:40) (79 - 89)  BP: 126/81 (08 Feb 2024 04:40) (89/61 - 126/81)  BP(mean): --  RR: 18 (08 Feb 2024 04:40) (18 - 18)  SpO2: 100% (08 Feb 2024 04:40) (96% - 100%)    Parameters below as of 08 Feb 2024 04:40  Patient On (Oxygen Delivery Method): room air          GENERAL: NAD, well-groomed, well-developed  HEAD:  Atraumatic, Normocephalic  EYES: EOMI, PERRLA, conjunctiva and sclera clear  ENMT: No tonsillar erythema, exudates, or enlargement; Moist mucous membranes, Good dentition, No lesions  NECK: Supple, No JVD, Normal thyroid  NERVOUS SYSTEM:  Alert & Oriented X3, Good concentration; Motor Strength 5/5 B/L upper and lower extremities; DTRs 2+ intact and symmetric  CHEST/LUNG: Clear to percussion bilaterally; No rales, rhonchi, wheezing, or rubs  HEART: Regular rate and rhythm; No murmurs, rubs, or gallops  ABDOMEN: Soft, Nontender, Nondistended; Bowel sounds present  EXTREMITIES:  2+ Peripheral Pulses, No clubbing, cyanosis, or edema    HOSPITAL MEDICATIONS:  MEDICATIONS  (STANDING):  albuterol    90 MICROgram(s) HFA Inhaler 2 Puff(s) Inhalation every 6 hours  aspirin enteric coated 81 milliGRAM(s) Oral daily  atorvastatin 40 milliGRAM(s) Oral at bedtime  budesonide  80 MICROgram(s)/formoterol 4.5 MICROgram(s) Inhaler 2 Puff(s) Inhalation two times a day  chlorhexidine 2% Cloths 1 Application(s) Topical <User Schedule>  dextrose 5%. 1000 milliLiter(s) (50 mL/Hr) IV Continuous <Continuous>  dextrose 5%. 1000 milliLiter(s) (100 mL/Hr) IV Continuous <Continuous>  dextrose 50% Injectable 25 Gram(s) IV Push once  dextrose 50% Injectable 12.5 Gram(s) IV Push once  furosemide    Tablet 40 milliGRAM(s) Oral daily  glucagon  Injectable 1 milliGRAM(s) IntraMuscular once  guaiFENesin  milliGRAM(s) Oral every 12 hours  heparin   Injectable 5000 Unit(s) SubCutaneous every 8 hours  insulin lispro (ADMELOG) corrective regimen sliding scale   SubCutaneous three times a day before meals  metoprolol succinate  milliGRAM(s) Oral daily  montelukast 10 milliGRAM(s) Oral daily  pantoprazole    Tablet 40 milliGRAM(s) Oral before breakfast  pregabalin 75 milliGRAM(s) Oral daily  ranolazine 500 milliGRAM(s) Oral two times a day  senna 2 Tablet(s) Oral at bedtime  spironolactone 12.5 milliGRAM(s) Oral daily    MEDICATIONS  (PRN):  acetaminophen     Tablet .. 650 milliGRAM(s) Oral every 6 hours PRN Temp greater or equal to 38C (100.4F), Mild Pain (1 - 3)  aluminum hydroxide/magnesium hydroxide/simethicone Suspension 30 milliLiter(s) Oral every 4 hours PRN Dyspepsia  dextrose Oral Gel 15 Gram(s) Oral once PRN Blood Glucose LESS THAN 70 milliGRAM(s)/deciliter  melatonin 3 milliGRAM(s) Oral at bedtime PRN Insomnia  metolazone 5 milliGRAM(s) Oral daily PRN fluid overload  ondansetron Injectable 4 milliGRAM(s) IV Push every 8 hours PRN Nausea and/or Vomiting  polyethylene glycol 3350 17 Gram(s) Oral two times a day PRN Constipation      LABS:    02-08    137  |  96<L>  |  30<H>  ----------------------------<  125<H>  3.5   |  31  |  1.3    Ca    8.8      08 Feb 2024 08:50  Mg     1.7     02-08        Urinalysis Basic - ( 08 Feb 2024 08:50 )    Color: x / Appearance: x / SG: x / pH: x  Gluc: 125 mg/dL / Ketone: x  / Bili: x / Urobili: x   Blood: x / Protein: x / Nitrite: x   Leuk Esterase: x / RBC: x / WBC x   Sq Epi: x / Non Sq Epi: x / Bacteria: x                RADIOLOGY:  [ ] Reviewed and interpreted by me    ECHO:    Point of Care Ultrasound Findings;    PFT:

## 2024-02-08 NOTE — DISCHARGE NOTE NURSING/CASE MANAGEMENT/SOCIAL WORK - PATIENT PORTAL LINK FT
You can access the FollowMyHealth Patient Portal offered by [facility] by registering at the following website: [email or web address]. By joining [facility]’s FollowMyHealth portal, you will also be able to view your health information using other applications (apps) compatible with our system.

## 2024-02-08 NOTE — DISCHARGE NOTE NURSING/CASE MANAGEMENT/SOCIAL WORK - NSDCPEFALRISK_GEN_ALL_CORE
For information on Fall & Injury Prevention, visit: [email or web address] OR  [email or web address] OR  https://www.cdc.gov/steadi/patient.html

## 2024-02-20 NOTE — REASON FOR VISIT
Social Work/Discharge Planning:  Met with patient and completed initial assessment.  Explained Social Work role and discussed transition of care/discharge planning.  Patient  admitted from [facility] and she plans to return to facility at discharge.  PTA she uses a wheelchair.  She wears oxygen at [facility] with her cpap.  Called Kalie with [facility] and confirmed patient will need a pre-cert to return.  Electronic N-17 in epic and transport form in soft chart.  Will continue to follow and assist with discharge planning.  Electronically signed by JADEN Kirby on 2/20/2024 at 10:21 AM     [Follow-Up - Clinic] : a clinic follow-up of [Aortic Stenosis] : aortic stenosis [Coronary Artery Disease] : coronary artery disease

## 2024-04-17 NOTE — H&P ADULT - ASSESSMENT
78-year-old female  came to ER  for assessment of  severe pain 10/10  to  left foot.  Patient has had small ulceration medial to her left  foot to this  area about 3 weeks ago . Pt a saw  a  podiatry, took 10 days of ABX  and notes that pain is now severe . Pt with history of hypertension, hyperlipidemia, DM2, A-fib not on AC after having Watchman procedure, CAD status post CABG, chronic anemia, peripheral vascular disease.      DX Dm foot ulcer:  Podiatry consult  ABX :  IV vancomycin  IV levaquin  ID consult  PT consult  F/U AM cbc, bmp, lactic acid    AKASH:  nephrololgy  bun /creat 69/1.7, GFR 30  IVF        continue home meds as per PMD:    HTN:  metoprolol 200 mg po QD    CAD:  ranexa 500mg po BID    COPD:  Duoneb PRN  Budesonide  montelukast    Neuropathy:  lyrica 75mg po QD    constipation:  senna 2 tab po QHS    Prophylaxis GI/VTE    CAse D/W DR Aiken 78-year-old female   with history of hypertension, hyperlipidemia, DM2, A-fib not on AC after having Watchman procedure, CAD status post CABG, chronic anemia, peripheral vascular disease.  presenting with     #Diabtic  foot ulcer:  Follow up foot xray  Podiatry consult  ABX :  IV vancomycin  IV levaquin  ID consult  PT consult  F/U AM cbc, bmp, lactic acid    AKASH:  nephrololgy  bun /creat 69/1.7, GFR 30  IVF  renal US  -Hold lasix, and aldactone for now   Monitor renal function            HTN:  continue with home medications, and monitor BP    CAD:  stable  continue with home medications    COPD:  Duoneb PRN  Budesonide  montelukast    Neuropathy:  lyrica 75mg po QD    constipation:  senna 2 tab po QHS    Prophylaxis GI/VTE    CAse D/W DR Aiken    #Progress Note Handoff  Pending (specify):  as above   Family discussion:  plan of care was discussed with patient in details.  all questions were answered.  seems to understand, and in agreement  Disposition: PT

## 2024-04-17 NOTE — ED PROVIDER NOTE - CLINICAL SUMMARY MEDICAL DECISION MAKING FREE TEXT BOX
Labs and CT unremarkable -- despite this, patient has significant pain, cannot ambulate. This brought up concern for arterial insufficiency/claudication, US done which demonstrates flow.    Patient treated with abx, unclear etiology of severe pain as she has only minimal signs of infection, no signs of abscess or NV compromise.

## 2024-04-17 NOTE — H&P ADULT - NSHPLABSRESULTS_GEN_ALL_CORE
12.8   6.14  )-----------( 144      ( 17 Apr 2024 19:47 )             40.5       04-17    135  |  91<L>  |  69<HH>  ----------------------------<  183<H>  3.5   |  30  |  1.7<H>    Ca    10.0      17 Apr 2024 19:47  Mg     1.9     04-17    TPro  7.3  /  Alb  4.1  /  TBili  1.0  /  DBili  x   /  AST  21  /  ALT  11  /  AlkPhos  65  04-17              Urinalysis Basic - ( 17 Apr 2024 19:47 )    Color: x / Appearance: x / SG: x / pH: x  Gluc: 183 mg/dL / Ketone: x  / Bili: x / Urobili: x   Blood: x / Protein: x / Nitrite: x   Leuk Esterase: x / RBC: x / WBC x   Sq Epi: x / Non Sq Epi: x / Bacteria: x        Lactate Trend  04-17 @ 19:47 Lactate:2.1         CAPILLARY BLOOD GLUCOSE

## 2024-04-17 NOTE — ED PROVIDER NOTE - PHYSICAL EXAMINATION
VITAL SIGNS: I have reviewed nursing notes and confirm.  CONSTITUTIONAL: Well-developed; well-nourished; in no acute distress.    SKIN: Skin exam is warm and dry, no acute rash. Thereis small dime sized ulceration to medial aspect of left foot without drainage, fluctuance.  There is moderate swelling, redness to entire foot, tenderness to palpation to medial foot.  Palpable pulses.  There is a second dime sized area of ulceration to distal right mid shin there is a small amount of oozing from this with surrounding redness and warmth.  Palpable pulses on right.  Bilateral lower extremities have hyperpigmentation consistent with chronic venous stasis.    HEAD: Normocephalic; atraumatic.  EYES: PERRL, EOM intact; conjunctiva and sclera clear.  ENT: No nasal discharge; airway clear. TMs clear.  NECK: Supple; non tender.  CARD: irregularly irregular with regular rhythm  RESP: No wheezes, rales or rhonchi.  ABD: Normal bowel sounds; soft; non-distended; non-tender; no hepatosplenomegaly.  EXT: Normal ROM. see skin  NEURO: Alert, oriented. Grossly unremarkable. No focal deficits.  PSYCH: Cooperative, appropriate.

## 2024-04-17 NOTE — PHARMACOTHERAPY INTERVENTION NOTE - COMMENTS
Spoke with ISAC Yadav regarding vancomycin renal adjustment recommendation. Pt weight is 112.9kg, SCr= 1.7, GFR = 30.    Dosing recommendation for Vancomycin is 10-15mg/kg. Thus dose recommendation is 1500mg IVPB every 24 hours.
Spoke with ISAC Yadav regarding order for Levofloxacin 750mg IVPB every 24 hours. Given patient's degree of renal impairment, recommended Levofloxacin 750mg IVPB every 48 hours.

## 2024-04-17 NOTE — ED ADULT NURSE NOTE - CHPI ED NUR SYMPTOMS POS
Pt presents to ED with c/o worsening pain to wound on Left foot heel. Pt states that she took 10 day course of clindamyacin. S,all open wound noted to foot. Open wound and redness noted to right shin, pt states that she is being treated by visiting nurse./PAIN

## 2024-04-17 NOTE — H&P ADULT - HISTORY OF PRESENT ILLNESS
78-year-old female  came to ER  for assessment of  severe pain 10/10  to  left foot.  Patient has had small ulceration medial to her left  foot to this  area about 3 weeks ago . Pt a saw  a  podiatry, took 10 days of ABX  and notes that pain is now severe . Pt with history of hypertension, hyperlipidemia, DM2, A-fib not on AC after having Watchman procedure, CAD status post CABG, chronic anemia, peripheral vascular disease.  She is unable to ambulate due severe pain. Pt denies chest pain , shortness of breath  nausea / vomiting.  78-year-old female with history of hypertension, hyperlipidemia, DM2, A-fib not on AC after having Watchman procedure, CAD status post CABG, chronic anemia, peripheral vascular disease, here for assessment of pain to medial aspect of left foot.  Patient has had small ulceration to the area times about 3 weeks, seen by podiatry, took 10 days of clinda and notes that pain is now severe.  She is unable to ambulate due to pain.    	No fever, chills.  Pain does not radiate up the leg.  No calf pain or swelling.    	Also notes ulceration to anterior right shin also being treated but has no pain to that area

## 2024-04-17 NOTE — ED ADULT NURSE NOTE - NSFALLHARMRISKINTERV_ED_ALL_ED
Assistance OOB with selected safe patient handling equipment if applicable/Communicate risk of Fall with Harm to all staff, patient, and family/Monitor gait and stability/Provide patient with walking aids/Provide visual cue: red socks, yellow wristband, yellow gown, etc/Reinforce activity limits and safety measures with patient and family/Bed in lowest position, wheels locked, appropriate side rails in place/Call bell, personal items and telephone in reach/Instruct patient to call for assistance before getting out of bed/chair/stretcher/Non-slip footwear applied when patient is off stretcher/[place] to call system/Physically safe environment - no spills, clutter or unnecessary equipment/Purposeful Proactive Rounding/Room/bathroom lighting operational, light cord in reach

## 2024-04-17 NOTE — H&P ADULT - NSHPPHYSICALEXAM_GEN_ALL_CORE
Vital Signs Last 24 Hrs  T(C): 35.6 (17 Apr 2024 19:04), Max: 35.6 (17 Apr 2024 19:04)  T(F): 96.1 (17 Apr 2024 19:04), Max: 96.1 (17 Apr 2024 19:04)  HR: 76 (17 Apr 2024 19:04) (76 - 76)  BP: 109/75 (17 Apr 2024 19:04) (109/75 - 109/75)  BP(mean): --  RR: 18 (17 Apr 2024 19:04) (18 - 18)  SpO2: 96% (17 Apr 2024 19:04) (96% - 96%)    Parameters below as of 17 Apr 2024 19:04  Patient On (Oxygen Delivery Method): room air    GENERAL:  77y/o Female NAD, resting comfortably.  HEAD:  Atraumatic, Normocephalic  EYES: EOMI, PERRLA, conjunctiva and sclera clear  NECK: Supple, No JVD, no cervical lymphadenopathy, non-tender  CHEST/LUNG: Clear to auscultation bilaterally; No wheeze, rhonchi, or rales  HEART: Regular rate and rhythm; S1&S2  ABDOMEN: Soft, Nontender, Nondistended x 4 quadrants; Bowel sounds present  EXTREMITIES:   Peripheral Pulses Present, No clubbing, no cyanosis, or no edema, no calf tenderness  PSYCH: AAOx3, cooperative, appropriate  NEUROLOGY: WNL  SKIN: WNL Vital Signs Last 24 Hrs  T(C): 35.6 (17 Apr 2024 19:04), Max: 35.6 (17 Apr 2024 19:04)  T(F): 96.1 (17 Apr 2024 19:04), Max: 96.1 (17 Apr 2024 19:04)  HR: 76 (17 Apr 2024 19:04) (76 - 76)  BP: 109/75 (17 Apr 2024 19:04) (109/75 - 109/75)  BP(mean): --  RR: 18 (17 Apr 2024 19:04) (18 - 18)  SpO2: 96% (17 Apr 2024 19:04) (96% - 96%)    Parameters below as of 17 Apr 2024 19:04  Patient On (Oxygen Delivery Method): room air    GENERAL:  77y/o Female NAD, resting comfortably.  HEAD:  Atraumatic, Normocephalic  EYES: EOMI, PERRLA, conjunctiva and sclera clear  NECK: Supple, No JVD, no cervical lymphadenopathy, non-tender  CHEST/LUNG: Clear to auscultation bilaterally; No wheeze, rhonchi, or rales  HEART: Regular rate and rhythm; S1&S2  ABDOMEN: Soft, Nontender, Nondistended x 4 quadrants; Bowel sounds present  EXTREMITIES:   Peripheral Pulses Present, No clubbing, no cyanosis, or no edema, no calf tenderness  PSYCH: AAOx3, cooperative, appropriate  NEUROLOGY: Wnl  Skin :  left foot small size about 1 cm on medial aspect ulcer Vital Signs Last 24 Hrs  T(C): 35.6 (17 Apr 2024 19:04), Max: 35.6 (17 Apr 2024 19:04)  T(F): 96.1 (17 Apr 2024 19:04), Max: 96.1 (17 Apr 2024 19:04)  HR: 76 (17 Apr 2024 19:04) (76 - 76)  BP: 109/75 (17 Apr 2024 19:04) (109/75 - 109/75)  BP(mean): --  RR: 18 (17 Apr 2024 19:04) (18 - 18)  SpO2: 96% (17 Apr 2024 19:04) (96% - 96%)    Parameters below as of 17 Apr 2024 19:04  Patient On (Oxygen Delivery Method): room air    GENERAL:  79y/o Female NAD, resting comfortably.  HEAD:  Atraumatic, Normocephalic  EYES: EOMI, PERRLA, conjunctiva and sclera clear  NECK: Supple, No JVD, no cervical lymphadenopathy, non-tender  CHEST/LUNG: Clear to auscultation bilaterally; No wheeze, rhonchi, or rales  HEART: Regular rate and rhythm; S1&S2  ABDOMEN: Soft, Nontender, Nondistended x 4 quadrants; Bowel sounds present  EXTREMITIES:   Peripheral Pulses Present, No clubbing, no cyanosis, or no edema, no calf tenderness  PSYCH: AAOx3, cooperative, appropriate  NEUROLOGY: Wnl  Skin :  left foot small size about 1 cm on medial aspect ulcer  another wound on the RLE  chronic B/L venenous changes

## 2024-04-17 NOTE — ED ADULT TRIAGE NOTE - MODE OF ARRIVAL
"SUBJECTIVE:    Chief Complaint   Patient presents with   • Other     F/U Cardiology evaluation   • Other     Abnormal Echocardiogram finding suggestive of intra-cadiac shunt       Jose Fallon is a 68 y.o. male,   Established Patient     PROBLEM #1-HISTORY OF PRESENT ILLNESS  Existing Problem, but requiring re-evaluation  PATIENT STATEMENT OF PROBLEM - physical deconditioning, subclinical CAD, and abnormal ECHO finding  ONSET - months  COURSE - patient is exercising more, and feeling better with more stamina.  See recent CARDIOLOGY visit.  He did undergo ECHOCARDIOGRAM with contrast and \"bubble test.\"  ECHO looked good mostly aside from likely PFO and some aortic sclerosis w/o stenosis.  Counseled at length today.   INTENSITY/STATUS/LOCATION/RADIATION - variable/ mostly asymptomatic/ as above  AGGRAVATORS - Multifactorial including inadequate Therapeutic Lifestyle Changes    RELIEVERS - improving Therapeutic Lifestyle Changes, and medications  TREATMENTS/COMPLIANCE/@GOAL? 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 02/08/2018   • B12 deficiency 02/08/2018   • Lifestyle problems 02/08/2018   • Physical deconditioning 02/08/2018   • Moderately severe multivessel coronary atherosclerosis due to calcified coronary lesions 02/08/2018   • Agatston CAC score 200-399 02/08/2018   • Abnormal resting ECG findings 02/08/2018   • Diverticulosis of colon, per 2/5/16 Colonoscopy 02/07/2018   • Internal and external hemorrhoids without complication, per 2/5/16 Colonoscopy 02/07/2018   • Essential hypertension 01/29/2018   • " Dyslipidemia 2018   • Anxiety 2018   • Prediabetes 2018   • Vitamin D deficiency 2018   • Well adult exam 2018   • Family history of first degree relative with dementia 2018   • Family history of colon cancer in father 2018   • Family history of hypertension 2018   • Family history of breast cancer in sister 2018   • Hiatal hernia 2018   • History of vertigo 2018   • History of nephritis 2018   • Osteoarthritis 2018   • Behind on immunizations 2018   • Hydrocele in adult, left 2018       Outpatient Encounter Prescriptions as of 3/5/2018   Medication Sig Dispense Refill   • irbesartan-hydrochlorothiazide (AVALIDE) 150-12.5 MG per tablet Take 1 Tab by mouth every day. 90 Tab 3   • simvastatin (ZOCOR) 20 MG Tab Take 1 Tab by mouth every day. 90 Tab 4   • escitalopram (LEXAPRO) 10 MG Tab Take 1 Tab by mouth every day. 90 Tab 4   • metformin ER (GLUCOPHAGE XR) 500 MG TABLET SR 24 HR Take 1 Tab by mouth every day. 90 Tab 4   • aspirin EC (ECOTRIN) 81 MG Tablet Delayed Response Take 1 Tab by mouth.     • Cholecalciferol (VITAMIN D) 2000 units Cap Take 1 Cap by mouth every day.     • Multiple Vitamins-Minerals (OCUVITE ADULT 50+) Cap Take 1 Each by mouth.       No facility-administered encounter medications on file as of 3/5/2018.        Social History   Substance Use Topics   • Smoking status: Former Smoker     Packs/day: 1.00     Years: 2.00     Types: Cigarettes     Start date: 1957     Quit date: 1959   • Smokeless tobacco: Never Used   • Alcohol use 4.2 oz/week     7 Cans of beer per week       Family History   Problem Relation Age of Onset   • Dementia Mother 70      @ 81 years   • Hypertension Father 78      @ 79 years   • Cancer Father 77     Colon Cancer   • Kidney Disease Father 78     Kidney failure was cause of death   • Breast Cancer Sister 50   • Heart Attack Paternal Grandmother 78     Fatal MI   •  Heart Attack Paternal Grandfather 60     Fatal MI       Patient's Past, Social, and Family History reviewed and updated by me in EPIC today.    REVIEW OF SYMPTOMS:               Pertinent Positives as above.    All other systems reviewed and negative.     OBJECTIVE:    There were no vitals taken for this visit.  There is no height or weight on file to calculate BMI.    Well developed, well nourished male, no acute distress, non-ill appearing. Comfortable, appears stated age, pleasant and cooperative  HEAD: atraumatic, normocephalic   EYES: Conjunctiva normal, extra-occular movements intact, PERRLA, acuity grossly intact.   EARS/NOSE/THROAT: TM's normal, no signs or symptoms of infection, no perforation, no hemotympanum, acuity grossly intact. Oropharynx: benign, no lesions noted. Nares: benign.   NECK: supple, no adenopathy, no thyromegaly or nodules, no jugular vein distention, no carotid bruits.   CHEST/LUNGS: clear to auscultation and percussion bilaterally. No adventitious breath sounds.   CARDIOVASCULAR: regular rate and rhythm, no murmur. Point of maximum intensity not displaced. Good central and peripheral pulses.   BACK: no CVA tenderness.   ABDOMEN: soft, non-tender, non-distended, no masses, no hepatosplenomegaly. Normal active bowel tones.   : deferred.   Rectal: deferred.   Extremities: warm/well-perfused, no cyanosis, clubbing, or edema.   SKIN: clear, unbroken, no rashes, normal turgor.   Neuro: Mental Status: Alert and Oriented x 3. CN II-XII grossly intact. Gait normal. Non-focal, intact. Normal strength, sensation    ASSESSMENT:    1. Moderately severe multivessel coronary atherosclerosis due to calcified coronary lesions     2. Echocardiogram findings suggestive of PFO (patent foramen ovale)     3. Aortic valve sclerosis without stenosis         PLAN:    Total Face-to-Face time spent with patient: 30 minutes  Amount of time spent counseling patient and/or coordinating care: 20 minutes    The  nature of patient counseling as below:  -Patient Education, including below topics:  -Differential Diagnoses and treatment options discussed  -Risks, benefits, alternatives discussed  -Recent Cardiology notes and recent ECHOCARDIOGRAM reviewed with patient in detail  -Health Maintenance Exam issues discussed  -Exercise  -Dietary recommendations discussed  -Therapeutic Lifestyle Changes discussed    The nature of coordination of care as below:  -I reminded him about my recommendation to begin OTC Vitamin B12 sublingual  -Continue (other) present chronic medications  -Referrals: I gave him a tour of [facility], and he will contemplate joining.  His insurance did authorize him for [facility].   -Other: He has a f/u with CARDIOLOGY in about 2 months  -Seek medical attention immediately if worse    FOLLOW-UP:  -in 3 months with me  -and sooner if test/consult results warrant  And for Health Care Maintenance Exams  And as needed.                    [employer]

## 2024-04-17 NOTE — ED PROVIDER NOTE - OBJECTIVE STATEMENT
78-year-old female with history of hypertension, hyperlipidemia, DM2, A-fib not on AC after having Watchman procedure, CAD status post CABG, chronic anemia, peripheral vascular disease, here for assessment of pain to medial aspect of left foot.  Patient has had small ulceration to the area times about 3 weeks, seen by podiatry, took 10 days of clinda and notes that pain is now severe.  She is unable to ambulate due to pain.    No fever, chills.  Pain does not radiate up the leg.  No calf pain or swelling.    Also notes ulceration to anterior right shin also being treated but has no pain to that area.

## 2024-04-18 NOTE — PROGRESS NOTE ADULT - SUBJECTIVE AND OBJECTIVE BOX
SUBJECTIVE:    Patient is a 78y old Female who presents with a chief complaint of Dm foot ulcer/ AAKSH (18 Apr 2024 10:38)    Currently admitted to medicine with the primary diagnosis of Cellulitis       Today is hospital day 1d. This morning she is resting comfortably in bed and reports no new issues or overnight events. Pending LE arterial duplex, uric acid. C/w IV abx and pending cultures.    ROS:   CONSTITUTIONAL: No weakness, fevers or chills   EYES/ENT: No visual changes; No vertigo or throat pain   NECK: No pain or stiffness   RESPIRATORY: No cough, wheezing, hemoptysis; No shortness of breath   CARDIOVASCULAR: No chest pain or palpitations   GASTROINTESTINAL: No abdominal or epigastric pain. No nausea, vomiting, or hematemesis; No diarrhea or constipation. No melena or hematochezia.  GENITOURINARY: No dysuria, frequency or hematuria  NEUROLOGICAL: No numbness or weakness  SKIN: No itching, rashes      PAST MEDICAL & SURGICAL HISTORY  Aortic stenosis    Diabetes    Asthma with COPD  many years since last attack    CAD (coronary artery disease), native coronary artery    Anemia    History of bleeding disorder  having work up 5/2/21- HAD WORKUP BUT NO DIAGNOSIS "NOTHING WAS FOUND"    History of transfusion reaction    Hiatal hernia    H/O ascending aortic replacement  Bovine Replacement    S/P CABG x 1  complication of bleeding 2016    H/O total knee replacement, right  complicated with fx femur bars screws in femur- b/l knee replacement    S/P hysterectomy    Presence of Watchman left atrial appendage closure device        SOCIAL HISTORY:  Negative for smoking/alcohol/drug use.     ALLERGIES:  Augmentin (Other)  penicillins (Hives; Rash)      MEDICATIONS:  STANDING MEDICATIONS  albuterol/ipratropium for Nebulization 3 milliLiter(s) Nebulizer every 6 hours  aspirin  chewable 81 milliGRAM(s) Oral daily  atorvastatin 40 milliGRAM(s) Oral at bedtime  budesonide 160 MICROgram(s)/formoterol 4.5 MICROgram(s) Inhaler 2 Puff(s) Inhalation two times a day  cefTRIAXone   IVPB 2000 milliGRAM(s) IV Intermittent every 24 hours  chlorhexidine 2% Cloths 1 Application(s) Topical <User Schedule>  dextrose 10% Bolus 125 milliLiter(s) IV Bolus once  dextrose 5%. 1000 milliLiter(s) IV Continuous <Continuous>  dextrose 5%. 1000 milliLiter(s) IV Continuous <Continuous>  dextrose 50% Injectable 25 Gram(s) IV Push once  dextrose 50% Injectable 12.5 Gram(s) IV Push once  glucagon  Injectable 1 milliGRAM(s) IntraMuscular once  heparin   Injectable 5000 Unit(s) SubCutaneous every 12 hours  insulin lispro (ADMELOG) corrective regimen sliding scale   SubCutaneous three times a day before meals  metoprolol succinate  milliGRAM(s) Oral daily  metroNIDAZOLE    Tablet 500 milliGRAM(s) Oral every 12 hours  montelukast 10 milliGRAM(s) Oral at bedtime  pantoprazole    Tablet 40 milliGRAM(s) Oral before breakfast  polyethylene glycol 3350 17 Gram(s) Oral two times a day  pregabalin 75 milliGRAM(s) Oral daily  ranolazine 500 milliGRAM(s) Oral two times a day  senna 2 Tablet(s) Oral at bedtime    PRN MEDICATIONS  acetaminophen     Tablet .. 650 milliGRAM(s) Oral every 6 hours PRN  albuterol/ipratropium for Nebulization 3 milliLiter(s) Nebulizer every 6 hours PRN  aluminum hydroxide/magnesium hydroxide/simethicone Suspension 30 milliLiter(s) Oral every 4 hours PRN  dextrose Oral Gel 15 Gram(s) Oral once PRN  melatonin 3 milliGRAM(s) Oral at bedtime PRN  ondansetron Injectable 4 milliGRAM(s) IV Push every 8 hours PRN    VITALS:   T(F): 96.1  HR: 115  BP: 144/62  RR: 18  SpO2: 100%    LABS:                          11.5   5.96  )-----------( 135      ( 18 Apr 2024 06:50 )             35.2     04-18    137  |  94<L>  |  64<HH>  ----------------------------<  129<H>  2.9<L>   |  30  |  1.4    Ca    9.7      18 Apr 2024 06:50  Mg     1.7     04-18    TPro  7.3  /  Alb  4.1  /  TBili  1.0  /  DBili  x   /  AST  21  /  ALT  11  /  AlkPhos  65  04-17      Urinalysis Basic - ( 18 Apr 2024 06:50 )    Color: x / Appearance: x / SG: x / pH: x  Gluc: 129 mg/dL / Ketone: x  / Bili: x / Urobili: x   Blood: x / Protein: x / Nitrite: x   Leuk Esterase: x / RBC: x / WBC x   Sq Epi: x / Non Sq Epi: x / Bacteria: x        Lactate, Blood: 1.3 mmol/L (04-18-24 @ 06:50)          RADIOLOGY:    PHYSICAL EXAM:  GEN: No acute distress  HEENT: normocephalic, atraumatic, aniceteric  LUNGS: Clear to auscultation bilaterally, no rales/wheezing/ rhonchi  HEART: S1/S2 present. RRR, no murmurs  ABD: Soft, non-tender, non-distended. Bowel sounds present  EXT: NC/NC/NE/2+PP/DÍAZ  NEURO: AAOX3, normal affect      ASSESSMENT AND PLAN:

## 2024-04-18 NOTE — CONSULT NOTE ADULT - SUBJECTIVE AND OBJECTIVE BOX
NEPHROLOGY CONSULTATION NOTE    EVAN QUINN  78y  Female  MRN-[id number]    CC:   Patient is a 78y old  Female who presents with a chief complaint of Dm foot ulcer/ AKASH (18 Apr 2024 09:29)      HPI:   78-year-old female with history of hypertension, hyperlipidemia, DM2, A-fib not on AC after having Watchman procedure, CAD status post CABG, chronic anemia, peripheral vascular disease, here for assessment of pain to medial aspect of left foot.  Patient has had small ulceration to the area times about 3 weeks, seen by podiatry, took 10 days of clinda and notes that pain is now severe.  She is unable to ambulate due to pain.    	No fever, chills.  Pain does not radiate up the leg.  No calf pain or swelling.    	Also notes ulceration to anterior right shin also being treated but has no pain to that area (17 Apr 2024 22:38)      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      CAD (coronary artery disease), native coronary artery      Anemia      History of bleeding disorder  having work up 5/2/21- HAD WORKUP BUT NO DIAGNOSIS "NOTHING WAS FOUND"      History of transfusion reaction      Hiatal hernia      H/O ascending aortic replacement  Bovine Replacement      S/P CABG x 1  complication of bleeding 2016      H/O total knee replacement, right  complicated with fx femur bars screws in femur- b/l knee replacement      S/P hysterectomy      Presence of Watchman left atrial appendage closure device        Allergies:  Augmentin (Other)  penicillins (Hives; Rash)    Home Medications Reviewed  Hospital Medications:   MEDICATIONS  (STANDING):  albuterol/ipratropium for Nebulization 3 milliLiter(s) Nebulizer every 6 hours  aspirin  chewable 81 milliGRAM(s) Oral daily  atorvastatin 40 milliGRAM(s) Oral at bedtime  budesonide 160 MICROgram(s)/formoterol 4.5 MICROgram(s) Inhaler 2 Puff(s) Inhalation two times a day  heparin   Injectable 5000 Unit(s) SubCutaneous every 12 hours  insulin lispro (ADMELOG) corrective regimen sliding scale   SubCutaneous three times a day before meals  levoFLOXacin IVPB 750 milliGRAM(s) IV Intermittent every 48 hours  metoprolol succinate  milliGRAM(s) Oral daily  montelukast 10 milliGRAM(s) Oral at bedtime  pantoprazole    Tablet 40 milliGRAM(s) Oral before breakfast  polyethylene glycol 3350 17 Gram(s) Oral two times a day  potassium chloride    Tablet ER 40 milliEquivalent(s) Oral every 4 hours  pregabalin 75 milliGRAM(s) Oral daily  ranolazine 500 milliGRAM(s) Oral two times a day  senna 2 Tablet(s) Oral at bedtime    MEDICATIONS  (PRN):  acetaminophen     Tablet .. 650 milliGRAM(s) Oral every 6 hours PRN Temp greater or equal to 38C (100.4F), Mild Pain (1 - 3)  albuterol/ipratropium for Nebulization 3 milliLiter(s) Nebulizer every 6 hours PRN Shortness of Breath and/or Wheezing  aluminum hydroxide/magnesium hydroxide/simethicone Suspension 30 milliLiter(s) Oral every 4 hours PRN Dyspepsia  dextrose Oral Gel 15 Gram(s) Oral once PRN Blood Glucose LESS THAN 70 milliGRAM(s)/deciliter  melatonin 3 milliGRAM(s) Oral at bedtime PRN Insomnia  ondansetron Injectable 4 milliGRAM(s) IV Push every 8 hours PRN Nausea and/or Vomiting    Home Medications:  aspirin 81 mg oral capsule: 1 cap(s) orally once a day (17 Apr 2024 23:25)  desvenlafaxine (as succinate) 25 mg oral tablet, extended release: 1 tab(s) orally once a day (17 Apr 2024 23:25)  furosemide 40 mg oral tablet: 1 tab(s) orally once a day (17 Apr 2024 23:25)  glipiZIDE 5 mg oral tablet: 1 tab(s) orally once a day (17 Apr 2024 23:25)  Lyrica 75 mg oral capsule: 1 cap(s) orally once a day (17 Apr 2024 23:25)  montelukast 10 mg oral tablet: 1 tab(s) orally once a day (17 Apr 2024 23:25)  polyethylene glycol 3350 oral powder for reconstitution: 17 gram(s) orally 2 times a day (17 Apr 2024 23:25)  Protonix 40 mg oral delayed release tablet: 1 tab(s) orally once a day (17 Apr 2024 23:25)  Ranexa 500 mg oral tablet, extended release: 1 tab(s) orally 2 times a day (17 Apr 2024 23:25)  rosuvastatin 10 mg oral tablet: 1 tab(s) orally once a day (17 Apr 2024 23:25)  senna leaf extract oral tablet: 2 tab(s) orally once a day (at bedtime) (17 Apr 2024 23:25)      SOCIAL HISTORY:  Social History:      FAMILY HISTORY:  Family history of pseudocholinesterase deficiency (Child)        REVIEW OF SYSTEMS:  All other review of systems is negative unless indicated above.    VITALS:  T(F): 96.6 (04-18-24 @ 04:36), Max: 96.7 (04-17-24 @ 22:57)  HR: 93 (04-18-24 @ 04:36)  BP: 104/55 (04-18-24 @ 04:36)  RR: 18 (04-18-24 @ 04:36)  SpO2: 99% (04-18-24 @ 00:07)    Height (cm): 167.6 (04-18 @ 00:07)  Weight (kg): 103.4 (04-18 @ 00:07)  BMI (kg/m2): 36.8 (04-18 @ 00:07)  BSA (m2): 2.11 (04-18 @ 00:07)  I&O's Detail        I&O's Summary      PHYSICAL EXAM:  Gen: NAD  resp: b/l breath sounds  card: S1/S2  abd: soft  ext: no edema  vascular access:     LABS:  Daily Height in cm: 167.64 (18 Apr 2024 00:07)    Daily     Lactate, Blood: 1.3 mmol/L (04-18-24 @ 06:50)  Lactate, Blood: 2.1 mmol/L (04-17-24 @ 19:47)    04-18    137  |  94<L>  |  64<HH>  ----------------------------<  129<H>  2.9<L>   |  30  |  1.4    Ca    9.7      18 Apr 2024 06:50  Mg     1.9     04-17    TPro  7.3  /  Alb  4.1  /  TBili  1.0  /  DBili      /  AST  21  /  ALT  11  /  AlkPhos  65  04-17      Creatinine Trend:   Creatinine: 1.4 mg/dL (04-18-24 @ 06:50)  Creatinine: 1.7 mg/dL (04-17-24 @ 19:47)  Creatinine: 1.3 mg/dL (02-08-24 @ 11:48)  Creatinine: 1.3 mg/dL (02-08-24 @ 08:50)  Creatinine: 1.3 mg/dL (02-07-24 @ 16:14)  Creatinine: 1.3 mg/dL (02-07-24 @ 07:37)                            11.5   5.96  )-----------( 135      ( 18 Apr 2024 06:50 )             35.2     Mean Cell Volume: 82.6 fL (04-18-24 @ 06:50)    Urine Studies:  Protein, Urine: Negative mg/dL (01-06-24 @ 04:10)  Protein, Urine: Negative (04-27-23 @ 14:53)  White Blood Cell - Urine: 0 /HPF (04-27-23 @ 14:53)  Red Blood Cell - Urine: 2 /HPF (04-27-23 @ 14:53)  Protein, Urine: Trace (05-05-21 @ 15:00)    Sodium, Random Urine: 121.0 mmoL/L (01-06 @ 04:10)  Creatinine, Random Urine: 12 mg/dL (01-06 @ 04:10)            RADIOLOGY & ADDITIONAL STUDIES:    US Kidney and Bladder:   ACC: [id number] EXAM:  US KIDNEYS AND BLADDER   ORDERED BY: PHILLY FULTON     PROCEDURE DATE:  04/18/2024          INTERPRETATION:  CLINICAL INFORMATION: Acute kidney injury    COMPARISON: Retroperitoneal sonogram 1/7/2024    TECHNIQUE: Sonography of the kidneys and bladder.    FINDINGS:    Right kidney: 9.3 cm. No hydronephrosis. Exophytic lower pole echogenic   lesion measuring 3.0 x 2.6 cm, likely angiomyolipoma.    Left kidney:  10.5 cm. No hydronephrosis. Echogenic lesion in the   interpolar region measures 0.5 cm, likely angiomyolipoma.    Urinary bladder: The urinary bladder is decompressed and cannot be fully   evaluated.        IMPRESSION:    No hydronephrosis. Stable appearance of angiomyolipomas.    --- End of Report ---         NEPHROLOGY CONSULTATION NOTE    EVAN QUINN  78y  Female  MRN-[id number]    CC:   Patient is a 78y old  Female who presents with a chief complaint of Dm foot ulcer/ AKASH (18 Apr 2024 09:29)      HPI:   78-year-old female with history of hypertension, hyperlipidemia, DM2, A-fib not on AC after having Watchman procedure, CAD status post CABG, chronic anemia, peripheral vascular disease, here for assessment of pain to medial aspect of left foot.  Patient has had small ulceration to the area times about 3 weeks, seen by podiatry, took 10 days of clinda and notes that pain is now severe.  She is unable to ambulate due to pain.  No fever, chills.  Pain does not radiate up the leg.  No calf pain or swelling.  Also notes ulceration to anterior right shin also being treated but has no pain to that area (17 Apr 2024 22:38)      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      CAD (coronary artery disease), native coronary artery      Anemia      History of bleeding disorder  having work up 5/2/21- HAD WORKUP BUT NO DIAGNOSIS "NOTHING WAS FOUND"      History of transfusion reaction      Hiatal hernia      H/O ascending aortic replacement  Bovine Replacement      S/P CABG x 1  complication of bleeding 2016      H/O total knee replacement, right  complicated with fx femur bars screws in femur- b/l knee replacement      S/P hysterectomy      Presence of Watchman left atrial appendage closure device        Allergies:  Augmentin (Other)  penicillins (Hives; Rash)    Home Medications Reviewed  Hospital Medications:   MEDICATIONS  (STANDING):  albuterol/ipratropium for Nebulization 3 milliLiter(s) Nebulizer every 6 hours  aspirin  chewable 81 milliGRAM(s) Oral daily  atorvastatin 40 milliGRAM(s) Oral at bedtime  budesonide 160 MICROgram(s)/formoterol 4.5 MICROgram(s) Inhaler 2 Puff(s) Inhalation two times a day  heparin   Injectable 5000 Unit(s) SubCutaneous every 12 hours  insulin lispro (ADMELOG) corrective regimen sliding scale   SubCutaneous three times a day before meals  levoFLOXacin IVPB 750 milliGRAM(s) IV Intermittent every 48 hours  metoprolol succinate  milliGRAM(s) Oral daily  montelukast 10 milliGRAM(s) Oral at bedtime  pantoprazole    Tablet 40 milliGRAM(s) Oral before breakfast  polyethylene glycol 3350 17 Gram(s) Oral two times a day  potassium chloride    Tablet ER 40 milliEquivalent(s) Oral every 4 hours  pregabalin 75 milliGRAM(s) Oral daily  ranolazine 500 milliGRAM(s) Oral two times a day  senna 2 Tablet(s) Oral at bedtime    MEDICATIONS  (PRN):  acetaminophen     Tablet .. 650 milliGRAM(s) Oral every 6 hours PRN Temp greater or equal to 38C (100.4F), Mild Pain (1 - 3)  albuterol/ipratropium for Nebulization 3 milliLiter(s) Nebulizer every 6 hours PRN Shortness of Breath and/or Wheezing  aluminum hydroxide/magnesium hydroxide/simethicone Suspension 30 milliLiter(s) Oral every 4 hours PRN Dyspepsia  dextrose Oral Gel 15 Gram(s) Oral once PRN Blood Glucose LESS THAN 70 milliGRAM(s)/deciliter  melatonin 3 milliGRAM(s) Oral at bedtime PRN Insomnia  ondansetron Injectable 4 milliGRAM(s) IV Push every 8 hours PRN Nausea and/or Vomiting    Home Medications:  aspirin 81 mg oral capsule: 1 cap(s) orally once a day (17 Apr 2024 23:25)  desvenlafaxine (as succinate) 25 mg oral tablet, extended release: 1 tab(s) orally once a day (17 Apr 2024 23:25)  furosemide 40 mg oral tablet: 1 tab(s) orally once a day (17 Apr 2024 23:25)  glipiZIDE 5 mg oral tablet: 1 tab(s) orally once a day (17 Apr 2024 23:25)  Lyrica 75 mg oral capsule: 1 cap(s) orally once a day (17 Apr 2024 23:25)  montelukast 10 mg oral tablet: 1 tab(s) orally once a day (17 Apr 2024 23:25)  polyethylene glycol 3350 oral powder for reconstitution: 17 gram(s) orally 2 times a day (17 Apr 2024 23:25)  Protonix 40 mg oral delayed release tablet: 1 tab(s) orally once a day (17 Apr 2024 23:25)  Ranexa 500 mg oral tablet, extended release: 1 tab(s) orally 2 times a day (17 Apr 2024 23:25)  rosuvastatin 10 mg oral tablet: 1 tab(s) orally once a day (17 Apr 2024 23:25)  senna leaf extract oral tablet: 2 tab(s) orally once a day (at bedtime) (17 Apr 2024 23:25)      SOCIAL HISTORY:  Social History:      FAMILY HISTORY:  Family history of pseudocholinesterase deficiency (Child)        REVIEW OF SYSTEMS:  All other review of systems is negative unless indicated above.    VITALS:  T(F): 96.6 (04-18-24 @ 04:36), Max: 96.7 (04-17-24 @ 22:57)  HR: 93 (04-18-24 @ 04:36)  BP: 104/55 (04-18-24 @ 04:36)  RR: 18 (04-18-24 @ 04:36)  SpO2: 99% (04-18-24 @ 00:07)    Height (cm): 167.6 (04-18 @ 00:07)  Weight (kg): 103.4 (04-18 @ 00:07)  BMI (kg/m2): 36.8 (04-18 @ 00:07)  BSA (m2): 2.11 (04-18 @ 00:07)  I&O's Detail        I&O's Summary      PHYSICAL EXAM:  Gen: NAD  resp: b/l breath sounds  abd: soft  ext: no edema    LABS:  Daily Height in cm: 167.64 (18 Apr 2024 00:07)      Lactate, Blood: 1.3 mmol/L (04-18-24 @ 06:50)  Lactate, Blood: 2.1 mmol/L (04-17-24 @ 19:47)    04-18    137  |  94<L>  |  64<HH>  ----------------------------<  129<H>  2.9<L>   |  30  |  1.4    Ca    9.7      18 Apr 2024 06:50  Mg     1.9     04-17    TPro  7.3  /  Alb  4.1  /  TBili  1.0  /  DBili      /  AST  21  /  ALT  11  /  AlkPhos  65  04-17      Creatinine Trend:   Creatinine: 1.4 mg/dL (04-18-24 @ 06:50)  Creatinine: 1.7 mg/dL (04-17-24 @ 19:47)  Creatinine: 1.3 mg/dL (02-08-24 @ 11:48)  Creatinine: 1.3 mg/dL (02-08-24 @ 08:50)  Creatinine: 1.3 mg/dL (02-07-24 @ 16:14)  Creatinine: 1.3 mg/dL (02-07-24 @ 07:37)                            11.5   5.96  )-----------( 135      ( 18 Apr 2024 06:50 )             35.2     Mean Cell Volume: 82.6 fL (04-18-24 @ 06:50)    Urine Studies:  Protein, Urine: Negative mg/dL (01-06-24 @ 04:10)          RADIOLOGY & ADDITIONAL STUDIES:    US Kidney and Bladder:   ACC: [id number] EXAM:  US KIDNEYS AND BLADDER   ORDERED BY: PHILLY FULTON     PROCEDURE DATE:  04/18/2024          INTERPRETATION:  CLINICAL INFORMATION: Acute kidney injury    COMPARISON: Retroperitoneal sonogram 1/7/2024    TECHNIQUE: Sonography of the kidneys and bladder.    FINDINGS:    Right kidney: 9.3 cm. No hydronephrosis. Exophytic lower pole echogenic   lesion measuring 3.0 x 2.6 cm, likely angiomyolipoma.    Left kidney:  10.5 cm. No hydronephrosis. Echogenic lesion in the   interpolar region measures 0.5 cm, likely angiomyolipoma.    Urinary bladder: The urinary bladder is decompressed and cannot be fully   evaluated.        IMPRESSION:    No hydronephrosis. Stable appearance of angiomyolipomas.    --- End of Report ---

## 2024-04-18 NOTE — PATIENT PROFILE ADULT - FALL HARM RISK - HARM RISK INTERVENTIONS

## 2024-04-18 NOTE — PROGRESS NOTE ADULT - ASSESSMENT
78-year-old female   with history of hypertension, hyperlipidemia, DM2, A-fib not on AC after having Watchman procedure, CAD status post CABG, chronic anemia, peripheral vascular disease.  presenting with     #Diabtic  foot ulcer:  Follow up foot xray  Podiatry consult  ABX :  IV vancomycin  IV levaquin  ID consult  PT consult: no needs     AKASH on CKD3   nephrololgy recs appreciated   monitor off diuretics   renal US results appreciated   Monitor renal function   outpt  and nephro follow-up     Hypokalemia / hypomagnesium  - mag sulfate IV 2g  - Kcl 40 x2 dose q4  - continue to monitor     Gout flare:  uric acid >10  - pred 40mg qd until symptoms improve     HTN:  continue with home medications, and monitor BP    CAD:  stable  continue with home medications    COPD:  Duoneb PRN  Budesonide  montelukast    Neuropathy:  lyrica 75mg po QD    constipation:  senna 2 tab po QHS    Prophylaxis GI/VTE      Family discussion:  plan of care was discussed with patient in details.  all questions were answered.  seems to understand, and in agreement  Disposition: PT

## 2024-04-18 NOTE — PHYSICAL THERAPY INITIAL EVALUATION ADULT - GENERAL OBSERVATIONS, REHAB EVAL
13:50-14:07 Chart reviewed. Patient available to be seen for physical therapy, denies pain, confirmed with RN. Pt rec'd sitting in recliner in NAD.

## 2024-04-18 NOTE — CONSULT NOTE ADULT - ASSESSMENT
IMPRESSION: Rehab of 77 yo  f  rehab  for  gd  le  ulcer     PRECAUTIONS: [  ] Cardiac  [  ] Respiratory  [  ] Seizures [  ] Contact Isolation  [  ] Droplet Isolation  [ FALL ] Other    Weight Bearing Status:     RECOMMENDATION:    Out of Bed to Chair     DVT/Decubiti Prophylaxis    REHAB PLAN:     [   ] Bedside P/T 3-5 times a week   [   ]   Bedside O/T  2-3 times a week             [  x ] No Rehab Therapy Indicated                   [   ]  Speech Therapy   Conditioning/ROM                                    ADL  Bed Mobility                                               Conditioning/ROM  Transfers                                                     Bed Mobility  Sitting /Standing Balance                         Transfers                                        Gait Training                                               Sitting/Standing Balance  Stair Training [   ]Applicable                    Home equipment Eval                                                                        Splinting  [   ] Only      GOALS:   ADL   [  x ]   Independent                    Transfers  [ x  ] Independent                          Ambulation  [  x ] Independent     [    ] With device                            [   ]  CG                                                         [   ]  CG                                                                  [   ] CG                            [    ] Min A                                                   [   ] Min A                                                              [   ] Min  A          DISCHARGE PLAN:   [   ]  Good candidate for Intensive Rehabilitation/Hospital based-4A [facility]                                             Will tolerate 3hrs Intensive Rehab Daily                                       [    ]  Short Term Rehab in Skilled Nursing Facility                                       [   xx ]  Home with Outpatient or VN services d/w  ptn  rehab  plan  ptn agree cont [facility]                                         [    ]  Possible Candidate for Intensive Hospital based Rehab                                       
 [birth year]78-year-old female with history of hypertension, hyperlipidemia, DM2, A-fib not on AC after having Watchman procedure, CAD status post CABG, chronic anemia, peripheral vascular disease, here for assessment of pain to medial aspect of left foot.    ID is consulted for DFU  Afebrile, no leukocytosis  BCx x 2 pending  On exam with underlying bilateral venous stasis changes  Left foot with ankle swelling and pain around the small ulcer, no warmth  Right anterior shin ulcer with mild purulence, no warmth    Xray left foot: Questionable cortical erosion at the level of the medial talus and   navicular bones. Underlying osteomyelitis cannot be excluded. Ankle soft   tissue swelling is noted.    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  Attending Physician  Division of Infectious Diseases  [facility]  Please contact me via [employer] Teams    
AKASH on CKD 3 / pre-renal - improved with holding diuretics  Hypokalemia d/t loop/thiazide diuretics - replete K to 4  cont to monitor off diuretics  DFU - podiatry f/u   dose meds for eGFR  needs outpatient  f/u for possible angiomyolipomas on renal ultrasound  should also follow up with nephrology outpatient for CKD 3
Hypocalcemia

## 2024-04-18 NOTE — PHYSICAL THERAPY INITIAL EVALUATION ADULT - PERTINENT HX OF CURRENT PROBLEM, REHAB EVAL
Reason for Admission: Dm foot ulcer/ AKASH  History of Present Illness:    78-year-old female with history of hypertension, hyperlipidemia, DM2, A-fib not on AC after having Watchman procedure, CAD status post CABG, chronic anemia, peripheral vascular disease, here for assessment of pain to medial aspect of left foot.  Patient has had small ulceration to the area times about 3 weeks, seen by podiatry, took 10 days of clinda and notes that pain is now severe.  She is unable to ambulate due to pain.

## 2024-04-18 NOTE — CONSULT NOTE ADULT - SUBJECTIVE AND OBJECTIVE BOX
Podiatry Consult Note    Subjective:  EVAN QUINN  Seen Bedside 78y Female  .   Patient is a 78y old  Female who presents with a chief complaint of Dm foot ulcer/ AKASH (17 Apr 2024 22:38)    HPI:   78-year-old female with history of hypertension, hyperlipidemia, DM2, A-fib not on AC after having Watchman procedure, CAD status post CABG, chronic anemia, peripheral vascular disease, here for assessment of pain to medial aspect of left foot.  Patient has had small ulceration to the area times about 3 weeks, seen by podiatry, took 10 days of clinda and notes that pain is now severe.  She is unable to ambulate due to pain.    	No fever, chills.  Pain does not radiate up the leg.  No calf pain or swelling.    	Also notes ulceration to anterior right shin also being treated but has no pain to that area (17 Apr 2024 22:38)      Past Medical History and Surgical History  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      CAD (coronary artery disease), native coronary artery      Anemia      History of bleeding disorder  having work up 5/2/21- HAD WORKUP BUT NO DIAGNOSIS "NOTHING WAS FOUND"      History of transfusion reaction      Hiatal hernia      H/O ascending aortic replacement  Bovine Replacement      S/P CABG x 1  complication of bleeding 2016      H/O total knee replacement, right  complicated with fx femur bars screws in femur- b/l knee replacement      S/P hysterectomy      Presence of Watchman left atrial appendage closure device           Review of Systems:  [X] Ten point review of systems is otherwise negative except as noted     Objective:  Vital Signs Last 24 Hrs  T(C): 35.9 (18 Apr 2024 04:36), Max: 35.9 (17 Apr 2024 22:57)  T(F): 96.6 (18 Apr 2024 04:36), Max: 96.7 (17 Apr 2024 22:57)  HR: 93 (18 Apr 2024 04:36) (76 - 104)  BP: 104/55 (18 Apr 2024 04:36) (104/55 - 145/91)  BP(mean): --  RR: 18 (18 Apr 2024 04:36) (18 - 18)  SpO2: 99% (18 Apr 2024 00:07) (96% - 99%)    Parameters below as of 18 Apr 2024 00:07  Patient On (Oxygen Delivery Method): room air                            11.5   5.96  )-----------( 135      ( 18 Apr 2024 06:50 )             35.2                 04-17    135  |  91<L>  |  69<HH>  ----------------------------<  183<H>  3.5   |  30  |  1.7<H>    Ca    10.0      17 Apr 2024 19:47  Mg     1.9     04-17    TPro  7.3  /  Alb  4.1  /  TBili  1.0  /  DBili  x   /  AST  21  /  ALT  11  /  AlkPhos  65  04-17        Physical Exam - Lower Extremity Focused:   Derm: Erythema to bilateral lower leg, superficial wound to medial midfoot left, superficial wound to anterior lower leg right, no drainage, no malodor, no signs of acute infection at wound site. No other open ulcers or wounds noted  Vascular: DP and PT Pulses Diminished; Foot is Warm to Warm to the touch;    Neuro: Protective Sensation Diminished / Moderately Neuropathic   MSK: Pain On Palpation to left ankle     Assessment:  Bilateral lower extremity ulcers  Cellulitic lower extremity  H/o gout right foot    Plan:  Chart reviewed and Patient evaluated. All Questions and Concerns Addressed and Answered  XR Imaging  Foot; Pending Results  Local Wound Care; Wound Flushed w/ NS; Wound Packed w/ allevyn to bilateral wounds  Weight Bearing Status; WBAT - patient states she is unable to walk currently due to pain  Pending; Lower Extremity Arterial Duplex B/L  Consider obtaining uric acid - patient states she had gout to right foot before but not left  No indication for surgical intervention at this time  Podiatry attending to jose   Discussed Plan w/ Dr. Ortiz    Podiatry      Podiatry Consult Note    Subjective:  EVAN QUINN  Seen Bedside 78y Female  .   Patient is a 78y old  Female who presents with a chief complaint of Dm foot ulcer/ AKASH (17 Apr 2024 22:38)    HPI:   78-year-old female with history of hypertension, hyperlipidemia, DM2, A-fib not on AC after having Watchman procedure, CAD status post CABG, chronic anemia, peripheral vascular disease, here for assessment of pain to medial aspect of left foot.  Patient has had small ulceration to the area times about 3 weeks, seen by podiatry, took 10 days of clinda and notes that pain is now severe.  She is unable to ambulate due to pain.    	No fever, chills.  Pain does not radiate up the leg.  No calf pain or swelling.    	Also notes ulceration to anterior right shin also being treated but has no pain to that area (17 Apr 2024 22:38)      Past Medical History and Surgical History  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      CAD (coronary artery disease), native coronary artery      Anemia      History of bleeding disorder  having work up 5/2/21- HAD WORKUP BUT NO DIAGNOSIS "NOTHING WAS FOUND"      History of transfusion reaction      Hiatal hernia      H/O ascending aortic replacement  Bovine Replacement      S/P CABG x 1  complication of bleeding 2016      H/O total knee replacement, right  complicated with fx femur bars screws in femur- b/l knee replacement      S/P hysterectomy      Presence of Watchman left atrial appendage closure device           Review of Systems:  [X] Ten point review of systems is otherwise negative except as noted     Objective:  Vital Signs Last 24 Hrs  T(C): 35.9 (18 Apr 2024 04:36), Max: 35.9 (17 Apr 2024 22:57)  T(F): 96.6 (18 Apr 2024 04:36), Max: 96.7 (17 Apr 2024 22:57)  HR: 93 (18 Apr 2024 04:36) (76 - 104)  BP: 104/55 (18 Apr 2024 04:36) (104/55 - 145/91)  BP(mean): --  RR: 18 (18 Apr 2024 04:36) (18 - 18)  SpO2: 99% (18 Apr 2024 00:07) (96% - 99%)    Parameters below as of 18 Apr 2024 00:07  Patient On (Oxygen Delivery Method): room air                            11.5   5.96  )-----------( 135      ( 18 Apr 2024 06:50 )             35.2                 04-17    135  |  91<L>  |  69<HH>  ----------------------------<  183<H>  3.5   |  30  |  1.7<H>    Ca    10.0      17 Apr 2024 19:47  Mg     1.9     04-17    TPro  7.3  /  Alb  4.1  /  TBili  1.0  /  DBili  x   /  AST  21  /  ALT  11  /  AlkPhos  65  04-17        Physical Exam - Lower Extremity Focused:   Derm: Erythema to bilateral lower leg, superficial wound to medial midfoot left, superficial wound to anterior lower leg right, no drainage, no malodor, no signs of acute infection at wound site. No other open ulcers or wounds noted  Vascular: DP and PT Pulses Diminished; Foot is Warm to Warm to the touch;    Neuro: Protective Sensation Diminished / Moderately Neuropathic   MSK: Pain On Palpation to left ankle     Assessment:  Bilateral lower extremity ulcers  Cellulitic lower extremity  H/o gout right foot    Plan:  Chart reviewed and Patient evaluated. All Questions and Concerns Addressed and Answered  XR Imaging  Foot; Pending Results  Local Wound Care; Wound Flushed w/ NS; Wound Packed w/ allevyn to bilateral wounds  Weight Bearing Status; WBAT - patient states she is unable to walk currently due to pain  Pending; Lower Extremity Arterial Duplex B/L  Consider obtaining uric acid - patient states she had gout to right foot before but not left  No indication for surgical intervention at this time; continue local wound care q24h  Follow up ID recs; continue with abx  Podiatry attending to jose   Discussed Plan w/ Dr. Ortiz    Podiatry

## 2024-04-18 NOTE — CONSULT NOTE ADULT - SUBJECTIVE AND OBJECTIVE BOX
HPI: 78-year-old female with history of hypertension, hyperlipidemia, DM2, A-fib not on AC after having Watchman procedure, CAD status post CABG, chronic anemia, peripheral vascular disease, here for assessment of pain to medial aspect of left foot.  Patient has had small ulceration to the area times about 3 weeks, seen by podiatry, took 10 days of clinda and notes that pain is now severe.  She is unable to ambulate due to pain.    	No fever, chills.  Pain does not radiate up the leg.  No calf pain or swelling.    	Also notes ulceration to anterior right shin also being treated but has no pain to that area ptn seen and  exam at  bed  side  aox3  no new  c.o  indep  in bm tf and  ambulate  with  rw  ptn  labs  imaging  and  medical  notes are appreciated  and  reviewed     PTN  REFERRED TO ACUTE  REHAB  FOR  EVAL AND  TX   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      CAD (coronary artery disease), native coronary artery      Anemia      History of bleeding disorder  having work up 5/2/21- HAD WORKUP BUT NO DIAGNOSIS "NOTHING WAS FOUND"      History of transfusion reaction      Hiatal hernia      H/O ascending aortic replacement  Bovine Replacement      S/P CABG x 1  complication of bleeding 2016      H/O total knee replacement, right  complicated with fx femur bars screws in femur- b/l knee replacement      S/P hysterectomy      Presence of Watchman left atrial appendage closure device          Hospital Course:    TODAY'S SUBJECTIVE & REVIEW OF SYMPTOMS:     Constitutional WNL   Cardio WNL   Resp WNL   GI WNL  Heme WNL  Endo WNL  Skin WNL  MSK WNL  Neuro WNL  Cognitive WNL  Psych WNL      MEDICATIONS  (STANDING):  albuterol/ipratropium for Nebulization 3 milliLiter(s) Nebulizer every 6 hours  aspirin  chewable 81 milliGRAM(s) Oral daily  atorvastatin 40 milliGRAM(s) Oral at bedtime  budesonide 160 MICROgram(s)/formoterol 4.5 MICROgram(s) Inhaler 2 Puff(s) Inhalation two times a day  chlorhexidine 2% Cloths 1 Application(s) Topical <User Schedule>  dextrose 10% Bolus 125 milliLiter(s) IV Bolus once  dextrose 5%. 1000 milliLiter(s) (100 mL/Hr) IV Continuous <Continuous>  dextrose 5%. 1000 milliLiter(s) (50 mL/Hr) IV Continuous <Continuous>  dextrose 50% Injectable 12.5 Gram(s) IV Push once  dextrose 50% Injectable 25 Gram(s) IV Push once  glucagon  Injectable 1 milliGRAM(s) IntraMuscular once  heparin   Injectable 5000 Unit(s) SubCutaneous every 12 hours  insulin lispro (ADMELOG) corrective regimen sliding scale   SubCutaneous three times a day before meals  levoFLOXacin IVPB 750 milliGRAM(s) IV Intermittent every 48 hours  metoprolol succinate  milliGRAM(s) Oral daily  montelukast 10 milliGRAM(s) Oral at bedtime  pantoprazole    Tablet 40 milliGRAM(s) Oral before breakfast  polyethylene glycol 3350 17 Gram(s) Oral two times a day  pregabalin 75 milliGRAM(s) Oral daily  ranolazine 500 milliGRAM(s) Oral two times a day  senna 2 Tablet(s) Oral at bedtime    MEDICATIONS  (PRN):  acetaminophen     Tablet .. 650 milliGRAM(s) Oral every 6 hours PRN Temp greater or equal to 38C (100.4F), Mild Pain (1 - 3)  albuterol/ipratropium for Nebulization 3 milliLiter(s) Nebulizer every 6 hours PRN Shortness of Breath and/or Wheezing  aluminum hydroxide/magnesium hydroxide/simethicone Suspension 30 milliLiter(s) Oral every 4 hours PRN Dyspepsia  dextrose Oral Gel 15 Gram(s) Oral once PRN Blood Glucose LESS THAN 70 milliGRAM(s)/deciliter  melatonin 3 milliGRAM(s) Oral at bedtime PRN Insomnia  ondansetron Injectable 4 milliGRAM(s) IV Push every 8 hours PRN Nausea and/or Vomiting      FAMILY HISTORY:  Family history of pseudocholinesterase deficiency (Child)        Allergies    Augmentin (Other)  penicillins (Hives; Rash)    Intolerances        SOCIAL HISTORY:    [  ] Etoh  [  ] Smoking  [  ] Substance abuse     Home Environment:  [  ] Home Alone  [x  ] Lives with Family  [  ] Home Health Aid    Dwelling:  [  ] Apartment  [  x] Private House  [  ] Adult Home  [  ] Skilled Nursing Facility      [  ] Short Term  [  ] Long Term  [x  ] Stairs       Elevator [  ]    FUNCTIONAL STATUS PTA: (Check all that apply)  Ambulation: [ x  ]Independent    [  ] Dependent     [  ] Non-Ambulatory  Assistive Device: [  ] SA Cane  [  ]  Q Cane  [x  ] Walker  [  ]  Wheelchair  ADL : [ x ] Independent  [  ]  Dependent       Vital Signs Last 24 Hrs  T(C): 35.9 (18 Apr 2024 04:36), Max: 35.9 (17 Apr 2024 22:57)  T(F): 96.6 (18 Apr 2024 04:36), Max: 96.7 (17 Apr 2024 22:57)  HR: 93 (18 Apr 2024 04:36) (76 - 104)  BP: 104/55 (18 Apr 2024 04:36) (104/55 - 145/91)  BP(mean): --  RR: 18 (18 Apr 2024 04:36) (18 - 18)  SpO2: 99% (18 Apr 2024 00:07) (96% - 99%)    Parameters below as of 18 Apr 2024 00:07  Patient On (Oxygen Delivery Method): room air          PHYSICAL EXAM: Alert & Oriented X3  GENERAL: NAD, well-groomed, well-developed  HEAD:  Atraumatic, Normocephalic  EYES: EOMI, PERRLA, conjunctiva and sclera clear  NECK: Supple, No JVD, Normal thyroid  CHEST/LUNG: Clear to percussion bilaterally; No rales, rhonchi, wheezing, or rubs  HEART: Regular rate and rhythm; No murmurs, rubs, or gallops  ABDOMEN: Soft, Nontender, Nondistended; Bowel sounds present  EXTREMITIES:  2+ Peripheral Pulses, No clubbing, cyanosis, or edema    NERVOUS SYSTEM:  Cranial Nerves 2-12 intact [x  ] Abnormal  [  ]  ROM: WFL all extremities [x  ]  Abnormal [  ]  Motor Strength: WFL all extremities  [ x ]  Abnormal [  ]  Sensation: intact to light touch [x  ] Abnormal [  ]  Reflexes: Symmetric [ x ]  Abnormal [  ]    FUNCTIONAL STATUS:  Bed Mobility: Independent [  ]  Supervision [ x ]  Needs Assistance [  ]  N/A [  ]  Transfers: Independent [  ]  Supervision [ x ]  Needs Assistance [  ]  N/A [  ]   Ambulation: Independent [  ]  Supervision [x  ]  Needs Assistance [  ]  N/A [  ]  ADL: Independent [x  ] Requires Assistance [  ] N/A [  ]  SEE PT/OT IE NOTES    LABS:                        11.5   5.96  )-----------( 135      ( 18 Apr 2024 06:50 )             35.2     04-18    137  |  94<L>  |  64<HH>  ----------------------------<  129<H>  2.9<L>   |  30  |  1.4    Ca    9.7      18 Apr 2024 06:50  Mg     1.9     04-17    TPro  7.3  /  Alb  4.1  /  TBili  1.0  /  DBili  x   /  AST  21  /  ALT  11  /  AlkPhos  65  04-17      Urinalysis Basic - ( 18 Apr 2024 06:50 )    Color: x / Appearance: x / SG: x / pH: x  Gluc: 129 mg/dL / Ketone: x  / Bili: x / Urobili: x   Blood: x / Protein: x / Nitrite: x   Leuk Esterase: x / RBC: x / WBC x   Sq Epi: x / Non Sq Epi: x / Bacteria: x        RADIOLOGY & ADDITIONAL STUDIES:< from: CT Head No Cont (02.05.24 @ 21:08) >    No acute intracranialhemorrhage, mass-effect or midline shift.        < end of copied text >      Assesment:

## 2024-04-18 NOTE — CONSULT NOTE ADULT - SUBJECTIVE AND OBJECTIVE BOX
INFECTIOUS DISEASE CONSULT NOTE    Patient is a 78y old  Female who presents with a chief complaint of Dm foot ulcer/ AKASH (18 Apr 2024 10:38)    HPI:   78-year-old female with history of hypertension, hyperlipidemia, DM2, A-fib not on AC after having Watchman procedure, CAD status post CABG, chronic anemia, peripheral vascular disease, here for assessment of pain to medial aspect of left foot.  Patient has had small ulceration to the area times about 3 weeks, seen by podiatry, took 10 days of clinda and notes that pain is now severe.  She is unable to ambulate due to pain.  No fever, chills.  Pain does not radiate up the leg.  No calf pain or swelling.  Also notes ulceration to anterior right shin also being treated but has no pain to that area (17 Apr 2024 22:38)     Prior hospital charts reviewed [Yes]  Primary team notes reviewed [Yes]  Other consultant notes reviewed [Yes]    REVIEW OF SYSTEMS:  CONSTITUTIONAL: No fever or chills  HEAD: No lesion on scalp  EYES: No visual disturbance  ENT: No sore throat  RESPIRATORY: No cough, no shortness of breath  CARDIOVASCULAR: No chest pain or palpitations  GASTROINTESTINAL: No abdominal or epigastric pain  GENITOURINARY: No dysuria  NEUROLOGICAL: No headache/dizziness  MUSCULOSKELETAL: No joint pain, erythema, or swelling; no back pain  SKIN: Left medial foot ulcer and right anterior shin ulcer  All other ROS negative except noted above      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      CAD (coronary artery disease), native coronary artery      Anemia      History of bleeding disorder  having work up 5/2/21- HAD WORKUP BUT NO DIAGNOSIS "NOTHING WAS FOUND"      History of transfusion reaction      Hiatal hernia      H/O ascending aortic replacement  Bovine Replacement      S/P CABG x 1  complication of bleeding 2016      H/O total knee replacement, right  complicated with fx femur bars screws in femur- b/l knee replacement      S/P hysterectomy      Presence of Watchman left atrial appendage closure device          SOCIAL HISTORY:  - No recent travel  - Denies tobacco use  - Denies alcohol use  - Denies illicit drug use    FAMILY HISTORY:  Family history of pseudocholinesterase deficiency (Child)        Allergies:  Augmentin (Other)  penicillins (Hives; Rash)      ANTIMICROBIALS:  cefTRIAXone   IVPB 2000 every 24 hours  metroNIDAZOLE    Tablet 500 every 12 hours      ANTIMICROBIALS (past 90 days):  MEDICATIONS  (STANDING):  cefTRIAXone   IVPB   100 mL/Hr IV Intermittent (04-18-24 @ 17:09)    levoFLOXacin IVPB   100 mL/Hr IV Intermittent (04-18-24 @ 01:22)    metroNIDAZOLE    Tablet   500 milliGRAM(s) Oral (04-18-24 @ 17:10)    vancomycin  IVPB.   250 mL/Hr IV Intermittent (04-17-24 @ 21:46)        OTHER MEDS:   MEDICATIONS  (STANDING):  acetaminophen     Tablet .. 650 every 6 hours PRN  albuterol/ipratropium for Nebulization 3 every 6 hours PRN  albuterol/ipratropium for Nebulization 3 every 6 hours  aluminum hydroxide/magnesium hydroxide/simethicone Suspension 30 every 4 hours PRN  aspirin  chewable 81 daily  atorvastatin 40 at bedtime  budesonide 160 MICROgram(s)/formoterol 4.5 MICROgram(s) Inhaler 2 two times a day  dextrose 50% Injectable 12.5 once  dextrose 50% Injectable 25 once  dextrose Oral Gel 15 once PRN  glucagon  Injectable 1 once  heparin   Injectable 5000 every 12 hours  insulin lispro (ADMELOG) corrective regimen sliding scale  three times a day before meals  melatonin 3 at bedtime PRN  metoprolol succinate  daily  montelukast 10 at bedtime  ondansetron Injectable 4 every 8 hours PRN  pantoprazole    Tablet 40 before breakfast  polyethylene glycol 3350 17 two times a day  predniSONE   Tablet 40 daily  pregabalin 75 daily  ranolazine 500 two times a day  senna 2 at bedtime      VITALS:  Vital Signs Last 24 Hrs  T(F): 96.9 (04-18-24 @ 21:03), Max: 96.9 (04-18-24 @ 21:03)    Vital Signs Last 24 Hrs  HR: 86 (04-18-24 @ 21:03) (86 - 115)  BP: 121/62 (04-18-24 @ 21:03) (104/55 - 145/91)  RR: 18 (04-18-24 @ 21:03)  SpO2: 100% (04-18-24 @ 13:27) (98% - 100%)  Wt(kg): --    EXAM:  GENERAL: NAD, lying in bed  HEAD: No head lesions  EYES: Conjunctiva pink and cornea white  EAR, NOSE, MOUTH, THROAT: Normal external ears and nose, no discharges; moist mucous membranes  NECK: Supple, nontender to palpation; no JVD  RESPIRATORY: Clear to auscultation bilaterally  CARDIOVASCULAR: S1 S2  GASTROINTESTINAL: Soft, nontender, nondistended; normoactive bowel sounds  EXTREMITIES: No clubbing, cyanosis, 1+ bilateral petal edema  NERVOUS SYSTEM: Alert and oriented to person, time, place and situation, speech clear. No focal deficits   MUSCULOSKELETAL: No joint erythema, swelling or pain  SKIN: small left medial foot ulcer, no drainage; tenderness around; small right anterior shin ulcer with mild purulent drainage., no superficial thrombophlebitis  PSYCH: Normal affect      Labs:                        11.5   5.96  )-----------( 135      ( 18 Apr 2024 06:50 )             35.2     04-18    137  |  94<L>  |  64<HH>  ----------------------------<  129<H>  2.9<L>   |  30  |  1.4    Ca    9.7      18 Apr 2024 06:50  Mg     1.7     04-18    TPro  7.3  /  Alb  4.1  /  TBili  1.0  /  DBili  x   /  AST  21  /  ALT  11  /  AlkPhos  65  04-17      WBC Trend:  WBC Count: 5.96 (04-18-24 @ 06:50)  WBC Count: 6.14 (04-17-24 @ 19:47)      Auto Neutrophil #: 3.55 K/uL (04-17-24 @ 19:47)  Auto Neutrophil #: 3.97 K/uL (02-06-24 @ 08:18)  Auto Neutrophil #: 8.69 K/uL (02-05-24 @ 15:37)  Auto Neutrophil #: 4.94 K/uL (01-20-24 @ 06:49)  Auto Neutrophil #: 9.47 K/uL (01-19-24 @ 01:31)      Creatine Trend:  Creatinine: 1.4 (04-18)  Creatinine: 1.7 (04-17)      Liver Biochemical Testing Trend:  Alanine Aminotransferase (ALT/SGPT): 11 (04-17)  Alanine Aminotransferase (ALT/SGPT): 34 (02-05)  Alanine Aminotransferase (ALT/SGPT): 18 (01-20)  Alanine Aminotransferase (ALT/SGPT): 15 (01-19)  Alanine Aminotransferase (ALT/SGPT): 14 (01-18)  Aspartate Aminotransferase (AST/SGOT): 21 (04-17-24 @ 19:47)  Aspartate Aminotransferase (AST/SGOT): 24 (02-05-24 @ 15:37)  Aspartate Aminotransferase (AST/SGOT): 32 (01-20-24 @ 06:49)  Aspartate Aminotransferase (AST/SGOT): 21 (01-19-24 @ 01:31)  Aspartate Aminotransferase (AST/SGOT): 23 (01-18-24 @ 04:16)  Bilirubin Total: 1.0 (04-17)  Bilirubin Total: 1.2 (02-05)  Bilirubin Total: 1.1 (01-20)  Bilirubin Total: 1.3 (01-19)  Bilirubin Total: 1.1 (01-18)      Trend LDH      Auto Eosinophil %: 0.7 % (04-17-24 @ 19:47)      Urinalysis Basic - ( 18 Apr 2024 06:50 )    Color: x / Appearance: x / SG: x / pH: x  Gluc: 129 mg/dL / Ketone: x  / Bili: x / Urobili: x   Blood: x / Protein: x / Nitrite: x   Leuk Esterase: x / RBC: x / WBC x   Sq Epi: x / Non Sq Epi: x / Bacteria: x        MICROBIOLOGY:      Lactate, Blood: 1.3 (04-18 @ 06:50)  Lactate, Blood: 2.1 (04-17 @ 19:47)    A1C with Estimated Average Glucose Result: 7.4 % (04-18-24 @ 06:50)  A1C with Estimated Average Glucose Result: 7.2 % (02-06-24 @ 08:18)  A1C with Estimated Average Glucose Result: 5.0 % (01-06-24 @ 06:46)      RADIOLOGY:  imaging below personally reviewed    < from: VA Duplex Lower Extrem Arterial, Bilat (04.17.24 @ 21:38) >  Impression:    Normal arterial flow in the bilateral lower extremities.      < end of copied text >    < from: Xray Ankle Complete 3 Views, Left (04.17.24 @ 20:01) >  Findings/  impression:    No evidence of acute fracture.    Questionable cortical erosion at the level of the medial talus and   navicular bones. Underlying osteomyelitis cannot be excluded. Ankle soft   tissue swelling is noted.    Degenerative osseous changes and vascular calcifications.    < end of copied text >      < from: Xray Tibia + Fibula 2 Views, Right (04.17.24 @ 20:01) >    FINDINGS/  IMPRESSION:    Post knee replacement.    No evidence of acute fracture or dislocation. No cortical destruction.    Degenerative osseous changes and vascular calcification.    < end of copied text >

## 2024-04-19 NOTE — PROGRESS NOTE ADULT - SUBJECTIVE AND OBJECTIVE BOX
PODIATRY CONSULT   EVAN QUINN is a pleasant well-nourished, well developed 78y Female in no acute distress, alert awake, and oriented to person, place and time.   Patient is a 78y old  Female who presents with a chief complaint of Dm foot ulcer/ AKASH (19 Apr 2024 08:58)    HPI:   78-year-old female with history of hypertension, hyperlipidemia, DM2, A-fib not on AC after having Watchman procedure, CAD status post CABG, chronic anemia, peripheral vascular disease, here for assessment of pain to medial aspect of left foot.  Patient has had small ulceration to the area times about 3 weeks, seen by podiatry, took 10 days of clinda and notes that pain is now severe.  She is unable to ambulate due to pain.    	No fever, chills.  Pain does not radiate up the leg.  No calf pain or swelling.    	Also notes ulceration to anterior right shin also being treated but has no pain to that area (17 Apr 2024 22:38)            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      CAD (coronary artery disease), native coronary artery      Anemia      History of bleeding disorder  having work up 5/2/21- HAD WORKUP BUT NO DIAGNOSIS "NOTHING WAS FOUND"      History of transfusion reaction      Hiatal hernia      H/O ascending aortic replacement  Bovine Replacement      S/P CABG x 1  complication of bleeding 2016      H/O total knee replacement, right  complicated with fx femur bars screws in femur- b/l knee replacement      S/P hysterectomy      Presence of Watchman left atrial appendage closure device        MEDICATIONS  (STANDING):  albuterol/ipratropium for Nebulization 3 milliLiter(s) Nebulizer every 6 hours  aspirin  chewable 81 milliGRAM(s) Oral daily  atorvastatin 40 milliGRAM(s) Oral at bedtime  budesonide 160 MICROgram(s)/formoterol 4.5 MICROgram(s) Inhaler 2 Puff(s) Inhalation two times a day  cefTRIAXone   IVPB 2000 milliGRAM(s) IV Intermittent every 24 hours  chlorhexidine 2% Cloths 1 Application(s) Topical <User Schedule>  dextrose 10% Bolus 125 milliLiter(s) IV Bolus once  dextrose 5%. 1000 milliLiter(s) (100 mL/Hr) IV Continuous <Continuous>  dextrose 5%. 1000 milliLiter(s) (50 mL/Hr) IV Continuous <Continuous>  dextrose 50% Injectable 25 Gram(s) IV Push once  dextrose 50% Injectable 12.5 Gram(s) IV Push once  glucagon  Injectable 1 milliGRAM(s) IntraMuscular once  heparin   Injectable 5000 Unit(s) SubCutaneous every 12 hours  insulin glargine Injectable (LANTUS) 10 Unit(s) SubCutaneous at bedtime  insulin lispro (ADMELOG) corrective regimen sliding scale   SubCutaneous three times a day before meals  insulin lispro Injectable (ADMELOG) 4 Unit(s) SubCutaneous three times a day before meals  lactated ringers. 500 milliLiter(s) (250 mL/Hr) IV Continuous <Continuous>  metoprolol succinate  milliGRAM(s) Oral daily  metroNIDAZOLE    Tablet 500 milliGRAM(s) Oral every 12 hours  montelukast 10 milliGRAM(s) Oral at bedtime  pantoprazole    Tablet 40 milliGRAM(s) Oral before breakfast  polyethylene glycol 3350 17 Gram(s) Oral two times a day  pregabalin 75 milliGRAM(s) Oral daily  ranolazine 500 milliGRAM(s) Oral two times a day  senna 2 Tablet(s) Oral at bedtime  vancomycin  IVPB 1000 milliGRAM(s) IV Intermittent every 24 hours    MEDICATIONS  (PRN):  acetaminophen     Tablet .. 650 milliGRAM(s) Oral every 6 hours PRN Temp greater or equal to 38C (100.4F), Mild Pain (1 - 3)  albuterol/ipratropium for Nebulization 3 milliLiter(s) Nebulizer every 6 hours PRN Shortness of Breath and/or Wheezing  aluminum hydroxide/magnesium hydroxide/simethicone Suspension 30 milliLiter(s) Oral every 4 hours PRN Dyspepsia  dextrose Oral Gel 15 Gram(s) Oral once PRN Blood Glucose LESS THAN 70 milliGRAM(s)/deciliter  melatonin 3 milliGRAM(s) Oral at bedtime PRN Insomnia  ondansetron Injectable 4 milliGRAM(s) IV Push every 8 hours PRN Nausea and/or Vomiting    FAMILY HISTORY:  Family history of pseudocholinesterase deficiency (Child)      Vital Signs Last 24 Hrs  T(C): 36.6 (19 Apr 2024 13:30), Max: 36.6 (19 Apr 2024 13:30)  T(F): 97.9 (19 Apr 2024 13:30), Max: 97.9 (19 Apr 2024 13:30)  HR: 95 (19 Apr 2024 13:30) (86 - 100)  BP: 108/51 (19 Apr 2024 13:30) (108/51 - 121/62)  BP(mean): 71 (19 Apr 2024 13:30) (71 - 71)  RR: 18 (19 Apr 2024 13:30) (18 - 18)  SpO2: 98% (19 Apr 2024 13:30) (98% - 98%)    Parameters below as of 19 Apr 2024 13:30  Patient On (Oxygen Delivery Method): room air                              11.7   5.46  )-----------( 119      ( 19 Apr 2024 07:14 )             37.1     04-19    140  |  99  |  55<H>  ----------------------------<  232<H>  4.2   |  27  |  1.5    Ca    9.8      19 Apr 2024 07:14  Mg     2.2     04-19    TPro  6.1  /  Alb  3.6  /  TBili  0.9  /  DBili  x   /  AST  18  /  ALT  9   /  AlkPhos  51  04-19      Urinalysis Basic - ( 19 Apr 2024 07:14 )    Color: x / Appearance: x / SG: x / pH: x  Gluc: 232 mg/dL / Ketone: x  / Bili: x / Urobili: x   Blood: x / Protein: x / Nitrite: x   Leuk Esterase: x / RBC: x / WBC x   Sq Epi: x / Non Sq Epi: x / Bacteria: x      CAPILLARY BLOOD GLUCOSE      POCT Blood Glucose.: 324 mg/dL (19 Apr 2024 16:25)  POCT Blood Glucose.: 262 mg/dL (19 Apr 2024 11:28)  POCT Blood Glucose.: 216 mg/dL (19 Apr 2024 07:46)

## 2024-04-19 NOTE — PROGRESS NOTE ADULT - ATTENDING COMMENTS
Left medial foot ulceration full thickness adjacent to navicular tuberosity 0.5 cm annular.   Right anterior shin ulceration.  Bilateral lower extremity cellulitis persistent.     A:  Left medial foot ulceration full thickness adjacent to navicular tuberosity 0.5 cm annular.   Right anterior shin ulceration.   Bilateral lower extremity cellulitis persistent.      P:  ESR/CRP waiting results.   Uric acid elevated : requesting follow up from primary team on management.   MRI L foot wound be helpful to assess the navicular talar erosions on X-ray. Differential diagnosis also includes Charcot foot and may require further work up if MRI non-diagnostic.

## 2024-04-19 NOTE — PROGRESS NOTE ADULT - SUBJECTIVE AND OBJECTIVE BOX
EVAN QUINN  78y  [facility] [address]      Patient is a 78y old  Female who presents with a chief complaint of Dm foot ulcer/ AKASH (18 Apr 2024 11:31)      INTERVAL HPI/OVERNIGHT EVENTS:        REVIEW OF SYSTEMS:        FAMILY HISTORY:  Family history of pseudocholinesterase deficiency (Child)      T(C): 35.7 (04-19-24 @ 05:26), Max: 36.1 (04-18-24 @ 21:03)  HR: 100 (04-19-24 @ 05:26) (86 - 115)  BP: 113/55 (04-19-24 @ 05:26) (113/55 - 144/62)  RR: 18 (04-18-24 @ 21:03) (18 - 18)  SpO2: 98% (04-19-24 @ 05:26) (98% - 100%)  Wt(kg): --Vital Signs Last 24 Hrs  T(C): 35.7 (19 Apr 2024 05:26), Max: 36.1 (18 Apr 2024 21:03)  T(F): 96.3 (19 Apr 2024 05:26), Max: 96.9 (18 Apr 2024 21:03)  HR: 100 (19 Apr 2024 05:26) (86 - 115)  BP: 113/55 (19 Apr 2024 05:26) (113/55 - 144/62)  BP(mean): --  RR: 18 (18 Apr 2024 21:03) (18 - 18)  SpO2: 98% (19 Apr 2024 05:26) (98% - 100%)    Parameters below as of 18 Apr 2024 13:27  Patient On (Oxygen Delivery Method): room air        PHYSICAL EXAM:  GENERAL: NAD, well-groomed, well-developed  HEAD:  Atraumatic, Normocephalic  EYES: EOMI, PERRLA, conjunctiva and sclera clear  ENMT: No tonsillar erythema, exudates, or enlargement; Moist mucous membranes, Good dentition, No lesions  NECK: Supple, No JVD, Normal thyroid  NERVOUS SYSTEM:  Alert & Oriented X3, Good concentration; Motor Strength 5/5 B/L upper and lower extremities; DTRs 2+ intact and symmetric  PULM: Clear to auscultation bilaterally  CARDIAC: Regular rate and rhythm; No murmurs, rubs, or gallops  GI: Soft, Nontender, Nondistended; Bowel sounds present  EXTREMITIES:  2+ Peripheral Pulses, No clubbing, cyanosis, or edema  LYMPH: No lymphadenopathy noted  SKIN: No rashes or lesions    Consultant(s) Notes Reviewed:  [x ] YES  [ ] NO  Care Discussed with Consultants/Other Providers [ x] YES  [ ] NO    LABS:                            11.7   5.46  )-----------( 119      ( 19 Apr 2024 07:14 )             37.1   04-19    140  |  99  |  55<H>  ----------------------------<  232<H>  4.2   |  27  |  1.5    Ca    9.8      19 Apr 2024 07:14  Mg     2.2     04-19    TPro  6.1  /  Alb  3.6  /  TBili  0.9  /  DBili  x   /  AST  18  /  ALT  9   /  AlkPhos  51  04-19            Culture - Blood (collected 17 Apr 2024 19:47)  Source: .Blood Blood  Preliminary Report (19 Apr 2024 03:02):    No growth at 24 hours    Culture - Blood (collected 17 Apr 2024 19:47)  Source: .Blood Blood  Preliminary Report (19 Apr 2024 03:02):    No growth at 24 hours      acetaminophen     Tablet .. 650 milliGRAM(s) Oral every 6 hours PRN  albuterol/ipratropium for Nebulization 3 milliLiter(s) Nebulizer every 6 hours  albuterol/ipratropium for Nebulization 3 milliLiter(s) Nebulizer every 6 hours PRN  aluminum hydroxide/magnesium hydroxide/simethicone Suspension 30 milliLiter(s) Oral every 4 hours PRN  aspirin  chewable 81 milliGRAM(s) Oral daily  atorvastatin 40 milliGRAM(s) Oral at bedtime  budesonide 160 MICROgram(s)/formoterol 4.5 MICROgram(s) Inhaler 2 Puff(s) Inhalation two times a day  cefTRIAXone   IVPB 2000 milliGRAM(s) IV Intermittent every 24 hours  chlorhexidine 2% Cloths 1 Application(s) Topical <User Schedule>  dextrose 10% Bolus 125 milliLiter(s) IV Bolus once  dextrose 5%. 1000 milliLiter(s) IV Continuous <Continuous>  dextrose 5%. 1000 milliLiter(s) IV Continuous <Continuous>  dextrose 50% Injectable 25 Gram(s) IV Push once  dextrose 50% Injectable 12.5 Gram(s) IV Push once  dextrose Oral Gel 15 Gram(s) Oral once PRN  glucagon  Injectable 1 milliGRAM(s) IntraMuscular once  heparin   Injectable 5000 Unit(s) SubCutaneous every 12 hours  insulin lispro (ADMELOG) corrective regimen sliding scale   SubCutaneous three times a day before meals  melatonin 3 milliGRAM(s) Oral at bedtime PRN  metoprolol succinate  milliGRAM(s) Oral daily  metroNIDAZOLE    Tablet 500 milliGRAM(s) Oral every 12 hours  montelukast 10 milliGRAM(s) Oral at bedtime  ondansetron Injectable 4 milliGRAM(s) IV Push every 8 hours PRN  pantoprazole    Tablet 40 milliGRAM(s) Oral before breakfast  polyethylene glycol 3350 17 Gram(s) Oral two times a day  predniSONE   Tablet 40 milliGRAM(s) Oral daily  pregabalin 75 milliGRAM(s) Oral daily  ranolazine 500 milliGRAM(s) Oral two times a day  senna 2 Tablet(s) Oral at bedtime  vancomycin  IVPB 1000 milliGRAM(s) IV Intermittent every 24 hours        78-year-old female   with history of hypertension, hyperlipidemia, DM2, A-fib not on AC after having Watchman procedure, CAD status post CABG, chronic anemia, peripheral vascular disease.  presenting with     1.Left Diabetic  foot ulcer:  - Admit to medicine                    - Left  foot xray:  a) Questionable cortical erosion at the level of the medial talus and navicular bones. Underlying osteomyelitis cannot be excluded. Ankle soft tissue swelling is noted.  b) Degenerative osseous changes and vascular calcifications.  - Cont vancomycin and levofloxacin   - Blood cx:NTD            - Cx:   - Local Wound Care; Wound Flushed w/ NS; Wound Packed w/ allevyn to bilateral wounds  - Arterial duplex: WNL   - PT consult: no needs     2. AKASH on CKD3   nephrololgy recs appreciated   monitor off diuretics   renal US results appreciated   Monitor renal function   outpt  and nephro follow-up     3. Hypokalemia / hypomagnesium  - mag sulfate IV 2g  - Kcl 40 x2 dose q4  - continue to monitor     4. Gout flare:  uric acid >10  - pred 40mg qd until symptoms improve     5. HTN:  - continue with home medications, and monitor BP    6. CAD stable  - continue with home medications    7. COPD:  Duoneb PRN  Budesonide  montelukast    8. Neuropathy:  lyrica 75mg po QD    9. constipation:  senna 2 tab po QHS    Prophylaxis GI/VTE      Family discussion:  plan of care was discussed with patient in details.  all questions were answered.  seems to understand, and in agreement  Disposition: PT   EVAN QUINN  78y  [facility] [address]      Patient is a 78y old  Female who presents with a chief complaint of Dm foot ulcer/ AKASH (18 Apr 2024 11:31)      INTERVAL HPI/OVERNIGHT EVENTS:    Patient still reporting pain but seems improving only got a dose of pain meds this morning  no other events note         FAMILY HISTORY:  Family history of pseudocholinesterase deficiency (Child)      T(C): 35.7 (04-19-24 @ 05:26), Max: 36.1 (04-18-24 @ 21:03)  HR: 100 (04-19-24 @ 05:26) (86 - 115)  BP: 113/55 (04-19-24 @ 05:26) (113/55 - 144/62)  RR: 18 (04-18-24 @ 21:03) (18 - 18)  SpO2: 98% (04-19-24 @ 05:26) (98% - 100%)  Wt(kg): --Vital Signs Last 24 Hrs  T(C): 35.7 (19 Apr 2024 05:26), Max: 36.1 (18 Apr 2024 21:03)  T(F): 96.3 (19 Apr 2024 05:26), Max: 96.9 (18 Apr 2024 21:03)  HR: 100 (19 Apr 2024 05:26) (86 - 115)  BP: 113/55 (19 Apr 2024 05:26) (113/55 - 144/62)  BP(mean): --  RR: 18 (18 Apr 2024 21:03) (18 - 18)  SpO2: 98% (19 Apr 2024 05:26) (98% - 100%)    Parameters below as of 18 Apr 2024 13:27  Patient On (Oxygen Delivery Method): room air        PHYSICAL EXAM:  GENERAL: NAD, well-groomed, well-developed  NERVOUS SYSTEM:  Alert & Oriented X3,   PULM: Clear to auscultation bilaterally  CARDIAC: Regular rate and rhythm;  GI: Soft, Nontender, Nondistended; Bowel sounds present  EXTREMITIES:  2+ Peripheral Pulse, left foot pulses positive, early venous insuff noted, left ulcer covered     Consultant(s) Notes Reviewed:  [x ] YES  [ ] NO  Care Discussed with Consultants/Other Providers [ x] YES  [ ] NO    LABS:                            11.7   5.46  )-----------( 119      ( 19 Apr 2024 07:14 )             37.1   04-19    140  |  99  |  55<H>  ----------------------------<  232<H>  4.2   |  27  |  1.5    Ca    9.8      19 Apr 2024 07:14  Mg     2.2     04-19    TPro  6.1  /  Alb  3.6  /  TBili  0.9  /  DBili  x   /  AST  18  /  ALT  9   /  AlkPhos  51  04-19            Culture - Blood (collected 17 Apr 2024 19:47)  Source: .Blood Blood  Preliminary Report (19 Apr 2024 03:02):    No growth at 24 hours    Culture - Blood (collected 17 Apr 2024 19:47)  Source: .Blood Blood  Preliminary Report (19 Apr 2024 03:02):    No growth at 24 hours      acetaminophen     Tablet .. 650 milliGRAM(s) Oral every 6 hours PRN  albuterol/ipratropium for Nebulization 3 milliLiter(s) Nebulizer every 6 hours  albuterol/ipratropium for Nebulization 3 milliLiter(s) Nebulizer every 6 hours PRN  aluminum hydroxide/magnesium hydroxide/simethicone Suspension 30 milliLiter(s) Oral every 4 hours PRN  aspirin  chewable 81 milliGRAM(s) Oral daily  atorvastatin 40 milliGRAM(s) Oral at bedtime  budesonide 160 MICROgram(s)/formoterol 4.5 MICROgram(s) Inhaler 2 Puff(s) Inhalation two times a day  cefTRIAXone   IVPB 2000 milliGRAM(s) IV Intermittent every 24 hours  chlorhexidine 2% Cloths 1 Application(s) Topical <User Schedule>  dextrose 10% Bolus 125 milliLiter(s) IV Bolus once  dextrose 5%. 1000 milliLiter(s) IV Continuous <Continuous>  dextrose 5%. 1000 milliLiter(s) IV Continuous <Continuous>  dextrose 50% Injectable 25 Gram(s) IV Push once  dextrose 50% Injectable 12.5 Gram(s) IV Push once  dextrose Oral Gel 15 Gram(s) Oral once PRN  glucagon  Injectable 1 milliGRAM(s) IntraMuscular once  heparin   Injectable 5000 Unit(s) SubCutaneous every 12 hours  insulin lispro (ADMELOG) corrective regimen sliding scale   SubCutaneous three times a day before meals  melatonin 3 milliGRAM(s) Oral at bedtime PRN  metoprolol succinate  milliGRAM(s) Oral daily  metroNIDAZOLE    Tablet 500 milliGRAM(s) Oral every 12 hours  montelukast 10 milliGRAM(s) Oral at bedtime  ondansetron Injectable 4 milliGRAM(s) IV Push every 8 hours PRN  pantoprazole    Tablet 40 milliGRAM(s) Oral before breakfast  polyethylene glycol 3350 17 Gram(s) Oral two times a day  predniSONE   Tablet 40 milliGRAM(s) Oral daily  pregabalin 75 milliGRAM(s) Oral daily  ranolazine 500 milliGRAM(s) Oral two times a day  senna 2 Tablet(s) Oral at bedtime  vancomycin  IVPB 1000 milliGRAM(s) IV Intermittent every 24 hours        78-year-old female   with history of hypertension, hyperlipidemia, DM2, A-fib not on AC after having Watchman procedure, CAD status post CABG, chronic anemia, peripheral vascular disease.  presenting with     1.Left Diabetic  foot ulcer with pain   - Admit to medicine             - ESR:pending        - CRP:Pending         - Left  foot xray:  a) Questionable cortical erosion at the level of the medial talus and navicular bones. Underlying osteomyelitis cannot be excluded. Ankle soft tissue swelling is noted.  b) Degenerative osseous changes and vascular calcifications.  - Cont vancomycin and levofloxacin   - Blood cx:NTD            - Cx:pending    - Local Wound Care; Wound Flushed w/ NS; Wound Packed w/ allevyn to bilateral wounds  - Arterial duplex: WNL   - PT consult: no needs     2. AKASH on CKD3   nephrololgy recs appreciated   monitor off diuretics   renal US results appreciated   Monitor renal function   outpt  and nephro follow-up     3. Hypokalemia / hypomagnesium  - mag sulfate IV 2g  - Kcl 40 x2 dose q4  - continue to monitor     4. Gout flare:  uric acid >10  - Cont prednisone 40mg po daily d:2     5. HTN:  - continue with home medications, and monitor BP    6. CAD stable  - continue with home medications    7. COPD:  Duoneb PRN  Budesonide  montelukast    8. Neuropathy:  lyrica 75mg po QD    9. constipation:  senna 2 tab po QHS    Prophylaxis GI/VTE      Disucss plan of care with patient and daughter.  Might need MRI if ESR elevated

## 2024-04-20 NOTE — PROGRESS NOTE ADULT - SUBJECTIVE AND OBJECTIVE BOX
EVAN QUINN  78y  [facility]-S 4I 023 B      Patient is a 78y old  Female who presents with a chief complaint of Dm foot ulcer/ AKASH (18 Apr 2024 11:31)      INTERVAL HPI/OVERNIGHT EVENTS:    Patient still reporting pain but seems improving only got a dose of pain meds this morning  no other events note         FAMILY HISTORY:  Family history of pseudocholinesterase deficiency (Child)      T(C): 35.7 (04-19-24 @ 05:26), Max: 36.1 (04-18-24 @ 21:03)  HR: 100 (04-19-24 @ 05:26) (86 - 115)  BP: 113/55 (04-19-24 @ 05:26) (113/55 - 144/62)  RR: 18 (04-18-24 @ 21:03) (18 - 18)  SpO2: 98% (04-19-24 @ 05:26) (98% - 100%)  Wt(kg): --Vital Signs Last 24 Hrs  T(C): 35.7 (19 Apr 2024 05:26), Max: 36.1 (18 Apr 2024 21:03)  T(F): 96.3 (19 Apr 2024 05:26), Max: 96.9 (18 Apr 2024 21:03)  HR: 100 (19 Apr 2024 05:26) (86 - 115)  BP: 113/55 (19 Apr 2024 05:26) (113/55 - 144/62)  BP(mean): --  RR: 18 (18 Apr 2024 21:03) (18 - 18)  SpO2: 98% (19 Apr 2024 05:26) (98% - 100%)    Parameters below as of 18 Apr 2024 13:27  Patient On (Oxygen Delivery Method): room air        PHYSICAL EXAM:  GENERAL: NAD, well-groomed, well-developed  NERVOUS SYSTEM:  Alert & Oriented X3,   PULM: Clear to auscultation bilaterally  CARDIAC: Regular rate and rhythm;  GI: Soft, Nontender, Nondistended; Bowel sounds present  EXTREMITIES:  2+ Peripheral Pulse, left foot pulses positive, early venous insuff noted, left ulcer covered     Consultant(s) Notes Reviewed:  [x ] YES  [ ] NO  Care Discussed with Consultants/Other Providers [ x] YES  [ ] NO    LABS:                            11.7   5.46  )-----------( 119      ( 19 Apr 2024 07:14 )             37.1   04-19    140  |  99  |  55<H>  ----------------------------<  232<H>  4.2   |  27  |  1.5    Ca    9.8      19 Apr 2024 07:14  Mg     2.2     04-19    TPro  6.1  /  Alb  3.6  /  TBili  0.9  /  DBili  x   /  AST  18  /  ALT  9   /  AlkPhos  51  04-19            Culture - Blood (collected 17 Apr 2024 19:47)  Source: .Blood Blood  Preliminary Report (19 Apr 2024 03:02):    No growth at 24 hours    Culture - Blood (collected 17 Apr 2024 19:47)  Source: .Blood Blood  Preliminary Report (19 Apr 2024 03:02):    No growth at 24 hours      acetaminophen     Tablet .. 650 milliGRAM(s) Oral every 6 hours PRN  albuterol/ipratropium for Nebulization 3 milliLiter(s) Nebulizer every 6 hours  albuterol/ipratropium for Nebulization 3 milliLiter(s) Nebulizer every 6 hours PRN  aluminum hydroxide/magnesium hydroxide/simethicone Suspension 30 milliLiter(s) Oral every 4 hours PRN  aspirin  chewable 81 milliGRAM(s) Oral daily  atorvastatin 40 milliGRAM(s) Oral at bedtime  budesonide 160 MICROgram(s)/formoterol 4.5 MICROgram(s) Inhaler 2 Puff(s) Inhalation two times a day  cefTRIAXone   IVPB 2000 milliGRAM(s) IV Intermittent every 24 hours  chlorhexidine 2% Cloths 1 Application(s) Topical <User Schedule>  dextrose 10% Bolus 125 milliLiter(s) IV Bolus once  dextrose 5%. 1000 milliLiter(s) IV Continuous <Continuous>  dextrose 5%. 1000 milliLiter(s) IV Continuous <Continuous>  dextrose 50% Injectable 25 Gram(s) IV Push once  dextrose 50% Injectable 12.5 Gram(s) IV Push once  dextrose Oral Gel 15 Gram(s) Oral once PRN  glucagon  Injectable 1 milliGRAM(s) IntraMuscular once  heparin   Injectable 5000 Unit(s) SubCutaneous every 12 hours  insulin lispro (ADMELOG) corrective regimen sliding scale   SubCutaneous three times a day before meals  melatonin 3 milliGRAM(s) Oral at bedtime PRN  metoprolol succinate  milliGRAM(s) Oral daily  metroNIDAZOLE    Tablet 500 milliGRAM(s) Oral every 12 hours  montelukast 10 milliGRAM(s) Oral at bedtime  ondansetron Injectable 4 milliGRAM(s) IV Push every 8 hours PRN  pantoprazole    Tablet 40 milliGRAM(s) Oral before breakfast  polyethylene glycol 3350 17 Gram(s) Oral two times a day  predniSONE   Tablet 40 milliGRAM(s) Oral daily  pregabalin 75 milliGRAM(s) Oral daily  ranolazine 500 milliGRAM(s) Oral two times a day  senna 2 Tablet(s) Oral at bedtime  vancomycin  IVPB 1000 milliGRAM(s) IV Intermittent every 24 hours        78-year-old female   with history of hypertension, hyperlipidemia, DM2, A-fib not on AC after having Watchman procedure, CAD status post CABG, chronic anemia, peripheral vascular disease.  presenting with     1.Left Diabetic  foot ulcer with pain   - Admit to medicine             - ESR:pending        - CRP:Pending         - Left  foot xray:  a) Questionable cortical erosion at the level of the medial talus and navicular bones. Underlying osteomyelitis cannot be excluded. Ankle soft tissue swelling is noted.  b) Degenerative osseous changes and vascular calcifications.  - Cont vancomycin and levofloxacin   - Blood cx:NTD            - Cx:pending    - MRI left foot:pending   - Local Wound Care; Wound Flushed w/ NS; Wound Packed w/ allevyn to bilateral wounds  - Arterial duplex: WNL   - PT consult: no needs     2. AKASH on CKD3   nephrololgy recs appreciated   monitor off diuretics   renal US results appreciated   Monitor renal function   outpt  and nephro follow-up     3. Hypokalemia / hypomagnesium  - mag sulfate IV 2g  - Kcl 40 x2 dose q4  - continue to monitor     4. Gout flare with hyperuricemia   uric acid >10  - Cont prednisone at lower dose 30mg po daily d:3  - Start low dose allopurinol 50mg po daily     5. HTN:  - continue with home medications, and monitor BP    6. CAD stable  - continue with home medications    7. COPD:  Duoneb PRN  Budesonide  montelukast    8. Neuropathy:  lyrica 75mg po QD    9. constipation:  senna 2 tab po QHS    Prophylaxis GI/VTE      Disucss plan of care with patient and daughter.     EVAN QUINN  78y  [facility]-S 4I 023 B      Patient is a 78y old  Female who presents with a chief complaint of Dm foot ulcer/ AKASH (18 Apr 2024 11:31)      INTERVAL HPI/OVERNIGHT EVENTS:    Patient pain is improving worse with ambulation   Podiatry asking for mri now   no other events noted   reported remote gout attack in the past         FAMILY HISTORY:  Family history of pseudocholinesterase deficiency (Child)      T(C): 35.7 (04-19-24 @ 05:26), Max: 36.1 (04-18-24 @ 21:03)  HR: 100 (04-19-24 @ 05:26) (86 - 115)  BP: 113/55 (04-19-24 @ 05:26) (113/55 - 144/62)  RR: 18 (04-18-24 @ 21:03) (18 - 18)  SpO2: 98% (04-19-24 @ 05:26) (98% - 100%)  Wt(kg): --Vital Signs Last 24 Hrs  T(C): 35.7 (19 Apr 2024 05:26), Max: 36.1 (18 Apr 2024 21:03)  T(F): 96.3 (19 Apr 2024 05:26), Max: 96.9 (18 Apr 2024 21:03)  HR: 100 (19 Apr 2024 05:26) (86 - 115)  BP: 113/55 (19 Apr 2024 05:26) (113/55 - 144/62)  BP(mean): --  RR: 18 (18 Apr 2024 21:03) (18 - 18)  SpO2: 98% (19 Apr 2024 05:26) (98% - 100%)    Parameters below as of 18 Apr 2024 13:27  Patient On (Oxygen Delivery Method): room air        PHYSICAL EXAM:  GENERAL: NAD, well-groomed, well-developed  NERVOUS SYSTEM:  Alert & Oriented X3,   PULM: Clear to auscultation bilaterally  CARDIAC: Regular rate and rhythm;  GI: Soft, Nontender, Nondistended; Bowel sounds present  EXTREMITIES:  2+ Peripheral Pulse, left foot pulses positive, early venous insuff noted, left ulcer covered     Consultant(s) Notes Reviewed:  [x ] YES  [ ] NO  Care Discussed with Consultants/Other Providers [ x] YES  [ ] NO    LABS:                            11.7   5.46  )-----------( 119      ( 19 Apr 2024 07:14 )             37.1   04-19    140  |  99  |  55<H>  ----------------------------<  232<H>  4.2   |  27  |  1.5    Ca    9.8      19 Apr 2024 07:14  Mg     2.2     04-19    TPro  6.1  /  Alb  3.6  /  TBili  0.9  /  DBili  x   /  AST  18  /  ALT  9   /  AlkPhos  51  04-19            Culture - Blood (collected 17 Apr 2024 19:47)  Source: .Blood Blood  Preliminary Report (19 Apr 2024 03:02):    No growth at 24 hours    Culture - Blood (collected 17 Apr 2024 19:47)  Source: .Blood Blood  Preliminary Report (19 Apr 2024 03:02):    No growth at 24 hours      acetaminophen     Tablet .. 650 milliGRAM(s) Oral every 6 hours PRN  albuterol/ipratropium for Nebulization 3 milliLiter(s) Nebulizer every 6 hours  albuterol/ipratropium for Nebulization 3 milliLiter(s) Nebulizer every 6 hours PRN  aluminum hydroxide/magnesium hydroxide/simethicone Suspension 30 milliLiter(s) Oral every 4 hours PRN  aspirin  chewable 81 milliGRAM(s) Oral daily  atorvastatin 40 milliGRAM(s) Oral at bedtime  budesonide 160 MICROgram(s)/formoterol 4.5 MICROgram(s) Inhaler 2 Puff(s) Inhalation two times a day  cefTRIAXone   IVPB 2000 milliGRAM(s) IV Intermittent every 24 hours  chlorhexidine 2% Cloths 1 Application(s) Topical <User Schedule>  dextrose 10% Bolus 125 milliLiter(s) IV Bolus once  dextrose 5%. 1000 milliLiter(s) IV Continuous <Continuous>  dextrose 5%. 1000 milliLiter(s) IV Continuous <Continuous>  dextrose 50% Injectable 25 Gram(s) IV Push once  dextrose 50% Injectable 12.5 Gram(s) IV Push once  dextrose Oral Gel 15 Gram(s) Oral once PRN  glucagon  Injectable 1 milliGRAM(s) IntraMuscular once  heparin   Injectable 5000 Unit(s) SubCutaneous every 12 hours  insulin lispro (ADMELOG) corrective regimen sliding scale   SubCutaneous three times a day before meals  melatonin 3 milliGRAM(s) Oral at bedtime PRN  metoprolol succinate  milliGRAM(s) Oral daily  metroNIDAZOLE    Tablet 500 milliGRAM(s) Oral every 12 hours  montelukast 10 milliGRAM(s) Oral at bedtime  ondansetron Injectable 4 milliGRAM(s) IV Push every 8 hours PRN  pantoprazole    Tablet 40 milliGRAM(s) Oral before breakfast  polyethylene glycol 3350 17 Gram(s) Oral two times a day  predniSONE   Tablet 40 milliGRAM(s) Oral daily  pregabalin 75 milliGRAM(s) Oral daily  ranolazine 500 milliGRAM(s) Oral two times a day  senna 2 Tablet(s) Oral at bedtime  vancomycin  IVPB 1000 milliGRAM(s) IV Intermittent every 24 hours        78-year-old female   with history of hypertension, hyperlipidemia, DM2, A-fib not on AC after having Watchman procedure, CAD status post CABG, chronic anemia, peripheral vascular disease.  presenting with     1.Left Diabetic  foot ulcer with pain   - Admit to medicine             - ESR:25       - CRP:8.1     - Left  foot xray:  a) Questionable cortical erosion at the level of the medial talus and navicular bones. Underlying osteomyelitis cannot be excluded. Ankle soft tissue swelling is noted.  b) Degenerative osseous changes and vascular calcifications.  - Cont vancomycin and levofloxacin   - Blood cx:NTD            - Cx:pending    - MRI left foot:pending   - Local Wound Care; Wound Flushed w/ NS; Wound Packed w/ allevyn to bilateral wounds  - Arterial duplex: WNL   - PT consult: no needs     2. AKASH on CKD3   nephrololgy recs appreciated   monitor off diuretics   renal US results appreciated   Monitor renal function   outpt  and nephro follow-up     3. Hypokalemia / hypomagnesium  - mag sulfate IV 2g  - Kcl 40 x2 dose q4  - continue to monitor     4. Gout flare with hyperuricemia   uric acid >10  - Cont prednisone at lower dose 30mg po daily d:3  - Start low dose allopurinol 50mg po daily     5. HTN:  - continue with home medications, and monitor BP    6. CAD stable  - continue with home medications    7. COPD:  Duoneb PRN  Budesonide  montelukast    8. Neuropathy:  lyrica 75mg po QD    9. constipation:  senna 2 tab po QHS    Prophylaxis GI/VTE    Plan for MRI left foot as pr podiatry. ESR not in favor of OM   Start low dose allopurinol for hyperuricemia

## 2024-04-21 NOTE — DISCHARGE NOTE NURSING/CASE MANAGEMENT/SOCIAL WORK - PATIENT PORTAL LINK FT
You can access the FollowMyHealth Patient Portal offered by [facility] by registering at the following website: [email or web address]. By joining [employer] [facility]’s FollowMyHealth portal, you will also be able to view your health information using other applications (apps) compatible with our system.

## 2024-04-21 NOTE — DISCHARGE NOTE PROVIDER - ATTENDING DISCHARGE PHYSICAL EXAMINATION:
VITALS:   T(C): 37.1 (04-21-24 @ 04:37), Max: 37.6 (04-20-24 @ 20:10)  HR: 107 (04-21-24 @ 04:37) (91 - 110)  BP: 112/60 (04-21-24 @ 04:37) (110/76 - 118/71)  RR: 18 (04-21-24 @ 04:37) (18 - 18)  SpO2: 99% (04-21-24 @ 04:37) (99% - 100%)    GENERAL: NAD, lying in bed comfortably  HEART: Regular rate and rhythm,  LUNGS: Unlabored respirations.  Clear to auscultation bilaterally,  ABDOMEN: Soft, nontender, nondistended, +BS  EXTREMITIES: 2+ peripheral pulses bilaterally. foot ulcer covered   NERVOUS SYSTEM:  A&Ox3,

## 2024-04-21 NOTE — DISCHARGE NOTE PROVIDER - NSDCCPCAREPLAN_GEN_ALL_CORE_FT
PRINCIPAL DISCHARGE DIAGNOSIS  Diagnosis: Cellulitis  Assessment and Plan of Treatment: with foot ulcer.   - Take bactrim ds po bid for 7 more days. need K check within one week with PMD  - Consider holding spironolactone while on bactrim if u're not taking metolazone         SECONDARY DISCHARGE DIAGNOSES  Diagnosis: Hyperuricemia  Assessment and Plan of Treatment: Start low dose allopurinol 50mg po daily   Finish a course of prednisone

## 2024-04-21 NOTE — DISCHARGE NOTE PROVIDER - CARE PROVIDER_API CALL
Ayad Phipps  Internal Medicine  [address] [place] [address]  Phone: [phone]  Fax: [phone]  Established Patient  Follow Up Time: 1 week

## 2024-04-21 NOTE — DISCHARGE NOTE PROVIDER - NSDCMRMEDTOKEN_GEN_ALL_CORE_FT
allopurinol 100 mg oral tablet: 0.5 orally once a day  aspirin 81 mg oral capsule: 1 cap(s) orally once a day  Bactrim  mg-160 mg oral tablet: 1 tab(s) orally 2 times a day  budesonide-formoterol 80 mcg-4.5 mcg/inh inhalation aerosol: 2 puff(s) inhaled 2 times a day  desvenlafaxine (as succinate) 25 mg oral tablet, extended release: 1 tab(s) orally once a day  furosemide 40 mg oral tablet: 1 tab(s) orally once a day  glipiZIDE 5 mg oral tablet: 1 tab(s) orally once a day  ipratropium-albuterol 0.5 mg-2.5 mg/3 mL inhalation solution: 3 milliliter(s) inhaled every 4 hours  Lyrica 75 mg oral capsule: 1 cap(s) orally once a day  metOLazone 5 mg oral tablet: 1 tab(s) orally once a day as needed for fluid overload  metoprolol succinate 200 mg oral tablet, extended release: 1 tab(s) orally once a day  montelukast 10 mg oral tablet: 1 tab(s) orally once a day  polyethylene glycol 3350 oral powder for reconstitution: 17 gram(s) orally 2 times a day  predniSONE 10 mg oral tablet: 2 tab(s) orally once a day Start with prednisone 20,20,10,10 then stop  Protonix 40 mg oral delayed release tablet: 1 tab(s) orally once a day  Ranexa 500 mg oral tablet, extended release: 1 tab(s) orally 2 times a day  rosuvastatin 10 mg oral tablet: 1 tab(s) orally once a day  senna leaf extract oral tablet: 2 tab(s) orally once a day (at bedtime)  spironolactone 25 mg oral tablet: 0.5 tab(s) orally once a day Hold while taking bactrim

## 2024-04-21 NOTE — DISCHARGE NOTE PROVIDER - HOSPITAL COURSE
78-year-old female   with history of hypertension, hyperlipidemia, DM2, A-fib not on AC after having Watchman procedure, CAD status post CABG, chronic anemia, peripheral vascular disease.  presenting with     1.Left Diabetic  foot ulcer with pain   - Admit to medicine             - ESR:25       - CRP:8.1     - Left  foot xray:  a) Questionable cortical erosion at the level of the medial talus and navicular bones. Underlying osteomyelitis cannot be excluded. Ankle soft tissue swelling is noted.  b) Degenerative osseous changes and vascular calcifications.  - Was on vancomycin and levofloxacin . DC on bactrim ds po bid for 10 days   - Blood cx:NTD            - Cx:pending    - MRI left foot:Pt wants to defer it to outpatient   - Local Wound Care; Wound Flushed w/ NS; Wound Packed w/ allevyn to bilateral wounds  - Arterial duplex: WNL   - PT consult: no needs     2. Chronic kidney disease  * no real acute kidney injury   nephrololgy recs appreciated   renal US results appreciated   Monitor renal function     3. Hypokalemia / hypomagnesium resolved       4. Gout flare with hyperuricemia   uric acid >10  - Cont prednisone at lower dose 30mg po daily d:3. DC on prednisone taper   - Start low dose allopurinol 50mg po daily . DC on allopurinol 50mg po daily     5. HTN:  - continue with home medications, and monitor BP    6. CAD stable  - continue with home medications    7. COPD:  Duoneb PRN  Budesonide  montelukast    8. Neuropathy:  lyrica 75mg po QD    9. constipation:  senna 2 tab po QHS

## 2024-05-09 NOTE — H&P ADULT - ATTENDING COMMENTS
Trauma Attending Note Attestation   Patient was examined and evaluated at the bedside at 8:45 AM. Medications, radiological studies and all other relevant studies reviewed.     78y Female with PMH COPD, HTN, HLD, DM, afib s/p watchman procedure, CAD s/p CABG, PVD, chronic anemia s/p mechanical fall. Was vacuuming when she tripped on rolling walker, falling face forward. Found by daughter and brought by ambulance and complaining of neck pain. No numbness or paresthesias. No weakness. No abdominal pain, chest pain, SOB, back pain.    Vital Signs Last 24 Hrs  T(C): 36.5 (09 May 2024 09:51), Max: 37.1 (09 May 2024 00:33)  T(F): 97.7 (09 May 2024 09:51), Max: 98.8 (09 May 2024 00:33)  HR: 69 (09 May 2024 09:51) (69 - 102)  BP: 142/73 (09 May 2024 09:51) (107/69 - 142/73)  BP(mean): --  RR: 18 (09 May 2024 09:51) (18 - 20)  SpO2: 100% (09 May 2024 09:51) (97% - 100%)    Parameters below as of 09 May 2024 09:51  Patient On (Oxygen Delivery Method): room air    Primary:  Airway - intact  Breathing - breath sounds bilaterally  Circulation - 2+ throughout  Disability - GCS 15, moving all extremities  Exposure - patient was exposed    I independently performed a medically appropriate exam. I have made revisions to the physical exam in the note above and agree with the exam as written.                          11.8   6.69  )-----------( 123      ( 09 May 2024 00:55 )             37.6     05-09    135  |  94<L>  |  47<H>  ----------------------------<  190<H>  3.3<L>   |  30  |  1.4    Ca 9.5; Mg x ; Phos x       ( 09 May 2024 00:55 )  Alb: 3.9 g/dL / Pro: 6.8 g/dL / AlkPhos: 61 U/L / ALT: 22 U/L / AST: 74 U/L / GGT:x     / Tbili 0.7 mg/dL/ Dbili x     / Indbili x        PT/INR/PTT - ( 09 May 2024 00:55 )   PT: 13.30 sec; INR: 1.16 ratio; PTT 28.5 sec         Blood Gas Venous - Lactate: 1.4 mmol/L (05-09 @ 04:02)  Lactate, Blood: 1.7 mmol/L (05-09 @ 00:55)      CXR reviewed and interpreted by me - no hemothorax/pneumothorax  CTH/Csp reviewed and interpreted by me - left frontal scalp hematoma, type II dens fracture  CT Max/Face reviewed and interpreted by me - no acute traumatic injuries  CT C/A/P reviewed and interpreted by me - no acute traumatic injuries; subxiphoid hernia with non-obstructive loop of transverse colon    Assessment/Plan:  78y Female PMH COPD, HTN, HLD, DM, afib s/p watchman procedure, CAD s/p CABG, PVD, chronic anemia s/p mechanical fall with left frontal scalp hematoma, small lip laceration, and type II dens fracture.  - Admit to trauma surgery  - Type II dens fracture - maintain hard collar, will need CTA neck to evaluate for BCVI, PT/OT evaluation, neurosurgery consulted - maintain hard collar, MRI C spine when able  - Hypokalemia - 3.3 on trauma labs, will plan to give 40mEq of potassium chloride and re-check on BMP tonight  - Posttraumatic pain - multimodal pain therapy  - Neuropathic pain - continue home pregabalin 75mg daily  - COPD - albuterol 2 puffs Q6H, symbicort 80mcg/4.5mcg 2 puffs BID, singular 10mg daily  - HTN/CAD - lasix 40mg daily, metoprolol 200mg daily, ranexa 500mg BID, spironolactone 12.5mg daily, ASA-81mg daily  - HLD - atorvastatin 40mg QHS  - Diabetes - QAC and QHS finger sticks, sliding scale insulin, hold home oral and injectable antihyperglycemics, (HgbA1c 7.4% in April 2024)  - DVT ppx - SQH    Dequan Mirza MD  Trauma/Acute Care Surgery/Surgical Critical Care Attending Trauma Attending Note Attestation   Patient was examined and evaluated at the bedside at 8:45 AM. Medications, radiological studies and all other relevant studies reviewed.     78y Female with PMH COPD, HTN, HLD, DM, afib s/p watchman procedure, CAD s/p CABG, PVD, chronic anemia s/p mechanical fall. Was vacuuming when she tripped on rolling walker, falling face forward. Found by daughter and brought by ambulance and complaining of neck pain. No numbness or paresthesias. No weakness. No abdominal pain, chest pain, SOB, back pain.    Vital Signs Last 24 Hrs  T(C): 36.5 (09 May 2024 09:51), Max: 37.1 (09 May 2024 00:33)  T(F): 97.7 (09 May 2024 09:51), Max: 98.8 (09 May 2024 00:33)  HR: 69 (09 May 2024 09:51) (69 - 102)  BP: 142/73 (09 May 2024 09:51) (107/69 - 142/73)  BP(mean): --  RR: 18 (09 May 2024 09:51) (18 - 20)  SpO2: 100% (09 May 2024 09:51) (97% - 100%)    Parameters below as of 09 May 2024 09:51  Patient On (Oxygen Delivery Method): room air    Primary:  Airway - intact  Breathing - breath sounds bilaterally  Circulation - 2+ throughout  Disability - GCS 15, moving all extremities  Exposure - patient was exposed    I independently performed a medically appropriate exam. I have made revisions to the physical exam in the note above and agree with the exam as written.                          11.8   6.69  )-----------( 123      ( 09 May 2024 00:55 )             37.6     05-09    135  |  94<L>  |  47<H>  ----------------------------<  190<H>  3.3<L>   |  30  |  1.4    Ca 9.5; Mg x ; Phos x       ( 09 May 2024 00:55 )  Alb: 3.9 g/dL / Pro: 6.8 g/dL / AlkPhos: 61 U/L / ALT: 22 U/L / AST: 74 U/L / GGT:x     / Tbili 0.7 mg/dL/ Dbili x     / Indbili x        PT/INR/PTT - ( 09 May 2024 00:55 )   PT: 13.30 sec; INR: 1.16 ratio; PTT 28.5 sec         Blood Gas Venous - Lactate: 1.4 mmol/L (05-09 @ 04:02)  Lactate, Blood: 1.7 mmol/L (05-09 @ 00:55)      CXR reviewed and interpreted by me - no hemothorax/pneumothorax  CTH/Csp reviewed and interpreted by me - left frontal scalp hematoma, type II dens fracture  CT Max/Face reviewed and interpreted by me - no acute traumatic injuries  CT C/A/P reviewed and interpreted by me - no acute traumatic injuries; subxiphoid hernia with non-obstructive loop of transverse colon    Assessment/Plan:  78y Female PMH COPD, HTN, HLD, DM, gout, afib s/p watchman procedure, CAD s/p CABG, PVD, chronic anemia s/p mechanical fall with left frontal scalp hematoma, small lip laceration, and type II dens fracture.  - Admit to trauma surgery  - Type II dens fracture - maintain hard collar, will need CTA neck to evaluate for BCVI, PT/OT evaluation, neurosurgery consulted - maintain hard collar, MRI C spine when able  - Hypokalemia - 3.3 on trauma labs, will plan to give 40mEq of potassium chloride and re-check on BMP tonight  - Posttraumatic pain - multimodal pain therapy  - Neuropathic pain - continue home pregabalin 75mg daily  - COPD - albuterol 2 puffs Q6H, symbicort 80mcg/4.5mcg 2 puffs BID, singular 10mg daily  - HTN/CAD - lasix 40mg daily, metoprolol 200mg daily, ranexa 500mg BID, spironolactone 12.5mg daily, ASA-81mg daily  - HLD - atorvastatin 40mg QHS  - Diabetes - QAC and QHS finger sticks, sliding scale insulin, hold home oral and injectable antihyperglycemics, (HgbA1c 7.4% in April 2024)  - Gout - continue allopurinol 50mg daily  - Lip laceration - will clean up and consider suture repair, otherwise local wound care  - DVT ppx - SQH    Dequan Mirza MD  Trauma/Acute Care Surgery/Surgical Critical Care Attending

## 2024-05-09 NOTE — CONSULT NOTE ADULT - ASSESSMENT
IMPRESSION: Rehab of multitrauma / s/p fall with head trauma, no LOC, C2 fx, L wrist and left knee sprain / hypertension, hyperlipidemia, DM2, A-fib not on AC after having Watchman procedure, CAD status post CABG, chronic anemia, peripheral vascular disease    PRECAUTIONS: [   ] Cardiac  [   ] Respiratory  [   ] Seizures [   ] Contact Isolation  [   ] Droplet Isolation  [   ] Other    Weight Bearing Status:     RECOMMENDATION:    Out of Bed to Chair     DVT/Decubiti Prophylaxis    REHAB PLAN:     [  x  ] Bedside P/T 3-5 times a week   [  x  ]   Bedside O/T  2-3 times a week             [    ] Speech Therapy               [    ]  No Rehab Therapy Indicated   Conditioning/ROM                                    ADL  Bed Mobility                                               Conditioning/ROM  Transfers                                                     Bed Mobility  Sitting /Standing Balance                         Transfers                                        Gait Training                                               Sitting/Standing Balance  Stair Training [   ]Applicable                    Home equipment Eval                                                                        Splinting  [   ] Only      GOALS:   ADL   [x    ]   Independent                    Transfers  [ x   ] Independent                          Ambulation  [  x  ] Independent     [   x  ] With device                            [    ]  CG                                                         [    ]  CG                                                                  [    ] CG                            [    ] Min A                                                   [    ] Min A                                                              [    ] Min  A          DISCHARGE PLAN:   [    ]  Good candidate for Intensive Rehabilitation/Hospital based                                             Will tolerate 3hrs Intensive Rehab Daily                                       [     ]  Short Term Rehab in Skilled Nursing Facility                                       [     ]  Home with Outpatient or  services                                         [ x    ]  Possible Candidate for Intensive Hospital based Rehab                                        IMPRESSION: Rehab of C2 dens fx  / s/p fall with head trauma, no LOC / hypertension, hyperlipidemia, DM2, A-fib not on AC after having Watchman procedure, CAD status post CABG, chronic anemia, peripheral vascular disease    PRECAUTIONS: [   ] Cardiac  [   ] Respiratory  [   ] Seizures [   ] Contact Isolation  [   ] Droplet Isolation  [   ] Other    Weight Bearing Status:     RECOMMENDATION:    Out of Bed to Chair     DVT/Decubiti Prophylaxis    REHAB PLAN:     [  x  ] Bedside P/T 3-5 times a week   [  x  ]   Bedside O/T  2-3 times a week             [    ] Speech Therapy               [    ]  No Rehab Therapy Indicated   Conditioning/ROM                                    ADL  Bed Mobility                                               Conditioning/ROM  Transfers                                                     Bed Mobility  Sitting /Standing Balance                         Transfers                                        Gait Training                                               Sitting/Standing Balance  Stair Training [   ]Applicable                    Home equipment Eval                                                                        Splinting  [   ] Only      GOALS:   ADL   [x    ]   Independent                    Transfers  [ x   ] Independent                          Ambulation  [  x  ] Independent     [   x  ] With device                            [    ]  CG                                                         [    ]  CG                                                                  [    ] CG                            [    ] Min A                                                   [    ] Min A                                                              [    ] Min  A          DISCHARGE PLAN:   [    ]  Good candidate for Intensive Rehabilitation/Hospital based                                             Will tolerate 3hrs Intensive Rehab Daily                                       [     ]  Short Term Rehab in Skilled Nursing Facility                                       [     ]  Home with Outpatient or VN services                                         [ x    ]  Possible Candidate for Intensive Hospital based Rehab

## 2024-05-09 NOTE — ED PROVIDER NOTE - PHYSICAL EXAMINATION
Vital Signs: I have reviewed the initial vital signs.  Constitutional: Appears stated age, in no acute distress.  HEENT: Airway patent, moist MM, no erythema/swelling/deformity of oral structures. EOMI, PERRLA.  +small abrasion to upper philtrum, no other trauma.   CV: Regular rate, regular rhythm, well-perfused extremities, 2+ pulses bilaterally. DP/PT and radial pulses equal.  Lungs: CTAB, no increased WOB.  ABD: NTND, no guarding or rebound, no pulsatile masses, no hernias, no flank pain.   MSK: +right paracervical tenderness, no midline tenderness or stepoff/deformit, stabilized in [place] c-spine collar. Chest nontender. Back nontender in TLS spine or to bilateral bony structures. Extremities nontender, normal ROM, no deformities. +dried blood to left hand, no snuffbox tenderness.   INTEG: Skin warm, dry, no rash.  NEURO: A&Ox3, moving all extremities, normal speech.   PSYCH: Calm, cooperative, normal affect and interaction. Vital Signs: I have reviewed the initial vital signs.  Constitutional: Appears stated age, in no acute distress.  HEENT: Airway patent, moist MM, no erythema/swelling/deformity of oral structures. EOMI, PERRLA.  +small abrasion to upper philtrum, no other trauma.   CV: Regular rate, regular rhythm, well-perfused extremities, 2+ pulses bilaterally. DP/PT and radial pulses equal.  Lungs: CTAB, no increased WOB.  ABD: NTND, no guarding or rebound, no pulsatile masses, no hernias, no flank pain.   MSK: +right paracervical tenderness, no midline tenderness or stepoff/deformit, stabilized in [place] c-spine collar. Chest nontender. Back nontender in TLS spine or to bilateral bony structures. Extremities nontender, normal ROM, no deformities. +dried blood to left hand, no snuffbox tenderness. +superficial abrasion to right knee w/o udnerlying tenderness or pain elicited w/ ROM.   INTEG: Skin warm, dry, no rash.  NEURO: A&Ox3, moving all extremities, normal speech.   PSYCH: Calm, cooperative, normal affect and interaction. Vital Signs: I have reviewed the initial vital signs.  Constitutional: Appears stated age, in no acute distress.  HEENT: Airway patent, moist MM, no erythema/swelling/deformity of oral structures. EOMI, PERRLA.  +small abrasion to upper philtrum, no other trauma.   CV: Regular rate, regular rhythm, well-perfused extremities, 2+ pulses bilaterally. DP/PT and radial pulses equal.  Lungs: CTAB, no increased WOB.  ABD: NTND, no guarding or rebound, no pulsatile masses, no hernias, no flank pain.   MSK: +right paracervical tenderness, no midline tenderness or stepoff/deformit, stabilized in [place] c-spine collar. Chest nontender. Back nontender in TLS spine or to bilateral bony structures. Extremities nontender, normal ROM, no deformities. +dried blood to left hand, no snuffbox tenderness. +superficial abrasion to right knee w/o underlying tenderness or pain elicited w/ ROM.   INTEG: Skin warm, dry, no rash.  NEURO: A&Ox3, moving all extremities, normal speech.   PSYCH: Calm, cooperative, normal affect and interaction. Vital Signs: I have reviewed the initial vital signs.  Constitutional: Appears stated age, in no acute distress.  HEENT: Airway patent, moist MM, no erythema/swelling/deformity of oral structures. EOMI, PERRLA.  +small abrasion to upper philtrum, no other trauma.   CV: Regular rate, regular rhythm, well-perfused extremities, 2+ pulses bilaterally. DP/PT and radial pulses equal.  Lungs: CTAB, no increased WOB.  ABD: NTND, no guarding or rebound, no pulsatile masses, no hernias, no flank pain.   MSK: +right paracervical tenderness, no stepoff/deformity, stabilized in [place] c-spine collar. Chest nontender. Back nontender in TLS spine or to bilateral bony structures. Extremities nontender, normal ROM, no deformities. +dried blood to left hand, no snuffbox tenderness. +superficial abrasion to right knee w/o underlying tenderness or pain elicited w/ ROM.   INTEG: Skin warm, dry, no rash.  NEURO: A&Ox3, moving all extremities, normal speech.   PSYCH: Calm, cooperative, normal affect and interaction.

## 2024-05-09 NOTE — ED ADULT NURSE REASSESSMENT NOTE - NS ED NURSE REASSESS COMMENT FT1
pt assessed. A/Ox4. s/p mechanical fall yesterday. c-collar in place. VSS. safety precautions maintained.

## 2024-05-09 NOTE — ED PROVIDER NOTE - OBJECTIVE STATEMENT
79yo female presenting s/p a mechanical trip and fall at home in which she was reaching forward to get her vacuum and fell forward striking the front of her face; pt c/o neck pain, denies any other complaints. No LOC, no numbness/tingling/weakness, no subsequent cp/sob or dizziness/lightheadedness/syncope. She denies dental trauma.     PMHx: hypertension, hyperlipidemia, DM2, A-fib not on AC after having Watchman procedure, CAD status post CABG, chronic anemia, peripheral vascular disease

## 2024-05-09 NOTE — CONSULT NOTE ADULT - SUBJECTIVE AND OBJECTIVE BOX
HPI: Patient is a 78 year-old female PMH AS, DM, COPD, CAD who presented to ED s/p mechanical fall last night. Patient claims she was vacuuming while using her RW and fell face down onto the ground. Neurosurgery was consulted after CT C spine demonstrated type 2 dens fracture. Patient was seen and examined at bedside in ED. She is lying in bed, A&Ox3, in [place] collar, and following all commands. She endorses localized midline neck pain, worse upon movement, and denies radiation of pain into her extremities, paresthesias, weakness, headaches at this time.       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      CAD (coronary artery disease), native coronary artery      Anemia      History of bleeding disorder  having work up 5/2/21- HAD WORKUP BUT NO DIAGNOSIS "NOTHING WAS FOUND"      History of transfusion reaction      Hiatal hernia      H/O ascending aortic replacement  Bovine Replacement      S/P CABG x 1  complication of bleeding 2016      H/O total knee replacement, right  complicated with fx femur bars screws in femur- b/l knee replacement      S/P hysterectomy      Presence of Watchman left atrial appendage closure device          Home Medications:  aspirin 81 mg oral capsule: 1 cap(s) orally once a day (17 Apr 2024 23:25)  desvenlafaxine (as succinate) 25 mg oral tablet, extended release: 1 tab(s) orally once a day (17 Apr 2024 23:25)  furosemide 40 mg oral tablet: 1 tab(s) orally once a day (17 Apr 2024 23:25)  glipiZIDE 5 mg oral tablet: 1 tab(s) orally once a day (17 Apr 2024 23:25)  Lyrica 75 mg oral capsule: 1 cap(s) orally once a day (17 Apr 2024 23:25)  montelukast 10 mg oral tablet: 1 tab(s) orally once a day (17 Apr 2024 23:25)  polyethylene glycol 3350 oral powder for reconstitution: 17 gram(s) orally 2 times a day (17 Apr 2024 23:25)  Protonix 40 mg oral delayed release tablet: 1 tab(s) orally once a day (17 Apr 2024 23:25)  Ranexa 500 mg oral tablet, extended release: 1 tab(s) orally 2 times a day (17 Apr 2024 23:25)  rosuvastatin 10 mg oral tablet: 1 tab(s) orally once a day (17 Apr 2024 23:25)  senna leaf extract oral tablet: 2 tab(s) orally once a day (at bedtime) (17 Apr 2024 23:25)      Allergies    Augmentin (Other)  penicillins (Hives; Rash)    Intolerances        ROS:  [X] A ten-point review of systems is negative except as noted   [  ] Due to altered mental status/intubation, subjective information were not able to be obtained from the patient. History was obtained, to the extent possible, from review of the chart and collateral sources of information    MEDICATIONS  (STANDING):    MEDICATIONS  (PRN):      ICU Vital Signs Last 24 Hrs  T(C): 37.1 (09 May 2024 00:33), Max: 37.1 (09 May 2024 00:33)  T(F): 98.8 (09 May 2024 00:33), Max: 98.8 (09 May 2024 00:33)  HR: 102 (09 May 2024 00:33) (102 - 102)  BP: 140/70 (09 May 2024 00:33) (140/70 - 140/70)  BP(mean): --  ABP: --  ABP(mean): --  RR: 18 (09 May 2024 00:33) (18 - 18)  SpO2: 97% (09 May 2024 00:33) (97% - 97%)    O2 Parameters below as of 09 May 2024 00:33  Patient On (Oxygen Delivery Method): room air            I&O's Detail      CBC Full  -  ( 09 May 2024 00:55 )  WBC Count : 6.69 K/uL  RBC Count : 4.45 M/uL  Hemoglobin : 11.8 g/dL  Hematocrit : 37.6 %  Platelet Count - Automated : 123 K/uL  Mean Cell Volume : 84.5 fL  Mean Cell Hemoglobin : 26.5 pg  Mean Cell Hemoglobin Concentration : 31.4 g/dL  Auto Neutrophil # : 4.64 K/uL  Auto Lymphocyte # : 1.32 K/uL  Auto Monocyte # : 0.63 K/uL  Auto Eosinophil # : 0.04 K/uL  Auto Basophil # : 0.02 K/uL  Auto Neutrophil % : 69.4 %  Auto Lymphocyte % : 19.7 %  Auto Monocyte % : 9.4 %  Auto Eosinophil % : 0.6 %  Auto Basophil % : 0.3 %    05-09    134<L>  |  94<L>  |  46<H>  ----------------------------<  231<H>  TNP/HEMOLYZED Hemolyzed. Interpret with caution  Critical value:  NOTIFIED DR HYATT 05/09/2024 03:21:12 EDT   |  27  |  1.6<H>    Ca    9.6      09 May 2024 00:55    TPro  6.8  /  Alb  3.9  /  TBili  0.7  /  DBili  x   /  AST  74<H>  /  ALT  22  /  AlkPhos  61  05-09        Urinalysis Basic - ( 09 May 2024 00:55 )    Color: x / Appearance: x / SG: x / pH: x  Gluc: 231 mg/dL / Ketone: x  / Bili: x / Urobili: x   Blood: x / Protein: x / Nitrite: x   Leuk Esterase: x / RBC: x / WBC x   Sq Epi: x / Non Sq Epi: x / Bacteria: x        Physical Exam:  General: Lying in bed, [place] collar in place, following all commands  AAOX3. Verbal function intact  Facial motions symmetric  EOMI  Motor: MAEx4  5/5 strength in b/l UE's and LE's  Hoffmans: Negative b/l  Sensation: intact to touch in all extremities        Imaging:  < from: CT Cervical Spine No Cont (05.09.24 @ 01:56) >  IMPRESSION:    CT HEAD:  Left frontal scalp hematoma.    CT FACIAL BONES:  No acute fracture of the face.    CT CERVICAL SPINE:  Type II dens fracture with minimal anterior displacement of 3 mm.    Questionable nondisplaced fracture in the anterior tubercle of the atlas   versus a nutrient groove.        Assessment/Plan:  78 year-old female PMH AS, DM, COPD, CAD s/p fall found to have type 2 dens fracture.   -Maintain in hard collar, provide [place] J  -MRI C spine if no contraindications  -F/u trauma reccs  -Spinal precautions  -Discussed with attending

## 2024-05-09 NOTE — CONSULT NOTE ADULT - SUBJECTIVE AND OBJECTIVE BOX
HPI:  77 y/o female with PMH of COPD not on home O2, HTN, HLD, DM, Afib not on AC (watchman in place), CAD s/p CABG, PVD, chronic anemia and recent LLE cellulitis that was seen as a trauma alert s/p mechanical fall +HT, -LOC, -AC. Patient reports she was vacuuming when she tripped on her rolling walker and falling face forward. Patient was found by daughter and ambulance was called. In the ED, patient is complaining of neck pain, AAOX3, NAD, GCS 15.    < from: CT Head No Cont (05.09.24 @ 01:50) >  IMPRESSION:    CT HEAD:  No acute intracranial pathology.    Left frontal scalp hematoma.    CT FACIAL BONES:  No acute fracture of theface.    CT CERVICAL SPINE:  Acute type II dens fracture with minimal posterior displacement of the   odontoid process fragment.    < end of copied text >    Neurosurgery eval:   -Maintain in hard collar, provide [place] J  -MRI C spine if no contraindications      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      CAD (coronary artery disease), native coronary artery      Anemia      History of bleeding disorder  having work up 5/2/21- HAD WORKUP BUT NO DIAGNOSIS "NOTHING WAS FOUND"      History of transfusion reaction      Hiatal hernia      H/O ascending aortic replacement  Bovine Replacement      S/P CABG x 1  complication of bleeding 2016      H/O total knee replacement, right  complicated with fx femur bars screws in femur- b/l knee replacement      S/P hysterectomy      Presence of Watchman left atrial appendage closure device          Hospital Course:    TODAY'S SUBJECTIVE & REVIEW OF SYMPTOMS:     Constitutional WNL   Cardio WNL   Resp WNL   GI WNL  Heme WNL  Endo WNL  Skin WNL  MSK neck pain, left wrist and left knee pain   Neuro WNL  Cognitive WNL  Psych WNL      MEDICATIONS  (STANDING):  acetaminophen     Tablet .. 650 milliGRAM(s) Oral every 6 hours  albuterol    90 MICROgram(s) HFA Inhaler 2 Puff(s) Inhalation every 6 hours  allopurinol 50 milliGRAM(s) Oral daily  aspirin enteric coated 81 milliGRAM(s) Oral daily  atorvastatin 40 milliGRAM(s) Oral at bedtime  budesonide  80 MICROgram(s)/formoterol 4.5 MICROgram(s) Inhaler 2 Puff(s) Inhalation two times a day  dextrose 50% Injectable 25 Gram(s) IV Push once  furosemide    Tablet 40 milliGRAM(s) Oral daily  glucagon  Injectable 1 milliGRAM(s) IntraMuscular once  heparin   Injectable 5000 Unit(s) SubCutaneous every 8 hours  insulin lispro (ADMELOG) corrective regimen sliding scale   SubCutaneous Before meals and at bedtime  metoprolol succinate  milliGRAM(s) Oral daily  montelukast 10 milliGRAM(s) Oral daily  pantoprazole    Tablet 40 milliGRAM(s) Oral before breakfast  polyethylene glycol 3350 17 Gram(s) Oral two times a day  pregabalin 75 milliGRAM(s) Oral daily  ranolazine 500 milliGRAM(s) Oral two times a day  senna 2 Tablet(s) Oral at bedtime  sodium chloride 0.9%. 1000 milliLiter(s) (75 mL/Hr) IV Continuous <Continuous>  spironolactone 12.5 milliGRAM(s) Oral daily    MEDICATIONS  (PRN):  metolazone 5 milliGRAM(s) Oral daily PRN fluid overload  traMADol 50 milliGRAM(s) Oral every 4 hours PRN Moderate Pain (4 - 6)      FAMILY HISTORY:  Family history of pseudocholinesterase deficiency (Child)        Allergies    Augmentin (Other)  penicillins (Hives; Rash)    Intolerances        SOCIAL HISTORY:    [  ] Etoh  [  ] Smoking  [  ] Substance abuse     Home Environment:  [  x ] Home Alone  [   ] Lives with Family  [   ] Home Health Aid    Dwelling:  [   ] Apartment  [x   ] Private House  [   ] Adult Home  [   ] Skilled Nursing Facility      [   ] Short Term  [   ] Long Term  [  x ] Stairs       Elevator [   ]    FUNCTIONAL STATUS PTA: (Check all that apply)  Ambulation: [ x   ]Independent    [   ] Dependent     [   ] Non-Ambulatory  Assistive Device: [   ] SA Cane  [   ]  Q Cane  [ x  ] Walker  [   ]  Wheelchair  ADL : [ x  ] Independent  [    ]  Dependent       Vital Signs Last 24 Hrs  T(C): 36.5 (09 May 2024 09:51), Max: 37.1 (09 May 2024 00:33)  T(F): 97.7 (09 May 2024 09:51), Max: 98.8 (09 May 2024 00:33)  HR: 69 (09 May 2024 09:51) (69 - 102)  BP: 142/73 (09 May 2024 09:51) (107/69 - 142/73)  BP(mean): --  RR: 18 (09 May 2024 09:51) (18 - 20)  SpO2: 100% (09 May 2024 09:51) (97% - 100%)    Parameters below as of 09 May 2024 09:51  Patient On (Oxygen Delivery Method): room air          PHYSICAL EXAM: Awake & Alert  GENERAL: NAD  HEAD:  Normocephalic  CHEST/LUNG: Clear   HEART: S1S2+  ABDOMEN: Soft, Nontender  EXTREMITIES:  no calf tenderness, left wrist tenderness, left knee tenderness with abrasion     NERVOUS SYSTEM:  Cranial Nerves 2-12 intact [   ] Abnormal  [   ]  ROM: WFL all extremities [ x  ]  Abnormal [   ]  Motor Strength: WFL all extremities  [x   ]  Abnormal [   ]  Sensation: intact to light touch [ x  ] Abnormal [   ]    FUNCTIONAL STATUS:  Bed Mobility: Independent [   ]  Supervision [   ]  Needs Assistance [ x  ]  N/A [   ]  Transfers: Independent [   ]  Supervision [   ]  Needs Assistance [   ]  N/A [   ]   Ambulation: Independent [   ]  Supervision [   ]  Needs Assistance [   ]  N/A [   ]  ADL: Independent [   ] Requires Assistance [   ] N/A [   ]      LABS:                        11.8   6.69  )-----------( 123      ( 09 May 2024 00:55 )             37.6     05-09    135  |  94<L>  |  47<H>  ----------------------------<  190<H>  3.3<L>   |  30  |  1.4    Ca    9.5      09 May 2024 11:18    TPro  6.8  /  Alb  3.9  /  TBili  0.7  /  DBili  x   /  AST  74<H>  /  ALT  22  /  AlkPhos  61  05-09    PT/INR - ( 09 May 2024 00:55 )   PT: 13.30 sec;   INR: 1.16 ratio         PTT - ( 09 May 2024 00:55 )  PTT:28.5 sec  Urinalysis Basic - ( 09 May 2024 11:18 )    Color: x / Appearance: x / SG: x / pH: x  Gluc: 190 mg/dL / Ketone: x  / Bili: x / Urobili: x   Blood: x / Protein: x / Nitrite: x   Leuk Esterase: x / RBC: x / WBC x   Sq Epi: x / Non Sq Epi: x / Bacteria: x        RADIOLOGY & ADDITIONAL STUDIES:   HPI:  79 y/o female with PMH of COPD not on home O2, HTN, HLD, DM, Afib not on AC (watchman in place), CAD s/p CABG, PVD, chronic anemia and recent LLE cellulitis that was seen as a trauma alert s/p mechanical fall +HT, -LOC, -AC. Patient reports she was vacuuming when she tripped on her rolling walker and falling face forward. Patient was found by daughter and ambulance was called. In the ED, patient is complaining of neck pain, AAOX3, NAD, GCS 15.    < from: CT Head No Cont (05.09.24 @ 01:50) >  IMPRESSION:    CT HEAD:  No acute intracranial pathology.    Left frontal scalp hematoma.    CT FACIAL BONES:  No acute fracture of the face.    CT CERVICAL SPINE:  Acute type II dens fracture with minimal posterior displacement of the   odontoid process fragment.    < end of copied text >    Neurosurgery eval:   -Maintain in hard collar, provide [place] J  -MRI C spine if no contraindications      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      CAD (coronary artery disease), native coronary artery      Anemia      History of bleeding disorder  having work up 5/2/21- HAD WORKUP BUT NO DIAGNOSIS "NOTHING WAS FOUND"      History of transfusion reaction      Hiatal hernia      H/O ascending aortic replacement  Bovine Replacement      S/P CABG x 1  complication of bleeding 2016      H/O total knee replacement, right  complicated with fx femur bars screws in femur- b/l knee replacement      S/P hysterectomy      Presence of Watchman left atrial appendage closure device          Hospital Course:    TODAY'S SUBJECTIVE & REVIEW OF SYMPTOMS:     Constitutional WNL   Cardio WNL   Resp WNL   GI WNL  Heme WNL  Endo WNL  Skin WNL  MSK neck pain, left wrist and left knee pain   Neuro WNL  Cognitive WNL  Psych WNL      MEDICATIONS  (STANDING):  acetaminophen     Tablet .. 650 milliGRAM(s) Oral every 6 hours  albuterol    90 MICROgram(s) HFA Inhaler 2 Puff(s) Inhalation every 6 hours  allopurinol 50 milliGRAM(s) Oral daily  aspirin enteric coated 81 milliGRAM(s) Oral daily  atorvastatin 40 milliGRAM(s) Oral at bedtime  budesonide  80 MICROgram(s)/formoterol 4.5 MICROgram(s) Inhaler 2 Puff(s) Inhalation two times a day  dextrose 50% Injectable 25 Gram(s) IV Push once  furosemide    Tablet 40 milliGRAM(s) Oral daily  glucagon  Injectable 1 milliGRAM(s) IntraMuscular once  heparin   Injectable 5000 Unit(s) SubCutaneous every 8 hours  insulin lispro (ADMELOG) corrective regimen sliding scale   SubCutaneous Before meals and at bedtime  metoprolol succinate  milliGRAM(s) Oral daily  montelukast 10 milliGRAM(s) Oral daily  pantoprazole    Tablet 40 milliGRAM(s) Oral before breakfast  polyethylene glycol 3350 17 Gram(s) Oral two times a day  pregabalin 75 milliGRAM(s) Oral daily  ranolazine 500 milliGRAM(s) Oral two times a day  senna 2 Tablet(s) Oral at bedtime  sodium chloride 0.9%. 1000 milliLiter(s) (75 mL/Hr) IV Continuous <Continuous>  spironolactone 12.5 milliGRAM(s) Oral daily    MEDICATIONS  (PRN):  metolazone 5 milliGRAM(s) Oral daily PRN fluid overload  traMADol 50 milliGRAM(s) Oral every 4 hours PRN Moderate Pain (4 - 6)      FAMILY HISTORY:  Family history of pseudocholinesterase deficiency (Child)        Allergies    Augmentin (Other)  penicillins (Hives; Rash)    Intolerances        SOCIAL HISTORY:    [  ] Etoh  [  ] Smoking  [  ] Substance abuse     Home Environment:  [  x ] Home Alone  [   ] Lives with Family  [   ] Home Health Aid    Dwelling:  [   ] Apartment  [x   ] Private House  [   ] Adult Home  [   ] Skilled Nursing Facility      [   ] Short Term  [   ] Long Term  [  x ] Stairs       Elevator [   ]    FUNCTIONAL STATUS PTA: (Check all that apply)  Ambulation: [ x   ]Independent    [   ] Dependent     [   ] Non-Ambulatory  Assistive Device: [   ] SA Cane  [   ]  Q Cane  [ x  ] Walker  [   ]  Wheelchair  ADL : [ x  ] Independent  [    ]  Dependent       Vital Signs Last 24 Hrs  T(C): 36.5 (09 May 2024 09:51), Max: 37.1 (09 May 2024 00:33)  T(F): 97.7 (09 May 2024 09:51), Max: 98.8 (09 May 2024 00:33)  HR: 69 (09 May 2024 09:51) (69 - 102)  BP: 142/73 (09 May 2024 09:51) (107/69 - 142/73)  BP(mean): --  RR: 18 (09 May 2024 09:51) (18 - 20)  SpO2: 100% (09 May 2024 09:51) (97% - 100%)    Parameters below as of 09 May 2024 09:51  Patient On (Oxygen Delivery Method): room air          PHYSICAL EXAM: Awake & Alert  GENERAL: NAD  HEAD:  Normocephalic, in neck collar  CHEST/LUNG: Clear   HEART: S1S2+  ABDOMEN: Soft, Nontender  EXTREMITIES:  no calf tenderness    NERVOUS SYSTEM:  Cranial Nerves 2-12 intact [   ] Abnormal  [   ]  ROM: WFL all extremities [ x  ]  Abnormal [   ]  Motor Strength: WFL all extremities  [x   ]  Abnormal [   ]  Sensation: intact to light touch [ x  ] Abnormal [   ]    FUNCTIONAL STATUS:  Bed Mobility: Independent [   ]  Supervision [   ]  Needs Assistance [ x  ]  N/A [   ]  Transfers: Independent [   ]  Supervision [   ]  Needs Assistance [   ]  N/A [   ]   Ambulation: Independent [   ]  Supervision [   ]  Needs Assistance [   ]  N/A [   ]  ADL: Independent [   ] Requires Assistance [   ] N/A [   ]      LABS:                        11.8   6.69  )-----------( 123      ( 09 May 2024 00:55 )             37.6     05-09    135  |  94<L>  |  47<H>  ----------------------------<  190<H>  3.3<L>   |  30  |  1.4    Ca    9.5      09 May 2024 11:18    TPro  6.8  /  Alb  3.9  /  TBili  0.7  /  DBili  x   /  AST  74<H>  /  ALT  22  /  AlkPhos  61  05-09    PT/INR - ( 09 May 2024 00:55 )   PT: 13.30 sec;   INR: 1.16 ratio         PTT - ( 09 May 2024 00:55 )  PTT:28.5 sec  Urinalysis Basic - ( 09 May 2024 11:18 )    Color: x / Appearance: x / SG: x / pH: x  Gluc: 190 mg/dL / Ketone: x  / Bili: x / Urobili: x   Blood: x / Protein: x / Nitrite: x   Leuk Esterase: x / RBC: x / WBC x   Sq Epi: x / Non Sq Epi: x / Bacteria: x        RADIOLOGY & ADDITIONAL STUDIES:

## 2024-05-09 NOTE — H&P ADULT - HISTORY OF PRESENT ILLNESS
79 y/o female s/p mechanical trip and fall +HT, -LOC< -AC.     pending final note 77 y/o female with PMH of COPD not on home O2, HTN, HLD, DM, Afib not on AC (watchman in place), CAD s/p CABG, PVD, chronic anemia and recent LLE cellulitis that was seen as a trauma alert s/p mechanical fall +HT, -LOC, -AC. Patient reports she was vacuuming when she tripped on her rolling walker and falling face forward. Patient was found by daughter and ambulance was called. In the ED, patient is complaining of neck pain, AAOX3, NAD, GCS 15.    Fatigue: How much time during the previous 4 weeks did you feel tired?   All or most of the time [   ] Yes (1pt)    [ x ] No  (0pts)  Resistance: Do you have any difficulty walking up 10 steps alone without resting and without aids? [   ] Yes (1pt)    [x  ] No  (0pts)  Ambulation: Do you have any difficulty walking several hundred yards alone without aids? [ x  ] Yes (1pt)    [  ] No  (0pts)  Illness: how many illnesses do you have out of list of 11 total? [   ] 5 or more (1pt) [ x ] < 5 (0pts)  Loss of weight: Have you had weight loss of 5% or more? [   ] Yes (1pt)    [ x ] No  (0pts)    Total Score:     Score 1-2: Consult medical comangement  Score 3-4: Consult Geriatric service   Score 5: Consult Palliative service [phone]    Donny Nails G, Yanick MANLEY, Ned BARR, Chetan B. Frality: toward a clinical definition. J AM Med Dir Assoc. 2008; 9 (2): 71-72  Carlos JE, Arlet TK, Luna DK. A simple frailty questionnaire (FRAIL) predicts outcomes in middle aged  Americans. J Nutr Health Aging. 2012; 16 (7): 601-608

## 2024-05-09 NOTE — ED PROVIDER NOTE - CLINICAL SUMMARY MEDICAL DECISION MAKING FREE TEXT BOX
70-year-old female presents to the emergency permit for fall.  Emergency room had screening exam, labs and imaging identified as dens fracture.  Had consults by neurosurgery and plan for admission for continued management.  Trauma service consulted as well, inpatient management planned.

## 2024-05-09 NOTE — H&P ADULT - ASSESSMENT
77 y/o female s/p mechanical trip and fall +HT, -LOC< -AC.     pending final note    -Admit to trauma 79 y/o female with PMH of COPD not on home O2, HTN, HLD, DM, Afib not on AC (watchman in place), CAD s/p CABG, PVD, chronic anemia and recent LLE cellulitis that was seen as a trauma alert s/p mechanical fall +HT, -LOC, -AC. Patient reports she was vacuuming when she tripped on her rolling walker and falling face forward. Patient was found by daughter and ambulance was called. In the ED, patient is complaining of neck pain, AAOX3, NAD, GCS 15.      PLAN:  -Admit to trauma team  -Pain control  -NSX: MRI cspine, [place] J at all times  -PT/Rehab    spectra [phone]

## 2024-05-09 NOTE — ED PROVIDER NOTE - WR ORDER DATE AND TIME 3
Left voice message that form is completed, and patient can come pick it up in [place] office. 09-May-2024 00:46

## 2024-05-09 NOTE — H&P ADULT - NSHPLABSRESULTS_GEN_ALL_CORE
LABS  CBC (05-09 @ 00:55)                              11.8<L>                         6.69    )----------------(  123<L>     69.4  % Neutrophils, 19.7<L>% Lymphocytes, ANC: 4.64                                37.6      BMP (05-09 @ 00:55)             134<L>  |  94<L>   |  46<H> 		Ca++ --      Ca 9.6                ---------------------------------( 231<H>		Mg --                 TNP/HEMOLYZED Hemolyzed. Interpret with caution  Critical value:  NOTIFIED DR SERRA 05/09/2024 03:21:12 EDT  |  27      |  1.6<H>			Ph --        LFTs (05-09 @ 00:55)      TPro 6.8 / Alb 3.9 / TBili 0.7 / DBili -- / AST 74<H> / ALT 22 / AlkPhos 61    Coags (05-09 @ 00:55)  aPTT 28.5 / INR 1.16 / PT 13.30<H>      ABG (05-09 @ 00:55)      /  /  /  /  / %     Lactate:  1.7    VBG (05-09 @ 04:02)     7.40 / 49<H> / 44 / 30<H> / 4.7<H> / 71.7%     Lactate: 1.4    --------------------------------------------------------------------------------------------    MICROBIOLOGY  Urinalysis (05-09 @ 00:55):     Color:  / Appearance:  / SG:  / pH:  / Gluc: 231<H> / Ketones:  / Bili:  / Urobili:  / Protein : / Nitrites:  / Leuk.Est:  / RBC:  / WBC:  / Sq Epi:  / Non Sq Epi:  / Bacteria          --------------------------------------------------------------------------------------------    I&O's Summary      < from: CT Head No Cont (05.09.24 @ 01:50) >    IMPRESSION:    CT HEAD:  No acute intracranial pathology.    Left frontal scalp hematoma.    CT FACIAL BONES:  No acute fracture of theface.    CT CERVICAL SPINE:  Acute type II dens fracture with minimal posterior displacement of the   odontoid process fragment.    Communication: The summary of above findings were discussed with readback   confirmation with Gina Serra by resident Sravani Santamaria MD on   5/9/2024 at 5:49 AM.    < end of copied text >    < from: CT Chest w/ IV Cont (05.09.24 @ 02:16) >    IMPRESSION:    No evidence of an acute traumatic solid organ or osseous injury.    Subxiphoid hernia pocket is slightly larger and now contains a short loop   of the transverse large bowel without evidence of obstruction at this   time.    Remaining chronic findings as above.    --- End of Report ---    < end of copied text >

## 2024-05-09 NOTE — ED ADULT TRIAGE NOTE - CHIEF COMPLAINT QUOTE
pt BIBEMS from home for fall from standing while vacuuming, pt denies loc, blood thinners, pt complains of head, neck, left hand, and right knee pain and nausea

## 2024-05-09 NOTE — H&P ADULT - NSHPPHYSICALEXAM_GEN_ALL_CORE
PHYSICAL EXAM:  GENERAL: No acute distress, well-developed  HEAD:  Normocephalic, frontal scalp abrasion, Upper lip ecchymosis, philtrum 1cm laceration  EYES: EOMI, PERRLA, conjunctiva and sclera clear  NECK: Supple, no lymphadenopathy, no JVD  CHEST/LUNG: CTAB; No wheezes, rales, or rhonchi  HEART: Regular rate and rhythm. Normal S1/S2. No murmurs, rubs, or gallops  ABDOMEN: Soft, non-tender, non-distended; normal bowel sounds, no organomegaly  EXTREMITIES:  2+ peripheral pulses b/l, No clubbing, cyanosis, or edema, R knee abrasion, L foot Charcot foot (b/l lower extremity chronic peripheral vascular disease)  NEUROLOGY: A&O x 3, no focal deficits  SKIN: No rashes or lesions

## 2024-05-09 NOTE — ED PROVIDER NOTE - ATTENDING CONTRIBUTION TO CARE
78 yr old f w/ a pmh significant for afib not on AC (watchman), htn, hld, dm2, cad s/p cabg, chronic anemia, peripheral vascular disease who presents s/p fall. Pt states that she was attempting to use a vacuum while using a walker, pt fell landing onto her face. Pt denies any LOC, however, does state that she hit the L hand attempting to "break" her fall. Pt denies any LOC, nausea, vomiting, fevers, chills or any other medical complaints.     VITAL SIGNS: I have reviewed nursing notes and confirm.  CONSTITUTIONAL: non-toxic, well appearing  SKIN: no rash, no petechiae.  EYES: EOMI, pink conjunctiva, anicteric  ENT: tongue midline, no exudates, MMM  NECK: Supple; no meningismus, no JVD  CARD: RRR, no murmurs, equal radial pulses bilaterally 2+  RESP: CTAB, no respiratory distress  ABD: Soft, non-tender, non-distended, no peritoneal signs, no HSM, no CVA tenderness  EXT: Normal ROM x4. No edema. No calves tenderness.  +right paracervical tenderness, no midline tenderness or stepoff/deformit, stabilized in [place] c-spine collar. Chest nontender. Back nontender in TLS spine or to bilateral bony structures. Extremities nontender, normal ROM, no deformities. +dried blood to left hand, no snuffbox tenderness. +superficial abrasion to right knee w/o underlying tenderness or pain elicited w/ ROM.   NEURO: Alert, oriented. CN2-12 intact, equal strength bilaterally, nl gait.  PSYCH: Cooperative, appropriate.      78 yr old f that presents s/p fall, not on AC. labs, imaging, reassess. dispo pending.

## 2024-05-09 NOTE — ED PROVIDER NOTE - PROGRESS NOTE DETAILS
TJY: pt d/w radiology regarding ct c-spine. There is a type ii dens minimal anterior displacement. TJY: XR with old fractures of left hallux; there is no point tenderness of the left hand. ER: pt signed out to Dr. Little AE: Patient evaluated by neurosurgery and trauma; will admit to trauma.

## 2024-05-10 NOTE — OCCUPATIONAL THERAPY INITIAL EVALUATION ADULT - SPECIFY REASON(S)
Chart reviewed. Discussed with trauma team at bedside, per trauma hold OT at this time pending neurosurgery assessment following completed MRI. OT to follow

## 2024-05-10 NOTE — CHART NOTE - NSCHARTNOTEFT_GEN_A_CORE
Pt had MRI Cspine: < from: MR Cervical Spine No Cont (05.10.24 @ 08:30) >  IMPRESSION:  Redemonstration of an acute type II dens fracture with associated   prevertebral soft tissue swelling. No evidence for ligamentous injury.    Mild multilevel degenerative changes as well as disc bulging without   significant spinal canal or neuroforaminal narrowing.  < end of copied text >    Pt has no ligamentous injury. Needs to remain on hard C-collar for now.  Can participate in PT/OT with collar in place.  Pt should follow up in office with DR Rivera in 3 weeks. d/w attg

## 2024-05-10 NOTE — PROGRESS NOTE ADULT - TIME BILLING
Total time spent to complete patient's bedside assessment, review medical chart, discuss medical plan of care with covering medical team was more than 50 minutes  with >50% of time spent face to face with patient, discussion with patient/family and/or coordination of care. My note supersedes the medical resident note in case of discrepancy.

## 2024-05-10 NOTE — PHYSICAL THERAPY INITIAL EVALUATION ADULT - SPECIFY REASON(S)
Case discussed in IDR. PT IE on hold pending Neuro recommendation. PT will f/u once pt is clear for PT.

## 2024-05-11 NOTE — DISCHARGE NOTE PROVIDER - ATTENDING DISCHARGE PHYSICAL EXAMINATION:
I independently performed a medically appropriate exam. The exam was notable for soft, not tender, not distended abdomen. Moving all extremities. Cervical collar in place.

## 2024-05-11 NOTE — OCCUPATIONAL THERAPY INITIAL EVALUATION ADULT - NSACTIVITYREC_GEN_A_OT
Pt educated on hygiene care in conjunction with [place] J collar @ all times- performing hair hygiene with no rinse shampoo shower caps. Pt demo good understanding and provided information to purchase.

## 2024-05-11 NOTE — PROVIDER CONTACT NOTE (OTHER) - NAME OF MD/NP/PA/DO NOTIFIED:
Jodie
MD Faustin
Jodie
PAST MEDICAL HISTORY:  COPD (chronic obstructive pulmonary disease)     Schizoaffective disorder     Seizure     Tourette disease

## 2024-05-11 NOTE — PHYSICAL THERAPY INITIAL EVALUATION ADULT - GENERAL OBSERVATIONS, REHAB EVAL
Chart reviewed.  Pt has a type 2 dens farcture after fall.  Will hold PT pending further clearance from Neurosurgery team.  MRI of C-spine is pending.  PT spoke to Giovanni in neurosurgery to confirm hold PT for now.
Chart reviewed. Cleared per NS to participate with PT. Pt. in bed upon PT arrival, +[place] J collar, c/o 4/10 posterior cervical/occipital pain non radiating. Agreeable to PT eval.
Private car

## 2024-05-11 NOTE — OCCUPATIONAL THERAPY INITIAL EVALUATION ADULT - PHYSICAL ASSIST/NONPHYSICAL ASSIST: SIT/STAND, REHAB EVAL
[facility]    [address] [address]    Phone:  [phone]    Fax:  [phone]       Thank You for choosing us for your health care visit. We are glad to serve you and happy to provide you with this summary of your visit. Please help us to ensure we have accurate records. If you find anything that needs to be changed, please let our staff know as soon as possible.          Your Demographic Information     Patient Name Sex     Shannen Arce Female 1951       Ethnic Group Patient Race    Not of  or  Origin White      Your Visit Details     Date & Time Provider Department    2017 8:00 AM Yang Puga MD [facility]      Your Upcoming Appointment*(Max 10)     2017  8:30 AM CST   Mammogram with MRMM   [facility] ([facility], [address])    [address]  [facility] [address]   [phone]              Your To Do List     Future Orders Please Complete On or Around Expires    ELECTROCARDIOGRAM 12-LEAD        Conditions Discussed Today or Order-Related Diagnoses        Comments    Paroxysmal atrial fibrillation    -  Primary     Atrial flutter, unspecified type         Chronic anticoagulation           Your Vitals Were     BP SpO2 Smoking Status             119/68 (BP Location: LUE, Patient Position: Sitting, Cuff Size: Regular) 98% Never Smoker         Medications Prescribed or Re-Ordered Today     None      Your Current Medications Are        Disp Refills Start End    Multiple Vitamins-Minerals (MULTIVITAMIN GUMMIES ADULT PO)        Sig - Route: Take 1 tablet by mouth daily. - Oral    Class: Historical Med    metoPROLOL (LOPRESSOR) 25 MG tablet 30 tablet 3 2017     Sig - Route: Take 0.5 tablets by mouth every 12 hours. - Oral    Class: Eprescribe    apixaban (ELIQUIS) 5 MG Tab 60 tablet 0 2017     Sig  - Route: Take 1 tablet by mouth every 12 hours. - Oral    Class: Eprescribe    ADVAIR DISKUS 500-50 MCG/DOSE AEROSOL POWDER, BREATH ACTIVATED 180 each 3 1/9/2017     Sig: Use 1 puff two times daily    Class: Eprescribe    escitalopram (LEXAPRO) 10 MG tablet 135 tablet 3 1/9/2017     Sig: Take 1 and 1/2 tablets by  mouth daily    Class: Eprescribe    VENTOLIN  (90 BASE) MCG/ACT inhaler 54 g 3 1/9/2017     Sig: INHALE 2 PUFFS INTO LUNGS  EVERY 4 HOURS AS NEEDED FOR SHORTNESS OF BREATH OR  WHEEZING.    Class: Eprescribe    albuterol (VENTOLIN HFA) 108 (90 BASE) MCG/ACT inhaler 3 Inhaler 3 9/8/2016     Sig: Inhale 2 puffs into lungs every 4 hours as needed for shortness of breath or wheezing.    Class: Eprescribe    ALPRAZolam (XANAX) 1 MG tablet 180 tablet 1 9/8/2016     Sig: Take one tablet by mouth three times daily as needed for anxiety or sleep.    loratadine (CLARITIN) 10 MG tablet 90 tablet 3 6/19/2015     Sig - Route: Take 1 tablet by mouth daily. - Oral    Class: Historical Med    Cholecalciferol (VITAMIN D3) 2000 UNITS capsule   1/22/2013     Sig - Route: Take 2,000 Units by mouth daily. - Oral    Class: Historical Med    amoxicillin-clavulanate (AUGMENTIN) 875-125 MG per tablet 20 tablet 0 1/9/2017     Sig - Route: Take 1 tablet by mouth every 12 hours. Take with food. - Oral    Class: Eprescribe    hydrochlorothiazide (HYDRODIURIL) 12.5 MG tablet 90 tablet 3 9/22/2015     Sig - Route: Take 1 tablet by mouth daily. - Oral    Class: Eprescribe      Allergies     Singulair HEADACHES      Immunizations History as of 1/17/2017     Name Date    HERPES ZOSTER SHINGLES 3/10/2014    Influenza 10/1/2016, 10/1/2015, 10/22/2014, 10/1/2012, 10/1/2011, 10/1/2010    Pneumococcal Conjugate 13 Valent 1/4/2017    Pneumococcal Polysaccharide Adult 9/1/2000    Tdap 11/3/2011      Problem List as of 1/17/2017     Paroxysmal atrial fibrillation    Atrial flutter    Chronic anticoagulation    Family history of colorectal  cancer    Persistent disorder of initiating or maintaining sleep    Anxiety    Urinary tract infection, site not specified    Pure hypercholesterolemia    Situational anxiety    AB (asthmatic bronchitis)    Sensation of pressure in bladder area            Patient Instructions     None       verbal cues/nonverbal cues (demo/gestures)/1 person assist

## 2024-05-11 NOTE — DISCHARGE NOTE PROVIDER - NSDCMRMEDTOKEN_GEN_ALL_CORE_FT
allopurinol 100 mg oral tablet: 0.5 orally once a day  aspirin 81 mg oral capsule: 1 cap(s) orally once a day  Bactrim  mg-160 mg oral tablet: 1 tab(s) orally 2 times a day  budesonide-formoterol 80 mcg-4.5 mcg/inh inhalation aerosol: 2 puff(s) inhaled 2 times a day  desvenlafaxine (as succinate) 25 mg oral tablet, extended release: 1 tab(s) orally once a day  furosemide 40 mg oral tablet: 1 tab(s) orally once a day  glipiZIDE 5 mg oral tablet: 1 tab(s) orally once a day  ipratropium-albuterol 0.5 mg-2.5 mg/3 mL inhalation solution: 3 milliliter(s) inhaled every 4 hours  Lyrica 75 mg oral capsule: 1 cap(s) orally once a day  metOLazone 5 mg oral tablet: 1 tab(s) orally once a day as needed for fluid overload  metoprolol succinate 200 mg oral tablet, extended release: 1 tab(s) orally once a day  montelukast 10 mg oral tablet: 1 tab(s) orally once a day  polyethylene glycol 3350 oral powder for reconstitution: 17 gram(s) orally 2 times a day  Protonix 40 mg oral delayed release tablet: 1 tab(s) orally once a day  Ranexa 500 mg oral tablet, extended release: 1 tab(s) orally 2 times a day  rosuvastatin 10 mg oral tablet: 1 tab(s) orally once a day  senna leaf extract oral tablet: 2 tab(s) orally once a day (at bedtime)  spironolactone 25 mg oral tablet: 0.5 tab(s) orally once a day Hold while taking bactrim

## 2024-05-11 NOTE — PROGRESS NOTE ADULT - ATTENDING COMMENTS
Trauma/Acute Care Surgery Attending Note Attestation    Patient was seen and examined bedside.  I reviewed the resident/PA note and agreed with above assessment and plan with following additions and corrections.    T(C): 36.4 (05-11-24 @ 08:24), Max: 37 (05-11-24 @ 00:23)  HR: 99 (05-11-24 @ 09:46) (94 - 111)  BP: 102/67 (05-11-24 @ 08:24) (102/67 - 126/74)  RR: 18 (05-11-24 @ 08:24) (18 - 18)  SpO2: 96% (05-11-24 @ 08:24) (96% - 97%)      05-10-24 @ 07:01  -  05-11-24 @ 07:00  --------------------------------------------------------  IN:    sodium chloride 0.9%: 900 mL  Total IN: 900 mL    OUT:    Voided (mL): 1050 mL  Total OUT: 1050 mL    Total NET: -150 mL      05-11-24 @ 07:01  -  05-11-24 @ 10:59  --------------------------------------------------------  IN:    Oral Fluid: 350 mL  Total IN: 350 mL    OUT:    Voided (mL): 350 mL  Total OUT: 350 mL    Total NET: 0 mL          I independently performed a medically appropriate exam. The exam was notable for                          11.7   7.97  )-----------( 101      ( 10 May 2024 20:51 )             37.6     05-10    133<L>  |  94<L>  |  43<H>  ----------------------------<  136<H>  4.2   |  20  |  1.6<H>    Ca 8.8; Mg 1.7<L>; Phos 4.7          Blood Gas Venous - Lactate: 1.4 mmol/L (05-09 @ 04:02)  Lactate, Blood: 1.7 mmol/L (05-09 @ 00:55)        Assessment/Plan:  78y Female     Dequan Mirza MD  Trauma/Acute Care Surgery/Surgical Critical Care Attending Trauma/Acute Care Surgery Attending Note Attestation    Patient was seen and examined bedside on 5/11.  I reviewed the resident/PA note and agreed with above assessment and plan with following additions and corrections.    Patient reported she was able to have a BM. Pain was well-controlled. No numbness, paresthesias, chest pain, SOB    T(C): 36.4 (05-11-24 @ 08:24), Max: 37 (05-11-24 @ 00:23)  HR: 99 (05-11-24 @ 09:46) (94 - 111)  BP: 102/67 (05-11-24 @ 08:24) (102/67 - 126/74)  RR: 18 (05-11-24 @ 08:24) (18 - 18)  SpO2: 96% (05-11-24 @ 08:24) (96% - 97%)      05-10-24 @ 07:01  -  05-11-24 @ 07:00  --------------------------------------------------------  IN:    sodium chloride 0.9%: 900 mL  Total IN: 900 mL    OUT:    Voided (mL): 1050 mL  Total OUT: 1050 mL    Total NET: -150 mL      05-11-24 @ 07:01  -  05-11-24 @ 10:59  --------------------------------------------------------  IN:    Oral Fluid: 350 mL  Total IN: 350 mL    OUT:    Voided (mL): 350 mL  Total OUT: 350 mL    Total NET: 0 mL    I independently performed a medically appropriate exam. The exam was notable for soft, not tender, not distended abdomen. Moving all extremities. Cervical collar in place                          11.7   7.97  )-----------( 101      ( 10 May 2024 20:51 )             37.6     05-10    133<L>  |  94<L>  |  43<H>  ----------------------------<  136<H>  4.2   |  20  |  1.6<H>    Ca 8.8; Mg 1.7<L>; Phos 4.7      Blood Gas Venous - Lactate: 1.4 mmol/L (05-09 @ 04:02)  Lactate, Blood: 1.7 mmol/L (05-09 @ 00:55)    Assessment/Plan:  78y Female PMH COPD, HTN, HLD, DM, gout, afib s/p watchman procedure, CAD s/p CABG, PVD, chronic anemia s/p mechanical fall with left frontal scalp hematoma, small lip laceration, and type II dens fracture.    - Type II dens fracture - maintain hard cervical collar, follow up with Dr. Rivera in 3 weeks, ok to work with PT  - Atrial fibrillation - continue metoprolol 200mg XR PO daily  - Nausea - resolved  - Posttraumatic pain - multimodal pain therapy  - Neuropathic pain - continue home pregabalin 75mg daily  - COPD - albuterol 2 puffs Q6H, symbicort 80mcg/4.5mcg 2 puffs BID, singular 10mg daily  - HTN/CAD - lasix 40mg daily, metoprolol 200mg daily, ranexa 500mg BID, spironolactone 12.5mg daily, ASA-81mg daily  - HLD - atorvastatin 40mg QHS  - Hypomagnesemia - 2g magnesium sulfate IV once  - Diabetes - QAC and QHS finger sticks, sliding scale insulin, blood sugars appropriate, carb-consistent diet   - Gout - continue allopurinol 50mg daily  - DVT ppx - SQH    Dequan Mirza MD  Trauma/Acute Care Surgery/Surgical Critical Care Attending

## 2024-05-11 NOTE — OCCUPATIONAL THERAPY INITIAL EVALUATION ADULT - PERTINENT HX OF CURRENT PROBLEM, REHAB EVAL
78 year-old female PMH AS, DM, COPD, CAD who presented to ED s/p mechanical fall last night. Patient claims she was vacuuming while using her RW and fell face down onto the ground. Neurosurgery was consulted after CT C spine demonstrated type 2 dens fracture. Patient was seen and examined at bedside in ED. She is lying in bed, A&Ox3, in [place] collar, and following all commands. She endorses localized midline neck pain, worse upon movement, and denies radiation of pain into her extremities, paresthesias, weakness, headaches at this time.

## 2024-05-11 NOTE — OCCUPATIONAL THERAPY INITIAL EVALUATION ADULT - ADDITIONAL COMMENTS
Pt resides in private home with grandson, daughter resides on same property in another dwelling. 4STE then all 1 level. +walk in stall shower with grab bar and seat, raised height toilet and grab bar, + RW

## 2024-05-11 NOTE — OCCUPATIONAL THERAPY INITIAL EVALUATION ADULT - GENERAL OBSERVATIONS, REHAB EVAL
Pt encountered semi mackenzie in bed, + IV locked, + [place] J collar. Pt agreeable to bedside OT assessment, may be seen as confirmed with RN. Pt returned to bedside chair, + IV locked, + [place] J collar, + chair alarm

## 2024-05-11 NOTE — PHYSICAL THERAPY INITIAL EVALUATION ADULT - GAIT DISTANCE, PT EVAL
Total distance 250ft: multiple standing breaks, 2 seated rest breaks before and after stair training.

## 2024-05-11 NOTE — PHYSICAL THERAPY INITIAL EVALUATION ADULT - LEVEL OF INDEPENDENCE: STAIR NEGOTIATION, REHAB EVAL
CG descending 3 steps with B hands on 1 rail step to; mod A ascending with inc time between and significant effort of pt with inc use of BUE on railing. Rec ambulette for entrance to home and PT at home to address further stair training.

## 2024-05-11 NOTE — DISCHARGE NOTE PROVIDER - NSDCFUSCHEDAPPT_GEN_ALL_CORE_FT
Howard Ramirez  [facility]  CARDIOLOGY [address] [place] [address] Av  Scheduled Appointment: 05/13/2024    Sandeep Rivera  [facility]  NEUROSURG [address]  Scheduled Appointment: 06/03/2024    Howard Ramirez  [facility]  CARDIOLOGY [address] [place] [address] Av  Scheduled Appointment: 06/03/2024     Sandeep Rivera  [facility]  NEUROSURG [address]  Scheduled Appointment: 06/03/2024    Howard Ramirez  [facility]  CARDIOLOGY [address]  Scheduled Appointment: 06/03/2024

## 2024-05-11 NOTE — PHYSICAL THERAPY INITIAL EVALUATION ADULT - ADDITIONAL COMMENTS
Pt. lives alone in private ranch style home with 4 stairs with railing to enter without stairs inside. Pt. family supportive and live nearby. Pt. ambulates with RW at baseline and has adapted bathroom with walk in shower, raised toilet seat, grab bars.

## 2024-05-11 NOTE — PROGRESS NOTE ADULT - ASSESSMENT
ASSESSMENT:  EVAN QUINN is a 79 y/o female with PMH of COPD not on home O2, HTN, HLD, DM, Afib not on AC (watchman in place), CAD s/p CABG, PVD, chronic anemia and recent LLE cellulitis that was seen as a trauma alert s/p mechanical fall +HT, -LOC, -AC. Patient reports she was vacuuming when she tripped on her rolling walker and falling face forward.     PLAN:   - Monitor for episodes of AFib w/ RvR and HR  - Keep [place] J collar in place at all times   - DVT and GI ppx  - Daily labs, replete electrolytes as needed   - Multi-modal pain control  - F/U physical therapy evaluation today  - F/U disposition planning     [id number]
79 y/o female with PMH of COPD not on home O2, HTN, HLD, DM, Afib not on AC (watchman in place), CAD s/p CABG, PVD, chronic anemia and recent LLE cellulitis s/p mechanical fall c/b type 2 dens fracture    #Mechanical Fall  #Left frontal scalp hematoma  #Lip Laceration  #Type 2 dens fracutre  - Pending MR C-Spine  - Care per trauma team  - PT and pain control    #Chronic Afib s/p watchman w RVR  ECG noted  - Cont BB  - Not on AC  - May need tele monitoring if RVR persists and add cardizem 30 q6h    #COPD  - Cont symbicort  - Albuterol or duonebs q6h PRN  - Cont singulair    #CAD  #HTN/HLD  #Chronic HFpEF  last Echo 01/2024: 70-75% EF, grade 3 diastolic dysfunction; moderate-severe TR; mod pHTN  - Cont lipitor 40mg qHs  - Cont metoprolol ER 200mg qD  - Cont ranexa 500 BID  - Cont lasix 40mg qD  - Cont spironolactone 12.5mg qD    #DM2  - Cont insulin regimen, FS well controlled on current ISS    #Gout  - Cont allopurinol    DVT PPX heparin

## 2024-05-11 NOTE — PROVIDER CONTACT NOTE (OTHER) - ACTION/TREATMENT ORDERED:
Provider aware of the above info, no further intervention at this time
MD made aware, will recheck in 20 min, if need be MD will order IV push meds.
Provider aware of the above, pt is cleared to go home, no further intervention

## 2024-05-11 NOTE — DISCHARGE NOTE PROVIDER - CARE PROVIDER_API CALL
Sandeep Rivera  Neurosurgery  [address] [place] [address], [address]  [place] [address]  Phone: [phone]  Fax: [phone]  Scheduled Appointment: 06/03/2024

## 2024-05-11 NOTE — DISCHARGE NOTE PROVIDER - NSDCCPCAREPLAN_GEN_ALL_CORE_FT
PRINCIPAL DISCHARGE DIAGNOSIS  Diagnosis: Closed dens fracture  Assessment and Plan of Treatment:   DIET: No restrictions     PAIN: You can take over the counter medications such as Tylenol, and Ibuprofen for pain control. Please adhere to the instructions on the back of the bottle.       MEDICATIONS: You may resume all home medications as prescribed. No changes were made to your prescriptions during this hospitalization.       ACTIVITY: Ambulate and get out of bed as tolerated, and with assistance if feeling weak. **KEEP [place] J COLLAR ON AT ALL TIMES UNTIL YOUR FOLLOW-UP APPOINTMENT WITH NEUROSURGERY**      FOLLOW-UP: You are scheduled to follow-up with Dr. Rivera on 6/3/2024. Please call [phone] to confirm your appointment date and time.      *If you develop fevers, chills, worsening pain, increased drainage from the wound, foul smelling drainage from the wound, nausea that won't subside, vomiting, or any other symptoms of concern, please call MD for further advice, evaluation, and/or treatment.*

## 2024-05-11 NOTE — PROGRESS NOTE ADULT - SUBJECTIVE AND OBJECTIVE BOX
EVAN QUINN  78y, Female  Allergy: Augmentin (Other)  penicillins (Hives; Rash)    Hospital Day: 1d    Patient seen and examined. No acute events overnight    PMH/PSH:  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      CAD (coronary artery disease), native coronary artery      Anemia      History of bleeding disorder  having work up 5/2/21- HAD WORKUP BUT NO DIAGNOSIS "NOTHING WAS FOUND"      History of transfusion reaction      Hiatal hernia      H/O ascending aortic replacement  Bovine Replacement      S/P CABG x 1  complication of bleeding 2016      H/O total knee replacement, right  complicated with fx femur bars screws in femur- b/l knee replacement      S/P hysterectomy      Presence of Watchman left atrial appendage closure device          VITALS:  T(F): 97.5 (05-10-24 @ 05:14), Max: 97.8 (05-09-24 @ 20:11)  HR: 101 (05-10-24 @ 05:14)  BP: 121/84 (05-10-24 @ 05:14) (96/62 - 134/90)  RR: 18 (05-10-24 @ 05:14)  SpO2: 95% (05-10-24 @ 05:14)    TESTS & MEASUREMENTS:  Weight (Kg): 97.5 (05-09-24 @ 00:33)  BMI (kg/m2): 34.7 (05-09)    05-09-24 @ 07:01  -  05-10-24 @ 07:00  --------------------------------------------------------  IN: 0 mL / OUT: 2400 mL / NET: -2400 mL                            12.4   7.36  )-----------( 100      ( 10 May 2024 05:18 )             40.9     PT/INR - ( 09 May 2024 00:55 )   PT: 13.30 sec;   INR: 1.16 ratio         PTT - ( 09 May 2024 00:55 )  PTT:28.5 sec  05-10    138  |  97<L>  |  41<H>  ----------------------------<  78  3.4<L>   |  27  |  1.4    Ca    9.6      10 May 2024 05:18  Phos  4.3     05-10  Mg     1.8     05-10    TPro  6.8  /  Alb  3.9  /  TBili  0.7  /  DBili  x   /  AST  74<H>  /  ALT  22  /  AlkPhos  61  05-09    LIVER FUNCTIONS - ( 09 May 2024 00:55 )  Alb: 3.9 g/dL / Pro: 6.8 g/dL / ALK PHOS: 61 U/L / ALT: 22 U/L / AST: 74 U/L / GGT: x                 Urinalysis Basic - ( 10 May 2024 05:18 )    Color: x / Appearance: x / SG: x / pH: x  Gluc: 78 mg/dL / Ketone: x  / Bili: x / Urobili: x   Blood: x / Protein: x / Nitrite: x   Leuk Esterase: x / RBC: x / WBC x   Sq Epi: x / Non Sq Epi: x / Bacteria: x        RADIOLOGY & ADDITIONAL TESTS:    RECENT DIAGNOSTIC ORDERS:  Phosphorus: 20:00 (05-10-24 @ 11:05)  Magnesium: 20:00 (05-10-24 @ 11:05)  Complete Blood Count: 20:00 (05-10-24 @ 11:05)  Basic Metabolic Panel: 20:00 (05-10-24 @ 11:05)      MEDICATIONS:  MEDICATIONS  (STANDING):  acetaminophen     Tablet .. 650 milliGRAM(s) Oral every 6 hours  albuterol    90 MICROgram(s) HFA Inhaler 2 Puff(s) Inhalation every 6 hours  allopurinol 50 milliGRAM(s) Oral daily  aspirin enteric coated 81 milliGRAM(s) Oral daily  atorvastatin 40 milliGRAM(s) Oral at bedtime  budesonide  80 MICROgram(s)/formoterol 4.5 MICROgram(s) Inhaler 2 Puff(s) Inhalation two times a day  dextrose 50% Injectable 25 Gram(s) IV Push once  furosemide    Tablet 40 milliGRAM(s) Oral daily  glucagon  Injectable 1 milliGRAM(s) IntraMuscular once  heparin   Injectable 5000 Unit(s) SubCutaneous every 8 hours  insulin lispro (ADMELOG) corrective regimen sliding scale   SubCutaneous Before meals and at bedtime  metoprolol succinate  milliGRAM(s) Oral daily  metoprolol tartrate 100 milliGRAM(s) Oral once  montelukast 10 milliGRAM(s) Oral daily  pantoprazole    Tablet 40 milliGRAM(s) Oral before breakfast  polyethylene glycol 3350 17 Gram(s) Oral two times a day  pregabalin 75 milliGRAM(s) Oral daily  ranolazine 500 milliGRAM(s) Oral two times a day  senna 2 Tablet(s) Oral at bedtime  sodium chloride 0.9%. 1000 milliLiter(s) (75 mL/Hr) IV Continuous <Continuous>  spironolactone 12.5 milliGRAM(s) Oral daily    MEDICATIONS  (PRN):  metolazone 5 milliGRAM(s) Oral daily PRN fluid overload  traMADol 50 milliGRAM(s) Oral every 4 hours PRN Moderate Pain (4 - 6)      HOME MEDICATIONS:  aspirin 81 mg oral capsule (04-17)  desvenlafaxine (as succinate) 25 mg oral tablet, extended release (04-17)  furosemide 40 mg oral tablet (04-17)  glipiZIDE 5 mg oral tablet (04-17)  Lyrica 75 mg oral capsule (04-17)  montelukast 10 mg oral tablet (04-17)  polyethylene glycol 3350 oral powder for reconstitution (04-17)  Protonix 40 mg oral delayed release tablet (04-17)  Ranexa 500 mg oral tablet, extended release (04-17)  rosuvastatin 10 mg oral tablet (04-17)  senna leaf extract oral tablet (04-17)      REVIEW OF SYSTEMS:  All other review of systems is negative unless indicated above.     PHYSICAL EXAM:  PHYSICAL EXAM:  GENERAL: NAD  HEAD:  Atraumatic, Normocephalic  NECK: Supple, No JVD; [place] J-collar  CHEST/LUNG: Clear to auscultation bilaterally; No wheeze  HEART: Regular rate and rhythm; No murmurs, rubs, or gallops  ABDOMEN: Soft, Nontender, Nondistended; Bowel sounds present  EXTREMITIES:  2+ Peripheral Pulses, No clubbing, cyanosis, or edema  SKIN: No rashes or lesions      
GENERAL SURGERY PROGRESS NOTE     EVAN QUINN  78y  Female  Hospital day :3d    OVERNIGHT EVENTS:  - [place] J collar in place  - -110s    T(F): 98.6 (05-11-24 @ 00:23), Max: 98.6 (05-11-24 @ 00:23)  HR: 110 (05-11-24 @ 00:23) (95 - 110)  BP: 126/74 (05-11-24 @ 00:23) (104/57 - 126/74)  RR: 18 (05-11-24 @ 00:23) (18 - 18)  SpO2: 97% (05-11-24 @ 00:23) (95% - 97%)    DIET/FLUIDS: sodium chloride 0.9%. 1000 milliLiter(s) IV Continuous <Continuous>     GI proph:  pantoprazole    Tablet 40 milliGRAM(s) Oral before breakfast    AC/ proph: aspirin enteric coated 81 milliGRAM(s) Oral daily  heparin   Injectable 5000 Unit(s) SubCutaneous every 8 hours    PHYSICAL EXAM:  GENERAL: NAD, [place] J collar in place  CHEST/LUNG: Clear to auscultation bilaterally  HEART: Regular rate and rhythm  ABDOMEN: Soft, Nontender, Nondistended;       LABS  Labs:  CAPILLARY BLOOD GLUCOSE      POCT Blood Glucose.: 142 mg/dL (10 May 2024 21:23)  POCT Blood Glucose.: 87 mg/dL (10 May 2024 16:31)  POCT Blood Glucose.: 99 mg/dL (10 May 2024 11:51)  POCT Blood Glucose.: 99 mg/dL (10 May 2024 09:05)                          11.7   7.97  )-----------( 101      ( 10 May 2024 20:51 )             37.6         05-10    133<L>  |  94<L>  |  43<H>  ----------------------------<  136<H>  4.2   |  20  |  1.6<H>      Calcium: 8.8 mg/dL (05-10-24 @ 20:51)      LFTs:     Blood Gas Venous - Lactate: 1.4 mmol/L (05-09-24 @ 04:02)  Lactate, Blood: 1.7 mmol/L (05-09-24 @ 00:55)      Coags:            Urinalysis Basic - ( 10 May 2024 20:51 )    Color: x / Appearance: x / SG: x / pH: x  Gluc: 136 mg/dL / Ketone: x  / Bili: x / Urobili: x   Blood: x / Protein: x / Nitrite: x   Leuk Esterase: x / RBC: x / WBC x   Sq Epi: x / Non Sq Epi: x / Bacteria: x            RADIOLOGY & ADDITIONAL TESTS:  < from: MR Cervical Spine No Cont (05.10.24 @ 08:30) >  IMPRESSION:  Redemonstration of an acute type II dens fracture with associated   prevertebral soft tissue swelling. No evidence for ligamentous injury.  Mild multilevel degenerative changes as well as disc bulging without   significant spinal canal or neuroforaminal narrowing.    
GENERAL SURGERY PROGRESS NOTE     EVAN QUINN  [address] [place] [address] day :2d    POD:  Procedure:   Surgical Attending: Dequan Mirza  Overnight events:    T(F): 97.7 (05-10-24 @ 00:30), Max: 98.1 (05-09-24 @ 07:37)  HR: 77 (05-10-24 @ 00:30) (69 - 99)  BP: 134/90 (05-10-24 @ 00:30) (96/62 - 142/73)  ABP: --  ABP(mean): --  RR: 18 (05-10-24 @ 00:30) (18 - 20)  SpO2: 95% (05-10-24 @ 00:30) (95% - 100%)    IN'S / OUT's:    05-09-24 @ 07:01  -  05-10-24 @ 01:50  --------------------------------------------------------  IN:  Total IN: 0 mL    OUT:    Voided (mL): 2200 mL  Total OUT: 2200 mL    Total NET: -2200 mL          PHYSICAL EXAM:  GENERAL: NAD, [place] J collar in place  HEAD:  frontal scalp abrasion, Upper lip ecchymosis, philtrum 1cm laceration  CHEST/LUNG: Equal chest rise b/l  HEART: Regular rate and rhythm  ABDOMEN: Soft, Nontender, Nondistended;   EXTREMITIES:  No clubbing, cyanosis, or edema, R knee abrasion       LABS  Labs:  CAPILLARY BLOOD GLUCOSE      POCT Blood Glucose.: 118 mg/dL (09 May 2024 20:52)  POCT Blood Glucose.: 138 mg/dL (09 May 2024 16:27)  POCT Blood Glucose.: 195 mg/dL (09 May 2024 12:14)                          11.8   6.69  )-----------( 123      ( 09 May 2024 00:55 )             37.6         05-09    135  |  94<L>  |  47<H>  ----------------------------<  190<H>  3.3<L>   |  30  |  1.4      Calcium: 9.5 mg/dL (05-09-24 @ 11:18)      LFTs:             6.8  | 0.7  | 74       ------------------[61      ( 09 May 2024 00:55 )  3.9  | x    | 22          Lipase:54     Amylase:x         Blood Gas Venous - Lactate: 1.4 mmol/L (05-09-24 @ 04:02)  Lactate, Blood: 1.7 mmol/L (05-09-24 @ 00:55)      Coags:     13.30  ----< 1.16    ( 09 May 2024 00:55 )     28.5                Urinalysis Basic - ( 09 May 2024 11:18 )    Color: x / Appearance: x / SG: x / pH: x  Gluc: 190 mg/dL / Ketone: x  / Bili: x / Urobili: x   Blood: x / Protein: x / Nitrite: x   Leuk Esterase: x / RBC: x / WBC x   Sq Epi: x / Non Sq Epi: x / Bacteria: x            RADIOLOGY & ADDITIONAL TESTS:      A/P:  EVAN QUINN is a 77 y/o female with PMH of COPD not on home O2, HTN, HLD, DM, Afib not on AC (watchman in place), CAD s/p CABG, PVD, chronic anemia and recent LLE cellulitis that was seen as a trauma alert s/p mechanical fall +HT, -LOC, -AC. Patient reports she was vacuuming when she tripped on her rolling walker and falling face forward.       PLAN:   - F/u MRI of neck  - Keep [place] J collar in place at all times   - f/u AM labs  - DVT and GI ppx  - Daily labs, replete electrolytes as needed   - Physiatry recs appreciated - possible 4A candidate   - encourage ambulation as tolerated   - multi modal pain control       #DVT ppx: aspirin enteric coated 81 milliGRAM(s) Oral daily  heparin   Injectable 5000 Unit(s) SubCutaneous every 8 hours    #GI ppx: pantoprazole    Tablet 40 milliGRAM(s) Oral before breakfast    Disposition:  4C    Above plan to be discussed with Attending Surgeon Dr. Mirza  , patient, patient family, and rest of health care team      TAP (Trauma, Acute care, Pediatrics) Spectra [phone]

## 2024-05-11 NOTE — DISCHARGE NOTE PROVIDER - HOSPITAL COURSE
EVAN QUINN is a 79 y/o female with a PMHx of HTN, HLD, DM, CKD, COPD, Afib, PAD, CAD s/p CABG, left diabetic foot ulcer and gout, who presented to [facility] ED on 5/9/2024, seen as a TRAUMA CONSULT, s/p mechanical fall (+HT -LOC -AC). Trauma workup revealed the following injuries:     1) Type 2 dense fracture w/ 3 mm displacement   2) Non-displaced fracture anterior tubercle atlas   3) Subxiphoid hernia     Neurosurgery consulted, patient was placed in a [place] J collar, and additional imaging with MRI C-spine was obtained which showed no ligamentous injury. No acute neurosurgical intervention indicated. Remainder of hospital course has been complicated by an episode of AFib w/ RVR (patient has history of Afib) which resolved with x1 dose of Lopressor 5mg IV. Patient takes Metoprolol 200mg QD at home and has been on this medication during admission.     Patient worked with physical therapy and they cleared her for discharge. She will be discharged home today (5/11/24 - HD #3).

## 2024-05-11 NOTE — PROVIDER CONTACT NOTE (OTHER) - SITUATION
/74 
pt's morning HR is elevated at 111. pt denies CP. /67. pt had ambulated with RN prior ot VS check and is now working with PT, RN richmond reassess HR after resting.
pt has voided multiple times today, pt is now c/o urge to void but is unable, pt has been d/c'ed, RN bladder scanned pt, volume in bladder is zero. pt is asking if she can still leave

## 2024-05-11 NOTE — CONSULT NOTE ADULT - SUBJECTIVE AND OBJECTIVE BOX
Podiatry Consult Note    Subjective:  EVAN QUINN  Seen Bedside 78y Female  .   Patient is a 78y old  Female who presents with a chief complaint of   HPI:  79 y/o female with PMH of COPD not on home O2, HTN, HLD, DM, Afib not on AC (watchman in place), CAD s/p CABG, PVD, chronic anemia and recent LLE cellulitis that was seen as a trauma alert s/p mechanical fall +HT, -LOC, -AC. Patient reports she was vacuuming when she tripped on her rolling walker and falling face forward. Patient was found by daughter and ambulance was called. In the ED, patient is complaining of neck pain, AAOX3, NAD, GCS 15.    Fatigue: How much time during the previous 4 weeks did you feel tired?   All or most of the time [   ] Yes (1pt)    [ x ] No  (0pts)  Resistance: Do you have any difficulty walking up 10 steps alone without resting and without aids? [   ] Yes (1pt)    [x  ] No  (0pts)  Ambulation: Do you have any difficulty walking several hundred yards alone without aids? [ x  ] Yes (1pt)    [  ] No  (0pts)  Illness: how many illnesses do you have out of list of 11 total? [   ] 5 or more (1pt) [ x ] < 5 (0pts)  Loss of weight: Have you had weight loss of 5% or more? [   ] Yes (1pt)    [ x ] No  (0pts)    Total Score:     Score 1-2: Consult medical comangement  Score 3-4: Consult Geriatric service   Score 5: Consult Palliative service [phone]    Donny Nails G, Yanick MANLEY, Ned BARR, Chetan FRANCISCO. Frality: toward a clinical definition. J AM Med Dir Assoc. 2008; 9 (2): 71-72  Carlos JE, Arlet TK, Luna DK. A simple frailty questionnaire (FRAIL) predicts outcomes in middle aged  Americans. J Nutr Health Aging. 2012; 16 (7): 601-608 (09 May 2024 07:54)      Past Medical History and Surgical History  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      CAD (coronary artery disease), native coronary artery      Anemia      History of bleeding disorder  having work up 5/2/21- HAD WORKUP BUT NO DIAGNOSIS "NOTHING WAS FOUND"      History of transfusion reaction      Hiatal hernia      H/O ascending aortic replacement  Bovine Replacement      S/P CABG x 1  complication of bleeding 2016      H/O total knee replacement, right  complicated with fx femur bars screws in femur- b/l knee replacement      S/P hysterectomy      Presence of Watchman left atrial appendage closure device           Review of Systems:  [X] Ten point review of systems is otherwise negative except as noted     Objective:  Vital Signs Last 24 Hrs  T(C): 36.1 (11 May 2024 12:33), Max: 37 (11 May 2024 00:23)  T(F): 97 (11 May 2024 12:33), Max: 98.6 (11 May 2024 00:23)  HR: 86 (11 May 2024 12:33) (86 - 111)  BP: 114/80 (11 May 2024 12:33) (102/67 - 126/74)  BP(mean): --  RR: 18 (11 May 2024 12:33) (18 - 18)  SpO2: 96% (11 May 2024 12:33) (96% - 97%)    Parameters below as of 11 May 2024 12:33  Patient On (Oxygen Delivery Method): room air                            11.7   7.97  )-----------( 101      ( 10 May 2024 20:51 )             37.6                 05-10    133<L>  |  94<L>  |  43<H>  ----------------------------<  136<H>  4.2   |  20  |  1.6<H>    Ca    8.8      10 May 2024 20:51  Phos  4.7     05-10  Mg     1.7     05-10          Physical Exam - Lower Extremity Focused:   Derm:   Painful callus of left medial foot;   Hyperpigmented skin of bilateral feet; No acute signs of infection  No other open wounds or ulcerations present;     Vascular: DP and PT Pulses Diminished; Foot is Warm to Warm to the touch; Capillary Refill Time < 3 Seconds;    Neuro: Protective Sensation Diminished c   MSK: Diffuse Pain On Palpation of bilateral feet     Assessment:  Bilateral foot pain  Hx of pain from Gout;     Plan:  Chart reviewed and Patient evaluated. All Questions and Concerns Addressed and Answered  XR Imaging B/L Foot; Pending Results  Weight Bearing Status; WBAT ;   No surgical intervention indicated at this time; F/u with XR results   Patient has hx of chronic bilateral foot pain; No acute signs of infection noted to bilateral feet;   Follow as outpatient with Dr. Ortiz  Discussed Plan w/ attending    Podiatry

## 2024-05-11 NOTE — PHYSICAL THERAPY INITIAL EVALUATION ADULT - PERTINENT HX OF CURRENT PROBLEM, REHAB EVAL
Admitted with C2 dens fx without ligamentous damage s/p fall while vacuuming with RW; PMH as below.
HPI: Patient is a 78 year-old female PMH AS, DM, COPD, CAD who presented to ED s/p mechanical fall last night. Patient claims she was vacuuming while using her RW and fell face down onto the ground. Neurosurgery was consulted after CT C spine demonstrated type 2 dens fracture. Patient was seen and examined at bedside in ED. She is lying in bed, A&Ox3, in [place] collar, and following all commands. She endorses localized midline neck pain, worse upon movement, and denies radiation of pain into her extremities, paresthesias, weakness, headaches at this time.

## 2024-05-11 NOTE — DISCHARGE NOTE NURSING/CASE MANAGEMENT/SOCIAL WORK - PATIENT PORTAL LINK FT
You can access the FollowMyHealth Patient Portal offered by [facility] by registering at the following website: [email or web address]. By joining [email or web address]’s FollowMyHealth portal, you will also be able to view your health information using other applications (apps) compatible with our system.

## 2024-05-11 NOTE — DISCHARGE NOTE NURSING/CASE MANAGEMENT/SOCIAL WORK - NSDCVIVACCINE_GEN_ALL_CORE_FT
Tdap; 09-May-2024 02:24; Arlene Araiza (BABITA); [employer]; [id number] (Exp. Date: 30-Nov-2025); IntraMuscular; Deltoid Left.; 0.5 milliLiter(s); VIS (VIS Published: 09-May-2013, VIS Presented: 09-May-2024);

## 2024-05-14 NOTE — ED PROVIDER NOTE - ATTENDING APP SHARED VISIT CONTRIBUTION OF CARE
78-year-old female history of hypertension, hyperlipidemia, diabetes, CKD, COPD, A-fib, CAD status post CABG, obesity, recent admission to the hospital for type II dens fracture, placed in a [place] J collar no ligamentous injury discharged on May 11, now presents with vomiting x 2 days nonbilious nonbloody unable to tolerate any p.o.  Complains of ill-defined epigastric pain.  None currently.  No fever.  Positive weakness.    vss, chronically ill-appearing no respiratory distress no active vomiting, pink conj, anicteric, positive old ecchymosis to face, full sentences, MMM, neck is in [place] J collar, CTAB, RRR, equal radial pulses bilat, abd no tenderness no peritoneal signs no distention, no cva tend. no calves tend, no edema, no fnd.  Facial ecchymosis Attending Statement:  I have personally seen the patient. ISAC Duque and I provided critical care for  a total of 35 minutes. I provided a substantive portion of the care and the majority of the critical care time."      78-year-old female history of hypertension, hyperlipidemia, diabetes, CKD, COPD, A-fib, CAD status post CABG, obesity, recent admission to the hospital for type II dens fracture, placed in a [place] J collar no ligamentous injury discharged on May 11, now presents with vomiting x 2 days nonbilious nonbloody unable to tolerate any p.o.  Complains of ill-defined epigastric pain.  None currently.  No fever.  Positive weakness.    vss, chronically ill-appearing no respiratory distress no active vomiting, pink conj, anicteric, positive old ecchymosis to face, full sentences, MMM, neck is in [place] J collar, CTAB, RRR, equal radial pulses bilat, abd no tenderness no peritoneal signs no distention, no cva tend. no calves tend, no edema, no fnd.  Facial ecchymosis

## 2024-05-14 NOTE — ED PROVIDER NOTE - PHYSICAL EXAMINATION
Gen: NAD, AOx3  Head: NCAT  HEENT: PERRL, oral mucosa moist, normal conjunctiva, oropharynx clear without exudate or erythema  Lung: CTAB, no respiratory distress  CV: normal s1/s2, rrr, Normal perfusion, pulses 2+ throughout  Abd: soft, NTND, no CVA tenderness  Genitourinary: no pelvic tenderness  MSK: No edema, no visible deformities, full range of motion in all 4 extremities  Neuro: CN II-XII grossly intact, No focal neurologic deficits  Skin: No rash   Psych: normal affect

## 2024-05-14 NOTE — ED PROVIDER NOTE - INTERPRETATION
ED EKG: my independent interpretation - Dr. Americo López : Atrial fibrillation at 110, left axis deviation, , ST depression in lead I and aVL, unchanged from May 10, 2024, isolated ST elevations in aVR.

## 2024-05-14 NOTE — ED PROVIDER NOTE - CARE PLAN
Principal Discharge DX:	AKASH (acute kidney injury)  Secondary Diagnosis:	Nausea & vomiting   1 Principal Discharge DX:	AKASH (acute kidney injury)  Secondary Diagnosis:	Nausea & vomiting  Secondary Diagnosis:	Hyperkalemia

## 2024-05-14 NOTE — ED ADULT TRIAGE NOTE - CHIEF COMPLAINT QUOTE
Patient BIBA from home c/o new onset vomiting s/p fall with head injury 5/11/24. Patient reports increasing weakness x 1 day.

## 2024-05-14 NOTE — ED PROVIDER NOTE - WR INTERPRETATION 1
ED CXR prelim, my independent interpretation - Dr. Americo López: No pneumothorax no infiltrates right pleural effusion portable film

## 2024-05-14 NOTE — ED PROVIDER NOTE - PROGRESS NOTE DETAILS
I, Dr. Americo López, discussed the case with cardiology fellow attending -Dr. Johns, will evaluate. Authored by Dr. López: Blood gas reviewed.  Potassium 6.8.  Will treat.  Nephrology contacted. ICU fellow contacted. Note authored by Dr. López: At this time, patient signout to Dr. Pruitt to follow up on follow-up ICU consultation and and dispo

## 2024-05-14 NOTE — ED PROVIDER NOTE - CLINICAL SUMMARY MEDICAL DECISION MAKING FREE TEXT BOX
Multiple medical problems.  Now with vomiting without abdominal pain.  Ill-defined chest pain.  Not current.  EKG ischemic but unchanged from prior.  Cardiology was consulted.  Medications administered and effects were reassessed. Multiple medical problems.  Now with vomiting without abdominal pain.  Ill-defined chest pain.  Not current.  EKG ischemic but unchanged from prior.  ICU fellow contacted. Cardiology was consulted.  Medications administered and effects were reassessed.

## 2024-05-14 NOTE — ED ADULT NURSE NOTE - OBJECTIVE STATEMENT
77 yo pt c/o v/n x 2 days. pt stated she had a fall a few days ago, and fx part of spine. pt on ccollor. pt denies sob/ chest pain. but decrease PO because of /n/v. pt posox 99 on room air, pt states lips are blue due prior fall

## 2024-05-14 NOTE — ED ADULT NURSE NOTE - NSFALLHARMRISKINTERV_ED_ALL_ED
Assistance OOB with selected safe patient handling equipment if applicable/Communicate risk of Fall with Harm to all staff, patient, and family/Monitor gait and stability/Monitor for mental status changes and reorient to person, place, and time, as needed/Provide patient with walking aids/Provide visual cue: red socks, yellow wristband, yellow gown, etc/Reinforce activity limits and safety measures with patient and family/Bed in lowest position, wheels locked, appropriate side rails in place/Call bell, personal items and telephone in reach/Instruct patient to call for assistance before getting out of bed/chair/stretcher/Non-slip footwear applied when patient is off stretcher/[place] to call system/Physically safe environment - no spills, clutter or unnecessary equipment/Purposeful Proactive Rounding/Room/bathroom lighting operational, light cord in reach

## 2024-05-14 NOTE — ED PROVIDER NOTE - OBJECTIVE STATEMENT
79 yo female with a pmh of htn, hld, dm, afib, cad, pvd, recent fall with dens fx presents c/o n/v for 2 days. pt also notes intermittent chest pressure over the past several days. pt denies any other symptoms including fevers, chill, headache, recent illness/travel, cough, abdominal pain, or SOB.

## 2024-05-15 NOTE — CONSULT NOTE ADULT - SUBJECTIVE AND OBJECTIVE BOX
NEPHROLOGY CONSULTATION NOTE    EVAN QUINN  78y  Female  MRN-[id number]    CC:   Patient is a 78y old  Female who presents with a chief complaint of N/V, AKASH on CKD, hyperkalmeia (15 May 2024 05:19)      HPI:  78 MARIZA w a PMH of CKD 3b, Afib (s/p watchman not on AC), COPD not on home O2,  Aortic stenosis w H/O ascending aortic replacement Bovine Replacement, DM , CAD s/p CABG 2016 (w bleeding complication) , anemia of chronic disease and a recent admission for a fall presents to the ED 3 days after DC for fall for the chief concern of NBNB N/V for 48hrs. Patient denies: hematemesis wrenching HA, palpitations, rash, diet or medication changes, diarrhea, fever, abdominal pain or cramping, hematochezia/melena. Patient admitted for AKASH on CKD, inadequate urine output and severe hyperKalemia          (15 May 2024 02:36)      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      CAD (coronary artery disease), native coronary artery      Anemia      History of bleeding disorder  having work up 5/2/21- HAD WORKUP BUT NO DIAGNOSIS "NOTHING WAS FOUND"      History of transfusion reaction      Hiatal hernia      Stage 3 chronic kidney disease      H/O ascending aortic replacement  Bovine Replacement      S/P CABG x 1  complication of bleeding 2016      H/O total knee replacement, right  complicated with fx femur bars screws in femur- b/l knee replacement      S/P hysterectomy      Presence of Watchman left atrial appendage closure device        Allergies:  Augmentin (Other)  penicillins (Hives; Rash)    Home Medications Reviewed  Hospital Medications:   MEDICATIONS  (STANDING):  allopurinol 50 milliGRAM(s) Oral daily  aspirin  chewable 81 milliGRAM(s) Oral daily  atorvastatin 40 milliGRAM(s) Oral at bedtime  budesonide  80 MICROgram(s)/formoterol 4.5 MICROgram(s) Inhaler 2 Puff(s) Inhalation two times a day  dextrose 10% Bolus 125 milliLiter(s) IV Bolus once  dextrose 5%. 1000 milliLiter(s) (100 mL/Hr) IV Continuous <Continuous>  dextrose 5%. 1000 milliLiter(s) (50 mL/Hr) IV Continuous <Continuous>  dextrose 50% Injectable 25 Gram(s) IV Push once  dextrose 50% Injectable 12.5 Gram(s) IV Push once  glucagon  Injectable 1 milliGRAM(s) IntraMuscular once  heparin   Injectable 5000 Unit(s) SubCutaneous every 12 hours  insulin glargine Injectable (LANTUS) 4 Unit(s) SubCutaneous every morning  insulin lispro (ADMELOG) corrective regimen sliding scale   SubCutaneous three times a day before meals  lactated ringers. 1000 milliLiter(s) (100 mL/Hr) IV Continuous <Continuous>  metoprolol tartrate 100 milliGRAM(s) Oral two times a day  montelukast 10 milliGRAM(s) Oral daily  norepinephrine Infusion 0.05 MICROgram(s)/kG/Min (10.6 mL/Hr) IV Continuous <Continuous>  polyethylene glycol 3350 17 Gram(s) Oral daily  pregabalin 75 milliGRAM(s) Oral daily  ranolazine 500 milliGRAM(s) Oral two times a day  senna 2 Tablet(s) Oral at bedtime  tamsulosin 0.4 milliGRAM(s) Oral daily  tiotropium 2.5 MICROgram(s) Inhaler 2 Puff(s) Inhalation daily  venlafaxine 25 milliGRAM(s) Oral daily    MEDICATIONS  (PRN):  dextrose Oral Gel 15 Gram(s) Oral once PRN Blood Glucose LESS THAN 70 milliGRAM(s)/deciliter  metolazone 5 milliGRAM(s) Oral daily PRN fluid overload    Home Medications:  aspirin 81 mg oral capsule: 1 cap(s) orally once a day (17 Apr 2024 23:25)  desvenlafaxine (as succinate) 25 mg oral tablet, extended release: 1 tab(s) orally once a day (17 Apr 2024 23:25)  furosemide 40 mg oral tablet: 1 tab(s) orally once a day (17 Apr 2024 23:25)  glipiZIDE 5 mg oral tablet: 1 tab(s) orally once a day (17 Apr 2024 23:25)  Lyrica 75 mg oral capsule: 1 cap(s) orally once a day (17 Apr 2024 23:25)  montelukast 10 mg oral tablet: 1 tab(s) orally once a day (17 Apr 2024 23:25)  polyethylene glycol 3350 oral powder for reconstitution: 17 gram(s) orally 2 times a day (17 Apr 2024 23:25)  Protonix 40 mg oral delayed release tablet: 1 tab(s) orally once a day (17 Apr 2024 23:25)  Ranexa 500 mg oral tablet, extended release: 1 tab(s) orally 2 times a day (17 Apr 2024 23:25)  rosuvastatin 10 mg oral tablet: 1 tab(s) orally once a day (17 Apr 2024 23:25)  senna leaf extract oral tablet: 2 tab(s) orally once a day (at bedtime) (17 Apr 2024 23:25)      SOCIAL HISTORY:  Social History:      FAMILY HISTORY:  Family history of pseudocholinesterase deficiency (Child)        REVIEW OF SYSTEMS:  All other review of systems is negative unless indicated above.    VITALS:  T(F): 97.4 (05-15-24 @ 04:00), Max: 97.9 (05-14-24 @ 19:07)  HR: 115 (05-15-24 @ 09:30)  BP: 103/69 (05-15-24 @ 09:30)  RR: 17 (05-15-24 @ 09:30)  SpO2: 97% (05-15-24 @ 09:30)    Height (cm): 167.6 (05-14 @ 19:07)  Weight (kg): 113.4 (05-14 @ 19:07)  BMI (kg/m2): 40.4 (05-14 @ 19:07)  BSA (m2): 2.2 (05-14 @ 19:07)  I&O's Detail    15 May 2024 07:01  -  15 May 2024 10:12  --------------------------------------------------------  IN:  Total IN: 0 mL    OUT:    Voided (mL): 45 mL  Total OUT: 45 mL    Total NET: -45 mL            I&O's Summary    15 May 2024 07:01  -  15 May 2024 10:12  --------------------------------------------------------  IN: 0 mL / OUT: 45 mL / NET: -45 mL        PHYSICAL EXAM:  Gen: NAD  resp: b/l breath sounds  card: S1/S2  abd: soft  ext: no edema  vascular access:     LABS:  Daily Height in cm: 167.64 (14 May 2024 19:07)    Daily     Blood Gas Profile w/Lytes - Venous: Performed in Lab (05-15-24 @ 02:30)  Blood Gas Venous - Lactate: 2.9 mmol/L (05-15-24 @ 02:30)  Blood Gas Venous - Potassium: 5.2 mmol/L (05-15-24 @ 02:30)    05-15    132<L>  |  92<L>  |  89<HH>  ----------------------------<  129<H>  5.2<H>   |  19  |  5.4<HH>    Ca    9.4      15 May 2024 05:33  Phos  7.9     05-15  Mg     2.5     05-15    TPro  6.3  /  Alb  3.4<L>  /  TBili  1.3<H>  /  DBili      /  AST  20  /  ALT  16  /  AlkPhos  106  05-15    Creatinine Trend:   Creatinine: 5.4 mg/dL (05-15-24 @ 05:33)  Creatinine: 5.2 mg/dL (05-15-24 @ 02:23)  Creatinine: 4.9 mg/dL (05-14-24 @ 21:26)  Creatinine: 1.6 mg/dL (05-10-24 @ 20:51)                              12.5   8.00  )-----------( 137      ( 15 May 2024 05:33 )             39.8     Mean Cell Volume: 84.9 fL (05-15-24 @ 05:33)    Urine Studies:  Protein, Urine: 100 mg/dL (05-15-24 @ 02:23)  White Blood Cell - Urine: 5 /HPF (05-15-24 @ 02:23)  Red Blood Cell - Urine: 5 /HPF (05-15-24 @ 02:23)  Protein, Urine: Negative mg/dL (01-06-24 @ 04:10)  Protein, Urine: Negative (04-27-23 @ 14:53)  White Blood Cell - Urine: 0 /HPF (04-27-23 @ 14:53)  Red Blood Cell - Urine: 2 /HPF (04-27-23 @ 14:53)  Protein, Urine: Trace (05-05-21 @ 15:00)    Sodium, Random Urine: 121.0 mmoL/L (01-06 @ 04:10)  Creatinine, Random Urine: 12 mg/dL (01-06 @ 04:10)            RADIOLOGY & ADDITIONAL STUDIES:    US Kidney and Bladder:   ACC: [id number] EXAM:  US KIDNEYS AND BLADDER   ORDERED BY: PHILLY FULTON     PROCEDURE DATE:  04/18/2024          INTERPRETATION:  CLINICAL INFORMATION: Acute kidney injury    COMPARISON: Retroperitoneal sonogram 1/7/2024    TECHNIQUE: Sonography of the kidneys and bladder.    FINDINGS:    Right kidney: 9.3 cm. No hydronephrosis. Exophytic lower pole echogenic   lesion measuring 3.0 x 2.6 cm, likely angiomyolipoma.    Left kidney:  10.5 cm. No hydronephrosis. Echogenic lesion in the   interpolar region measures 0.5 cm, likely angiomyolipoma.    Urinary bladder: The urinary bladder is decompressed and cannot be fully   evaluated.        IMPRESSION:    No hydronephrosis. Stable appearance of angiomyolipomas.    --- End of Report ---            KATHERINE XIONG MD; Attending Radiologist  This document has been electronically signed. Apr 18 2024  9:20AM (04-18-24 @ 09:12)      CT Abdomen and Pelvis w/ IV Cont:   ACC: [id number] EXAM:  CT ABDOMEN AND PELVIS IC   ORDERED BY: ARELI HYATT     ACC: [id number] EXAM:  CT CHEST IC   ORDERED BY: ARELI HYATT     PROCEDURE DATE:  05/09/2024          INTERPRETATION:  STUDY INDICATION: Trauma Code    TECHNIQUE:CT of the chest, abdomen and pelvis was performed following   administration of IV contrast. Oral contrast was not administered.    Reformatted images in the coronal and sagittal planes were acquired.    COMPARISON: CT CHEST 2/2/2023, CT ABDOMEN 4/27/2023.  CONTRAST VOLUME: Omnipaque 350 (accession [id number]), IV contrast   documented in unlinked concurrent exam (accession [id number]). 100 cc   administered. 0 cc discarded.  QUALITY STATEMENT: Patient upper extremities causing streak artifact   inherently degrades image quality and limits this exam.    FINDINGS:    CHEST    MEDIASTINUM/LYMPH NODES: No lymphadenopathy by size criteria..    HEART/GREAT VESSELS: Visually the heart is without significant   enlargement. No pericardial effusion. Normal caliber aorta.  Watchman   device is in place. Biatrial enlargement. Mitral annular calcification.  Post median sternotomy and CABG.    LUNGS, PLEURA, AND AIRWAYS: Central airways are patent. No mass or   consolidation. No pneumothorax or pleural effusion.  Scattered small   nodules measuring up to 6 to 7 mm without significant change. Bilateral   linear atelectasis. 1.2 cm fatty nodule in the right lower lobe likely   hematoma without significant change.    ABDOMEN/PELVIS    HEPATOBILIARY: Cholelithiasis.    SPLEEN: Unremarkable.    PANCREAS: Unremarkable.    ADRENAL GLANDS: Unremarkable.    KIDNEYS: No hydronephrosis. Bilateral angiomyolipomas similar to prior   exam.    ABDOMINOPELVIC NODES: Unremarkable.    PELVIC ORGANS: Unremarkable.    PERITONEUM/MESENTERY/BOWEL: No bowel obstruction, pneumoperitoneum or   pneumatosis. No ascites.  Subxiphoid hernia pocket is slightly larger and   now contains a short loop of the transverse large bowel without evidence   of obstruction at this time.    BONES/SOFT TISSUES: No acute osseous abnormality. Osteopenia. Bony   degenerative changes..      OTHER/VASCULAR: Normal caliber aorta with atherosclerotic calcifications.      IMPRESSION:    No evidence of an acute traumatic solid organ or osseous injury.    Subxiphoid hernia pocket is slightly larger and now contains a short loop   of the transverse large bowel without evidence of obstruction at this   time.    Remaining chronic findings as above.    --- End of Report ---            LESIA EVANGELISTA MD; Attending Radiologist  This document has been electronically signed. May  9 2024  3:55AM (05-09-24 @ 02:16)                     NEPHROLOGY CONSULTATION NOTE    EVAN QUINN  78y  Female  MRN-[id number]    CC:   Patient is a 78y old  Female who presents with a chief complaint of N/V, AKASH on CKD, hyperkalmeia (15 May 2024 05:19)      HPI:  78 MARIZA w a PMH of CKD 3b, Afib (s/p watchman not on AC), COPD not on home O2,  Aortic stenosis w H/O ascending aortic replacement Bovine Replacement, DM , CAD s/p CABG 2016 (w bleeding complication) , anemia of chronic disease and a recent admission for a fall presents to the ED 3 days after DC for fall for the chief concern of NBNB N/V for 48hrs. Patient denies: hematemesis wrenching HA, palpitations, rash, diet or medication changes, diarrhea, fever, abdominal pain or cramping, hematochezia/melena. Patient admitted for AKASH on CKD, inadequate urine output and severe hyperKalemia    (15 May 2024 02:36)      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      CAD (coronary artery disease), native coronary artery      Anemia      History of bleeding disorder  having work up 5/2/21- HAD WORKUP BUT NO DIAGNOSIS "NOTHING WAS FOUND"      History of transfusion reaction      Hiatal hernia      Stage 3 chronic kidney disease      H/O ascending aortic replacement  Bovine Replacement      S/P CABG x 1  complication of bleeding 2016      H/O total knee replacement, right  complicated with fx femur bars screws in femur- b/l knee replacement      S/P hysterectomy      Presence of Watchman left atrial appendage closure device        Allergies:  Augmentin (Other)  penicillins (Hives; Rash)    Home Medications Reviewed  Hospital Medications:   MEDICATIONS  (STANDING):  allopurinol 50 milliGRAM(s) Oral daily  aspirin  chewable 81 milliGRAM(s) Oral daily  atorvastatin 40 milliGRAM(s) Oral at bedtime  budesonide  80 MICROgram(s)/formoterol 4.5 MICROgram(s) Inhaler 2 Puff(s) Inhalation two times a day  heparin   Injectable 5000 Unit(s) SubCutaneous every 12 hours  insulin glargine Injectable (LANTUS) 4 Unit(s) SubCutaneous every morning  insulin lispro (ADMELOG) corrective regimen sliding scale   SubCutaneous three times a day before meals  lactated ringers. 1000 milliLiter(s) (100 mL/Hr) IV Continuous <Continuous>  metoprolol tartrate 100 milliGRAM(s) Oral two times a day  montelukast 10 milliGRAM(s) Oral daily  norepinephrine Infusion 0.05 MICROgram(s)/kG/Min (10.6 mL/Hr) IV Continuous <Continuous>  polyethylene glycol 3350 17 Gram(s) Oral daily  pregabalin 75 milliGRAM(s) Oral daily  ranolazine 500 milliGRAM(s) Oral two times a day  senna 2 Tablet(s) Oral at bedtime  tamsulosin 0.4 milliGRAM(s) Oral daily  tiotropium 2.5 MICROgram(s) Inhaler 2 Puff(s) Inhalation daily  venlafaxine 25 milliGRAM(s) Oral daily    MEDICATIONS  (PRN):  dextrose Oral Gel 15 Gram(s) Oral once PRN Blood Glucose LESS THAN 70 milliGRAM(s)/deciliter  metolazone 5 milliGRAM(s) Oral daily PRN fluid overload    Home Medications:  aspirin 81 mg oral capsule: 1 cap(s) orally once a day (17 Apr 2024 23:25)  desvenlafaxine (as succinate) 25 mg oral tablet, extended release: 1 tab(s) orally once a day (17 Apr 2024 23:25)  furosemide 40 mg oral tablet: 1 tab(s) orally once a day (17 Apr 2024 23:25)  glipiZIDE 5 mg oral tablet: 1 tab(s) orally once a day (17 Apr 2024 23:25)  Lyrica 75 mg oral capsule: 1 cap(s) orally once a day (17 Apr 2024 23:25)  montelukast 10 mg oral tablet: 1 tab(s) orally once a day (17 Apr 2024 23:25)  polyethylene glycol 3350 oral powder for reconstitution: 17 gram(s) orally 2 times a day (17 Apr 2024 23:25)  Protonix 40 mg oral delayed release tablet: 1 tab(s) orally once a day (17 Apr 2024 23:25)  Ranexa 500 mg oral tablet, extended release: 1 tab(s) orally 2 times a day (17 Apr 2024 23:25)  rosuvastatin 10 mg oral tablet: 1 tab(s) orally once a day (17 Apr 2024 23:25)  senna leaf extract oral tablet: 2 tab(s) orally once a day (at bedtime) (17 Apr 2024 23:25)      SOCIAL HISTORY:  Social History:      FAMILY HISTORY:  Family history of pseudocholinesterase deficiency (Child)        REVIEW OF SYSTEMS:  difficult to obtain d/t drowsiness    VITALS:  T(F): 97.4 (05-15-24 @ 04:00), Max: 97.9 (05-14-24 @ 19:07)  HR: 115 (05-15-24 @ 09:30)  BP: 103/69 (05-15-24 @ 09:30)  RR: 17 (05-15-24 @ 09:30)  SpO2: 97% (05-15-24 @ 09:30)    Height (cm): 167.6 (05-14 @ 19:07)  Weight (kg): 113.4 (05-14 @ 19:07)  BMI (kg/m2): 40.4 (05-14 @ 19:07)  BSA (m2): 2.2 (05-14 @ 19:07)  I&O's Detail    15 May 2024 07:01  -  15 May 2024 10:12  --------------------------------------------------------  IN:  Total IN: 0 mL    OUT:    Voided (mL): 45 mL  Total OUT: 45 mL    Total NET: -45 mL            I&O's Summary    15 May 2024 07:01  -  15 May 2024 10:12  --------------------------------------------------------  IN: 0 mL / OUT: 45 mL / NET: -45 mL        PHYSICAL EXAM:  Gen: drowsy, in C-collar  resp: b/l breath sounds  abd: soft  ext: +LE edema    LABS:  Daily Height in cm: 167.64 (14 May 2024 19:07)      Blood Gas Profile w/Lytes - Venous: Performed in Lab (05-15-24 @ 02:30)  Blood Gas Venous - Lactate: 2.9 mmol/L (05-15-24 @ 02:30)  Blood Gas Venous - Potassium: 5.2 mmol/L (05-15-24 @ 02:30)    05-15    132<L>  |  92<L>  |  89<HH>  ----------------------------<  129<H>  5.2<H>   |  19  |  5.4<HH>    Ca    9.4      15 May 2024 05:33  Phos  7.9     05-15  Mg     2.5     05-15    TPro  6.3  /  Alb  3.4<L>  /  TBili  1.3<H>  /  DBili      /  AST  20  /  ALT  16  /  AlkPhos  106  05-15    Creatinine Trend:   Creatinine: 5.4 mg/dL (05-15-24 @ 05:33)  Creatinine: 5.2 mg/dL (05-15-24 @ 02:23)  Creatinine: 4.9 mg/dL (05-14-24 @ 21:26)  Creatinine: 1.6 mg/dL (05-10-24 @ 20:51)    Sodium: 132 mmol/L (05-15-24 @ 05:33)  Sodium: 131 mmol/L (05-15-24 @ 02:23)  Sodium: 127 mmol/L (05-14-24 @ 21:26)  Sodium: 133 mmol/L (05-10-24 @ 20:51)  Sodium: 138 mmol/L (05-10-24 @ 05:18)  Sodium: 135 mmol/L (05-09-24 @ 11:18)  Sodium: 134 mmol/L (05-09-24 @ 00:55)                          12.5   8.00  )-----------( 137      ( 15 May 2024 05:33 )             39.8     Mean Cell Volume: 84.9 fL (05-15-24 @ 05:33)    Urine Studies:  Protein, Urine: 100 mg/dL (05-15-24 @ 02:23)  White Blood Cell - Urine: 5 /HPF (05-15-24 @ 02:23)  Red Blood Cell - Urine: 5 /HPF (05-15-24 @ 02:23)    RADIOLOGY & ADDITIONAL STUDIES:

## 2024-05-15 NOTE — H&P ADULT - ASSESSMENT
Ms Pickering is a 78 MARIZA w a PMH of CKD 3b, Afib (s/p watchman not on AC), COPD not on home O2,  Aortic stenosis w H/O ascending aortic replacement  Bovine Replacement, DM , CAD s/p CABG 2016 (w bleeding complication) , anemia of chronic disease and a recent admission for a fall presents to the ED 3 days after DC for fall for the chief concern of NBNB N/V for 48hrs. Patient denies: hematemesis wrenching HA, virtigo, palpitations, rash, diet or medication changes, diarrhea, fever, abdominal pain or cramping, hematochezia/melena. Patient admitted for AKASH on CKD, inadequate urine output and severe hyperKalemia     Note: LASIX and SPIRONOLACTONE held due to AKASH     Impression  AKASH on CKD3b  severe hyperkalemia w/o EKG changes per ED   Inadequate urine output  Ho recent traumatic fall admission w DC May 11  HO asthma/COPD not on home O2  Ho HFpEF, G3DD, moderate PH  Ho CAD s/p CABG  Ho AS s/p SAVR (2016),   HO Atrial fibrillation s/p watchman (2021),       PLAN:    CNS:  No depressants, patient on desvelafaxine not available here change to venlafaxine equivalent     HEENT: Oral care    PULMONARY:  HOB @ 45 degrees.  Aspiration precautions.  NIV during sleep.  c/w home symbicort     CARDIOVASCULAR:  Rate controled.  home Met Succ change to met Tart 100mg BID, c/w amlodpipine, Ranexa for angina, c/w Metolazone for BP . f/u cardio coansult placed by ED. Troponin noted, f/u repeat    GI: GI prophylaxis.  Feeding.  Senna and Miralax    RENAL:  s/p D50+Insulin+CaGluc+Lokelma 10+Senna/miralax. Follow up lytes.  Correct as needed. F/u Nephro consult, f/u urine output     INFECTIOUS DISEASE: f/u CBC and assess daily for signs of infevtion     HEMATOLOGICAL:  Heparin proph     ENDOCRINE:  Follow up FS.  Insulin sliding scale and 4U am Lantus, home gliupizide held     MUSCULOSKELETAL:  PT OT .  Off loading   CODE: PATIENT IS DNR DNI, wants to sign MOLST in AM (stated "too much going on right now, can we sign in the morning?)    Ms Pickering is a 78 MARIZA w a PMH of CKD 3b, Afib (s/p watchman not on AC), COPD not on home O2,  Aortic stenosis w H/O ascending aortic replacement  Bovine Replacement, DM , CAD s/p CABG 2016 (w bleeding complication) , anemia of chronic disease and a recent admission for a fall presents to the ED 3 days after DC for fall for the chief concern of NBNB N/V for 48hrs. Patient denies: hematemesis wrenching HA, virtigo, palpitations, rash, diet or medication changes, diarrhea, fever, abdominal pain or cramping, hematochezia/melena. Patient admitted for AKASH on CKD, inadequate urine output and severe hyperKalemia     Note: LASIX and SPIRONOLACTONE held due to AKASH     Impression  AKASH on CKD3b  severe hyperkalemia w/o EKG changes per ED   Inadequate urine output  Ho recent traumatic fall admission w DC May 11  HO asthma/COPD not on home O2  Ho HFpEF, G3DD, moderate PH  Ho CAD s/p CABG  Ho AS s/p SAVR (2016),   HO Atrial fibrillation s/p watchman (2021),       PLAN:    CNS:  No depressants, patient on desvelafaxine not available here change to venlafaxine equivalent     HEENT: Oral care    PULMONARY:  HOB @ 45 degrees.  Aspiration precautions.  NIV during sleep.  c/w home symbicort     CARDIOVASCULAR:  Rate controled.  home Met Succ change to met Tart 100mg BID, c/w amlodpipine, Ranexa for angina, c/w Metolazone for BP . f/u cardio coansult placed by ED. Troponin noted, f/u repeat    GI: GI prophylaxis.  Feeding.  Senna and Miralax    RENAL:  s/p D50+Insulin+CaGluc+Lokelma 10+Senna/miralax. Follow up lytes.  Correct as needed. LR at 75cc/hr, Flowmax 0.4, F/u Nephro consult, f/u urine output     INFECTIOUS DISEASE: f/u CBC and assess daily for signs of infection     HEMATOLOGICAL:  Heparin proph     ENDOCRINE:  Follow up FS.  Insulin sliding scale and 4U am Lantus, home gliupizide held     MUSCULOSKELETAL:  PT OT .  Off loading   CODE: PATIENT IS DNR DNI, wants to sign MOLST in AM (stated "too much going on right now, can we sign in the morning?)    Ms Pickering is a 78 MARIZA w a PMH of CKD 3b, Afib (s/p watchman not on AC), COPD not on home O2,  Aortic stenosis w H/O ascending aortic replacement  Bovine Replacement, DM , CAD s/p CABG 2016 (w bleeding complication) , anemia of chronic disease and a recent admission for a fall presents to the ED 3 days after DC for fall for the chief concern of NBNB N/V for 48hrs. Patient denies: hematemesis wrenching HA, virtigo, palpitations, rash, diet or medication changes, diarrhea, fever, abdominal pain or cramping, hematochezia/melena. Patient admitted for AKASH on CKD, inadequate urine output and severe hyperKalemia     Note: LASIX and SPIRONOLACTONE held due to AKASH     Impression  AKASH on CKD3b  severe hyperkalemia w/o EKG changes per ED   Inadequate urine output  Ho recent traumatic fall admission w DC May 11  HO asthma/COPD not on home O2  Ho HFpEF, G3DD, moderate PH  Ho CAD s/p CABG  Ho AS s/p SAVR (2016),   HO Atrial fibrillation s/p watchman (2021),       PLAN:    CNS:  No depressants, patient on desvelafaxine not available here change to venlafaxine equivalent     HEENT: Oral care    PULMONARY:  HOB @ 45 degrees.  Aspiration precautions.  NIV during sleep.  c/w home symbicort     CARDIOVASCULAR:  Rate controled.  home Met Succ change to met Tart 100mg BID, c/w amlodpipine, Ranexa for angina, c/w Metolazone for BP . f/u cardio coansult placed by ED. Troponin noted, f/u repeat    GI: GI prophylaxis.  Feeding.  Senna and Miralax    RENAL:  s/p D50+Insulin+CaGluc+Lokelma 10+Senna/miralax. Follow up lytes.  Correct as needed. LR at 75cc/hr, Flowmax 0.4, F/u Nephro consult, Overnight output ~330mL dark urine.  f/u urine output     INFECTIOUS DISEASE: f/u CBC and assess daily for signs of infection     HEMATOLOGICAL:  Heparin proph     ENDOCRINE:  Follow up FS.  Insulin sliding scale and 4U am Lantus, home gliupizide held     MUSCULOSKELETAL:  PT OT .  Off loading   CODE: PATIENT IS DNR DNI, wants to sign MOLST in AM (stated "too much going on right now, can we sign in the morning?)    Ms Pickering is a 78 MARIZA w a PMH of CKD 3b, Afib (s/p watchman not on AC), COPD not on home O2,  Aortic stenosis w H/O ascending aortic replacement  Bovine Replacement, DM , CAD s/p CABG 2016 (w bleeding complication) , anemia of chronic disease and a recent admission for a fall presents to the ED 3 days after DC for fall for the chief concern of NBNB N/V for 48hrs. Patient denies: hematemesis wrenching HA, virtigo, palpitations, rash, diet or medication changes, diarrhea, fever, abdominal pain or cramping, hematochezia/melena. Patient admitted for AKASH on CKD, inadequate urine output and severe hyperKalemia     Note: LASIX and SPIRONOLACTONE held due to AKASH     Impression  AKASH on CKD3b  severe hyperkalemia w/o EKG changes   Ho recent traumatic fall admission w DC May 11  HO asthma/COPD not on home O2  Ho HFpEF, G3DD, moderate PH  Ho CAD s/p CABG  Ho AS s/p SAVR (2016),   HO Atrial fibrillation s/p watchman (2021),       PLAN:    CNS:  No depressants,    HEENT: Oral care    PULMONARY:  HOB @ 45 degrees.  Aspiration precautions.  NIV during sleep.  c/w home symbicort     CARDIOVASCULAR:  Rate control, hold diuretics, Bp meds, cardio fup, echo    GI: GI prophylaxis.  Feeding.  Senna and Miralax    RENAL:  s/p D50+Insulin+CaGluc+Lokelma 10+Senna/miralax. Follow up lytes.  Correct as needed. LR at 75cc/hr,, F/u Nephro consult, Overnight output ~330mL dark urine.  f/u urine output     INFECTIOUS DISEASE: f/u CBC and assess daily for signs of infection     HEMATOLOGICAL:  Heparin proph     ENDOCRINE:  Follow up FS.  Insulin sliding scale and 4U am Lantus, home gliupizide held     MUSCULOSKELETAL:  PT OT .  Off loading   CODE: PATIENT IS DNR DNI, wants to sign MOLST in AM (stated "too much going on right now, can we sign in the morning?)

## 2024-05-15 NOTE — H&P ADULT - NSHPPHYSICALEXAM_GEN_ALL_CORE
T(C): 36.3 (05-14-24 @ 23:47), Max: 36.6 (05-14-24 @ 19:07)  HR: 128 (05-14-24 @ 23:47) (122 - 128)  BP: 129/92 (05-14-24 @ 23:47) (99/72 - 129/92)  RR: 18 (05-14-24 @ 23:47) (18 - 18)  SpO2: 98% (05-14-24 @ 23:47) (98% - 99%)    CONSTITUTIONAL:  no apparent distress, numerous bruises and scabs from fall over face, arms, torso and legs, Pale   EYES: PERRLA , No conjunctival or scleral injection, non-icteric  ENMT: Oral mucosa with moist membranes. no pharyngeal injection or exudates             NECK: Supple, symmetric and without tracheal deviation   RESP: No respiratory distress, no use of accessory muscles; CTA b/l, no WRR  CV: RRR, +S1S2, no MRG; no JVD; +1 BL pitting edema to shins   GI: Soft, NT, ND, no rebound, no guarding; no palpable masses  LYMPH: No cervical LAD or tenderness  MSK: No digital clubbing or cyanosis; Normal ROM without pain, no spinal tenderness, no CVA tenderness, able to move all 4 limbs  SKIN: No rashes or ulcers noted; pallor   NEURO: CN III-VIII intact; normal reflexes in upper and lower extremities, sensation intact in upper and lower extremities b/l to light touch   PSYCH: Appropriate insight/judgment; A+O x 3, mood and affect appropriate, recent/remote memory intact

## 2024-05-15 NOTE — CONSULT NOTE ADULT - ASSESSMENT
78 yr old female with hx of CKD 3b, Afib (s/p watchman not on AC), COPD not on home O2,  Aortic stenosis w H/O ascending aortic replacement and Bovine Replacement, DM , CAD s/p CABG 2016 (w bleeding complication) , anemia of chronic disease and a recent admission for a fall presents to the ED 3 days after DC for fall for the chief concern of NBNB N/V for 48hrs.     Cardiology consulted for elevated Trops     # Type 2 MI   # Acute Gastritis   # AKASH on CKD3b  # Hyperkalemia  # HX HFpEF  # CAD s/p CABG  # AS s/p SAVR (2016),   # Atrial fibrillation s/p watchman (2021)  - hemodynamically stable  - pt denies any chest pain or sob at encounter  - trops: 41 --> 89 --> repeat until down trending   - EKG: NSR with no specific ST changes (same as prior ekg)  - ECHO (01.09.2): EF 70 to 75%. Severe (grade III) diastolic dysfunction. S/p SAVR with normal function (Vmax 2.3, Mean PG 13mmHg, DI 0.46, ESTUARDO 1.33cm2). Moderate-severe tricuspid regurgitation.  - c/w home metoprolol, diuretics, Amlodipine Ranexa  - no further cardic workup at this time   - tele monitoring  78 yr old female with hx of CKD 3b, Afib (s/p watchman not on AC), COPD not on home O2,  Aortic stenosis w H/O ascending aortic replacement and Bovine Replacement, DM , CAD s/p CABG 2016 (w bleeding complication) , anemia of chronic disease and a recent admission for a fall presents to the ED 3 days after DC for fall for the chief concern of NBNB N/V for 48hrs.     Cardiology consulted for elevated Trops     # Type 2 MI   # Acute Gastritis   # AKASH on CKD3b  # Hyperkalemia  # HX HFpEF  # CAD s/p CABG  # AS s/p SAVR (2016),   # Atrial fibrillation s/p watchman (2021)  - hemodynamically stable  - pt denies any chest pain or sob at encounter  - trops: 41 --> 89   - EKG: NSR with no specific ST changes (same as prior ekg)  - ECHO (01.09.2): EF 70 to 75%. Severe (grade III) diastolic dysfunction. S/p SAVR with normal function (Vmax 2.3, Mean PG 13mmHg, DI 0.46, ESTUARDO 1.33cm2). Moderate-severe tricuspid regurgitation.  - c/w home metoprolol, diuretics, Amlodipine Ranexa  - no further cardiac workup at this time   - tele monitoring  78 yr old female with hx of CKD 3b, Afib (s/p watchman not on AC), COPD not on home O2,  Aortic stenosis w H/O ascending aortic replacement and Bovine Replacement, DM , CAD s/p CABG 2016 (w bleeding complication) , anemia of chronic disease and a recent admission for a fall presents to the ED 3 days after DC for fall for the chief concern of NBNB N/V for 48hrs.     Cardiology consulted for mildly elevated Trops CKMB negative     # Type 2 MI   # Acute Gastritis   # AKASH on CKD3b  # Hyperkalemia  # HX HFpEF  # CAD s/p CABG  # AS s/p SAVR (2016),   # Atrial fibrillation s/p watchman (2021)  - hemodynamically stable  - pt denies any chest pain or sob at encounter  - trops: 41 --> 89    - EKG: NSR with no specific ST changes (same as prior ekg)  - ECHO (01.09.2): EF 70 to 75%. Severe (grade III) diastolic dysfunction. S/p SAVR with normal function (Vmax 2.3, Mean PG 13mmHg, DI 0.46, ESTUARDO 1.33cm2). Moderate-severe tricuspid regurgitation.  - c/w home metoprolol, diuretics, Amlodipine Ranexa  - no further cardiac workup at this time   - tele monitoring

## 2024-05-15 NOTE — CONSULT NOTE ADULT - SUBJECTIVE AND OBJECTIVE BOX
Outpt cardiologist: Dr. Ramirez    HPI:  Ms Pickering is a 78 MARIZA w a PMH of CKD 3b, Afib (s/p watchman not on AC), COPD not on home O2,  Aortic stenosis w H/O ascending aortic replacement  Bovine Replacement, DM , CAD s/p CABG 2016 (w bleeding complication) , anemia of chronic disease and a recent admission for a fall presents to the ED 3 days after DC for fall for the chief concern of NBNB N/V for 48hrs. Patient denies: hematemesis wrenching HA, virtigo, palpitations, rash, diet or medication changes, diarrhea, fever, abdominal pain or cramping, hematochezia/melena. Patient admitted for AKASH on CKD, inadequate urine output and severe hyperKalemia       PAST MEDICAL & SURGICAL HISTORY  Aortic stenosis    Diabetes    Asthma with COPD  many years since last attack    CAD (coronary artery disease), native coronary artery    Anemia    History of bleeding disorder  having work up 5/2/21- HAD WORKUP BUT NO DIAGNOSIS "NOTHING WAS FOUND"    History of transfusion reaction    Hiatal hernia    Stage 3 chronic kidney disease    H/O ascending aortic replacement  Bovine Replacement    S/P CABG x 1  complication of bleeding 2016    H/O total knee replacement, right  complicated with fx femur bars screws in femur- b/l knee replacement    S/P hysterectomy    Presence of Watchman left atrial appendage closure device        FAMILY HISTORY:  FAMILY HISTORY:  Family history of pseudocholinesterase deficiency (Child)        SOCIAL HISTORY:  Social History: neg x3      ALLERGIES:  Augmentin (Other)  penicillins (Hives; Rash)      MEDICATIONS:  allopurinol 50 milliGRAM(s) Oral daily  aspirin  chewable 81 milliGRAM(s) Oral daily  atorvastatin 40 milliGRAM(s) Oral at bedtime  budesonide  80 MICROgram(s)/formoterol 4.5 MICROgram(s) Inhaler 2 Puff(s) Inhalation two times a day  dextrose 10% Bolus 125 milliLiter(s) IV Bolus once  dextrose 5%. 1000 milliLiter(s) (100 mL/Hr) IV Continuous <Continuous>  dextrose 5%. 1000 milliLiter(s) (50 mL/Hr) IV Continuous <Continuous>  dextrose 50% Injectable 25 Gram(s) IV Push once  dextrose 50% Injectable 12.5 Gram(s) IV Push once  glucagon  Injectable 1 milliGRAM(s) IntraMuscular once  heparin   Injectable 5000 Unit(s) SubCutaneous every 12 hours  insulin glargine Injectable (LANTUS) 4 Unit(s) SubCutaneous every morning  insulin lispro (ADMELOG) corrective regimen sliding scale   SubCutaneous three times a day before meals  lactated ringers. 1000 milliLiter(s) (75 mL/Hr) IV Continuous <Continuous>  metoprolol tartrate 100 milliGRAM(s) Oral two times a day  montelukast 10 milliGRAM(s) Oral daily  polyethylene glycol 3350 17 Gram(s) Oral daily  pregabalin 75 milliGRAM(s) Oral daily  ranolazine 500 milliGRAM(s) Oral two times a day  senna 2 Tablet(s) Oral at bedtime  tamsulosin 0.4 milliGRAM(s) Oral daily  tiotropium 2.5 MICROgram(s) Inhaler 2 Puff(s) Inhalation daily  venlafaxine 25 milliGRAM(s) Oral daily    PRN:  dextrose Oral Gel 15 Gram(s) Oral once PRN  metolazone 5 milliGRAM(s) Oral daily PRN      HOME MEDICATIONS:  Home Medications:  aspirin 81 mg oral capsule: 1 cap(s) orally once a day (17 Apr 2024 23:25)  desvenlafaxine (as succinate) 25 mg oral tablet, extended release: 1 tab(s) orally once a day (17 Apr 2024 23:25)  furosemide 40 mg oral tablet: 1 tab(s) orally once a day (17 Apr 2024 23:25)  glipiZIDE 5 mg oral tablet: 1 tab(s) orally once a day (17 Apr 2024 23:25)  Lyrica 75 mg oral capsule: 1 cap(s) orally once a day (17 Apr 2024 23:25)  montelukast 10 mg oral tablet: 1 tab(s) orally once a day (17 Apr 2024 23:25)  polyethylene glycol 3350 oral powder for reconstitution: 17 gram(s) orally 2 times a day (17 Apr 2024 23:25)  Protonix 40 mg oral delayed release tablet: 1 tab(s) orally once a day (17 Apr 2024 23:25)  Ranexa 500 mg oral tablet, extended release: 1 tab(s) orally 2 times a day (17 Apr 2024 23:25)  rosuvastatin 10 mg oral tablet: 1 tab(s) orally once a day (17 Apr 2024 23:25)  senna leaf extract oral tablet: 2 tab(s) orally once a day (at bedtime) (17 Apr 2024 23:25)      VITALS:   T(F): 97.4 (05-15 @ 04:00), Max: 97.9 (05-14 @ 19:07)  HR: 129 (05-15 @ 04:00) (122 - 129)  BP: 105/71 (05-15 @ 04:00) (99/72 - 129/92)  BP(mean): --  RR: 18 (05-15 @ 04:00) (18 - 18)  SpO2: 98% (05-15 @ 04:00) (98% - 99%)    I&O's Summary      REVIEW OF SYSTEMS:  CONSTITUTIONAL: No weakness, fevers or chills  HEENT: No visual changes, neck/ear pain  RESPIRATORY: No cough, sob  CARDIOVASCULAR: See HPI  GASTROINTESTINAL: No abdominal pain. No nausea, vomiting, diarrhea   GENITOURINARY: No dysuria, frequency or hematuria  NEUROLOGICAL: No new focal deficits  SKIN: No new rashes    PHYSICAL EXAM:  General: Not in distress.  Non-toxic appearing.   HEENT: EOMI  Cardio: regular, S1, S2, no murmur  Pulm: B/L BS.  No wheezing / crackles / rales  Abdomen: Soft, non-tender, non-distended. Normoactive bowel sounds  Extremities: No edema b/l le  Neuro: A&O x3. No focal deficits  Skin: intact   (+) J collar     LABS:                        12.8   6.84  )-----------( 145      ( 14 May 2024 21:26 )             40.4     05-15    131<L>  |  91<L>  |  96<HH>  ----------------------------<  171<H>  5.4<H>   |  21  |  5.2<HH>    Ca    9.9      15 May 2024 02:23    TPro  6.5  /  Alb  3.7  /  TBili  1.1  /  DBili  x   /  AST  42<H>  /  ALT  20  /  AlkPhos  108  05-14    PT/INR - ( 14 May 2024 21:26 )   PT: 12.90 sec;   INR: 1.13 ratio         PTT - ( 14 May 2024 21:26 )  PTT:37.7 sec          Troponin trend:        COVID-19 PCR: NotDetec (07 Feb 2024 11:02)  COVID-19 PCR: NotDetec (22 Jan 2024 11:58)  SARS-CoV-2: NotDetec (12 Jan 2024 09:34)  SARS-CoV-2: NotDetec (10 Bhaskar 2024 17:11)  COVID-19 PCR: NotDetec (08 Jan 2024 21:30)      RADIOLOGY:  -CXR:  -TTE:  -CCTA:  -STRESS TEST:  -CATHETERIZATION:  -OTHER:  ECG:      TELEMETRY EVENTS:   Outpt cardiologist: Dr. Ramirez    HPI:  Ms Pickering is a 78 MARIZA w a PMH of CKD 3b, Afib (s/p watchman not on AC), COPD not on home O2,  Aortic stenosis w H/O ascending aortic replacement  Bovine Replacement, DM , CAD s/p CABG 2016 (w bleeding complication) , anemia of chronic disease and a recent admission for a fall presents to the ED 3 days after DC for fall for the chief concern of NBNB N/V for 48hrs. Patient denies: hematemesis wrenching HA, virtigo, palpitations, rash, diet or medication changes, diarrhea, fever, abdominal pain or cramping, hematochezia/melena. Patient admitted for AKASH on CKD, inadequate urine output and severe hyperKalemia       PAST MEDICAL & SURGICAL HISTORY  Aortic stenosis    Diabetes    Asthma with COPD  many years since last attack    CAD (coronary artery disease), native coronary artery    Anemia    History of bleeding disorder  having work up 5/2/21- HAD WORKUP BUT NO DIAGNOSIS "NOTHING WAS FOUND"    History of transfusion reaction    Hiatal hernia    Stage 3 chronic kidney disease    H/O ascending aortic replacement  Bovine Replacement    S/P CABG x 1  complication of bleeding 2016    H/O total knee replacement, right  complicated with fx femur bars screws in femur- b/l knee replacement    S/P hysterectomy    Presence of Watchman left atrial appendage closure device        FAMILY HISTORY:  FAMILY HISTORY:  Family history of pseudocholinesterase deficiency (Child)        SOCIAL HISTORY:  Social History: neg x3      ALLERGIES:  Augmentin (Other)  penicillins (Hives; Rash)      MEDICATIONS:  allopurinol 50 milliGRAM(s) Oral daily  aspirin  chewable 81 milliGRAM(s) Oral daily  atorvastatin 40 milliGRAM(s) Oral at bedtime  budesonide  80 MICROgram(s)/formoterol 4.5 MICROgram(s) Inhaler 2 Puff(s) Inhalation two times a day  dextrose 10% Bolus 125 milliLiter(s) IV Bolus once  dextrose 5%. 1000 milliLiter(s) (100 mL/Hr) IV Continuous <Continuous>  dextrose 5%. 1000 milliLiter(s) (50 mL/Hr) IV Continuous <Continuous>  dextrose 50% Injectable 25 Gram(s) IV Push once  dextrose 50% Injectable 12.5 Gram(s) IV Push once  glucagon  Injectable 1 milliGRAM(s) IntraMuscular once  heparin   Injectable 5000 Unit(s) SubCutaneous every 12 hours  insulin glargine Injectable (LANTUS) 4 Unit(s) SubCutaneous every morning  insulin lispro (ADMELOG) corrective regimen sliding scale   SubCutaneous three times a day before meals  lactated ringers. 1000 milliLiter(s) (75 mL/Hr) IV Continuous <Continuous>  metoprolol tartrate 100 milliGRAM(s) Oral two times a day  montelukast 10 milliGRAM(s) Oral daily  polyethylene glycol 3350 17 Gram(s) Oral daily  pregabalin 75 milliGRAM(s) Oral daily  ranolazine 500 milliGRAM(s) Oral two times a day  senna 2 Tablet(s) Oral at bedtime  tamsulosin 0.4 milliGRAM(s) Oral daily  tiotropium 2.5 MICROgram(s) Inhaler 2 Puff(s) Inhalation daily  venlafaxine 25 milliGRAM(s) Oral daily    PRN:  dextrose Oral Gel 15 Gram(s) Oral once PRN  metolazone 5 milliGRAM(s) Oral daily PRN      HOME MEDICATIONS:  Home Medications:  aspirin 81 mg oral capsule: 1 cap(s) orally once a day (17 Apr 2024 23:25)  desvenlafaxine (as succinate) 25 mg oral tablet, extended release: 1 tab(s) orally once a day (17 Apr 2024 23:25)  furosemide 40 mg oral tablet: 1 tab(s) orally once a day (17 Apr 2024 23:25)  glipiZIDE 5 mg oral tablet: 1 tab(s) orally once a day (17 Apr 2024 23:25)  Lyrica 75 mg oral capsule: 1 cap(s) orally once a day (17 Apr 2024 23:25)  montelukast 10 mg oral tablet: 1 tab(s) orally once a day (17 Apr 2024 23:25)  polyethylene glycol 3350 oral powder for reconstitution: 17 gram(s) orally 2 times a day (17 Apr 2024 23:25)  Protonix 40 mg oral delayed release tablet: 1 tab(s) orally once a day (17 Apr 2024 23:25)  Ranexa 500 mg oral tablet, extended release: 1 tab(s) orally 2 times a day (17 Apr 2024 23:25)  rosuvastatin 10 mg oral tablet: 1 tab(s) orally once a day (17 Apr 2024 23:25)  senna leaf extract oral tablet: 2 tab(s) orally once a day (at bedtime) (17 Apr 2024 23:25)      VITALS:   T(F): 97.4 (05-15 @ 04:00), Max: 97.9 (05-14 @ 19:07)  HR: 129 (05-15 @ 04:00) (122 - 129)  BP: 105/71 (05-15 @ 04:00) (99/72 - 129/92)  BP(mean): --  RR: 18 (05-15 @ 04:00) (18 - 18)  SpO2: 98% (05-15 @ 04:00) (98% - 99%)    I&O's Summary      REVIEW OF SYSTEMS:  CONSTITUTIONAL: No weakness, fevers or chills  RESPIRATORY: No cough, sob  CARDIOVASCULAR: See HPI  GASTROINTESTINAL: No abdominal pain. No nausea, vomiting, diarrhea   GENITOURINARY: No dysuria, frequency or hematuria  NEUROLOGICAL: No new focal deficits    PHYSICAL EXAM:  General: Not in distress.  Non-toxic appearing.   Cardio: regular, S1, S2, no murmur  Pulm No wheezing / crackles / rales  Abdomen: Soft, non-tender,   Extremities: No edema b/l le  Neuro: A&O x3.  (+) J collar     LABS:                        12.8   6.84  )-----------( 145      ( 14 May 2024 21:26 )             40.4     05-15    131<L>  |  91<L>  |  96<HH>  ----------------------------<  171<H>  5.4<H>   |  21  |  5.2<HH>    Ca    9.9      15 May 2024 02:23    TPro  6.5  /  Alb  3.7  /  TBili  1.1  /  DBili  x   /  AST  42<H>  /  ALT  20  /  AlkPhos  108  05-14    PT/INR - ( 14 May 2024 21:26 )   PT: 12.90 sec;   INR: 1.13 ratio         PTT - ( 14 May 2024 21:26 )  PTT:37.7 sec          Troponin trend:        COVID-19 PCR: NotDetec (07 Feb 2024 11:02)  COVID-19 PCR: NotDetec (22 Jan 2024 11:58)  SARS-CoV-2: NotDetec (12 Jan 2024 09:34)  SARS-CoV-2: NotDetec (10 Bhaskar 2024 17:11)  COVID-19 PCR: NotDetec (08 Jan 2024 21:30)      RADIOLOGY:  -CXR:  -TTE:  -CCTA:  -STRESS TEST:  -CATHETERIZATION:  -OTHER:  ECG:      TELEMETRY EVENTS:

## 2024-05-15 NOTE — CHART NOTE - NSCHARTNOTEFT_GEN_A_CORE
patient hypotensive to 79/52 w  on levo 0.6, patient reporting "I don't feel good" when pushed to elaborate "I cont breath good"   norm added  levo brought down to 0.06 w Norm at 1.7, BP improved to 92/64 w MAP of 72 and HR of 136  K= 6.2, D5/insulin given, EKG showed T wae inversions and ST depression in lead I, CaGluc given  patient unable to take PO at this time  will continue to push D50/Insulin q2hrs until able to give rectal K binder q8hr   patient reporting dyspnea despite SpO2 99%, lungs BL CTA, patient also reporting diaphoresis (not visible)  will draw troponin once patient stable   patient A&Ox3, following commands, clinically unchanged   unable to place IJ due to hard collar in place from previous trauma, not able to remove   2 peripheral IVs, unable to place third IV due to edema and small blood vessels  MAR coming to placing Fem Central (complicated by habitus)   per discussion w fellow starting Amiodarone ggt (150 push followed by 6hr at 1mg/min then 18hrs at 0.5 despite no home AC, since admission on heparin) patient hypotensive to 79/52 w  on levo 0.6, patient reporting "I don't feel good" when pushed to elaborate "I cont breath good"   norm added  levo brought down to 0.06 w Norm at 1.7, BP improved to 92/64 w MAP of 72 and HR of 136  K= 6.2, D5/insulin given, EKG showed T wae inversions and ST depression in lead I, CaGluc given  patient unable to take PO at this time  will continue to push D50/Insulin q2hrs until able to give rectal K binder q8hr   patient reporting dyspnea despite SpO2 99%, lungs BL CTA, patient also reporting diaphoresis (not visible)  will draw troponin once patient stable   patient A&Ox3, following commands, clinically unchanged   unable to place IJ due to hard collar in place from previous trauma, not able to remove   2 peripheral IVs, unable to place third IV due to edema and small blood vessels  MAR coming to placing Fem Central (complicated by habitus)   per discussion w fellow starting Amiodarone ggt (150 push followed by 6hr at 1mg/min then 18hrs at 0.5 despite no home AC, since admission on heparin)    patient was to be started on Selevamer this AM for hyperphosphatemia, not yet initiated, starting now  patient was unable to take Lokelma oral today due to lethargy per report patient hypotensive to 79/52 w  on levo 0.6, patient reporting "I don't feel good" when pushed to elaborate "I cont breath good"   norm added  levo brought down to 0.06 w Norm at 1.7, BP improved to 92/64 w MAP of 72 and HR of 136  K= 6.2, D5/insulin given, EKG showed T wae inversions and ST depression in lead I, CaGluc given  patient unable to take PO at this time  will continue to push D50/Insulin q2hrs until able to give rectal K binder q8hr   patient reporting dyspnea despite SpO2 99%, lungs BL CTA, patient also reporting diaphoresis (not visible)  will draw troponin once patient stable   patient A&Ox3, following commands, clinically unchanged   unable to place IJ due to hard collar in place from previous trauma, not able to remove   2 peripheral IVs, unable to place third IV due to edema and small blood vessels  MAR coming to placing Fem Central (complicated by habitus)   per discussion w fellow starting Amiodarone ggt (150 push followed by 6hr at 1mg/min then 18hrs at 0.5 despite no home AC, since admission on heparin)    sevelamer was meant to be started today and lokelma was to be continued q8hr however patient was unable to take oral today due to lethargy per report patient hypotensive to 79/52 w  on levo 0.6, patient reporting "I don't feel good" when pushed to elaborate "I cont breath good"   norm added  levo brought down to 0.06 w Norm at 1.7, BP improved to 92/64 w MAP of 72 and HR of 136  K= 6.2, D5/insulin given, EKG showed T wae inversions and ST depression in lead I, CaGluc given  patient unable to take PO at this time  will continue to push D50/Insulin q2hrs until able to give rectal K binder q8hr   patient reporting dyspnea despite SpO2 99%, lungs BL CTA, patient also reporting diaphoresis (not visible)  will draw troponin once patient stable   patient A&Ox3, following commands, clinically unchanged   unable to place IJ due to hard collar in place from previous trauma, not able to remove   2 peripheral IVs, unable to place third IV due to edema and small blood vessels  MAR coming to placing Fem Central (complicated by habitus)   per discussion w fellow starting Amiodarone ggt (150 push followed by 6hr at 1mg/min then 18hrs at 0.5 despite no home AC, since admission on heparin)    sevelamer was meant to be started today and lokelma was to be continued q8hr however patient was unable to take oral today due to lethargy per report    Daughter Erna Glover gave verbal concent for Fem [facility] and stated patient has prior MOLST DNR/DNI signed on fridge at home patient hypotensive to 79/52 w  on levo 0.6, patient reporting "I don't feel good" when pushed to elaborate "I cont breath good"   norm added  levo brought down to 0.06 w Norm at 1.7, BP improved to 92/64 w MAP of 72 and HR of 136  K= 6.2, D5/insulin given, EKG showed T wae inversions and ST depression in lead I, CaGluc given  patient unable to take PO at this time  will continue to push D50/Insulin q2hrs until able to give rectal K binder q8hr   patient reporting dyspnea despite SpO2 99%, lungs BL CTA, patient also reporting diaphoresis (not visible)  will draw troponin once patient stable   patient A&Ox3, following commands, clinically unchanged   unable to place IJ due to hard collar in place from previous trauma, not able to remove   2 peripheral IVs, unable to place third IV due to edema and small blood vessels  MAR coming to placing Fem [facility] (complicated by habitus)   per discussion w fellow starting Amiodarone ggt (150 push followed by 6hr at 1mg/min then 18hrs at 0.5 despite no home AC, since admission on heparin)    sevelamer was meant to be started today and lokelma was to be continued q8hr however patient was unable to take oral today due to lethargy per report    Daughter Erna Glover gave verbal concent for [facility] and stated patient has prior MOLST DNR/DNI signed on fridge at home, new MOLST completed over the phone w Erna who is also health care proxy

## 2024-05-15 NOTE — H&P ADULT - NSHPREVIEWOFSYSTEMS_GEN_ALL_CORE
REVIEW OF SYSTEMS:    CONSTITUTIONAL: No fevers or chills  EYES/ENT: No visual changes;  No vertigo or throat pain   NECK: No pain or stiffness  RESPIRATORY: No cough, wheezing, hemoptysis; No shortness of breath  CARDIOVASCULAR: No chest pain or palpitations  GASTROINTESTINAL: per HPI   GENITOURINARY: No dysuria. Has the urge to void but unable to   NEUROLOGICAL: No numbness   SKIN: No itching, rashes

## 2024-05-15 NOTE — H&P ADULT - HISTORY OF PRESENT ILLNESS
Ms Pickering is a 78 MARIZA w a PMH of CKD 3b, Afib (s/p watchman not on AC), COPD not on home O2,  Aortic stenosis w H/O ascending aortic replacement  Bovine Replacement, DM , CAD s/p CABG 2016 (w bleeding complication) , anemia of chronic disease and a recent admission for a fall presents to the ED 3 days after DC for fall for the chief concern of NBNB N/V for 48hrs. Patient denies: hematemesis wrenching HA, virtigo, palpitations, rash, diet or medication changes, diarrhea, fever, abdominal pain or cramping, hematochezia/melena. Patient admitted for AKASH on CKD, inadequate urine output and severe hyperKalemia          78 MARIZA w a PMH of CKD 3b, Afib (s/p watchman not on AC), COPD not on home O2,  Aortic stenosis w H/O ascending aortic replacement Bovine Replacement, DM , CAD s/p CABG 2016 (w bleeding complication) , anemia of chronic disease and a recent admission for a fall presents to the ED 3 days after DC for fall for the chief concern of NBNB N/V for 48hrs. Patient denies: hematemesis wrenching HA, palpitations, rash, diet or medication changes, diarrhea, fever, abdominal pain or cramping, hematochezia/melena. Patient admitted for AKASH on CKD, inadequate urine output and severe hyperKalemia

## 2024-05-15 NOTE — CONSULT NOTE ADULT - ASSESSMENT
AKASH on CKD 3 / last creat on 5/10 was 1.6 / received contrast on 5/9, but AKASH more likely related to hemodynamic instability, Afib with RVR, fall, very dark urine, possible rhabdomyolysis  Hyponatremia improving with iv hydration   Hyperkalemia s/p medical treatment improved    - monitor strict i/o  - cont iv hydration until the evening then d/c to avoid hypervolemia (unless CPK level is elevated will need to continue and can give diuretics if gaining positive fluid balance)  - maintain on lokelma 10g daily until K < 5  - start sodium bicarb 1300mg q8h  - f/u CPK  - phos level high, start sevelamer 1 tab TID with meals  - dose meds for eGFR <15 while creat is up-trending / pregabalin dose should be reduced to 25 to 50mg per day   - no acute indication for RRT at this point / will follow up   - obtain renal/bladder ultrasound if renal function not improving   - cardio eval

## 2024-05-15 NOTE — CHART NOTE - NSCHARTNOTEFT_GEN_A_CORE
was called at 5:30 regarding , /72  asked to give lopressor 5 mg push stat  ED unable to obtain until 6:22 at which time BP 90/63 w   Lopressor was SCANNED BUT NOT GIVEN  Nurse told to hold and give 500cc LR bolus instead  Urine noted to be Black  CK ordered stat to assess for Rhabdo

## 2024-05-15 NOTE — H&P ADULT - CLICK TO LAUNCH ORM
1. Patient is a 57-year-old female who comes to the clinic for evaluation of persistent urinary symptoms.  She was seen initially at her primary MD, Dr. Wright's office, on Wednesday, March 24 and diagnosed with a UTI.  She has taken 10 days worth of Macrobid.  She states that her urinary symptoms are somewhat improved.  She did have a fever up to 102 two days ago.  Denies any upper respiratory symptoms.  No chest pain, shortness of breath.  No cough.  No abdominal pain.  She did have one episode of emesis earlier today.  No diarrhea.  She is tolerating oral intake.  Patient is currently afebrile.  She is mildly tachycardic at 104, though states that she is tolerating oral intake.    2. Patient understands we are limited in her diagnostic and therapeutic capability here at this clinic.  Declines transfer to the emergency department today for further evaluation and treatment.  Patient wishes to be discharged home. Patient states that she has tolerated ciprofloxacin in the past and prefers this medication.  She states that she does have an appointment with her primary MD on Monday, April 5.  She wishes to be discharged home.  Prescription for ciprofloxacin electronically prescribed to patient's preferred pharmacy.  If worsening symptoms, persistent fevers, or any new concerns, patient understands she is to proceed immediately to the nearest emergency department for evaluation and treatment.  See discharge instructions below.  3. Ciprofloxacin, use as directed.  4. Push oral fluid intake.  5. OTC analgesics for supportive management.  6. If worsening symptoms, or any concerns, patient fully understands she is to proceed immediately to the nearest emergency department for further evaluation and treatment.  7. Keep your followup appointment with your primary physician, Dr. Wright, on Monday 4/5 for reevaluation and repeat urinalysis.    Final diagnosis: Acute urinary tract infection    
.

## 2024-05-15 NOTE — H&P ADULT - NSHPLABSRESULTS_GEN_ALL_CORE
Complete Blood Count + Automated Diff (05.14.24 @ 21:26)   WBC Count: 6.84 K/uL  RBC Count: 4.83 M/uL  Hemoglobin: 12.8 g/dL  Hematocrit: 40.4 %  Mean Cell Volume: 83.6 fL  Mean Cell Hemoglobin: 26.5 pg  Mean Cell Hemoglobin Conc: 31.7 g/dL  Red Cell Distrib Width: 19.3 %  Platelet Count - Automated: 145 K/uL  MPV: 9.8 fL  Auto Neutrophil #: 4.88 K/uL  Auto Lymphocyte #: 1.05 K/uL  Auto Monocyte #: 0.87 K/uL  Auto Eosinophil #: 0.00 K/uL  Auto Basophil #: 0.01 K/uL  Auto Neutrophil %: 71.4:     Comprehensive Metabolic Panel (05.14.24 @ 21:26)   Sodium: 127 mmol/L  Potassium: TNP: TEST NOT PERFORMED; SPECIMEN GROSSLY HEMOLYZED. mmol/L  Chloride: 87 mmol/L  Carbon Dioxide: 20 mmol/L  Anion Gap: 20 mmol/L  Blood Urea Nitrogen: 90Glucose: 203 mg/dL  Calcium: 9.3 mg/dL  Protein Total: 6.5 g/dL  Albumin: 3.7 g/dL  Bilirubin Total: 1.1 mg/dL  Alkaline Phosphatase: 108 U/L  Aspartate Aminotransferase (AST/SGOT): 42: Hemolyzed. Interpret with caution U/L  Alanine Aminotransferase (ALT/SGPT): 20: Hemolyzed. Interpret with caution U/L  eGFR: 9:    Troponin T, High Sensitivity (05.14.24 @ 21:26)   Troponin T, High Sensitivity Result: 89:    Blood Gas Profile - Venous (05.14.24 @ 23:31)   pH, Venous: 7.33  pCO2, Venous: 46 mmHg  pO2, Venous: 37 mmHg  HCO3, Venous: 24 mmol/L  Base Excess, Venous: -1.7 mmol/L  Oxygen Saturation, Venous: 51.8 %  Blood Gas Source Venous: Venous    Blood Gas Profile - Venous (05.15.24 @ 02:30)   pH, Venous: 7.28  pCO2, Venous: 47 mmHg  pO2, Venous: 42 mmHg  HCO3, Venous: 22 mmol/L  Base Excess, Venous: -4.9 mmol/L  Oxygen Saturation, Venous: 55.1 %  Blood Gas Source Venous: Venous    < from: CT Head No Cont (05.14.24 @ 19:48) >    IMPRESSION:    No evidence of acute intracranial pathology.    < end of copied text >

## 2024-05-15 NOTE — H&P ADULT - ATTENDING COMMENTS
events noted, presented with N/V, found to have hyperk, acute on chronic renal failure, dc few days back sp fall, multiple co morbidities, IVF, hold diuretics, burden cath, renal eval, CK, ECHO, MICU, GOC

## 2024-05-16 NOTE — CONSULT NOTE ADULT - NS ATTEST RISK PROBLEM GEN_ALL_CORE FT
1.   [ ] 1 or more chronic illnesses with severe exacerbation, progression, or side effects of treatment  [ x] 1 acute or chronic illnesses or injuries that may pose a threat to life or bodily function    2.  [ x] Personally review and interpretation of  image or testing   [ x] Discussion of management or test interpretation with external physician/other qualified health care professional\appropriate source (not separately reported)    3. [ ] Drug therapy requiring intensive monitoring for toxicity   [ ] Decision regarding elective major surgery, treatment, or procedure with identified patient or procedure risk factors   [ ] Decision regarding emergency major surgery, treatment, or procedure   [ ] Decision regarding hospitalization or escalation of hospital-level of care  [ ] Decision not to resuscitate, not to intubate, or to de-escalate care because of poor prognosis   [ ] Decision to proceed or not with artificial nutrition   [ ] Parenteral controlled substance

## 2024-05-16 NOTE — CHART NOTE - NSCHARTNOTEFT_GEN_A_CORE
[employer] readings: patient is on amiodarone, dobutamine 5mcg, and levophed 0.18mcg    CO: 3.9  CI: 1.8  SV: 33    SVR: 1846

## 2024-05-16 NOTE — PROGRESS NOTE ADULT - SUBJECTIVE AND OBJECTIVE BOX
Over Night Events: events noted, remain critically ill, hypotensive, tachycardic, afebrile    PHYSICAL EXAM    ICU Vital Signs Last 24 Hrs  T(C): 36.1 (16 May 2024 04:30), Max: 37.3 (15 May 2024 12:21)  T(F): 97 (16 May 2024 04:30), Max: 99.1 (15 May 2024 12:21)  HR: 98 (16 May 2024 05:30) (87 - 145)  BP: 103/71 (16 May 2024 05:15) (52/30 - 136/85)  BP(mean): 82 (16 May 2024 05:15) (37 - 867)  RR: 23 (16 May 2024 05:30) (15 - 29)  SpO2: 96% (16 May 2024 05:30) (94% - 100%)    O2 Parameters below as of 16 May 2024 04:30  Patient On (Oxygen Delivery Method): room air            General: ill looking  t collar  Lungs: dec bs both bases  Cardiovascular irregular  Abdomen: Soft, Positive BS  Extremities: No clubbing   Neurological: follows commands      05-15-24 @ 07:01  -  05-16-24 @ 06:32  --------------------------------------------------------  IN:    Amiodarone: 199.8 mL    Lactated Ringers: 1200 mL    Norepinephrine: 29.6 mL    Norepinephrine: 121.7 mL    Phenylephrine: 98.8 mL    Phenylephrine: 206.7 mL  Total IN: 1856.6 mL    OUT:    Indwelling Catheter - Urethral (mL): 465 mL    Voided (mL): 70 mL  Total OUT: 535 mL    Total NET: 1321.6 mL          LABS:                          13.3   12.84 )-----------( 147      ( 16 May 2024 04:45 )             41.8                                               05-16    131<L>  |  92<L>  |  89<HH>  ----------------------------<  128<H>  5.6<H>   |  17  |  5.6<HH>    Ca    9.5      16 May 2024 04:45  Phos  7.9     05-15  Mg     2.3     05-16    TPro  6.0  /  Alb  3.3<L>  /  TBili  3.0<H>  /  DBili  x   /  AST  1105<H>  /  ALT  667<H>  /  AlkPhos  144<H>  05-16      PT/INR - ( 14 May 2024 21:26 )   PT: 12.90 sec;   INR: 1.13 ratio         PTT - ( 14 May 2024 21:26 )  PTT:37.7 sec                                       Urinalysis Basic - ( 16 May 2024 04:45 )    Color: x / Appearance: x / SG: x / pH: x  Gluc: 128 mg/dL / Ketone: x  / Bili: x / Urobili: x   Blood: x / Protein: x / Nitrite: x   Leuk Esterase: x / RBC: x / WBC x   Sq Epi: x / Non Sq Epi: x / Bacteria: x        CARDIAC MARKERS ( 16 May 2024 04:45 )  x     / x     / 50 U/L / x     / x      CARDIAC MARKERS ( 15 May 2024 21:49 )  x     / x     / 49 U/L / x     / x      CARDIAC MARKERS ( 15 May 2024 07:00 )  x     / x     / x     / x     / 3.5 ng/mL                                            LIVER FUNCTIONS - ( 16 May 2024 04:45 )  Alb: 3.3 g/dL / Pro: 6.0 g/dL / ALK PHOS: 144 U/L / ALT: 667 U/L / AST: 1105 U/L / GGT: x                                                                                                                                       MEDICATIONS  (STANDING):  allopurinol 50 milliGRAM(s) Oral daily  aMIOdarone Infusion 0.5 mG/Min (16.7 mL/Hr) IV Continuous <Continuous>  aMIOdarone Infusion 1 mG/Min (33.3 mL/Hr) IV Continuous <Continuous>  aspirin  chewable 81 milliGRAM(s) Oral daily  atorvastatin 40 milliGRAM(s) Oral at bedtime  budesonide  80 MICROgram(s)/formoterol 4.5 MICROgram(s) Inhaler 2 Puff(s) Inhalation two times a day  chlorhexidine 2% Cloths 1 Application(s) Topical daily  dextrose 10% Bolus 125 milliLiter(s) IV Bolus once  dextrose 5%. 1000 milliLiter(s) (100 mL/Hr) IV Continuous <Continuous>  dextrose 5%. 1000 milliLiter(s) (50 mL/Hr) IV Continuous <Continuous>  dextrose 50% Injectable 12.5 Gram(s) IV Push once  dextrose 50% Injectable 25 Gram(s) IV Push once  glucagon  Injectable 1 milliGRAM(s) IntraMuscular once  heparin   Injectable 5000 Unit(s) SubCutaneous every 12 hours  insulin glargine Injectable (LANTUS) 4 Unit(s) SubCutaneous every morning  insulin lispro (ADMELOG) corrective regimen sliding scale   SubCutaneous three times a day before meals  lactated ringers. 1000 milliLiter(s) (100 mL/Hr) IV Continuous <Continuous>  metoprolol tartrate Injectable 5 milliGRAM(s) IV Push every 12 hours  montelukast 10 milliGRAM(s) Oral daily  norepinephrine Infusion 0.05 MICROgram(s)/kG/Min (5.32 mL/Hr) IV Continuous <Continuous>  nystatin Powder 1 Application(s) Topical every 12 hours  pantoprazole    Tablet 40 milliGRAM(s) Oral before breakfast  phenylephrine    Infusion 0.05 MICROgram(s)/kG/Min (1.06 mL/Hr) IV Continuous <Continuous>  polyethylene glycol 3350 17 Gram(s) Oral daily  pregabalin 75 milliGRAM(s) Oral daily  ranolazine 500 milliGRAM(s) Oral two times a day  senna 2 Tablet(s) Oral at bedtime  sevelamer carbonate 800 milliGRAM(s) Oral every 8 hours  sodium bicarbonate 1300 milliGRAM(s) Oral three times a day  sodium polystyrene sulfonate Enema 30 Gram(s) Rectal once  sodium polystyrene sulfonate Enema 30 Gram(s) Rectal once  tamsulosin 0.4 milliGRAM(s) Oral daily  tiotropium 2.5 MICROgram(s) Inhaler 2 Puff(s) Inhalation daily  venlafaxine 25 milliGRAM(s) Oral daily    MEDICATIONS  (PRN):  dextrose Oral Gel 15 Gram(s) Oral once PRN Blood Glucose LESS THAN 70 milliGRAM(s)/deciliter  metolazone 5 milliGRAM(s) Oral daily PRN fluid overload  ondansetron Injectable 4 milliGRAM(s) IV Push four times a day PRN Nausea and/or Vomiting    cxr noted Over Night Events: events noted, remain critically ill, hypotensive, tachycardic, afebrile, amiodarone, levophed 0.04, neeraj 2.4, off LR, Femoral noted    PHYSICAL EXAM    ICU Vital Signs Last 24 Hrs  T(C): 36.1 (16 May 2024 04:30), Max: 37.3 (15 May 2024 12:21)  T(F): 97 (16 May 2024 04:30), Max: 99.1 (15 May 2024 12:21)  HR: 98 (16 May 2024 05:30) (87 - 145)  BP: 103/71 (16 May 2024 05:15) (52/30 - 136/85)  BP(mean): 82 (16 May 2024 05:15) (37 - 867)  RR: 23 (16 May 2024 05:30) (15 - 29)  SpO2: 96% (16 May 2024 05:30) (94% - 100%)    O2 Parameters below as of 16 May 2024 04:30  Patient On (Oxygen Delivery Method): room air            General: ill looking  t collar  Lungs: dec bs both bases  Cardiovascular irregular  Abdomen: Soft, Positive BS  Extremities: No clubbing   Neurological: follows commands      05-15-24 @ 07:01  -  05-16-24 @ 06:32  --------------------------------------------------------  IN:    Amiodarone: 199.8 mL    Lactated Ringers: 1200 mL    Norepinephrine: 29.6 mL    Norepinephrine: 121.7 mL    Phenylephrine: 98.8 mL    Phenylephrine: 206.7 mL  Total IN: 1856.6 mL    OUT:    Indwelling Catheter - Urethral (mL): 465 mL    Voided (mL): 70 mL  Total OUT: 535 mL    Total NET: 1321.6 mL          LABS:                          13.3   12.84 )-----------( 147      ( 16 May 2024 04:45 )             41.8                                               05-16    131<L>  |  92<L>  |  89<HH>  ----------------------------<  128<H>  5.6<H>   |  17  |  5.6<HH>    Ca    9.5      16 May 2024 04:45  Phos  7.9     05-15  Mg     2.3     05-16    TPro  6.0  /  Alb  3.3<L>  /  TBili  3.0<H>  /  DBili  x   /  AST  1105<H>  /  ALT  667<H>  /  AlkPhos  144<H>  05-16      PT/INR - ( 14 May 2024 21:26 )   PT: 12.90 sec;   INR: 1.13 ratio         PTT - ( 14 May 2024 21:26 )  PTT:37.7 sec                                       Urinalysis Basic - ( 16 May 2024 04:45 )    Color: x / Appearance: x / SG: x / pH: x  Gluc: 128 mg/dL / Ketone: x  / Bili: x / Urobili: x   Blood: x / Protein: x / Nitrite: x   Leuk Esterase: x / RBC: x / WBC x   Sq Epi: x / Non Sq Epi: x / Bacteria: x        CARDIAC MARKERS ( 16 May 2024 04:45 )  x     / x     / 50 U/L / x     / x      CARDIAC MARKERS ( 15 May 2024 21:49 )  x     / x     / 49 U/L / x     / x      CARDIAC MARKERS ( 15 May 2024 07:00 )  x     / x     / x     / x     / 3.5 ng/mL                                            LIVER FUNCTIONS - ( 16 May 2024 04:45 )  Alb: 3.3 g/dL / Pro: 6.0 g/dL / ALK PHOS: 144 U/L / ALT: 667 U/L / AST: 1105 U/L / GGT: x                                                                                                                                       MEDICATIONS  (STANDING):  allopurinol 50 milliGRAM(s) Oral daily  aMIOdarone Infusion 0.5 mG/Min (16.7 mL/Hr) IV Continuous <Continuous>  aMIOdarone Infusion 1 mG/Min (33.3 mL/Hr) IV Continuous <Continuous>  aspirin  chewable 81 milliGRAM(s) Oral daily  atorvastatin 40 milliGRAM(s) Oral at bedtime  budesonide  80 MICROgram(s)/formoterol 4.5 MICROgram(s) Inhaler 2 Puff(s) Inhalation two times a day  chlorhexidine 2% Cloths 1 Application(s) Topical daily  dextrose 10% Bolus 125 milliLiter(s) IV Bolus once  dextrose 5%. 1000 milliLiter(s) (100 mL/Hr) IV Continuous <Continuous>  dextrose 5%. 1000 milliLiter(s) (50 mL/Hr) IV Continuous <Continuous>  dextrose 50% Injectable 12.5 Gram(s) IV Push once  dextrose 50% Injectable 25 Gram(s) IV Push once  glucagon  Injectable 1 milliGRAM(s) IntraMuscular once  heparin   Injectable 5000 Unit(s) SubCutaneous every 12 hours  insulin glargine Injectable (LANTUS) 4 Unit(s) SubCutaneous every morning  insulin lispro (ADMELOG) corrective regimen sliding scale   SubCutaneous three times a day before meals  lactated ringers. 1000 milliLiter(s) (100 mL/Hr) IV Continuous <Continuous>  metoprolol tartrate Injectable 5 milliGRAM(s) IV Push every 12 hours  montelukast 10 milliGRAM(s) Oral daily  norepinephrine Infusion 0.05 MICROgram(s)/kG/Min (5.32 mL/Hr) IV Continuous <Continuous>  nystatin Powder 1 Application(s) Topical every 12 hours  pantoprazole    Tablet 40 milliGRAM(s) Oral before breakfast  phenylephrine    Infusion 0.05 MICROgram(s)/kG/Min (1.06 mL/Hr) IV Continuous <Continuous>  polyethylene glycol 3350 17 Gram(s) Oral daily  pregabalin 75 milliGRAM(s) Oral daily  ranolazine 500 milliGRAM(s) Oral two times a day  senna 2 Tablet(s) Oral at bedtime  sevelamer carbonate 800 milliGRAM(s) Oral every 8 hours  sodium bicarbonate 1300 milliGRAM(s) Oral three times a day  sodium polystyrene sulfonate Enema 30 Gram(s) Rectal once  sodium polystyrene sulfonate Enema 30 Gram(s) Rectal once  tamsulosin 0.4 milliGRAM(s) Oral daily  tiotropium 2.5 MICROgram(s) Inhaler 2 Puff(s) Inhalation daily  venlafaxine 25 milliGRAM(s) Oral daily    MEDICATIONS  (PRN):  dextrose Oral Gel 15 Gram(s) Oral once PRN Blood Glucose LESS THAN 70 milliGRAM(s)/deciliter  metolazone 5 milliGRAM(s) Oral daily PRN fluid overload  ondansetron Injectable 4 milliGRAM(s) IV Push four times a day PRN Nausea and/or Vomiting    cxr noted

## 2024-05-16 NOTE — PROGRESS NOTE ADULT - SUBJECTIVE AND OBJECTIVE BOX
Nephrology progress note    THIS IS AN INCOMPLETE NOTE . FULL NOTE TO FOLLOW SHORTLY    Patient is seen and examined, events over the last 24 h noted .    Allergies:  Augmentin (Other)  penicillins (Hives; Rash)    Hospital Medications:   MEDICATIONS  (STANDING):  allopurinol 50 milliGRAM(s) Oral daily  aMIOdarone Infusion 0.5 mG/Min (16.7 mL/Hr) IV Continuous <Continuous>  aMIOdarone Infusion 1 mG/Min (33.3 mL/Hr) IV Continuous <Continuous>  aspirin  chewable 81 milliGRAM(s) Oral daily  atorvastatin 40 milliGRAM(s) Oral at bedtime  budesonide  80 MICROgram(s)/formoterol 4.5 MICROgram(s) Inhaler 2 Puff(s) Inhalation two times a day  cefepime   IVPB 2000 milliGRAM(s) IV Intermittent once  cefepime   IVPB      chlorhexidine 2% Cloths 1 Application(s) Topical daily  dextrose 10% Bolus 125 milliLiter(s) IV Bolus once  dextrose 5%. 1000 milliLiter(s) (50 mL/Hr) IV Continuous <Continuous>  dextrose 5%. 1000 milliLiter(s) (100 mL/Hr) IV Continuous <Continuous>  dextrose 50% Injectable 12.5 Gram(s) IV Push once  dextrose 50% Injectable 25 Gram(s) IV Push once  fludroCORTISONE 0.1 milliGRAM(s) Oral daily  glucagon  Injectable 1 milliGRAM(s) IntraMuscular once  heparin   Injectable 5000 Unit(s) SubCutaneous every 12 hours  hydrocortisone sodium succinate Injectable 50 milliGRAM(s) IV Push four times a day  insulin glargine Injectable (LANTUS) 4 Unit(s) SubCutaneous every morning  insulin lispro (ADMELOG) corrective regimen sliding scale   SubCutaneous three times a day before meals  lactulose Syrup 15 Gram(s) Oral every 3 hours  metroNIDAZOLE  IVPB 500 milliGRAM(s) IV Intermittent every 8 hours  metroNIDAZOLE  IVPB      metroNIDAZOLE  IVPB 500 milliGRAM(s) IV Intermittent once  montelukast 10 milliGRAM(s) Oral daily  norepinephrine Infusion 0.05 MICROgram(s)/kG/Min (5.32 mL/Hr) IV Continuous <Continuous>  nystatin Powder 1 Application(s) Topical every 12 hours  pantoprazole    Tablet 40 milliGRAM(s) Oral before breakfast  phenylephrine    Infusion 0.05 MICROgram(s)/kG/Min (1.06 mL/Hr) IV Continuous <Continuous>  polyethylene glycol 3350 17 Gram(s) Oral daily  pregabalin 75 milliGRAM(s) Oral daily  ranolazine 500 milliGRAM(s) Oral two times a day  senna 2 Tablet(s) Oral at bedtime  sevelamer carbonate 800 milliGRAM(s) Oral every 8 hours  sodium bicarbonate 1300 milliGRAM(s) Oral three times a day  sodium polystyrene sulfonate Enema 30 Gram(s) Rectal once  sodium polystyrene sulfonate Enema 30 Gram(s) Rectal once  sodium zirconium cyclosilicate 10 Gram(s) Oral once  tamsulosin 0.4 milliGRAM(s) Oral daily  tiotropium 2.5 MICROgram(s) Inhaler 2 Puff(s) Inhalation daily  venlafaxine 25 milliGRAM(s) Oral daily        VITALS:  T(F): 97.8 (05-16-24 @ 08:00), Max: 99.1 (05-15-24 @ 12:21)  HR: 104 (05-16-24 @ 08:45)  BP: 120/78 (05-16-24 @ 08:45)  RR: 21 (05-16-24 @ 08:45)  SpO2: 97% (05-16-24 @ 08:45)  Wt(kg): --    05-15 @ 07:01  -  05-16 @ 07:00  --------------------------------------------------------  IN: 2002.6 mL / OUT: 575 mL / NET: 1427.6 mL    05-16 @ 07:01  -  05-16 @ 09:21  --------------------------------------------------------  IN: 56.3 mL / OUT: 0 mL / NET: 56.3 mL          PHYSICAL EXAM:  Constitutional: NAD  HEENT: anicteric sclera, oropharynx clear, MMM  Neck: No JVD  Respiratory: CTAB, no wheezes, rales or rhonchi  Cardiovascular: S1, S2, RRR  Gastrointestinal: BS+, soft, NT/ND  Extremities: No cyanosis or clubbing. No peripheral edema  :  No burden.   Skin: No rashes    LABS:  05-16    131<L>  |  92<L>  |  89<HH>  ----------------------------<  128<H>  5.6<H>   |  17  |  5.6<HH>    Ca    9.5      16 May 2024 04:45  Phos  7.9     05-15  Mg     2.3     05-16    TPro  6.0  /  Alb  3.3<L>  /  TBili  3.0<H>  /  DBili      /  AST  1105<H>  /  ALT  667<H>  /  AlkPhos  144<H>  05-16                          13.3   12.84 )-----------( 147      ( 16 May 2024 04:45 )             41.8       Urine Studies:  Urinalysis Basic - ( 16 May 2024 04:45 )    Color:  / Appearance:  / SG:  / pH:   Gluc: 128 mg/dL / Ketone:   / Bili:  / Urobili:    Blood:  / Protein:  / Nitrite:    Leuk Esterase:  / RBC:  / WBC    Sq Epi:  / Non Sq Epi:  / Bacteria:           Iron 20, TIBC 334, %sat 6      [01-06-24 @ 06:46]  Ferritin 83      [01-06-24 @ 06:46]  Lipid: chol 145, , HDL 82, LDL --      [02-06-24 @ 08:18]          RADIOLOGY & ADDITIONAL STUDIES:   Nephrology progress note    Patient is seen and examined, events over the last 24 h noted .  Lying in bed  has C collar   burden in place   Allergies:  Augmentin (Other)  penicillins (Hives; Rash)    Hospital Medications:   MEDICATIONS  (STANDING):  allopurinol 50 milliGRAM(s) Oral daily  aMIOdarone Infusion 0.5 mG/Min (16.7 mL/Hr) IV Continuous <Continuous>  aMIOdarone Infusion 1 mG/Min (33.3 mL/Hr) IV Continuous <Continuous>  aspirin  chewable 81 milliGRAM(s) Oral daily  atorvastatin 40 milliGRAM(s) Oral at bedtime  budesonide  80 MICROgram(s)/formoterol 4.5 MICROgram(s) Inhaler 2 Puff(s) Inhalation two times a day  cefepime   IVPB 2000 milliGRAM(s) IV Intermittent once    fludroCORTISONE 0.1 milliGRAM(s) Oral daily  glucagon  Injectable 1 milliGRAM(s) IntraMuscular once  heparin   Injectable 5000 Unit(s) SubCutaneous every 12 hours  hydrocortisone sodium succinate Injectable 50 milliGRAM(s) IV Push four times a day  insulin glargine Injectable (LANTUS) 4 Unit(s) SubCutaneous every morning  insulin lispro (ADMELOG) corrective regimen sliding scale   SubCutaneous three times a day before meals  lactulose Syrup 15 Gram(s) Oral every 3 hours  metroNIDAZOLE  IVPB 500 milliGRAM(s) IV Intermittent every 8 hours   metroNIDAZOLE  IVPB 500 milliGRAM(s) IV Intermittent once  montelukast 10 milliGRAM(s) Oral daily  norepinephrine Infusion 0.05 MICROgram(s)/kG/Min (5.32 mL/Hr) IV Continuous <Continuous>  nystatin Powder 1 Application(s) Topical every 12 hours  pantoprazole    Tablet 40 milliGRAM(s) Oral before breakfast  phenylephrine    Infusion 0.05 MICROgram(s)/kG/Min (1.06 mL/Hr) IV Continuous <Continuous>  polyethylene glycol 3350 17 Gram(s) Oral daily  pregabalin 75 milliGRAM(s) Oral daily  ranolazine 500 milliGRAM(s) Oral two times a day  senna 2 Tablet(s) Oral at bedtime  sevelamer carbonate 800 milliGRAM(s) Oral every 8 hours  sodium bicarbonate 1300 milliGRAM(s) Oral three times a day  sodium polystyrene sulfonate Enema 30 Gram(s) Rectal once  sodium polystyrene sulfonate Enema 30 Gram(s) Rectal once  sodium zirconium cyclosilicate 10 Gram(s) Oral once  tamsulosin 0.4 milliGRAM(s) Oral daily  tiotropium 2.5 MICROgram(s) Inhaler 2 Puff(s) Inhalation daily  venlafaxine 25 milliGRAM(s) Oral daily        VITALS:  T(F): 97.8 (05-16-24 @ 08:00), Max: 99.1 (05-15-24 @ 12:21)  HR: 104 (05-16-24 @ 08:45)  BP: 120/78 (05-16-24 @ 08:45)  RR: 21 (05-16-24 @ 08:45)  SpO2: 97% (05-16-24 @ 08:45)      05-15 @ 07:01  -  05-16 @ 07:00  --------------------------------------------------------  IN: 2002.6 mL / OUT: 575 mL / NET: 1427.6 mL    05-16 @ 07:01  -  05-16 @ 09:21  --------------------------------------------------------  IN: 56.3 mL / OUT: 0 mL / NET: 56.3 mL          PHYSICAL EXAM:  Constitutional: in bed has C collar   Respiratory: CTAB, no wheezes, rales or rhonchi  Cardiovascular: S1, S2, RRR  Gastrointestinal: BS+, soft, NT/ND  Extremities: No cyanosis or clubbing. No peripheral edema  :  No burden.   Skin: No rashes    LABS:  05-16    131<L>  |  92<L>  |  89<HH>  ----------------------------<  128<H>  5.6<H>   |  17  |  5.6<HH>    Creatinine Trend: 5.6<--, 5.6<--, 5.4<--, 5.2<--, 4.9<--, 1.6<--  Potassium Trend: 5.6<--, 6.2<--, 5.2<--, 5.4<--, TNP<--    Ca    9.5      16 May 2024 04:45  Phos  7.9     05-15  Mg     2.3     05-16    TPro  6.0  /  Alb  3.3<L>  /  TBili  3.0<H>  /  DBili      /  AST  1105<H>  /  ALT  667<H>  /  AlkPhos  144<H>  05-16                          13.3   12.84 )-----------( 147      ( 16 May 2024 04:45 )             41.8     Creatine Kinase, Serum: 50 U/L (05.16.24 @ 04:45)        Urine Studies:  Urinalysis Basic - ( 16 May 2024 04:45 )    Color:  / Appearance:  / SG:  / pH:   Gluc: 128 mg/dL / Ketone:   / Bili:  / Urobili:    Blood:  / Protein:  / Nitrite:    Leuk Esterase:  / RBC:  / WBC    Sq Epi:  / Non Sq Epi:  / Bacteria:           Iron 20, TIBC 334, %sat 6      [01-06-24 @ 06:46]  Ferritin 83      [01-06-24 @ 06:46]  Lipid: chol 145, , HDL 82, LDL --      [02-06-24 @ 08:18]          RADIOLOGY & ADDITIONAL STUDIES:

## 2024-05-16 NOTE — CONSULT NOTE ADULT - ASSESSMENT
78yFemale is a history of CKD, afib s/p watchman, COPD not on O2, aortic stenosis, CAD presents with NV.  Hospital course complicated by AKASH on CKD with hyperkalemia, shock on pressors. Palliative care consulted for GOC.      MEDD (morphine equivalent daily dose):    Education about palliative care provided to patient/family.  See Recs below.    Please call [phone] with questions or concerns 24/7.   We will continue to follow.    78yFemale is a history of CKD, afib s/p watchman, COPD not on O2, aortic stenosis, CAD presents with NV.  Hospital course complicated by AKASH on CKD with hyperkalemia, shock on pressors. Palliative care consulted for GOC.    Patient seen at bedside. She denied pain, but also was lethargic/confused.  Attempted to reach out to patient's daughter/HCP Erna rodriguez. Unable to leave a message. Will follow.      MEDD (morphine equivalent daily dose):    Education about palliative care provided to patient/family.  See Recs below.    Please call [phone] with questions or concerns 24/7.   We will continue to follow.

## 2024-05-16 NOTE — PROCEDURE NOTE - NSPOSTCAREGUIDE_GEN_A_CORE
Verbal/written post procedure instructions were given to patient/caregiver/Care for catheter as per unit/ICU protocols Which Photosensitizer Was Used: Ameluz

## 2024-05-16 NOTE — CONSULT NOTE ADULT - PROBLEM SELECTOR RECOMMENDATION 2
Mostly likely 2/2 ATN. With hyperkalemia and hyponatremia POA.  -follow up renal recs  -no acute need for RRT  -IVF and bicarb per renal

## 2024-05-16 NOTE — PROGRESS NOTE ADULT - ASSESSMENT
AKASH on CKD 3 / last creat on 5/10 was 1.6 / received contrast on 5/9, but AKASH more likely related to hemodynamic instability, Afib with RVR, fall, very dark urine      Hyponatremia improving with iv hydration   Hyperkalemia s/p medical treatment improved  AKASH on CKD most likely ATN     - monitor strict i/o  - cont iv hydration LR at 75 cc/ jour  - sp Echo follow results   - maintain on lokelma 10g q12h until K < 5/ avoid SPS   - cont sodium bicarb 1300mg q8h  - phos level high, sevelamer 1 tab TID with meals  - dose meds for eGFR <15 while creat is up-trending / pregabalin dose should be reduced to 25 to 50mg per day   - no acute indication for RRT at this point / will follow up closely   - obtain renal/bladder ultrasound   - cardio eval     will follow

## 2024-05-16 NOTE — PROGRESS NOTE ADULT - ASSESSMENT
Ms Pickering is a 78 MARIZA w a PMH of CKD 3b, Afib (s/p watchman not on AC), COPD not on home O2,  Aortic stenosis w H/O ascending aortic replacement  Bovine Replacement, DM , CAD s/p CABG 2016 (w bleeding complication) , anemia of chronic disease and a recent admission for a fall presents to the ED 3 days after DC for fall for the chief concern of NBNB N/V for 48hrs. Patient denies: hematemesis wrenching HA, virtigo, palpitations, rash, diet or medication changes, diarrhea, fever, abdominal pain or cramping, hematochezia/melena. Patient admitted for AKASH on CKD, inadequate urine output and severe hyperKalemia     Note: LASIX and SPIRONOLACTONE held due to AKASH   AKASH on CKD3b  hyperkalemia w/o EKG changes   Shock on pressors  Rapid A fib  Transamintis  Ho recent traumatic fall admission w DC May 11  HO asthma/COPD not on home O2  Ho HFpEF, G3DD, moderate PH  Ho CAD s/p CABG  Ho AS s/p SAVR (2016),   HO Atrial fibrillation s/p watchman (2021),       PLAN:    CNS:  Avoid CNS depressant    HEENT: Oral care    PULMONARY:  HOB @ 45 degrees.  Aspiration precautions.  NIV during sleep. Neb as neded    CARDIOVASCULAR:  Rate control, hold diuretics, Bp meds, cardio fup, echo, trend LA, EKG    GI: GI prophylaxis. NPO, RUQ sono    RENAL:  trend CMP, renal FUP, lokelma    INFECTIOUS DISEASE: IV abx till cx    HEMATOLOGICAL:  Heparin proph     ENDOCRINE:  Follow up FS. TSH    MUSCULOSKELETAL:  PT OT .  Off loading     DNR/DNI    penidng: palliative, cardio fu, ruq sono, echo,  Ms Pickering is a 78 MARIZA w a PMH of CKD 3b, Afib (s/p watchman not on AC), COPD not on home O2,  Aortic stenosis w H/O ascending aortic replacement  Bovine Replacement, DM , CAD s/p CABG 2016 (w bleeding complication) , anemia of chronic disease and a recent admission for a fall presents to the ED 3 days after DC for fall for the chief concern of NBNB N/V for 48hrs. Patient denies: hematemesis wrenching HA, virtigo, palpitations, rash, diet or medication changes, diarrhea, fever, abdominal pain or cramping, hematochezia/melena. Patient admitted for AKASH on CKD, inadequate urine output and severe hyperKalemia     Note: LASIX and SPIRONOLACTONE held due to AKASH     Shock on pressors r/o cardiogenic vs septic  -started cef/flagyl, 5/16  -on levo and neeraj  -hemisphere result: CO 3.26, CI 1.5, SV 48  -    AKASH on CKD3b  hyperkalemia w/o EKG changes   -lokelma  -nephro recs appreciated     Rapid A fib  Ho HFpEF, G3DD, moderate PH  Ho CAD s/p CABG  Ho AS s/p SAVR (2016),   HO Atrial fibrillation s/p watchman (2021),   - amio drip  -consult cardio  -fu echo      Transamintis  -ruqus  -ammonia elvated  -lactulose    Ho recent traumatic fall admission w DC May 11  - in c collar, conulsted nsgx, will stay in collar until outpatient fu     HO asthma/COPD not on home O2    DNR/DNI    fem line placed 5/15 due to inability to access sherwin nuñez: palliative, cardio fu, ruq sono, echo,

## 2024-05-16 NOTE — PROGRESS NOTE ADULT - SUBJECTIVE AND OBJECTIVE BOX
24H events:    Patient is a 78y old Female who presents with a chief complaint of N/V, AKASH on CKD, hyperkalemia (16 May 2024 06:31)    Primary diagnosis of AKASH (acute kidney injury)        Today is hospital day 1d. This morning patient was seen and examined at bedside, resting comfortably in bed.    No acute or major events overnight.    Code Status:    PAST MEDICAL & SURGICAL HISTORY  Aortic stenosis    Diabetes    Asthma with COPD  many years since last attack    CAD (coronary artery disease), native coronary artery    Anemia    History of bleeding disorder  having work up 5/2/21- HAD WORKUP BUT NO DIAGNOSIS "NOTHING WAS FOUND"    History of transfusion reaction    Hiatal hernia    Stage 3 chronic kidney disease    H/O ascending aortic replacement  Bovine Replacement    S/P CABG x 1  complication of bleeding 2016    H/O total knee replacement, right  complicated with fx femur bars screws in femur- b/l knee replacement    S/P hysterectomy    Presence of Watchman left atrial appendage closure device      SOCIAL HISTORY:  Social History:      ALLERGIES:  Augmentin (Other)  penicillins (Hives; Rash)    MEDICATIONS:  STANDING MEDICATIONS  allopurinol 50 milliGRAM(s) Oral daily  aMIOdarone Infusion 0.5 mG/Min IV Continuous <Continuous>  aMIOdarone Infusion 1 mG/Min IV Continuous <Continuous>  aspirin  chewable 81 milliGRAM(s) Oral daily  atorvastatin 40 milliGRAM(s) Oral at bedtime  budesonide  80 MICROgram(s)/formoterol 4.5 MICROgram(s) Inhaler 2 Puff(s) Inhalation two times a day  cefepime   IVPB      cefepime   IVPB 2000 milliGRAM(s) IV Intermittent once  chlorhexidine 2% Cloths 1 Application(s) Topical daily  dextrose 10% Bolus 125 milliLiter(s) IV Bolus once  dextrose 5%. 1000 milliLiter(s) IV Continuous <Continuous>  dextrose 5%. 1000 milliLiter(s) IV Continuous <Continuous>  dextrose 50% Injectable 12.5 Gram(s) IV Push once  dextrose 50% Injectable 25 Gram(s) IV Push once  fludroCORTISONE 0.1 milliGRAM(s) Oral daily  glucagon  Injectable 1 milliGRAM(s) IntraMuscular once  heparin   Injectable 5000 Unit(s) SubCutaneous every 12 hours  hydrocortisone sodium succinate Injectable 50 milliGRAM(s) IV Push four times a day  insulin glargine Injectable (LANTUS) 4 Unit(s) SubCutaneous every morning  insulin lispro (ADMELOG) corrective regimen sliding scale   SubCutaneous three times a day before meals  lactulose Syrup 15 Gram(s) Oral every 3 hours  metroNIDAZOLE  IVPB 500 milliGRAM(s) IV Intermittent every 8 hours  metroNIDAZOLE  IVPB 500 milliGRAM(s) IV Intermittent once  metroNIDAZOLE  IVPB      montelukast 10 milliGRAM(s) Oral daily  norepinephrine Infusion 0.05 MICROgram(s)/kG/Min IV Continuous <Continuous>  nystatin Powder 1 Application(s) Topical every 12 hours  pantoprazole    Tablet 40 milliGRAM(s) Oral before breakfast  phenylephrine    Infusion 0.05 MICROgram(s)/kG/Min IV Continuous <Continuous>  polyethylene glycol 3350 17 Gram(s) Oral daily  pregabalin 75 milliGRAM(s) Oral daily  ranolazine 500 milliGRAM(s) Oral two times a day  senna 2 Tablet(s) Oral at bedtime  sevelamer carbonate 800 milliGRAM(s) Oral every 8 hours  sodium bicarbonate 1300 milliGRAM(s) Oral three times a day  sodium polystyrene sulfonate Enema 30 Gram(s) Rectal once  sodium polystyrene sulfonate Enema 30 Gram(s) Rectal once  sodium zirconium cyclosilicate 10 Gram(s) Oral once  tamsulosin 0.4 milliGRAM(s) Oral daily  tiotropium 2.5 MICROgram(s) Inhaler 2 Puff(s) Inhalation daily  venlafaxine 25 milliGRAM(s) Oral daily    PRN MEDICATIONS  dextrose Oral Gel 15 Gram(s) Oral once PRN  metolazone 5 milliGRAM(s) Oral daily PRN  ondansetron Injectable 4 milliGRAM(s) IV Push four times a day PRN    VITALS:   T(F): 97  HR: 91  BP: 95/51  RR: 16  SpO2: 97%    PHYSICAL EXAM:  GENERAL:  NAD, lying in bed comfortably  HEAD: Atraumatic  NECK: Supple, no JVD,  HEART: Regular rate, rhytm, normal s1s2   LUNGS: b/l air entry, unlabored respirations   ABDOMEN: Soft, non distended   EXTREMITIES: 2+ peripheral pulses bilaterally. No clubbing, cyanosis, or edema  SKIN:   NERVOUS SYSTEM:  A&Ox3         (  ) Indwelling Wang Catheter:   Date insterted:    Reason (  ) Critical illness     (  ) urinary retention    (  ) Accurate Ins/Outs Monitoring     (  ) CMO patient    (  ) Central Line:   Date inserted: 5/15   Location: (  ) Right IJ     (  ) Left IJ     (  x) Right Fem     (  ) Left Fem    LABS:                        13.3   12.84 )-----------( 147      ( 16 May 2024 04:45 )             41.8     05-16    131<L>  |  92<L>  |  89<HH>  ----------------------------<  128<H>  5.6<H>   |  17  |  5.6<HH>    Ca    9.5      16 May 2024 04:45  Phos  7.9     05-15  Mg     2.3     05-16    TPro  6.0  /  Alb  3.3<L>  /  TBili  3.0<H>  /  DBili  x   /  AST  1105<H>  /  ALT  667<H>  /  AlkPhos  144<H>  05-16    PT/INR - ( 14 May 2024 21:26 )   PT: 12.90 sec;   INR: 1.13 ratio         PTT - ( 14 May 2024 21:26 )  PTT:37.7 sec  Urinalysis Basic - ( 16 May 2024 04:45 )    Color: x / Appearance: x / SG: x / pH: x  Gluc: 128 mg/dL / Ketone: x  / Bili: x / Urobili: x   Blood: x / Protein: x / Nitrite: x   Leuk Esterase: x / RBC: x / WBC x   Sq Epi: x / Non Sq Epi: x / Bacteria: x        Creatine Kinase, Serum: 50 U/L (05-16-24 @ 04:45)  Creatine Kinase, Serum: 49 U/L (05-15-24 @ 21:49)      CARDIAC MARKERS ( 16 May 2024 04:45 )  x     / x     / 50 U/L / x     / x      CARDIAC MARKERS ( 15 May 2024 21:49 )  x     / x     / 49 U/L / x     / x      CARDIAC MARKERS ( 15 May 2024 07:00 )  x     / x     / x     / x     / 3.5 ng/mL      RADIOLOGY/Other tests:

## 2024-05-16 NOTE — PROGRESS NOTE ADULT - ASSESSMENT
Impression    AKASH on CKD3b  hyperkalemia w/o EKG changes   Shock on pressors  Rapid A fib  Transamintis  Ho recent traumatic fall admission w DC May 11  HO asthma/COPD not on home O2  Ho HFpEF, G3DD, moderate PH  Ho CAD s/p CABG  Ho AS s/p SAVR (2016),   HO Atrial fibrillation s/p watchman (2021),       PLAN:    CNS:  Avoid CNS depressant    HEENT: Oral care    PULMONARY:  HOB @ 45 degrees.  Aspiration precautions.  NIV during sleep. Neb as neded    CARDIOVASCULAR:  Rate control, hold diuretics, Bp meds, cardio fup, echo, trend LA, EKG    GI: GI prophylaxis. NPO, RUQ sono    RENAL:  trend CMP, renal FUP, lokelma    INFECTIOUS DISEASE: IV abx till cx    HEMATOLOGICAL:  Heparin proph     ENDOCRINE:  Follow up FS. TSH    MUSCULOSKELETAL:  PT OT .  Off loading     DNR/ I  poor prognosis

## 2024-05-16 NOTE — CONSULT NOTE ADULT - SUBJECTIVE AND OBJECTIVE BOX
CC:  [place]    HPI:  78 MARIZA w a PMH of CKD 3b, Afib (s/p watchman not on AC), COPD not on home O2,  Aortic stenosis w H/O ascending aortic replacement Bovine Replacement, DM , CAD s/p CABG 2016 (w bleeding complication) , anemia of chronic disease and a recent admission for a fall presents to the ED 3 days after DC for fall for the chief concern of NBNB N/V for 48hrs. Patient denies: hematemesis wrenching HA, palpitations, rash, diet or medication changes, diarrhea, fever, abdominal pain or cramping, hematochezia/melena. Patient admitted for AKASH on CKD, inadequate urine output and severe hyperKalemia          (15 May 2024 02:36)    PERTINENT PM/SXH:   Aortic stenosis    Hypertension    Diabetes    Asthma with COPD    CAD (coronary artery disease), native coronary artery    Anemia    History of bleeding disorder    History of transfusion reaction    Hiatal hernia    Stage 3 chronic kidney disease      H/O ascending aortic replacement    S/P CABG x 1    H/O total knee replacement, right    S/P hysterectomy    Presence of Watchman left atrial appendage closure device      FAMILY HISTORY:  Family history of pseudocholinesterase deficiency (Child)      None pertinent    ITEMS NOT CHECKED ARE NOT PRESENT    SOCIAL HISTORY:   Significant other/partner[ ]  Children[ ]  Bahai/Spirituality:  Substance hx:  [ ]   Tobacco hx:  [ ]   Alcohol hx: [ ]   Living Situation: [x ]Home  [ ]Long term care  [ ]Rehab [ ]Other  Home Services: [ ] HHA [ ] Visting RN [ ] Hospice  Occupation:  Home Opioid hx:  [ ] Y [ ] N [ x] I-Stop Reference No:    Reference #: [id number]    Patient Demographic Information (PDI)       PDI	First Name	Last Name	Birth Date	Gender	Street Address	[facility]	Zip Code  [place]ering	1945	Female	[address] [facility]	[address]    Prescription Information      PDI Filter:    PDI	Current Rx	Drug Type	Rx Written	Rx Dispensed	Drug	Quantity	Days Supply	Prescriber Name	Prescriber NINFA #	Payment Method	Dispenser  A	Y		04/29/2024	04/30/2024	pregabalin 75 mg capsule	30	30	Laura Garduno	[id number]	Medicare	[id number]  A	N		03/18/2024	03/22/2024	pregabalin 75 mg capsule	30	30	Laura Garduno	[id number]	Medicare	[id number]  A	N		02/23/2024	02/23/2024	pregabalin 75 mg capsule	30	30	Sage Barillas MD	[id number]	Medicare	[id number]  A	N		02/10/2024	02/10/2024	pregabalin 75 mg capsule	14	14	Kevan Chapman DO	[id number]	Insurance	[employer]  A	N		01/25/2024	01/25/2024	pregabalin 75 mg capsule	14	14	Sage Barillas MD	[id number]	Insurance	[employer]  A	N		12/27/2023	12/28/2023	pregabalin 75 mg capsule	60	30	Ayad Cuellar	[id number]	Medicare	[id number]  A	N		07/26/2023	11/08/2023	pregabalin 75 mg capsule	60	30	Ayad Cuellar	[id number]	Medicare	[id number]  A	N		08/31/2023	10/03/2023	pregabalin 75 mg capsule	60	30	Ayad Cuellar	[id number]	Medicare	[id number]  A	N		08/31/2023	09/01/2023	pregabalin 75 mg capsule	60	30	Ayad Cuellar	[id number]	Medicare	[id number]  A	N		07/26/2023	07/29/2023	pregabalin 75 mg capsule	60	30	Ayad Cuellar	[id number]	Medicare	[id number]  A	N	O	06/23/2023	06/23/2023	acetaminophen-cod #3 tablet	28	14	Ayad Cuellar	[id number]	Medicare	[id number]  A	N		06/12/2023	06/12/2023	pregabalin 75 mg capsule	60	30	Ayad Cuellar	[id number]	Medicare	[id number]       ADVANCE DIRECTIVES:     [ ] Full Code [x ] DNR  MOLST  [ ]  Living Will  [ ]   DECISION MAKER(s):  [x ] Health Care Proxy(s)  [ ] Surrogate(s)  [ ] Guardian           Name(s): Phone Number(s):  Erna Belen      BASELINE (I)ADL(s) (prior to admission):    [place]: [ ]Total  [ ] Moderate [ ]Dependent  Palliative Performance Status Version 2:         %    http://[email or web address]/[email or web address]    Allergies    Augmentin (Other)  penicillins (Hives; Rash)    Intolerances    MEDICATIONS  (STANDING):  allopurinol 50 milliGRAM(s) Oral daily  aMIOdarone Infusion 0.5 mG/Min (16.7 mL/Hr) IV Continuous <Continuous>  aMIOdarone Infusion 1 mG/Min (33.3 mL/Hr) IV Continuous <Continuous>  aspirin  chewable 81 milliGRAM(s) Oral daily  atorvastatin 40 milliGRAM(s) Oral at bedtime  budesonide  80 MICROgram(s)/formoterol 4.5 MICROgram(s) Inhaler 2 Puff(s) Inhalation two times a day  cefepime   IVPB      cefepime   IVPB 2000 milliGRAM(s) IV Intermittent once  chlorhexidine 2% Cloths 1 Application(s) Topical daily  dextrose 10% Bolus 125 milliLiter(s) IV Bolus once  dextrose 5%. 1000 milliLiter(s) (50 mL/Hr) IV Continuous <Continuous>  dextrose 5%. 1000 milliLiter(s) (100 mL/Hr) IV Continuous <Continuous>  dextrose 50% Injectable 12.5 Gram(s) IV Push once  dextrose 50% Injectable 25 Gram(s) IV Push once  fludroCORTISONE 0.1 milliGRAM(s) Oral daily  glucagon  Injectable 1 milliGRAM(s) IntraMuscular once  heparin   Injectable 5000 Unit(s) SubCutaneous every 12 hours  hydrocortisone sodium succinate Injectable 50 milliGRAM(s) IV Push four times a day  insulin glargine Injectable (LANTUS) 4 Unit(s) SubCutaneous every morning  insulin lispro (ADMELOG) corrective regimen sliding scale   SubCutaneous three times a day before meals  lactulose Syrup 15 Gram(s) Oral every 3 hours  metroNIDAZOLE  IVPB      metroNIDAZOLE  IVPB 500 milliGRAM(s) IV Intermittent once  metroNIDAZOLE  IVPB 500 milliGRAM(s) IV Intermittent every 8 hours  montelukast 10 milliGRAM(s) Oral daily  norepinephrine Infusion 0.05 MICROgram(s)/kG/Min (5.32 mL/Hr) IV Continuous <Continuous>  nystatin Powder 1 Application(s) Topical every 12 hours  pantoprazole    Tablet 40 milliGRAM(s) Oral before breakfast  phenylephrine    Infusion 0.05 MICROgram(s)/kG/Min (1.06 mL/Hr) IV Continuous <Continuous>  polyethylene glycol 3350 17 Gram(s) Oral daily  pregabalin 75 milliGRAM(s) Oral daily  ranolazine 500 milliGRAM(s) Oral two times a day  senna 2 Tablet(s) Oral at bedtime  sevelamer carbonate 800 milliGRAM(s) Oral every 8 hours  sodium bicarbonate 1300 milliGRAM(s) Oral three times a day  sodium polystyrene sulfonate Enema 30 Gram(s) Rectal once  sodium polystyrene sulfonate Enema 30 Gram(s) Rectal once  sodium zirconium cyclosilicate 10 Gram(s) Oral once  tamsulosin 0.4 milliGRAM(s) Oral daily  tiotropium 2.5 MICROgram(s) Inhaler 2 Puff(s) Inhalation daily  venlafaxine 25 milliGRAM(s) Oral daily    MEDICATIONS  (PRN):  dextrose Oral Gel 15 Gram(s) Oral once PRN Blood Glucose LESS THAN 70 milliGRAM(s)/deciliter  metolazone 5 milliGRAM(s) Oral daily PRN fluid overload  ondansetron Injectable 4 milliGRAM(s) IV Push four times a day PRN Nausea and/or Vomiting    PRESENT SYMPTOMS: [ ]Unable to obtain due to poor mentation   Source if other than patient:  [ ]Family   [ ]Team     Pain: [ ]yes [ ]no  ALL PAIN ASSESSMENT COMPONENTS WERE ASKED ABOUT UNLESS OTHERWISE NOTED  QOL impact -   Location -                    Aggravating factors -  Quality -  Radiation -  Timing-  Severity (0-10 scale):  Minimal acceptable level (0-10 scale):     CPOT:    https://www.[facility].org/getattachment/[email or web address]-9l1l-9t4b-0858d1381z0h/Critical-Care-Pain-Observation-Tool-(CPOT)    PAIN AD Score:   http://geriatrictoolkit.[facility]/cog/painad.pdf (press ctrl +  left click to view)    Dyspnea:                           [ ]None[ ]Mild [ ]Moderate [ ]Severe     Respiratory Distress Observation Scale (RDOS):   A score of 0 to 2 signifies little or no respiratory distress, 3 signifies mild distress, scores 4 to 6 indicate moderate distress, and scores greater than 7 signify severe distress  https://www.[facility].ca/sites/default/files/PDFS/[email or web address]-oiupkohrrki-piuxyzis-vhxcnimjxda-voxgo.pdf    Anxiety:                             [ ]None[ ]Mild [ ]Moderate [ ]Severe   Fatigue:                             [ ]None[ ]Mild [ ]Moderate [ ]Severe   Nausea:                             [ ]None[ ]Mild [ ]Moderate [ ]Severe   Loss of appetite:              [ ]None[ ]Mild [ ]Moderate [ ]Severe   Constipation:                    [ ]None[ ]Mild [ ]Moderate [ ]Severe    Other Symptoms:  [ ]All other review of systems negative     Palliative Performance Status Version 2:         %    http://[email or web address].[email or web address]/files/news/palliative_performance_scale_[email or web address].pdf    PHYSICAL EXAM:  Vital Signs Last 24 Hrs  T(C): 36.6 (16 May 2024 08:00), Max: 37.3 (15 May 2024 12:21)  T(F): 97.8 (16 May 2024 08:00), Max: 99.1 (15 May 2024 12:21)  HR: 104 (16 May 2024 08:45) (87 - 145)  BP: 120/78 (16 May 2024 08:45) (52/30 - 136/85)  BP(mean): 93 (16 May 2024 08:45) (37 - 867)  RR: 21 (16 May 2024 08:45) (15 - 29)  SpO2: 97% (16 May 2024 08:45) (94% - 100%)    Parameters below as of 16 May 2024 08:00  Patient On (Oxygen Delivery Method): room air     I&O's Summary    15 May 2024 07:01  -  16 May 2024 07:00  --------------------------------------------------------  IN: 2002.6 mL / OUT: 575 mL / NET: 1427.6 mL    16 May 2024 07:01  -  16 May 2024 09:41  --------------------------------------------------------  IN: 56.3 mL / OUT: 0 mL / NET: 56.3 mL        GENERAL:  [ ] No acute distress [ ]Lethargic  [ ]Unarousable  [ ]Verbal  [ ]Non-Verbal [ ]Cachexia    BEHAVIORAL/PSYCH:  [ ]Alert and Oriented x  [ ] Anxiety [ ] Delirium [ ] Agitation [ ] Calm   EYES: [ ] No scleral icterus [ ] Scleral icterus [ ] Closed  ENMT:  [ ]Dry mouth  [ ]No external oral lesions [ ] No external ear or nose lesions  CARDIOVASCULAR:  [ ]Regular [ ]Irregular [ ]Tachy [ ]Not Tachy  [ ]Hosea [ ] Edema [ ] No edema  PULMONARY:  [ ]Tachypnea  [ ]Audible excessive secretions [ ] No labored breathing [ ] labored breathing  GASTROINTESTINAL: [ ]Soft  [ ]Distended  [ ]Not distended [ ]Non tender [ ]Tender  MUSCULOSKELETAL: [ ]No clubbing [ ] clubbing  [ ] No cyanosis [ ] cyanosis  NEUROLOGIC: [ ]No focal deficits  [ ]Follows commands  [ ]Does not follow commands  [ ]Cognitive impairment  [ ]Dysphagia  [ ]Dysarthria  [ ]Paresis   SKIN: [ ] Jaundiced [ ] Non-jaundiced [ ]Rash [ ]No Rash [ ] Warm [ ] Dry  MISC/LINES: [ ] ET tube [ ] Trach [ ]NGT/OGT [ ]PEG [ ]Wang    LABS: reviewed by me                        13.3   12.84 )-----------( 147      ( 16 May 2024 04:45 )             41.8   05-16    131<L>  |  92<L>  |  89<HH>  ----------------------------<  128<H>  5.6<H>   |  17  |  5.6<HH>    Ca    9.5      16 May 2024 04:45  Phos  7.9     05-15  Mg     2.3     05-16    TPro  6.0  /  Alb  3.3<L>  /  TBili  3.0<H>  /  DBili  x   /  AST  1105<H>  /  ALT  667<H>  /  AlkPhos  144<H>  05-16  PT/INR - ( 14 May 2024 21:26 )   PT: 12.90 sec;   INR: 1.13 ratio         PTT - ( 14 May 2024 21:26 )  PTT:37.7 sec    Urinalysis Basic - ( 16 May 2024 04:45 )    Color: x / Appearance: x / SG: x / pH: x  Gluc: 128 mg/dL / Ketone: x  / Bili: x / Urobili: x   Blood: x / Protein: x / Nitrite: x   Leuk Esterase: x / RBC: x / WBC x   Sq Epi: x / Non Sq Epi: x / Bacteria: x      RADIOLOGY & ADDITIONAL STUDIES: reviewed by me    < from: Xray Chest 1 View- PORTABLE-Urgent (Xray Chest 1 View- PORTABLE-Urgent .) (05.16.24 @ 02:10) >  Impression:    Low lung volumes. Bibasilar opacity/atelectasis.    < end of copied text >      EKG: reviewed by me    < from: 12 Lead ECG (05.15.24 @ 22:53) >  Ventricular Rate 139 BPM    Atrial Rate 127 BPM    QRS Duration 140 ms    Q-T Interval 354 ms    QTC Calculation(Bazett) 538 ms    R Axis -51 degrees    T Axis 159 degrees    Diagnosis Line Atrial flutter with 2:1 A-V conduction  Left axis deviation  Non-specific intra-ventricular conduction block  Marked ST abnormality, possible inferolateral subendocardial injury  Abnormal ECG    < end of copied text >      PROTEIN CALORIE MALNUTRITION PRESENT: [ ]mild [ ]moderate [ ]severe [ ]underweight [ ]morbid obesity  https://www.andeal.org/vault/2440/web/files/ONC/Table_Clinical%20Characteristics%20to%20Document%20Malnutrition-White%20JV%20et%20al%202012.pdf    Height (cm): 167.6 (05-14-24 @ 19:07), 167.6 (05-09-24 @ 00:33), 167.6 (04-18-24 @ 00:07)  Weight (kg): 113.4 (05-14-24 @ 19:07), 97.5 (05-09-24 @ 00:33), 103.4 (04-18-24 @ 00:07)  BMI (kg/m2): 40.4 (05-14-24 @ 19:07), 34.7 (05-09-24 @ 00:33), 36.8 (04-18-24 @ 00:07)  [ ]PPSV2 < or = to 30% [ ]significant weight loss  [ ]poor nutritional intake  [ ]anasarca      [ ]Artificial Nutrition      Palliative Care Spiritual/Emotional Screening Tool Question  Severity (0-4):                    OR                    [ x] Unable to determine. Will assess at later time if appropriate.  Score of 2 or greater indicates recommendation of Chaplaincy and/or SW referral  Chaplaincy Referral: [ ] Yes [ ] Refused [ ] Following     Caregiver [place]:  [ ] Yes [ ] No    OR    [x ] Unable to determine. Will assess at later time if appropriate.  Social Work Referral [ ]  Patient and Family Centered Care Referral [ ]    Anticipatory Grief Present: [ ] Yes [ ] No    OR     [ x] Unable to determine. Will assess at later time if appropriate.  Social Work Referral [ ]  Patient and Family Centered Care Referral [ ]    Patient discussed with primary medical team MD  Palliative care education provided to patient and/or family   CC:  [place]    HPI:  78 MARIZA w a PMH of CKD 3b, Afib (s/p watchman not on AC), COPD not on home O2,  Aortic stenosis w H/O ascending aortic replacement Bovine Replacement, DM , CAD s/p CABG 2016 (w bleeding complication) , anemia of chronic disease and a recent admission for a fall presents to the ED 3 days after DC for fall for the chief concern of NBNB N/V for 48hrs. Patient denies: hematemesis wrenching HA, palpitations, rash, diet or medication changes, diarrhea, fever, abdominal pain or cramping, hematochezia/melena. Patient admitted for AKASH on CKD, inadequate urine output and severe hyperKalemia          (15 May 2024 02:36)    PERTINENT PM/SXH:   Aortic stenosis    Hypertension    Diabetes    Asthma with COPD    CAD (coronary artery disease), native coronary artery    Anemia    History of bleeding disorder    History of transfusion reaction    Hiatal hernia    Stage 3 chronic kidney disease      H/O ascending aortic replacement    S/P CABG x 1    H/O total knee replacement, right    S/P hysterectomy    Presence of Watchman left atrial appendage closure device      FAMILY HISTORY:  Family history of pseudocholinesterase deficiency (Child)      None pertinent    ITEMS NOT CHECKED ARE NOT PRESENT    SOCIAL HISTORY:   Significant other/partner[ ]  Children[ ]  Buddhism/Spirituality:  Substance hx:  [ ]   Tobacco hx:  [ ]   Alcohol hx: [ ]   Living Situation: [x ]Home  [ ]Long term care  [ ]Rehab [ ]Other  Home Services: [ ] HHA [ ] Visting RN [ ] Hospice  Occupation:  Home Opioid hx:  [ ] Y [ ] N [ x] I-Stop Reference No:    Reference #: [id number]    Patient Demographic Information (PDI)       PDI	First Name	Last Name	Birth Date	Gender	Street Address	[facility]	Zip Code  EMA [place]ering	1945	Female	[address] [facility]	[address]    Prescription Information      PDI Filter:    PDI	Current Rx	Drug Type	Rx Written	Rx Dispensed	Drug	Quantity	Days Supply	Prescriber Name	Prescriber NINFA #	Payment Method	Dispenser  A	Y		04/29/2024	04/30/2024	pregabalin 75 mg capsule	30	30	Laura Garduno	[id number]	Medicare	[id number]  A	N		03/18/2024	03/22/2024	pregabalin 75 mg capsule	30	30	Laura Garduno	[id number]	Medicare	[id number]  A	N		02/23/2024	02/23/2024	pregabalin 75 mg capsule	30	30	Sage Barillas MD	[id number]	Medicare	[id number]  A	N		02/10/2024	02/10/2024	pregabalin 75 mg capsule	14	14	Kevan Chapman DO	[id number]	Insurance	[employer]  A	N		01/25/2024	01/25/2024	pregabalin 75 mg capsule	14	14	Sage Barillas MD	[id number]	Insurance	[employer]  A	N		12/27/2023	12/28/2023	pregabalin 75 mg capsule	60	30	Ayad Cuellar	[id number]	Medicare	[id number]  A	N		07/26/2023	11/08/2023	pregabalin 75 mg capsule	60	30	Ayad Cuellar	[id number]	Medicare	[id number]  A	N		08/31/2023	10/03/2023	pregabalin 75 mg capsule	60	30	Ayad Cuellar	[id number]	Medicare	[id number]  A	N		08/31/2023	09/01/2023	pregabalin 75 mg capsule	60	30	Ayad Cuellar	[id number]	Medicare	[id number]  A	N		07/26/2023	07/29/2023	pregabalin 75 mg capsule	60	30	Ayad Cuellar	[id number]	Medicare	[id number]  A	N	O	06/23/2023	06/23/2023	acetaminophen-cod #3 tablet	28	14	Ayad Cuellar	[id number]	Medicare	[id number]  A	N		06/12/2023	06/12/2023	pregabalin 75 mg capsule	60	30	Ayad Cuellar	[id number]	Medicare	[id number]       ADVANCE DIRECTIVES:     [ ] Full Code [x ] DNR  MOLST  [ ]  Living Will  [ ]   DECISION MAKER(s):  [x ] Health Care Proxy(s)  [ ] Surrogate(s)  [ ] Guardian           Name(s): Phone Number(s):  Erna Belen      BASELINE (I)ADL(s) (prior to admission):    [place]: [ ]Total  [ ] Moderate [ ]Dependent  Palliative Performance Status Version 2:         %    [email or web address]/[email or web address]_performance_scale_[email or web address].pdf    Allergies    Augmentin (Other)  penicillins (Hives; Rash)    Intolerances    MEDICATIONS  (STANDING):  allopurinol 50 milliGRAM(s) Oral daily  aMIOdarone Infusion 0.5 mG/Min (16.7 mL/Hr) IV Continuous <Continuous>  aMIOdarone Infusion 1 mG/Min (33.3 mL/Hr) IV Continuous <Continuous>  aspirin  chewable 81 milliGRAM(s) Oral daily  atorvastatin 40 milliGRAM(s) Oral at bedtime  budesonide  80 MICROgram(s)/formoterol 4.5 MICROgram(s) Inhaler 2 Puff(s) Inhalation two times a day  cefepime   IVPB      cefepime   IVPB 2000 milliGRAM(s) IV Intermittent once  chlorhexidine 2% Cloths 1 Application(s) Topical daily  dextrose 10% Bolus 125 milliLiter(s) IV Bolus once  dextrose 5%. 1000 milliLiter(s) (50 mL/Hr) IV Continuous <Continuous>  dextrose 5%. 1000 milliLiter(s) (100 mL/Hr) IV Continuous <Continuous>  dextrose 50% Injectable 12.5 Gram(s) IV Push once  dextrose 50% Injectable 25 Gram(s) IV Push once  fludroCORTISONE 0.1 milliGRAM(s) Oral daily  glucagon  Injectable 1 milliGRAM(s) IntraMuscular once  heparin   Injectable 5000 Unit(s) SubCutaneous every 12 hours  hydrocortisone sodium succinate Injectable 50 milliGRAM(s) IV Push four times a day  insulin glargine Injectable (LANTUS) 4 Unit(s) SubCutaneous every morning  insulin lispro (ADMELOG) corrective regimen sliding scale   SubCutaneous three times a day before meals  lactulose Syrup 15 Gram(s) Oral every 3 hours  metroNIDAZOLE  IVPB      metroNIDAZOLE  IVPB 500 milliGRAM(s) IV Intermittent once  metroNIDAZOLE  IVPB 500 milliGRAM(s) IV Intermittent every 8 hours  montelukast 10 milliGRAM(s) Oral daily  norepinephrine Infusion 0.05 MICROgram(s)/kG/Min (5.32 mL/Hr) IV Continuous <Continuous>  nystatin Powder 1 Application(s) Topical every 12 hours  pantoprazole    Tablet 40 milliGRAM(s) Oral before breakfast  phenylephrine    Infusion 0.05 MICROgram(s)/kG/Min (1.06 mL/Hr) IV Continuous <Continuous>  polyethylene glycol 3350 17 Gram(s) Oral daily  pregabalin 75 milliGRAM(s) Oral daily  ranolazine 500 milliGRAM(s) Oral two times a day  senna 2 Tablet(s) Oral at bedtime  sevelamer carbonate 800 milliGRAM(s) Oral every 8 hours  sodium bicarbonate 1300 milliGRAM(s) Oral three times a day  sodium polystyrene sulfonate Enema 30 Gram(s) Rectal once  sodium polystyrene sulfonate Enema 30 Gram(s) Rectal once  sodium zirconium cyclosilicate 10 Gram(s) Oral once  tamsulosin 0.4 milliGRAM(s) Oral daily  tiotropium 2.5 MICROgram(s) Inhaler 2 Puff(s) Inhalation daily  venlafaxine 25 milliGRAM(s) Oral daily    MEDICATIONS  (PRN):  dextrose Oral Gel 15 Gram(s) Oral once PRN Blood Glucose LESS THAN 70 milliGRAM(s)/deciliter  metolazone 5 milliGRAM(s) Oral daily PRN fluid overload  ondansetron Injectable 4 milliGRAM(s) IV Push four times a day PRN Nausea and/or Vomiting    PRESENT SYMPTOMS: [ ]Unable to obtain due to poor mentation   Source if other than patient:  [ ]Family   [ ]Team     Pain: [ ]yes [x ]no  ALL PAIN ASSESSMENT COMPONENTS WERE ASKED ABOUT UNLESS OTHERWISE NOTED  QOL impact -   Location -                    Aggravating factors -  Quality -  Radiation -  Timing-  Severity (0-10 scale):  Minimal acceptable level (0-10 scale):     CPOT:    https://www.[facility].org/getattachment/[email or web address]-[email or web address]/Critical-Care-Pain-Observation-Tool-(CPOT)    PAIN AD Score:   http://geriatrictoolkit.[facility]/cog/painad.pdf (press ctrl +  left click to view)    Dyspnea:                           [ ]None[x ]Mild [ ]Moderate [ ]Severe     Respiratory Distress Observation Scale (RDOS):   A score of 0 to 2 signifies little or no respiratory distress, 3 signifies mild distress, scores 4 to 6 indicate moderate distress, and scores greater than 7 signify severe distress  https://www.[facility].ca/sites/default/files/PDFS/234523-tmozdxftbym-neogfvwv-uovykccdjwq-znyyk.pdf    Anxiety:                             [x ]None[ ]Mild [ ]Moderate [ ]Severe   Fatigue:                             [x ]None[ ]Mild [ ]Moderate [ ]Severe   Nausea:                             [x ]None[ ]Mild [ ]Moderate [ ]Severe   Loss of appetite:              [x ]None[ ]Mild [ ]Moderate [ ]Severe   Constipation:                    [x ]None[ ]Mild [ ]Moderate [ ]Severe    Other Symptoms:  [x ]All other review of systems negative     Palliative Performance Status Version 2:         20%    [email or web address]://[email or web address]/files/news/palliative_performance_scale_[email or web address].pdf    PHYSICAL EXAM:  Vital Signs Last 24 Hrs  T(C): 36.6 (16 May 2024 08:00), Max: 37.3 (15 May 2024 12:21)  T(F): 97.8 (16 May 2024 08:00), Max: 99.1 (15 May 2024 12:21)  HR: 104 (16 May 2024 08:45) (87 - 145)  BP: 120/78 (16 May 2024 08:45) (52/30 - 136/85)  BP(mean): 93 (16 May 2024 08:45) (37 - 867)  RR: 21 (16 May 2024 08:45) (15 - 29)  SpO2: 97% (16 May 2024 08:45) (94% - 100%)    Parameters below as of 16 May 2024 08:00  Patient On (Oxygen Delivery Method): room air     I&O's Summary    15 May 2024 07:01  -  16 May 2024 07:00  --------------------------------------------------------  IN: 2002.6 mL / OUT: 575 mL / NET: 1427.6 mL    16 May 2024 07:01  -  16 May 2024 09:41  --------------------------------------------------------  IN: 56.3 mL / OUT: 0 mL / NET: 56.3 mL        GENERAL:  [ ] No acute distress [ ]Lethargic  [ ]Unarousable  [ x]Verbal  [ ]Non-Verbal [ ]Cachexia    BEHAVIORAL/PSYCH:  [x ]Alert and Oriented x2  [ ] Anxiety [ ] Delirium [ ] Agitation [ ] Calm   EYES: [ ] No scleral icterus [ ] Scleral icterus [ ] Closed  ENMT:  [ ]Dry mouth  [ x]No external oral lesions [ ] No external ear or nose lesions  CARDIOVASCULAR:  [ ]Regular [ ]Irregular [ ]Tachy [ ]Not Tachy  [ ]Hosea [ ] Edema [ ] No edema  PULMONARY:  [ x]Tachypnea  [ ]Audible excessive secretions [ ] No labored breathing [ ] labored breathing  GASTROINTESTINAL: [ ]Soft  [ ]Distended  [ ]Not distended [ ]Non tender [ ]Tender  MUSCULOSKELETAL: [ ]No clubbing [ ] clubbing  [ ] No cyanosis [ ] cyanosis  NEUROLOGIC: [ ]No focal deficits  [x ]Follows commands  [ ]Does not follow commands  [x ]Cognitive impairment  [ ]Dysphagia  [ ]Dysarthria  [ ]Paresis   SKIN: [ ] Jaundiced [x ] Non-jaundiced [ ]Rash [ ]No Rash [ ] Warm [ ] Dry  MISC/LINES: [ ] ET tube [ ] Trach [ ]NGT/OGT [ ]PEG [ ]Wang    LABS: reviewed by me                        13.3   12.84 )-----------( 147      ( 16 May 2024 04:45 )             41.8   05-16    131<L>  |  92<L>  |  89<HH>  ----------------------------<  128<H>  5.6<H>   |  17  |  5.6<HH>    Ca    9.5      16 May 2024 04:45  Phos  7.9     05-15  Mg     2.3     05-16    TPro  6.0  /  Alb  3.3<L>  /  TBili  3.0<H>  /  DBili  x   /  AST  1105<H>  /  ALT  667<H>  /  AlkPhos  144<H>  05-16  PT/INR - ( 14 May 2024 21:26 )   PT: 12.90 sec;   INR: 1.13 ratio         PTT - ( 14 May 2024 21:26 )  PTT:37.7 sec    Urinalysis Basic - ( 16 May 2024 04:45 )    Color: x / Appearance: x / SG: x / pH: x  Gluc: 128 mg/dL / Ketone: x  / Bili: x / Urobili: x   Blood: x / Protein: x / Nitrite: x   Leuk Esterase: x / RBC: x / WBC x   Sq Epi: x / Non Sq Epi: x / Bacteria: x      RADIOLOGY & ADDITIONAL STUDIES: reviewed by me    < from: Xray Chest 1 View- PORTABLE-Urgent (Xray Chest 1 View- PORTABLE-Urgent .) (05.16.24 @ 02:10) >  Impression:    Low lung volumes. Bibasilar opacity/atelectasis.    < end of copied text >      EKG: reviewed by me    < from: 12 Lead ECG (05.15.24 @ 22:53) >  Ventricular Rate 139 BPM    Atrial Rate 127 BPM    QRS Duration 140 ms    Q-T Interval 354 ms    QTC Calculation(Bazett) 538 ms    R Axis -51 degrees    T Axis 159 degrees    Diagnosis Line Atrial flutter with 2:1 A-V conduction  Left axis deviation  Non-specific intra-ventricular conduction block  Marked ST abnormality, possible inferolateral subendocardial injury  Abnormal ECG    < end of copied text >      PROTEIN CALORIE MALNUTRITION PRESENT: [ ]mild [ ]moderate [ ]severe [ ]underweight [ ]morbid obesity  https://www.andeal.org/vault/2440/web/files/ONC/Table_Clinical%20Characteristics%20to%20Document%20Malnutrition-White%20JV%20et%20al%084738.pdf    Height (cm): 167.6 (05-14-24 @ 19:07), 167.6 (05-09-24 @ 00:33), 167.6 (04-18-24 @ 00:07)  Weight (kg): 113.4 (05-14-24 @ 19:07), 97.5 (05-09-24 @ 00:33), 103.4 (04-18-24 @ 00:07)  BMI (kg/m2): 40.4 (05-14-24 @ 19:07), 34.7 (05-09-24 @ 00:33), 36.8 (04-18-24 @ 00:07)  [ ]PPSV2 < or = to 30% [ ]significant weight loss  [ ]poor nutritional intake  [ ]anasarca      [ ]Artificial Nutrition      Palliative Care Spiritual/Emotional Screening Tool Question  Severity (0-4):                    OR                    [ x] Unable to determine. Will assess at later time if appropriate.  Score of 2 or greater indicates recommendation of Chaplaincy and/or SW referral  Chaplaincy Referral: [ ] Yes [ ] Refused [ ] Following     Caregiver [place]:  [ ] Yes [ ] No    OR    [x ] Unable to determine. Will assess at later time if appropriate.  Social Work Referral [ ]  Patient and Family Centered Care Referral [ ]    Anticipatory Grief Present: [ ] Yes [ ] No    OR     [ x] Unable to determine. Will assess at later time if appropriate.  Social Work Referral [ ]  Patient and Family Centered Care Referral [ ]    Patient discussed with primary medical team MD  Palliative care education provided to patient and/or family

## 2024-05-17 NOTE — PROGRESS NOTE ADULT - SUBJECTIVE AND OBJECTIVE BOX
Nephrology progress note    THIS IS AN INCOMPLETE NOTE . FULL NOTE TO FOLLOW SHORTLY    Patient is seen and examined, events over the last 24 h noted .    Allergies:  Augmentin (Other)  penicillins (Hives; Rash)    Hospital Medications:   MEDICATIONS  (STANDING):  allopurinol 50 milliGRAM(s) Oral daily  aMIOdarone Infusion 0.5 mG/Min (16.7 mL/Hr) IV Continuous <Continuous>  aspirin  chewable 81 milliGRAM(s) Oral daily  atorvastatin 40 milliGRAM(s) Oral at bedtime  budesonide  80 MICROgram(s)/formoterol 4.5 MICROgram(s) Inhaler 2 Puff(s) Inhalation two times a day  chlorhexidine 2% Cloths 1 Application(s) Topical daily  dextrose 10% Bolus 125 milliLiter(s) IV Bolus once  dextrose 5%. 1000 milliLiter(s) (50 mL/Hr) IV Continuous <Continuous>  dextrose 5%. 1000 milliLiter(s) (100 mL/Hr) IV Continuous <Continuous>  dextrose 50% Injectable 12.5 Gram(s) IV Push once  dextrose 50% Injectable 25 Gram(s) IV Push once  doxazosin 2 milliGRAM(s) Oral at bedtime  fludroCORTISONE 0.1 milliGRAM(s) Oral daily  glucagon  Injectable 1 milliGRAM(s) IntraMuscular once  heparin   Injectable 5000 Unit(s) SubCutaneous every 12 hours  hydrocortisone sodium succinate Injectable 50 milliGRAM(s) IV Push four times a day  insulin glargine Injectable (LANTUS) 4 Unit(s) SubCutaneous every morning  insulin lispro (ADMELOG) corrective regimen sliding scale   SubCutaneous three times a day before meals  lactated ringers. 1000 milliLiter(s) (75 mL/Hr) IV Continuous <Continuous>  metroNIDAZOLE  IVPB 500 milliGRAM(s) IV Intermittent every 8 hours  metroNIDAZOLE  IVPB      montelukast 10 milliGRAM(s) Oral daily  norepinephrine Infusion 0.05 MICROgram(s)/kG/Min (5.32 mL/Hr) IV Continuous <Continuous>  nystatin Powder 1 Application(s) Topical every 12 hours  pantoprazole   Suspension 40 milliGRAM(s) Oral daily  polyethylene glycol 3350 17 Gram(s) Oral daily  senna 2 Tablet(s) Oral at bedtime  sevelamer carbonate Powder 800 milliGRAM(s) Oral every 8 hours  sodium bicarbonate 1300 milliGRAM(s) Oral three times a day  tiotropium 2.5 MICROgram(s) Inhaler 2 Puff(s) Inhalation daily  vasopressin Infusion 0.04 Unit(s)/Min (6 mL/Hr) IV Continuous <Continuous>        VITALS:  T(F): 97.2 (05-17-24 @ 04:00), Max: 98.7 (05-17-24 @ 00:00)  HR: 114 (05-17-24 @ 08:45)  BP: 88/50 (05-17-24 @ 08:45)  RR: 17 (05-17-24 @ 08:45)  SpO2: 95% (05-17-24 @ 08:45)  Wt(kg): --    05-15 @ 07:01  -  05-16 @ 07:00  --------------------------------------------------------  IN: 2002.6 mL / OUT: 605 mL / NET: 1397.6 mL    05-16 @ 07:01  -  05-17 @ 07:00  --------------------------------------------------------  IN: 3605.9 mL / OUT: 1285 mL / NET: 2320.9 mL          PHYSICAL EXAM:  Constitutional: NAD  HEENT: anicteric sclera, oropharynx clear, MMM  Neck: No JVD  Respiratory: CTAB, no wheezes, rales or rhonchi  Cardiovascular: S1, S2, RRR  Gastrointestinal: BS+, soft, NT/ND  Extremities: No cyanosis or clubbing. No peripheral edema  :  No burden.   Skin: No rashes    LABS:  05-17    132<L>  |  94<L>  |  91<HH>  ----------------------------<  268<H>  4.8   |  17  |  5.4<HH>    Ca    8.7      17 May 2024 05:22  Mg     2.2     05-17    TPro  5.4<L>  /  Alb  3.1<L>  /  TBili  3.4<H>  /  DBili      /  AST  506<H>  /  ALT  537<H>  /  AlkPhos  149<H>  05-17                          11.1   7.86  )-----------( 112      ( 17 May 2024 05:22 )             35.1       Urine Studies:  Urinalysis Basic - ( 17 May 2024 05:22 )    Color:  / Appearance:  / SG:  / pH:   Gluc: 268 mg/dL / Ketone:   / Bili:  / Urobili:    Blood:  / Protein:  / Nitrite:    Leuk Esterase:  / RBC:  / WBC    Sq Epi:  / Non Sq Epi:  / Bacteria:           Iron 20, TIBC 334, %sat 6      [01-06-24 @ 06:46]  Ferritin 83      [01-06-24 @ 06:46]  Lipid: chol 145, , HDL 82, LDL --      [02-06-24 @ 08:18]          RADIOLOGY & ADDITIONAL STUDIES:   Nephrology progress note    Patient is seen and examined, events over the last 24 h noted .  Lying in bed     Allergies:  Augmentin (Other)  penicillins (Hives; Rash)    Hospital Medications:   MEDICATIONS  (STANDING):  allopurinol 50 milliGRAM(s) Oral daily  aMIOdarone Infusion 0.5 mG/Min (16.7 mL/Hr) IV Continuous <Continuous>  aspirin  chewable 81 milliGRAM(s) Oral daily  atorvastatin 40 milliGRAM(s) Oral at bedtime  budesonide  80 MICROgram(s)/formoterol 4.5 MICROgram(s) Inhaler 2 Puff(s) Inhalation two times a day  doxazosin 2 milliGRAM(s) Oral at bedtime  fludroCORTISONE 0.1 milliGRAM(s) Oral daily  glucagon  Injectable 1 milliGRAM(s) IntraMuscular once  heparin   Injectable 5000 Unit(s) SubCutaneous every 12 hours  hydrocortisone sodium succinate Injectable 50 milliGRAM(s) IV Push four times a day  insulin glargine Injectable (LANTUS) 4 Unit(s) SubCutaneous every morning  insulin lispro (ADMELOG) corrective regimen sliding scale   SubCutaneous three times a day before meals  lactated ringers. 1000 milliLiter(s) (75 mL/Hr) IV Continuous <Continuous>  metroNIDAZOLE  IVPB 500 milliGRAM(s) IV Intermittent every 8 hours    montelukast 10 milliGRAM(s) Oral daily  norepinephrine Infusion 0.05 MICROgram(s)/kG/Min (5.32 mL/Hr) IV Continuous <Continuous>  nystatin Powder 1 Application(s) Topical every 12 hours  pantoprazole   Suspension 40 milliGRAM(s) Oral daily  polyethylene glycol 3350 17 Gram(s) Oral daily  senna 2 Tablet(s) Oral at bedtime  sevelamer carbonate Powder 800 milliGRAM(s) Oral every 8 hours  sodium bicarbonate 1300 milliGRAM(s) Oral three times a day  tiotropium 2.5 MICROgram(s) Inhaler 2 Puff(s) Inhalation daily  vasopressin Infusion 0.04 Unit(s)/Min (6 mL/Hr) IV Continuous <Continuous>        VITALS:  T(F): 97.2 (05-17-24 @ 04:00), Max: 98.7 (05-17-24 @ 00:00)  HR: 114 (05-17-24 @ 08:45)  BP: 88/50 (05-17-24 @ 08:45)  RR: 17 (05-17-24 @ 08:45)  SpO2: 95% (05-17-24 @ 08:45)      05-15 @ 07:01  -  05-16 @ 07:00  --------------------------------------------------------  IN: 2002.6 mL / OUT: 605 mL / NET: 1397.6 mL    05-16 @ 07:01  -  05-17 @ 07:00  --------------------------------------------------------  IN: 3605.9 mL / OUT: 1285 mL / NET: 2320.9 mL          PHYSICAL EXAM:  Constitutional: NAD in C collar   HEENT: anicteric sclera, oropharynx clear, MMM  Respiratory: CTAB, no wheezes, rales or rhonchi  Cardiovascular: S1, S2, RRR  Gastrointestinal: BS+, soft, NT/ND  Extremities: No cyanosis or clubbing. No peripheral edema  :  No burden.   Skin: No rashes    LABS:  05-17    132<L>  |  94<L>  |  91<HH>  ----------------------------<  268<H>  4.8   |  17  |  5.4<HH>    Creatinine Trend: 5.4<--, 5.2<--, 5.5<--, 5.6<--, 5.6<--, 5.4<--  Ca    8.7      17 May 2024 05:22  Mg     2.2     05-17    TPro  5.4<L>  /  Alb  3.1<L>  /  TBili  3.4<H>  /  DBili      /  AST  506<H>  /  ALT  537<H>  /  AlkPhos  149<H>  05-17                          11.1   7.86  )-----------( 112      ( 17 May 2024 05:22 )             35.1       Urine Studies:  Urinalysis Basic - ( 17 May 2024 05:22 )    Color:  / Appearance:  / SG:  / pH:   Gluc: 268 mg/dL / Ketone:   / Bili:  / Urobili:    Blood:  / Protein:  / Nitrite:    Leuk Esterase:  / RBC:  / WBC    Sq Epi:  / Non Sq Epi:  / Bacteria:           Iron 20, TIBC 334, %sat 6      [01-06-24 @ 06:46]  Ferritin 83      [01-06-24 @ 06:46]  Lipid: chol 145, , HDL 82, LDL --      [02-06-24 @ 08:18]          RADIOLOGY & ADDITIONAL STUDIES:  < from: TTE Echo Complete w/o Contrast w/ Doppler (05.16.24 @ 10:32) >   1. Left ventricular ejection fraction, by visual estimation, is 60 to   65%.   2. Hyperdynamic global left ventricular systolic function.   3. Mildly increased LV wall thickness.   4. Severely enlarged left atrium.   5. The mitral in-flow pattern reveals no discernable A-wave, therefore   no comment on diastolic function can be made.   6. Mildly enlarged right atrium.   7. Degenerative mitral valve.   8. Mild mitral valve regurgitation.   9. Moderately decreased mitral leaflet mobility.  10. Severe mitral annular calcification.  11. Moderate-severe tricuspid regurgitation.  12. Bioprosthesis in the aortic position.  13. Estimated pulmonary artery systolic pressure is 48.7 mmHg assuming a   right atrial pressure of 10 mmHg, which is consistent with mild pulmonary   hypertension.    < end of copied text >

## 2024-05-17 NOTE — CONSULT NOTE ADULT - ASSESSMENT
78 MARIZA w a PMH of CKD 3b, Afib (s/p watchman not on AC), COPD not on home O2,  Aortic stenosis w H/O ascending aortic replacement Bovine Replacement, DM , CAD s/p CABG 2016 (w bleeding complication) , anemia of chronic disease and a recent admission for a fall presents to the ED 3 days after DC for fall for the chief concern of NBNB N/V for 48hrs.    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   Shock : on pressors , ischemic hepatitis, end organ damage with AKASH ( Cr 1.4 on 5/10 )  Cholestasis with US with cholelithiasis  Asymptomatic bacteuria with E fecalis with no pyuria ( will not recommend treating )    No PNA      -BCX  -consider MRCP  -Meropenem 750 mg iv q24h

## 2024-05-17 NOTE — CHART NOTE - NSCHARTNOTEFT_GEN_A_CORE
Hemosphere reading: patient is on amiodarone 0.5mg/kg, dobutamine 2.5 mcg, and levophed 0.15     CO: 4.5  CI: 2.4  SV: 40    SVR: 977

## 2024-05-17 NOTE — PROGRESS NOTE ADULT - SUBJECTIVE AND OBJECTIVE BOX
Impression: S/P Cataract Extraction by phacoemulsification with IOL placement; ORA; LRI (Limbal Relaxing Incision) OS - 7 Days. Presence of intraocular lens  Z96.1. Excellent post op course   Post operative instructions reviewed - Condition is improving - Plan: Pt doing well POW #1. RTC 3 wks for f/u. Pred TID OS Diclofenac QID OS Over Night Events: events noted, on dobutamine 2.5, levophed 0.08, dobutamine, amiodarone, LR 75 , Sill altered MS    PHYSICAL EXAM    ICU Vital Signs Last 24 Hrs  T(C): 36.2 (17 May 2024 04:00), Max: 37.1 (17 May 2024 00:00)  T(F): 97.2 (17 May 2024 04:00), Max: 98.7 (17 May 2024 00:00)  HR: 114 (17 May 2024 08:45) (101 - 145)  BP: 88/50 (17 May 2024 08:45) (82/58 - 140/59)  BP(mean): 67 (17 May 2024 08:45) (59 - 96)  RR: 17 (17 May 2024 08:45) (15 - 39)  SpO2: 95% (17 May 2024 08:45) (95% - 98%)    O2 Parameters below as of 17 May 2024 08:30  Patient On (Oxygen Delivery Method): room air            General: ill looking  dry oral mucosa  Lungs: dec bs both bases  Cardiovascular: Regular   Abdomen: Soft, Positive BS  Extremities: No clubbing   not following commands      05-16-24 @ 07:01  -  05-17-24 @ 07:00  --------------------------------------------------------  IN:    Amiodarone: 100.2 mL    Amiodarone: 300.6 mL    DOBUTamine: 114.6 mL    Enteral Tube Flush: 380 mL    IV PiggyBack: 400 mL    Lactated Ringers: 450 mL    Lactated Ringers: 825 mL    Lactated Ringers Bolus: 500 mL    Norepinephrine: 382.5 mL    Phenylephrine: 153 mL  Total IN: 3605.9 mL    OUT:    Indwelling Catheter - Urethral (mL): 1285 mL    Vasopressin: 0 mL  Total OUT: 1285 mL    Total NET: 2320.9 mL          LABS:                          11.1   7.86  )-----------( 112      ( 17 May 2024 05:22 )             35.1                                               05-17    132<L>  |  94<L>  |  91<HH>  ----------------------------<  268<H>  4.8   |  17  |  5.4<HH>    Ca    8.7      17 May 2024 05:22  Mg     2.2     05-17    TPro  5.4<L>  /  Alb  3.1<L>  /  TBili  3.4<H>  /  DBili  x   /  AST  506<H>  /  ALT  537<H>  /  AlkPhos  149<H>  05-17                                             Urinalysis Basic - ( 17 May 2024 05:22 )    Color: x / Appearance: x / SG: x / pH: x  Gluc: 268 mg/dL / Ketone: x  / Bili: x / Urobili: x   Blood: x / Protein: x / Nitrite: x   Leuk Esterase: x / RBC: x / WBC x   Sq Epi: x / Non Sq Epi: x / Bacteria: x        CARDIAC MARKERS ( 16 May 2024 04:45 )  x     / x     / 50 U/L / x     / x      CARDIAC MARKERS ( 15 May 2024 21:49 )  x     / x     / 49 U/L / x     / x                                                LIVER FUNCTIONS - ( 17 May 2024 05:22 )  Alb: 3.1 g/dL / Pro: 5.4 g/dL / ALK PHOS: 149 U/L / ALT: 537 U/L / AST: 506 U/L / GGT: x                                                  Culture - Urine (collected 15 May 2024 02:23)  Source: Clean Catch Clean Catch (Midstream)  Preliminary Report (17 May 2024 08:52):    50,000 - 99,000 CFU/mL Enterococcus faecalis                                                                                           MEDICATIONS  (STANDING):  allopurinol 50 milliGRAM(s) Oral daily  aMIOdarone Infusion 0.5 mG/Min (16.7 mL/Hr) IV Continuous <Continuous>  aspirin  chewable 81 milliGRAM(s) Oral daily  atorvastatin 40 milliGRAM(s) Oral at bedtime  budesonide  80 MICROgram(s)/formoterol 4.5 MICROgram(s) Inhaler 2 Puff(s) Inhalation two times a day  cefepime   IVPB      cefepime   IVPB 2000 milliGRAM(s) IV Intermittent daily  chlorhexidine 2% Cloths 1 Application(s) Topical daily  dextrose 10% Bolus 125 milliLiter(s) IV Bolus once  dextrose 5%. 1000 milliLiter(s) (100 mL/Hr) IV Continuous <Continuous>  dextrose 5%. 1000 milliLiter(s) (50 mL/Hr) IV Continuous <Continuous>  dextrose 50% Injectable 25 Gram(s) IV Push once  dextrose 50% Injectable 12.5 Gram(s) IV Push once  DOBUTamine Infusion 2.5 MICROgram(s)/kG/Min (4.25 mL/Hr) IV Continuous <Continuous>  doxazosin 2 milliGRAM(s) Oral at bedtime  fludroCORTISONE 0.1 milliGRAM(s) Oral daily  glucagon  Injectable 1 milliGRAM(s) IntraMuscular once  heparin   Injectable 5000 Unit(s) SubCutaneous every 12 hours  hydrocortisone sodium succinate Injectable 50 milliGRAM(s) IV Push four times a day  insulin glargine Injectable (LANTUS) 4 Unit(s) SubCutaneous every morning  insulin lispro (ADMELOG) corrective regimen sliding scale   SubCutaneous three times a day before meals  lactated ringers. 1000 milliLiter(s) (75 mL/Hr) IV Continuous <Continuous>  lactulose Syrup 15 Gram(s) Oral every 3 hours  metroNIDAZOLE  IVPB 500 milliGRAM(s) IV Intermittent every 8 hours  metroNIDAZOLE  IVPB      montelukast 10 milliGRAM(s) Oral daily  norepinephrine Infusion 0.05 MICROgram(s)/kG/Min (5.32 mL/Hr) IV Continuous <Continuous>  nystatin Powder 1 Application(s) Topical every 12 hours  pantoprazole   Suspension 40 milliGRAM(s) Oral daily  polyethylene glycol 3350 17 Gram(s) Oral daily  pregabalin 25 milliGRAM(s) Oral daily  senna 2 Tablet(s) Oral at bedtime  sevelamer carbonate Powder 800 milliGRAM(s) Oral every 8 hours  sodium bicarbonate 1300 milliGRAM(s) Oral three times a day  tiotropium 2.5 MICROgram(s) Inhaler 2 Puff(s) Inhalation daily  vasopressin Infusion 0.04 Unit(s)/Min (6 mL/Hr) IV Continuous <Continuous>  venlafaxine 25 milliGRAM(s) Oral daily    MEDICATIONS  (PRN):  dextrose Oral Gel 15 Gram(s) Oral once PRN Blood Glucose LESS THAN 70 milliGRAM(s)/deciliter  ondansetron Injectable 4 milliGRAM(s) IV Push four times a day PRN Nausea and/or Vomiting      CXR NOTED

## 2024-05-17 NOTE — PROGRESS NOTE ADULT - SUBJECTIVE AND OBJECTIVE BOX
24H events:    Patient is a 78y old Female who presents with a chief complaint of N/V, AKASH on CKD, hyperkalmeia (16 May 2024 09:41)    Primary diagnosis of AKASH (acute kidney injury)        Today is hospital day 2d. This morning patient was seen and examined at bedside, resting comfortably in bed.    No acute or major events overnight.    Code Status:    PAST MEDICAL & SURGICAL HISTORY  Aortic stenosis    Diabetes    Asthma with COPD  many years since last attack    CAD (coronary artery disease), native coronary artery    Anemia    History of bleeding disorder  having work up 5/2/21- HAD WORKUP BUT NO DIAGNOSIS "NOTHING WAS FOUND"    History of transfusion reaction    Hiatal hernia    Stage 3 chronic kidney disease    H/O ascending aortic replacement  Bovine Replacement    S/P CABG x 1  complication of bleeding 2016    H/O total knee replacement, right  complicated with fx femur bars screws in femur- b/l knee replacement    S/P hysterectomy    Presence of Watchman left atrial appendage closure device      SOCIAL HISTORY:  Social History:      ALLERGIES:  Augmentin (Other)  penicillins (Hives; Rash)    MEDICATIONS:  STANDING MEDICATIONS  allopurinol 50 milliGRAM(s) Oral daily  aMIOdarone Infusion 0.5 mG/Min IV Continuous <Continuous>  aspirin  chewable 81 milliGRAM(s) Oral daily  atorvastatin 40 milliGRAM(s) Oral at bedtime  budesonide  80 MICROgram(s)/formoterol 4.5 MICROgram(s) Inhaler 2 Puff(s) Inhalation two times a day  cefepime   IVPB      cefepime   IVPB 2000 milliGRAM(s) IV Intermittent daily  chlorhexidine 2% Cloths 1 Application(s) Topical daily  dextrose 10% Bolus 125 milliLiter(s) IV Bolus once  dextrose 5%. 1000 milliLiter(s) IV Continuous <Continuous>  dextrose 5%. 1000 milliLiter(s) IV Continuous <Continuous>  dextrose 50% Injectable 25 Gram(s) IV Push once  dextrose 50% Injectable 12.5 Gram(s) IV Push once  DOBUTamine Infusion 2.5 MICROgram(s)/kG/Min IV Continuous <Continuous>  doxazosin 2 milliGRAM(s) Oral at bedtime  fludroCORTISONE 0.1 milliGRAM(s) Oral daily  glucagon  Injectable 1 milliGRAM(s) IntraMuscular once  heparin   Injectable 5000 Unit(s) SubCutaneous every 12 hours  hydrocortisone sodium succinate Injectable 50 milliGRAM(s) IV Push four times a day  insulin glargine Injectable (LANTUS) 4 Unit(s) SubCutaneous every morning  insulin lispro (ADMELOG) corrective regimen sliding scale   SubCutaneous three times a day before meals  lactated ringers. 1000 milliLiter(s) IV Continuous <Continuous>  lactulose Syrup 15 Gram(s) Oral every 3 hours  metroNIDAZOLE  IVPB 500 milliGRAM(s) IV Intermittent every 8 hours  metroNIDAZOLE  IVPB      montelukast 10 milliGRAM(s) Oral daily  norepinephrine Infusion 0.05 MICROgram(s)/kG/Min IV Continuous <Continuous>  nystatin Powder 1 Application(s) Topical every 12 hours  pantoprazole   Suspension 40 milliGRAM(s) Oral daily  polyethylene glycol 3350 17 Gram(s) Oral daily  pregabalin 25 milliGRAM(s) Oral daily  senna 2 Tablet(s) Oral at bedtime  sevelamer carbonate Powder 800 milliGRAM(s) Oral every 8 hours  sodium bicarbonate 1300 milliGRAM(s) Oral three times a day  tiotropium 2.5 MICROgram(s) Inhaler 2 Puff(s) Inhalation daily  vasopressin Infusion 0.04 Unit(s)/Min IV Continuous <Continuous>  venlafaxine 25 milliGRAM(s) Oral daily    PRN MEDICATIONS  dextrose Oral Gel 15 Gram(s) Oral once PRN  ondansetron Injectable 4 milliGRAM(s) IV Push four times a day PRN    VITALS:   T(F): 97.2  HR: 114  BP: 116/58  RR: 21  SpO2: 96%    PHYSICAL EXAM:  GENERAL:  NAD, lying in bed comfortably  HEAD: Atraumatic  NECK: Supple, no JVD, c collar   HEART: tachycardic   LUNGS: b/l air entry, unlabored respirations   ABDOMEN: Soft, non distended   EXTREMITIES: 2+ peripheral pulses bilaterally. No clubbing, cyanosis, or edema  SKIN:   NERVOUS SYSTEM:  A&Ox 0 difficult to assess, pt lethargic          (  ) Indwelling Wang Catheter:   Date insterted:    Reason (  ) Critical illness     (  ) urinary retention    (  ) Accurate Ins/Outs Monitoring     (  ) CMO patient    right fem 5/16     LABS:                        11.1   7.86  )-----------( 112      ( 17 May 2024 05:22 )             35.1     05-17    132<L>  |  94<L>  |  91<HH>  ----------------------------<  268<H>  4.8   |  17  |  5.4<HH>    Ca    8.7      17 May 2024 05:22  Mg     2.2     05-17    TPro  5.4<L>  /  Alb  3.1<L>  /  TBili  3.4<H>  /  DBili  x   /  AST  506<H>  /  ALT  537<H>  /  AlkPhos  149<H>  05-17      Urinalysis Basic - ( 17 May 2024 05:22 )    Color: x / Appearance: x / SG: x / pH: x  Gluc: 268 mg/dL / Ketone: x  / Bili: x / Urobili: x   Blood: x / Protein: x / Nitrite: x   Leuk Esterase: x / RBC: x / WBC x   Sq Epi: x / Non Sq Epi: x / Bacteria: x        Lactate, Blood: 2.6 mmol/L *H* (05-16-24 @ 13:35)      Culture - Urine (collected 15 May 2024 02:23)  Source: Clean Catch Clean Catch (Midstream)  Preliminary Report (16 May 2024 15:06):    50,000 - 99,000 CFU/mL Gram positive organisms      CARDIAC MARKERS ( 16 May 2024 04:45 )  x     / x     / 50 U/L / x     / x      CARDIAC MARKERS ( 15 May 2024 21:49 )  x     / x     / 49 U/L / x     / x          RADIOLOGY/Other tests:

## 2024-05-17 NOTE — PROGRESS NOTE ADULT - ASSESSMENT
Impression    AKASH on CKD3b Non oliguric  hyperkalemia w/o EKG changes improved  Shock on pressors  Rapid A fib  Transamintis  Ho recent traumatic fall admission w DC May 11  HO asthma/COPD not on home O2  Ho HFpEF, G3DD, moderate PH  Ho CAD s/p CABG  Ho AS s/p SAVR (2016),   HO Atrial fibrillation s/p watchman (2021),       PLAN:    CNS:  Avoid CNS depressant, ammonia noted    HEENT: Oral care    PULMONARY:  HOB @ 45 degrees.  Aspiration precautions.  NIV during sleep. Neb as needed, ABG    CARDIOVASCULAR:  Rate control, hold diuretics, DC dobutamine Bp meds, cardio fup, echo, trend LA, EKG    GI: GI prophylaxis. NPO, RUQ sono noted    RENAL:  trend CMP, renal FUP, lokelma    INFECTIOUS DISEASE: IV abx till cx    HEMATOLOGICAL:  Heparin proph     ENDOCRINE:  Follow up FS. TSH    MUSCULOSKELETAL:  PT OT .  Off loading     DNR/ I  poor prognosis  fem line  palliative care fup

## 2024-05-17 NOTE — PROGRESS NOTE ADULT - CONVERSATION DETAILS
Patient seen at bedside with daughter.  Palliative care introduced. She was able to provide a medical history and hospital course.  She noted that she spoke with the primary team and attending earlier today, and knows that "things are bad."  We discussed overall GOC.  I noted the primary team noted some clinical improvement today.  We discussed treatment options should the patient not improve or decline, including ongoing medical management and comfort measures only.  She note that the plan would be to see how the patient did for the next 24-48 hours and make decisions about care after that. All questions answered.

## 2024-05-17 NOTE — CONSULT NOTE ADULT - SUBJECTIVE AND OBJECTIVE BOX
EVAN QUINN  78y, Female  Allergy: Augmentin (Other)  penicillins (Hives; Rash)      All historical available data reviewed.    HPI:  78 MARIZA w a PMH of CKD 3b, Afib (s/p watchman not on AC), COPD not on home O2,  Aortic stenosis w H/O ascending aortic replacement Bovine Replacement, DM , CAD s/p CABG 2016 (w bleeding complication) , anemia of chronic disease and a recent admission for a fall presents to the ED 3 days after DC for fall for the chief concern of NBNB N/V for 48hrs. Patient denies: hematemesis wrenching HA, palpitations, rash, diet or medication changes, diarrhea, fever, abdominal pain or cramping, hematochezia/melena. Patient admitted for AKASH on CKD, inadequate urine output and severe hyperKalemia    (15 May 2024 02:36)    FAMILY HISTORY:  Family history of pseudocholinesterase deficiency (Child)      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      CAD (coronary artery disease), native coronary artery      Anemia      History of bleeding disorder  having work up 5/2/21- HAD WORKUP BUT NO DIAGNOSIS "NOTHING WAS FOUND"      History of transfusion reaction      Hiatal hernia      Stage 3 chronic kidney disease      H/O ascending aortic replacement  Bovine Replacement      S/P CABG x 1  complication of bleeding 2016      H/O total knee replacement, right  complicated with fx femur bars screws in femur- b/l knee replacement      S/P hysterectomy      Presence of Watchman left atrial appendage closure device            VITALS:  T(F): 97.2, Max: 98.7 (05-17-24 @ 00:00)  HR: 120  BP: 97/51  RR: 18Vital Signs Last 24 Hrs  T(C): 36.2 (17 May 2024 04:00), Max: 37.1 (17 May 2024 00:00)  T(F): 97.2 (17 May 2024 04:00), Max: 98.7 (17 May 2024 00:00)  HR: 120 (17 May 2024 14:15) (109 - 145)  BP: 97/51 (17 May 2024 14:15) (82/46 - 130/58)  BP(mean): 70 (17 May 2024 14:15) (59 - 96)  RR: 18 (17 May 2024 10:45) (15 - 39)  SpO2: 95% (17 May 2024 10:45) (95% - 97%)    Parameters below as of 17 May 2024 10:45  Patient On (Oxygen Delivery Method): room air        TESTS & MEASUREMENTS:                        11.1   7.86  )-----------( 112      ( 17 May 2024 05:22 )             35.1     05-17    132<L>  |  94<L>  |  91<HH>  ----------------------------<  268<H>  4.8   |  17  |  5.4<HH>    Ca    8.7      17 May 2024 05:22  Mg     2.2     05-17    TPro  5.4<L>  /  Alb  3.1<L>  /  TBili  3.4<H>  /  DBili  x   /  AST  506<H>  /  ALT  537<H>  /  AlkPhos  149<H>  05-17    LIVER FUNCTIONS - ( 17 May 2024 05:22 )  Alb: 3.1 g/dL / Pro: 5.4 g/dL / ALK PHOS: 149 U/L / ALT: 537 U/L / AST: 506 U/L / GGT: x             Culture - Urine (collected 05-15-24 @ 02:23)  Source: Clean Catch Clean Catch (Midstream)  Preliminary Report (05-17-24 @ 08:52):    50,000 - 99,000 CFU/mL Enterococcus faecalis      Urinalysis Basic - ( 17 May 2024 05:22 )    Color: x / Appearance: x / SG: x / pH: x  Gluc: 268 mg/dL / Ketone: x  / Bili: x / Urobili: x   Blood: x / Protein: x / Nitrite: x   Leuk Esterase: x / RBC: x / WBC x   Sq Epi: x / Non Sq Epi: x / Bacteria: x          RADIOLOGY & ADDITIONAL TESTS:  Personal review of radiological diagnostics performed  Echo and EKG results noted when applicable.     MEDICATIONS:  allopurinol 50 milliGRAM(s) Oral daily  aMIOdarone Infusion 0.5 mG/Min IV Continuous <Continuous>  aspirin  chewable 81 milliGRAM(s) Oral daily  budesonide  80 MICROgram(s)/formoterol 4.5 MICROgram(s) Inhaler 2 Puff(s) Inhalation two times a day  chlorhexidine 2% Cloths 1 Application(s) Topical daily  dextrose 10% Bolus 125 milliLiter(s) IV Bolus once  dextrose 5%. 1000 milliLiter(s) IV Continuous <Continuous>  dextrose 5%. 1000 milliLiter(s) IV Continuous <Continuous>  dextrose 50% Injectable 12.5 Gram(s) IV Push once  dextrose 50% Injectable 25 Gram(s) IV Push once  dextrose Oral Gel 15 Gram(s) Oral once PRN  doxazosin 2 milliGRAM(s) Oral at bedtime  fludroCORTISONE 0.1 milliGRAM(s) Oral daily  glucagon  Injectable 1 milliGRAM(s) IntraMuscular once  heparin   Injectable 5000 Unit(s) SubCutaneous every 12 hours  hydrocortisone sodium succinate Injectable 50 milliGRAM(s) IV Push four times a day  insulin glargine Injectable (LANTUS) 4 Unit(s) SubCutaneous every morning  insulin lispro (ADMELOG) corrective regimen sliding scale   SubCutaneous three times a day before meals  lactated ringers. 1000 milliLiter(s) IV Continuous <Continuous>  lactulose Syrup 15 Gram(s) Oral every 12 hours  metroNIDAZOLE  IVPB 500 milliGRAM(s) IV Intermittent every 8 hours  metroNIDAZOLE  IVPB      montelukast 10 milliGRAM(s) Oral daily  norepinephrine Infusion 0.05 MICROgram(s)/kG/Min IV Continuous <Continuous>  nystatin Powder 1 Application(s) Topical every 12 hours  ondansetron Injectable 4 milliGRAM(s) IV Push four times a day PRN  ondansetron Injectable 4 milliGRAM(s) IV Push once  pantoprazole   Suspension 40 milliGRAM(s) Oral daily  polyethylene glycol 3350 17 Gram(s) Oral daily  senna 2 Tablet(s) Oral at bedtime  sevelamer carbonate Powder 800 milliGRAM(s) Oral every 8 hours  sodium bicarbonate 1300 milliGRAM(s) Oral three times a day  tiotropium 2.5 MICROgram(s) Inhaler 2 Puff(s) Inhalation daily  vasopressin Infusion 0.04 Unit(s)/Min IV Continuous <Continuous>      ANTIBIOTICS:  metroNIDAZOLE  IVPB 500 milliGRAM(s) IV Intermittent every 8 hours  metroNIDAZOLE  IVPB

## 2024-05-17 NOTE — PROGRESS NOTE ADULT - ASSESSMENT
78yFemale is a history of CKD, afib s/p watchman, COPD not on O2, aortic stenosis, CAD presents with NV.  Hospital course complicated by AKASH on CKD with hyperkalemia, shock on pressors. Palliative care consulted for GOC.    Patient seen at bedside with daughter.  Palliative care introduced. She was able to provide a medical history and hospital course.  She noted that she spoke with the primary team and attending earlier today, and knows that "things are bad."  We discussed overall GOC.  I noted the primary team noted some clinical improvement today.  We discussed treatment options should the patient not improve or decline, including ongoing medical management and comfort measures only.  She note that the plan would be to see how the patient did for the next 24-48 hours and make decisions about care after that. All questions answered.    Education about palliative care provided to patient/family.  See Recs below.    Please call [phone] with questions or concerns 24/7.   We will continue to follow.

## 2024-05-17 NOTE — PROGRESS NOTE ADULT - ASSESSMENT
AKASH on CKD 3 / last creat on 5/10 was 1.6 / received contrast on 5/9, but AKASH more likely related to hemodynamic instability, Afib with RVR, fall, very dark urine      Hyponatremia improving with iv hydration   Hyperkalemia s/p medical treatment improved  AKASH on CKD most likely ATN     - monitor strict i/o  - cont iv hydration LR at 75 cc/ jour  - sp Echo results noted   - Hold lokelma   - cont sodium bicarb 1300mg q8h  - phos level high, on sevelamer 1 tab TID with meals  - dose meds for eGFR <15   - no acute indication for RRT at this point / will follow up closely   - obtain renal/bladder ultrasound   - d/w daughter at bedside today     will follow

## 2024-05-17 NOTE — CONSULT NOTE ADULT - COMMENTS
weak, on RA, poorly responsive, non verbal  R femoral catheter with no erythema  on pressors  on Lactulose/miralax

## 2024-05-17 NOTE — PROGRESS NOTE ADULT - SUBJECTIVE AND OBJECTIVE BOX
HPI:  78 MARIZA w a PMH of CKD 3b, Afib (s/p watchman not on AC), COPD not on home O2,  Aortic stenosis w H/O ascending aortic replacement Bovine Replacement, DM , CAD s/p CABG 2016 (w bleeding complication) , anemia of chronic disease and a recent admission for a fall presents to the ED 3 days after DC for fall for the chief concern of NBNB N/V for 48hrs. Patient denies: hematemesis wrenching HA, palpitations, rash, diet or medication changes, diarrhea, fever, abdominal pain or cramping, hematochezia/melena. Patient admitted for AKASH on CKD, inadequate urine output and severe hyperKalemia       Interval history  -Patient seen at bedside with jared Umanzor  -She denied pain or SOB     ADVANCE DIRECTIVES:     [ ] Full Code [x ] DNR  MOLST  [ ]  Living Will  [ ]   DECISION MAKER(s):  [x ] Health Care Proxy(s)  [ ] Surrogate(s)  [ ] Guardian           Name(s): Phone Number(s):  Erna Glover      BASELINE (I)ADL(s) (prior to admission):    [employer]: [ ]Total  [ ] Moderate [ ]Dependent  Palliative Performance Status Version 2:         %    http://npcrc.org/files/news/palliative_performance_scale_ppsv2.pdf    Allergies    Augmentin (Other)  penicillins (Hives; Rash)    Intolerances    MEDICATIONS  (STANDING):  allopurinol 50 milliGRAM(s) Oral daily  aMIOdarone Infusion 0.5 mG/Min (16.7 mL/Hr) IV Continuous <Continuous>  aspirin  chewable 81 milliGRAM(s) Oral daily  budesonide  80 MICROgram(s)/formoterol 4.5 MICROgram(s) Inhaler 2 Puff(s) Inhalation two times a day  chlorhexidine 2% Cloths 1 Application(s) Topical daily  dextrose 10% Bolus 125 milliLiter(s) IV Bolus once  dextrose 5%. 1000 milliLiter(s) (50 mL/Hr) IV Continuous <Continuous>  dextrose 5%. 1000 milliLiter(s) (100 mL/Hr) IV Continuous <Continuous>  dextrose 50% Injectable 12.5 Gram(s) IV Push once  dextrose 50% Injectable 25 Gram(s) IV Push once  doxazosin 2 milliGRAM(s) Oral at bedtime  fludroCORTISONE 0.1 milliGRAM(s) Oral daily  glucagon  Injectable 1 milliGRAM(s) IntraMuscular once  heparin   Injectable 5000 Unit(s) SubCutaneous every 12 hours  hydrocortisone sodium succinate Injectable 50 milliGRAM(s) IV Push four times a day  insulin glargine Injectable (LANTUS) 4 Unit(s) SubCutaneous every morning  insulin lispro (ADMELOG) corrective regimen sliding scale   SubCutaneous three times a day before meals  lactated ringers. 1000 milliLiter(s) (75 mL/Hr) IV Continuous <Continuous>  lactulose Syrup 15 Gram(s) Oral every 12 hours  metroNIDAZOLE  IVPB 500 milliGRAM(s) IV Intermittent every 8 hours  metroNIDAZOLE  IVPB      montelukast 10 milliGRAM(s) Oral daily  norepinephrine Infusion 0.05 MICROgram(s)/kG/Min (5.32 mL/Hr) IV Continuous <Continuous>  nystatin Powder 1 Application(s) Topical every 12 hours  ondansetron Injectable 4 milliGRAM(s) IV Push once  pantoprazole   Suspension 40 milliGRAM(s) Oral daily  polyethylene glycol 3350 17 Gram(s) Oral daily  senna 2 Tablet(s) Oral at bedtime  sevelamer carbonate Powder 800 milliGRAM(s) Oral every 8 hours  sodium bicarbonate 1300 milliGRAM(s) Oral three times a day  tiotropium 2.5 MICROgram(s) Inhaler 2 Puff(s) Inhalation daily  vasopressin Infusion 0.04 Unit(s)/Min (6 mL/Hr) IV Continuous <Continuous>    MEDICATIONS  (PRN):  dextrose Oral Gel 15 Gram(s) Oral once PRN Blood Glucose LESS THAN 70 milliGRAM(s)/deciliter  ondansetron Injectable 4 milliGRAM(s) IV Push four times a day PRN Nausea and/or Vomiting      PRESENT SYMPTOMS: [ ]Unable to obtain due to poor mentation   Source if other than patient:  [ ]Family   [ ]Team     Pain: [ ]yes [x ]no  ALL PAIN ASSESSMENT COMPONENTS WERE ASKED ABOUT UNLESS OTHERWISE NOTED  QOL impact -   Location -                    Aggravating factors -  Quality -  Radiation -  Timing-  Severity (0-10 scale):  Minimal acceptable level (0-10 scale):     CPOT:    https://www.[email or web address]/getattachment/[email or web address]/Critical-Care-Pain-Observation-Tool-(CPOT)    PAIN AD Score:   http://geriatrictoolkit.[email or web address]/painad.pdf (press ctrl +  left click to view)    Dyspnea:                           [x ]None[ ]Mild [ ]Moderate [ ]Severe     Respiratory Distress Observation Scale (RDOS):   A score of 0 to 2 signifies little or no respiratory distress, 3 signifies mild distress, scores 4 to 6 indicate moderate distress, and scores greater than 7 signify severe distress  https://[email or web address]/sites/default/files/PDFS/144597-qkwrxskytmf-iwsubpuh-iksewpcmzxs-bdcut.pdf    Anxiety:                             [x ]None[ ]Mild [ ]Moderate [ ]Severe   Fatigue:                             [x ]None[ ]Mild [ ]Moderate [ ]Severe   Nausea:                             [x ]None[ ]Mild [ ]Moderate [ ]Severe   Loss of appetite:              [x ]None[ ]Mild [ ]Moderate [ ]Severe   Constipation:                    [x ]None[ ]Mild [ ]Moderate [ ]Severe    Other Symptoms:  [x ]All other review of systems negative     Palliative Performance Status Version 2:         20%    http://npcrc.org/files/news/palliative_performance_scale_ppsv2.pdf    PHYSICAL EXAM:  Vital Signs Last 24 Hrs  T(C): 36.2 (17 May 2024 04:00), Max: 37.1 (17 May 2024 00:00)  T(F): 97.2 (17 May 2024 04:00), Max: 98.7 (17 May 2024 00:00)  HR: 120 (17 May 2024 14:15) (109 - 145)  BP: 97/51 (17 May 2024 14:15) (82/46 - 130/58)  BP(mean): 70 (17 May 2024 14:15) (59 - 96)  RR: 18 (17 May 2024 10:45) (15 - 39)  SpO2: 95% (17 May 2024 10:45) (95% - 97%)    Parameters below as of 17 May 2024 10:45  Patient On (Oxygen Delivery Method): room air      GENERAL:  [ ] No acute distress [ ]Lethargic  [ ]Unarousable  [ x]Verbal  [ ]Non-Verbal [ ]Cachexia    BEHAVIORAL/PSYCH:  [x ]Alert and Oriented x1-2  [ ] Anxiety [ ] Delirium [ ] Agitation [x ] Calm   EYES: [ x] No scleral icterus [ ] Scleral icterus [ ] Closed  ENMT:  [ ]Dry mouth  [ x]No external oral lesions [ ] No external ear or nose lesions  CARDIOVASCULAR:  [ ]Regular [ ]Irregular [ ]Tachy [ ]Not Tachy  [ ]Hosea [ ] Edema [ ] No edema  PULMONARY:  [ x]Tachypnea  [ ]Audible excessive secretions [ ] No labored breathing [ ] labored breathing  GASTROINTESTINAL: [ ]Soft  [ ]Distended  [ ]Not distended [ ]Non tender [ ]Tender  MUSCULOSKELETAL: [ ]No clubbing [ ] clubbing  [ ] No cyanosis [ ] cyanosis  NEUROLOGIC: [ ]No focal deficits  [x ]Follows commands  [ ]Does not follow commands  [x ]Cognitive impairment  [ ]Dysphagia  [ ]Dysarthria  [ ]Paresis   SKIN: [ ] Jaundiced [x ] Non-jaundiced [ ]Rash [ ]No Rash [ ] Warm [ ] Dry  MISC/LINES: [ ] ET tube [ ] Trach [ ]NGT/OGT [ ]PEG [ ]Wang    LABS: reviewed by me                                   11.1   7.86  )-----------( 112      ( 17 May 2024 05:22 )             35.1       05-17    132<L>  |  94<L>  |  91<HH>  ----------------------------<  268<H>  4.8   |  17  |  5.4<HH>    Ca    8.7      17 May 2024 05:22  Mg     2.2     05-17    TPro  5.4<L>  /  Alb  3.1<L>  /  TBili  3.4<H>  /  DBili  x   /  AST  506<H>  /  ALT  537<H>  /  AlkPhos  149<H>  05-17          ABG - ( 17 May 2024 09:31 )  pH, Arterial: 7.35  pH, Blood: x     /  pCO2: 31    /  pO2: 68    / HCO3: 17    / Base Excess: -7.5  /  SaO2: 92.6                Urinalysis Basic - ( 17 May 2024 05:22 )    Color: x / Appearance: x / SG: x / pH: x  Gluc: 268 mg/dL / Ketone: x  / Bili: x / Urobili: x   Blood: x / Protein: x / Nitrite: x   Leuk Esterase: x / RBC: x / WBC x   Sq Epi: x / Non Sq Epi: x / Bacteria: x            CARDIAC MARKERS ( 16 May 2024 04:45 )  x     / x     / 50 U/L / x     / x      CARDIAC MARKERS ( 15 May 2024 21:49 )  x     / x     / 49 U/L / x     / x            CAPILLARY BLOOD GLUCOSE      POCT Blood Glucose.: 257 mg/dL (17 May 2024 11:58)              RADIOLOGY & ADDITIONAL STUDIES: reviewed by me    < from: US Abdomen Upper Quadrant Right (05.16.24 @ 13:09) >    IMPRESSION:  Cholelithiasis without ductal dilatation.    < end of copied text >        EKG: reviewed by me    < from: 12 Lead ECG (05.15.24 @ 22:53) >  Ventricular Rate 139 BPM    Atrial Rate 127 BPM    QRS Duration 140 ms    Q-T Interval 354 ms    QTC Calculation(Bazett) 538 ms    R Axis -51 degrees    T Axis 159 degrees    Diagnosis Line Atrial flutter with 2:1 A-V conduction  Left axis deviation  Non-specific intra-ventricular conduction block  Marked ST abnormality, possible inferolateral subendocardial injury  Abnormal ECG    < end of copied text >        PROTEIN CALORIE MALNUTRITION PRESENT: [ ]mild [ ]moderate [ ]severe [ ]underweight [ ]morbid obesity  https://www.andeal.org/vault/2440/web/files/ONC/Table_Clinical%20Characteristics%20to%20Document%20Malnutrition-White%20JV%20et%20al%202012.pdf    Height (cm): 167.6 (05-14-24 @ 19:07), 167.6 (05-09-24 @ 00:33), 167.6 (04-18-24 @ 00:07)  Weight (kg): 113.4 (05-14-24 @ 19:07), 97.5 (05-09-24 @ 00:33), 103.4 (04-18-24 @ 00:07)  BMI (kg/m2): 40.4 (05-14-24 @ 19:07), 34.7 (05-09-24 @ 00:33), 36.8 (04-18-24 @ 00:07)  [ ]PPSV2 < or = to 30% [ ]significant weight loss  [ ]poor nutritional intake  [ ]anasarca      [ ]Artificial Nutrition      Palliative Care Spiritual/Emotional Screening Tool Question  Severity (0-4):                    OR                    [ x] Unable to determine. Will assess at later time if appropriate.  Score of 2 or greater indicates recommendation of Chaplaincy and/or SW referral  Chaplaincy Referral: [ ] Yes [ ] Refused [ ] Following     Caregiver [place]:  [ ] Yes [ ] No    OR    [x ] Unable to determine. Will assess at later time if appropriate.  Social Work Referral [ ]  Patient and Family Centered Care Referral [ ]    Anticipatory Grief Present: [ ] Yes [ ] No    OR     [ x] Unable to determine. Will assess at later time if appropriate.  Social Work Referral [ ]  Patient and Family Centered Care Referral [ ]    Patient discussed with primary medical team MD  Palliative care education provided to patient and/or family

## 2024-05-17 NOTE — PROGRESS NOTE ADULT - PROBLEM SELECTOR PLAN 2
Mostly likely 2/2 ATN. With hyperkalemia and hyponatremia POA.  -follow up renal recs  -no acute need for RRT  -IVF and bicarb per renal.

## 2024-05-17 NOTE — PROGRESS NOTE ADULT - ASSESSMENT
Ms Pickering is a 78 MARIZA w a PMH of CKD 3b, Afib (s/p watchman not on AC), COPD not on home O2,  Aortic stenosis w H/O ascending aortic replacement  Bovine Replacement, DM , CAD s/p CABG 2016 (w bleeding complication) , anemia of chronic disease and a recent admission for a fall presents to the ED 3 days after DC for fall for the chief concern of NBNB N/V for 48hrs. Patient denies: hematemesis wrenching HA, virtigo, palpitations, rash, diet or medication changes, diarrhea, fever, abdominal pain or cramping, hematochezia/melena. Patient admitted for AKASH on CKD, inadequate urine output and severe hyperKalemia     Note: LASIX and SPIRONOLACTONE held due to AKASH     Shock on pressors r/o cardiogenic vs septic  -started cef/flagyl, 5/16  - on amio, levo, lr, dobutamine   -    AKASH on CKD3b  hyperkalemia w/o EKG changes   -lokelma  -nephro recs appreciated     Rapid A fib  Ho HFpEF, G3DD, moderate PH  Ho CAD s/p CABG  Ho AS s/p SAVR (2016),   HO Atrial fibrillation s/p watchman (2021),   - amio drip  -consult cardio  -fu echo      Transamintis  -ruqus- cholethithias   -ammonia elvated  -lactulose    Ho recent traumatic fall admission w DC May 11  - in c collar, conulsted nsgx, will stay in collar until outpatient fu     HO asthma/COPD not on home O2    DNR/DNI    fem line placed 5/15 due to inability to access sherwin nuñez: cardio fu  Ms Pickering is a 78 MARIZA w a PMH of CKD 3b, Afib (s/p watchman not on AC), COPD not on home O2,  Aortic stenosis w H/O ascending aortic replacement  Bovine Replacement, DM , CAD s/p CABG 2016 (w bleeding complication) , anemia of chronic disease and a recent admission for a fall presents to the ED 3 days after DC for fall for the chief concern of NBNB N/V for 48hrs. Patient denies: hematemesis wrenching HA, virtigo, palpitations, rash, diet or medication changes, diarrhea, fever, abdominal pain or cramping, hematochezia/melena. Patient admitted for AKASH on CKD, inadequate urine output and severe hyperKalemia       AMS  hold cns depressants  -ammonia level noted, lactulose q12, taper to 3bm a day       Shock on pressors r/o cardiogenic vs septic  -started cef/flagyl, 5/16  -hold diuretics   -wean dobutamine , on levo, lr 75   -    AKASH on CKD3b  hyperkalemia w/o EKG changes   -lokelma  -nephro recs appreciated     Rapid A fib  Ho HFpEF, G3DD, moderate PH  Ho CAD s/p CABG  Ho AS s/p SAVR (2016),   HO Atrial fibrillation s/p watchman (2021),   - amio drip, transition to po amio   -consult cardio  -echo seen       Transamintis  -ruqus- cholethithias , no cholecystitis   -ammonia elvated  -lactulose q12     Ho recent traumatic fall admission w DC May 11  - in c collar, conulsted nsgx, will stay in collar until outpatient fu     HO asthma/COPD not on home O2    DNR/DNI  feeds: if abg ok, start ng tube feeds in aftn.     fem line placed 5/15 due to inability to access rij   penidng: cardio fu  Ms Pickering is a 78 MARIZA w a PMH of CKD 3b, Afib (s/p watchman not on AC), COPD not on home O2,  Aortic stenosis w H/O ascending aortic replacement  Bovine Replacement, DM , CAD s/p CABG 2016 (w bleeding complication) , anemia of chronic disease and a recent admission for a fall presents to the ED 3 days after DC for fall for the chief concern of NBNB N/V for 48hrs. Patient denies: hematemesis wrenching HA, virtigo, palpitations, rash, diet or medication changes, diarrhea, fever, abdominal pain or cramping, hematochezia/melena. Patient admitted for AKASH on CKD, inadequate urine output and severe hyperKalemia       AMS  hold cns depressants  -ammonia level noted, lactulose q12, taper to 3bm a day       Shock on pressors r/o cardiogenic vs septic  -e faecalis in ucx 5/17 fu sensitivies   -started cef/flagyl, 5/16  -hold diuretics   -wean dobutamine , on levo, lr 75   -    AKASH on CKD3b  hyperkalemia w/o EKG changes   -lokelma  -nephro recs appreciated     Rapid A fib  Ho HFpEF, G3DD, moderate PH  Ho CAD s/p CABG  Ho AS s/p SAVR (2016),   HO Atrial fibrillation s/p watchman (2021),   - amio drip, transition to po amio   -consult cardio  -echo seen       Transamintis  -ruqus- cholethithias , no cholecystitis   -ammonia elvated  -lactulose q12     Ho recent traumatic fall admission w DC May 11  - in c collar, conulsted nsgx, will stay in collar until outpatient fu     HO asthma/COPD not on home O2    DNR/DNI  feeds: if abg ok, start ng tube feeds in aftn.     fem line placed 5/15 due to inability to access rij   penidng: cardio fu

## 2024-05-18 NOTE — PATIENT PROFILE ADULT - FALL HARM RISK - HARM RISK INTERVENTIONS

## 2024-05-18 NOTE — PROGRESS NOTE ADULT - ASSESSMENT
Ms Pickering is a 78 MARIZA w a PMH of CKD 3b, Afib (s/p watchman not on AC), COPD not on home O2,  Aortic stenosis w H/O ascending aortic replacement  Bovine Replacement, DM , CAD s/p CABG 2016 (w bleeding complication) , anemia of chronic disease and a recent admission for a fall presents to the ED 3 days after DC for fall for the chief concern of NBNB N/V for 48hrs. Patient denies: hematemesis wrenching HA, virtigo, palpitations, rash, diet or medication changes, diarrhea, fever, abdominal pain or cramping, hematochezia/melena. Patient admitted for AKASH on CKD, inadequate urine output and severe hyperKalemia       AMS  hold cns depressants  -ammonia level 195, lactulose q12, taper to 3bm a day       Shock on pressors r/o cardiogenic vs septic  -e faecalis in ucx 5/17 fu sensitivies   -started meropenem 750 05/17 as per ID  -hold diuretics   -off dobutamine , on levo, lr 75     AKASH on CKD3b  hyperkalemia w/o EKG changes   - off lokelma  -nephro recs appreciated   - RBUS     Rapid A fib  Ho HFpEF, G3DD, moderate PH  Ho CAD s/p CABG  Ho AS s/p SAVR (2016),   HO Atrial fibrillation s/p watchman (2021),   - amio drip, transition to po amio   -consult cardio  -echo seen       Transamintis  -ruqus- cholethithias , no cholecystitis   -ammonia elvated  -lactulose q12  - consider MRCP     Ho recent traumatic fall admission w DC May 11  - in c collar, conulsted nsgx, will stay in collar until outpatient fu   - dens fracture noted on imaging     HO asthma/COPD not on home O2    DNR/DNI  feeds: if abg ok, start ng tube feeds in aftn.     fem line placed 5/15 due to inability to access rij   penidng: cardio fu

## 2024-05-18 NOTE — PHARMACOTHERAPY INTERVENTION NOTE - COMMENTS
-270, d/w med team, insulin regimen adjusted to Lantus 13 units sc hs, Lispro 3 units sc ac
d/w team-vasopressin @ 0.04 units/min, no titration  -change sevelamer tab to powder 800mg ng q8h  -change tamsulosin to doxazosin 2mg ng hs  -change pantoprazole tab to suspension 40mg ng daily  -hold ranolazine  -reorder amiodarone 0.5 mg/min x24 hrs  -d/c metolazone prn  -hold IV metoprolol  -d/c kayexalate enema, NG placed will utilize [facility]
-meropenem 750mg IV daily per ID, d/w team to evaluate Flagyl 500mg IV q8h-d/c Flagyl
Pregabalin changed to 25mg po daily, adjust start to 5/17 (pt received 75mg earlier)
cefepime 2g IV q8h, SCr 5.6-recommened adjusting to q24h

## 2024-05-18 NOTE — SWALLOW BEDSIDE ASSESSMENT ADULT - SWALLOW EVAL: DIAGNOSIS
+overt s/s of aspiration w/thin liquids. Mild oral dysphagia w/ minced & moist, toleration for puree, minced & moist and mildly thick liquids without overt s/s of penetration/ aspiration.

## 2024-05-18 NOTE — PROGRESS NOTE ADULT - ASSESSMENT
Impression    AKASH on CKD3b Non oliguric  Hyperkalemia w/o EKG changes improved  Shock improving   Rapid A fib  Ho recent traumatic fall admission w DC May 11  HO asthma / COPD not on home O2  Ho HFpEF G3DD, moderate PH  HO CAD s/p CABG  Ho AS s/p SAVR (2016),   HO Atrial fibrillation s/p watchman (2021),       PLAN:    CNS:  Avoid CNS depressant.  pain control if needed.  Ammonia noted    HEENT: Oral care.  Speech and swallow.      PULMONARY:  HOB @ 45 degrees.  Aspiration precautions.  NIV during sleep. Neb as needed,     CARDIOVASCULAR:  Rate control.  Cardio fup.  Echo noted.  trend LA, EKG    GI: GI prophylaxis. Feeding per speech.      RENAL:  trend CMP.  Correct as needed     INFECTIOUS DISEASE: ABX per ID>      HEMATOLOGICAL: DC Heparin.  HIT and DIC panels.  Monitor CBC.  Wean HC     ENDOCRINE:  Follow up FS. TSH noted     MUSCULOSKELETAL:  PT OT .  Off loading     DNR/ I  poor prognosis  palliative care fup

## 2024-05-18 NOTE — PROGRESS NOTE ADULT - SUBJECTIVE AND OBJECTIVE BOX
seen and examined  24 h events noted   no distress         PAST HISTORY  --------------------------------------------------------------------------------  No significant changes to PMH, PSH, FHx, SHx, unless otherwise noted    ALLERGIES & MEDICATIONS  --------------------------------------------------------------------------------  Allergies    Augmentin (Other)  penicillins (Hives; Rash)    Intolerances      Standing Inpatient Medications  allopurinol 50 milliGRAM(s) Oral daily  aMIOdarone    Tablet 200 milliGRAM(s) Oral two times a day  aMIOdarone Infusion 0.5 mG/Min IV Continuous <Continuous>  aspirin  chewable 81 milliGRAM(s) Oral daily  budesonide  80 MICROgram(s)/formoterol 4.5 MICROgram(s) Inhaler 2 Puff(s) Inhalation two times a day  chlorhexidine 2% Cloths 1 Application(s) Topical daily  dextrose 10% Bolus 125 milliLiter(s) IV Bolus once  dextrose 5%. 1000 milliLiter(s) IV Continuous <Continuous>  dextrose 5%. 1000 milliLiter(s) IV Continuous <Continuous>  dextrose 50% Injectable 25 Gram(s) IV Push once  dextrose 50% Injectable 12.5 Gram(s) IV Push once  doxazosin 2 milliGRAM(s) Oral at bedtime  fludroCORTISONE 0.1 milliGRAM(s) Oral daily  glucagon  Injectable 1 milliGRAM(s) IntraMuscular once  heparin   Injectable 5000 Unit(s) SubCutaneous every 12 hours  hydrocortisone sodium succinate Injectable 50 milliGRAM(s) IV Push four times a day  insulin glargine Injectable (LANTUS) 4 Unit(s) SubCutaneous every morning  insulin lispro (ADMELOG) corrective regimen sliding scale   SubCutaneous three times a day before meals  lactated ringers. 1000 milliLiter(s) IV Continuous <Continuous>  lactulose Syrup 15 Gram(s) Oral every 12 hours  meropenem  IVPB      meropenem  IVPB 750 milliGRAM(s) IV Intermittent every 24 hours  metroNIDAZOLE  IVPB 500 milliGRAM(s) IV Intermittent every 8 hours  metroNIDAZOLE  IVPB      montelukast 10 milliGRAM(s) Oral daily  norepinephrine Infusion 0.05 MICROgram(s)/kG/Min IV Continuous <Continuous>  nystatin Powder 1 Application(s) Topical every 12 hours  pantoprazole   Suspension 40 milliGRAM(s) Oral daily  polyethylene glycol 3350 17 Gram(s) Oral daily  senna 2 Tablet(s) Oral at bedtime  sevelamer carbonate Powder 800 milliGRAM(s) Oral every 8 hours  sodium bicarbonate 1300 milliGRAM(s) Oral three times a day  tiotropium 2.5 MICROgram(s) Inhaler 2 Puff(s) Inhalation daily  vasopressin Infusion 0.04 Unit(s)/Min IV Continuous <Continuous>    PRN Inpatient Medications  dextrose Oral Gel 15 Gram(s) Oral once PRN  ondansetron Injectable 4 milliGRAM(s) IV Push four times a day PRN        VITALS/PHYSICAL EXAM  --------------------------------------------------------------------------------  T(C): 36.6 (05-18-24 @ 08:00), Max: 36.6 (05-17-24 @ 20:00)  HR: 125 (05-18-24 @ 09:00) (112 - 133)  BP: 102/55 (05-18-24 @ 09:00) (83/56 - 144/65)  RR: 16 (05-18-24 @ 09:00) (14 - 29)  SpO2: 96% (05-18-24 @ 09:00) (95% - 97%)  Wt(kg): --        05-17-24 @ 07:01  -  05-18-24 @ 07:00  --------------------------------------------------------  IN: 1913.4 mL / OUT: 1240 mL / NET: 673.4 mL    05-18-24 @ 07:01  -  05-18-24 @ 09:16  --------------------------------------------------------  IN: 75 mL / OUT: 65 mL / NET: 10 mL      Physical Exam:  	Gen: NAD,  	Pulm: decrease BS B/L  	CV: S1S2; no rub  	Abd: +distended  	LE: no edema    LABS/STUDIES  --------------------------------------------------------------------------------              8.2    6.70  >-----------<  86       [05-18-24 @ 05:14]              25.7     131  |  94  |  93  ----------------------------<  252      [05-18-24 @ 05:14]  4.1   |  22  |  4.9        Ca     8.5     [05-18-24 @ 05:14]      Mg     2.3     [05-18-24 @ 05:14]    TPro  5.4  /  Alb  3.0  /  TBili  3.0  /  DBili  2.6  /  AST  356  /  ALT  492  /  AlkPhos  135  [05-18-24 @ 05:14]      Creatinine Trend:  SCr 4.9 [05-18 @ 05:14]  SCr 5.4 [05-17 @ 05:22]  SCr 5.2 [05-16 @ 16:36]  SCr 5.5 [05-16 @ 13:35]  SCr 5.6 [05-16 @ 04:45]    Urinalysis - [05-18-24 @ 05:14]      Color  / Appearance  / SG  / pH       Gluc 252 / Ketone   / Bili  / Urobili        Blood  / Protein  / Leuk Est  / Nitrite       RBC  / WBC  / Hyaline  / Gran  / Sq Epi  / Non Sq Epi  / Bacteria       Iron 20, TIBC 334, %sat 6      [01-06-24 @ 06:46]  Ferritin 83      [01-06-24 @ 06:46]  TSH 2.64      [05-17-24 @ 05:22]  Lipid: chol 145, , HDL 82, LDL --      [02-06-24 @ 08:18]

## 2024-05-18 NOTE — PHARMACOTHERAPY INTERVENTION NOTE - INTERVENTION TYPE RECOOMEND
Overdue for HM.  Needs an appt   
Dose Optimization/Non-renal Dose Adjustment
Therapy Recommended - Alternative treatment
Timing/Frequency of Administration Recommended

## 2024-05-18 NOTE — PROGRESS NOTE ADULT - ASSESSMENT
AKASH on CKD 3 / last creat on 5/10 was 1.6 / received contrast on 5/9, but AKASH more likely related to hemodynamic instability, Afib with RVR, fall, very dark urine  Hyponatremia improving with iv hydration   Hyperkalemia s/p medical treatment improved  AKASH on CKD most likely ATN   - monitor strict i/o  - cr trending down   -  iv hydration LR at 75 cc/ h  - cont sodium bicarb 1300mg q8h  - on flagyl /sally   - start midodrine 5 q 8   - phos level high, on sevelamer 1 tab TID with meals  - dose meds for eGFR <15   - no acute indication for RRT at this point / will follow up closely   - ct:    No hydronephrosis. Bilateral angiomyolipomas similar to prior   exam.  will follow

## 2024-05-18 NOTE — PROGRESS NOTE ADULT - SUBJECTIVE AND OBJECTIVE BOX
24H events:    Patient is a 78y old Female who presents with a chief complaint of N/V, AKASH on CKD, hyperkalmeia (17 May 2024 14:42)    Primary diagnosis of AKASH (acute kidney injury)      Today is hospital day 3d. This morning patient was seen and examined at bedside    Code Status: DNR/DNI      PAST MEDICAL & SURGICAL HISTORY  Aortic stenosis    Diabetes    Asthma with COPD  many years since last attack    CAD (coronary artery disease), native coronary artery    Anemia    History of bleeding disorder  having work up 5/2/21- HAD WORKUP BUT NO DIAGNOSIS "NOTHING WAS FOUND"    History of transfusion reaction    Hiatal hernia    Stage 3 chronic kidney disease    H/O ascending aortic replacement  Bovine Replacement    S/P CABG x 1  complication of bleeding 2016    H/O total knee replacement, right  complicated with fx femur bars screws in femur- b/l knee replacement    S/P hysterectomy    Presence of Watchman left atrial appendage closure device      SOCIAL HISTORY:  Social History:      ALLERGIES:  Augmentin (Other)  penicillins (Hives; Rash)    MEDICATIONS:  STANDING MEDICATIONS  allopurinol 50 milliGRAM(s) Oral daily  aMIOdarone    Tablet 200 milliGRAM(s) Oral two times a day  aMIOdarone Infusion 0.5 mG/Min IV Continuous <Continuous>  aspirin  chewable 81 milliGRAM(s) Oral daily  budesonide  80 MICROgram(s)/formoterol 4.5 MICROgram(s) Inhaler 2 Puff(s) Inhalation two times a day  chlorhexidine 2% Cloths 1 Application(s) Topical daily  dextrose 10% Bolus 125 milliLiter(s) IV Bolus once  dextrose 5%. 1000 milliLiter(s) IV Continuous <Continuous>  dextrose 5%. 1000 milliLiter(s) IV Continuous <Continuous>  dextrose 50% Injectable 12.5 Gram(s) IV Push once  dextrose 50% Injectable 25 Gram(s) IV Push once  doxazosin 2 milliGRAM(s) Oral at bedtime  fludroCORTISONE 0.1 milliGRAM(s) Oral daily  glucagon  Injectable 1 milliGRAM(s) IntraMuscular once  heparin   Injectable 5000 Unit(s) SubCutaneous every 12 hours  hydrocortisone sodium succinate Injectable 50 milliGRAM(s) IV Push four times a day  insulin glargine Injectable (LANTUS) 4 Unit(s) SubCutaneous every morning  insulin lispro (ADMELOG) corrective regimen sliding scale   SubCutaneous three times a day before meals  lactated ringers. 1000 milliLiter(s) IV Continuous <Continuous>  lactulose Syrup 15 Gram(s) Oral every 12 hours  meropenem  IVPB 750 milliGRAM(s) IV Intermittent every 24 hours  meropenem  IVPB      metroNIDAZOLE  IVPB 500 milliGRAM(s) IV Intermittent every 8 hours  metroNIDAZOLE  IVPB      montelukast 10 milliGRAM(s) Oral daily  norepinephrine Infusion 0.05 MICROgram(s)/kG/Min IV Continuous <Continuous>  nystatin Powder 1 Application(s) Topical every 12 hours  pantoprazole   Suspension 40 milliGRAM(s) Oral daily  polyethylene glycol 3350 17 Gram(s) Oral daily  senna 2 Tablet(s) Oral at bedtime  sevelamer carbonate Powder 800 milliGRAM(s) Oral every 8 hours  sodium bicarbonate 1300 milliGRAM(s) Oral three times a day  tiotropium 2.5 MICROgram(s) Inhaler 2 Puff(s) Inhalation daily  vasopressin Infusion 0.04 Unit(s)/Min IV Continuous <Continuous>    PRN MEDICATIONS  dextrose Oral Gel 15 Gram(s) Oral once PRN  ondansetron Injectable 4 milliGRAM(s) IV Push four times a day PRN    VITALS:   T(F): 97.2  HR: 131  BP: 94/56  RR: 16  SpO2: 96%    PHYSICAL EXAM:  GENERAL:  NAD,  HEAD: Atraumatic  NECK: Supple, no JVD, c collar   HEART: tachycardic   LUNGS: b/l air entry, unlabored respirations   ABDOMEN: Soft, non distended   EXTREMITIES: 2+ peripheral pulses bilaterally.  NERVOUS SYSTEM:  A&Ox 0 difficult to assess, pt lethargic          ( x ) Indwelling Wang Catheter:  Reason (  x) Critical illness     (  ) urinary retention    ( x ) Accurate Ins/Outs Monitoring     (  ) CMO patient    ( x ) Central Line:   Location:      ( x ) Right Fem         LABS:                        11.1   7.86  )-----------( 112      ( 17 May 2024 05:22 )             35.1     05-17    132<L>  |  94<L>  |  91<HH>  ----------------------------<  268<H>  4.8   |  17  |  5.4<HH>    Ca    8.7      17 May 2024 05:22  Mg     2.2     05-17    TPro  5.4<L>  /  Alb  3.1<L>  /  TBili  3.4<H>  /  DBili  x   /  AST  506<H>  /  ALT  537<H>  /  AlkPhos  149<H>  05-17      Urinalysis Basic - ( 17 May 2024 05:22 )    Color: x / Appearance: x / SG: x / pH: x  Gluc: 268 mg/dL / Ketone: x  / Bili: x / Urobili: x   Blood: x / Protein: x / Nitrite: x   Leuk Esterase: x / RBC: x / WBC x   Sq Epi: x / Non Sq Epi: x / Bacteria: x      ABG - ( 17 May 2024 09:31 )  pH, Arterial: 7.35  pH, Blood: x     /  pCO2: 31    /  pO2: 68    / HCO3: 17    / Base Excess: -7.5  /  SaO2: 92.6                  CARDIAC MARKERS ( 16 May 2024 04:45 )  x     / x     / 50 U/L / x     / x          RADIOLOGY:    RADIOLOGY

## 2024-05-18 NOTE — SWALLOW BEDSIDE ASSESSMENT ADULT - SWALLOW EVAL: FUNCTIONAL LEVEL AT TIME OF EVAL
Received awake, alert, [place] J-collar in place Received awake, alert, [place] J-collar in place, +generalized weakness, confused. NGT in situ

## 2024-05-18 NOTE — PROGRESS NOTE ADULT - SUBJECTIVE AND OBJECTIVE BOX
Patient is a 78y old  Female who presents with a chief complaint of N/V, AKASH on CKD, hyperkalmeia (18 May 2024 09:16)        Over Night Events:  Remains critically ill.  on RA.  off pressors for 6 hours.          ROS:     All ROS are negative except HPI         PHYSICAL EXAM    ICU Vital Signs Last 24 Hrs  T(C): 36.6 (18 May 2024 08:00), Max: 36.6 (17 May 2024 20:00)  T(F): 97.9 (18 May 2024 08:00), Max: 97.9 (18 May 2024 08:00)  HR: 125 (18 May 2024 09:00) (112 - 133)  BP: 102/55 (18 May 2024 09:00) (83/56 - 144/65)  BP(mean): 75 (18 May 2024 09:00) (59 - 119)  ABP: --  ABP(mean): --  RR: 16 (18 May 2024 09:00) (14 - 29)  SpO2: 96% (18 May 2024 09:00) (95% - 97%)    O2 Parameters below as of 18 May 2024 08:00  Patient On (Oxygen Delivery Method): room air            CONSTITUTIONAL:  NAD    ENT:   Airway patent,   Mouth with normal mucosa.       EYES:   Pupils equal,   Round and reactive to light.    CARDIAC:   Normal rate,   Regular rhythm.      RESPIRATORY:   No wheezing  Bilateral BS  Normal chest expansion  Not tachypneic,  No use of accessory muscles    GASTROINTESTINAL:  Abdomen soft,   Non-tender,   No guarding,   + BS    MUSCULOSKELETAL:   No clubbing, cyanosis    NEUROLOGICAL:   Alert and oriented   No motor  deficits.    SKIN:   Skin normal color for race,   No evidence of rash.        05-17-24 @ 07:01  -  05-18-24 @ 07:00  --------------------------------------------------------  IN:    Amiodarone: 33.4 mL    IV PiggyBack: 150 mL    Lactated Ringers: 1650 mL    Norepinephrine: 80 mL  Total IN: 1913.4 mL    OUT:    Indwelling Catheter - Urethral (mL): 1240 mL    Vasopressin: 0 mL  Total OUT: 1240 mL    Total NET: 673.4 mL      05-18-24 @ 07:01  -  05-18-24 @ 09:34  --------------------------------------------------------  IN:    Lactated Ringers: 75 mL  Total IN: 75 mL    OUT:    Indwelling Catheter - Urethral (mL): 65 mL    Norepinephrine: 0 mL    Vasopressin: 0 mL  Total OUT: 65 mL    Total NET: 10 mL          LABS:                            8.2    6.70  )-----------( 86       ( 18 May 2024 05:14 )             25.7                      8.2    6.70  )-----------( 86       ( 05-18 @ 05:14 )             25.7                11.1   7.86  )-----------( 112      ( 05-17 @ 05:22 )             35.1                12.5   10.03 )-----------( 127      ( 05-16 @ 16:36 )             38.8                13.3   12.84 )-----------( 147      ( 05-16 @ 04:45 )             41.8                13.1   12.09 )-----------( 125      ( 05-15 @ 21:49 )             40.8                                             05-18    131<L>  |  94<L>  |  93<HH>  ----------------------------<  252<H>  4.1   |  22  |  4.9<HH>    Creatinine Trend  BUN 93, Cr 4.9, (05-18-24 @ 05:14)  Creatinine Trend  BUN 91, Cr 5.4, (05-17-24 @ 05:22)  Creatinine Trend  BUN 95, Cr 5.2, (05-16-24 @ 16:36)  Creatinine Trend  BUN 95, Cr 5.5, (05-16-24 @ 13:35)  Creatinine Trend  BUN 89, Cr 5.6, (05-16-24 @ 04:45)  Creatinine Trend  BUN 90, Cr 5.6, (05-15-24 @ 21:49)  Creatinine Trend  BUN 89, Cr 5.4, (05-15-24 @ 05:33)  Creatinine Trend  BUN 96, Cr 5.2, (05-15-24 @ 02:23)  Creatinine Trend  BUN 90, Cr 4.9, (05-14-24 @ 21:26)      Ca    8.5      18 May 2024 05:14  Mg     2.3     05-18    TPro  5.4<L>  /  Alb  3.0<L>  /  TBili  3.0<H>  /  DBili  2.6<H>  /  AST  356<H>  /  ALT  492<H>  /  AlkPhos  135<H>  05-18                                             Urinalysis Basic - ( 18 May 2024 05:14 )    Color: x / Appearance: x / SG: x / pH: x  Gluc: 252 mg/dL / Ketone: x  / Bili: x / Urobili: x   Blood: x / Protein: x / Nitrite: x   Leuk Esterase: x / RBC: x / WBC x   Sq Epi: x / Non Sq Epi: x / Bacteria: x                                                  LIVER FUNCTIONS - ( 18 May 2024 05:14 )  Alb: 3.0 g/dL / Pro: 5.4 g/dL / ALK PHOS: 135 U/L / ALT: 492 U/L / AST: 356 U/L / GGT: x                                                                                                                                   ABG - ( 17 May 2024 09:31 )  pH, Arterial: 7.35  pH, Blood: x     /  pCO2: 31    /  pO2: 68    / HCO3: 17    / Base Excess: -7.5  /  SaO2: 92.6                MEDICATIONS  (STANDING):  allopurinol 50 milliGRAM(s) Oral daily  aMIOdarone    Tablet 200 milliGRAM(s) Oral two times a day  aMIOdarone Infusion 0.5 mG/Min (16.7 mL/Hr) IV Continuous <Continuous>  aspirin  chewable 81 milliGRAM(s) Oral daily  budesonide  80 MICROgram(s)/formoterol 4.5 MICROgram(s) Inhaler 2 Puff(s) Inhalation two times a day  chlorhexidine 2% Cloths 1 Application(s) Topical daily  dextrose 10% Bolus 125 milliLiter(s) IV Bolus once  dextrose 5%. 1000 milliLiter(s) (50 mL/Hr) IV Continuous <Continuous>  dextrose 5%. 1000 milliLiter(s) (100 mL/Hr) IV Continuous <Continuous>  dextrose 50% Injectable 25 Gram(s) IV Push once  dextrose 50% Injectable 12.5 Gram(s) IV Push once  doxazosin 2 milliGRAM(s) Oral at bedtime  fludroCORTISONE 0.1 milliGRAM(s) Oral daily  glucagon  Injectable 1 milliGRAM(s) IntraMuscular once  heparin   Injectable 5000 Unit(s) SubCutaneous every 12 hours  hydrocortisone sodium succinate Injectable 50 milliGRAM(s) IV Push four times a day  insulin glargine Injectable (LANTUS) 4 Unit(s) SubCutaneous every morning  insulin lispro (ADMELOG) corrective regimen sliding scale   SubCutaneous three times a day before meals  lactated ringers. 1000 milliLiter(s) (75 mL/Hr) IV Continuous <Continuous>  lactulose Syrup 15 Gram(s) Oral every 12 hours  meropenem  IVPB 750 milliGRAM(s) IV Intermittent every 24 hours  meropenem  IVPB      metroNIDAZOLE  IVPB 500 milliGRAM(s) IV Intermittent every 8 hours  metroNIDAZOLE  IVPB      montelukast 10 milliGRAM(s) Oral daily  norepinephrine Infusion 0.05 MICROgram(s)/kG/Min (5.32 mL/Hr) IV Continuous <Continuous>  nystatin Powder 1 Application(s) Topical every 12 hours  pantoprazole   Suspension 40 milliGRAM(s) Oral daily  polyethylene glycol 3350 17 Gram(s) Oral daily  senna 2 Tablet(s) Oral at bedtime  sevelamer carbonate Powder 800 milliGRAM(s) Oral every 8 hours  sodium bicarbonate 1300 milliGRAM(s) Oral three times a day  tiotropium 2.5 MICROgram(s) Inhaler 2 Puff(s) Inhalation daily  vasopressin Infusion 0.04 Unit(s)/Min (6 mL/Hr) IV Continuous <Continuous>    MEDICATIONS  (PRN):  dextrose Oral Gel 15 Gram(s) Oral once PRN Blood Glucose LESS THAN 70 milliGRAM(s)/deciliter  ondansetron Injectable 4 milliGRAM(s) IV Push four times a day PRN Nausea and/or Vomiting      New X-rays reviewed:                                                                                  ECHO

## 2024-05-18 NOTE — SWALLOW BEDSIDE ASSESSMENT ADULT - NS SPL SWALLOW CLINIC TRIAL FT
Toleration for puree, minced & moist and mildly thick liquids without overt s/s of penetration/ aspiration.

## 2024-05-18 NOTE — PATIENT PROFILE ADULT - FUNCTIONAL ASSESSMENT - BASIC MOBILITY 5.
DWAYNE MedranoN, RN   1 = Total assistance

## 2024-05-19 NOTE — PROGRESS NOTE ADULT - ASSESSMENT
· Assessment	  78 MARIZA w a PMH of CKD 3b, Afib (s/p watchman not on AC), COPD not on home O2,  Aortic stenosis w H/O ascending aortic replacement Bovine Replacement, DM , CAD s/p CABG 2016 (w bleeding complication) , anemia of chronic disease and a recent admission for a fall presents to the ED 3 days after DC for fall for the chief concern of NBNB N/V for 48hrs.    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   Resolved shock : off pressors , ischemic hepatitis, end organ damage with AKASH ( Cr 1.4 on 5/10 )  Transaminitis decreasing  Cholestasis with US with cholelithiasis  Asymptomatic bacteuria with E fecalis with no pyuri  No PNA      -BCX  -Meropenem 750 mg iv q24h for 7 days    I shall be away till May 27 and will be covered by Carey Phipps / Rosalinda for any questions. They are available on [employer] teams.

## 2024-05-19 NOTE — PROGRESS NOTE ADULT - SUBJECTIVE AND OBJECTIVE BOX
EVAN QUINN  78y, Female    All available historical data reviewed    OVERNIGHT EVENTS:  no fevers  more alert  RA    ROS:  not reliable    VITALS:  T(F): 97.1, Max: 97.1 (05-19-24 @ 08:00)  HR: 121  BP: 127/66  RR: 18Vital Signs Last 24 Hrs  T(C): 36.2 (19 May 2024 08:00), Max: 36.2 (19 May 2024 08:00)  T(F): 97.1 (19 May 2024 08:00), Max: 97.1 (19 May 2024 08:00)  HR: 121 (19 May 2024 08:00) (105 - 132)  BP: 127/66 (19 May 2024 08:00) (83/47 - 130/56)  BP(mean): 91 (19 May 2024 08:00) (62 - 91)  RR: 18 (19 May 2024 08:00) (14 - 28)  SpO2: 97% (19 May 2024 08:00) (95% - 98%)    Parameters below as of 19 May 2024 08:00  Patient On (Oxygen Delivery Method): room air        TESTS & MEASUREMENTS:                        10.7   7.25  )-----------( 91       ( 18 May 2024 20:07 )             32.8     05-18    131<L>  |  94<L>  |  93<HH>  ----------------------------<  252<H>  4.1   |  22  |  4.9<HH>    Ca    8.5      18 May 2024 05:14  Mg     2.3     05-18    TPro  5.4<L>  /  Alb  3.0<L>  /  TBili  3.0<H>  /  DBili  2.6<H>  /  AST  356<H>  /  ALT  492<H>  /  AlkPhos  135<H>  05-18    LIVER FUNCTIONS - ( 18 May 2024 05:14 )  Alb: 3.0 g/dL / Pro: 5.4 g/dL / ALK PHOS: 135 U/L / ALT: 492 U/L / AST: 356 U/L / GGT: x             Culture - Urine (collected 05-15-24 @ 02:23)  Source: Clean Catch Clean Catch (Midstream)  Final Report (05-18-24 @ 07:47):    50,000 - 99,000 CFU/mL Enterococcus faecalis  Organism: Enterococcus faecalis (05-18-24 @ 07:47)  Organism: Enterococcus faecalis (05-18-24 @ 07:47)      Method Type: WALLY      -  Ampicillin: S <=2 Predicts results to ampicillin/sulbactam, amoxacillin-clavulanate and  piperacillin-tazobactam.      -  Ciprofloxacin: R >2      -  Levofloxacin: R >4      -  Nitrofurantoin: S <=32 Should not be used to treat pyelonephritis.      -  Tetracycline: R >8      -  Vancomycin: S 2      Urinalysis Basic - ( 18 May 2024 05:14 )    Color: x / Appearance: x / SG: x / pH: x  Gluc: 252 mg/dL / Ketone: x  / Bili: x / Urobili: x   Blood: x / Protein: x / Nitrite: x   Leuk Esterase: x / RBC: x / WBC x   Sq Epi: x / Non Sq Epi: x / Bacteria: x          RADIOLOGY & ADDITIONAL TESTS:  Personal review of radiological diagnostics performed  Echo and EKG results noted when applicable.     MEDICATIONS:  allopurinol 50 milliGRAM(s) Oral daily  aMIOdarone    Tablet 200 milliGRAM(s) Oral two times a day  aspirin  chewable 81 milliGRAM(s) Oral daily  budesonide  80 MICROgram(s)/formoterol 4.5 MICROgram(s) Inhaler 2 Puff(s) Inhalation two times a day  chlorhexidine 2% Cloths 1 Application(s) Topical daily  dextrose 10% Bolus 125 milliLiter(s) IV Bolus once  dextrose 5%. 1000 milliLiter(s) IV Continuous <Continuous>  dextrose 5%. 1000 milliLiter(s) IV Continuous <Continuous>  dextrose 50% Injectable 12.5 Gram(s) IV Push once  dextrose 50% Injectable 25 Gram(s) IV Push once  dextrose Oral Gel 15 Gram(s) Oral once PRN  doxazosin 2 milliGRAM(s) Oral at bedtime  fludroCORTISONE 0.1 milliGRAM(s) Oral daily  glucagon  Injectable 1 milliGRAM(s) IntraMuscular once  heparin   Injectable 5000 Unit(s) SubCutaneous every 12 hours  hydrocortisone sodium succinate Injectable 50 milliGRAM(s) IV Push three times a day  insulin glargine Injectable (LANTUS) 13 Unit(s) SubCutaneous at bedtime  insulin lispro (ADMELOG) corrective regimen sliding scale   SubCutaneous three times a day before meals  insulin lispro Injectable (ADMELOG) 3 Unit(s) SubCutaneous three times a day before meals  lidocaine   4% Patch 1 Patch Transdermal every 24 hours PRN  meropenem  IVPB      meropenem  IVPB 750 milliGRAM(s) IV Intermittent every 24 hours  metoprolol tartrate 50 milliGRAM(s) Oral four times a day  montelukast 10 milliGRAM(s) Oral daily  nystatin Powder 1 Application(s) Topical every 12 hours  ondansetron Injectable 4 milliGRAM(s) IV Push four times a day PRN  pantoprazole    Tablet 40 milliGRAM(s) Oral before breakfast  polyethylene glycol 3350 17 Gram(s) Oral daily  senna 2 Tablet(s) Oral at bedtime  sodium bicarbonate 1300 milliGRAM(s) Oral three times a day  tiotropium 2.5 MICROgram(s) Inhaler 2 Puff(s) Inhalation daily      ANTIBIOTICS:  meropenem  IVPB      meropenem  IVPB 750 milliGRAM(s) IV Intermittent every 24 hours

## 2024-05-19 NOTE — PROGRESS NOTE ADULT - ASSESSMENT
Impression    AKASH on CKD3b Non oliguric  Hyperkalemia w/o EKG changes improved  Shock improving   Rapid A fib  Ho recent traumatic fall admission w DC May 11  HO asthma / COPD not on home O2  Ho HFpEF G3DD, moderate PH  HO CAD s/p CABG  Ho AS s/p SAVR (2016),   HO Atrial fibrillation s/p watchman (2021),       PLAN:    CNS:  Avoid CNS depressant.  Pain control if needed.     HEENT: Oral care.  Speech and swallow.      PULMONARY:  HOB @ 45 degrees.  Aspiration precautions.  NIV during sleep. Neb as needed,     CARDIOVASCULAR:  Rate control.  Cards follow up     GI: GI prophylaxis. Feeding per speech.      RENAL:  trend CMP.  Correct as needed     INFECTIOUS DISEASE: ABX per ID>      HEMATOLOGICAL: DVT prophylaxis.  HIT neg.  FU DIC panels.  Monitor CBC.  Wean HC     ENDOCRINE:  Follow up FS. TSH noted     MUSCULOSKELETAL:  PT OT .  Off loading     DNR/ I  poor prognosis  palliative care fup  Tele

## 2024-05-19 NOTE — PROGRESS NOTE ADULT - SUBJECTIVE AND OBJECTIVE BOX
24H events:    Patient is a 78y old Female who presents with a chief complaint of N/V, AKASH on CKD, hyperkalmeia (18 May 2024 09:34)    Primary diagnosis of AKASH (acute kidney injury)        Today is hospital day 4d. This morning patient was seen and examined at bedside, resting comfortably in bed.    No acute or major events overnight.    Code Status:    PAST MEDICAL & SURGICAL HISTORY  Aortic stenosis    Diabetes    Asthma with COPD  many years since last attack    CAD (coronary artery disease), native coronary artery    Anemia    History of bleeding disorder  having work up 5/2/21- HAD WORKUP BUT NO DIAGNOSIS "NOTHING WAS FOUND"    History of transfusion reaction    Hiatal hernia    Stage 3 chronic kidney disease    H/O ascending aortic replacement  Bovine Replacement    S/P CABG x 1  complication of bleeding 2016    H/O total knee replacement, right  complicated with fx femur bars screws in femur- b/l knee replacement    S/P hysterectomy    Presence of Watchman left atrial appendage closure device      SOCIAL HISTORY:  Social History:      ALLERGIES:  Augmentin (Other)  penicillins (Hives; Rash)    MEDICATIONS:  STANDING MEDICATIONS  allopurinol 50 milliGRAM(s) Oral daily  aMIOdarone    Tablet 200 milliGRAM(s) Oral two times a day  aspirin  chewable 81 milliGRAM(s) Oral daily  budesonide  80 MICROgram(s)/formoterol 4.5 MICROgram(s) Inhaler 2 Puff(s) Inhalation two times a day  chlorhexidine 2% Cloths 1 Application(s) Topical daily  dextrose 10% Bolus 125 milliLiter(s) IV Bolus once  dextrose 5%. 1000 milliLiter(s) IV Continuous <Continuous>  dextrose 5%. 1000 milliLiter(s) IV Continuous <Continuous>  dextrose 50% Injectable 12.5 Gram(s) IV Push once  dextrose 50% Injectable 25 Gram(s) IV Push once  doxazosin 2 milliGRAM(s) Oral at bedtime  fludroCORTISONE 0.1 milliGRAM(s) Oral daily  glucagon  Injectable 1 milliGRAM(s) IntraMuscular once  heparin   Injectable 5000 Unit(s) SubCutaneous every 12 hours  hydrocortisone sodium succinate Injectable 50 milliGRAM(s) IV Push three times a day  insulin glargine Injectable (LANTUS) 13 Unit(s) SubCutaneous at bedtime  insulin lispro (ADMELOG) corrective regimen sliding scale   SubCutaneous three times a day before meals  insulin lispro Injectable (ADMELOG) 3 Unit(s) SubCutaneous three times a day before meals  meropenem  IVPB      meropenem  IVPB 750 milliGRAM(s) IV Intermittent every 24 hours  metoprolol tartrate 50 milliGRAM(s) Oral four times a day  montelukast 10 milliGRAM(s) Oral daily  nystatin Powder 1 Application(s) Topical every 12 hours  pantoprazole    Tablet 40 milliGRAM(s) Oral before breakfast  polyethylene glycol 3350 17 Gram(s) Oral daily  senna 2 Tablet(s) Oral at bedtime  sodium bicarbonate 1300 milliGRAM(s) Oral three times a day  tiotropium 2.5 MICROgram(s) Inhaler 2 Puff(s) Inhalation daily    PRN MEDICATIONS  dextrose Oral Gel 15 Gram(s) Oral once PRN  lidocaine   4% Patch 1 Patch Transdermal every 24 hours PRN  ondansetron Injectable 4 milliGRAM(s) IV Push four times a day PRN    VITALS:   T(F): 96.6  HR: 115  BP: 127/63  RR: 17  SpO2: 97%    PHYSICAL EXAM:  GENERAL:  NAD, lying in bed comfortably  HEAD: Atraumatic  NECK: Supple, no JVD,  HEART: Regular rate, rhytm, normal s1s2   LUNGS: b/l air entry, unlabored respirations   ABDOMEN: Soft, non distended   EXTREMITIES: 2+ peripheral pulses bilaterally. No clubbing, cyanosis, or edema  SKIN:   NERVOUS SYSTEM:  A&Ox3         (  ) Indwelling Wang Catheter:   Date insterted:    Reason (  ) Critical illness     (  ) urinary retention    (  ) Accurate Ins/Outs Monitoring     (  ) CMO patient    (  ) Central Line:   Date inserted:  Location: (  ) Right IJ     (  ) Left IJ     (  ) Right Fem     (  ) Left Fem    LABS:                        10.7   7.25  )-----------( 91       ( 18 May 2024 20:07 )             32.8     05-18    131<L>  |  94<L>  |  93<HH>  ----------------------------<  252<H>  4.1   |  22  |  4.9<HH>    Ca    8.5      18 May 2024 05:14  Mg     2.3     05-18    TPro  5.4<L>  /  Alb  3.0<L>  /  TBili  3.0<H>  /  DBili  2.6<H>  /  AST  356<H>  /  ALT  492<H>  /  AlkPhos  135<H>  05-18    PT/INR - ( 18 May 2024 10:58 )   PT: 17.90 sec;   INR: 1.56 ratio         PTT - ( 18 May 2024 10:58 )  PTT:29.1 sec  Urinalysis Basic - ( 18 May 2024 05:14 )    Color: x / Appearance: x / SG: x / pH: x  Gluc: 252 mg/dL / Ketone: x  / Bili: x / Urobili: x   Blood: x / Protein: x / Nitrite: x   Leuk Esterase: x / RBC: x / WBC x   Sq Epi: x / Non Sq Epi: x / Bacteria: x      ABG - ( 17 May 2024 09:31 )  pH, Arterial: 7.35  pH, Blood: x     /  pCO2: 31    /  pO2: 68    / HCO3: 17    / Base Excess: -7.5  /  SaO2: 92.6                      RADIOLOGY/Other tests:

## 2024-05-19 NOTE — PROGRESS NOTE ADULT - SUBJECTIVE AND OBJECTIVE BOX
Patient is a 78y old  Female who presents with a chief complaint of N/V, AKASH on CKD, hyperkalmeia (19 May 2024 08:52)        Over Night Events:  Off pressors.  on RA.  Passed speech         ROS:     All ROS are negative except HPI         PHYSICAL EXAM    ICU Vital Signs Last 24 Hrs  T(C): 36.2 (19 May 2024 08:00), Max: 36.2 (19 May 2024 08:00)  T(F): 97.1 (19 May 2024 08:00), Max: 97.1 (19 May 2024 08:00)  HR: 104 (19 May 2024 09:00) (104 - 132)  BP: 95/52 (19 May 2024 09:00) (83/47 - 130/56)  BP(mean): 65 (19 May 2024 09:00) (62 - 91)  ABP: --  ABP(mean): --  RR: 17 (19 May 2024 09:00) (14 - 28)  SpO2: 98% (19 May 2024 09:00) (95% - 98%)    O2 Parameters below as of 19 May 2024 08:00  Patient On (Oxygen Delivery Method): room air            CONSTITUTIONAL:   NAD    ENT:   Airway patent,   Mouth with normal mucosa.       EYES:   Pupils equal,   Round and reactive to light.    CARDIAC:   tachycardic   Irregular rhythm.        RESPIRATORY:   No wheezing  Bilateral BS  Normal chest expansion  Not tachypneic,  No use of accessory muscles    GASTROINTESTINAL:  Abdomen soft,   Non-tender,   No guarding,   + BS    MUSCULOSKELETAL:   Range of motion is not limited,  No clubbing, cyanosis    NEUROLOGICAL:   Alert and oriented   No motor  deficits.    SKIN:   Skin normal color for race,   No evidence of rash.        05-18-24 @ 07:01  -  05-19-24 @ 07:00  --------------------------------------------------------  IN:    IV PiggyBack: 150 mL    Lactated Ringers: 600 mL    Lactated Ringers Bolus: 1000 mL    Oral Fluid: 510 mL    Sodium Chloride 0.9% Bolus: 500 mL  Total IN: 2760 mL    OUT:    Indwelling Catheter - Urethral (mL): 1500 mL    Norepinephrine: 0 mL    Vasopressin: 0 mL  Total OUT: 1500 mL    Total NET: 1260 mL      05-19-24 @ 07:01  -  05-19-24 @ 09:27  --------------------------------------------------------  IN:  Total IN: 0 mL    OUT:    Indwelling Catheter - Urethral (mL): 125 mL  Total OUT: 125 mL    Total NET: -125 mL          LABS:                            10.7   7.25  )-----------( 91       ( 18 May 2024 20:07 )             32.8                                               05-18             10.7   7.25  )-----------( 91       ( 05-18 @ 20:07 )             32.8                10.3   7.46  )-----------( 95       ( 05-18 @ 11:58 )             32.4                8.2    6.70  )-----------( 86       ( 05-18 @ 05:14 )             25.7                11.1   7.86  )-----------( 112      ( 05-17 @ 05:22 )             35.1                12.5   10.03 )-----------( 127      ( 05-16 @ 16:36 )             38.8           131<L>  |  94<L>  |  93<HH>  ----------------------------<  252<H>  4.1   |  22  |  4.9<HH>    Ca    8.5      18 May 2024 05:14  Mg     2.3     05-18    TPro  5.4<L>  /  Alb  3.0<L>  /  TBili  3.0<H>  /  DBili  2.6<H>  /  AST  356<H>  /  ALT  492<H>  /  AlkPhos  135<H>  05-18      PT/INR - ( 18 May 2024 10:58 )   PT: 17.90 sec;   INR: 1.56 ratio         PTT - ( 18 May 2024 10:58 )  PTT:29.1 sec                                       Urinalysis Basic - ( 18 May 2024 05:14 )    Color: x / Appearance: x / SG: x / pH: x  Gluc: 252 mg/dL / Ketone: x  / Bili: x / Urobili: x   Blood: x / Protein: x / Nitrite: x   Leuk Esterase: x / RBC: x / WBC x   Sq Epi: x / Non Sq Epi: x / Bacteria: x                                                  LIVER FUNCTIONS - ( 18 May 2024 05:14 )  Alb: 3.0 g/dL / Pro: 5.4 g/dL / ALK PHOS: 135 U/L / ALT: 492 U/L / AST: 356 U/L / GGT: x                                                                                                                                   ABG - ( 17 May 2024 09:31 )  pH, Arterial: 7.35  pH, Blood: x     /  pCO2: 31    /  pO2: 68    / HCO3: 17    / Base Excess: -7.5  /  SaO2: 92.6                MEDICATIONS  (STANDING):  allopurinol 50 milliGRAM(s) Oral daily  aMIOdarone    Tablet 200 milliGRAM(s) Oral two times a day  aspirin  chewable 81 milliGRAM(s) Oral daily  budesonide  80 MICROgram(s)/formoterol 4.5 MICROgram(s) Inhaler 2 Puff(s) Inhalation two times a day  chlorhexidine 2% Cloths 1 Application(s) Topical daily  dextrose 10% Bolus 125 milliLiter(s) IV Bolus once  dextrose 5%. 1000 milliLiter(s) (50 mL/Hr) IV Continuous <Continuous>  dextrose 5%. 1000 milliLiter(s) (100 mL/Hr) IV Continuous <Continuous>  dextrose 50% Injectable 12.5 Gram(s) IV Push once  dextrose 50% Injectable 25 Gram(s) IV Push once  doxazosin 2 milliGRAM(s) Oral at bedtime  fludroCORTISONE 0.1 milliGRAM(s) Oral daily  glucagon  Injectable 1 milliGRAM(s) IntraMuscular once  heparin   Injectable 5000 Unit(s) SubCutaneous every 12 hours  hydrocortisone sodium succinate Injectable 50 milliGRAM(s) IV Push three times a day  insulin glargine Injectable (LANTUS) 13 Unit(s) SubCutaneous at bedtime  insulin lispro (ADMELOG) corrective regimen sliding scale   SubCutaneous three times a day before meals  insulin lispro Injectable (ADMELOG) 3 Unit(s) SubCutaneous three times a day before meals  meropenem  IVPB      meropenem  IVPB 750 milliGRAM(s) IV Intermittent every 24 hours  metoprolol tartrate 50 milliGRAM(s) Oral four times a day  montelukast 10 milliGRAM(s) Oral daily  nystatin Powder 1 Application(s) Topical every 12 hours  pantoprazole    Tablet 40 milliGRAM(s) Oral before breakfast  polyethylene glycol 3350 17 Gram(s) Oral daily  senna 2 Tablet(s) Oral at bedtime  sodium bicarbonate 1300 milliGRAM(s) Oral three times a day  tiotropium 2.5 MICROgram(s) Inhaler 2 Puff(s) Inhalation daily    MEDICATIONS  (PRN):  dextrose Oral Gel 15 Gram(s) Oral once PRN Blood Glucose LESS THAN 70 milliGRAM(s)/deciliter  lidocaine   4% Patch 1 Patch Transdermal every 24 hours PRN pain  ondansetron Injectable 4 milliGRAM(s) IV Push four times a day PRN Nausea and/or Vomiting      New X-rays reviewed:                                                                                  ECHO

## 2024-05-19 NOTE — PROGRESS NOTE ADULT - SUBJECTIVE AND OBJECTIVE BOX
Nephrology Progress Note    EVAN QUINN  [id number]  78y  Female    S:  Patient is seen and examined, events over the last 24h noted.    O:  Allergies:  Augmentin (Other)  penicillins (Hives; Rash)    Hospital Medications:   MEDICATIONS  (STANDING):  allopurinol 50 milliGRAM(s) Oral daily  aMIOdarone    Tablet 200 milliGRAM(s) Oral two times a day  aspirin  chewable 81 milliGRAM(s) Oral daily  budesonide  80 MICROgram(s)/formoterol 4.5 MICROgram(s) Inhaler 2 Puff(s) Inhalation two times a day  chlorhexidine 2% Cloths 1 Application(s) Topical daily  dextrose 10% Bolus 125 milliLiter(s) IV Bolus once  dextrose 5%. 1000 milliLiter(s) (100 mL/Hr) IV Continuous <Continuous>  dextrose 5%. 1000 milliLiter(s) (50 mL/Hr) IV Continuous <Continuous>  dextrose 50% Injectable 12.5 Gram(s) IV Push once  dextrose 50% Injectable 25 Gram(s) IV Push once  doxazosin 2 milliGRAM(s) Oral at bedtime  fludroCORTISONE 0.1 milliGRAM(s) Oral daily  glucagon  Injectable 1 milliGRAM(s) IntraMuscular once  heparin   Injectable 5000 Unit(s) SubCutaneous every 12 hours  hydrocortisone sodium succinate Injectable 50 milliGRAM(s) IV Push three times a day  insulin glargine Injectable (LANTUS) 13 Unit(s) SubCutaneous at bedtime  insulin lispro (ADMELOG) corrective regimen sliding scale   SubCutaneous three times a day before meals  insulin lispro Injectable (ADMELOG) 3 Unit(s) SubCutaneous three times a day before meals  meropenem  IVPB      meropenem  IVPB 750 milliGRAM(s) IV Intermittent every 24 hours  metoprolol tartrate 50 milliGRAM(s) Oral four times a day  montelukast 10 milliGRAM(s) Oral daily  nystatin Powder 1 Application(s) Topical every 12 hours  pantoprazole    Tablet 40 milliGRAM(s) Oral before breakfast  polyethylene glycol 3350 17 Gram(s) Oral daily  senna 2 Tablet(s) Oral at bedtime  sodium bicarbonate 1300 milliGRAM(s) Oral three times a day  tiotropium 2.5 MICROgram(s) Inhaler 2 Puff(s) Inhalation daily    MEDICATIONS  (PRN):  dextrose Oral Gel 15 Gram(s) Oral once PRN Blood Glucose LESS THAN 70 milliGRAM(s)/deciliter  lidocaine   4% Patch 1 Patch Transdermal every 24 hours PRN pain  ondansetron Injectable 4 milliGRAM(s) IV Push four times a day PRN Nausea and/or Vomiting    Home Medications:  aspirin 81 mg oral capsule: 1 cap(s) orally once a day (2024 23:25)  desvenlafaxine (as succinate) 25 mg oral tablet, extended release: 1 tab(s) orally once a day (2024 23:25)  furosemide 40 mg oral tablet: 1 tab(s) orally once a day (2024 23:25)  glipiZIDE 5 mg oral tablet: 1 tab(s) orally once a day (2024 23:25)  Lyrica 75 mg oral capsule: 1 cap(s) orally once a day (2024 23:25)  montelukast 10 mg oral tablet: 1 tab(s) orally once a day (2024 23:25)  polyethylene glycol 3350 oral powder for reconstitution: 17 gram(s) orally 2 times a day (2024 23:25)  Protonix 40 mg oral delayed release tablet: 1 tab(s) orally once a day (2024 23:25)  Ranexa 500 mg oral tablet, extended release: 1 tab(s) orally 2 times a day (2024 23:25)  rosuvastatin 10 mg oral tablet: 1 tab(s) orally once a day (2024 23:25)  senna leaf extract oral tablet: 2 tab(s) orally once a day (at bedtime) (2024 23:25)      VITALS:  Daily     Daily Weight in k.6 (19 May 2024 01:00)  T(F): 97.1 (24 @ 08:00), Max: 97.1 (24 @ 08:00)  HR: 121 (24 @ 08:00)  BP: 127/66 (24 @ 08:00)  RR: 18 (24 @ 08:00)  SpO2: 97% (24 @ 08:00)  Wt(kg): --  I&O's Detail    18 May 2024 07:01  -  19 May 2024 07:00  --------------------------------------------------------  IN:    IV PiggyBack: 150 mL    Lactated Ringers: 600 mL    Lactated Ringers Bolus: 1000 mL    Oral Fluid: 510 mL    Sodium Chloride 0.9% Bolus: 500 mL  Total IN: 2760 mL    OUT:    Indwelling Catheter - Urethral (mL): 1500 mL    Norepinephrine: 0 mL    Vasopressin: 0 mL  Total OUT: 1500 mL    Total NET: 1260 mL      19 May 2024 07:  -  19 May 2024 08:51  --------------------------------------------------------  IN:  Total IN: 0 mL    OUT:    Indwelling Catheter - Urethral (mL): 60 mL  Total OUT: 60 mL    Total NET: -60 mL        I&O's Summary    18 May 2024 07:  -  19 May 2024 07:00  --------------------------------------------------------  IN: 2760 mL / OUT: 1500 mL / NET: 1260 mL    19 May 2024 07:  -  19 May 2024 08:51  --------------------------------------------------------  IN: 0 mL / OUT: 60 mL / NET: -60 mL          PHYSICAL EXAM:  Gen: NAD  Chest: b/l breath sounds  Abd: soft  Ext: edema    LABS:  ABG - ( 17 May 2024 09:31 )  pH, Arterial: 7.35  pH, Blood: x     /  pCO2: 31    /  pO2: 68    / HCO3: 17    / Base Excess: -7.5  /  SaO2: 92.6        05-18    131<L>  |  94<L>  |  93<HH>  ----------------------------<  252<H>  4.1   |  22  |  4.9<HH>    Ca    8.5      18 May 2024 05:14  Mg     2.3         TPro  5.4<L>  /  Alb  3.0<L>  /  TBili  3.0<H>  /  DBili  2.6<H>  /  AST  356<H>  /  ALT  492<H>  /  AlkPhos  135<H>      Phosphorus: 7.9 mg/dL (05-15-24 @ 05:33)  Phosphorus: 4.7 mg/dL (05-10-24 @ 20:51)                            10.7   7.25  )-----------( 91       ( 18 May 2024 20:07 )             32.8     Mean Cell Volume: 82.6 fL (24 @ 20:07)    Culture Results:   50,000 - 99,000 CFU/mL Enterococcus faecalis (05-15 @ 02:23)  Culture Results:   No growth at 48 hours ( @ 19:55)    Creatinine trend:  Creatinine: 4.9 mg/dL (24 @ 05:14)  Creatinine: 5.4 mg/dL (24 @ 05:22)  Creatinine: 5.2 mg/dL (24 @ 16:36)  Creatinine: 5.5 mg/dL (24 @ 13:35)  Creatinine: 5.6 mg/dL (24 @ 04:45)  Creatinine: 5.6 mg/dL (05-15-24 @ 21:49)

## 2024-05-19 NOTE — PROGRESS NOTE ADULT - ASSESSMENT
AKASH on CKD 3 / last creat on 5/10 was 1.6 / received contrast on 5/9, but AKASH more likely related to hemodynamic instability, Afib with RVR, fall, very dark urine  Hyponatremia improving with iv hydration   Hyperkalemia s/p medical treatment improved  AKASH on CKD most likely ATN   - creat trending down as of yesterday / f/u repeat  - d/c standing iv fluids  - monitor strict i/o  - cont sodium bicarb 1300mg q8h, decrease to q12 if bicarb > 22 today  - on meropenem  - BP stable, off pressors  - phos level high, on sevelamer 1 tab TID with meals / please repeat  - no acute indication for RRT at this point / will follow up closely   - ct:   No hydronephrosis. Bilateral angiomyolipomas similar to prior exam.  will follow

## 2024-05-19 NOTE — PROGRESS NOTE ADULT - ASSESSMENT
Ms Pickering is a 78 MARIZA w a PMH of CKD 3b, Afib (s/p watchman not on AC), COPD not on home O2,  Aortic stenosis w H/O ascending aortic replacement  Bovine Replacement, DM , CAD s/p CABG 2016 (w bleeding complication) , anemia of chronic disease and a recent admission for a fall presents to the ED 3 days after DC for fall for the chief concern of NBNB N/V for 48hrs. Patient denies: hematemesis wrenching HA, virtigo, palpitations, rash, diet or medication changes, diarrhea, fever, abdominal pain or cramping, hematochezia/melena. Patient admitted for AKASH on CKD, inadequate urine output and severe hyperKalemia       AMS  hold cns depressants  -ammonia level 195, lactulose q12, taper to 3bm a day       Shock on pressors r/o cardiogenic vs septic  -e faecalis in ucx 5/17 fu sensitivies   -started meropenem 750 05/17 as per ID  -hold diuretics   -off dobutamine , on levo, lr 75     AKASH on CKD3b  hyperkalemia w/o EKG changes   - off lokelma  -nephro recs appreciated   - RBUS     Rapid A fib  Ho HFpEF, G3DD, moderate PH  Ho CAD s/p CABG  Ho AS s/p SAVR (2016),   HO Atrial fibrillation s/p watchman (2021),   - amio drip, transition to po amio   -consult cardio  -echo seen       Transamintis  -ruqus- cholethithias , no cholecystitis   -ammonia elvated  -lactulose q12  - consider MRCP     Ho recent traumatic fall admission w DC May 11  - in c collar, conulsted nsgx, will stay in collar until outpatient fu   - dens fracture noted on imaging     HO asthma/COPD not on home O2    DNR/DNI  feeds: pureed     fem line placed 5/15 due to inability to access rij   penidng: cardio fu

## 2024-05-19 NOTE — CHART NOTE - NSCHARTNOTEFT_GEN_A_CORE
Transfer from: MICU    HPI / MICU COURSE:    Reason for Admission: N/V, AKASH on CKD, hyperkalmeia  History of Present Illness:   78 MARIZA w a PMH of CKD 3b, Afib (s/p watchman not on AC), COPD not on home O2,  Aortic stenosis w H/O ascending aortic replacement Bovine Replacement, DM , CAD s/p CABG 2016 (w bleeding complication) , anemia of chronic disease and a recent admission for a fall presents to the ED 3 days after DC for fall for the chief concern of NBNB N/V for 48hrs. Patient denies: hematemesis wrenching HA, palpitations, rash, diet or medication changes, diarrhea, fever, abdominal pain or cAramping, hematochezia/melena. Patient admitted for AKASH on CKD, inadequate urine output and severe hyperKalemia     Patient hypotensive to 79/52 w  on levo 0.6, patient reporting "I don't feel good" when pushed to elaborate "I cont breath good"   norm added  levo brought down to 0.06 w Norm at 1.7, BP improved to 92/64 w MAP of 72 and HR of 136  K= 6.2, D5/insulin given, EKG showed T wae inversions and ST depression in lead I, CaGluc given  patient unable to take PO at this time  will continue to push D50/Insulin q2hrs until able to give rectal K binder q8hr   patient reporting dyspnea despite SpO2 99%, lungs BL CTA, patient also reporting diaphoresis (not visible)  will draw troponin once patient stable   patient A&Ox3, following commands, clinically unchanged   unable to place IJ due to hard collar in place from previous trauma, not able to remove   2 peripheral IVs, unable to place third IV due to edema and small blood vessels  MAR coming to placing Fem Central (complicated by habitus)   per discussion w fellow starting Amiodarone ggt (150 push followed by 6hr at 1mg/min then 18hrs at 0.5 despite no home AC, since admission on heparin)    sevelamer was meant to be started today and lokelma was to be continued q8hr however patient was unable to take oral today due to lethargy per report    Daughter Erna Glover gave verbal concent for Fem Central and stated patient has prior MOLST DNR/DNI signed on fridge at home, new MOLST completed over the phone w Erna who is also health care proxy.        Vital Signs Last 24 Hrs  T(C): 36.1 (19 May 2024 16:00), Max: 36.2 (19 May 2024 08:00)  T(F): 96.9 (19 May 2024 16:00), Max: 97.1 (19 May 2024 08:00)  HR: 93 (19 May 2024 19:00) (91 - 126)  BP: 96/51 (19 May 2024 19:00) (84/62 - 127/66)  BP(mean): 67 (19 May 2024 19:00) (65 - 91)  RR: 26 (19 May 2024 19:00) (14 - 26)  SpO2: 99% (19 May 2024 19:00) (95% - 99%)    Parameters below as of 19 May 2024 16:00  Patient On (Oxygen Delivery Method): room air        I&O's Summary    18 May 2024 07:01  -  19 May 2024 07:00  --------------------------------------------------------  IN: 2760 mL / OUT: 1500 mL / NET: 1260 mL    19 May 2024 07:01  -  19 May 2024 20:55  --------------------------------------------------------  IN: 540 mL / OUT: 580 mL / NET: -40 mL        Physical Exam:   CONSTITUTIONAL: well-appearing, well nourished, non-toxic, NAD  HEAD: s/p fall to face  NECK: Neck collar  EXT: Moves arms, and feet  NEURO: normal motor. normal sensory.  PSYCH: Cooperative, appropriate.     LABS:                               10.7   7.25  )-----------( 91       ( 18 May 2024 20:07 )             32.8       05-19    135  |  98  |  91<HH>  ----------------------------<  255<H>  3.6   |  21  |  4.3<HH>    Ca    8.5      19 May 2024 12:12  Mg     2.3     05-18    TPro  5.4<L>  /  Alb  3.2<L>  /  TBili  2.6<H>  /  DBili  x   /  AST  231<H>  /  ALT  430<H>  /  AlkPhos  132<H>  05-19      PT/INR - ( 18 May 2024 10:58 )   PT: 17.90 sec;   INR: 1.56 ratio         PTT - ( 18 May 2024 10:58 )  PTT:29.1 sec    ABG - ( 19 May 2024 09:48 )  pH, Arterial: 7.44  pH, Blood: x     /  pCO2: 31    /  pO2: 65    / HCO3: 21    / Base Excess: -2.3  /  SaO2: 94.2                  ECG:Ms Pickering is a 78 MARIZA w a PMH of CKD 3b, Afib (s/p watchman not on AC), COPD not on home O2,  Aortic stenosis w H/O ascending aortic replacement  Bovine Replacement, DM , CAD s/p CABG 2016 (w bleeding complication) , anemia of chronic disease and a recent admission for a fall presents to the ED 3 days after DC for fall for the chief concern of NBNB N/V for 48hrs. Patient denies: hematemesis wrenching HA, virtigo, palpitations, rash, diet or medication changes, diarrhea, fever, abdominal pain or cramping, hematochezia/melena. Patient admitted for AKASH on CKD, inadequate urine output and severe hyperKalemia       AMS  hold cns depressants  -ammonia level 195, lactulose q12, taper to 3bm a day       Shock on pressors r/o cardiogenic vs septic  -e faecalis in ucx 5/17 fu sensitivies   -started meropenem 750 05/17 as per ID  -hold diuretics   -off dobutamine , on levo, lr 75     AKASH on CKD3b  hyperkalemia w/o EKG changes   - off lokelma  -nephro recs appreciated   - RBUS     Rapid A fib  Ho HFpEF, G3DD, moderate PH  Ho CAD s/p CABG  Ho AS s/p SAVR (2016),   HO Atrial fibrillation s/p watchman (2021),   - amio drip, transition to po amio   -consult cardio  -echo seen       Transamintis  -ruqus- cholethithias , no cholecystitis   -ammonia elvated  -lactulose q12  - consider MRCP     Ho recent traumatic fall admission w DC May 11  - in c collar, conulsted nsgx, will stay in collar until outpatient fu   - dens fracture noted on imaging     HO asthma/COPD not on home O2    DNR/DNI  feeds: pureed     fem line placed 5/15 due to inability to access rij   penidng: cardio fu

## 2024-05-20 NOTE — PROGRESS NOTE ADULT - SUBJECTIVE AND OBJECTIVE BOX
24H events:    Patient is a 78y old Female who presents with a chief complaint of N/V, AKASH on CKD, hyperkalmeia (20 May 2024 09:29)    Primary diagnosis of AKASH (acute kidney injury)      Today is hospital day 5d. This morning patient was seen and examined at bedside, resting comfortably in bed.  Downgraded from ICU overnight. AAOx3 this morning. Pending ID f/u TOV today and PT for dispo planning.  No acute or major events overnight.    Code Status: DNR/DNI      PAST MEDICAL & SURGICAL HISTORY  Aortic stenosis    Diabetes    Asthma with COPD  many years since last attack    CAD (coronary artery disease), native coronary artery    Anemia    History of bleeding disorder  having work up 5/2/21- HAD WORKUP BUT NO DIAGNOSIS "NOTHING WAS FOUND"    History of transfusion reaction    Hiatal hernia    Stage 3 chronic kidney disease    H/O ascending aortic replacement  Bovine Replacement    S/P CABG x 1  complication of bleeding 2016    H/O total knee replacement, right  complicated with fx femur bars screws in femur- b/l knee replacement    S/P hysterectomy    Presence of Watchman left atrial appendage closure device      ALLERGIES:  Augmentin (Other)  penicillins (Hives; Rash)    MEDICATIONS:  STANDING MEDICATIONS  allopurinol 50 milliGRAM(s) Oral daily  aMIOdarone    Tablet 200 milliGRAM(s) Oral two times a day  aspirin  chewable 81 milliGRAM(s) Oral daily  budesonide  80 MICROgram(s)/formoterol 4.5 MICROgram(s) Inhaler 2 Puff(s) Inhalation two times a day  chlorhexidine 2% Cloths 1 Application(s) Topical daily  dextrose 10% Bolus 125 milliLiter(s) IV Bolus once  dextrose 5%. 1000 milliLiter(s) IV Continuous <Continuous>  dextrose 5%. 1000 milliLiter(s) IV Continuous <Continuous>  dextrose 50% Injectable 12.5 Gram(s) IV Push once  dextrose 50% Injectable 25 Gram(s) IV Push once  doxazosin 2 milliGRAM(s) Oral at bedtime  fludroCORTISONE 0.1 milliGRAM(s) Oral daily  glucagon  Injectable 1 milliGRAM(s) IntraMuscular once  heparin   Injectable 5000 Unit(s) SubCutaneous every 12 hours  hydrocortisone sodium succinate Injectable 50 milliGRAM(s) IV Push three times a day  insulin glargine Injectable (LANTUS) 16 Unit(s) SubCutaneous at bedtime  insulin lispro (ADMELOG) corrective regimen sliding scale   SubCutaneous three times a day before meals  insulin lispro Injectable (ADMELOG) 3 Unit(s) SubCutaneous three times a day before meals  melatonin 5 milliGRAM(s) Oral at bedtime  meropenem  IVPB 750 milliGRAM(s) IV Intermittent every 24 hours  meropenem  IVPB      metoprolol tartrate 50 milliGRAM(s) Oral four times a day  montelukast 10 milliGRAM(s) Oral daily  nystatin Powder 1 Application(s) Topical every 12 hours  pantoprazole    Tablet 40 milliGRAM(s) Oral before breakfast  polyethylene glycol 3350 17 Gram(s) Oral daily  senna 2 Tablet(s) Oral at bedtime  sodium bicarbonate 1300 milliGRAM(s) Oral three times a day  tiotropium 2.5 MICROgram(s) Inhaler 2 Puff(s) Inhalation daily    PRN MEDICATIONS  dextrose Oral Gel 15 Gram(s) Oral once PRN  lidocaine   4% Patch 1 Patch Transdermal every 24 hours PRN  ondansetron Injectable 4 milliGRAM(s) IV Push four times a day PRN    VITALS:   T(F): 97.4  HR: 84  BP: 107/70  RR: 16  SpO2: 97%    PHYSICAL EXAM:  GENERAL:   ( + ) NAD, lying in bed comfortably     (  ) obtunded     (  ) lethargic     (  ) somnolent    HEAD: With bruises under b/l eyes and crusted blood on forehead and nose.  (  ) Atraumatic     (  ) hematoma     (  ) laceration (specify location:       )     NECK: with cervical collar s/p fracture  (  ) Supple     (  ) neck stiffness     (  ) nuchal rigidity     (  )  no JVD     (  ) JVD present ( -- cm)    HEART:  Rate -->     ( + ) normal rate     (  ) bradycardic     (  ) tachycardic  Rhythm -->     ( + ) regular     (  ) regularly irregular     (  ) irregularly irregular  Murmurs -->     (  ) normal s1s2     (  ) systolic murmur     (  ) diastolic murmur     (  ) continuous murmur      (  ) S3 present     (  ) S4 present    LUNGS:   ( + )Unlabored respirations     (  ) tachypnea  ( + ) B/L air entry     (  ) decreased breath sounds in:  (location     )    (  ) no adventitious sound     (  ) crackles     (  ) wheezing      (  ) rhonchi      (specify location:       )  (  ) chest wall tenderness (specify location:       )    ABDOMEN:   ( + ) Soft     (  ) tense   |   ( + ) nondistended     (  ) distended   |   (  ) +BS     (  ) hypoactive bowel sounds     (  ) hyperactive bowel sounds  ( + ) nontender     (  ) RUQ tenderness     (  ) RLQ tenderness     (  ) LLQ tenderness     (  ) epigastric tenderness     (  ) diffuse tenderness  (  ) Splenomegaly      (  ) Hepatomegaly      (  ) Jaundice     (  ) ecchymosis     EXTREMITIES: 2+ peripheral pulses bilaterally. No clubbing, cyanosis, or edema  ( + ) Normal     (  ) Rash     (  ) ecchymosis     (  ) varicose veins      (  ) pitting edema     (  ) non-pitting edema   (  ) ulceration     (  ) gangrene:     (location:     )    NERVOUS SYSTEM:    ( + ) A&Ox3     (  ) confused     (  ) lethargic  CN II-XII:     (  ) Intact     (  ) deficits found     (Specify:     )   Upper extremities:     (  ) no sensorimotor deficits     (  ) weakness     (  ) loss of proprioception/vibration     (  ) loss of touch/temperature (specify:    )  Lower extremities:     (  ) no sensorimotor deficits     (  ) weakness     (  ) loss of proprioception/vibration     (  ) loss of touch/temperature (specify:    )    SKIN:   ( - ) No rashes or lesions     (  ) maculopapular rash     (  ) pustules     (  ) vesicles     (  ) ulcer     (  ) ecchymosis     (specify location:     )    AMPAC score:    ( - ) Indwelling Wang Catheter:   Date insterted:    Reason (  ) Critical illness     (  ) urinary retention    (  ) Accurate Ins/Outs Monitoring     (  ) CMO patient    ( - ) Central Line:   Date inserted:  Location: (  ) Right IJ     (  ) Left IJ     (  ) Right Fem     (  ) Left Fem    (  ) SPC        (  ) pigtail       (  ) PEG tube       (  ) colostomy       (  ) jejunostomy  (  ) U-Dall    LABS:                        10.7   7.25  )-----------( 91       ( 18 May 2024 20:07 )             32.8     05-19    135  |  98  |  91<HH>  ----------------------------<  255<H>  3.6   |  21  |  4.3<HH>    Ca    8.5      19 May 2024 12:12    TPro  5.4<L>  /  Alb  3.2<L>  /  TBili  2.6<H>  /  DBili  x   /  AST  231<H>  /  ALT  430<H>  /  AlkPhos  132<H>  05-19      Urinalysis Basic - ( 19 May 2024 12:12 )    Color: x / Appearance: x / SG: x / pH: x  Gluc: 255 mg/dL / Ketone: x  / Bili: x / Urobili: x   Blood: x / Protein: x / Nitrite: x   Leuk Esterase: x / RBC: x / WBC x   Sq Epi: x / Non Sq Epi: x / Bacteria: x      ABG - ( 19 May 2024 09:48 )  pH, Arterial: 7.44  pH, Blood: x     /  pCO2: 31    /  pO2: 65    / HCO3: 21    / Base Excess: -2.3  /  SaO2: 94.2                      RADIOLOGY:

## 2024-05-20 NOTE — PROGRESS NOTE ADULT - SUBJECTIVE AND OBJECTIVE BOX
Nephrology progress note    THIS IS AN INCOMPLETE NOTE . FULL NOTE TO FOLLOW SHORTLY    Patient is seen and examined, events over the last 24 h noted .    Allergies:  Augmentin (Other)  penicillins (Hives; Rash)    Hospital Medications:   MEDICATIONS  (STANDING):  allopurinol 50 milliGRAM(s) Oral daily  aMIOdarone    Tablet 200 milliGRAM(s) Oral two times a day  aspirin  chewable 81 milliGRAM(s) Oral daily  budesonide  80 MICROgram(s)/formoterol 4.5 MICROgram(s) Inhaler 2 Puff(s) Inhalation two times a day  chlorhexidine 2% Cloths 1 Application(s) Topical daily  dextrose 10% Bolus 125 milliLiter(s) IV Bolus once  dextrose 5%. 1000 milliLiter(s) (100 mL/Hr) IV Continuous <Continuous>  dextrose 5%. 1000 milliLiter(s) (50 mL/Hr) IV Continuous <Continuous>  dextrose 50% Injectable 12.5 Gram(s) IV Push once  dextrose 50% Injectable 25 Gram(s) IV Push once  doxazosin 2 milliGRAM(s) Oral at bedtime  fludroCORTISONE 0.1 milliGRAM(s) Oral daily  glucagon  Injectable 1 milliGRAM(s) IntraMuscular once  heparin   Injectable 5000 Unit(s) SubCutaneous every 12 hours  hydrocortisone sodium succinate Injectable 50 milliGRAM(s) IV Push three times a day  insulin glargine Injectable (LANTUS) 16 Unit(s) SubCutaneous at bedtime  insulin lispro (ADMELOG) corrective regimen sliding scale   SubCutaneous three times a day before meals  insulin lispro Injectable (ADMELOG) 3 Unit(s) SubCutaneous three times a day before meals  melatonin 5 milliGRAM(s) Oral at bedtime  meropenem  IVPB      meropenem  IVPB 750 milliGRAM(s) IV Intermittent every 24 hours  metoprolol tartrate 50 milliGRAM(s) Oral four times a day  montelukast 10 milliGRAM(s) Oral daily  nystatin Powder 1 Application(s) Topical every 12 hours  pantoprazole    Tablet 40 milliGRAM(s) Oral before breakfast  polyethylene glycol 3350 17 Gram(s) Oral daily  senna 2 Tablet(s) Oral at bedtime  sodium bicarbonate 1300 milliGRAM(s) Oral three times a day  tiotropium 2.5 MICROgram(s) Inhaler 2 Puff(s) Inhalation daily        VITALS:  T(F): 96.7 (05-20-24 @ 06:00), Max: 97.6 (05-19-24 @ 21:54)  HR: 83 (05-20-24 @ 04:59)  BP: 105/70 (05-20-24 @ 04:59)  RR: 18 (05-20-24 @ 04:59)  SpO2: 97% (05-19-24 @ 23:45)  Wt(kg): --    05-18 @ 07:01  -  05-19 @ 07:00  --------------------------------------------------------  IN: 2760 mL / OUT: 1500 mL / NET: 1260 mL    05-19 @ 07:01  -  05-20 @ 07:00  --------------------------------------------------------  IN: 740 mL / OUT: 1670 mL / NET: -930 mL          PHYSICAL EXAM:  Constitutional: NAD  HEENT: anicteric sclera, oropharynx clear, MMM  Neck: No JVD  Respiratory: CTAB, no wheezes, rales or rhonchi  Cardiovascular: S1, S2, RRR  Gastrointestinal: BS+, soft, NT/ND  Extremities: No cyanosis or clubbing. No peripheral edema  :  No burden.   Skin: No rashes    LABS:  05-19    135  |  98  |  91<HH>  ----------------------------<  255<H>  3.6   |  21  |  4.3<HH>    Ca    8.5      19 May 2024 12:12    TPro  5.4<L>  /  Alb  3.2<L>  /  TBili  2.6<H>  /  DBili      /  AST  231<H>  /  ALT  430<H>  /  AlkPhos  132<H>  05-19                          10.7   7.25  )-----------( 91       ( 18 May 2024 20:07 )             32.8       Urine Studies:  Urinalysis Basic - ( 19 May 2024 12:12 )    Color:  / Appearance:  / SG:  / pH:   Gluc: 255 mg/dL / Ketone:   / Bili:  / Urobili:    Blood:  / Protein:  / Nitrite:    Leuk Esterase:  / RBC:  / WBC    Sq Epi:  / Non Sq Epi:  / Bacteria:           Iron 20, TIBC 334, %sat 6      [01-06-24 @ 06:46]  Ferritin 83      [01-06-24 @ 06:46]  TSH 2.64      [05-17-24 @ 05:22]  Lipid: chol 145, , HDL 82, LDL --      [02-06-24 @ 08:18]          RADIOLOGY & ADDITIONAL STUDIES:   Nephrology progress note    Patient is seen and examined, events over the last 24 h noted .  Lying in bed     Allergies:  Augmentin (Other)  penicillins (Hives; Rash)    Hospital Medications:     MEDICATIONS  (STANDING):    allopurinol 50 milliGRAM(s) Oral daily  aMIOdarone    Tablet 200 milliGRAM(s) Oral two times a day  aspirin  chewable 81 milliGRAM(s) Oral daily  budesonide  80 MICROgram(s)/formoterol 4.5 MICROgram(s) Inhaler 2 Puff(s) Inhalation two times a day  doxazosin 2 milliGRAM(s) Oral at bedtime  fludroCORTISONE 0.1 milliGRAM(s) Oral daily  glucagon  Injectable 1 milliGRAM(s) IntraMuscular once  heparin   Injectable 5000 Unit(s) SubCutaneous every 12 hours  hydrocortisone sodium succinate Injectable 50 milliGRAM(s) IV Push three times a day  insulin glargine Injectable (LANTUS) 16 Unit(s) SubCutaneous at bedtime  insulin lispro (ADMELOG) corrective regimen sliding scale   SubCutaneous three times a day before meals  insulin lispro Injectable (ADMELOG) 3 Unit(s) SubCutaneous three times a day before meals  melatonin 5 milliGRAM(s) Oral at bedtime     meropenem  IVPB 750 milliGRAM(s) IV Intermittent every 24 hours  metoprolol tartrate 50 milliGRAM(s) Oral four times a day  montelukast 10 milliGRAM(s) Oral daily  nystatin Powder 1 Application(s) Topical every 12 hours  pantoprazole    Tablet 40 milliGRAM(s) Oral before breakfast  polyethylene glycol 3350 17 Gram(s) Oral daily  senna 2 Tablet(s) Oral at bedtime  sodium bicarbonate 1300 milliGRAM(s) Oral three times a day  tiotropium 2.5 MICROgram(s) Inhaler 2 Puff(s) Inhalation daily        VITALS:  T(F): 96.7 (05-20-24 @ 06:00), Max: 97.6 (05-19-24 @ 21:54)  HR: 83 (05-20-24 @ 04:59)  BP: 105/70 (05-20-24 @ 04:59)  RR: 18 (05-20-24 @ 04:59)  SpO2: 97% (05-19-24 @ 23:45)      05-18 @ 07:01  -  05-19 @ 07:00  --------------------------------------------------------  IN: 2760 mL / OUT: 1500 mL / NET: 1260 mL    05-19 @ 07:01  - 05-20 @ 07:00  --------------------------------------------------------  IN: 740 mL / OUT: 1670 mL / NET: -930 mL          PHYSICAL EXAM:  Constitutional: NAD  Respiratory: CTAB  Cardiovascular: S1, S2, RRR  Gastrointestinal: BS+, soft, NT/ND  Extremities: No cyanosis or clubbing. No peripheral edema  :  No burden.   Skin: No rashes    LABS:      05-19    135  |  98  |  91<HH>  ----------------------------<  255<H>  3.6   |  21  |  4.3<HH>    Ca    8.5      19 May 2024 12:12    TPro  5.4<L>  /  Alb  3.2<L>  /  TBili  2.6<H>  /  DBili      /  AST  231<H>  /  ALT  430<H>  /  AlkPhos  132<H>  05-19                          10.7   7.25  )-----------( 91       ( 18 May 2024 20:07 )             32.8       Urine Studies:  Urinalysis Basic - ( 19 May 2024 12:12 )    Color:  / Appearance:  / SG:  / pH:   Gluc: 255 mg/dL / Ketone:   / Bili:  / Urobili:    Blood:  / Protein:  / Nitrite:    Leuk Esterase:  / RBC:  / WBC    Sq Epi:  / Non Sq Epi:  / Bacteria:           Iron 20, TIBC 334, %sat 6      [01-06-24 @ 06:46]  Ferritin 83      [01-06-24 @ 06:46]  TSH 2.64      [05-17-24 @ 05:22]  Lipid: chol 145, , HDL 82, LDL --      [02-06-24 @ 08:18]          RADIOLOGY & ADDITIONAL STUDIES:

## 2024-05-20 NOTE — SWALLOW BEDSIDE ASSESSMENT ADULT - SWALLOW EVAL: DIAGNOSIS
+overt s/s of aspiration w/thin liquids. Mild oral dysphagia w/ minced & moist, toleration for puree, minced & moist and mildly thick liquids without overt s/s of penetration/ aspiration. Pt. continues to present with +overt s/s of aspiration w/thin liquids. Mild oral dysphagia with easy to chew solids, toleration for soft & bite sized, easy to chew solids and mildly thick liquids without overt s/s of penetration/ aspiration.

## 2024-05-20 NOTE — PROGRESS NOTE ADULT - SUBJECTIVE AND OBJECTIVE BOX
SUBJECTIVE:    Patient is a 78y old Female who presents with a chief complaint of N/V, AKASH on CKD, hyperkalmeia (20 May 2024 13:51)    Currently admitted to medicine with the primary diagnosis of AKASH (acute kidney injury)       Today is hospital day 5d.     PAST MEDICAL & SURGICAL HISTORY  Aortic stenosis    Diabetes    Asthma with COPD  many years since last attack    CAD (coronary artery disease), native coronary artery    Anemia    History of bleeding disorder  having work up 5/2/21- HAD WORKUP BUT NO DIAGNOSIS "NOTHING WAS FOUND"    History of transfusion reaction    Hiatal hernia    Stage 3 chronic kidney disease    H/O ascending aortic replacement  Bovine Replacement    S/P CABG x 1  complication of bleeding 2016    H/O total knee replacement, right  complicated with fx femur bars screws in femur- b/l knee replacement    S/P hysterectomy    Presence of Watchman left atrial appendage closure device      ALLERGIES:  Augmentin (Other)  penicillins (Hives; Rash)    MEDICATIONS:  STANDING MEDICATIONS  allopurinol 50 milliGRAM(s) Oral daily  aMIOdarone    Tablet 200 milliGRAM(s) Oral two times a day  aspirin  chewable 81 milliGRAM(s) Oral daily  budesonide  80 MICROgram(s)/formoterol 4.5 MICROgram(s) Inhaler 2 Puff(s) Inhalation two times a day  chlorhexidine 2% Cloths 1 Application(s) Topical daily  dextrose 10% Bolus 125 milliLiter(s) IV Bolus once  dextrose 5%. 1000 milliLiter(s) IV Continuous <Continuous>  dextrose 5%. 1000 milliLiter(s) IV Continuous <Continuous>  dextrose 50% Injectable 12.5 Gram(s) IV Push once  dextrose 50% Injectable 25 Gram(s) IV Push once  doxazosin 2 milliGRAM(s) Oral at bedtime  fludroCORTISONE 0.1 milliGRAM(s) Oral daily  glucagon  Injectable 1 milliGRAM(s) IntraMuscular once  heparin   Injectable 5000 Unit(s) SubCutaneous every 12 hours  hydrocortisone sodium succinate Injectable 50 milliGRAM(s) IV Push three times a day  insulin glargine Injectable (LANTUS) 16 Unit(s) SubCutaneous at bedtime  insulin lispro (ADMELOG) corrective regimen sliding scale   SubCutaneous three times a day before meals  insulin lispro Injectable (ADMELOG) 3 Unit(s) SubCutaneous three times a day before meals  melatonin 5 milliGRAM(s) Oral at bedtime  meropenem  IVPB      meropenem  IVPB 750 milliGRAM(s) IV Intermittent every 24 hours  metoprolol tartrate 50 milliGRAM(s) Oral four times a day  montelukast 10 milliGRAM(s) Oral daily  nystatin Powder 1 Application(s) Topical every 12 hours  oxyCODONE    IR 5 milliGRAM(s) Oral once  pantoprazole    Tablet 40 milliGRAM(s) Oral before breakfast  polyethylene glycol 3350 17 Gram(s) Oral daily  senna 2 Tablet(s) Oral at bedtime  sodium bicarbonate 1300 milliGRAM(s) Oral three times a day  tiotropium 2.5 MICROgram(s) Inhaler 2 Puff(s) Inhalation daily    PRN MEDICATIONS  dextrose Oral Gel 15 Gram(s) Oral once PRN  lidocaine   4% Patch 1 Patch Transdermal every 24 hours PRN  ondansetron Injectable 4 milliGRAM(s) IV Push four times a day PRN  oxyCODONE    IR 2.5 milliGRAM(s) Oral every 6 hours PRN    VITALS:   T(F): 97.4  HR: 84  BP: 107/70  RR: 16  SpO2: 97%    LABS:                        10.7   7.25  )-----------( 91       ( 18 May 2024 20:07 )             32.8     05-19    135  |  98  |  91<HH>  ----------------------------<  255<H>  3.6   |  21  |  4.3<HH>    Ca    8.5      19 May 2024 12:12    TPro  5.4<L>  /  Alb  3.2<L>  /  TBili  2.6<H>  /  DBili  x   /  AST  231<H>  /  ALT  430<H>  /  AlkPhos  132<H>  05-19      Urinalysis Basic - ( 19 May 2024 12:12 )    Color: x / Appearance: x / SG: x / pH: x  Gluc: 255 mg/dL / Ketone: x  / Bili: x / Urobili: x   Blood: x / Protein: x / Nitrite: x   Leuk Esterase: x / RBC: x / WBC x   Sq Epi: x / Non Sq Epi: x / Bacteria: x      ABG - ( 19 May 2024 09:48 )  pH, Arterial: 7.44  pH, Blood: x     /  pCO2: 31    /  pO2: 65    / HCO3: 21    / Base Excess: -2.3  /  SaO2: 94.2                      RADIOLOGY:    PHYSICAL EXAM:  GEN: No acute distress  LUNGS: Clear to auscultation bilaterally   HEART: S1/S2 present. RRR.   ABD/ GI: Soft, non-tender, non-distended. Bowel sounds present  EXT: NC/NC/NE/2+PP/DÍAZ  NEURO: AAOX3

## 2024-05-20 NOTE — SWALLOW BEDSIDE ASSESSMENT ADULT - NS ASR SWALLOW FINDINGS DISCUS
BABITA Valiente and MD made aware/Physician/Nursing BABITA Feliciano, and MD made aware/Physician/Nursing

## 2024-05-20 NOTE — SWALLOW BEDSIDE ASSESSMENT ADULT - SWALLOW EVAL: FUNCTIONAL LEVEL AT TIME OF EVAL
Received awake, alert, [place] J-collar in place, +generalized weakness, confused. NGT in situ Received awake, alert, [place] J-collar in place, improved alertness.

## 2024-05-20 NOTE — SWALLOW BEDSIDE ASSESSMENT ADULT - NS SPL SWALLOW CLINIC TRIAL FT
Toleration for puree, minced & moist and mildly thick liquids without overt s/s of penetration/ aspiration. Toleration for soft, easy to chew and mildly thick liquids without overt s/s of penetration/ aspiration.

## 2024-05-20 NOTE — SWALLOW BEDSIDE ASSESSMENT ADULT - COMMENTS
RN reports improved alertness and mental status. RN reports improved alertness and mental status.  CXR showed bilateral opacities.

## 2024-05-20 NOTE — SWALLOW BEDSIDE ASSESSMENT ADULT - SLP GENERAL OBSERVATIONS
Pt. received awake, ox2 confused, room air, [place] J collar in place. Pt. received in OOB chair, awake/alert, room air, [place] J collar in place.

## 2024-05-20 NOTE — PROGRESS NOTE ADULT - ASSESSMENT
AKASH on CKD 3 / last creat on 5/10 was 1.6 / received contrast on 5/9, but AKASH more likely related to hemodynamic instability, Afib with RVR, fall, very dark urine  Hyponatremia improving with iv hydration   Hyperkalemia s/p medical treatment improved  AKASH on CKD most likely ATN     - creat trending down as of yesterday / f/u repeat BMP  - monitor strict i/o  - cont sodium bicarb 1300mg q8h, decrease to q12 if bicarb > 22 today  - on meropenem  - BP stable, off pressors  - phos level high, on sevelamer 1 tab TID with meals / please repeat  - no acute indication for RRT at this point / will follow up closely   - ct:   No hydronephrosis. Bilateral angiomyolipomas similar to prior exam.  will follow

## 2024-05-20 NOTE — PROGRESS NOTE ADULT - ASSESSMENT
Ms Pickering is a 78 MARIZA w a PMH of CKD 3b, Afib (s/p watchman not on AC), COPD not on home O2,  Aortic stenosis w H/O ascending aortic replacement  Bovine Replacement, DM , CAD s/p CABG 2016 (w bleeding complication) , anemia of chronic disease and a recent admission for a fall presents to the ED 3 days after DC for fall for the chief concern of NBNB N/V for 48hrs. Patient denies: hematemesis wrenching HA, virtigo, palpitations, rash, diet or medication changes, diarrhea, fever, abdominal pain or cramping, hematochezia/melena. Patient admitted for AKASH on CKD, inadequate urine output and severe hyperKalemia       AMS - resolved  hold cns depressants  -ammonia level 195, lactulose q12, taper to 3bm a day     Shock on pressors r/o cardiogenic vs septic - resolved  -e faecalis in ucx 5/17 fu sensitivies   -started meropenem 750 05/17 for 7 days as per ID  -diuretics still on hold   -off dobutamine , off levo    AKASH on CKD3b  hyperkalemia w/o EKG changes   - off lokelma  -nephro recs appreciated   - RBUS : Nonobstructive bowel gas pattern. Osseous structures demonstrate degenerative changes. No acute abnormality.      Rapid A fib  Ho HFpEF, G3DD, moderate PH  Ho CAD s/p CABG  Ho AS s/p SAVR (2016),   HO Atrial fibrillation s/p watchman (2021),   - amio drip, transitioned to po amio   -consult cardio  -echo: LVEF 60-65%, mild MR, mod-severe TR, mild Pulm HTN    Transamintis  -ruqus- cholethithias , no cholecystitis   -ammonia elvated  -lactulose q12  - consider MRCP     Ho recent traumatic fall admission w DC May 11  - in c collar, conulsted nsgx, will stay in collar until outpatient fu   - dens fracture noted on imaging     HO asthma/COPD not on home O2    DNR/DNI  feeds: pureed     fem line placed 5/15 due to inability to access rij   penidng: cardio fu. ID fu. TOV. PT eval

## 2024-05-20 NOTE — SWALLOW BEDSIDE ASSESSMENT ADULT - ORAL PHASE
w/minced & moist/Decreased anterior-posterior movement of the bolus/Delayed oral transit time w/easy to chew/Decreased anterior-posterior movement of the bolus/Delayed oral transit time

## 2024-05-20 NOTE — PHYSICAL THERAPY INITIAL EVALUATION ADULT - ADDITIONAL COMMENTS
pt lives alone in a private house, pt enters though the back door with 4 steps to enter with 1 rail, no steps inside, PTA pt amb with a walker and was I with ADLs

## 2024-05-20 NOTE — SWALLOW BEDSIDE ASSESSMENT ADULT - CONSISTENCIES ADMINISTERED
thin liquid/mildly thick/pureed/minced & moist thin liquid/mildly thick/soft & bite-sized/easy to chew

## 2024-05-20 NOTE — PROGRESS NOTE ADULT - ASSESSMENT
78 MARIZA w a PMH of CKD 3b, Afib (s/p watchman not on AC), COPD not on home O2,  Aortic stenosis w H/O ascending aortic replacement Bovine Replacement, DM , CAD s/p CABG 2016 (w bleeding complication) , anemia of chronic disease and a recent admission for a fall presents to the ED 3 days after DC for fall for the chief concern of NBNB N/V for 48hrs.  Patient admitted for AKASH on CKD, inadequate urine output and severe hyperKalemia       AMS  hold cns depressants  -ammonia level 195, lactulose q12, taper to 3bm a day   now more alert    Shock on pressors r/o cardiogenic vs septic  -E faecalis in ucx 5/17 allergic to amoxicillin  -started meropenem 750 05/17 as per ID  -hold diuretics   -off dobutamine , on levo,  off fluids  - taper hydrocortisone    AKASH on CKD3b  hyperkalemia w/o EKG changes   - off lokelma  -nephro recs appreciated   - CT no hydro     Rapid A fib  Ho HFpEF, G3DD, moderate PH  Ho CAD s/p CABG  Ho AS s/p SAVR (2016),   HO Atrial fibrillation s/p watchman (2021),   - amio drip, transition to po amio   -consult cardio  -echo -- EF 60-65% with moderate to severe TR-- bovine aortic valve replaced      Transamintis  -ruqus- cholethithias , no cholecystitis   -ammonia elvated  -lactulose q12  - liver enz are improving    Ho recent traumatic fall admission w DC May 11  - in c collar, conulsted nsgx, will stay in collar until outpatient fu   - dens fracture noted on imaging     HO asthma/COPD not on home O2    DNR/DNI

## 2024-05-20 NOTE — PHYSICAL THERAPY INITIAL EVALUATION ADULT - GENERAL OBSERVATIONS, REHAB EVAL
13:54-14:40 46 min pt received in bed in NAD, pt agreeable to PT, +[place] J and primafit intact, pt c/o 7/10 neck pain, +Lidocaine patch on , per RN pt is not due for pian meds yet

## 2024-05-21 NOTE — PROGRESS NOTE ADULT - ASSESSMENT
AKASH on CKD 3 / last creat on 5/10 was 1.6 / received contrast on 5/9, but AKASH more likely related to hemodynamic instability, Afib with RVR, fall, very dark urine  Hyponatremia improving with iv hydration   Hyperkalemia s/p medical treatment improved  AKASH on CKD most likely ATN   - creat trending down / repeat cr   - monitor strict i/o  - cont sodium bicarb 1300mg q8h, decrease to q12 if bicarb > 22 today  - on meropenem  - BP stable, start midodrine if remains low   - phos level high, on sevelamer 1 tab TID with meals / please repeat  - no acute indication for RRT at this point / will follow up closely   - ct:   No hydronephrosis. Bilateral angiomyolipomas similar to prior exam.  will follow

## 2024-05-21 NOTE — PROGRESS NOTE ADULT - SUBJECTIVE AND OBJECTIVE BOX
seen and examined  24 h events noted        PAST HISTORY  --------------------------------------------------------------------------------  No significant changes to PMH, PSH, FHx, SHx, unless otherwise noted    ALLERGIES & MEDICATIONS  --------------------------------------------------------------------------------  Allergies    Augmentin (Other)  penicillins (Hives; Rash)    Intolerances      Standing Inpatient Medications  allopurinol 50 milliGRAM(s) Oral daily  aMIOdarone    Tablet 200 milliGRAM(s) Oral two times a day  aspirin  chewable 81 milliGRAM(s) Oral daily  budesonide  80 MICROgram(s)/formoterol 4.5 MICROgram(s) Inhaler 2 Puff(s) Inhalation two times a day  chlorhexidine 2% Cloths 1 Application(s) Topical daily  dextrose 10% Bolus 125 milliLiter(s) IV Bolus once  dextrose 5%. 1000 milliLiter(s) IV Continuous <Continuous>  dextrose 5%. 1000 milliLiter(s) IV Continuous <Continuous>  dextrose 50% Injectable 25 Gram(s) IV Push once  dextrose 50% Injectable 12.5 Gram(s) IV Push once  doxazosin 2 milliGRAM(s) Oral at bedtime  fludroCORTISONE 0.1 milliGRAM(s) Oral daily  glucagon  Injectable 1 milliGRAM(s) IntraMuscular once  heparin   Injectable 5000 Unit(s) SubCutaneous every 12 hours  hydrocortisone sodium succinate Injectable 50 milliGRAM(s) IV Push two times a day  insulin glargine Injectable (LANTUS) 16 Unit(s) SubCutaneous at bedtime  insulin lispro (ADMELOG) corrective regimen sliding scale   SubCutaneous three times a day before meals  insulin lispro Injectable (ADMELOG) 3 Unit(s) SubCutaneous three times a day before meals  melatonin 5 milliGRAM(s) Oral at bedtime  meropenem  IVPB 750 milliGRAM(s) IV Intermittent every 24 hours  meropenem  IVPB      metoprolol tartrate 50 milliGRAM(s) Oral four times a day  montelukast 10 milliGRAM(s) Oral daily  nystatin Powder 1 Application(s) Topical every 12 hours  pantoprazole    Tablet 40 milliGRAM(s) Oral before breakfast  polyethylene glycol 3350 17 Gram(s) Oral daily  senna 2 Tablet(s) Oral at bedtime  sodium bicarbonate 1300 milliGRAM(s) Oral three times a day  tiotropium 2.5 MICROgram(s) Inhaler 2 Puff(s) Inhalation daily    PRN Inpatient Medications  dextrose Oral Gel 15 Gram(s) Oral once PRN  lidocaine   4% Patch 1 Patch Transdermal every 24 hours PRN  ondansetron Injectable 4 milliGRAM(s) IV Push four times a day PRN  oxyCODONE    IR 2.5 milliGRAM(s) Oral every 6 hours PRN        VITALS/PHYSICAL EXAM  --------------------------------------------------------------------------------  T(C): 36.6 (05-21-24 @ 05:13), Max: 36.6 (05-21-24 @ 05:13)  HR: 77 (05-21-24 @ 08:00) (77 - 90)  BP: 113/73 (05-21-24 @ 05:13) (91/54 - 119/73)  RR: 19 (05-21-24 @ 08:00) (16 - 19)  SpO2: 100% (05-21-24 @ 08:00) (97% - 100%)  Wt(kg): --        05-20-24 @ 07:01  -  05-21-24 @ 07:00  --------------------------------------------------------  IN: 622 mL / OUT: 1300 mL / NET: -678 mL      Physical Exam:  	Gen: NAD  	Pulm: decrease BS  B/L  	CV:  S1S2; no rub  	Abd: +distended  	LE:  edema      LABS/STUDIES  --------------------------------------------------------------------------------              10.7   6.58  >-----------<  x        [05-21-24 @ 06:54]              34.1     135  |  98  |  91  ----------------------------<  255      [05-19-24 @ 12:12]  3.6   |  21  |  4.3        Ca     8.5     [05-19-24 @ 12:12]    TPro  5.4  /  Alb  3.2  /  TBili  2.6  /  DBili  x   /  AST  231  /  ALT  430  /  AlkPhos  132  [05-19-24 @ 12:12]        Creatinine Trend:  SCr 4.3 [05-19 @ 12:12]  SCr 4.9 [05-18 @ 05:14]  SCr 5.4 [05-17 @ 05:22]  SCr 5.2 [05-16 @ 16:36]  SCr 5.5 [05-16 @ 13:35]    Urinalysis - [05-19-24 @ 12:12]      Color  / Appearance  / SG  / pH       Gluc 255 / Ketone   / Bili  / Urobili        Blood  / Protein  / Leuk Est  / Nitrite       RBC  / WBC  / Hyaline  / Gran  / Sq Epi  / Non Sq Epi  / Bacteria       Iron 20, TIBC 334, %sat 6      [01-06-24 @ 06:46]  Ferritin 83      [01-06-24 @ 06:46]  TSH 2.64      [05-17-24 @ 05:22]  Lipid: chol 145, , HDL 82, LDL --      [02-06-24 @ 08:18]

## 2024-05-21 NOTE — DISCHARGE NOTE PROVIDER - HOSPITAL COURSE
HPI:  78 MARIZA w a PMH of CKD 3b, Afib (s/p watchman not on AC), COPD not on home O2,  Aortic stenosis w H/O ascending aortic replacement Bovine Replacement, DM , CAD s/p CABG 2016 (w bleeding complication) , anemia of chronic disease and a recent admission for a fall presents to the ED 3 days after DC for fall for the chief concern of NBNB N/V for 48hrs. Patient denies: hematemesis wrenching HA, palpitations, rash, diet or medication changes, diarrhea, fever, abdominal pain or cramping, hematochezia/melena. Patient admitted for AKASH on CKD, inadequate urine output and severe hyperKalemia     Impression:  78 MARIZA w a PMH of CKD 3b, Afib (s/p watchman not on AC), COPD not on home O2,  Aortic stenosis w H/O ascending aortic replacement Bovine Replacement, DM , CAD s/p CABG 2016 (w bleeding complication) , anemia of chronic disease and a recent admission for a fall presents to the ED 3 days after DC for fall for the chief concern of NBNB N/V for 48hrs.  Patient admitted for AKASH on CKD, inadequate urine output and severe hyperKalemia     AMS - resolved  hold cns depressants  -ammonia level 195, lactulose q12, taper to 3bm a day     Shock on pressors r/o cardiogenic vs septic - resolved  -E faecalis in ucx 5/17 allergic to amoxicillin  -started meropenem 750 05/17 as per ID  -hold diuretics   -off dobutamine , off levo,  off fluids  - taper hydrocortisone    AKASH on CKD3b - improving  hyperkalemia w/o EKG changes - resolved  - off lokelma  -nephro recs appreciated   - CT no hydro     Rapid A fib  Ho HFpEF, G3DD, moderate PH  Ho CAD s/p CABG  Ho AS s/p SAVR (2016),   HO Atrial fibrillation s/p watchman (2021),   - amio drip, transitioned to po amio   -f/u cardio outpatient  -echo -- EF 60-65% with moderate to severe TR-- bovine aortic valve replaced    Transaminitis iso shock - resolving  -ruqus- cholethithias , no cholecystitis   -ammonia elvated  -lactulose q12    Ho recent traumatic fall admission w DC May 11  - in c collar, conulsted nsgx, will stay in collar until outpatient fu   - dens fracture noted on imaging     HO asthma/COPD not on home O2    DNR/DNI    Patient ready for discharge to SNF HPI:  78 MARIZA w a PMH of CKD 3b, Afib (s/p watchman not on AC), COPD not on home O2,  Aortic stenosis w H/O ascending aortic replacement Bovine Replacement, DM , CAD s/p CABG 2016 (w bleeding complication) , anemia of chronic disease and a recent admission for a fall presents to the ED 3 days after DC for fall for the chief concern of NBNB N/V for 48hrs. Patient denies: hematemesis wrenching HA, palpitations, rash, diet or medication changes, diarrhea, fever, abdominal pain or cramping, hematochezia/melena. Patient admitted for AKASH on CKD, inadequate urine output and severe hyperKalemia     Impression:  78 MARIZA w a PMH of CKD 3b, Afib (s/p watchman not on AC), COPD not on home O2,  Aortic stenosis w H/O ascending aortic replacement Bovine Replacement, DM , CAD s/p CABG 2016 (w bleeding complication) , anemia of chronic disease and a recent admission for a fall presents to the ED 3 days after DC for fall for the chief concern of NBNB N/V for 48hrs.  Patient admitted for AKASH on CKD, inadequate urine output and severe hyperKalemia     AMS - resolved  hold cns depressants  -ammonia level 195  -d/c lactulose     Shock on pressors r/o cardiogenic vs septic - resolved  -E faecalis in ucx 5/17 allergic to amoxicillin  -hold diuretics  -off dobutamine, off levo, off fluids  -completed meropenem course   - s/p hydrocortisone    AKASH on CKD3b - improving  - CT no hydro  - hyperkalemia w/o EKG changes - resolved  - off lokelma  - nephro recs appreciated   - stop sodium bicarb  - c/w LR @100    #Thrombocytopenia  - BL in 100s. Now 48K  - unknown etiology   - HIT negative  - off AC and holding ASA  - monitor  - transfuse platelets if <50K and bleeding  - Heme/onc consulted    Rapid A fib  Ho HFpEF, G3DD, moderate PH  Ho CAD s/p CABG  Ho AS s/p SAVR (2016),   HO Atrial fibrillation s/p watchman (2021)  - echo -- EF 60-65% with moderate to severe TR-- bovine aortic valve replaced  - amio drip, transitioned to po amio. Continue BID for 2 weeks then daily after???  - f/u cardio outpatient    Transaminitis iso shock - resolving  -ruqus- cholethithias , no cholecystitis   -ammonia elevated  -d/c lactulose    Ho recent traumatic fall admission w DC May 11  - in c collar, conulsted nsgx, will stay in collar until outpatient fu   - dens fracture noted on imaging     HO asthma/COPD not on home O2      Patient ready for discharge to SNF HPI:  78 MARIZA w a PMH of CKD 3b, Afib (s/p watchman not on AC), COPD not on home O2,  Aortic stenosis w H/O ascending aortic replacement Bovine Replacement, DM , CAD s/p CABG 2016 (w bleeding complication) , anemia of chronic disease and a recent admission for a fall presents to the ED 3 days after DC for fall for the chief concern of NBNB N/V for 48hrs. Patient denies: hematemesis wrenching HA, palpitations, rash, diet or medication changes, diarrhea, fever, abdominal pain or cramping, hematochezia/melena. Patient admitted for AKASH on CKD, inadequate urine output and severe hyperKalemia     Impression:  78 MARIZA w a PMH of CKD 3b, Afib (s/p watchman not on AC), COPD not on home O2,  Aortic stenosis w H/O ascending aortic replacement Bovine Replacement, DM , CAD s/p CABG 2016 (w bleeding complication) , anemia of chronic disease and a recent admission for a fall presents to the ED 3 days after DC for fall for the chief concern of NBNB N/V for 48hrs.  Patient admitted for AKASH on CKD, inadequate urine output and severe hyperKalemia     AMS - resolved  hold cns depressants  -ammonia level 195  -d/c lactulose     Shock on pressors r/o cardiogenic vs septic - resolved  -E faecalis in ucx 5/17 allergic to amoxicillin  -hold diuretics  -off dobutamine, off levo, off fluids  -completed meropenem course   - s/p hydrocortisone  - c/w fludrocortisone    AKASH on CKD3b - improving  - CT no hydro  - hyperkalemia w/o EKG changes - resolved  - s/p lokelma, sodium bicarb, and IVF  - nephro recs appreciated     #Thrombocytopenia - improved  - BL in 100s. Down to 48K  - unknown etiology   - HIT negative  - Heme/onc appreciated. Likely 2/2 to sepsis  - restart ASA and AC    Rapid A fib  Ho HFpEF, G3DD, moderate PH  Ho CAD s/p CABG  Ho AS s/p SAVR (2016),   HO Atrial fibrillation s/p watchman (2021)  - echo -- EF 60-65% with moderate to severe TR-- bovine aortic valve replaced  - s/p amio  - start Tropol XL 100mg daily   - f/u cardio outpatient    Transaminitis iso shock - resolving  -ruqus- cholethithias , no cholecystitis   -ammonia elevated  -d/c lactulose    Ho recent traumatic fall admission w DC May 11  - in c collar, conulsted nsgx, will stay in collar until outpatient fu   - dens fracture noted on imaging     HO asthma/COPD not on home O2    Patient ready for discharge to SNF HPI:  78 MARIZA w a PMH of CKD 3b, Afib (s/p watchman not on AC), COPD not on home O2,  Aortic stenosis w H/O ascending aortic replacement Bovine Replacement, DM , CAD s/p CABG 2016 (w bleeding complication) , anemia of chronic disease and a recent admission for a fall presents to the ED 3 days after DC for fall for the chief concern of NBNB N/V for 48hrs. Patient denies: hematemesis wrenching HA, palpitations, rash, diet or medication changes, diarrhea, fever, abdominal pain or cramping, hematochezia/melena. Patient admitted for KAASH on CKD, inadequate urine output and severe hyperKalemia     Impression:  78 MARIZA w a PMH of CKD 3b, Afib (s/p watchman not on AC), COPD not on home O2,  Aortic stenosis w H/O ascending aortic replacement Bovine Replacement, DM , CAD s/p CABG 2016 (w bleeding complication) , anemia of chronic disease and a recent admission for a fall presents to the ED 3 days after DC for fall for the chief concern of NBNB N/V for 48hrs.  Patient admitted for AKASH on CKD, inadequate urine output and severe hyperKalemia     AMS - resolved  hold cns depressants  -ammonia level 195  -d/c lactulose     Shock on pressors r/o cardiogenic vs septic - resolved  -E faecalis in ucx 5/17 allergic to amoxicillin  -hold diuretics  -off dobutamine, off levo, off fluids  -completed meropenem course   - s/p hydrocortisone  - c/w fludrocortisone    AKASH on CKD3b - improving  - CT no hydro  - hyperkalemia w/o EKG changes - resolved  - s/p lokelma, sodium bicarb, and IVF  - nephro recs appreciated     #Thrombocytopenia - improved  - BL in 100s. Down to 48K  - unknown etiology   - HIT negative  - Heme/onc appreciated. Likely 2/2 to sepsis  - restart ASA and AC    Rapid A fib  Ho HFpEF, G3DD, moderate PH  Ho CAD s/p CABG  Ho AS s/p SAVR (2016),   HO Atrial fibrillation s/p watchman (2021)  - echo -- EF 60-65% with moderate to severe TR-- bovine aortic valve replaced  - s/p amio  - start Tropol XL 100mg daily   - possible ablation OP with EP  - f/u EP outpatient    Transaminitis iso shock - resolving  -ruqus- cholethithias , no cholecystitis   -ammonia elevated  -d/c lactulose    Ho recent traumatic fall admission w DC May 11  - in c collar, conulsted nsgx, will stay in collar until outpatient fu   - dens fracture noted on imaging     HO asthma/COPD not on home O2    Patient ready for discharge to SNF

## 2024-05-21 NOTE — DISCHARGE NOTE PROVIDER - NSDCMRMEDTOKEN_GEN_ALL_CORE_FT
allopurinol 100 mg oral tablet: 0.5 orally once a day  aspirin 81 mg oral capsule: 1 cap(s) orally once a day  budesonide-formoterol 80 mcg-4.5 mcg/inh inhalation aerosol: 2 puff(s) inhaled 2 times a day  desvenlafaxine (as succinate) 25 mg oral tablet, extended release: 1 tab(s) orally once a day  furosemide 40 mg oral tablet: 1 tab(s) orally once a day  glipiZIDE 5 mg oral tablet: 1 tab(s) orally once a day  ipratropium-albuterol 0.5 mg-2.5 mg/3 mL inhalation solution: 3 milliliter(s) inhaled every 4 hours  Lyrica 75 mg oral capsule: 1 cap(s) orally once a day  metOLazone 5 mg oral tablet: 1 tab(s) orally once a day as needed for fluid overload  metoprolol succinate 200 mg oral tablet, extended release: 1 tab(s) orally once a day  montelukast 10 mg oral tablet: 1 tab(s) orally once a day  polyethylene glycol 3350 oral powder for reconstitution: 17 gram(s) orally 2 times a day  Protonix 40 mg oral delayed release tablet: 1 tab(s) orally once a day  Ranexa 500 mg oral tablet, extended release: 1 tab(s) orally 2 times a day  rosuvastatin 10 mg oral tablet: 1 tab(s) orally once a day  senna leaf extract oral tablet: 2 tab(s) orally once a day (at bedtime)  spironolactone 25 mg oral tablet: 0.5 tab(s) orally once a day Hold while taking bactrim   allopurinol 100 mg oral tablet: 0.5 orally once a day  aspirin 81 mg oral capsule: 1 cap(s) orally once a day  budesonide-formoterol 80 mcg-4.5 mcg/inh inhalation aerosol: 2 puff(s) inhaled 2 times a day  desvenlafaxine (as succinate) 25 mg oral tablet, extended release: 1 tab(s) orally once a day  furosemide 40 mg oral tablet: 1 tab(s) orally once a day  glipiZIDE 5 mg oral tablet: 1 tab(s) orally once a day  ipratropium-albuterol 0.5 mg-2.5 mg/3 mL inhalation solution: 3 milliliter(s) inhaled every 4 hours  Lyrica 75 mg oral capsule: 1 cap(s) orally once a day  metOLazone 5 mg oral tablet: 1 tab(s) orally once a day as needed for fluid overload  metoprolol succinate 100 mg oral tablet, extended release: 1 tab(s) orally once a day  montelukast 10 mg oral tablet: 1 tab(s) orally once a day  nystatin 100,000 units/g topical powder: 1 Apply topically to affected area every 12 hours  polyethylene glycol 3350 oral powder for reconstitution: 17 gram(s) orally 2 times a day  Protonix 40 mg oral delayed release tablet: 1 tab(s) orally once a day  Ranexa 500 mg oral tablet, extended release: 1 tab(s) orally 2 times a day  rosuvastatin 10 mg oral tablet: 1 tab(s) orally once a day  senna leaf extract oral tablet: 2 tab(s) orally once a day (at bedtime)  spironolactone 25 mg oral tablet: 0.5 tab(s) orally once a day Hold while taking bactrim   allopurinol 100 mg oral tablet: 0.5 orally once a day  aspirin 81 mg oral capsule: 1 cap(s) orally once a day  budesonide-formoterol 80 mcg-4.5 mcg/inh inhalation aerosol: 2 puff(s) inhaled 2 times a day  desvenlafaxine (as succinate) 25 mg oral tablet, extended release: 1 tab(s) orally once a day  furosemide 40 mg oral tablet: 1 tab(s) orally once a day  glipiZIDE 5 mg oral tablet: 1 tab(s) orally once a day  ipratropium-albuterol 0.5 mg-2.5 mg/3 mL inhalation solution: 3 milliliter(s) inhaled every 4 hours  Lyrica 75 mg oral capsule: 1 cap(s) orally once a day  metoprolol succinate 100 mg oral tablet, extended release: 1 tab(s) orally once a day  montelukast 10 mg oral tablet: 1 tab(s) orally once a day  nystatin 100,000 units/g topical powder: 1 Apply topically to affected area every 12 hours  polyethylene glycol 3350 oral powder for reconstitution: 17 gram(s) orally 2 times a day  Protonix 40 mg oral delayed release tablet: 1 tab(s) orally once a day  Ranexa 500 mg oral tablet, extended release: 1 tab(s) orally 2 times a day  rosuvastatin 10 mg oral tablet: 1 tab(s) orally once a day  senna leaf extract oral tablet: 2 tab(s) orally once a day (at bedtime)   allopurinol 100 mg oral tablet: 0.5 orally once a day  aspirin 81 mg oral capsule: 1 cap(s) orally once a day  budesonide-formoterol 80 mcg-4.5 mcg/inh inhalation aerosol: 2 puff(s) inhaled 2 times a day  desvenlafaxine (as succinate) 25 mg oral tablet, extended release: 1 tab(s) orally once a day  doxazosin 2 mg oral tablet: 1 tab(s) orally once a day (at bedtime)  furosemide 40 mg oral tablet: 1 tab(s) orally once a day  glipiZIDE 5 mg oral tablet: 1 tab(s) orally once a day  ipratropium-albuterol 0.5 mg-2.5 mg/3 mL inhalation solution: 3 milliliter(s) inhaled every 4 hours  Lyrica 75 mg oral capsule: 1 cap(s) orally once a day  metoprolol succinate 100 mg oral tablet, extended release: 1 tab(s) orally once a day  montelukast 10 mg oral tablet: 1 tab(s) orally once a day  nystatin 100,000 units/g topical powder: 1 Apply topically to affected area every 12 hours  polyethylene glycol 3350 oral powder for reconstitution: 17 gram(s) orally 2 times a day  Protonix 40 mg oral delayed release tablet: 1 tab(s) orally once a day  Ranexa 500 mg oral tablet, extended release: 1 tab(s) orally 2 times a day  rosuvastatin 10 mg oral tablet: 1 tab(s) orally once a day  senna leaf extract oral tablet: 2 tab(s) orally once a day (at bedtime)

## 2024-05-21 NOTE — CHART NOTE - NSCHARTNOTEFT_GEN_A_CORE
Palliative following for GOC.   Patient clinically improved. Goals are clear- DNR/DNI with ongoing medical management.   patient is preparing for d/c.   Will sign off. Please reconsult PRN [phone].

## 2024-05-21 NOTE — DISCHARGE NOTE PROVIDER - NSDCFUSCHEDAPPT_GEN_ALL_CORE_FT
Sandeep Rivera  [facility]  NEUROSURG [address]  Scheduled Appointment: 06/03/2024    Howard Ramirez  [facility]  CARDIOLOGY [address]  Scheduled Appointment: 06/03/2024

## 2024-05-21 NOTE — DISCHARGE NOTE PROVIDER - PROVIDER TOKENS
PROVIDER:[TOKEN:[[id number]],FOLLOWUP:[2 weeks],ESTABLISHEDPATIENT:[T]] PROVIDER:[TOKEN:[[id number]],FOLLOWUP:[2 weeks],ESTABLISHEDPATIENT:[T]],PROVIDER:[TOKEN:[[id number]],FOLLOWUP:[2 weeks]]

## 2024-05-21 NOTE — DISCHARGE NOTE PROVIDER - NSDCCPCAREPLAN_GEN_ALL_CORE_FT
PRINCIPAL DISCHARGE DIAGNOSIS  Diagnosis: AKASH (acute kidney injury)  Assessment and Plan of Treatment: You came to the hospital for vomitting and was found to have a kidney injury. You were also found to have a UTI. You were treated for the kidney injury and UTI with fluids and antibiotics, respectively. You are clinically stable for discharge. Please continue to take all of your medications as prescribed and follow up with your PCP.  Call 911 if:  You have sudden chest pain or trouble breathing.  Seek care immediately if:  Your symptoms get worse.  Contact your healthcare provider if:  Your symptoms return.  Your blood sugar or blood pressure level is not within the range your healthcare provider recommends.  You have questions or concerns about your condition or care.        SECONDARY DISCHARGE DIAGNOSES  Diagnosis: Nausea & vomiting  Assessment and Plan of Treatment:     Diagnosis: Hyperkalemia  Assessment and Plan of Treatment:      PRINCIPAL DISCHARGE DIAGNOSIS  Diagnosis: AKASH (acute kidney injury)  Assessment and Plan of Treatment: You came to the hospital for vomitting and was found to have a kidney injury. You were also found to have a UTI. You were treated for the kidney injury and UTI with fluids and antibiotics, respectively. You are clinically stable for discharge. Please continue to take all of your medications as prescribed and follow up with your PCP.  Call 911 if:  You have sudden chest pain or trouble breathing.  Seek care immediately if:  Your symptoms get worse.  Contact your healthcare provider if:  Your symptoms return.  Your blood sugar or blood pressure level is not within the range your healthcare provider recommends.  You have questions or concerns about your condition or care.        SECONDARY DISCHARGE DIAGNOSES  Diagnosis: Nausea & vomiting  Assessment and Plan of Treatment:     Diagnosis: Hyperkalemia  Assessment and Plan of Treatment:     Diagnosis: Afib  Assessment and Plan of Treatment: You were seen by electrophysiology (EP) for your afib. You were started on metoprolol 100mg daily. Please follow up with EP Dr. Jaquez for further management.

## 2024-05-21 NOTE — PROGRESS NOTE ADULT - SUBJECTIVE AND OBJECTIVE BOX
- Seen and examined patient   - No acute issue  - PASSEd TOV  - Exam no change from previous  - Labs and imaging reviewed  - stable to dc to [facility]/SNF

## 2024-05-21 NOTE — DISCHARGE NOTE PROVIDER - CARE PROVIDERS DIRECT ADDRESSES
,DirectAddress_Unknown ,DirectAddress_Unknown,[email or web address]

## 2024-05-21 NOTE — SWALLOW BEDSIDE ASSESSMENT ADULT - ADDITIONAL RECOMMENDATIONS
SLP will plan to f/u to assess FEES candidacy
SLP will plan to f/u to assess FEES candidacy
RE-consult ST services PRN.

## 2024-05-21 NOTE — SWALLOW BEDSIDE ASSESSMENT ADULT - SLP PERTINENT HISTORY OF CURRENT PROBLEM
79yo Female with PMH of CKD 3b, Afib (s/p watchman not on AC), COPD not on home O2, Aortic stenosis w H/O ascending aortic replacement Bovine Replacement, DM , CAD s/p CABG 2016 (w bleeding complication), anemia of chronic disease and a recent admission for a fall presents to the ED 3 days after DC for fall for the chief concern of NBNB N/V for 48hrs. Hospital course complicated by AKASH on CKD with hyperkalemia, shock on pressors. MRI C-spine--> redemonstration of an acute type II dens fracture with associated prevertebral soft tissue swelling.

## 2024-05-21 NOTE — DISCHARGE NOTE PROVIDER - ATTENDING DISCHARGE PHYSICAL EXAMINATION:
patient seen and examined independently on morning rounds, chart reviewed and discussed with medicine resident and agree with the above discharge note and please refer to my progress note from today for additional information.    time spendt on discharge >30 minutes including coordination of discharge care    discharge to [facility] today- plan for f/u with outpatient pcp and with eps for consideration for ablation

## 2024-05-21 NOTE — DISCHARGE NOTE PROVIDER - CARE PROVIDER_API CALL
Ayad Phipps  Internal Medicine  [address] [place] [address]  Phone: [phone]  Fax: [phone]  Established Patient  Follow Up Time: 2 weeks   Ayad Phipps  Internal Medicine  [address] [place] [address]  Phone: [phone]  Fax: [phone]  Established Patient  Follow Up Time: 2 weeks    Mumtaz Jaquez  Cardiac Electrophysiology  [address] [place] [address], [address]  [place] [address]  Phone: [phone]  Fax: [phone]  Follow Up Time: 2 weeks

## 2024-05-21 NOTE — SWALLOW BEDSIDE ASSESSMENT ADULT - SWALLOW EVAL: DIAGNOSIS
Pt. continues with mild oral dysphagia with easy to chew solids, toleration observed for easy to chew solids and thin liquids without overt s/s of penetration/ aspiration.

## 2024-05-21 NOTE — SWALLOW BEDSIDE ASSESSMENT ADULT - PHARYNGEAL PHASE
Cough post oral intake/Within functional limits
w/thin liquids./Cough post oral intake
w/thin liquids./Cough post oral intake

## 2024-05-21 NOTE — SWALLOW BEDSIDE ASSESSMENT ADULT - SWALLOW EVAL: RECOMMENDED FEEDING/EATING TECHNIQUES
allow for swallow between intakes/position upright (90 degrees)/small sips/bites
allow for swallow between intakes/small sips/bites

## 2024-05-22 NOTE — PROGRESS NOTE ADULT - ASSESSMENT
AKASH on CKD 3 / last creat on 5/10 was 1.6 / received contrast on 5/9, but AKASH more likely related to hemodynamic instability, Afib with RVR, fall, very dark urine  Hyponatremia improving with iv hydration   Hyperkalemia s/p medical treatment improved  AKASH on CKD most likely ATN   - creat trending down / repeat cr   - monitor strict i/o  - cont sodium bicarb 1300mg q8h, decrease to q12 if bicarb > 22 today  - on meropenem  - BP stable, start midodrine if remains low   - phos level high, on sevelamer 1 tab TID with meals / please repeat  - no acute indication for RRT at this point / will follow up closely   - ct:   No hydronephrosis. Bilateral angiomyolipomas similar to prior exam.  will follow AKASH on CKD 3 / last creat on 5/10 was 1.6 / received contrast on 5/9, but AKASH more likely related to hemodynamic instability, Afib with RVR, fall, very dark urine  Hyponatremia improving with iv hydration   Hyperkalemia s/p medical treatment improved  AKASH on CKD most prerenal vs ATN MCKAYLA?  - creat trending down / repeat cr   - monitor strict i/o  - DECREASE sodium bicarb 650 mg q12h,  - on meropenem  - replete K to 4.0 - needs KCl 40 po x1  - BP stable, start midodrine if remains low   - phos level high, on sevelamer 1 tab TID with meals / please repeat    - CT: No hydronephrosis. Bilateral angiomyolipomas similar to prior exam.  will follow

## 2024-05-22 NOTE — PROGRESS NOTE ADULT - SUBJECTIVE AND OBJECTIVE BOX
SUBJECTIVE:    Patient is a 78y old Female who presents with a chief complaint of N/V, AKASH on CKD, hyperkalmeia (21 May 2024 14:27)    Currently admitted to medicine with the primary diagnosis of AKASH (acute kidney injury)       Today is hospital day 7d. This morning she is resting comfortably in bed and reports no new issues or overnight events.     PAST MEDICAL & SURGICAL HISTORY  Aortic stenosis    Diabetes    Asthma with COPD  many years since last attack    CAD (coronary artery disease), native coronary artery    Anemia    History of bleeding disorder  having work up 5/2/21- HAD WORKUP BUT NO DIAGNOSIS "NOTHING WAS FOUND"    History of transfusion reaction    Hiatal hernia    Stage 3 chronic kidney disease    H/O ascending aortic replacement  Bovine Replacement    S/P CABG x 1  complication of bleeding 2016    H/O total knee replacement, right  complicated with fx femur bars screws in femur- b/l knee replacement    S/P hysterectomy    Presence of Watchman left atrial appendage closure device      SOCIAL HISTORY:  Negative for smoking/alcohol/drug use.     ALLERGIES:  Augmentin (Other)  penicillins (Hives; Rash)    MEDICATIONS:  STANDING MEDICATIONS  allopurinol 50 milliGRAM(s) Oral daily  aMIOdarone    Tablet 200 milliGRAM(s) Oral two times a day  aspirin  chewable 81 milliGRAM(s) Oral daily  budesonide  80 MICROgram(s)/formoterol 4.5 MICROgram(s) Inhaler 2 Puff(s) Inhalation two times a day  chlorhexidine 2% Cloths 1 Application(s) Topical daily  dextrose 10% Bolus 125 milliLiter(s) IV Bolus once  dextrose 5%. 1000 milliLiter(s) IV Continuous <Continuous>  dextrose 5%. 1000 milliLiter(s) IV Continuous <Continuous>  dextrose 50% Injectable 25 Gram(s) IV Push once  dextrose 50% Injectable 12.5 Gram(s) IV Push once  doxazosin 2 milliGRAM(s) Oral at bedtime  fludroCORTISONE 0.1 milliGRAM(s) Oral daily  glucagon  Injectable 1 milliGRAM(s) IntraMuscular once  heparin   Injectable 5000 Unit(s) SubCutaneous every 12 hours  insulin glargine Injectable (LANTUS) 16 Unit(s) SubCutaneous at bedtime  insulin lispro (ADMELOG) corrective regimen sliding scale   SubCutaneous three times a day before meals  insulin lispro Injectable (ADMELOG) 3 Unit(s) SubCutaneous three times a day before meals  lactated ringers. 1000 milliLiter(s) IV Continuous <Continuous>  melatonin 5 milliGRAM(s) Oral at bedtime  meropenem  IVPB 750 milliGRAM(s) IV Intermittent every 24 hours  meropenem  IVPB      metoprolol tartrate 50 milliGRAM(s) Oral four times a day  montelukast 10 milliGRAM(s) Oral daily  nystatin Powder 1 Application(s) Topical every 12 hours  pantoprazole    Tablet 40 milliGRAM(s) Oral before breakfast  polyethylene glycol 3350 17 Gram(s) Oral daily  senna 2 Tablet(s) Oral at bedtime  sodium bicarbonate 1300 milliGRAM(s) Oral three times a day  tiotropium 2.5 MICROgram(s) Inhaler 2 Puff(s) Inhalation daily    PRN MEDICATIONS  dextrose Oral Gel 15 Gram(s) Oral once PRN  lidocaine   4% Patch 1 Patch Transdermal every 24 hours PRN  ondansetron Injectable 4 milliGRAM(s) IV Push four times a day PRN  oxyCODONE    IR 2.5 milliGRAM(s) Oral every 6 hours PRN    VITALS:   T(F): 96.8  HR: 81  BP: 108/70  RR: 17  SpO2: 98%    LABS:                        10.9   8.69  )-----------( 50       ( 22 May 2024 08:04 )             35.5     05-22    138  |  98  |  85<HH>  ----------------------------<  169<H>  3.4<L>   |  25  |  3.2<H>    Ca    8.3<L>      22 May 2024 08:04  Mg     2.1     05-22    TPro  5.5<L>  /  Alb  3.1<L>  /  TBili  2.0<H>  /  DBili  x   /  AST  39  /  ALT  197<H>  /  AlkPhos  118<H>  05-22      Urinalysis Basic - ( 22 May 2024 08:04 )    Color: x / Appearance: x / SG: x / pH: x  Gluc: 169 mg/dL / Ketone: x  / Bili: x / Urobili: x   Blood: x / Protein: x / Nitrite: x   Leuk Esterase: x / RBC: x / WBC x   Sq Epi: x / Non Sq Epi: x / Bacteria: x                RADIOLOGY:    PHYSICAL EXAM:  GEN: in neck collar   LUNGS: Clear to auscultation bilaterally   HEART: S1/S2 present. RRR.   ABD: Soft, non-tender, non-distended. Bowel sounds present  EXT: NC/NC/NE/2+PP/DÍAZ/Skin Intact.   NEURO: AAOX3    Intravenous access:   NG tube:   Wang Catheter:   Indwelling Urethral Catheter:     Connect To:  Straight Drainage/[place]    Indication:  Urinary Retention / Obstruction (05-19-24 @ 06:02) (not performed) [Active]  Indwelling Urethral Catheter:     Connect To:  Straight Drainage/[place]    Indication:  Urinary Retention / Obstruction (05-18-24 @ 05:43) (not performed) [Active]  Indwelling Urethral Catheter:     Connect To:  Straight Drainage/[place]    Indication:  Urinary Retention / Obstruction (05-14-24 @ 22:37) (not performed) [Active]

## 2024-05-22 NOTE — PROGRESS NOTE ADULT - ASSESSMENT
77 YO Woman w a PMH of CKD 3b, Afib (s/p watchman, not on AC), COPD not on home O2, Aortic stenosis w AVR (Bovine); DM, CAD s/p CABG 2016 (w bleeding complication), anemia of chronic disease and a recent admission for a fall presents to the ED 3 days after DC for fall w/ chief concern of NBNB N/V for 48hrs.  Patient admitted for AKASH on CKD, inadequate urine output and severe hyperKalemia     # AMS - poss toxic and metabolic encephalopathy  hold cns depressants  ammonia level 195 -> 27  off lactulose  MS back to baseline today    # Septic (HR, WBC) shock present on admit 2/2 UTI 2/2 Enterococcus (VSE)  -E faecalis (VSE; S-ampi) in ucx 5/17 allergic to amoxicillin  ID noted  abx: meropenem 750 iv q24 for 8 days (5/16-5/23)  off pressors  tapered off hydrocortisone -> back on florinef 0.1 po q24    # AKASH (likely ATN) on CKD3b; hyperkalemia; mild normo an of chr dz  base Cr mid 1s  keep hgb >8  K a little low (hypokalemia) - replete  off lokelma  f/u renal  CT no hydro  decr NaHCO3 650mg po q12  f/u renal  IVFs: /hr  BMP q24    # abnl LFTs  could have been from fall  poss shock liver  CT A/P 5/9: no cirrhosis; + GB stones (asymp)  RUQ U/S: GB stones  LFTs q48    # thrombocytopenia - suspect 2/2 sepsis  plt are usually in low 100s back to 2021  plt good until 5/17/24  PF-4 Ab: neg  d/c hep and asa (plt are now 50K)  SCDs for DVT ppx  cbc q24    # DM w/ hyperglycemia  on glipizide at home  diabetic dash diet  FS qac/hs - goal 100-180  make lantus 15 HS  make lisp 5/m w/ +1 scale    # Rapid A fib; Ho HFpEF, G3DD, moderate PHTN; Ho CAD s/p CABG; Ho AS s/p SAVR (2016) (bovine); HO Atrial fibrillation s/p watchman (2021), no a/c  HOLD asa - plt 50 K  amio 200mg po q12 - need to ask cardio if this is going to be usual dose  c/w metoprolol 50 po q6 - hold if BP < 100/60 or HR < 60  c/w doxazosin 2mg po qhs  cardio noted  echo: EF 60-65% with moderate to severe TR-- bovine aortic valve replaced    # Ho recent traumatic fall admission w DC May 11; has C2 fx; back pain  c/w [place] collar  NSx: stay in collar until outpatient fu   dens fracture noted on imaging   c/w lido patch q24  melatonin 5mg hs  c/w oxy IR 2.5mg po q6 prn pain  bowel reg: PEG q24 + senna 2 hs    # HO asthma/COPD not on home O2  off O2 now  c/w symbicort 80/4.5 2 puffs po q12  c/w spiriva 2 puff q24  c/w singulair 10 q24    # DVT ppx: HOLD Hep (low plt) - use SCDs    # GI ppx: PPI po q24    # Activity: PT eval; needs SNF    # DNR/DNI    Dispo: PT eval; f/u renal/ID/cardio; HOLD asa/DVT ppx: f/u plt; tx DM; c/w [place] collar; IVFs; abx  eventually, pt will need STR at SNF - f/u CM - prob d/c in 48-72hrs    Prog is guarded, but improving.

## 2024-05-22 NOTE — PROGRESS NOTE ADULT - SUBJECTIVE AND OBJECTIVE BOX
Nephrology progress note    Patient is seen and examined, events over the last 24 h noted .    Allergies:  Augmentin (Other)  penicillins (Hives; Rash)    Hospital Medications:   MEDICATIONS  (STANDING):  allopurinol 50 milliGRAM(s) Oral daily  aMIOdarone    Tablet 200 milliGRAM(s) Oral two times a day  aspirin  chewable 81 milliGRAM(s) Oral daily  budesonide  80 MICROgram(s)/formoterol 4.5 MICROgram(s) Inhaler 2 Puff(s) Inhalation two times a day  chlorhexidine 2% Cloths 1 Application(s) Topical daily  dextrose 10% Bolus 125 milliLiter(s) IV Bolus once  dextrose 5%. 1000 milliLiter(s) (100 mL/Hr) IV Continuous <Continuous>  dextrose 5%. 1000 milliLiter(s) (50 mL/Hr) IV Continuous <Continuous>  dextrose 50% Injectable 25 Gram(s) IV Push once  dextrose 50% Injectable 12.5 Gram(s) IV Push once  doxazosin 2 milliGRAM(s) Oral at bedtime  fludroCORTISONE 0.1 milliGRAM(s) Oral daily  glucagon  Injectable 1 milliGRAM(s) IntraMuscular once  heparin   Injectable 5000 Unit(s) SubCutaneous every 12 hours  insulin glargine Injectable (LANTUS) 16 Unit(s) SubCutaneous at bedtime  insulin lispro (ADMELOG) corrective regimen sliding scale   SubCutaneous three times a day before meals  insulin lispro Injectable (ADMELOG) 3 Unit(s) SubCutaneous three times a day before meals  lactated ringers. 1000 milliLiter(s) (75 mL/Hr) IV Continuous <Continuous>  melatonin 5 milliGRAM(s) Oral at bedtime  meropenem  IVPB 750 milliGRAM(s) IV Intermittent every 24 hours  meropenem  IVPB      metoprolol tartrate 50 milliGRAM(s) Oral four times a day  montelukast 10 milliGRAM(s) Oral daily  nystatin Powder 1 Application(s) Topical every 12 hours  pantoprazole    Tablet 40 milliGRAM(s) Oral before breakfast  polyethylene glycol 3350 17 Gram(s) Oral daily  senna 2 Tablet(s) Oral at bedtime  sodium bicarbonate 1300 milliGRAM(s) Oral three times a day  tiotropium 2.5 MICROgram(s) Inhaler 2 Puff(s) Inhalation daily        VITALS:  T(F): 96.8 (05-22-24 @ 05:00), Max: 97.3 (05-21-24 @ 20:17)  HR: 81 (05-22-24 @ 05:00)  BP: 108/70 (05-22-24 @ 05:00)  RR: 17 (05-22-24 @ 05:00)  SpO2: 98% (05-22-24 @ 05:00)  Wt(kg): --    05-20 @ 07:01  -  05-21 @ 07:00  --------------------------------------------------------  IN: 622 mL / OUT: 1300 mL / NET: -678 mL    05-21 @ 07:01  -  05-22 @ 07:00  --------------------------------------------------------  IN: 0 mL / OUT: 900 mL / NET: -900 mL          PHYSICAL EXAM:  Constitutional: NAD  HEENT: anicteric sclera, oropharynx clear, MMM  Neck: No JVD  Respiratory: CTAB, no wheezes, rales or rhonchi  Cardiovascular: S1, S2, RRR  Gastrointestinal: BS+, soft, NT/ND  Extremities: No cyanosis or clubbing. No peripheral edema  Neurological:   : No CVA tenderness. No burden.   Skin: No rashes  Vascular Access:    LABS:  05-22    138  |  98  |  85<HH>  ----------------------------<  169<H>  3.4<L>   |  25  |  3.2<H>    Ca    8.3<L>      22 May 2024 08:04  Mg     2.1     05-22    TPro  5.5<L>  /  Alb  3.1<L>  /  TBili  2.0<H>  /  DBili      /  AST  39  /  ALT  197<H>  /  AlkPhos  118<H>  05-22                          10.9   8.69  )-----------( 50       ( 22 May 2024 08:04 )             35.5       Urine Studies:  Urinalysis Basic - ( 22 May 2024 08:04 )    Color:  / Appearance:  / SG:  / pH:   Gluc: 169 mg/dL / Ketone:   / Bili:  / Urobili:    Blood:  / Protein:  / Nitrite:    Leuk Esterase:  / RBC:  / WBC    Sq Epi:  / Non Sq Epi:  / Bacteria:         RADIOLOGY & ADDITIONAL STUDIES:   Nephrology progress note    Patient is seen and examined, events over the last 24 h noted .  in c collar  denies sob LE +edema  Allergies:  Augmentin (Other)  penicillins (Hives; Rash)    Hospital Medications:   MEDICATIONS  (STANDING):  allopurinol 50 milliGRAM(s) Oral daily  aMIOdarone    Tablet 200 milliGRAM(s) Oral two times a day  aspirin  chewable 81 milliGRAM(s) Oral daily  budesonide  80 MICROgram(s)/formoterol 4.5 MICROgram(s) Inhaler 2 Puff(s) Inhalation two times a day  chlorhexidine 2% Cloths 1 Application(s) Topical daily  dextrose 10% Bolus 125 milliLiter(s) IV Bolus once  dextrose 5%. 1000 milliLiter(s) (100 mL/Hr) IV Continuous <Continuous>  dextrose 5%. 1000 milliLiter(s) (50 mL/Hr) IV Continuous <Continuous>  dextrose 50% Injectable 25 Gram(s) IV Push once  dextrose 50% Injectable 12.5 Gram(s) IV Push once  doxazosin 2 milliGRAM(s) Oral at bedtime  fludroCORTISONE 0.1 milliGRAM(s) Oral daily  glucagon  Injectable 1 milliGRAM(s) IntraMuscular once  heparin   Injectable 5000 Unit(s) SubCutaneous every 12 hours  insulin glargine Injectable (LANTUS) 16 Unit(s) SubCutaneous at bedtime  insulin lispro (ADMELOG) corrective regimen sliding scale   SubCutaneous three times a day before meals  insulin lispro Injectable (ADMELOG) 3 Unit(s) SubCutaneous three times a day before meals  lactated ringers. 1000 milliLiter(s) (75 mL/Hr) IV Continuous <Continuous>  melatonin 5 milliGRAM(s) Oral at bedtime  meropenem  IVPB 750 milliGRAM(s) IV Intermittent every 24 hours  meropenem  IVPB      metoprolol tartrate 50 milliGRAM(s) Oral four times a day  montelukast 10 milliGRAM(s) Oral daily  nystatin Powder 1 Application(s) Topical every 12 hours  pantoprazole    Tablet 40 milliGRAM(s) Oral before breakfast  polyethylene glycol 3350 17 Gram(s) Oral daily  senna 2 Tablet(s) Oral at bedtime  sodium bicarbonate 1300 milliGRAM(s) Oral three times a day  tiotropium 2.5 MICROgram(s) Inhaler 2 Puff(s) Inhalation daily        VITALS:  T(F): 96.8 (05-22-24 @ 05:00), Max: 97.3 (05-21-24 @ 20:17)  HR: 81 (05-22-24 @ 05:00)  BP: 108/70 (05-22-24 @ 05:00)  RR: 17 (05-22-24 @ 05:00)  SpO2: 98% (05-22-24 @ 05:00)  Wt(kg): --    05-20 @ 07:01  -  05-21 @ 07:00  --------------------------------------------------------  IN: 622 mL / OUT: 1300 mL / NET: -678 mL    05-21 @ 07:01  -  05-22 @ 07:00  --------------------------------------------------------  IN: 0 mL / OUT: 900 mL / NET: -900 mL          PHYSICAL EXAM:  Constitutional: NAD  HEENT: anicteric sclera,  Neck: c collar  Respiratory: CTA  Cardiovascular: S1, S2, RRR  Gastrointestinal: BS+, soft, NT/ND  Extremities: Has peripheral edema  Neurological:   : No CVA tenderness. No burden. Primafit+  Skin: No rashes  Vascular Access:    LABS:  05-22    138  |  98  |  85<HH>  ----------------------------<  169<H>  3.4<L>   |  25  |  3.2<H>  Creatinine Trend: 3.2<--, 3.6<--, 4.3<--, 4.9<--, 5.4<--, 5.2<--  Ca    8.3<L>      22 May 2024 08:04  Mg     2.1     05-22    TPro  5.5<L>  /  Alb  3.1<L>  /  TBili  2.0<H>  /  DBili      /  AST  39  /  ALT  197<H>  /  AlkPhos  118<H>  05-22                          10.9   8.69  )-----------( 50       ( 22 May 2024 08:04 )             35.5       Urine Studies:  Urinalysis Basic - ( 22 May 2024 08:04 )    Color:  / Appearance:  / SG:  / pH:   Gluc: 169 mg/dL / Ketone:   / Bili:  / Urobili:    Blood:  / Protein:  / Nitrite:    Leuk Esterase:  / RBC:  / WBC    Sq Epi:  / Non Sq Epi:  / Bacteria:         RADIOLOGY & ADDITIONAL STUDIES:  < from: CT Abdomen and Pelvis w/ IV Cont (05.09.24 @ 02:16) >      < end of copied text >

## 2024-05-22 NOTE — PROGRESS NOTE ADULT - ASSESSMENT
78 MARIZA w a PMH of CKD 3b, Afib (s/p watchman not on AC), COPD not on home O2,  Aortic stenosis w H/O ascending aortic replacement Bovine Replacement, DM , CAD s/p CABG 2016 (w bleeding complication) , anemia of chronic disease and a recent admission for a fall presents to the ED 3 days after DC for fall for the chief concern of NBNB N/V for 48hrs.  Patient admitted for AKASH on CKD, inadequate urine output and severe hyperKalemia     AMS - resolved  hold cns depressants  -ammonia level 195  -d/c lactulose     Shock on pressors r/o cardiogenic vs septic - resolved  -E faecalis in ucx 5/17 allergic to amoxicillin  -started meropenem 750 for 7 days (Last day 5/24) as per ID   -hold diuretics  -off dobutamine, off levo, off fluids  - Last day hydrocortisone    AKASH on CKD3b - improving  - hyperkalemia w/o EKG changes - resolved  - off lokelma  - nephro recs appreciated   - CT no hydro  - start LR @75     Rapid A fib  Ho HFpEF, G3DD, moderate PH  Ho CAD s/p CABG  Ho AS s/p SAVR (2016),   HO Atrial fibrillation s/p watchman (2021)  - echo -- EF 60-65% with moderate to severe TR-- bovine aortic valve replaced  - amio drip, transitioned to po amio. Continue BID for 2 weeks then daily after.  - f/u cardio outpatient    Transaminitis iso shock - resolving  -ruqus- cholethithias , no cholecystitis   -ammonia elevated  -d/c lactulose    Ho recent traumatic fall admission w DC May 11  - in c collar, conulsted nsgx, will stay in collar until outpatient fu   - dens fracture noted on imaging     HO asthma/COPD not on home O2    DNR/DNI    Anticipate DC to [facility]

## 2024-05-22 NOTE — PROGRESS NOTE ADULT - SUBJECTIVE AND OBJECTIVE BOX
EVAN QUINN  78y  Female  ***My note supersedes ALL resident notes that I sign.  My corrections for their notes are in my note.***    I can be reached directly on [id number]. My office number is [phone]. My personal cell number is [phone].    INTERVAL EVENTS: Here for f/u of AKASH. Pt says that UO is starting to . Feels UTI is better. Had fall a few wks ago w/ C2 fx - so in [place] collar - compliant. Pt planning to go to SNF for STR. Pt can eat/drink.    T(F): 97.4 (05-22-24 @ 12:00), Max: 97.4 (05-22-24 @ 12:00)  HR: 85 (05-22-24 @ 12:00) (81 - 90)  BP: 121/74 (05-22-24 @ 12:00) (108/70 - 121/74)  RR: 18 (05-22-24 @ 12:00) (17 - 18)  SpO2: 96% (05-22-24 @ 12:00) (96% - 100%)    BMI 40.4 = morbid obese    Gen: NAD  HEENT: PERRL, EOMI, mouth clr, nose clr  Neck: no nodes, no JVD, thyroid nl; [place] collar (C2 fx)  lungs: clr  hrt: s1 s2 rrr no murmur  abd: soft, NT/ND, no HS megaly  ext: no edema, no c/c  neuro: aa ox3, cn intact, can move all 4 ext    LABS:                      10.9    (    86.8   8.69  )-----------( ---------      50       ( 22 May 2024 08:04 )             35.5    (    21.9     Hemoglobin: 10.9 g/dL (05-22 @ 08:04)  Hemoglobin: 10.7 g/dL (05-21 @ 06:54)  Hemoglobin: 10.7 g/dL (05-18 @ 20:07)  Hemoglobin: 10.3 g/dL (05-18 @ 11:58)  Hemoglobin: 8.2 g/dL (05-18 @ 05:14)    Platelet Count - Automated: 50 K/uL (05-22-24 @ 08:04) - worse  Platelet Count - Automated: 60 K/uL (05-21-24 @ 06:54)  Platelet Count - Automated: 91 K/uL (05-18-24 @ 20:07)  Platelet Count - Automated: 95 K/uL (05-18-24 @ 11:58)  Platelet Count - Automated: 86 K/uL (05-18-24 @ 05:14)    138   (   98   (   169      05-22-24 @ 08:04  ----------------------               3.4   (   25   (   85                             -----                        3.2  Ca  8.3   Mg  2.1    P   --     Creatinine:   3.2 (05-22 @ 08:04) - better  eGFR:  14    Creatinine:   3.6 (05-21 @ 06:54)  eGFR:  12    Creatinine:   4.3 (05-19 @ 12:12)  eGFR:  10    Creatinine:   4.9 (05-18 @ 05:14)  eGFR:  9      LFT  5.5  (  2.0  (  39       05-22-24 @ 08:04  -------------------------  3.1  (  118  (  197    Alb 3.1  T stephenie 2.0     AST 39    05-22-24 @ 08:04 - better  Alb 3.1  T stephenie 2.0     AST 68    05-21-24 @ 06:54  Alb 3.2  T stephenie 2.6         05-19-24 @ 12:12  Alb 3.0  T stephenie 3.0         05-18-24 @ 05:14    Urinalysis Basic - ( 22 May 2024 08:04 )    Color: x / Appearance: x / SG: x / pH: x  Gluc: 169 mg/dL / Ketone: x  / Bili: x / Urobili: x   Blood: x / Protein: x / Nitrite: x   Leuk Esterase: x / RBC: x / WBC x   Sq Epi: x / Non Sq Epi: x / Bacteria: x    CAPILLARY BLOOD GLUCOSE  POCT Blood Glucose.: 222 (05-22-24 @ 11:38)  POCT Blood Glucose.: 187 (05-22-24 @ 08:40)  POCT Blood Glucose.: 297 (05-21-24 @ 22:09)  POCT Blood Glucose.: 302 (05-21-24 @ 16:21)  POCT Blood Glucose.: 314 (05-21-24 @ 11:18)  POCT Blood Glucose.: 255 (05-21-24 @ 07:32)  POCT Blood Glucose.: 375 (05-20-24 @ 21:14)  POCT Blood Glucose.: 331 (05-20-24 @ 16:42)    RADIOLOGY & ADDITIONAL TESTS:  < from: Xray Chest 1 View- PORTABLE-Routine (Xray Chest 1 View- PORTABLE-Routine .) (05.17.24 @ 19:26) >  Impression:  Interval placement of enteric tube traveling below the diaphragm with tip   out of view. Otherwise stable exam.    < end of copied text >    < from: US Abdomen Upper Quadrant Right (05.16.24 @ 13:09) >  IMPRESSION:  Cholelithiasis without ductal dilatation.    < end of copied text >    < from: CT Head No Cont (05.14.24 @ 19:48) >  IMPRESSION:    No evidence of acute intracranial pathology.    < end of copied text >    < from: CT Angio Neck w/ IV Cont (05.09.24 @ 17:33) >  IMPRESSION:    No evidence of major vascular stenosis, occlusion, or aneurysm.    No evidence of vascular occlusion, extravasation, dissection,   pseudoaneurysm, or other vascular injury.    Fracture of the odontoid and degenerative changes of the cervical spine   are again noted.    < end of copied text >    < from: CT Abdomen and Pelvis w/ IV Cont (05.09.24 @ 02:16) >  IMPRESSION:    No evidence of an acute traumatic solid organ or osseous injury.    Subxiphoid hernia pocket is slightly larger and now contains a short loop   of the transverse large bowel without evidence of obstruction at this time.    < end of copied text >    MEDICATIONS:  meropenem  IVPB      meropenem  IVPB 750 milliGRAM(s) IV Intermittent every 24 hours    allopurinol 50 milliGRAM(s) Oral daily  aMIOdarone    Tablet 200 milliGRAM(s) Oral two times a day  aspirin  chewable 81 milliGRAM(s) Oral daily  budesonide  80 MICROgram(s)/formoterol 4.5 MICROgram(s) Inhaler 2 Puff(s) Inhalation two times a day  chlorhexidine 2% Cloths 1 Application(s) Topical daily  doxazosin 2 milliGRAM(s) Oral at bedtime  fludroCORTISONE 0.1 milliGRAM(s) Oral daily  heparin   Injectable 5000 Unit(s) SubCutaneous every 12 hours  insulin glargine Injectable (LANTUS) 16 Unit(s) SubCutaneous at bedtime  insulin lispro (ADMELOG) corrective regimen sliding scale   SubCutaneous three times a day before meals  insulin lispro Injectable (ADMELOG) 3 Unit(s) SubCutaneous three times a day before meals  lactated ringers. 1000 milliLiter(s) IV Continuous <Continuous>  lidocaine   4% Patch 1 Patch Transdermal every 24 hours PRN  melatonin 5 milliGRAM(s) Oral at bedtime  metoprolol tartrate 50 milliGRAM(s) Oral four times a day  montelukast 10 milliGRAM(s) Oral daily  nystatin Powder 1 Application(s) Topical every 12 hours  ondansetron Injectable 4 milliGRAM(s) IV Push four times a day PRN  oxyCODONE    IR 2.5 milliGRAM(s) Oral every 6 hours PRN  pantoprazole    Tablet 40 milliGRAM(s) Oral before breakfast  polyethylene glycol 3350 17 Gram(s) Oral daily  senna 2 Tablet(s) Oral at bedtime  sodium bicarbonate 1300 milliGRAM(s) Oral three times a day  tiotropium 2.5 MICROgram(s) Inhaler 2 Puff(s) Inhalation daily

## 2024-05-23 NOTE — PROGRESS NOTE ADULT - ASSESSMENT
78 MARIZA w a PMH of CKD 3b, Afib (s/p watchman not on AC), COPD not on home O2,  Aortic stenosis w H/O ascending aortic replacement Bovine Replacement, DM , CAD s/p CABG 2016 (w bleeding complication) , anemia of chronic disease and a recent admission for a fall presents to the ED 3 days after DC for fall for the chief concern of NBNB N/V for 48hrs.  Patient admitted for AKASH on CKD, inadequate urine output and severe hyperKalemia     AMS - resolved  hold cns depressants  -ammonia level 195  -d/c lactulose     Shock on pressors r/o cardiogenic vs septic - resolved  -E faecalis in ucx 5/17 allergic to amoxicillin  -c/w meropenem 750 for 7 days (Last today) as per ID   -hold diuretics  -off dobutamine, off levo, off fluids  - s/p hydrocortisone    AKASH on CKD3b - improving  - CT no hydro  - hyperkalemia w/o EKG changes - resolved  - off lokelma  - nephro recs appreciated   - stop sodium bicarb  - c/w LR @100    #Thrombocytopenia  - BL in 100s. Now 48K  - unknown etiology   - HIT negative  - off AC and ASA  - monitor  - transfuse platelets if <50K and bleeding    Rapid A fib  Ho HFpEF, G3DD, moderate PH  Ho CAD s/p CABG  Ho AS s/p SAVR (2016),   HO Atrial fibrillation s/p watchman (2021)  - echo -- EF 60-65% with moderate to severe TR-- bovine aortic valve replaced  - amio drip, transitioned to po amio. Continue BID for 2 weeks then daily after.  - f/u cardio outpatient    Transaminitis iso shock - resolving  -ruqus- cholethithias , no cholecystitis   -ammonia elevated  -d/c lactulose    Ho recent traumatic fall admission w DC May 11  - in c collar, conulsted nsgx, will stay in collar until outpatient fu   - dens fracture noted on imaging     HO asthma/COPD not on home O2    DNR/DNI    Anticipate DC to [facility]    78 MARIZA w a PMH of CKD 3b, Afib (s/p watchman not on AC), COPD not on home O2,  Aortic stenosis w H/O ascending aortic replacement Bovine Replacement, DM , CAD s/p CABG 2016 (w bleeding complication) , anemia of chronic disease and a recent admission for a fall presents to the ED 3 days after DC for fall for the chief concern of NBNB N/V for 48hrs.  Patient admitted for AKASH on CKD, inadequate urine output and severe hyperKalemia     AMS - resolved  hold cns depressants  -ammonia level 195  -d/c lactulose     Shock on pressors r/o cardiogenic vs septic - resolved  -E faecalis in ucx 5/17 allergic to amoxicillin  -c/w meropenem 750 for 7 days (Last today) as per ID   -hold diuretics  -off dobutamine, off levo, off fluids  - s/p hydrocortisone    AKASH on CKD3b - improving  - CT no hydro  - hyperkalemia w/o EKG changes - resolved  - off lokelma  - nephro recs appreciated   - stop sodium bicarb  - c/w LR @100    #Thrombocytopenia  - BL in 100s. Now 48K  - unknown etiology   - HIT negative  - off AC and holding ASA  - monitor  - transfuse platelets if <50K and bleeding  - Heme/onc consulted    Rapid A fib  Ho HFpEF, G3DD, moderate PH  Ho CAD s/p CABG  Ho AS s/p SAVR (2016),   HO Atrial fibrillation s/p watchman (2021)  - echo -- EF 60-65% with moderate to severe TR-- bovine aortic valve replaced  - amio drip, transitioned to po amio. Continue BID for 2 weeks then daily after???  - f/u cardio outpatient    Transaminitis iso shock - resolving  -ruqus- cholethithias , no cholecystitis   -ammonia elevated  -d/c lactulose    Ho recent traumatic fall admission w DC May 11  - in c collar, conulsted nsgx, will stay in collar until outpatient fu   - dens fracture noted on imaging     HO asthma/COPD not on home O2    DNR/DNI    Anticipate DC to [facility]

## 2024-05-23 NOTE — PROGRESS NOTE ADULT - ASSESSMENT
AKASH on CKD 3 / last creat on 5/10 was 1.6 / received contrast on 5/9, but AKASH more likely related to hemodynamic instability, Afib with RVR, fall, very dark urine  Hyponatremia improving with iv hydration   Hyperkalemia s/p medical treatment improved  AKASH on CKD most prerenal vs ATN MCKAYLA?  - creat trending down / repeat cr noted   - monitor strict i/o  - can stop sodium bicarb   - on meropenem  - BP stable, start midodrine if remains low   - phos level high, on sevelamer 1 tab TID with meals / please repeat/ DC binders if phos< 3.5    - CT: No hydronephrosis. Bilateral angiomyolipomas similar to prior exam.    will sign off recall PRN / call using TEAMS or on [phone]

## 2024-05-23 NOTE — PROGRESS NOTE ADULT - ASSESSMENT
77 YO Woman w a PMH of CKD 3b, Afib (s/p watchman, not on AC), COPD not on home O2, Aortic stenosis w AVR (Bovine); DM, CAD s/p CABG 2016 (w bleeding complication), anemia of chronic disease and a recent admission for a fall presents to the ED 3 days after DC for fall w/ chief concern of NBNB N/V for 48hrs.  Patient admitted for AKASH on CKD, inadequate urine output and severe hyperKalemia     # AMS - poss toxic and metabolic encephalopathy  hold CNS depressants  ammonia level 195 -> 27  off lactulose  MS back to baseline today    # Septic (HR, WBC) shock present on admit 2/2 UTI 2/2 Enterococcus (VSE)  -E faecalis (VSE; S-ampi) in ucx 5/17 allergic to amoxicillin  ID noted  abx: completed meropenem 750 iv q24 for 8 days (5/16-5/23)  off pressors  tapered off hydrocortisone -> back on florinef 0.1 po q24    # AKASH (likely ATN) on CKD3b; hyperkalemia; mild normo an of chr dz  base Cr mid 1s  keep hgb >8  K a little low (hypokalemia) - replete  off lokelma  f/u renal  rpt Phos in AM (pt NOT on phos binders now)  CT no hydro  d/c oral NaHCO3   f/u renal  IVFs: /hr  BMP q24    # abnl LFTs  could have been from fall  poss shock liver  CT A/P 5/9: no cirrhosis; + GB stones (asymp)  RUQ U/S: GB stones  LFTs q48    # thrombocytopenia - suspect 2/2 sepsis  plt are usually in low 100s back to 2021  plt good until 5/17/24  PF-4 Ab: neg  d/c hep and asa (plt are now 48K)  heme eval  SCDs for DVT ppx  cbc q24    # DM w/ hyperglycemia  on glipizide at home  diabetic dash diet  FS qac/hs - goal 100-180  make lantus 18 HS  make lisp 6/m w/ +1 scale    # suspect gastroparesis  reglan 2.5mg po qac/hs  outpt GES    # Rapid A fib; Ho HFpEF, G3DD, moderate PHTN; Ho CAD s/p CABG; Ho AS s/p SAVR (2016) (bovine); HO Atrial fibrillation s/p watchman (2021), no a/c  HOLD asa - plt 48 K  amio 200mg po q12 - need to ask cardio if this is going to be usual dose  c/w metoprolol 50 po q6 - hold if BP < 100/60 or HR < 60  c/w doxazosin 2mg po qhs  cardio noted  echo: EF 60-65% with moderate to severe TR-- bovine aortic valve replaced    # Ho recent traumatic fall admission w DC May 11; has C2 fx; back pain; facial hematoma (forehead);  c/w [place] collar  NSx: stay in collar until outpatient fu   dens fracture noted on imaging   c/w lido patch q24  melatonin 5mg hs  c/w oxy IR 2.5mg po q6 prn pain  bowel reg: PEG q24 + senna 2 hs    # constipation  bowel reg: above  lactulose x1  try reglan    # HO asthma/COPD not on home O2  off O2 now  c/w symbicort 80/4.5 2 puffs po q12  c/w spiriva 2 puff q24  c/w singulair 10 q24    # DVT ppx: HOLD Hep (low plt) - use SCDs    # GI ppx: PPI po q24    # Activity: PT eval; needs SNF    # DNR/DNI    Dispo: PT eval; f/u renal re AKASH; f/u cardio re amio dose; HOLD asa/DVT ppx: f/u plt heme eval; tx DM; c/w [place] collar; IVFs; try reglan; tx constipation  eventually, pt will need STR at SNF - f/u CM - prob d/c in 48-72hrs?    Prog is guarded.

## 2024-05-23 NOTE — PROGRESS NOTE ADULT - SUBJECTIVE AND OBJECTIVE BOX
Nephrology progress note    THIS IS AN INCOMPLETE NOTE . FULL NOTE TO FOLLOW SHORTLY    Patient is seen and examined, events over the last 24 h noted .    Allergies:  Augmentin (Other)  penicillins (Hives; Rash)    Hospital Medications:   MEDICATIONS  (STANDING):  allopurinol 50 milliGRAM(s) Oral daily  aMIOdarone    Tablet 200 milliGRAM(s) Oral two times a day  budesonide  80 MICROgram(s)/formoterol 4.5 MICROgram(s) Inhaler 2 Puff(s) Inhalation two times a day  chlorhexidine 2% Cloths 1 Application(s) Topical daily  dextrose 10% Bolus 125 milliLiter(s) IV Bolus once  dextrose 5%. 1000 milliLiter(s) (100 mL/Hr) IV Continuous <Continuous>  dextrose 5%. 1000 milliLiter(s) (50 mL/Hr) IV Continuous <Continuous>  dextrose 50% Injectable 25 Gram(s) IV Push once  dextrose 50% Injectable 12.5 Gram(s) IV Push once  doxazosin 2 milliGRAM(s) Oral at bedtime  fludroCORTISONE 0.1 milliGRAM(s) Oral daily  glucagon  Injectable 1 milliGRAM(s) IntraMuscular once  insulin glargine Injectable (LANTUS) 15 Unit(s) SubCutaneous at bedtime  insulin lispro (ADMELOG) corrective regimen sliding scale   SubCutaneous three times a day before meals  insulin lispro Injectable (ADMELOG) 5 Unit(s) SubCutaneous three times a day before meals  lactated ringers. 1000 milliLiter(s) (100 mL/Hr) IV Continuous <Continuous>  melatonin 5 milliGRAM(s) Oral at bedtime  meropenem  IVPB 750 milliGRAM(s) IV Intermittent every 24 hours  meropenem  IVPB      metoprolol tartrate 50 milliGRAM(s) Oral four times a day  montelukast 10 milliGRAM(s) Oral daily  nystatin Powder 1 Application(s) Topical every 12 hours  pantoprazole    Tablet 40 milliGRAM(s) Oral before breakfast  polyethylene glycol 3350 17 Gram(s) Oral daily  senna 2 Tablet(s) Oral at bedtime  sodium bicarbonate 650 milliGRAM(s) Oral every 12 hours  tiotropium 2.5 MICROgram(s) Inhaler 2 Puff(s) Inhalation daily        VITALS:  T(F): 96.8 (05-23-24 @ 05:12), Max: 97.4 (05-22-24 @ 12:00)  HR: 82 (05-23-24 @ 05:12)  BP: 111/73 (05-23-24 @ 05:12)  RR: 17 (05-23-24 @ 05:12)  SpO2: 98% (05-23-24 @ 05:12)  Wt(kg): --    05-21 @ 07:01  -  05-22 @ 07:00  --------------------------------------------------------  IN: 0 mL / OUT: 900 mL / NET: -900 mL          PHYSICAL EXAM:  Constitutional: NAD  HEENT: anicteric sclera, oropharynx clear, MMM  Neck: No JVD  Respiratory: CTAB, no wheezes, rales or rhonchi  Cardiovascular: S1, S2, RRR  Gastrointestinal: BS+, soft, NT/ND  Extremities: No cyanosis or clubbing. No peripheral edema  :  No burden.   Skin: No rashes    LABS:  05-22    138  |  98  |  85<HH>  ----------------------------<  169<H>  3.4<L>   |  25  |  3.2<H>    Ca    8.3<L>      22 May 2024 08:04  Mg     1.9     05-23    TPro  5.5<L>  /  Alb  3.1<L>  /  TBili  2.0<H>  /  DBili      /  AST  39  /  ALT  197<H>  /  AlkPhos  118<H>  05-22                          10.9   8.69  )-----------( 50       ( 22 May 2024 08:04 )             35.5       Urine Studies:  Urinalysis Basic - ( 22 May 2024 08:04 )    Color:  / Appearance:  / SG:  / pH:   Gluc: 169 mg/dL / Ketone:   / Bili:  / Urobili:    Blood:  / Protein:  / Nitrite:    Leuk Esterase:  / RBC:  / WBC    Sq Epi:  / Non Sq Epi:  / Bacteria:           Iron 20, TIBC 334, %sat 6      [01-06-24 @ 06:46]  Ferritin 83      [01-06-24 @ 06:46]  TSH 2.64      [05-17-24 @ 05:22]  Lipid: chol 145, , HDL 82, LDL --      [02-06-24 @ 08:18]          RADIOLOGY & ADDITIONAL STUDIES:   Nephrology progress note    Patient is seen and examined, events over the last 24 h noted .  Lying in bed   still has coller    Allergies:  Augmentin (Other)  penicillins (Hives; Rash)    Hospital Medications:   MEDICATIONS  (STANDING):  allopurinol 50 milliGRAM(s) Oral daily  aMIOdarone    Tablet 200 milliGRAM(s) Oral two times a day  budesonide  80 MICROgram(s)/formoterol 4.5 MICROgram(s) Inhaler 2 Puff(s) Inhalation two times a day  doxazosin 2 milliGRAM(s) Oral at bedtime  fludroCORTISONE 0.1 milliGRAM(s) Oral daily  glucagon  Injectable 1 milliGRAM(s) IntraMuscular once  insulin glargine Injectable (LANTUS) 15 Unit(s) SubCutaneous at bedtime  insulin lispro (ADMELOG) corrective regimen sliding scale   SubCutaneous three times a day before meals  insulin lispro Injectable (ADMELOG) 5 Unit(s) SubCutaneous three times a day before meals  lactated ringers. 1000 milliLiter(s) (100 mL/Hr) IV Continuous <Continuous>  melatonin 5 milliGRAM(s) Oral at bedtime  meropenem  IVPB 750 milliGRAM(s) IV Intermittent every 24 hours    metoprolol tartrate 50 milliGRAM(s) Oral four times a day  montelukast 10 milliGRAM(s) Oral daily  nystatin Powder 1 Application(s) Topical every 12 hours  pantoprazole    Tablet 40 milliGRAM(s) Oral before breakfast  polyethylene glycol 3350 17 Gram(s) Oral daily  senna 2 Tablet(s) Oral at bedtime  sodium bicarbonate 650 milliGRAM(s) Oral every 12 hours  tiotropium 2.5 MICROgram(s) Inhaler 2 Puff(s) Inhalation daily        VITALS:  T(F): 96.8 (05-23-24 @ 05:12), Max: 97.4 (05-22-24 @ 12:00)  HR: 82 (05-23-24 @ 05:12)  BP: 111/73 (05-23-24 @ 05:12)  RR: 17 (05-23-24 @ 05:12)  SpO2: 98% (05-23-24 @ 05:12)      05-21 @ 07:01  -  05-22 @ 07:00  --------------------------------------------------------  IN: 0 mL / OUT: 900 mL / NET: -900 mL          PHYSICAL EXAM:  Constitutional: NAD  Respiratory: CTAB,   Cardiovascular: S1, S2, RRR  Gastrointestinal: BS+, soft, NT/ND  Extremities: No cyanosis or clubbing. No peripheral edema  :  No burden.   Skin: No rashes    LABS:  05-23    135  |  98  |  76<HH>  ----------------------------<  125<H>  3.9   |  23  |  2.6<H>    Ca    8.2<L>      23 May 2024 07:40  Mg     1.9     05-23    TPro  5.5<L>  /  Alb  3.1<L>  /  TBili  2.0<H>  /  DBili  x   /  AST  39  /  ALT  197<H>  /  AlkPhos  118<H>  05-22 05-22    138  |  98  |  85<HH>  ----------------------------<  169<H>  3.4<L>   |  25  |  3.2<H>    Ca    8.3<L>      22 May 2024 08:04  Mg     1.9     05-23    TPro  5.5<L>  /  Alb  3.1<L>  /  TBili  2.0<H>  /  DBili      /  AST  39  /  ALT  197<H>  /  AlkPhos  118<H>  05-22                          10.9   8.69  )-----------( 50       ( 22 May 2024 08:04 )             35.5       Urine Studies:  Urinalysis Basic - ( 22 May 2024 08:04 )    Color:  / Appearance:  / SG:  / pH:   Gluc: 169 mg/dL / Ketone:   / Bili:  / Urobili:    Blood:  / Protein:  / Nitrite:    Leuk Esterase:  / RBC:  / WBC    Sq Epi:  / Non Sq Epi:  / Bacteria:           Iron 20, TIBC 334, %sat 6      [01-06-24 @ 06:46]  Ferritin 83      [01-06-24 @ 06:46]  TSH 2.64      [05-17-24 @ 05:22]  Lipid: chol 145, , HDL 82, LDL --      [02-06-24 @ 08:18]          RADIOLOGY & ADDITIONAL STUDIES:

## 2024-05-23 NOTE — PROGRESS NOTE ADULT - SUBJECTIVE AND OBJECTIVE BOX
EVAN QUINN  78y  Female  ***My note supersedes ALL resident notes that I sign.  My corrections for their notes are in my note.***    I can be reached directly on [id number]. My office number is [phone]. My personal cell number is [phone].    INTERVAL EVENTS: Here for f/u of renal failure. Said she had 2 panic attacks today for no reason. She does NOT usually have panic attacks, but they were brief and she is OK. Pain is OK. Making urine. Able to eat. Pt c/o constipation. Pt also c/o nausea and gastroparesis feeling.    T(F): 97.3 (05-23-24 @ 12:19), Max: 97.3 (05-23-24 @ 12:19)  HR: 92 (05-23-24 @ 17:25) (78 - 92)  BP: 105/62 (05-23-24 @ 17:25) (100/64 - 111/73)  RR: 18 (05-23-24 @ 12:19) (17 - 18)  SpO2: 98% (05-23-24 @ 12:19) (98% - 98%)    Gen: NAD  HEENT: PERRL, EOMI, mouth clr, nose clr; forehead hematoma; minor facial bruising  Neck: no nodes, no JVD, thyroid nl; [place] collar (C2 fx)  lungs: clr  hrt: s1 s2 rrr no murmur  abd: soft, NT/ND, no HS megaly  ext: no edema, no c/c  neuro: aa ox3, cn intact, can move all 4 ext    LABS:                      11.3    (    87.0   10.48 )-----------( ---------      48       ( 23 May 2024 11:40 )             36.1    (    21.8     Hemoglobin: 11.3 g/dL (05-23 @ 11:40)  Hemoglobin: 10.9 g/dL (05-22 @ 08:04)  Hemoglobin: 10.7 g/dL (05-21 @ 06:54)  Hemoglobin: 10.7 g/dL (05-18 @ 20:07)    Platelet Count - Automated: 48 K/uL (05-23-24 @ 11:40)  Platelet Count - Automated: 50 K/uL (05-22-24 @ 08:04)  Platelet Count - Automated: 60 K/uL (05-21-24 @ 06:54)  Platelet Count - Automated: 91 K/uL (05-18-24 @ 20:07)    135   (   98   (   125      05-23-24 @ 07:40  ----------------------               3.9   (   23   (   76                             -----                        2.6  Ca  8.2   Mg  1.9    P   --     Creatinine:   2.6 (05-23 @ 07:40) - better  eGFR:  18    Creatinine:   3.2 (05-22 @ 08:04)  eGFR:  14    Creatinine:   3.6 (05-21 @ 06:54)  eGFR:  12    Creatinine:   4.3 (05-19 @ 12:12)  eGFR:  10      Urinalysis Basic - ( 23 May 2024 07:40 )    Color: x / Appearance: x / SG: x / pH: x  Gluc: 125 mg/dL / Ketone: x  / Bili: x / Urobili: x   Blood: x / Protein: x / Nitrite: x   Leuk Esterase: x / RBC: x / WBC x   Sq Epi: x / Non Sq Epi: x / Bacteria: x    Alb 3.1  T stephenie 2.0     AST 39    05-22-24 @ 08:04  Alb 3.1  T stephenie 2.0     AST 68    05-21-24 @ 06:54  Alb 3.2  T stephenie 2.6         05-19-24 @ 12:12    CAPILLARY BLOOD GLUCOSE  POCT Blood Glucose.: 187 (05-23-24 @ 16:40)  POCT Blood Glucose.: 191 (05-23-24 @ 12:14)  POCT Blood Glucose.: 173 (05-23-24 @ 11:12)  POCT Blood Glucose.: 132 (05-23-24 @ 07:41)  POCT Blood Glucose.: 203 (05-22-24 @ 22:29)  POCT Blood Glucose.: 222 (05-22-24 @ 11:38)  POCT Blood Glucose.: 187 (05-22-24 @ 08:40)  POCT Blood Glucose.: 297 (05-21-24 @ 22:09)    RADIOLOGY & ADDITIONAL TESTS:    MEDICATIONS:  meropenem  IVPB 750 milliGRAM(s) IV Intermittent every 24 hours  meropenem  IVPB        allopurinol 50 milliGRAM(s) Oral daily  aMIOdarone    Tablet 200 milliGRAM(s) Oral two times a day  budesonide  80 MICROgram(s)/formoterol 4.5 MICROgram(s) Inhaler 2 Puff(s) Inhalation two times a day  chlorhexidine 2% Cloths 1 Application(s) Topical daily  doxazosin 2 milliGRAM(s) Oral at bedtime  fludroCORTISONE 0.1 milliGRAM(s) Oral daily  insulin glargine Injectable (LANTUS) 15 Unit(s) SubCutaneous at bedtime  insulin lispro (ADMELOG) corrective regimen sliding scale   SubCutaneous three times a day before meals  insulin lispro Injectable (ADMELOG) 5 Unit(s) SubCutaneous three times a day before meals  lactated ringers. 1000 milliLiter(s) IV Continuous <Continuous>  lidocaine   4% Patch 1 Patch Transdermal every 24 hours PRN  melatonin 5 milliGRAM(s) Oral at bedtime  metoclopramide 2.5 milliGRAM(s) Oral every 6 hours  metoprolol tartrate 50 milliGRAM(s) Oral four times a day  montelukast 10 milliGRAM(s) Oral daily  nystatin Powder 1 Application(s) Topical every 12 hours  ondansetron Injectable 4 milliGRAM(s) IV Push four times a day PRN  oxyCODONE    IR 2.5 milliGRAM(s) Oral every 6 hours PRN  pantoprazole    Tablet 40 milliGRAM(s) Oral before breakfast  polyethylene glycol 3350 17 Gram(s) Oral two times a day  senna 2 Tablet(s) Oral at bedtime  tiotropium 2.5 MICROgram(s) Inhaler 2 Puff(s) Inhalation daily

## 2024-05-23 NOTE — PROGRESS NOTE ADULT - SUBJECTIVE AND OBJECTIVE BOX
SUBJECTIVE:    Patient is a 78y old Female who presents with a chief complaint of N/V, AKASH on CKD, hyperkalmeia (23 May 2024 11:31)    Currently admitted to medicine with the primary diagnosis of AKASH (acute kidney injury)       Today is hospital day 8d. This morning she is resting comfortably in bed and reports no new issues or overnight events.     PAST MEDICAL & SURGICAL HISTORY  Aortic stenosis    Diabetes    Asthma with COPD  many years since last attack    CAD (coronary artery disease), native coronary artery    Anemia    History of bleeding disorder  having work up 5/2/21- HAD WORKUP BUT NO DIAGNOSIS "NOTHING WAS FOUND"    History of transfusion reaction    Hiatal hernia    Stage 3 chronic kidney disease    H/O ascending aortic replacement  Bovine Replacement    S/P CABG x 1  complication of bleeding 2016    H/O total knee replacement, right  complicated with fx femur bars screws in femur- b/l knee replacement    S/P hysterectomy    Presence of Watchman left atrial appendage closure device      SOCIAL HISTORY:  Negative for smoking/alcohol/drug use.     ALLERGIES:  Augmentin (Other)  penicillins (Hives; Rash)    MEDICATIONS:  STANDING MEDICATIONS  allopurinol 50 milliGRAM(s) Oral daily  aMIOdarone    Tablet 200 milliGRAM(s) Oral two times a day  budesonide  80 MICROgram(s)/formoterol 4.5 MICROgram(s) Inhaler 2 Puff(s) Inhalation two times a day  chlorhexidine 2% Cloths 1 Application(s) Topical daily  dextrose 10% Bolus 125 milliLiter(s) IV Bolus once  dextrose 5%. 1000 milliLiter(s) IV Continuous <Continuous>  dextrose 5%. 1000 milliLiter(s) IV Continuous <Continuous>  dextrose 50% Injectable 25 Gram(s) IV Push once  dextrose 50% Injectable 12.5 Gram(s) IV Push once  doxazosin 2 milliGRAM(s) Oral at bedtime  fludroCORTISONE 0.1 milliGRAM(s) Oral daily  glucagon  Injectable 1 milliGRAM(s) IntraMuscular once  insulin glargine Injectable (LANTUS) 15 Unit(s) SubCutaneous at bedtime  insulin lispro (ADMELOG) corrective regimen sliding scale   SubCutaneous three times a day before meals  insulin lispro Injectable (ADMELOG) 5 Unit(s) SubCutaneous three times a day before meals  lactated ringers. 1000 milliLiter(s) IV Continuous <Continuous>  melatonin 5 milliGRAM(s) Oral at bedtime  meropenem  IVPB 750 milliGRAM(s) IV Intermittent every 24 hours  meropenem  IVPB      metoprolol tartrate 50 milliGRAM(s) Oral four times a day  montelukast 10 milliGRAM(s) Oral daily  nystatin Powder 1 Application(s) Topical every 12 hours  pantoprazole    Tablet 40 milliGRAM(s) Oral before breakfast  polyethylene glycol 3350 17 Gram(s) Oral daily  senna 2 Tablet(s) Oral at bedtime  tiotropium 2.5 MICROgram(s) Inhaler 2 Puff(s) Inhalation daily    PRN MEDICATIONS  dextrose Oral Gel 15 Gram(s) Oral once PRN  lidocaine   4% Patch 1 Patch Transdermal every 24 hours PRN  ondansetron Injectable 4 milliGRAM(s) IV Push four times a day PRN  oxyCODONE    IR 2.5 milliGRAM(s) Oral every 6 hours PRN    VITALS:   T(F): 97.3  HR: 78  BP: 100/64  RR: 18  SpO2: 98%    LABS:                        11.3   10.48 )-----------( 48       ( 23 May 2024 11:40 )             36.1     05-23    135  |  98  |  76<HH>  ----------------------------<  125<H>  3.9   |  23  |  2.6<H>    Ca    8.2<L>      23 May 2024 07:40  Mg     1.9     05-23    TPro  5.5<L>  /  Alb  3.1<L>  /  TBili  2.0<H>  /  DBili  x   /  AST  39  /  ALT  197<H>  /  AlkPhos  118<H>  05-22      Urinalysis Basic - ( 23 May 2024 07:40 )    Color: x / Appearance: x / SG: x / pH: x  Gluc: 125 mg/dL / Ketone: x  / Bili: x / Urobili: x   Blood: x / Protein: x / Nitrite: x   Leuk Esterase: x / RBC: x / WBC x   Sq Epi: x / Non Sq Epi: x / Bacteria: x                RADIOLOGY:    PHYSICAL EXAM:  GEN: in neck collar   LUNGS: Clear to auscultation bilaterally   HEART: S1/S2 present. RRR.   ABD: Soft, non-tender, non-distended. Bowel sounds present  EXT: NC/NC/NE/2+PP/DÍAZ/Skin Intact.   NEURO: AAOX3    Intravenous access:   NG tube:   Wang Catheter:   Indwelling Urethral Catheter:     Connect To:  Straight Drainage/[place]    Indication:  Urinary Retention / Obstruction (05-19-24 @ 06:02) (not performed) [Active]  Indwelling Urethral Catheter:     Connect To:  Straight Drainage/[place]    Indication:  Urinary Retention / Obstruction (05-18-24 @ 05:43) (not performed) [Active]  Indwelling Urethral Catheter:     Connect To:  Straight Drainage/[place]    Indication:  Urinary Retention / Obstruction (05-14-24 @ 22:37) (not performed) [Active]      6 click score:

## 2024-05-23 NOTE — PROGRESS NOTE ADULT - SUBJECTIVE AND OBJECTIVE BOX
Podiatry Progress Note    Subjective:  EVAN QUINN is a  78y Female.   Seen bedside.   Patient is a 78y old  Female who presents with a chief complaint of N/V, AKASH on CKD, hyperkalmeia (23 May 2024 09:18)      Past Medical History and Surgical History  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      CAD (coronary artery disease), native coronary artery      Anemia      History of bleeding disorder  having work up 5/2/21- HAD WORKUP BUT NO DIAGNOSIS "NOTHING WAS FOUND"      History of transfusion reaction      Hiatal hernia      Stage 3 chronic kidney disease      H/O ascending aortic replacement  Bovine Replacement      S/P CABG x 1  complication of bleeding 2016      H/O total knee replacement, right  complicated with fx femur bars screws in femur- b/l knee replacement      S/P hysterectomy      Presence of Watchman left atrial appendage closure device           Objective:  Vital Signs Last 24 Hrs  T(C): 36 (23 May 2024 05:12), Max: 36.3 (22 May 2024 12:00)  T(F): 96.8 (23 May 2024 05:12), Max: 97.4 (22 May 2024 12:00)  HR: 82 (23 May 2024 05:12) (82 - 87)  BP: 111/73 (23 May 2024 05:12) (108/59 - 121/74)  BP(mean): --  RR: 17 (23 May 2024 05:12) (17 - 18)  SpO2: 98% (23 May 2024 05:12) (96% - 98%)    Parameters below as of 22 May 2024 12:00  Patient On (Oxygen Delivery Method): room air                            10.9   8.69  )-----------( 50       ( 22 May 2024 08:04 )             35.5                 05-23    135  |  98  |  76<HH>  ----------------------------<  125<H>  3.9   |  23  |  2.6<H>    Ca    8.2<L>      23 May 2024 07:40  Mg     1.9     05-23    TPro  5.5<L>  /  Alb  3.1<L>  /  TBili  2.0<H>  /  DBili  x   /  AST  39  /  ALT  197<H>  /  AlkPhos  118<H>  05-22        Physical Exam - Lower Extremity Focused:   Derm:   Painful callus of left medial foot;   Hyperpigmented skin of bilateral feet; No acute signs of infection  No other open wounds or ulcerations present;   Vascular: DP and PT Pulses Diminished; Foot is Warm to Warm to the touch; Capillary Refill Time < 3 Seconds;    Neuro: Protective Sensation Diminished c   MSK: Diffuse Pain On Palpation of bilateral feet     Assessment:  Bilateral foot pain  Hx of pain from Gout;     Plan:  Chart reviewed and Patient evaluated. All Questions and Concerns Addressed and Answered  Debrided nails X 10 with sterile nail nippers to patient tolerance   Weight Bearing Status; WBAT ;   No surgical intervention indicated at this time;   Patient has hx of chronic bilateral foot pain; No acute signs of infection noted to bilateral feet;   Follow as outpatient with Dr. Ortiz    Discussed Plan w/ attending; Dr. Ortiz       Podiatry

## 2024-05-24 NOTE — PROGRESS NOTE ADULT - SUBJECTIVE AND OBJECTIVE BOX
EVAN QUINN  78y  Female  ***My note supersedes ALL resident notes that I sign.  My corrections for their notes are in my note.***    I can be reached directly on [employer] [id number]. My office number is [phone]. My personal cell number is [phone].    INTERVAL EVENTS: Here for f/u of AKASH. Pt c/o pain in b/l legs - feels like a constricting feeling. Says pain has been going on since the fall. Pt still having constipation. Pt able to eat/drink. Pt had refused reglan, but willing to try it now.    T(F): 98.5 (05-24-24 @ 14:13), Max: 98.5 (05-24-24 @ 14:13)  HR: 92 (05-24-24 @ 14:13) (57 - 92)  BP: 117/77 (05-24-24 @ 14:13) (111/54 - 119/54)  RR: 18 (05-24-24 @ 14:13) (18 - 18)  SpO2: 98% (05-23-24 @ 23:00) (98% - 98%)    Gen: NAD  HEENT: PERRL, EOMI, mouth clr, nose clr; forehead hematoma; minor facial bruising  Neck: no nodes, no JVD, thyroid nl; [place] collar (C2 fx)  lungs: clr  hrt: s1 s2 rrr no murmur  abd: soft, NT/ND, no HS megaly  ext: no edema, no c/c; b/l leg tender  neuro: aa ox3, cn intact, can move all 4 ext    LABS:                      10.7    (    87.6   8.41  )-----------( ---------      60       ( 24 May 2024 13:31 )             34.5    (    22.0     Platelet Count - Automated: 60 K/uL (05-24-24 @ 13:31)  Platelet Count - Automated: 48 K/uL (05-23-24 @ 11:40)  Platelet Count - Automated: 50 K/uL (05-22-24 @ 08:04)  Platelet Count - Automated: 60 K/uL (05-21-24 @ 06:54)    139   (   100   (   178      05-24-24 @ 13:31  ----------------------               3.4   (   28   (   69                             -----                        2.2  Ca  8.0   Mg  1.8    P   3.8     Creatinine:   2.2 (05-24 @ 13:31)  eGFR:  22    Creatinine:   2.6 (05-23 @ 07:40)  eGFR:  18    Creatinine:   3.2 (05-22 @ 08:04)  eGFR:  14    Creatinine:   3.6 (05-21 @ 06:54)  eGFR:  12      LFT  5.3  (  1.8  (  27       05-24-24 @ 13:31  -------------------------  3.0  (  139  (  99    Urinalysis Basic - ( 24 May 2024 13:31 )    Color: x / Appearance: x / SG: x / pH: x  Gluc: 178 mg/dL / Ketone: x  / Bili: x / Urobili: x   Blood: x / Protein: x / Nitrite: x   Leuk Esterase: x / RBC: x / WBC x   Sq Epi: x / Non Sq Epi: x / Bacteria: x    CAPILLARY BLOOD GLUCOSE  POCT Blood Glucose.: 316 (05-24-24 @ 17:17)  POCT Blood Glucose.: 179 (05-24-24 @ 12:43)  POCT Blood Glucose.: 156 (05-24-24 @ 11:22)  POCT Blood Glucose.: 88 (05-24-24 @ 07:52)  POCT Blood Glucose.: 152 (05-23-24 @ 20:49)  POCT Blood Glucose.: 187 (05-23-24 @ 16:40)  POCT Blood Glucose.: 191 (05-23-24 @ 12:14)  POCT Blood Glucose.: 173 (05-23-24 @ 11:12)    RADIOLOGY & ADDITIONAL TESTS:    MEDICATIONS:    allopurinol 50 milliGRAM(s) Oral daily  aMIOdarone    Tablet 200 milliGRAM(s) Oral two times a day  aspirin  chewable 81 milliGRAM(s) Oral daily  budesonide  80 MICROgram(s)/formoterol 4.5 MICROgram(s) Inhaler 2 Puff(s) Inhalation two times a day  chlorhexidine 2% Cloths 1 Application(s) Topical daily  doxazosin 2 milliGRAM(s) Oral at bedtime  fludroCORTISONE 0.1 milliGRAM(s) Oral daily  heparin   Injectable 5000 Unit(s) SubCutaneous every 8 hours  insulin glargine Injectable (LANTUS) 15 Unit(s) SubCutaneous at bedtime  insulin lispro (ADMELOG) corrective regimen sliding scale   SubCutaneous three times a day before meals  insulin lispro Injectable (ADMELOG) 5 Unit(s) SubCutaneous three times a day before meals  lactated ringers. 1000 milliLiter(s) IV Continuous <Continuous>  lactulose Syrup 20 Gram(s) Oral every 3 hours  lidocaine   4% Patch 1 Patch Transdermal every 24 hours PRN  melatonin 5 milliGRAM(s) Oral at bedtime  metoclopramide 2.5 milliGRAM(s) Oral every 6 hours PRN  metoprolol succinate  milliGRAM(s) Oral daily  montelukast 10 milliGRAM(s) Oral daily  nystatin Powder 1 Application(s) Topical every 12 hours  oxyCODONE    IR 2.5 milliGRAM(s) Oral every 6 hours PRN  pantoprazole    Tablet 40 milliGRAM(s) Oral before breakfast  polyethylene glycol 3350 17 Gram(s) Oral two times a day  pregabalin 25 milliGRAM(s) Oral two times a day  senna 2 Tablet(s) Oral at bedtime  tiotropium 2.5 MICROgram(s) Inhaler 2 Puff(s) Inhalation daily

## 2024-05-24 NOTE — PROGRESS NOTE ADULT - TIME BILLING
complex care    care coord    cond a pot threat to life    ongoing issues above
1st day w/ pt - hosp day 7    complex chart/image/lab/med review    complex care plan    ongoing issues    cond a threat to life
care coord    complex plan above

## 2024-05-24 NOTE — PROGRESS NOTE ADULT - ASSESSMENT
79 YO Woman w a PMH of CKD 3b, Afib (s/p watchman, not on AC), COPD not on home O2, Aortic stenosis w AVR (Bovine); DM, CAD s/p CABG 2016 (w bleeding complication), anemia of chronic disease and a recent admission for a fall presents to the ED 3 days after DC for fall w/ chief concern of NBNB N/V for 48hrs.  Patient admitted for AKASH on CKD, inadequate urine output and severe hyperKalemia     # AMS - poss toxic and metabolic encephalopathy  hold CNS depressants  ammonia level 195 -> 27  off lactulose  MS back to baseline today    # Septic (HR, WBC) shock present on admit 2/2 UTI 2/2 Enterococcus (VSE)  E faecalis (VSE; S-ampi) in ucx 5/17 allergic to amoxicillin  ID noted  abx: completed meropenem 750 iv q24 for 8 days (5/16-5/23)  off pressors  tapered off hydrocortisone -> back on florinef 0.1 po q24    # AKASH (likely ATN) on CKD3b; hyperkalemia (resolved); mild normo an of chr dz  base Cr mid 1s  Cr trending down  keep hgb >8  off lokelma  f/u renal  rpt Phos 3.8 (pt NOT on phos binders now)  CT no hydro  d/c oral NaHCO3   f/u renal  IVFs: LR 75/hr  BMP q24    # hypokalemia  replete orally    # abnl LFTs  could have been from fall  poss shock liver  CT A/P 5/9: no cirrhosis; + GB stones (asymp)  RUQ U/S: GB stones  LFTs q48    # thrombocytopenia - suspect 2/2 sepsis  plt are usually in low 100s back to 2021  plt good until 5/17/24  PF-4 Ab: neg  heme eval: smear reviewed: no schistocytes; no plt clumping; + dary cells; had bleeding gums in past  SCDs for DVT ppx  cbc q24    # DM w/ hyperglycemia  on glipizide at home  diabetic dash diet  FS qac/hs - goal 100-180  make lantus 15 HS  make lisp 5/m w/ +1 scale    # suspect gastroparesis  reglan liquid 2.5mg po qac/hs - will see how long this needs to be standing  outpt GES    # constipation  bowel reg: PEG q12 + senna 2 hs  lactulose 20gm q3 x4 doses  try reglan above    # Rapid A fib; Ho HFpEF, G3DD, moderate PHTN; Ho CAD s/p CABG; Ho AS s/p SAVR (2016) (bovine); HO Atrial fibrillation s/p watchman (2021), no a/c  resume asa 81mg po q24  cardio noted  EP eval noted: had extended holter as outpt for syncope; outpt f/u Dr Jaquez  ECG [place] - eval QTc  d/c amio  make metoprolol xl 100mg po q24 - hold if BP < 100/60 or HR < 60  remote tele on 3B  c/w doxazosin 2mg po qhs  echo: EF 60-65% with moderate to severe TR-- bovine aortic valve replaced    # Ho recent traumatic fall admission w DC May 11; has C2 fx; back pain; facial hematoma (forehead);  c/w [place] collar  NSx: stay in collar until outpatient fu   dens fracture noted on imaging   c/w lido patch q24  melatonin 5mg hs  c/w oxy IR 2.5mg po q6 prn pain  bowel reg: PEG q24 + senna 2 hs    # HO asthma/COPD not on home O2  off O2 now  c/w symbicort 80/4.5 2 puffs po q12  c/w spiriva 2 puff q24  c/w singulair 10 q24    # DVT ppx: resume Hep 5000 sc q8  can d/c SCDs    # GI ppx: PPI po q24    # Activity: PT eval; needs SNF    # DNR/DNI    Dispo: PT eval; f/u renal re AKASH; f/u EP; tele; d/c amio, adj BB; resume asa/DVT ppx; f/u heme; tx DM; c/w [place] collar; IVFs; try reglan; tx constipation  eventually, pt will need STR at [facility] - f/u CM - we have auth until SAT 5/25, then will need auth again (d/c planning w/ subspec team)    Prog is guarded.   77 YO Woman w a PMH of CKD 3b, Afib (s/p watchman, not on AC), COPD not on home O2, Aortic stenosis w AVR (Bovine); DM, CAD s/p CABG 2016 (w bleeding complication), anemia of chronic disease and a recent admission for a fall presents to the ED 3 days after DC for fall w/ chief concern of NBNB N/V for 48hrs.  Patient admitted for AKASH on CKD, inadequate urine output and severe hyperKalemia     # AMS - poss toxic and metabolic encephalopathy  hold CNS depressants  ammonia level 195 -> 27  off lactulose  MS back to baseline today    # Septic (HR, WBC) shock present on admit 2/2 UTI 2/2 Enterococcus (VSE)  E faecalis (VSE; S-ampi) in ucx 5/17 allergic to amoxicillin  ID noted  abx: completed meropenem 750 iv q24 for 8 days (5/16-5/23)  off pressors  tapered off hydrocortisone -> back on florinef 0.1 po q24    # AKASH (likely ATN) on CKD3b; hyperkalemia (resolved); mild normo an of chr dz  base Cr mid 1s  Cr trending down  keep hgb >8  off lokelma  f/u renal  rpt Phos 3.8 (pt NOT on phos binders now)  CT no hydro  d/c oral NaHCO3   f/u renal  IVFs: LR 75/hr  BMP q24    # hypokalemia  replete orally    # abnl LFTs  could have been from fall  poss shock liver  CT A/P 5/9: no cirrhosis; + GB stones (asymp)  RUQ U/S: GB stones  LFTs q48    # thrombocytopenia - suspect 2/2 sepsis  plt are usually in low 100s back to 2021  plt good until 5/17/24  PF-4 Ab: neg  heme eval: smear reviewed: no schistocytes; no plt clumping; + dary cells; had bleeding gums in past  SCDs for DVT ppx  cbc q24    # DM w/ hyperglycemia  on glipizide at home  diabetic dash diet  FS qac/hs - goal 100-180  make lantus 15 HS  make lisp 5/m w/ +1 scale    # suspect gastroparesis  reglan liquid 2.5mg po qac/hs - will see how long this needs to be standing  outpt GES    # constipation  bowel reg: PEG q12 + senna 2 hs  lactulose 20gm q3 x4 doses  try reglan above    # Rapid A fib; Ho HFpEF, G3DD, moderate PHTN; Ho CAD s/p CABG; Ho AS s/p SAVR (2016) (bovine); HO Atrial fibrillation s/p watchman (2021), no a/c  resume asa 81mg po q24  cardio noted  EP eval noted: had extended holter as outpt for syncope; outpt f/u Dr Jaquez  ECG [place] - eval QTc  d/c amio  make metoprolol xl 100mg po q24 - hold if BP < 100/60 or HR < 60  remote tele on 3B  c/w doxazosin 2mg po qhs  echo: EF 60-65% with moderate to severe TR-- bovine aortic valve replaced    # Ho recent traumatic fall admission w DC May 11; has C2 fx; back pain; facial hematoma (forehead); neuropathic pain in b/l legs  c/w [place] collar  NSx: stay in collar until outpatient fu   dens fracture noted on imaging   c/w lido patch q24  melatonin 5mg hs  c/w oxy IR 2.5mg po q6 prn pain - pt was not using this, I told her to ask RN   bowel reg: PEG q24 + senna 2 hs  restart lyrica 25mg po q12    # HO asthma/COPD not on home O2  off O2 now  c/w symbicort 80/4.5 2 puffs po q12  c/w spiriva 2 puff q24  c/w singulair 10 q24    # DVT ppx: resume Hep 5000 sc q8  can d/c SCDs    # GI ppx: PPI po q24    # Activity: PT eval; needs SNF    # DNR/DNI    Dispo: PT eval; f/u renal re AKASH; f/u EP; tele; d/c amio, adj BB; resume asa/DVT ppx; f/u heme; tx DM; tx pain; c/w [place] collar; IVFs; try reglan; tx constipation  eventually, pt will need STR at [facility] - f/u CM - we have auth until SAT 5/25, then will need auth again (d/c planning w/ subspec team)    Prog is guarded.

## 2024-05-24 NOTE — CONSULT NOTE ADULT - ASSESSMENT
77 yo F with PMHx of HTN, HLD, DM, CAD s/p CABG + bioprosthetic AVR for AS, A Fib s/p watchman off AC, COPD, recent hospitalization s/p mechanical fall was readmitted with nausea vomiting and noted to have septic shock from possible UTI. Noted to be in A Fib with RVR at some point and was started on Amio. EP consulted regarding antiarrhythmic medications.     IMPRESSION  Paroxysmal A Fib sp watchman device no leak off AC  Septic shock on admission from possible UTI now improved  s/p mechanical fall recently, prior hx of syncope orthostatic? on florinef  CAD s/p CABG + bioprosthetic AVR for AS.   Mod to Severe TR  HTN, HLD, DM  COPD    RECOMMENDATIONS  check EKG for QTc  recommend rate control for A Fib/AFL for now  switch BB to Toprol 100mg XL qd  off AC s/p watchman. Off aspirin given thrombocytopenia  may not need long term Amio  may need ILR given hx of recurrent falls and possible syncope  will discuss with attending   79 yo F with PMHx of HTN, HLD, DM, CAD s/p CABG + bioprosthetic AVR for AS, A Fib s/p watchman off AC, COPD, recent hospitalization s/p mechanical fall was readmitted with nausea vomiting and noted to have septic shock from possible UTI. Noted to be in A Fib with RVR at some point and was started on Amio. EP consulted regarding antiarrhythmic medications.     IMPRESSION  Paroxysmal A Fib sp watchman device no leak off AC  Septic shock on admission from possible UTI now improved  s/p mechanical fall recently   CAD s/p CABG + bioprosthetic AVR for AS.   Mod to Severe TR  HTN, HLD, DM  COPD  AKASH on CKD improving    RECOMMENDATIONS  check EKG for QTc  telemonitoring  recommend rate control for A Fib/AFL for now  switch BB to Toprol 100mg XL qd  off AC s/p watchman. Off aspirin given thrombocytopenia  DC Amio  Had extended holter as outpatient with prior episodes of syncope  outpatient f/u with Dr Jaquez for further management  May need A Fib ablation eventually once platelet count and functional status improved

## 2024-05-24 NOTE — PROGRESS NOTE ADULT - ASSESSMENT
78 MARIZA w a PMH of CKD 3b, Afib (s/p watchman not on AC), COPD not on home O2,  Aortic stenosis w H/O ascending aortic replacement Bovine Replacement, DM , CAD s/p CABG 2016 (w bleeding complication) , anemia of chronic disease and a recent admission for a fall presents to the ED 3 days after DC for fall for the chief concern of NBNB N/V for 48hrs.  Patient admitted for AKASH on CKD, inadequate urine output and severe hyperKalemia     AMS - resolved  hold cns depressants  -ammonia level 195  -restart lactulose for constipation     Shock on pressors r/o cardiogenic vs septic - resolved  -E faecalis in ucx 5/17 allergic to amoxicillin  -completed meropenem as per ID   -hold diuretics  -off dobutamine, off levo, off fluids  - s/p hydrocortisone    AKASH on CKD3b - improving  - CT no hydro  - hyperkalemia w/o EKG changes - resolved  - off lokelma  - nephro recs appreciated   - stop sodium bicarb  - c/w LR @75    #Thrombocytopenia  - BL in 100s. Now 48K  - unknown etiology   - HIT negative  - off AC  - monitor  - transfuse platelets if <50K and bleeding  - restart ASA  - f/u Heme/onc     Rapid A fib  Ho HFpEF, G3DD, moderate PH  Ho CAD s/p CABG  Ho AS s/p SAVR (2016),   HO Atrial fibrillation s/p watchman (2021)  - echo -- EF 60-65% with moderate to severe TR-- bovine aortic valve replaced  - s/p amio drip  - c/w PO amio 200mg BID  - start lopressor 100mg daily  - f/u EP for amio recs  - f/u cardio outpatient    Transaminitis iso shock - resolving  -ruqus- cholethithias , no cholecystitis   -ammonia elevated  -restart lactulose for constipation     Ho recent traumatic fall admission w DC May 11  - in c collar, conulsted nsgx, will stay in collar until outpatient fu   - dens fracture noted on imaging     HO asthma/COPD not on home O2    DNR/DNI    Anticipate DC to [facility]

## 2024-05-24 NOTE — CONSULT NOTE ADULT - ASSESSMENT
79 YO Woman w a PMH of CKD 3b, Afib (s/p watchman, not on AC), COPD not on home O2, Aortic stenosis w AVR (Bovine); DM, CAD s/p CABG 2016 (w bleeding complication), anemia of chronic disease and a recent admission for a fall presents to the ED 3 days after DC for fall w/ chief concern of NBNB N/V for 48hrs.Patient admitted for AKASH on CKD, inadequate urine output and severe hyperKalemia. Hemonc consulted for Thrombocytopenia       #Thrombocytopenia   #Septic shock present on admit 2/2 UTI 2/2 Enterococcus, resolved   #AKIn on CKD 3b improving  - Platelets baseline 120- 180s since 2021, Platelets 5/14 145 >> down trending, now 48  - On heparin subq 5000 units BID 5/15 > 5/22   - completed meropenem 750 iv q24 for 8 days (5/16-5/23)      #Normocytic Anemia  79 YO Woman w a PMH of CKD 3b, Afib (s/p watchman, not on AC), COPD not on home O2, Aortic stenosis w AVR (Bovine); DM, CAD s/p CABG 2016 (w bleeding complication), anemia of chronic disease and a recent admission for a fall presents to the ED 3 days after DC for fall w/ chief concern of NBNB N/V for 48hrs.Patient admitted for AKASH on CKD, inadequate urine output and severe hyperKalemia. Hemonc consulted for Thrombocytopenia       #Thrombocytopenia   #Chronic Normocytic Anemia   #Septic shock present on admission 2/2 UTI 2/2 Enterococcus, resolved   #AKASH on CKD 3b improving  - Platelets baseline 120- 180s since 2021, Platelets on admission on 5/14 145 now down trending slowly, today 48  - Hgb at baseline around 10   - On heparin subq 5000 units BID 5/15 till 5/22   - HIT score 3, low   - completed meropenem 750 iv q24 for 8 days (5/16-5/23),   - Was following with hemonc Dr Lockwood up until 2021 for anemia. At that time she was on eliquis and was being planned for watchman's procedure. Patient denies any gum bleeding/ BRBPR, hematuria since being off eliquis after the watchman   - Smear reviewed: no schistocytes or platelet clumping. Melvin cells     Plan  - likely multifactorial in the setting of resolving infection   - continue to monitor with daily CBC  - Transfuse if platelet < 10k or active bleeding  - Keep active type and screen  - Ok to resume daily aspirin  - Can resume heparin subq for dvt ppx when platelets remain above 50k.      79 YO Woman w a PMH of CKD 3b, Afib (s/p watchman, not on AC), COPD not on home O2, Aortic stenosis w AVR (Bovine); DM, CAD s/p CABG 2016 (w bleeding complication), anemia of chronic disease and a recent admission for a fall presents to the ED 3 days after DC for fall w/ chief concern of NBNB N/V for 48hrs.Patient admitted for AKASH on CKD, inadequate urine output and severe hyperKalemia. Hemonc consulted for Thrombocytopenia.       #Thrombocytopenia   #Chronic Normocytic Anemia   #Septic shock present on admission 2/2 UTI 2/2 Enterococcus, resolved   #AKASH on CKD 3b improving  - Platelets baseline 120- 180s since 2021, Platelets on admission on 5/14 145 now down trending slowly, today 48  - Hgb at baseline around 10   - On heparin subq 5000 units BID 5/15 till 5/22   - HIT score 3, low   - completed meropenem 750 iv q24 for 8 days (5/16-5/23),   - Was following with hemonc Dr Lockwood up until 2021 for anemia. At that time she was on eliquis and was being planned for watchman's procedure. Patient denies any gum bleeding/ BRBPR, hematuria since being off eliquis after the watchman   - Smear reviewed: no schistocytes or platelet clumping. Melvin cells     Plan  - likely multifactorial in the setting of resolving infection   - continue to monitor with daily CBC  - Transfuse if platelet < 10k or active bleeding  - Keep active type and screen  - Ok to resume daily aspirin  - Can resume heparin subq for dvt ppx when platelets remain above 50k.

## 2024-05-24 NOTE — CONSULT NOTE ADULT - SUBJECTIVE AND OBJECTIVE BOX
HPI:  78 MARIZA w a PMH of CKD 3b, Afib (s/p watchman not on AC), COPD not on home O2,  Aortic stenosis w H/O ascending aortic replacement Bovine Replacement, DM , CAD s/p CABG 2016 (w bleeding complication) , anemia of chronic disease and a recent admission for a fall presents to the ED 3 days after DC for fall for the chief concern of NBNB N/V for 48hrs. Patient denies: hematemesis wrenching HA, palpitations, rash, diet or medication changes, diarrhea, fever, abdominal pain or cramping, hematochezia/melena. Patient admitted for AKASH on CKD, inadequate urine output and severe hyperKalemia          (15 May 2024 02:36)      ---  cardio fellow additional notes:    79 yo F with PMHx of HTN, HLD, DM, CAD s/p CABG + bioprosthetic AVR for AS, A Fib s/p watchman off AC, COPD, recent hospitalization s/p mechanical fall was readmitted with nausea vomiting and noted to have septic shock from possible UTI. Noted to be in A Fib with RVR at some point and was started on Amio. EP consulted regarding antiarrhythmic medications.     PAST MEDICAL & SURGICAL HISTORY  Aortic stenosis    Diabetes    Asthma with COPD  many years since last attack    CAD (coronary artery disease), native coronary artery    Anemia    History of bleeding disorder  having work up 21- HAD WORKUP BUT NO DIAGNOSIS "NOTHING WAS FOUND"    History of transfusion reaction    Hiatal hernia    Stage 3 chronic kidney disease    H/O ascending aortic replacement  Bovine Replacement    S/P CABG x 1  complication of bleeding     H/O total knee replacement, right  complicated with fx femur bars screws in femur- b/l knee replacement    S/P hysterectomy    Presence of Watchman left atrial appendage closure device        FAMILY HISTORY:  FAMILY HISTORY:  Family history of pseudocholinesterase deficiency (Child)        SOCIAL HISTORY:  Social History:      ALLERGIES:  Augmentin (Other)  penicillins (Hives; Rash)      MEDICATIONS:  allopurinol 50 milliGRAM(s) Oral daily  aMIOdarone    Tablet 200 milliGRAM(s) Oral two times a day  budesonide  80 MICROgram(s)/formoterol 4.5 MICROgram(s) Inhaler 2 Puff(s) Inhalation two times a day  chlorhexidine 2% Cloths 1 Application(s) Topical daily  dextrose 10% Bolus 125 milliLiter(s) IV Bolus once  dextrose 5%. 1000 milliLiter(s) (100 mL/Hr) IV Continuous <Continuous>  dextrose 5%. 1000 milliLiter(s) (50 mL/Hr) IV Continuous <Continuous>  dextrose 50% Injectable 12.5 Gram(s) IV Push once  dextrose 50% Injectable 25 Gram(s) IV Push once  doxazosin 2 milliGRAM(s) Oral at bedtime  fludroCORTISONE 0.1 milliGRAM(s) Oral daily  glucagon  Injectable 1 milliGRAM(s) IntraMuscular once  insulin glargine Injectable (LANTUS) 15 Unit(s) SubCutaneous at bedtime  insulin lispro (ADMELOG) corrective regimen sliding scale   SubCutaneous three times a day before meals  insulin lispro Injectable (ADMELOG) 5 Unit(s) SubCutaneous three times a day before meals  lactated ringers. 1000 milliLiter(s) (75 mL/Hr) IV Continuous <Continuous>  lactulose Syrup 20 Gram(s) Oral every 3 hours  melatonin 5 milliGRAM(s) Oral at bedtime  metoprolol tartrate 50 milliGRAM(s) Oral four times a day  montelukast 10 milliGRAM(s) Oral daily  nystatin Powder 1 Application(s) Topical every 12 hours  pantoprazole    Tablet 40 milliGRAM(s) Oral before breakfast  polyethylene glycol 3350 17 Gram(s) Oral two times a day  senna 2 Tablet(s) Oral at bedtime  tiotropium 2.5 MICROgram(s) Inhaler 2 Puff(s) Inhalation daily    PRN:  dextrose Oral Gel 15 Gram(s) Oral once PRN  lidocaine   4% Patch 1 Patch Transdermal every 24 hours PRN  metoclopramide 2.5 milliGRAM(s) Oral every 6 hours PRN  oxyCODONE    IR 2.5 milliGRAM(s) Oral every 6 hours PRN      HOME MEDICATIONS:  Home Medications:  aspirin 81 mg oral capsule: 1 cap(s) orally once a day (2024 23:25)  desvenlafaxine (as succinate) 25 mg oral tablet, extended release: 1 tab(s) orally once a day (2024 23:25)  furosemide 40 mg oral tablet: 1 tab(s) orally once a day (2024 23:25)  glipiZIDE 5 mg oral tablet: 1 tab(s) orally once a day (2024 23:25)  Lyrica 75 mg oral capsule: 1 cap(s) orally once a day (2024 23:25)  montelukast 10 mg oral tablet: 1 tab(s) orally once a day (2024 23:25)  polyethylene glycol 3350 oral powder for reconstitution: 17 gram(s) orally 2 times a day (2024 23:25)  Protonix 40 mg oral delayed release tablet: 1 tab(s) orally once a day (2024 23:25)  Ranexa 500 mg oral tablet, extended release: 1 tab(s) orally 2 times a day (2024 23:25)  rosuvastatin 10 mg oral tablet: 1 tab(s) orally once a day (2024 23:25)  senna leaf extract oral tablet: 2 tab(s) orally once a day (at bedtime) (2024 23:25)      VITALS:   T(F): 96.5 ( @ 05:11), Max: 97.5 ( @ 23:00)  HR: 82 ( @ 05:11) (57 - 92)  BP: 111/54 ( @ 05:11) (98/59 - 121/74)  BP(mean): 73 ( @ 16:48) (72 - 73)  RR: 18 ( @ 05:11) (17 - 20)  SpO2: 98% ( @ 23:00) (96% - 100%)    I&O's Summary      REVIEW OF SYSTEMS:  CONSTITUTIONAL: generalized weakness  HEENT: No visual changes, neck/ear pain  RESPIRATORY: No cough   CARDIOVASCULAR: See HPI  GASTROINTESTINAL: Nausea vomiting  GENITOURINARY: No dysuria, frequency or hematuria  NEUROLOGICAL: No new focal deficits  SKIN: No new rashes    PHYSICAL EXAM:  General: Not in distress.   HEENT: EOMI, neck collar in place  Cardio: irregular, S1, S2, ROBERT 2/6 left parasternal  Pulm: decreased air entry b/l bases  Abdomen: Soft, non-tender, non-distended. Normoactive bowel sounds  Extremities: No edema b/l le  Neuro: A&O x3. No focal deficits    LABS:                        11.3   10.48 )-----------( 48       ( 23 May 2024 11:40 )             36.1         135  |  98  |  76<HH>  ----------------------------<  125<H>  3.9   |  23  |  2.6<H>    Ca    8.2<L>      23 May 2024 07:40  Mg     1.9             COVID-19 PCR: NotDetec (2024 11:02)  COVID-19 PCR: NotDetec (2024 11:58)  SARS-CoV-2: NotDetec (2024 09:34)  SARS-CoV-2: NotDetec (10 Bhaskar 2024 17:11)  COVID-19 PCR: NotDetec (2024 21:30)      RADIOLOGY:  < from: Xray Chest 1 View- PORTABLE-Routine (Xray Chest 1 View- PORTABLE-Routine .) (24 @ 19:26) >    Impression:  Interval placement of enteric tube traveling below the diaphragm with tip   out of view. Otherwise stable exam.    --- End of Report ---    < end of copied text >  -TTE: < from: TTE Echo Complete w/o Contrast w/ Doppler (24 @ 10:32) >      Summary:   1. Left ventricular ejection fraction, by visual estimation, is 60 to   65%.   2. Hyperdynamic global left ventricular systolic function.   3. Mildly increased LV wall thickness.   4. Severely enlarged left atrium.   5. The mitral in-flow pattern reveals no discernable A-wave, therefore   no comment on diastolic function can be made.   6. Mildly enlarged right atrium.   7. Degenerative mitral valve.   8. Mild mitral valve regurgitation.   9. Moderately decreased mitral leaflet mobility.  10. Severe mitral annular calcification.  11. Moderate-severe tricuspid regurgitation.  12. Bioprosthesis in the aortic position.  13. Estimated pulmonary artery systolic pressure is 48.7 mmHg assuming a   right atrial pressure of 10 mmHg, which is consistent with mild pulmonary   hypertension.    PHYSICIAN INTERPRETATION:  Left Ventricle: The left ventricular internal cavity size is normal. Left   ventricular wall thickness is mildly increased. Global LV systolic   function was hyperdynamic. Left ventricular ejection fraction, by visual   estimation, is 60 to 65%. The mitral in-flow pattern reveals no   discernable A-wave, therefore no comment on diastolic function can be   made.  Right Ventricle: Normal right ventricular size and function.  Left Atrium: Severely enlarged left atrium.  Right Atrium: Mildly enlarged right atrium.  Pericardium: There is no evidence of pericardial effusion.  Mitral Valve: The mitral valve is degenerative in appearance. Mobility is   moderately decreased for both leaflets. There is severe mitral annular   calcification. Mild mitral valve regurgitation is seen.  Tricuspid Valve: The tricuspid valve is not well seen. Moderate-severe   tricuspid regurgitation is visualized. Estimated pulmonary artery   systolic pressure is 48.7 mmHg assuming a right atrial pressure of 10   mmHg, which is consistent with mild pulmonary hypertension.  Aortic Valve: No evidence of aortic stenosis. A bioprosthetic AoV is   visualized. Echo findings are consistent with normal structure and   function, allowing for the limitations of transthoracic echo techniques   of the aortic prosthesis. No evidence of aortic valve regurgitation is   seen.  Pulmonic Valve: The pulmonic valve was not well visualized.  Aorta: The aorta was not well visualized.  Pulmonary Artery: The pulmonary artery is not well seen.  Venous: The inferior vena cava is not well visualized. The inferior vena   cava was normal sized, with respiratory size variation less than 50%.  SPECTRAL DOPPLER ANALYSIS:  Aortic Valve:  AoV VMax:    1.97 m/s  AoV Area, Vmax:    0.63 cm² Vmax Indx:    0.29   cm²/m²  AoV VTI:     0.35 m    AoV Area, VTI:     0.67 cm² VTI Indx:     0.31   cm²/m²  AoV Pk Grad: 15.5 mmHg AoV Area, Mn Grad: 0.64 cm² Mn Grad Indx: 0.29   cm²/m²  AoV Mn Grad: 10.0 mmHg    LVOT Vmax: 0.70 m/s  LVOT VTI:  0.13 m  LVOT Diam: 1.50 cm    Tricuspid Valve and PA/RV Systolic Pressure: TR Max Velocity: 3.11 m/s RA   Pressure: 10 mmHg RVSP/PASP: 48.7 mmHg    Pulmonic Valve:  PV Max Velocity: 0.71 m/s PV Max P.0 mmHg PV Mean PG:    < end of copied text >      -STRESS TEST:< from: NM Nuclear Stress Pharmacologic Multiple (19 @ 10:00) >  Impression:  1. IV Adenosine Dual Isotope Study which was negative with respect to   symptoms and EKG changes.  2. Myocardial perfusion imaging reveals no fixed perfusion defects  3. Gated imaging reveals septal motion consistent with an   interventricular conduction defect normal thickening and ejection   fraction:    < end of copied text >    -OTHER:  ECG:  < from: 12 Lead ECG (24 @ 21:34) >  Diagnosis Line Atrial fibrillation with rapid ventricular response  Possible Anterior infarct , age undetermined  ST & T wave abnormality, consider inferolateral ischemia  Abnormal ECG    < end of copied text >      TELEMETRY EVENTS: NA HPI:  78 MARIZA w a PMH of CKD 3b, Afib (s/p watchman not on AC), COPD not on home O2,  Aortic stenosis w H/O ascending aortic replacement Bovine Replacement, DM , CAD s/p CABG 2016 (w bleeding complication) , anemia of chronic disease and a recent admission for a fall presents to the ED 3 days after DC for fall for the chief concern of NBNB N/V for 48hrs. Patient denies: hematemesis wrenching HA, palpitations, rash, diet or medication changes, diarrhea, fever, abdominal pain or cramping, hematochezia/melena. Patient admitted for AKASH on CKD, inadequate urine output and severe hyperKalemia          (15 May 2024 02:36)      ---  cardio fellow additional notes:    79 yo F with PMHx of HTN, HLD, DM, CAD s/p CABG + bioprosthetic AVR for AS, A Fib s/p watchman off AC, COPD, recent hospitalization s/p mechanical fall was readmitted with nausea vomiting and noted to have septic shock from possible UTI. Noted to be in A Fib with RVR at some point and was started on Amio. EP consulted regarding antiarrhythmic medications.     PAST MEDICAL & SURGICAL HISTORY  Aortic stenosis    Diabetes    Asthma with COPD  many years since last attack    CAD (coronary artery disease), native coronary artery    Anemia    History of bleeding disorder  having work up 21- HAD WORKUP BUT NO DIAGNOSIS "NOTHING WAS FOUND"    History of transfusion reaction    Hiatal hernia    Stage 3 chronic kidney disease    H/O ascending aortic replacement  Bovine Replacement    S/P CABG x 1  complication of bleeding     H/O total knee replacement, right  complicated with fx femur bars screws in femur- b/l knee replacement    S/P hysterectomy    Presence of Watchman left atrial appendage closure device        FAMILY HISTORY:  FAMILY HISTORY:  Family history of pseudocholinesterase deficiency (Child)        SOCIAL HISTORY:  Social History:      ALLERGIES:  Augmentin (Other)  penicillins (Hives; Rash)      MEDICATIONS:  allopurinol 50 milliGRAM(s) Oral daily  aMIOdarone    Tablet 200 milliGRAM(s) Oral two times a day  budesonide  80 MICROgram(s)/formoterol 4.5 MICROgram(s) Inhaler 2 Puff(s) Inhalation two times a day  chlorhexidine 2% Cloths 1 Application(s) Topical daily  dextrose 10% Bolus 125 milliLiter(s) IV Bolus once  dextrose 5%. 1000 milliLiter(s) (100 mL/Hr) IV Continuous <Continuous>  dextrose 5%. 1000 milliLiter(s) (50 mL/Hr) IV Continuous <Continuous>  dextrose 50% Injectable 12.5 Gram(s) IV Push once  dextrose 50% Injectable 25 Gram(s) IV Push once  doxazosin 2 milliGRAM(s) Oral at bedtime  fludroCORTISONE 0.1 milliGRAM(s) Oral daily  glucagon  Injectable 1 milliGRAM(s) IntraMuscular once  insulin glargine Injectable (LANTUS) 15 Unit(s) SubCutaneous at bedtime  insulin lispro (ADMELOG) corrective regimen sliding scale   SubCutaneous three times a day before meals  insulin lispro Injectable (ADMELOG) 5 Unit(s) SubCutaneous three times a day before meals  lactated ringers. 1000 milliLiter(s) (75 mL/Hr) IV Continuous <Continuous>  lactulose Syrup 20 Gram(s) Oral every 3 hours  melatonin 5 milliGRAM(s) Oral at bedtime  metoprolol tartrate 50 milliGRAM(s) Oral four times a day  montelukast 10 milliGRAM(s) Oral daily  nystatin Powder 1 Application(s) Topical every 12 hours  pantoprazole    Tablet 40 milliGRAM(s) Oral before breakfast  polyethylene glycol 3350 17 Gram(s) Oral two times a day  senna 2 Tablet(s) Oral at bedtime  tiotropium 2.5 MICROgram(s) Inhaler 2 Puff(s) Inhalation daily    PRN:  dextrose Oral Gel 15 Gram(s) Oral once PRN  lidocaine   4% Patch 1 Patch Transdermal every 24 hours PRN  metoclopramide 2.5 milliGRAM(s) Oral every 6 hours PRN  oxyCODONE    IR 2.5 milliGRAM(s) Oral every 6 hours PRN      HOME MEDICATIONS:  Home Medications:  aspirin 81 mg oral capsule: 1 cap(s) orally once a day (2024 23:25)  desvenlafaxine (as succinate) 25 mg oral tablet, extended release: 1 tab(s) orally once a day (2024 23:25)  furosemide 40 mg oral tablet: 1 tab(s) orally once a day (2024 23:25)  glipiZIDE 5 mg oral tablet: 1 tab(s) orally once a day (2024 23:25)  Lyrica 75 mg oral capsule: 1 cap(s) orally once a day (2024 23:25)  montelukast 10 mg oral tablet: 1 tab(s) orally once a day (2024 23:25)  polyethylene glycol 3350 oral powder for reconstitution: 17 gram(s) orally 2 times a day (2024 23:25)  Protonix 40 mg oral delayed release tablet: 1 tab(s) orally once a day (2024 23:25)  Ranexa 500 mg oral tablet, extended release: 1 tab(s) orally 2 times a day (2024 23:25)  rosuvastatin 10 mg oral tablet: 1 tab(s) orally once a day (2024 23:25)  senna leaf extract oral tablet: 2 tab(s) orally once a day (at bedtime) (2024 23:25)      VITALS:   T(F): 96.5 ( @ 05:11), Max: 97.5 ( @ 23:00)  HR: 82 ( @ 05:11) (57 - 92)  BP: 111/54 ( @ 05:11) (98/59 - 121/74)  BP(mean): 73 ( @ 16:48) (72 - 73)  RR: 18 ( @ 05:11) (17 - 20)  SpO2: 98% ( @ 23:00) (96% - 100%)    I&O's Summary      REVIEW OF SYSTEMS:  CONSTITUTIONAL: generalized weakness  HEENT: No visual changes, neck/ear pain  RESPIRATORY: No cough   CARDIOVASCULAR: See HPI  GASTROINTESTINAL: Nausea vomiting  GENITOURINARY: No dysuria, frequency or hematuria  NEUROLOGICAL: No new focal deficits  SKIN: No new rashes    PHYSICAL EXAM:  General: Not in distress.   HEENT: EOMI, neck collar in place. Bruises and ecchymoses on forehead face  Cardio: irregular, S1, S2, ROBERT 2/6 left parasternal  Pulm: decreased air entry b/l bases  Abdomen: Soft, non-tender, non-distended. Normoactive bowel sounds  Extremities: No edema b/l le  Neuro: A&O x3. No focal deficits    LABS:                        11.3   10.48 )-----------( 48       ( 23 May 2024 11:40 )             36.1         135  |  98  |  76<HH>  ----------------------------<  125<H>  3.9   |  23  |  2.6<H>    Ca    8.2<L>      23 May 2024 07:40  Mg     1.9             COVID-19 PCR: NotDetec (2024 11:02)  COVID-19 PCR: NotDetec (2024 11:58)  SARS-CoV-2: NotDetec (2024 09:34)  SARS-CoV-2: NotDetec (10 Bhaskar 2024 17:11)  COVID-19 PCR: NotDetec (2024 21:30)      RADIOLOGY:  < from: Xray Chest 1 View- PORTABLE-Routine (Xray Chest 1 View- PORTABLE-Routine .) (24 @ 19:26) >    Impression:  Interval placement of enteric tube traveling below the diaphragm with tip   out of view. Otherwise stable exam.    --- End of Report ---    < end of copied text >  -TTE: < from: TTE Echo Complete w/o Contrast w/ Doppler (24 @ 10:32) >      Summary:   1. Left ventricular ejection fraction, by visual estimation, is 60 to   65%.   2. Hyperdynamic global left ventricular systolic function.   3. Mildly increased LV wall thickness.   4. Severely enlarged left atrium.   5. The mitral in-flow pattern reveals no discernable A-wave, therefore   no comment on diastolic function can be made.   6. Mildly enlarged right atrium.   7. Degenerative mitral valve.   8. Mild mitral valve regurgitation.   9. Moderately decreased mitral leaflet mobility.  10. Severe mitral annular calcification.  11. Moderate-severe tricuspid regurgitation.  12. Bioprosthesis in the aortic position.  13. Estimated pulmonary artery systolic pressure is 48.7 mmHg assuming a   right atrial pressure of 10 mmHg, which is consistent with mild pulmonary   hypertension.    PHYSICIAN INTERPRETATION:  Left Ventricle: The left ventricular internal cavity size is normal. Left   ventricular wall thickness is mildly increased. Global LV systolic   function was hyperdynamic. Left ventricular ejection fraction, by visual   estimation, is 60 to 65%. The mitral in-flow pattern reveals no   discernable A-wave, therefore no comment on diastolic function can be   made.  Right Ventricle: Normal right ventricular size and function.  Left Atrium: Severely enlarged left atrium.  Right Atrium: Mildly enlarged right atrium.  Pericardium: There is no evidence of pericardial effusion.  Mitral Valve: The mitral valve is degenerative in appearance. Mobility is   moderately decreased for both leaflets. There is severe mitral annular   calcification. Mild mitral valve regurgitation is seen.  Tricuspid Valve: The tricuspid valve is not well seen. Moderate-severe   tricuspid regurgitation is visualized. Estimated pulmonary artery   systolic pressure is 48.7 mmHg assuming a right atrial pressure of 10   mmHg, which is consistent with mild pulmonary hypertension.  Aortic Valve: No evidence of aortic stenosis. A bioprosthetic AoV is   visualized. Echo findings are consistent with normal structure and   function, allowing for the limitations of transthoracic echo techniques   of the aortic prosthesis. No evidence of aortic valve regurgitation is   seen.  Pulmonic Valve: The pulmonic valve was not well visualized.  Aorta: The aorta was not well visualized.  Pulmonary Artery: The pulmonary artery is not well seen.  Venous: The inferior vena cava is not well visualized. The inferior vena   cava was normal sized, with respiratory size variation less than 50%.  SPECTRAL DOPPLER ANALYSIS:  Aortic Valve:  AoV VMax:    1.97 m/s  AoV Area, Vmax:    0.63 cm² Vmax Indx:    0.29   cm²/m²  AoV VTI:     0.35 m    AoV Area, VTI:     0.67 cm² VTI Indx:     0.31   cm²/m²  AoV Pk Grad: 15.5 mmHg AoV Area, Mn Grad: 0.64 cm² Mn Grad Indx: 0.29   cm²/m²  AoV Mn Grad: 10.0 mmHg    LVOT Vmax: 0.70 m/s  LVOT VTI:  0.13 m  LVOT Diam: 1.50 cm    Tricuspid Valve and PA/RV Systolic Pressure: TR Max Velocity: 3.11 m/s RA   Pressure: 10 mmHg RVSP/PASP: 48.7 mmHg    Pulmonic Valve:  PV Max Velocity: 0.71 m/s PV Max P.0 mmHg PV Mean PG:    < end of copied text >      -STRESS TEST:< from: NM Nuclear Stress Pharmacologic Multiple (19 @ 10:00) >  Impression:  1. IV Adenosine Dual Isotope Study which was negative with respect to   symptoms and EKG changes.  2. Myocardial perfusion imaging reveals no fixed perfusion defects  3. Gated imaging reveals septal motion consistent with an   interventricular conduction defect normal thickening and ejection   fraction:    < end of copied text >    -OTHER:  ECG:  < from: 12 Lead ECG (24 @ 21:34) >  Diagnosis Line Atrial fibrillation with rapid ventricular response  Possible Anterior infarct , age undetermined  ST & T wave abnormality, consider inferolateral ischemia  Abnormal ECG    < end of copied text >      TELEMETRY EVENTS: NA

## 2024-05-24 NOTE — CONSULT NOTE ADULT - CONSULT REQUESTED DATE/TIME
15-May-2024 05:19
24-May-2024 13:11
15-May-2024 10:11
17-May-2024 14:43
16-May-2024 09:41
24-May-2024 09:00

## 2024-05-24 NOTE — CONSULT NOTE ADULT - SUBJECTIVE AND OBJECTIVE BOX
Hematology Consult Note    HPI:  78 MARIZA w a PMH of CKD 3b, Afib (s/p watchman not on AC), COPD not on home O2,  Aortic stenosis w H/O ascending aortic replacement Bovine Replacement, DM , CAD s/p CABG 2016 (w bleeding complication) , anemia of chronic disease and a recent admission for a fall presents to the ED 3 days after DC for fall for the chief concern of NBNB N/V for 48hrs. Patient denies: hematemesis wrenching HA, palpitations, rash, diet or medication changes, diarrhea, fever, abdominal pain or cramping, hematochezia/melena. Patient admitted for AKASH on CKD, inadequate urine output and severe hyperKalemia          (15 May 2024 02:36)      Allergies    Augmentin (Other)  penicillins (Hives; Rash)    Intolerances        MEDICATIONS  (STANDING):  allopurinol 50 milliGRAM(s) Oral daily  aMIOdarone    Tablet 200 milliGRAM(s) Oral two times a day  budesonide  80 MICROgram(s)/formoterol 4.5 MICROgram(s) Inhaler 2 Puff(s) Inhalation two times a day  chlorhexidine 2% Cloths 1 Application(s) Topical daily  dextrose 10% Bolus 125 milliLiter(s) IV Bolus once  dextrose 5%. 1000 milliLiter(s) (100 mL/Hr) IV Continuous <Continuous>  dextrose 5%. 1000 milliLiter(s) (50 mL/Hr) IV Continuous <Continuous>  dextrose 50% Injectable 12.5 Gram(s) IV Push once  dextrose 50% Injectable 25 Gram(s) IV Push once  doxazosin 2 milliGRAM(s) Oral at bedtime  fludroCORTISONE 0.1 milliGRAM(s) Oral daily  glucagon  Injectable 1 milliGRAM(s) IntraMuscular once  insulin glargine Injectable (LANTUS) 15 Unit(s) SubCutaneous at bedtime  insulin lispro (ADMELOG) corrective regimen sliding scale   SubCutaneous three times a day before meals  insulin lispro Injectable (ADMELOG) 5 Unit(s) SubCutaneous three times a day before meals  lactated ringers. 1000 milliLiter(s) (75 mL/Hr) IV Continuous <Continuous>  lactulose Syrup 20 Gram(s) Oral every 3 hours  melatonin 5 milliGRAM(s) Oral at bedtime  metoprolol tartrate 50 milliGRAM(s) Oral four times a day  montelukast 10 milliGRAM(s) Oral daily  nystatin Powder 1 Application(s) Topical every 12 hours  pantoprazole    Tablet 40 milliGRAM(s) Oral before breakfast  polyethylene glycol 3350 17 Gram(s) Oral two times a day  senna 2 Tablet(s) Oral at bedtime  tiotropium 2.5 MICROgram(s) Inhaler 2 Puff(s) Inhalation daily    MEDICATIONS  (PRN):  dextrose Oral Gel 15 Gram(s) Oral once PRN Blood Glucose LESS THAN 70 milliGRAM(s)/deciliter  lidocaine   4% Patch 1 Patch Transdermal every 24 hours PRN pain  metoclopramide 2.5 milliGRAM(s) Oral every 6 hours PRN nausea/vomiting  oxyCODONE    IR 2.5 milliGRAM(s) Oral every 6 hours PRN Moderate Pain (4 - 6)      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      CAD (coronary artery disease), native coronary artery      Anemia      History of bleeding disorder  having work up 5/2/21- HAD WORKUP BUT NO DIAGNOSIS "NOTHING WAS FOUND"      History of transfusion reaction      Hiatal hernia      Stage 3 chronic kidney disease      H/O ascending aortic replacement  Bovine Replacement      S/P CABG x 1  complication of bleeding 2016      H/O total knee replacement, right  complicated with fx femur bars screws in femur- b/l knee replacement      S/P hysterectomy      Presence of Watchman left atrial appendage closure device          FAMILY HISTORY:  Family history of pseudocholinesterase deficiency (Child)        SOCIAL HISTORY: No EtOH, no tobacco    REVIEW OF SYSTEMS:    CONSTITUTIONAL: No weakness, fevers or chills  EYES/ENT: No visual changes;  No vertigo or throat pain   NECK: No pain or stiffness  RESPIRATORY: No cough, wheezing, hemoptysis; No shortness of breath  CARDIOVASCULAR: No chest pain or palpitations  GASTROINTESTINAL: No abdominal or epigastric pain. No nausea, vomiting, or hematemesis; No diarrhea or constipation. No melena or hematochezia.  GENITOURINARY: No dysuria, frequency or hematuria  NEUROLOGICAL: No numbness or weakness  SKIN: No itching, burning, rashes, or lesions   All other review of systems is negative unless indicated above.        T(F): 96.5 (05-24-24 @ 05:11), Max: 97.5 (05-23-24 @ 23:00)  HR: 82 (05-24-24 @ 05:11)  BP: 111/54 (05-24-24 @ 05:11)  RR: 18 (05-24-24 @ 05:11)  SpO2: 98% (05-23-24 @ 23:00)  Wt(kg): --    GENERAL: NAD, well-developed  HEAD:  Atraumatic, Normocephalic  EYES: EOMI, PERRLA, conjunctiva and sclera clear  NECK: Supple, No JVD  CHEST/LUNG: Clear to auscultation bilaterally; No wheeze  HEART: Regular rate and rhythm; No murmurs, rubs, or gallops  ABDOMEN: Soft, Nontender, Nondistended; Bowel sounds present  EXTREMITIES:  2+ Peripheral Pulses, No clubbing, cyanosis, or edema  NEUROLOGY: non-focal  SKIN: No rashes or lesions                          11.3   10.48 )-----------( 48       ( 23 May 2024 11:40 )             36.1       05-23    135  |  98  |  76<HH>  ----------------------------<  125<H>  3.9   |  23  |  2.6<H>    Ca    8.2<L>      23 May 2024 07:40  Mg     1.9     05-23             Hematology Consult Note    HPI:  78 MARIZA w a PMH of CKD 3b, Afib (s/p watchman not on AC), COPD not on home O2,  Aortic stenosis w H/O ascending aortic replacement Bovine Replacement, DM , CAD s/p CABG 2016 (w bleeding complication) , anemia of chronic disease and a recent admission for a fall presents to the ED 3 days after DC for fall for the chief concern of NBNB N/V for 48hrs. Patient denies: hematemesis wrenching HA, palpitations, rash, diet or medication changes, diarrhea, fever, abdominal pain or cramping, hematochezia/melena. Patient admitted for AKASH on CKD, inadequate urine output and severe hyperKalemia      (15 May 2024 02:36)    Allergies    Augmentin (Other)  penicillins (Hives; Rash)    Intolerances        MEDICATIONS  (STANDING):  allopurinol 50 milliGRAM(s) Oral daily  aMIOdarone    Tablet 200 milliGRAM(s) Oral two times a day  budesonide  80 MICROgram(s)/formoterol 4.5 MICROgram(s) Inhaler 2 Puff(s) Inhalation two times a day  chlorhexidine 2% Cloths 1 Application(s) Topical daily  dextrose 10% Bolus 125 milliLiter(s) IV Bolus once  dextrose 5%. 1000 milliLiter(s) (100 mL/Hr) IV Continuous <Continuous>  dextrose 5%. 1000 milliLiter(s) (50 mL/Hr) IV Continuous <Continuous>  dextrose 50% Injectable 12.5 Gram(s) IV Push once  dextrose 50% Injectable 25 Gram(s) IV Push once  doxazosin 2 milliGRAM(s) Oral at bedtime  fludroCORTISONE 0.1 milliGRAM(s) Oral daily  glucagon  Injectable 1 milliGRAM(s) IntraMuscular once  insulin glargine Injectable (LANTUS) 15 Unit(s) SubCutaneous at bedtime  insulin lispro (ADMELOG) corrective regimen sliding scale   SubCutaneous three times a day before meals  insulin lispro Injectable (ADMELOG) 5 Unit(s) SubCutaneous three times a day before meals  lactated ringers. 1000 milliLiter(s) (75 mL/Hr) IV Continuous <Continuous>  lactulose Syrup 20 Gram(s) Oral every 3 hours  melatonin 5 milliGRAM(s) Oral at bedtime  metoprolol tartrate 50 milliGRAM(s) Oral four times a day  montelukast 10 milliGRAM(s) Oral daily  nystatin Powder 1 Application(s) Topical every 12 hours  pantoprazole    Tablet 40 milliGRAM(s) Oral before breakfast  polyethylene glycol 3350 17 Gram(s) Oral two times a day  senna 2 Tablet(s) Oral at bedtime  tiotropium 2.5 MICROgram(s) Inhaler 2 Puff(s) Inhalation daily    MEDICATIONS  (PRN):  dextrose Oral Gel 15 Gram(s) Oral once PRN Blood Glucose LESS THAN 70 milliGRAM(s)/deciliter  lidocaine   4% Patch 1 Patch Transdermal every 24 hours PRN pain  metoclopramide 2.5 milliGRAM(s) Oral every 6 hours PRN nausea/vomiting  oxyCODONE    IR 2.5 milliGRAM(s) Oral every 6 hours PRN Moderate Pain (4 - 6)      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      CAD (coronary artery disease), native coronary artery      Anemia      History of bleeding disorder  having work up 5/2/21- HAD WORKUP BUT NO DIAGNOSIS "NOTHING WAS FOUND"      History of transfusion reaction      Hiatal hernia      Stage 3 chronic kidney disease      H/O ascending aortic replacement  Bovine Replacement      S/P CABG x 1  complication of bleeding 2016      H/O total knee replacement, right  complicated with fx femur bars screws in femur- b/l knee replacement      S/P hysterectomy      Presence of Watchman left atrial appendage closure device          FAMILY HISTORY:  Family history of pseudocholinesterase deficiency (Child)        SOCIAL HISTORY: No EtOH, no tobacco    REVIEW OF SYSTEMS:    CONSTITUTIONAL: No weakness, fevers or chills  EYES/ENT: No visual changes;  No vertigo or throat pain   NECK: No pain or stiffness  RESPIRATORY: No cough, wheezing, hemoptysis; No shortness of breath  CARDIOVASCULAR: No chest pain or palpitations  GASTROINTESTINAL: No abdominal or epigastric pain. No nausea, vomiting, or hematemesis; No diarrhea or constipation. No melena or hematochezia.  GENITOURINARY: No dysuria, frequency or hematuria  NEUROLOGICAL: No numbness or weakness  SKIN: reports easy bruising   All other review of systems is negative unless indicated above.    T(F): 96.5 (05-24-24 @ 05:11), Max: 97.5 (05-23-24 @ 23:00)  HR: 82 (05-24-24 @ 05:11)  BP: 111/54 (05-24-24 @ 05:11)  RR: 18 (05-24-24 @ 05:11)  SpO2: 98% (05-23-24 @ 23:00)  Wt(kg): --    GENERAL: NAD, appears comfortable in chair   HEAD:  Atraumatic, Normocephalic, C-neck collar present   EYES: conjunctiva and sclera clear  NECK: Supple, No JVD  CHEST/LUNG: Clear to auscultation bilaterally; No wheeze  HEART: Murmur hear, irregular rhythm   ABDOMEN: Soft, Nontender, Nondistended; Bowel sounds present  NEUROLOGY: non-focal  SKIN: ecchymosis noted on bilateral arms                           11.3   10.48 )-----------( 48       ( 23 May 2024 11:40 )             36.1       05-23    135  |  98  |  76<HH>  ----------------------------<  125<H>  3.9   |  23  |  2.6<H>    Ca    8.2<L>      23 May 2024 07:40  Mg     1.9     05-23

## 2024-05-24 NOTE — PROGRESS NOTE ADULT - SUBJECTIVE AND OBJECTIVE BOX
SUBJECTIVE:    Patient is a 78y old Female who presents with a chief complaint of N/V, AKASH on CKD, hyperkalmeia (24 May 2024 13:11)    Currently admitted to medicine with the primary diagnosis of AKASH (acute kidney injury)       Today is hospital day 9d. This morning she is resting comfortably in bed and reports no new issues or overnight events.     PAST MEDICAL & SURGICAL HISTORY  Aortic stenosis    Diabetes    Asthma with COPD  many years since last attack    CAD (coronary artery disease), native coronary artery    Anemia    History of bleeding disorder  having work up 5/2/21- HAD WORKUP BUT NO DIAGNOSIS "NOTHING WAS FOUND"    History of transfusion reaction    Hiatal hernia    Stage 3 chronic kidney disease    H/O ascending aortic replacement  Bovine Replacement    S/P CABG x 1  complication of bleeding 2016    H/O total knee replacement, right  complicated with fx femur bars screws in femur- b/l knee replacement    S/P hysterectomy    Presence of Watchman left atrial appendage closure device      SOCIAL HISTORY:  Negative for smoking/alcohol/drug use.     ALLERGIES:  Augmentin (Other)  penicillins (Hives; Rash)    MEDICATIONS:  STANDING MEDICATIONS  allopurinol 50 milliGRAM(s) Oral daily  aMIOdarone    Tablet 200 milliGRAM(s) Oral two times a day  aspirin  chewable 81 milliGRAM(s) Oral daily  budesonide  80 MICROgram(s)/formoterol 4.5 MICROgram(s) Inhaler 2 Puff(s) Inhalation two times a day  chlorhexidine 2% Cloths 1 Application(s) Topical daily  dextrose 10% Bolus 125 milliLiter(s) IV Bolus once  dextrose 5%. 1000 milliLiter(s) IV Continuous <Continuous>  dextrose 5%. 1000 milliLiter(s) IV Continuous <Continuous>  dextrose 50% Injectable 25 Gram(s) IV Push once  dextrose 50% Injectable 12.5 Gram(s) IV Push once  doxazosin 2 milliGRAM(s) Oral at bedtime  fludroCORTISONE 0.1 milliGRAM(s) Oral daily  glucagon  Injectable 1 milliGRAM(s) IntraMuscular once  heparin   Injectable 5000 Unit(s) SubCutaneous every 8 hours  insulin glargine Injectable (LANTUS) 15 Unit(s) SubCutaneous at bedtime  insulin lispro (ADMELOG) corrective regimen sliding scale   SubCutaneous three times a day before meals  insulin lispro Injectable (ADMELOG) 5 Unit(s) SubCutaneous three times a day before meals  lactated ringers. 1000 milliLiter(s) IV Continuous <Continuous>  lactulose Syrup 20 Gram(s) Oral every 3 hours  melatonin 5 milliGRAM(s) Oral at bedtime  metoprolol succinate  milliGRAM(s) Oral daily  montelukast 10 milliGRAM(s) Oral daily  nystatin Powder 1 Application(s) Topical every 12 hours  pantoprazole    Tablet 40 milliGRAM(s) Oral before breakfast  polyethylene glycol 3350 17 Gram(s) Oral two times a day  pregabalin 25 milliGRAM(s) Oral two times a day  senna 2 Tablet(s) Oral at bedtime  tiotropium 2.5 MICROgram(s) Inhaler 2 Puff(s) Inhalation daily    PRN MEDICATIONS  dextrose Oral Gel 15 Gram(s) Oral once PRN  lidocaine   4% Patch 1 Patch Transdermal every 24 hours PRN  metoclopramide 2.5 milliGRAM(s) Oral every 6 hours PRN  oxyCODONE    IR 2.5 milliGRAM(s) Oral every 6 hours PRN    VITALS:   T(F): 98.5  HR: 92  BP: 117/77  RR: 18  SpO2: 98%    LABS:                        10.7   8.41  )-----------( 60       ( 24 May 2024 13:31 )             34.5     05-24    139  |  100  |  69<HH>  ----------------------------<  178<H>  3.4<L>   |  28  |  2.2<H>    Ca    8.0<L>      24 May 2024 13:31  Phos  3.8     05-24  Mg     1.8     05-24    TPro  5.3<L>  /  Alb  3.0<L>  /  TBili  1.8<H>  /  DBili  x   /  AST  27  /  ALT  99<H>  /  AlkPhos  139<H>  05-24      Urinalysis Basic - ( 24 May 2024 13:31 )    Color: x / Appearance: x / SG: x / pH: x  Gluc: 178 mg/dL / Ketone: x  / Bili: x / Urobili: x   Blood: x / Protein: x / Nitrite: x   Leuk Esterase: x / RBC: x / WBC x   Sq Epi: x / Non Sq Epi: x / Bacteria: x                RADIOLOGY:    PHYSICAL EXAM:  GEN: neck brace  LUNGS: Clear to auscultation bilaterally   HEART: S1/S2 present. RRR.   ABD: Soft, non-tender, non-distended. Bowel sounds present  EXT: NC/NC/NE/2+PP/DÍAZ/Skin Intact.   NEURO: AAOX3    Intravenous access:   NG tube:   Wang Catheter:   Indwelling Urethral Catheter:     Connect To:  Straight Drainage/[place]    Indication:  Urinary Retention / Obstruction (05-19-24 @ 06:02) (not performed) [Active]  Indwelling Urethral Catheter:     Connect To:  Straight Drainage/[place]    Indication:  Urinary Retention / Obstruction (05-18-24 @ 05:43) (not performed) [Active]  Indwelling Urethral Catheter:     Connect To:  Straight Drainage/[place]    Indication:  Urinary Retention / Obstruction (05-14-24 @ 22:37) (not performed) [Active]

## 2024-05-24 NOTE — CONSULT NOTE ADULT - ATTENDING COMMENTS
79 yo F with PMHx of HTN, HLD, DM, CAD s/p CABG + bioprosthetic AVR for AS, A Fib s/p watchman off AC, COPD, recent hospitalization s/p mechanical fall was readmitted with nausea vomiting and noted to have septic shock from possible UTI. Noted to be in A Fib with RVR at some point and was started on Amio. EP consulted regarding antiarrhythmic medications.     Rec  - Monitor on tele  - Stop Amio   - Check EKG for QTc  - Recommend rate control for A Fib/AFL for now  - Follow up with Dr. Jaquez     Recall as needed
Patient seen at bedside  78 yr old female with hx of CKD 3b, Afib (s/p watchman not on AC), COPD not on home O2,  Aortic stenosis w H/O ascending aortic replacement and Bovine Replacement, DM , CAD s/p CABG 2016 (w bleeding complication) , anemia of chronic disease and a recent admission for a fall presents to the ED 3 days after DC for fall for the chief concern    Cardiology consulted for mildly elevated trop , CK MB negative likely type II, stress related, in setting of AKASH on CKD, s/p fall  with underlying CAD hx.    no evidence of ACS.   No need to keep trending troponin  Plan as outlined above.
The patient was seen. Agree with above.  New onset thrombocytopenia is likely multifactorial due to acute illness, medication bone marrow suppression.  Blood smear reviewed. No increased schistocytes.   Supportive care. Monitor CBC.

## 2024-05-24 NOTE — CONSULT NOTE ADULT - REASON FOR ADMISSION
N/V, AKASH on CKD, hyperkalmeia
Impaired

## 2024-05-25 NOTE — PROGRESS NOTE ADULT - SUBJECTIVE AND OBJECTIVE BOX
EVAN QUINN  78y Female    INTERVAL HPI/OVERNIGHT EVENTS:    pt feels better today  eating better and encouraged her to drink liquids  no SOB, N/V  still c/o some abdominal pain - refuses reglan    T(F): 97 (05-25-24 @ 05:55), Max: 98.5 (05-24-24 @ 14:13)  HR: 84 (05-25-24 @ 05:55) (76 - 92)  BP: 116/72 (05-25-24 @ 05:55) (102/58 - 117/77)  RR: 18 (05-25-24 @ 05:55) (18 - 18)    CAPILLARY BLOOD GLUCOSE      POCT Blood Glucose.: 128 mg/dL (25 May 2024 11:39)  POCT Blood Glucose.: 80 mg/dL (25 May 2024 08:00)  POCT Blood Glucose.: 194 mg/dL (24 May 2024 20:34)  POCT Blood Glucose.: 316 mg/dL (24 May 2024 17:17)  POCT Blood Glucose.: 179 mg/dL (24 May 2024 12:43)        PHYSICAL EXAM:  GENERAL: NAD  HEAD:  Normocephalic, frontal hematom  EYES:  conjunctiva and sclera clear  ENMT: Moist mucous membranes  neck: hard collar  NERVOUS SYSTEM:  Alert, awake, Good concentration  CHEST/LUNG: CTA b/l; No rales, rhonchi, wheezing  HEART: Regular rate and rhythm; No murmurs  ABDOMEN: Soft, Nontender, Nondistended; Bowel sounds present  EXTREMITIES:   No edema  SKIN:     Consultant(s) Notes Reviewed:  [x ] YES  [ ] NO  Care Discussed with Consultants/Other Providers [ x] YES  [ ] NO    MEDICATIONS  (STANDING):  allopurinol 50 milliGRAM(s) Oral daily  aspirin  chewable 81 milliGRAM(s) Oral daily  budesonide  80 MICROgram(s)/formoterol 4.5 MICROgram(s) Inhaler 2 Puff(s) Inhalation two times a day  chlorhexidine 2% Cloths 1 Application(s) Topical daily  dextrose 10% Bolus 125 milliLiter(s) IV Bolus once  dextrose 5%. 1000 milliLiter(s) (100 mL/Hr) IV Continuous <Continuous>  dextrose 5%. 1000 milliLiter(s) (50 mL/Hr) IV Continuous <Continuous>  dextrose 50% Injectable 25 Gram(s) IV Push once  dextrose 50% Injectable 12.5 Gram(s) IV Push once  doxazosin 2 milliGRAM(s) Oral at bedtime  fludroCORTISONE 0.1 milliGRAM(s) Oral daily  glucagon  Injectable 1 milliGRAM(s) IntraMuscular once  heparin   Injectable 5000 Unit(s) SubCutaneous every 8 hours  insulin glargine Injectable (LANTUS) 15 Unit(s) SubCutaneous at bedtime  insulin lispro (ADMELOG) corrective regimen sliding scale   SubCutaneous three times a day before meals  insulin lispro Injectable (ADMELOG) 5 Unit(s) SubCutaneous three times a day before meals  magnesium sulfate  IVPB 2 Gram(s) IV Intermittent once  melatonin 5 milliGRAM(s) Oral at bedtime  metoclopramide   Syrup 2.5 milliGRAM(s) Oral four times a day  metoprolol succinate  milliGRAM(s) Oral daily  montelukast 10 milliGRAM(s) Oral daily  nystatin Powder 1 Application(s) Topical every 12 hours  pantoprazole    Tablet 40 milliGRAM(s) Oral before breakfast  polyethylene glycol 3350 17 Gram(s) Oral two times a day  pregabalin 25 milliGRAM(s) Oral two times a day  senna 2 Tablet(s) Oral at bedtime  tiotropium 2.5 MICROgram(s) Inhaler 2 Puff(s) Inhalation daily    MEDICATIONS  (PRN):  dextrose Oral Gel 15 Gram(s) Oral once PRN Blood Glucose LESS THAN 70 milliGRAM(s)/deciliter  lidocaine   4% Patch 1 Patch Transdermal every 24 hours PRN pain  oxyCODONE    IR 2.5 milliGRAM(s) Oral every 6 hours PRN Moderate Pain (4 - 6)      LABS:                        10.4   7.63  )-----------( 67       ( 25 May 2024 05:46 )             32.8     05-25    141  |  102  |  58<H>  ----------------------------<  79  3.5   |  27  |  2.0<H>    Ca    8.2<L>      25 May 2024 05:46  Phos  3.8     05-24  Mg     1.8     05-25    TPro  5.3<L>  /  Alb  3.0<L>  /  TBili  1.8<H>  /  DBili  x   /  AST  27  /  ALT  99<H>  /  AlkPhos  139<H>  05-24                RADIOLOGY & ADDITIONAL TESTS:    Imaging or report Personally Reviewed:  [ ] YES  [ ] NO            Case discussed with residents and RN on rounds today    Care discussed with pt/family           EVAN QUINN  78y Female    INTERVAL HPI/OVERNIGHT EVENTS:    pt feels better today  eating better and encouraged her to drink liquids  no SOB, N/V  still c/o some abdominal pain - refuses reglan    T(F): 97 (05-25-24 @ 05:55), Max: 98.5 (05-24-24 @ 14:13)  HR: 84 (05-25-24 @ 05:55) (76 - 92)  BP: 116/72 (05-25-24 @ 05:55) (102/58 - 117/77)  RR: 18 (05-25-24 @ 05:55) (18 - 18)    CAPILLARY BLOOD GLUCOSE      POCT Blood Glucose.: 128 mg/dL (25 May 2024 11:39)  POCT Blood Glucose.: 80 mg/dL (25 May 2024 08:00)  POCT Blood Glucose.: 194 mg/dL (24 May 2024 20:34)  POCT Blood Glucose.: 316 mg/dL (24 May 2024 17:17)  POCT Blood Glucose.: 179 mg/dL (24 May 2024 12:43)        PHYSICAL EXAM:  GENERAL: NAD  HEAD:  Normocephalic, frontal hematoma  EYES:  conjunctiva and sclera clear  ENMT: Moist mucous membranes  neck: hard collar  NERVOUS SYSTEM:  Alert, awake, Good concentration  CHEST/LUNG: CTA b/l  HEART: IRR  ABDOMEN: Soft, Nontender, Nondistended; Bowel sounds present, obese  EXTREMITIES:   1+ LE edema  SKIN: ecchymoses, warm, dry    Consultant(s) Notes Reviewed:  [x ] YES  [ ] NO  Care Discussed with Consultants/Other Providers [ x] YES  [ ] NO    MEDICATIONS  (STANDING):  allopurinol 50 milliGRAM(s) Oral daily  aspirin  chewable 81 milliGRAM(s) Oral daily  budesonide  80 MICROgram(s)/formoterol 4.5 MICROgram(s) Inhaler 2 Puff(s) Inhalation two times a day  chlorhexidine 2% Cloths 1 Application(s) Topical daily  dextrose 10% Bolus 125 milliLiter(s) IV Bolus once  dextrose 5%. 1000 milliLiter(s) (100 mL/Hr) IV Continuous <Continuous>  dextrose 5%. 1000 milliLiter(s) (50 mL/Hr) IV Continuous <Continuous>  dextrose 50% Injectable 25 Gram(s) IV Push once  dextrose 50% Injectable 12.5 Gram(s) IV Push once  doxazosin 2 milliGRAM(s) Oral at bedtime  fludroCORTISONE 0.1 milliGRAM(s) Oral daily  glucagon  Injectable 1 milliGRAM(s) IntraMuscular once  heparin   Injectable 5000 Unit(s) SubCutaneous every 8 hours  insulin glargine Injectable (LANTUS) 15 Unit(s) SubCutaneous at bedtime  insulin lispro (ADMELOG) corrective regimen sliding scale   SubCutaneous three times a day before meals  insulin lispro Injectable (ADMELOG) 5 Unit(s) SubCutaneous three times a day before meals  magnesium sulfate  IVPB 2 Gram(s) IV Intermittent once  melatonin 5 milliGRAM(s) Oral at bedtime  metoclopramide   Syrup 2.5 milliGRAM(s) Oral four times a day  metoprolol succinate  milliGRAM(s) Oral daily  montelukast 10 milliGRAM(s) Oral daily  nystatin Powder 1 Application(s) Topical every 12 hours  pantoprazole    Tablet 40 milliGRAM(s) Oral before breakfast  polyethylene glycol 3350 17 Gram(s) Oral two times a day  pregabalin 25 milliGRAM(s) Oral two times a day  senna 2 Tablet(s) Oral at bedtime  tiotropium 2.5 MICROgram(s) Inhaler 2 Puff(s) Inhalation daily    MEDICATIONS  (PRN):  dextrose Oral Gel 15 Gram(s) Oral once PRN Blood Glucose LESS THAN 70 milliGRAM(s)/deciliter  lidocaine   4% Patch 1 Patch Transdermal every 24 hours PRN pain  oxyCODONE    IR 2.5 milliGRAM(s) Oral every 6 hours PRN Moderate Pain (4 - 6)      LABS:                        10.4   7.63  )-----------( 67       ( 25 May 2024 05:46 )             32.8     05-25    141  |  102  |  58<H>  ----------------------------<  79  3.5   |  27  |  2.0<H>    Ca    8.2<L>      25 May 2024 05:46  Phos  3.8     05-24  Mg     1.8     05-25    TPro  5.3<L>  /  Alb  3.0<L>  /  TBili  1.8<H>  /  DBili  x   /  AST  27  /  ALT  99<H>  /  AlkPhos  139<H>  05-24                RADIOLOGY & ADDITIONAL TESTS:    Imaging or report Personally Reviewed:  [x ] YES  [ ] NO    < from: US Abdomen Upper Quadrant Right (05.16.24 @ 13:09) >  IMPRESSION:  Cholelithiasis without ductal dilatation.    < end of copied text >      < from: CT Head No Cont (05.14.24 @ 19:48) >  IMPRESSION:    No evidence of acute intracranial pathology.    < end of copied text >      < from: MR Cervical Spine No Cont (05.10.24 @ 08:30) >  IMPRESSION:  Redemonstration of an acute type II dens fracture with associated   prevertebral soft tissue swelling. No evidence for ligamentous injury.    Mild multilevel degenerative changes as well as disc bulging without   significant spinal canal or neuroforaminal narrowing.      < end of copied text >      < from: CT Angio Neck w/ IV Cont (05.09.24 @ 17:33) >  IMPRESSION:    No evidence of major vascular stenosis, occlusion, or aneurysm.    No evidence of vascular occlusion, extravasation, dissection,   pseudoaneurysm, or other vascular injury.    Fracture of the odontoid and degenerative changes of the cervical spine   are again noted.      < end of copied text >      < from: CT Chest w/ IV Cont (05.09.24 @ 02:16) >  IMPRESSION:    No evidence of an acute traumatic solid organ or osseous injury.    Subxiphoid hernia pocket is slightly larger and now contains a short loop   of the transverse large bowel without evidence of obstruction at this   time.    < end of copied text >      < from: CT Head No Cont (05.09.24 @ 01:50) >  IMPRESSION:    CT HEAD:  No acute intracranial pathology.    Left frontal scalp hematoma.    CT FACIAL BONES:  No acute fracture of the face.    CT CERVICAL SPINE:  Acute type II dens fracture with minimal posterior displacement of the   odontoid process fragment.    < end of copied text >      < from: TTE Echo Complete w/o Contrast w/ Doppler (05.16.24 @ 10:32) >  Summary:   1. Left ventricular ejection fraction, by visual estimation, is 60 to   65%.   2. Hyperdynamic global left ventricular systolic function.   3. Mildly increased LV wall thickness.   4. Severely enlarged left atrium.   5. The mitral in-flow pattern reveals no discernable A-wave, therefore   no comment on diastolic function can be made.   6. Mildly enlarged right atrium.   7. Degenerative mitral valve.   8. Mild mitral valve regurgitation.   9. Moderately decreased mitral leaflet mobility.  10. Severe mitral annular calcification.  11. Moderate-severe tricuspid regurgitation.  12. Bioprosthesis in the aortic position.  13. Estimated pulmonary artery systolic pressure is 48.7 mmHg assuming a   right atrial pressure of 10 mmHg, which is consistent with mild pulmonary   hypertension.    < end of copied text >              Case discussed with resident and RN on rounds today    Care discussed with pt

## 2024-05-25 NOTE — PROGRESS NOTE ADULT - ASSESSMENT
77 y/o Woman with PMH of CKD 3b, chronic Afib (s/p watchman, not on AC), COPD not on home O2, Aortic stenosis w AVR (Bovine), DM, CAD s/p CABG 2016 (with bleeding complication), anemia of chronic disease and a recent admission for a fall presented to the ED 3 days after DC for fall w/ chief concern of NBNB N/V for 48hrs.  Patient admitted for AKASH on CKD, inadequate urine output and severe hyperKalemia.    1. Altered mental status possibly toxic and metabolic encephalopathy  back to baseline now - awake, alert, appropriate  hold CNS depressants  ammonia level 195 -> 27  now off lactulose    2. Septic (HR, WBC) shock present on admission due to Enterococcal UTI  E faecalis (VSE; S-ampi) in ucx 5/17  pt allergic to amoxicillin  completed course of meropenem 750 iv q24 for 8 days (5/16-5/23)  off pressors  tapered off hydrocortisone -> back on florinef 0.1 po q24    3. AKASH (likely ATN) on CKD3b; hyperkalemia (resolved); mild normocytic anemia  base Cr mid 1s  Cr trending down  off lokelma  f/u renal  rpt Phos 3.8 (pt NOT on phos binders now)  CT no hydronephrosis  renal following  IVF stopped and encourage PO intake  BMP in AM    4. Transaminitis  could have been from fall vs shock liver  CT A/P 5/9: cholelithiasis  RUQ U/S: GB stones    5. Thrombocytopenia - suspected due to sepsis  plt are usually in low 100s back to 2021  plt good until 5/17/24 and then dropped to 48 -> improving now  PF-4 Ab: negative  heme eval: smear reviewed: no schistocytes; no plt clumping; + dary cells; had bleeding gums in past  ASA and heparin SQ restarted  CBC in AM    6. DM w/ hyperglycemia  on glipizide at home  diabetic dash diet  FS qac/hs - goal 100-180  lantus 15 HS and lispro 5 units qAC    6. Suspected gastroparesis  pt took reglan this afternoon and monitor  reglan liquid 2.5mg po qac/hs   outpt GES    7. Constipation  bowel reg: PEG q12 + senna 2 hs  lactulose 20gm q3 x4 doses -> pt had BMs and now off lactulose    8. Rapid A fib   s/p Watchman 2021  chronic HFpEF, G3DD, moderate PHTN  H/O CAD s/p CABG   AS s/p SAVR (2016) (bovine)  back on ASA  EP consult appreciated: remote tele restarted, EKG today to check QTc  had extended holter as outpt for syncope; outpt f/u Dr Jaquez  amiodarone stopped  metoprolol ER 100mg po q24 - hold if BP < 100/60 or HR < 60  c/w doxazosin 2mg po qhs  echo: EF 60-65% with moderate to severe TR-- bovine aortic valve replaced    9. recent traumatic fall admission with discharge May 11; has C2 fx; back pain; facial hematoma (forehead); neuropathic pain in b/l legs  c/w [place] collar  NSx: stay in collar until outpatient fu   dens fracture noted on imaging   c/w lido patch q24  melatonin 5mg hs  oxy IR 2.5mg po q6 prn pain    bowel reg: PEG q24 + senna 2 hs  restarted lyrica 25mg po q12    10. asthma/COPD not on home O2  off O2 now  c/w symbicort, spiriva, singulair    11. DVT ppx: resumed Hep 5000 sc q8  continue PT      DNR/DNI  guarded prognosis      PROGRESS NOTE HANDOFF    Pending: improvement in renal function and platelet count, daily CBC, CMP for now, continue PT  pt informed of the plan of care  Disposition: STR at SNF

## 2024-05-26 NOTE — PROGRESS NOTE ADULT - SUBJECTIVE AND OBJECTIVE BOX
24H events:    Patient is a 78y old Female who presents with a chief complaint of N/V, AKASH on CKD, hyperkalmeia (25 May 2024 12:09)    Primary diagnosis of AKASH (acute kidney injury)      Day 1:  Day 2:  Day 3:     Today is hospital day 11d. This morning patient was seen and examined at bedside, resting comfortably in bed.    No acute or major events overnight.    Code Status:    Family communication:  Contact date:  Name of person contacted:  Relationship to patient:  Communication details:  What matters most:    PAST MEDICAL & SURGICAL HISTORY  Aortic stenosis    Diabetes    Asthma with COPD  many years since last attack    CAD (coronary artery disease), native coronary artery    Anemia    History of bleeding disorder  having work up 5/2/21- HAD WORKUP BUT NO DIAGNOSIS "NOTHING WAS FOUND"    History of transfusion reaction    Hiatal hernia    Stage 3 chronic kidney disease    H/O ascending aortic replacement  Bovine Replacement    S/P CABG x 1  complication of bleeding 2016    H/O total knee replacement, right  complicated with fx femur bars screws in femur- b/l knee replacement    S/P hysterectomy    Presence of Watchman left atrial appendage closure device      SOCIAL HISTORY:  Social History:      ALLERGIES:  Augmentin (Other)  penicillins (Hives; Rash)    MEDICATIONS:  STANDING MEDICATIONS  allopurinol 50 milliGRAM(s) Oral daily  aspirin  chewable 81 milliGRAM(s) Oral daily  budesonide  80 MICROgram(s)/formoterol 4.5 MICROgram(s) Inhaler 2 Puff(s) Inhalation two times a day  chlorhexidine 2% Cloths 1 Application(s) Topical daily  dextrose 10% Bolus 125 milliLiter(s) IV Bolus once  dextrose 5%. 1000 milliLiter(s) IV Continuous <Continuous>  dextrose 5%. 1000 milliLiter(s) IV Continuous <Continuous>  dextrose 50% Injectable 12.5 Gram(s) IV Push once  dextrose 50% Injectable 25 Gram(s) IV Push once  doxazosin 2 milliGRAM(s) Oral at bedtime  fludroCORTISONE 0.1 milliGRAM(s) Oral daily  glucagon  Injectable 1 milliGRAM(s) IntraMuscular once  heparin   Injectable 5000 Unit(s) SubCutaneous every 8 hours  insulin glargine Injectable (LANTUS) 15 Unit(s) SubCutaneous at bedtime  insulin lispro (ADMELOG) corrective regimen sliding scale   SubCutaneous three times a day before meals  insulin lispro Injectable (ADMELOG) 5 Unit(s) SubCutaneous three times a day before meals  melatonin 5 milliGRAM(s) Oral at bedtime  metoclopramide   Syrup 2.5 milliGRAM(s) Oral four times a day  metoprolol succinate  milliGRAM(s) Oral daily  montelukast 10 milliGRAM(s) Oral daily  nystatin Powder 1 Application(s) Topical every 12 hours  pantoprazole    Tablet 40 milliGRAM(s) Oral before breakfast  polyethylene glycol 3350 17 Gram(s) Oral two times a day  pregabalin 25 milliGRAM(s) Oral two times a day  senna 2 Tablet(s) Oral at bedtime  tiotropium 2.5 MICROgram(s) Inhaler 2 Puff(s) Inhalation daily    PRN MEDICATIONS  dextrose Oral Gel 15 Gram(s) Oral once PRN  lidocaine   4% Patch 1 Patch Transdermal every 24 hours PRN  oxyCODONE    IR 2.5 milliGRAM(s) Oral every 6 hours PRN    VITALS:   T(F): 97.4  HR: 87  BP: 106/53  RR: 18  SpO2: 100%    PHYSICAL EXAM:  GENERAL: NAD  HEAD:  Normocephalic, frontal hematoma  EYES:  conjunctiva and sclera clear  ENMT: Moist mucous membranes  neck: hard collar  NERVOUS SYSTEM:  Alert, awake, Good concentration  CHEST/LUNG: CTA b/l  HEART: IRR  ABDOMEN: Soft, Nontender, Nondistended; Bowel sounds present, obese  EXTREMITIES:   1+ LE edema  SKIN: ecchymoses, warm, dry    LABS:                        10.4   7.63  )-----------( 67       ( 25 May 2024 05:46 )             32.8     05-25    141  |  102  |  58<H>  ----------------------------<  79  3.5   |  27  |  2.0<H>    Ca    8.2<L>      25 May 2024 05:46  Phos  3.8     05-24  Mg     1.8     05-25    TPro  5.3<L>  /  Alb  3.0<L>  /  TBili  1.8<H>  /  DBili  x   /  AST  27  /  ALT  99<H>  /  AlkPhos  139<H>  05-24      Urinalysis Basic - ( 25 May 2024 05:46 )    Color: x / Appearance: x / SG: x / pH: x  Gluc: 79 mg/dL / Ketone: x  / Bili: x / Urobili: x   Blood: x / Protein: x / Nitrite: x   Leuk Esterase: x / RBC: x / WBC x   Sq Epi: x / Non Sq Epi: x / Bacteria: x                RADIOLOGY:

## 2024-05-26 NOTE — PROGRESS NOTE ADULT - SUBJECTIVE AND OBJECTIVE BOX
EVAN QUINN  78y Female    INTERVAL HPI/OVERNIGHT EVENTS:    pt feels OK  ate breakfast  motivated to continue physical therapy    T(F): 95.7 (05-26-24 @ 11:37), Max: 97.8 (05-25-24 @ 22:28)  HR: 97 (05-26-24 @ 11:37) (73 - 100)  BP: 105/72 (05-26-24 @ 11:37) (105/72 - 115/77)  RR: 18 (05-26-24 @ 11:37) (18 - 18)  SpO2: 97% (05-26-24 @ 11:37) (97% - 100%)  I&O's Summary    CAPILLARY BLOOD GLUCOSE      POCT Blood Glucose.: 166 mg/dL (26 May 2024 11:36)  POCT Blood Glucose.: 75 mg/dL (26 May 2024 08:32)  POCT Blood Glucose.: 62 mg/dL (26 May 2024 07:56)  POCT Blood Glucose.: 159 mg/dL (25 May 2024 21:49)  POCT Blood Glucose.: 192 mg/dL (25 May 2024 17:14)        PHYSICAL EXAM:  GENERAL: NAD  HEAD:  Normocephalic, frontal hematom  EYES:  conjunctiva and sclera clear  ENMT: Moist mucous membranes  neck: hard collar  NERVOUS SYSTEM:  Alert, awake, Good concentration  CHEST/LUNG: CTA b/l (only anterior auscultated)  HEART: IRR  ABDOMEN: Soft, Nontender, Nondistended  EXTREMITIES:  + LE edema b/l and right arm edema  SKIN: warm, dry    Consultant(s) Notes Reviewed:  [x ] YES  [ ] NO  Care Discussed with Consultants/Other Providers [ x] YES  [ ] NO    MEDICATIONS  (STANDING):  allopurinol 50 milliGRAM(s) Oral daily  aspirin  chewable 81 milliGRAM(s) Oral daily  budesonide  80 MICROgram(s)/formoterol 4.5 MICROgram(s) Inhaler 2 Puff(s) Inhalation two times a day  chlorhexidine 2% Cloths 1 Application(s) Topical daily  dextrose 10% Bolus 125 milliLiter(s) IV Bolus once  dextrose 5%. 1000 milliLiter(s) (50 mL/Hr) IV Continuous <Continuous>  dextrose 5%. 1000 milliLiter(s) (100 mL/Hr) IV Continuous <Continuous>  dextrose 50% Injectable 25 Gram(s) IV Push once  dextrose 50% Injectable 12.5 Gram(s) IV Push once  doxazosin 2 milliGRAM(s) Oral at bedtime  fludroCORTISONE 0.1 milliGRAM(s) Oral daily  glucagon  Injectable 1 milliGRAM(s) IntraMuscular once  heparin   Injectable 5000 Unit(s) SubCutaneous every 8 hours  insulin glargine Injectable (LANTUS) 10 Unit(s) SubCutaneous at bedtime  insulin lispro (ADMELOG) corrective regimen sliding scale   SubCutaneous three times a day before meals  insulin lispro Injectable (ADMELOG) 5 Unit(s) SubCutaneous three times a day before meals  melatonin 5 milliGRAM(s) Oral at bedtime  metoclopramide   Syrup 2.5 milliGRAM(s) Oral four times a day  metoprolol succinate  milliGRAM(s) Oral daily  montelukast 10 milliGRAM(s) Oral daily  nystatin Powder 1 Application(s) Topical every 12 hours  pantoprazole    Tablet 40 milliGRAM(s) Oral before breakfast  polyethylene glycol 3350 17 Gram(s) Oral two times a day  pregabalin 25 milliGRAM(s) Oral two times a day  senna 2 Tablet(s) Oral at bedtime  tiotropium 2.5 MICROgram(s) Inhaler 2 Puff(s) Inhalation daily    MEDICATIONS  (PRN):  dextrose Oral Gel 15 Gram(s) Oral once PRN Blood Glucose LESS THAN 70 milliGRAM(s)/deciliter  lidocaine   4% Patch 1 Patch Transdermal every 24 hours PRN pain  oxyCODONE    IR 2.5 milliGRAM(s) Oral every 6 hours PRN Moderate Pain (4 - 6)      LABS:                        10.8   7.03  )-----------( 81       ( 26 May 2024 05:39 )             35.7     05-26    143  |  103  |  52<H>  ----------------------------<  59<L>  3.9   |  30  |  1.9<H>    Ca    8.4      26 May 2024 05:39  Phos  3.8     05-24  Mg     2.0     05-26    TPro  5.0<L>  /  Alb  2.9<L>  /  TBili  1.5<H>  /  DBili  x   /  AST  21  /  ALT  64<H>  /  AlkPhos  131<H>  05-26                        Case discussed with RN today    Care discussed with pt

## 2024-05-26 NOTE — PROGRESS NOTE ADULT - ASSESSMENT
79 y/o Woman with PMH of CKD 3b, chronic Afib (s/p watchman, not on AC), COPD not on home O2, Aortic stenosis w AVR (Bovine), DM, CAD s/p CABG 2016 (with bleeding complication), anemia of chronic disease and a recent admission for a fall presented to the ED 3 days after DC for fall w/ chief concern of NBNB N/V for 48hrs.  Patient admitted for AKASH on CKD, inadequate urine output and severe hyperKalemia.    1. Altered mental status possibly toxic and metabolic encephalopathy  back to baseline now - awake, alert, appropriate  hold CNS depressants  ammonia level 195 -> 27  now off lactulose    2. Septic (HR, WBC) shock present on admission due to Enterococcal UTI  E faecalis (VSE; S-ampi) in ucx 5/17  pt allergic to amoxicillin  completed course of meropenem 750 iv q24 for 8 days (5/16-5/23)  off pressors  tapered off hydrocortisone -> back on florinef 0.1 po q24    3. AKASH (likely ATN) on CKD3b; hyperkalemia (resolved); mild normocytic anemia  base Cr mid 1s  Cr trending down  off lokelma  f/u renal  rpt Phos 3.8 (pt NOT on phos binders now)  CT no hydronephrosis  renal following  IVF stopped and encourage PO intake  BMP in AM    4. Transaminitis - improving  could have been from fall vs shock liver  CT A/P 5/9: cholelithiasis  RUQ U/S: GB stones    5. Thrombocytopenia - suspected due to sepsis  plt are usually in low 100s back to 2021  plt good until 5/17/24 and then dropped to 48 -> improving now  PF-4 Ab: negative  heme eval: smear reviewed: no schistocytes; no plt clumping; + dary cells; had bleeding gums in past  ASA and heparin SQ restarted  CBC in AM    6. DM w/ hyperglycemia  on glipizide at home  diabetic dash diet  FS qac/hs - goal 100-180  FS low today - lantus reduced to 10 units qHS, continue lispro 5 units qAC and monitor    6. Suspected gastroparesis  pt taking reglan with improvement  reglan liquid 2.5mg po qac/hs   outpt GES    7. Constipation  bowel reg: PEG q12 + senna 2 hs  lactulose 20gm q3 x4 doses -> pt had BMs and now off lactulose    8. Rapid A fib   s/p Watchman 2021  chronic HFpEF, G3DD, moderate PHTN  H/O CAD s/p CABG   AS s/p SAVR (2016) (bovine)  back on ASA  EP consult appreciated: remote tele restarted, EKG 5/25 reviewed: QTc 446msec (improved)  had extended holter as outpt for syncope; outpt f/u Dr Jaquez  amiodarone stopped  metoprolol ER 100mg po q24 - hold if BP < 100/60 or HR < 60  c/w doxazosin 2mg po qhs  echo: EF 60-65% with moderate to severe TR-- bovine aortic valve replaced    9. recent traumatic fall admission with discharge May 11; has C2 fx; back pain; facial hematoma (forehead); neuropathic pain in b/l legs  c/w [place] collar  NSx: stay in collar until outpatient fu   dens fracture noted on imaging   c/w lido patch q24  melatonin 5mg hs  oxy IR 2.5mg po q6 prn pain    bowel reg: PEG q24 + senna 2 hs  restarted lyrica 25mg po q12    10. asthma/COPD not on home O2  off O2 now  c/w symbicort, spiriva, singulair    11. DVT ppx: resumed Hep 5000 sc q8  continue PT      DNR/DNI  guarded prognosis      PROGRESS NOTE HANDOFF    Pending: improvement in renal function and platelet count, daily CBC, CMP for now, continue PT  pt informed of the plan of care  Disposition: STR at SNF

## 2024-05-26 NOTE — PROGRESS NOTE ADULT - ASSESSMENT
77 y/o Woman with PMH of CKD 3b, chronic Afib (s/p watchman, not on AC), COPD not on home O2, Aortic stenosis w AVR (Bovine), DM, CAD s/p CABG 2016 (with bleeding complication), anemia of chronic disease and a recent admission for a fall presented to the ED 3 days after DC for fall w/ chief concern of NBNB N/V for 48hrs.  Patient admitted for AKASH on CKD, inadequate urine output and severe hyperKalemia.    1. Altered mental status possibly toxic and metabolic encephalopathy  back to baseline now - awake, alert, appropriate  hold CNS depressants  ammonia level 195 -> 27  now off lactulose    2. Septic (HR, WBC) shock present on admission due to Enterococcal UTI  E faecalis (VSE; S-ampi) in ucx 5/17  pt allergic to amoxicillin  completed course of meropenem 750 iv q24 for 8 days (5/16-5/23)  off pressors  tapered off hydrocortisone -> back on florinef 0.1 po q24    3. AKASH (likely ATN) on CKD3b; hyperkalemia (resolved); mild normocytic anemia  base Cr mid 1s  Cr trending down  off lokelma  f/u renal  rpt Phos 3.8 (pt NOT on phos binders now)  CT no hydronephrosis  renal following  IVF stopped and encourage PO intake  BMP in AM    4. Transaminitis  could have been from fall vs shock liver  CT A/P 5/9: cholelithiasis  RUQ U/S: GB stones    5. Thrombocytopenia - suspected due to sepsis  plt are usually in low 100s back to 2021  plt good until 5/17/24 and then dropped to 48 -> improving now  PF-4 Ab: negative  heme eval: smear reviewed: no schistocytes; no plt clumping; + dary cells; had bleeding gums in past  ASA and heparin SQ restarted  CBC in AM    6. DM w/ hyperglycemia  on glipizide at home  diabetic dash diet  FS qac/hs - goal 100-180  lantus 15 HS and lispro 5 units qAC    6. Suspected gastroparesis  pt took reglan this afternoon and monitor  reglan liquid 2.5mg po qac/hs   outpt GES    7. Constipation  bowel reg: PEG q12 + senna 2 hs  lactulose 20gm q3 x4 doses -> pt had BMs and now off lactulose    8. Rapid A fib   s/p Watchman 2021  chronic HFpEF, G3DD, moderate PHTN  H/O CAD s/p CABG   AS s/p SAVR (2016) (bovine)  back on ASA  EP consult appreciated: remote tele restarted, EKG today to check QTc  had extended holter as outpt for syncope; outpt f/u Dr Jaquez  amiodarone stopped  metoprolol ER 100mg po q24 - hold if BP < 100/60 or HR < 60  c/w doxazosin 2mg po qhs  echo: EF 60-65% with moderate to severe TR-- bovine aortic valve replaced    9. recent traumatic fall admission with discharge May 11; has C2 fx; back pain; facial hematoma (forehead); neuropathic pain in b/l legs  c/w [place] collar  NSx: stay in collar until outpatient fu   dens fracture noted on imaging   c/w lido patch q24  melatonin 5mg hs  oxy IR 2.5mg po q6 prn pain    bowel reg: PEG q24 + senna 2 hs  restarted lyrica 25mg po q12    10. asthma/COPD not on home O2  off O2 now  c/w symbicort, spiriva, singulair    11. DVT ppx: resumed Hep 5000 sc q8  continue PT      DNR/DNI  guarded prognosis

## 2024-05-27 NOTE — PROGRESS NOTE ADULT - SUBJECTIVE AND OBJECTIVE BOX
SUBJECTIVE:    Patient is a 78y old Female who presents with a chief complaint of N/V, AKASH on CKD, hyperkalmeia (26 May 2024 11:49)    Currently admitted to medicine with the primary diagnosis of AKASH (acute kidney injury)       Today is hospital day 12d. This morning she is resting comfortably in bed and reports no new issues or overnight events.     PAST MEDICAL & SURGICAL HISTORY  Aortic stenosis    Diabetes    Asthma with COPD  many years since last attack    CAD (coronary artery disease), native coronary artery    Anemia    History of bleeding disorder  having work up 5/2/21- HAD WORKUP BUT NO DIAGNOSIS "NOTHING WAS FOUND"    History of transfusion reaction    Hiatal hernia    Stage 3 chronic kidney disease    H/O ascending aortic replacement  Bovine Replacement    S/P CABG x 1  complication of bleeding 2016    H/O total knee replacement, right  complicated with fx femur bars screws in femur- b/l knee replacement    S/P hysterectomy    Presence of Watchman left atrial appendage closure device      SOCIAL HISTORY:  Negative for smoking/alcohol/drug use.     ALLERGIES:  Augmentin (Other)  penicillins (Hives; Rash)    MEDICATIONS:  STANDING MEDICATIONS  allopurinol 50 milliGRAM(s) Oral daily  aspirin  chewable 81 milliGRAM(s) Oral daily  budesonide  80 MICROgram(s)/formoterol 4.5 MICROgram(s) Inhaler 2 Puff(s) Inhalation two times a day  chlorhexidine 2% Cloths 1 Application(s) Topical daily  dextrose 10% Bolus 125 milliLiter(s) IV Bolus once  dextrose 5%. 1000 milliLiter(s) IV Continuous <Continuous>  dextrose 5%. 1000 milliLiter(s) IV Continuous <Continuous>  dextrose 50% Injectable 25 Gram(s) IV Push once  dextrose 50% Injectable 12.5 Gram(s) IV Push once  doxazosin 2 milliGRAM(s) Oral at bedtime  fludroCORTISONE 0.1 milliGRAM(s) Oral daily  glucagon  Injectable 1 milliGRAM(s) IntraMuscular once  heparin   Injectable 5000 Unit(s) SubCutaneous every 8 hours  insulin glargine Injectable (LANTUS) 10 Unit(s) SubCutaneous at bedtime  insulin lispro (ADMELOG) corrective regimen sliding scale   SubCutaneous three times a day before meals  insulin lispro Injectable (ADMELOG) 5 Unit(s) SubCutaneous three times a day before meals  melatonin 5 milliGRAM(s) Oral at bedtime  metoclopramide   Syrup 2.5 milliGRAM(s) Oral four times a day  metoprolol succinate  milliGRAM(s) Oral daily  montelukast 10 milliGRAM(s) Oral daily  nystatin Powder 1 Application(s) Topical every 12 hours  pantoprazole    Tablet 40 milliGRAM(s) Oral before breakfast  polyethylene glycol 3350 17 Gram(s) Oral two times a day  pregabalin 25 milliGRAM(s) Oral two times a day  senna 2 Tablet(s) Oral at bedtime  tiotropium 2.5 MICROgram(s) Inhaler 2 Puff(s) Inhalation daily    PRN MEDICATIONS  dextrose Oral Gel 15 Gram(s) Oral once PRN  lidocaine   4% Patch 1 Patch Transdermal every 24 hours PRN  oxyCODONE    IR 2.5 milliGRAM(s) Oral every 6 hours PRN    VITALS:   T(F): 97.6  HR: 92  BP: 103/65  RR: 18  SpO2: 98%    LABS:                        10.5   6.41  )-----------( 90       ( 27 May 2024 06:42 )             35.1     05-27    142  |  103  |  45<H>  ----------------------------<  70  3.9   |  27  |  1.7<H>    Ca    8.6      27 May 2024 06:42  Mg     1.9     05-27    TPro  5.2<L>  /  Alb  3.2<L>  /  TBili  1.6<H>  /  DBili  x   /  AST  21  /  ALT  52<H>  /  AlkPhos  131<H>  05-27      Urinalysis Basic - ( 27 May 2024 06:42 )    Color: x / Appearance: x / SG: x / pH: x  Gluc: 70 mg/dL / Ketone: x  / Bili: x / Urobili: x   Blood: x / Protein: x / Nitrite: x   Leuk Esterase: x / RBC: x / WBC x   Sq Epi: x / Non Sq Epi: x / Bacteria: x                RADIOLOGY:    PHYSICAL EXAM:  GEN: neck brace  LUNGS: Clear to auscultation bilaterally   HEART: S1/S2 present. RRR.   ABD: Soft, non-tender, non-distended. Bowel sounds present  EXT: NC/NC/NE/2+PP/DÍAZ/Skin Intact.   NEURO: AAOX3    Intravenous access:   NG tube:   Wang Catheter:   Indwelling Urethral Catheter:     Connect To:  Straight Drainage/[place]    Indication:  Urinary Retention / Obstruction (05-19-24 @ 06:02) (not performed) [Active]  Indwelling Urethral Catheter:     Connect To:  Straight Drainage/[place]    Indication:  Urinary Retention / Obstruction (05-18-24 @ 05:43) (not performed) [Active]  Indwelling Urethral Catheter:     Connect To:  Straight Drainage/[place]    Indication:  Urinary Retention / Obstruction (05-14-24 @ 22:37) (not performed) [Active]

## 2024-05-27 NOTE — PROVIDER CONTACT NOTE (OTHER) - ACTION/TREATMENT ORDERED:
No new orders at this time.
Metoprolol given x1 ivp, Cardizem given x1 ivp -HR now 115-120s. Per MD Vicente pending cardio consult, if HR >130s notify provider. No further interventions at this time.
MD placed new IV via ultrasound on L forearm 20 g.

## 2024-05-27 NOTE — PROVIDER CONTACT NOTE (OTHER) - SITUATION
HR 120s-130s
Notified via teams message
Notified via teams message
Notified in person on unit
Notified via teams message

## 2024-05-27 NOTE — PROGRESS NOTE ADULT - SUBJECTIVE AND OBJECTIVE BOX
EVAN QUINN  78y Female    INTERVAL HPI/OVERNIGHT EVENTS:    pt feels well - no complaints  right arm swelling improving with elevation    T(F): 97.6 (05-27-24 @ 05:24), Max: 97.8 (05-26-24 @ 21:29)  HR: 92 (05-27-24 @ 05:24) (87 - 92)  BP: 103/65 (05-27-24 @ 05:24) (103/65 - 126/62)  RR: 18 (05-27-24 @ 05:24) (18 - 18)  SpO2: 98% (05-26-24 @ 21:29) (98% - 98%)  I&O's Summary    CAPILLARY BLOOD GLUCOSE      POCT Blood Glucose.: 205 mg/dL (27 May 2024 10:54)  POCT Blood Glucose.: 90 mg/dL (27 May 2024 07:58)  POCT Blood Glucose.: 102 mg/dL (26 May 2024 22:16)  POCT Blood Glucose.: 120 mg/dL (26 May 2024 16:39)        PHYSICAL EXAM:  GENERAL: NAD  HEAD:  Normocephalic, frontal hematoma  EYES:  conjunctiva and sclera clear  ENMT: Moist mucous membranes  NERVOUS SYSTEM:  Alert, awake, Good concentration  CHEST/LUNG: CTA b/l  HEART: Regular rate and rhythm  ABDOMEN: Soft, Nontender, Nondistended  EXTREMITIES:   1+ LE edema  SKIN: warm, dry    Consultant(s) Notes Reviewed:  [x ] YES  [ ] NO  Care Discussed with Consultants/Other Providers [ x] YES  [ ] NO    MEDICATIONS  (STANDING):  allopurinol 50 milliGRAM(s) Oral daily  aspirin  chewable 81 milliGRAM(s) Oral daily  budesonide  80 MICROgram(s)/formoterol 4.5 MICROgram(s) Inhaler 2 Puff(s) Inhalation two times a day  chlorhexidine 2% Cloths 1 Application(s) Topical daily  dextrose 10% Bolus 125 milliLiter(s) IV Bolus once  dextrose 5%. 1000 milliLiter(s) (50 mL/Hr) IV Continuous <Continuous>  dextrose 5%. 1000 milliLiter(s) (100 mL/Hr) IV Continuous <Continuous>  dextrose 50% Injectable 12.5 Gram(s) IV Push once  dextrose 50% Injectable 25 Gram(s) IV Push once  doxazosin 2 milliGRAM(s) Oral at bedtime  glucagon  Injectable 1 milliGRAM(s) IntraMuscular once  heparin   Injectable 5000 Unit(s) SubCutaneous every 8 hours  insulin glargine Injectable (LANTUS) 10 Unit(s) SubCutaneous at bedtime  insulin lispro (ADMELOG) corrective regimen sliding scale   SubCutaneous three times a day before meals  insulin lispro Injectable (ADMELOG) 5 Unit(s) SubCutaneous three times a day before meals  melatonin 5 milliGRAM(s) Oral at bedtime  metoclopramide   Syrup 2.5 milliGRAM(s) Oral four times a day  metoprolol succinate  milliGRAM(s) Oral daily  montelukast 10 milliGRAM(s) Oral daily  nystatin Powder 1 Application(s) Topical every 12 hours  pantoprazole    Tablet 40 milliGRAM(s) Oral before breakfast  polyethylene glycol 3350 17 Gram(s) Oral two times a day  pregabalin 25 milliGRAM(s) Oral two times a day  senna 2 Tablet(s) Oral at bedtime  tiotropium 2.5 MICROgram(s) Inhaler 2 Puff(s) Inhalation daily    MEDICATIONS  (PRN):  dextrose Oral Gel 15 Gram(s) Oral once PRN Blood Glucose LESS THAN 70 milliGRAM(s)/deciliter  lidocaine   4% Patch 1 Patch Transdermal every 24 hours PRN pain  oxyCODONE    IR 2.5 milliGRAM(s) Oral every 6 hours PRN Moderate Pain (4 - 6)      LABS:                        10.5   6.41  )-----------( 90       ( 27 May 2024 06:42 )             35.1     05-27    142  |  103  |  45<H>  ----------------------------<  70  3.9   |  27  |  1.7<H>    Ca    8.6      27 May 2024 06:42  Mg     1.9     05-27    TPro  5.2<L>  /  Alb  3.2<L>  /  TBili  1.6<H>  /  DBili  x   /  AST  21  /  ALT  52<H>  /  AlkPhos  131<H>  05-27                            Case discussed with residents and RN on rounds today    Care discussed with pt

## 2024-05-27 NOTE — PROGRESS NOTE ADULT - ASSESSMENT
78 MARIZA w a PMH of CKD 3b, Afib (s/p watchman not on AC), COPD not on home O2,  Aortic stenosis w H/O ascending aortic replacement Bovine Replacement, DM , CAD s/p CABG 2016 (w bleeding complication) , anemia of chronic disease and a recent admission for a fall presents to the ED 3 days after DC for fall for the chief concern of NBNB N/V for 48hrs.  Patient admitted for AKASH on CKD, inadequate urine output and severe hyperKalemia     AMS - resolved  hold cns depressants  -ammonia level 195  -d/c lactulose     Shock on pressors r/o cardiogenic vs septic - resolved  -E faecalis in ucx 5/17 allergic to amoxicillin  -hold diuretics  -off dobutamine, off levo, off fluids  -completed meropenem course   - s/p hydrocortisone  - c/w fludrocortisone    AKASH on CKD3b - improving  - CT no hydro  - hyperkalemia w/o EKG changes - resolved  - s/p lokelma, sodium bicarb, and IVF  - nephro recs appreciated     #Thrombocytopenia - improved  - BL in 100s. Down to 48K  - unknown etiology   - HIT negative  - Heme/onc appreciated. Likely 2/2 to sepsis  - restart ASA and AC    Rapid A fib  Ho HFpEF, G3DD, moderate PH  Ho CAD s/p CABG  Ho AS s/p SAVR (2016),   HO Atrial fibrillation s/p watchman (2021)  - echo -- EF 60-65% with moderate to severe TR-- bovine aortic valve replaced  - s/p amio  - start Tropol XL 100mg daily   - f/u cardio outpatient    Transaminitis iso shock - resolving  -ruqus- cholethithias , no cholecystitis   -ammonia elevated  -d/c lactulose    Ho recent traumatic fall admission w DC May 11  - in c collar, conulsted nsgx, will stay in collar until outpatient fu   - dens fracture noted on imaging     HO asthma/COPD not on home O2    DNR/DNI    Anticipate DC to [facility]

## 2024-05-27 NOTE — PROVIDER CONTACT NOTE (OTHER) - REASON
Suspected IV infiltration
Increased HR on tele monitoring
Pt refusal of bowel medications
Elevation in HR on Tele
-130s

## 2024-05-27 NOTE — PROGRESS NOTE ADULT - ASSESSMENT
79 y/o Woman with PMH of CKD 3b, chronic Afib (s/p watchman, not on AC), COPD not on home O2, Aortic stenosis w AVR (Bovine), DM, CAD s/p CABG 2016 (with bleeding complication), anemia of chronic disease and a recent admission for a fall presented to the ED 3 days after DC for fall w/ chief concern of NBNB N/V for 48hrs.  Patient admitted for AKASH on CKD, inadequate urine output and severe hyperKalemia.    1. Altered mental status possibly toxic and metabolic encephalopathy  back to baseline now - awake, alert, appropriate  hold CNS depressants  ammonia level 195 -> 27  now off lactulose    2. Septic (HR, WBC) shock present on admission due to Enterococcal UTI  E faecalis (VSE; S-ampi) in ucx 5/17  pt allergic to amoxicillin  completed course of meropenem 750 iv q24 for 8 days (5/16-5/23)  off pressors  tapered off hydrocortisone -> on florinef 0.1 po q24 (? home med - if not, stop and monitor off)    3. AKASH (likely ATN) on CKD3b; hyperkalemia (resolved); mild normocytic anemia  base Cr mid 1s  Cr trending down  off lokelma  evaluated by renal  rpt Phos 3.8 (pt NOT on phos binders now)  CT no hydronephrosis  IVF stopped and encourage PO intake  BMP in AM  pt with LE edema - continue ambulation with PT and resume lasix 40mg daily on discharge if renal function continues to improve (continue to hold spironolactone and metolazone)    4. Transaminitis - improving  could have been from fall vs shock liver  CT A/P 5/9: cholelithiasis  RUQ U/S: GB stones    5. Thrombocytopenia - suspected due to sepsis  plt are usually in low 100s back to 2021  plt good until 5/17/24 and then dropped to 48 -> improving now  PF-4 Ab: negative  heme eval: smear reviewed: no schistocytes; no plt clumping; + dary cells; had bleeding gums in past  ASA and heparin SQ restarted  CBC in AM    6. DM w/ hyperglycemia  on glipizide at home  diabetic dash diet  FS qac/hs - goal 100-180  FS low 5/26 - lantus reduced to 10 units qHS, continue lispro 5 units qAC and monitor    6. Suspected gastroparesis  pt taking reglan with improvement  reglan liquid 2.5mg po qac/hs   outpt GES    7. Constipation  bowel reg: PEG q12 + senna 2 hs  lactulose 20gm q3 x4 doses -> pt had BMs and now off lactulose    8. Rapid A fib   s/p Watchman 2021  chronic HFpEF, G3DD, moderate PHTN  H/O CAD s/p CABG   AS s/p SAVR (2016) (bovine)  back on ASA  EP consult appreciated: remote tele restarted, EKG 5/25 reviewed: QTc 446msec (improved)  had extended holter as outpt for syncope; outpt f/u Dr Jaquez  amiodarone stopped  metoprolol ER 100mg po q24 - hold if BP < 100/60 or HR < 60  c/w doxazosin 2mg po qhs  echo: EF 60-65% with moderate to severe TR-- bovine aortic valve replaced    9. recent traumatic fall admission with discharge May 11; has C2 fx; back pain; facial hematoma (forehead); neuropathic pain in b/l legs  c/w [place] collar  NSx: stay in collar until outpatient fu   dens fracture noted on imaging   c/w lido patch q24  melatonin 5mg hs  oxy IR 2.5mg po q6 prn pain    bowel reg: PEG q24 + senna 2 hs  restarted lyrica 25mg po q12    10. asthma/COPD not on home O2  off O2 now  c/w symbicort, spiriva, singulair    11. DVT ppx: resumed Hep 5000 sc q8  continue PT      DNR/DNI  guarded prognosis      PROGRESS NOTE HANDOFF    Pending: discharge planning to STR, PT f/u, CBC and BMP in AM  pt informed of the plan of care  Disposition: STR at [facility] (need new PT note and auth per case management)

## 2024-05-27 NOTE — PROVIDER CONTACT NOTE (OTHER) - ASSESSMENT
RN contacted provider d/t swelling present at PERCY IV line noted at 1330 pm. MD came to bedside to assess patient. Patient receiving LR @ 75 mL/hr. No redness, warmth. Patient denies pain.     RN removed PERCY and elevated extremity.
Pt denies cp
Patient HR venkat to 145 reading as A-Fib on tele monitor. Patient in the 100-120 range, patient hr came down to  range. MD ordered metoprolol to have on hand if hr raises or pt becomes symptomatic. Patient denies chest pain, sob, or dizziness.
Patient had x2 large bms; pt refused miralax/lactulose, & reglan.
Patient noted to have hr in the 100-125 range on tele monitor reading as atrial fibrillation, patient asymptomatic and denies any dizziness, chest pain or sob. Also discussed during am team huddle.

## 2024-05-28 NOTE — PROGRESS NOTE ADULT - ASSESSMENT
77 y/o Woman with PMH of CKD 3b, chronic Afib (s/p watchman, not on AC), COPD not on home O2, Aortic stenosis w AVR (Bovine), DM, CAD s/p CABG 2016 (with bleeding complication), anemia of chronic disease and a recent admission for a fall presented to the ED 3 days after DC for fall w/ chief concern of NBNB N/V for 48hrs.  Patient admitted for AKASH on CKD, inadequate urine output and severe hyperKalemia.    #Altered mental status --likely toxic/metabolic encephalopathy  -resolved and now back to baseline  -avoid sedatives  -ammonia 195--->27    #Sepsis with septic shock- POA -- due to UTI (Enterococci)  -urine cx 5/17 E faecalis---pan sensitive  -ffinished course iv merrem (patient allergic to amoxicillin)  -moniottr wbc and fever curve  -off pressors  -tapered off hydrocortisone -> and will stop florinef 0.1 po q24 ? home med    # AKASH (likely ATN) on CKD stage 3b---stable  # hyperkalemia (resolved)  # mild normocytic anemia  -cr improved (down to 1.7 now)  -nephrology following  -CT no hydronephrosis  - encourage PO intake  pt with LE edema - continue ambulation with PT and resume lasix 40mg daily on discharge if renal function continues to improve (continue to hold spironolactone and metolazone)    # Transaminitis - improving-  -CT A/P 5/9: cholelithiasis  -RUQ U/S: GB stones    #Thrombocytopenia - suspected due to sepsis  -platelets baseline in low 100's  -down to plt 83k today---f/u repeat cbc in am  -if plt remain stable awating dc to STR ([facility])--pending bed avail and authorization  -PF-4 Ab: negative  -heme eval: smear reviewed: no schistocytes; no plt clumping; + dary cells; had bleeding gums in past  -ASA and heparin SQ restarted    #DM w/ hyperglycemia and suspected gastroparesis  -nutrition/diabetic diet  -cont to monitor FS qac/hs - goal 100-180  -continue lantus 10 u daily with lispro 5 u coverage with qac  -hba1c 7.4  -continue with reglan  -outpatient GES and GI F/u  -outpaitent endocrine f/u    #PAF---s/p watchman 2021  #chronic HFpEF, G3DD, moderate PHTN  #H/O CAD s/p CABG   #AS s/p SAVR (2016) (bovine)  -EPS f/u---consider possible outpatient ablation  -off amiodarone now---continue toprol xl 100 mg daily (hold if bp <100/60 or HR <60)  -echo: EF 60-65% with moderate to severe TR-- bovine aortic valve replaced    #recent traumatic fall (hospitlization discharged May 11  #[id number] with facial hematoma (forehead); neuropathic pain in b/l legs  -c/w [place] collar  -f/u neurosurgery recommendations  -lidocaine patch prn  -pain control  -lyrica 25 mg q12hr    #asthma/COPD not on home O2-stable  -c/w symbicort, spiriva, singulair  -nebs prn    DVT/GI ppx  guarded prognosis    Dispo planning to STR--anticipate likely dc in am if no further plan for inpatient EPS [id number] and if plt remain stable      Total time spent to complete patient's bedside assessment, review medical chart, discuss medical plan of care with covering medical team was more than 35 minutes  with >50% of time spendt face to face with patient, discussion with patient/family and/or coordination of care

## 2024-05-28 NOTE — PROGRESS NOTE ADULT - SUBJECTIVE AND OBJECTIVE BOX
SUBJECTIVE:    Patient is a 78y old Female who presents with a chief complaint of N/V, AKASH on CKD, hyperkalmeia (27 May 2024 12:14)    Currently admitted to medicine with the primary diagnosis of AKASH (acute kidney injury)       Today is hospital day 13d. This morning she is resting comfortably in bed and reports no new issues or overnight events.     PAST MEDICAL & SURGICAL HISTORY  Aortic stenosis    Diabetes    Asthma with COPD  many years since last attack    CAD (coronary artery disease), native coronary artery    Anemia    History of bleeding disorder  having work up 5/2/21- HAD WORKUP BUT NO DIAGNOSIS "NOTHING WAS FOUND"    History of transfusion reaction    Hiatal hernia    Stage 3 chronic kidney disease    H/O ascending aortic replacement  Bovine Replacement    S/P CABG x 1  complication of bleeding 2016    H/O total knee replacement, right  complicated with fx femur bars screws in femur- b/l knee replacement    S/P hysterectomy    Presence of Watchman left atrial appendage closure device      SOCIAL HISTORY:  Negative for smoking/alcohol/drug use.     ALLERGIES:  Augmentin (Other)  penicillins (Hives; Rash)    MEDICATIONS:  STANDING MEDICATIONS  allopurinol 50 milliGRAM(s) Oral daily  aspirin  chewable 81 milliGRAM(s) Oral daily  budesonide  80 MICROgram(s)/formoterol 4.5 MICROgram(s) Inhaler 2 Puff(s) Inhalation two times a day  chlorhexidine 2% Cloths 1 Application(s) Topical daily  dextrose 10% Bolus 125 milliLiter(s) IV Bolus once  dextrose 5%. 1000 milliLiter(s) IV Continuous <Continuous>  dextrose 5%. 1000 milliLiter(s) IV Continuous <Continuous>  dextrose 50% Injectable 25 Gram(s) IV Push once  dextrose 50% Injectable 12.5 Gram(s) IV Push once  doxazosin 2 milliGRAM(s) Oral at bedtime  glucagon  Injectable 1 milliGRAM(s) IntraMuscular once  heparin   Injectable 5000 Unit(s) SubCutaneous every 8 hours  insulin glargine Injectable (LANTUS) 10 Unit(s) SubCutaneous at bedtime  insulin lispro (ADMELOG) corrective regimen sliding scale   SubCutaneous three times a day before meals  insulin lispro Injectable (ADMELOG) 5 Unit(s) SubCutaneous three times a day before meals  melatonin 5 milliGRAM(s) Oral at bedtime  metoclopramide   Syrup 2.5 milliGRAM(s) Oral four times a day  metoprolol succinate  milliGRAM(s) Oral daily  montelukast 10 milliGRAM(s) Oral daily  nystatin Powder 1 Application(s) Topical every 12 hours  pantoprazole    Tablet 40 milliGRAM(s) Oral before breakfast  polyethylene glycol 3350 17 Gram(s) Oral two times a day  pregabalin 25 milliGRAM(s) Oral two times a day  senna 2 Tablet(s) Oral at bedtime  tiotropium 2.5 MICROgram(s) Inhaler 2 Puff(s) Inhalation daily    PRN MEDICATIONS  dextrose Oral Gel 15 Gram(s) Oral once PRN  lidocaine   4% Patch 1 Patch Transdermal every 24 hours PRN    VITALS:   T(F): 97  HR: 86  BP: 108/69  RR: 18  SpO2: 100%    LABS:                        10.6   4.96  )-----------( 83       ( 28 May 2024 07:29 )             35.6     05-28    140  |  102  |  38<H>  ----------------------------<  62<L>  3.8   |  28  |  1.6<H>    Ca    8.5      28 May 2024 07:29  Mg     1.7     05-28    TPro  5.2<L>  /  Alb  3.2<L>  /  TBili  1.6<H>  /  DBili  x   /  AST  21  /  ALT  52<H>  /  AlkPhos  131<H>  05-27      Urinalysis Basic - ( 28 May 2024 07:29 )    Color: x / Appearance: x / SG: x / pH: x  Gluc: 62 mg/dL / Ketone: x  / Bili: x / Urobili: x   Blood: x / Protein: x / Nitrite: x   Leuk Esterase: x / RBC: x / WBC x   Sq Epi: x / Non Sq Epi: x / Bacteria: x                RADIOLOGY:    PHYSICAL EXAM:  GEN: neck brace  LUNGS: Clear to auscultation bilaterally   HEART: S1/S2 present. RRR.   ABD: Soft, non-tender, non-distended. Bowel sounds present  EXT: NC/NC/NE/2+PP/DÍAZ/Skin Intact.   NEURO: AAOX3    Intravenous access:   NG tube:   Wang Catheter:   Indwelling Urethral Catheter:     Connect To:  Straight Drainage/[place]    Indication:  Urinary Retention / Obstruction (05-19-24 @ 06:02) (not performed) [Active]  Indwelling Urethral Catheter:     Connect To:  Straight Drainage/[place]    Indication:  Urinary Retention / Obstruction (05-18-24 @ 05:43) (not performed) [Active]  Indwelling Urethral Catheter:     Connect To:  Straight Drainage/[place]    Indication:  Urinary Retention / Obstruction (05-14-24 @ 22:37) (not performed) [Active]

## 2024-05-28 NOTE — PROGRESS NOTE ADULT - ASSESSMENT
78 MARIZA w a PMH of CKD 3b, Afib (s/p watchman not on AC), COPD not on home O2,  Aortic stenosis w H/O ascending aortic replacement Bovine Replacement, DM , CAD s/p CABG 2016 (w bleeding complication) , anemia of chronic disease and a recent admission for a fall presents to the ED 3 days after DC for fall for the chief concern of NBNB N/V for 48hrs.  Patient admitted for AKASH on CKD, inadequate urine output and severe hyperKalemia     AMS - resolved  hold cns depressants  -ammonia level 195  -d/c lactulose     Shock on pressors r/o cardiogenic vs septic - resolved  -E faecalis in ucx 5/17 allergic to amoxicillin  -hold diuretics  -off dobutamine, off levo, off fluids  -completed meropenem course   -s/p hydrocortisone and fludrocortisone    AKASH on CKD3b - improving  - CT no hydro  - hyperkalemia w/o EKG changes - resolved  - s/p lokelma, sodium bicarb, and IVF  - nephro recs appreciated     #Thrombocytopenia - improved  - BL in 100s. Down to 48K  - unknown etiology   - HIT negative  - Heme/onc appreciated. Likely 2/2 to sepsis  - restart ASA and AC    Rapid A fib  Ho HFpEF, G3DD, moderate PH  Ho CAD s/p CABG  Ho AS s/p SAVR (2016),   HO Atrial fibrillation s/p watchman (2021)  - echo -- EF 60-65% with moderate to severe TR-- bovine aortic valve replaced  - s/p amio  - start Tropol XL 100mg daily   - f/u cardio outpatient    Transaminitis iso shock - resolving  -ruqus- cholethithias , no cholecystitis   -ammonia elevated  -d/c lactulose    Ho recent traumatic fall admission w DC May 11  - in c collar, conulsted nsgx, will stay in collar until outpatient fu   - dens fracture noted on imaging     HO asthma/COPD not on home O2    DNR/DNI    Anticipate DC to [facility]    78 MARIZA w a PMH of CKD 3b, Afib (s/p watchman not on AC), COPD not on home O2,  Aortic stenosis w H/O ascending aortic replacement Bovine Replacement, DM , CAD s/p CABG 2016 (w bleeding complication) , anemia of chronic disease and a recent admission for a fall presents to the ED 3 days after DC for fall for the chief concern of NBNB N/V for 48hrs.  Patient admitted for AKASH on CKD, inadequate urine output and severe hyperKalemia     AMS - resolved  hold cns depressants  -ammonia level 195  -d/c lactulose     Shock on pressors r/o cardiogenic vs septic - resolved  -E faecalis in ucx 5/17 allergic to amoxicillin  -hold diuretics  -off dobutamine, off levo, off fluids  -completed meropenem course   -s/p hydrocortisone and fludrocortisone    AKASH on CKD3b - improving  - CT no hydro  - hyperkalemia w/o EKG changes - resolved  - s/p lokelma, sodium bicarb, and IVF  - nephro recs appreciated     #Thrombocytopenia - improved  - BL in 100s. Down to 48K  - unknown etiology   - HIT negative  - Heme/onc appreciated. Likely 2/2 to sepsis  - restart ASA and AC    Rapid A fib  Ho HFpEF, G3DD, moderate PH  Ho CAD s/p CABG  Ho AS s/p SAVR (2016),   HO Atrial fibrillation s/p watchman (2021)  - echo -- EF 60-65% with moderate to severe TR-- bovine aortic valve replaced  - s/p amio  - start Tropol XL 100mg daily   - possible ablation OP with EP  - f/u cardio and EP OP    Transaminitis iso shock - resolving  -ruqus- cholethithias , no cholecystitis   -ammonia elevated  -d/c lactulose    Ho recent traumatic fall admission w DC May 11  - in c collar, conulsted nsgx, will stay in collar until outpatient fu   - dens fracture noted on imaging     HO asthma/COPD not on home O2    DNR/DNI    Anticipate DC to [facility]

## 2024-05-28 NOTE — PROGRESS NOTE ADULT - SUBJECTIVE AND OBJECTIVE BOX
Patient is a 78y old  Female who presents with a chief complaint of N/V, AKASH on CKD, hyperkalmeia (28 May 2024 11:10)    HPI:  78 MARIZA w a PMH of CKD 3b, Afib (s/p watchman not on AC), COPD not on home O2,  Aortic stenosis w H/O ascending aortic replacement Bovine Replacement, DM , CAD s/p CABG 2016 (w bleeding complication) , anemia of chronic disease and a recent admission for a fall presents to the ED 3 days after DC for fall for the chief concern of NBNB N/V for 48hrs. Patient denies: hematemesis wrenching HA, palpitations, rash, diet or medication changes, diarrhea, fever, abdominal pain or cramping, hematochezia/melena. Patient admitted for AKASH on CKD, inadequate urine output and severe hyperKalemia    (15 May 2024 02:36)    PAST MEDICAL & SURGICAL HISTORY:  Aortic stenosis  Diabetes  Asthma with COPD  many years since last attack  CAD (coronary artery disease), native coronary artery  Anemia  History of bleeding disorder  having work up 5/2/21- HAD WORKUP BUT NO DIAGNOSIS "NOTHING WAS FOUND"  History of transfusion reaction  Hiatal hernia  Stage 3 chronic kidney disease  H/O ascending aortic replacement  Bovine Replacement  S/P CABG x 1  complication of bleeding 2016  H/O total knee replacement, right  complicated with fx femur bars screws in femur- b/l knee replacement  S/P hysterectomy  Presence of Watchman left atrial appendage closure device    patient seen and examined independently on morning rounds for the first time today, chart reviewed and discussed with the medicine resident and on interdisciplinary rounds:    no overnight events--awaiting dispo plan for STR ([place])    hospital day #13      Vital Signs Last 24 Hrs  T(C): 36.3 (28 May 2024 11:58), Max: 36.9 (27 May 2024 19:54)  T(F): 97.3 (28 May 2024 11:58), Max: 98.4 (27 May 2024 19:54)  HR: 85 (28 May 2024 11:58) (85 - 91)  BP: 98/57 (28 May 2024 11:58) (98/57 - 116/67)  BP(mean): 71 (28 May 2024 11:58) (71 - 71)  RR: 18 (28 May 2024 11:58) (18 - 18)  SpO2: 100% (28 May 2024 11:58) (100% - 100%)    PE:  GEN-NAD, +neck brace  PULM- Clear to auscultation bilaterally, fair air entry  CVS- +s1/s2 RRR  GI- soft NT ND +bs, no rebound, no guarding  EXT- trace LE edema                        10.6   4.96  )-----------( 83       ( 28 May 2024 07:29 )             35.6     05-28    140  |  102  |  38<H>  ----------------------------<  62<L>  3.8   |  28  |  1.6<H>    Ca    8.5      28 May 2024 07:29  Mg     1.7     05-28    TPro  5.2<L>  /  Alb  3.2<L>  /  TBili  1.6<H>  /  DBili  x   /  AST  21  /  ALT  52<H>  /  AlkPhos  131<H>  05-27        Urinalysis Basic - ( 28 May 2024 07:29 )    Color: x / Appearance: x / SG: x / pH: x  Gluc: 62 mg/dL / Ketone: x  / Bili: x / Urobili: x   Blood: x / Protein: x / Nitrite: x   Leuk Esterase: x / RBC: x / WBC x   Sq Epi: x / Non Sq Epi: x / Bacteria: x          MEDICATIONS  (STANDING):  allopurinol 50 milliGRAM(s) Oral daily  aspirin  chewable 81 milliGRAM(s) Oral daily  budesonide  80 MICROgram(s)/formoterol 4.5 MICROgram(s) Inhaler 2 Puff(s) Inhalation two times a day  chlorhexidine 2% Cloths 1 Application(s) Topical daily  dextrose 10% Bolus 125 milliLiter(s) IV Bolus once  dextrose 5%. 1000 milliLiter(s) (100 mL/Hr) IV Continuous <Continuous>  dextrose 5%. 1000 milliLiter(s) (50 mL/Hr) IV Continuous <Continuous>  dextrose 50% Injectable 12.5 Gram(s) IV Push once  dextrose 50% Injectable 25 Gram(s) IV Push once  doxazosin 2 milliGRAM(s) Oral at bedtime  glucagon  Injectable 1 milliGRAM(s) IntraMuscular once  heparin   Injectable 5000 Unit(s) SubCutaneous every 8 hours  insulin glargine Injectable (LANTUS) 10 Unit(s) SubCutaneous at bedtime  insulin lispro (ADMELOG) corrective regimen sliding scale   SubCutaneous three times a day before meals  insulin lispro Injectable (ADMELOG) 5 Unit(s) SubCutaneous three times a day before meals  melatonin 5 milliGRAM(s) Oral at bedtime  metoclopramide   Syrup 2.5 milliGRAM(s) Oral four times a day  metoprolol succinate  milliGRAM(s) Oral daily  montelukast 10 milliGRAM(s) Oral daily  nystatin Powder 1 Application(s) Topical every 12 hours  pantoprazole    Tablet 40 milliGRAM(s) Oral before breakfast  polyethylene glycol 3350 17 Gram(s) Oral two times a day  pregabalin 25 milliGRAM(s) Oral two times a day  senna 2 Tablet(s) Oral at bedtime  tiotropium 2.5 MICROgram(s) Inhaler 2 Puff(s) Inhalation daily

## 2024-05-29 NOTE — PROGRESS NOTE ADULT - ASSESSMENT
78 MARIZA w a PMH of CKD 3b, Afib (s/p watchman not on AC), COPD not on home O2,  Aortic stenosis w H/O ascending aortic replacement Bovine Replacement, DM , CAD s/p CABG 2016 (w bleeding complication) , anemia of chronic disease and a recent admission for a fall presents to the ED 3 days after DC for fall for the chief concern of NBNB N/V for 48hrs.  Patient admitted for AKASH on CKD, inadequate urine output and severe hyperKalemia     AMS - resolved  hold cns depressants  -ammonia level 195  -d/c lactulose     Shock on pressors r/o cardiogenic vs septic - resolved  -E faecalis in ucx 5/17 allergic to amoxicillin  -hold diuretics  -off dobutamine, off levo, off fluids  -completed meropenem course   -s/p hydrocortisone and fludrocortisone    AKASH on CKD3b - improving  - CT no hydro  - hyperkalemia w/o EKG changes - resolved  - s/p lokelma, sodium bicarb, and IVF  - nephro recs appreciated     #Thrombocytopenia - improved  - BL in 100s. Down to 48K  - unknown etiology   - HIT negative  - Heme/onc appreciated. Likely 2/2 to sepsis  - restart ASA and AC    Rapid A fib  Ho HFpEF, G3DD, moderate PH  Ho CAD s/p CABG  Ho AS s/p SAVR (2016),   HO Atrial fibrillation s/p watchman (2021)  - echo -- EF 60-65% with moderate to severe TR-- bovine aortic valve replaced  - s/p amio  - start Tropol XL 100mg daily   - possible ablation OP with EP  - f/u cardio and EP OP    Transaminitis iso shock - resolving  -ruqus- cholethithias , no cholecystitis   -ammonia elevated  -d/c lactulose    Ho recent traumatic fall admission w DC May 11  - in c collar, conulsted nsgx, will stay in collar until outpatient fu   - dens fracture noted on imaging     HO asthma/COPD not on home O2    DNR/DNI    Anticipate DC to [facility]    78 MARIZA w a PMH of CKD 3b, Afib (s/p watchman not on AC), COPD not on home O2,  Aortic stenosis w H/O ascending aortic replacement Bovine Replacement, DM , CAD s/p CABG 2016 (w bleeding complication) , anemia of chronic disease and a recent admission for a fall presents to the ED 3 days after DC for fall for the chief concern of NBNB N/V for 48hrs.  Patient admitted for AKASH on CKD, inadequate urine output and severe hyperKalemia     AMS - resolved  hold cns depressants  -ammonia level 195  -d/c lactulose     Shock on pressors r/o cardiogenic vs septic - resolved  -E faecalis in ucx 5/17 allergic to amoxicillin  -hold diuretics  -off dobutamine, off levo, off fluids  -completed meropenem course   -s/p hydrocortisone and fludrocortisone    AKASH on CKD3b - improving  - CT no hydro  - hyperkalemia w/o EKG changes - resolved  - s/p lokelma, sodium bicarb, and IVF  - nephro recs appreciated     #Thrombocytopenia - improved  - BL in 100s. Down to 48K  - unknown etiology   - HIT negative  - Heme/onc appreciated. Likely 2/2 to sepsis  - restart ASA and AC    Rapid A fib  Ho HFpEF, G3DD, moderate PH  Ho CAD s/p CABG  Ho AS s/p SAVR (2016),   HO Atrial fibrillation s/p watchman (2021)  - echo -- EF 60-65% with moderate to severe TR-- bovine aortic valve replaced  - s/p amio  - start Tropol XL 100mg daily   - possible ablation OP with EP  - f/u EP OP    Transaminitis iso shock - resolving  -ruqus- cholethithias , no cholecystitis   -ammonia elevated  -d/c lactulose    Ho recent traumatic fall admission w DC May 11  - in c collar, conulsted nsgx, will stay in collar until outpatient fu   - dens fracture noted on imaging     HO asthma/COPD not on home O2    DNR/DNI    Anticipate DC to [facility]

## 2024-05-29 NOTE — PROGRESS NOTE ADULT - REASON FOR ADMISSION
N/V, AKASH on CKD, hyperkalmeia
N/V, AKASH on CKD, hyperkalemia
N/V, AKASH on CKD, hyperkalmeia
N/V, AKASH on CKD, hyperkalemia
N/V, AKASH on CKD, hyperkalmeia

## 2024-05-29 NOTE — DISCHARGE NOTE NURSING/CASE MANAGEMENT/SOCIAL WORK - PATIENT PORTAL LINK FT
You can access the FollowMyHealth Patient Portal offered by [facility] by registering at the following website: [email or web address]. By joining [employer]’s FollowMyHealth portal, you will also be able to view your health information using other applications (apps) compatible with our system.

## 2024-05-29 NOTE — DISCHARGE NOTE NURSING/CASE MANAGEMENT/SOCIAL WORK - NSDCPEFALRISK_GEN_ALL_CORE
For information on Fall & Injury Prevention, visit: [email or web address] OR  [email or web address] OR  https://www.cdc.gov/steadi/patient.html

## 2024-05-29 NOTE — DISCHARGE NOTE NURSING/CASE MANAGEMENT/SOCIAL WORK - NSDCVIVACCINE_GEN_ALL_CORE_FT
Tdap; 09-May-2024 02:24; Arlene Araiza (BABITA); [employer]; [id number] (Exp. Date: 30-Nov-2025); IntraMuscular; Deltoid Left.; 0.5 milliLiter(s); VIS (VIS Published: 09-May-2013, VIS Presented: 09-May-2024);

## 2024-05-29 NOTE — PROGRESS NOTE ADULT - PROVIDER SPECIALTY LIST ADULT
Critical Care
Critical Care
Hospitalist
Hospitalist
Internal Medicine
MICU
MICU
Nephrology
Podiatry
Hospitalist
Internal Medicine
Internal Medicine
Nephrology
Hospitalist
Internal Medicine
MICU
MICU
Nephrology
Critical Care
Critical Care
Internal Medicine
Palliative Care
Internal Medicine
Internal Medicine
Infectious Disease

## 2024-05-29 NOTE — PROGRESS NOTE ADULT - SUBJECTIVE AND OBJECTIVE BOX
SUBJECTIVE:    Patient is a 78y old Female who presents with a chief complaint of N/V, AKASH on CKD, hyperkalmeia (28 May 2024 17:18)    Currently admitted to medicine with the primary diagnosis of AKASH (acute kidney injury)       Today is hospital day 14d. This morning she is resting comfortably in bed and reports no new issues or overnight events.     PAST MEDICAL & SURGICAL HISTORY  Aortic stenosis    Diabetes    Asthma with COPD  many years since last attack    CAD (coronary artery disease), native coronary artery    Anemia    History of bleeding disorder  having work up 5/2/21- HAD WORKUP BUT NO DIAGNOSIS "NOTHING WAS FOUND"    History of transfusion reaction    Hiatal hernia    Stage 3 chronic kidney disease    H/O ascending aortic replacement  Bovine Replacement    S/P CABG x 1  complication of bleeding 2016    H/O total knee replacement, right  complicated with fx femur bars screws in femur- b/l knee replacement    S/P hysterectomy    Presence of Watchman left atrial appendage closure device      SOCIAL HISTORY:  Negative for smoking/alcohol/drug use.     ALLERGIES:  Augmentin (Other)  penicillins (Hives; Rash)    MEDICATIONS:  STANDING MEDICATIONS  allopurinol 50 milliGRAM(s) Oral daily  aspirin  chewable 81 milliGRAM(s) Oral daily  budesonide  80 MICROgram(s)/formoterol 4.5 MICROgram(s) Inhaler 2 Puff(s) Inhalation two times a day  chlorhexidine 2% Cloths 1 Application(s) Topical daily  dextrose 10% Bolus 125 milliLiter(s) IV Bolus once  dextrose 5%. 1000 milliLiter(s) IV Continuous <Continuous>  dextrose 5%. 1000 milliLiter(s) IV Continuous <Continuous>  dextrose 50% Injectable 12.5 Gram(s) IV Push once  dextrose 50% Injectable 25 Gram(s) IV Push once  doxazosin 2 milliGRAM(s) Oral at bedtime  glucagon  Injectable 1 milliGRAM(s) IntraMuscular once  heparin   Injectable 5000 Unit(s) SubCutaneous every 8 hours  insulin glargine Injectable (LANTUS) 10 Unit(s) SubCutaneous at bedtime  insulin lispro (ADMELOG) corrective regimen sliding scale   SubCutaneous three times a day before meals  melatonin 5 milliGRAM(s) Oral at bedtime  metoclopramide   Syrup 2.5 milliGRAM(s) Oral four times a day  metoprolol succinate  milliGRAM(s) Oral daily  montelukast 10 milliGRAM(s) Oral daily  nystatin Powder 1 Application(s) Topical every 12 hours  pantoprazole    Tablet 40 milliGRAM(s) Oral before breakfast  polyethylene glycol 3350 17 Gram(s) Oral two times a day  pregabalin 25 milliGRAM(s) Oral two times a day  senna 2 Tablet(s) Oral at bedtime  tiotropium 2.5 MICROgram(s) Inhaler 2 Puff(s) Inhalation daily    PRN MEDICATIONS  dextrose Oral Gel 15 Gram(s) Oral once PRN  lidocaine   4% Patch 1 Patch Transdermal every 24 hours PRN    VITALS:   T(F): 97.7  HR: 69  BP: 100/61  RR: 18  SpO2: 100%    LABS:                        10.5   5.60  )-----------( 83       ( 29 May 2024 08:00 )             34.0     05-29    142  |  103  |  37<H>  ----------------------------<  79  4.2   |  x   |  1.7<H>    Ca    8.7      29 May 2024 08:00  Mg     2.0     05-29        Urinalysis Basic - ( 29 May 2024 08:00 )    Color: x / Appearance: x / SG: x / pH: x  Gluc: 79 mg/dL / Ketone: x  / Bili: x / Urobili: x   Blood: x / Protein: x / Nitrite: x   Leuk Esterase: x / RBC: x / WBC x   Sq Epi: x / Non Sq Epi: x / Bacteria: x                RADIOLOGY:    PHYSICAL EXAM:  GEN: neck brace  LUNGS: Clear to auscultation bilaterally   HEART: S1/S2 present. RRR.   ABD: Soft, non-tender, non-distended. Bowel sounds present  EXT: NC/NC/NE/2+PP/DÍAZ/Skin Intact.   NEURO: AAOX3    Intravenous access:   NG tube:   Wang Catheter:   Indwelling Urethral Catheter:     Connect To:  Straight Drainage/[place]    Indication:  Urinary Retention / Obstruction (05-19-24 @ 06:02) (not performed) [Active]  Indwelling Urethral Catheter:     Connect To:  Straight Drainage/[place]    Indication:  Urinary Retention / Obstruction (05-18-24 @ 05:43) (not performed) [Active]

## 2024-06-16 PROBLEM — N18.30 CHRONIC KIDNEY DISEASE, STAGE 3 UNSPECIFIED: Chronic | Status: ACTIVE | Noted: 2024-01-01

## 2024-06-20 NOTE — H&P ADULT - PATIENT'S PREFERRED PRONOUN
Body Location Override (Optional): Mid chest Which Doctor Performed Mohs? (Optional): Cornelio Cooper MD Her/She

## 2024-06-21 LAB
CULTURE RESULTS: SIGNIFICANT CHANGE UP
SPECIMEN SOURCE: SIGNIFICANT CHANGE UP

## 2024-06-24 DIAGNOSIS — N18.32 CHRONIC KIDNEY DISEASE, STAGE 3B: ICD-10-CM

## 2024-06-24 DIAGNOSIS — Z88.1 ALLERGY STATUS TO OTHER ANTIBIOTIC AGENTS STATUS: ICD-10-CM

## 2024-06-24 DIAGNOSIS — E87.20 ACIDOSIS, UNSPECIFIED: ICD-10-CM

## 2024-06-24 DIAGNOSIS — I48.91 UNSPECIFIED ATRIAL FIBRILLATION: ICD-10-CM

## 2024-06-24 DIAGNOSIS — L03.116 CELLULITIS OF LEFT LOWER LIMB: ICD-10-CM

## 2024-06-24 DIAGNOSIS — Z51.5 ENCOUNTER FOR PALLIATIVE CARE: ICD-10-CM

## 2024-06-24 DIAGNOSIS — G92.8 OTHER TOXIC ENCEPHALOPATHY: ICD-10-CM

## 2024-06-24 DIAGNOSIS — J18.9 PNEUMONIA, UNSPECIFIED ORGANISM: ICD-10-CM

## 2024-06-24 DIAGNOSIS — Z96.651 PRESENCE OF RIGHT ARTIFICIAL KNEE JOINT: ICD-10-CM

## 2024-06-24 DIAGNOSIS — D63.1 ANEMIA IN CHRONIC KIDNEY DISEASE: ICD-10-CM

## 2024-06-24 DIAGNOSIS — Z95.1 PRESENCE OF AORTOCORONARY BYPASS GRAFT: ICD-10-CM

## 2024-06-24 DIAGNOSIS — Z88.0 ALLERGY STATUS TO PENICILLIN: ICD-10-CM

## 2024-06-24 DIAGNOSIS — E11.65 TYPE 2 DIABETES MELLITUS WITH HYPERGLYCEMIA: ICD-10-CM

## 2024-06-24 DIAGNOSIS — E87.1 HYPO-OSMOLALITY AND HYPONATREMIA: ICD-10-CM

## 2024-06-24 DIAGNOSIS — N39.0 URINARY TRACT INFECTION, SITE NOT SPECIFIED: ICD-10-CM

## 2024-06-24 DIAGNOSIS — I27.20 PULMONARY HYPERTENSION, UNSPECIFIED: ICD-10-CM

## 2024-06-24 DIAGNOSIS — Z66 DO NOT RESUSCITATE: ICD-10-CM

## 2024-06-24 DIAGNOSIS — J45.909 UNSPECIFIED ASTHMA, UNCOMPLICATED: ICD-10-CM

## 2024-06-24 DIAGNOSIS — I50.33 ACUTE ON CHRONIC DIASTOLIC (CONGESTIVE) HEART FAILURE: ICD-10-CM

## 2024-06-24 DIAGNOSIS — F41.9 ANXIETY DISORDER, UNSPECIFIED: ICD-10-CM

## 2024-06-24 DIAGNOSIS — E11.22 TYPE 2 DIABETES MELLITUS WITH DIABETIC CHRONIC KIDNEY DISEASE: ICD-10-CM

## 2024-06-24 DIAGNOSIS — N17.9 ACUTE KIDNEY FAILURE, UNSPECIFIED: ICD-10-CM

## 2024-06-24 DIAGNOSIS — Z95.818 PRESENCE OF OTHER CARDIAC IMPLANTS AND GRAFTS: ICD-10-CM

## 2024-06-24 DIAGNOSIS — E87.5 HYPERKALEMIA: ICD-10-CM

## 2024-06-24 DIAGNOSIS — R65.21 SEVERE SEPSIS WITH SEPTIC SHOCK: ICD-10-CM

## 2024-06-24 DIAGNOSIS — Z79.84 LONG TERM (CURRENT) USE OF ORAL HYPOGLYCEMIC DRUGS: ICD-10-CM

## 2024-06-24 DIAGNOSIS — E66.9 OBESITY, UNSPECIFIED: ICD-10-CM

## 2024-06-24 DIAGNOSIS — Z95.3 PRESENCE OF XENOGENIC HEART VALVE: ICD-10-CM

## 2024-06-24 DIAGNOSIS — Z90.710 ACQUIRED ABSENCE OF BOTH CERVIX AND UTERUS: ICD-10-CM

## 2024-06-24 DIAGNOSIS — J44.9 CHRONIC OBSTRUCTIVE PULMONARY DISEASE, UNSPECIFIED: ICD-10-CM

## 2024-06-24 DIAGNOSIS — J44.0 CHRONIC OBSTRUCTIVE PULMONARY DISEASE WITH (ACUTE) LOWER RESPIRATORY INFECTION: ICD-10-CM

## 2024-06-24 DIAGNOSIS — D69.6 THROMBOCYTOPENIA, UNSPECIFIED: ICD-10-CM

## 2024-06-24 DIAGNOSIS — A41.81 SEPSIS DUE TO ENTEROCOCCUS: ICD-10-CM

## 2024-06-24 DIAGNOSIS — I35.0 NONRHEUMATIC AORTIC (VALVE) STENOSIS: ICD-10-CM

## 2024-10-14 NOTE — SWALLOW BEDSIDE ASSESSMENT ADULT - SWALLOW EVAL: RECOMMENDED DIET
Minced & moist diet w/mildly thick liquids. How Did The Hair Loss Occur?: gradual in onset How Severe Is Your Hair Loss?: moderate Additional History: Used minoxidil years ago but didn’t see improvement

## 2024-11-22 NOTE — PATIENT PROFILE ADULT - FALL HARM RISK - HARM RISK INTERVENTIONS
Surgery Assistance with ambulation/Assistance OOB with selected safe patient handling equipment/Communicate Risk of Fall with Harm to all staff/Discuss with provider need for PT consult/Monitor gait and stability/Provide patient with walking aids - walker, cane, crutches/Reinforce activity limits and safety measures with patient and family/Tailored Fall Risk Interventions/Visual Cue: Yellow wristband and red socks/Bed in lowest position, wheels locked, appropriate side rails in place/Call bell, personal items and telephone in reach/Instruct patient to call for assistance before getting out of bed or chair/Non-slip footwear when patient is out of bed/[place] to call system/Physically safe environment - no spills, clutter or unnecessary equipment/Purposeful Proactive Rounding/Room/bathroom lighting operational, light cord in reach Assistance with ambulation/Assistance OOB with selected safe patient handling equipment/Communicate Risk of Fall with Harm to all staff/Discuss with provider need for PT consult/Monitor gait and stability/Provide patient with walking aids - walker, cane, crutches/Reinforce activity limits and safety measures with patient and family/Tailored Fall Risk Interventions/Visual Cue: Yellow wristband and red socks/Bed in lowest position, wheels locked, appropriate side rails in place/Call bell, personal items and telephone in reach/Instruct patient to call for assistance before getting out of bed or chair/Non-slip footwear when patient is out of bed/[place] to call system/Physically safe environment - no spills, clutter or unnecessary equipment/Purposeful Proactive Rounding/Room/bathroom lighting operational, light cord in reach

## 2025-02-03 NOTE — OCCUPATIONAL THERAPY INITIAL EVALUATION ADULT - FINE MOTOR COORDINATION EXAM
"Since pain/discomfort has been ongoing for >4 weeks pt wanted to be seen.  Apt scheduled for 2/5/25 with PCP    Reason for Disposition   Patient wants to be seen    Additional Information   Negative: Similar pain previously and it was from \"heart attack\"   Negative: Similar pain previously from 'angina' and not relieved by nitroglycerin   Negative: Sounds like a life-threatening emergency to the triager   Negative: Followed a hand injury   Negative: Caused by an animal bite   Negative: Caused by a human bite   Negative: Caused by frostbite   Negative: Wound looks infected (e.g., spreading redness, pus)   Negative: Finger pain is main symptom   Negative: Wrist pain is main symptom   Negative: Swollen joint and fever   Negative: Red area or streak and fever   Negative: Patient sounds very sick or weak to the triager   Negative: SEVERE pain (e.g., excruciating, unable to use hand at all)   Negative: Looks infected (e.g., spreading redness, pus) and large red area (> 2 in. or 5 cm)   Negative: Looks like a boil, infected sore, deep ulcer or other infected rash (spreading redness, pus)   Negative: Localized rash is very painful (no fever)   Negative: Weakness (i.e., loss of strength) of new-onset in hand or fingers   Negative: Numbness (i.e., loss of sensation) in hand or fingers   Negative: MODERATE pain (e.g., interferes with normal activities) and present > 3 days   Negative: Pain is worsened or caused by bending the neck   Negative: Pain is worsened by using computer keyboard or mouse    Answer Assessment - Initial Assessment Questions  1. ONSET: \"When did the pain start?\"      4-5 weeks  2. LOCATION: \"Where is the pain located?\"      Left hand - below thumb - on pad of palm  3. PAIN: \"How bad is the pain?\" (Scale 1-10; or mild, moderate, severe)      4/10  4. 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 CAUSE: \"What do you think is causing the pain?\"      Unsure but has been lifting grandchild out of car seat- child is 3 years old  6. AGGRAVATING FACTORS: \"What makes the pain worse?\" (e.g., using computer)      none  7. OTHER SYMPTOMS: \"Do you have any other symptoms?\" (e.g., fever, neck pain, numbness or tingling, rash, swelling)      Some swelling- no numbness or tingling  8. PREGNANCY: \"Is there any chance you are pregnant?\" \"When was your last menstrual period?\"      N/a    Protocols used: Hand Pain-LINH Max RN    Triage Nurse  [facility]      " Left UE/Right UE

## 2025-02-20 NOTE — OCCUPATIONAL THERAPY INITIAL EVALUATION ADULT - EATING, PREVIOUS LEVEL OF FUNCTION, OT EVAL
Dr. Brian recommend you recheck comprehensive metabolic panel next week patient has a jump in her creatinine from 0.6-1.2 tell her to stay well hydrated avoid alcohol avoid NSAIDs that are not needed for cardiovascular protection in recheck those labs next week   independent

## 2025-06-25 NOTE — DISCHARGE NOTE PROVIDER - NSDCHHATTENDCERT_GEN_ALL_CORE
none
My signature below certifies that the above stated patient is homebound and upon completion of the Face-To-Face encounter, has the need for intermittent skilled nursing, physical therapy and/or speech or occupational therapy services in their home for their current diagnosis as outlined in their initial plan of care. These services will continue to be monitored by myself or another physician.

## 2025-07-17 NOTE — CONSULT NOTE ADULT - ATTENDING SUPERVISION STATEMENT
Normal vision: sees adequately in most situations; can see medication labels, newsprint
Fellow
Fellow